# Patient Record
Sex: FEMALE | Race: OTHER | Employment: FULL TIME | URBAN - METROPOLITAN AREA
[De-identification: names, ages, dates, MRNs, and addresses within clinical notes are randomized per-mention and may not be internally consistent; named-entity substitution may affect disease eponyms.]

---

## 2017-04-13 ENCOUNTER — HOSPITAL ENCOUNTER (OUTPATIENT)
Age: 27
Setting detail: SPECIMEN
Discharge: HOME | End: 2017-04-13
Payer: MEDICARE

## 2017-04-13 LAB
ABSOLUTE EOS #: 0.2 K/UL (ref 0–0.4)
ABSOLUTE LYMPH #: 4 K/UL (ref 1–4.8)
ABSOLUTE MONO #: 0.6 K/UL (ref 0.1–1.2)
ALBUMIN SERPL-MCNC: 4.1 G/DL (ref 3.5–5.2)
ALBUMIN/GLOBULIN RATIO: 1.4 (ref 1–2.5)
ALP BLD-CCNC: 106 U/L (ref 35–104)
ALT SERPL-CCNC: 14 U/L (ref 5–33)
ANION GAP SERPL CALCULATED.3IONS-SCNC: 16 MMOL/L (ref 9–17)
AST SERPL-CCNC: 8 U/L
BASOPHILS # BLD: 1 % (ref 0–2)
BASOPHILS ABSOLUTE: 0 K/UL (ref 0–0.2)
BILIRUB SERPL-MCNC: 0.18 MG/DL (ref 0.3–1.2)
BILIRUBIN DIRECT: 0.08 MG/DL
BILIRUBIN, INDIRECT: 0.1 MG/DL (ref 0–1)
BUN BLDV-MCNC: 8 MG/DL (ref 6–20)
BUN/CREAT BLD: ABNORMAL (ref 9–20)
CALCIUM SERPL-MCNC: 9.1 MG/DL (ref 8.6–10.4)
CHLORIDE BLD-SCNC: 98 MMOL/L (ref 98–107)
CHOLESTEROL/HDL RATIO: 16.9
CHOLESTEROL: 305 MG/DL
CO2: 22 MMOL/L (ref 20–31)
CREAT SERPL-MCNC: 0.43 MG/DL (ref 0.5–0.9)
DIFFERENTIAL TYPE: ABNORMAL
EOSINOPHILS RELATIVE PERCENT: 2 % (ref 1–4)
ESTIMATED AVERAGE GLUCOSE: 292 MG/DL
GFR AFRICAN AMERICAN: >60 ML/MIN
GFR NON-AFRICAN AMERICAN: >60 ML/MIN
GFR SERPL CREATININE-BSD FRML MDRD: ABNORMAL ML/MIN/{1.73_M2}
GFR SERPL CREATININE-BSD FRML MDRD: ABNORMAL ML/MIN/{1.73_M2}
GLOBULIN: ABNORMAL G/DL (ref 1.5–3.8)
GLUCOSE BLD-MCNC: 331 MG/DL (ref 70–99)
HBA1C MFR BLD: 11.8 % (ref 4–6)
HCT VFR BLD CALC: 48.2 % (ref 36–46)
HDLC SERPL-MCNC: 18 MG/DL
HEMOGLOBIN: 16.1 G/DL (ref 12–16)
LDL CHOLESTEROL DIRECT: 109 MG/DL
LDL CHOLESTEROL: ABNORMAL MG/DL (ref 0–130)
LYMPHOCYTES # BLD: 41 % (ref 24–44)
MCH RBC QN AUTO: 30 PG (ref 26–34)
MCHC RBC AUTO-ENTMCNC: 33.5 G/DL (ref 31–37)
MCV RBC AUTO: 89.8 FL (ref 80–100)
MONOCYTES # BLD: 6 % (ref 2–11)
PDW BLD-RTO: 13.5 % (ref 12.5–15.4)
PLATELET # BLD: 298 K/UL (ref 140–450)
PLATELET ESTIMATE: ABNORMAL
PMV BLD AUTO: 9.1 FL (ref 6–12)
POTASSIUM SERPL-SCNC: 4.3 MMOL/L (ref 3.7–5.3)
RBC # BLD: 5.36 M/UL (ref 4–5.2)
RBC # BLD: ABNORMAL 10*6/UL
SEG NEUTROPHILS: 50 % (ref 36–66)
SEGMENTED NEUTROPHILS ABSOLUTE COUNT: 4.8 K/UL (ref 1.8–7.7)
SODIUM BLD-SCNC: 136 MMOL/L (ref 135–144)
T3 FREE: 2.82 PG/ML (ref 2.02–4.43)
THYROXINE, FREE: 1.17 NG/DL (ref 0.93–1.7)
TOTAL PROTEIN: 7 G/DL (ref 6.4–8.3)
TRIGL SERPL-MCNC: 1425 MG/DL
TSH SERPL DL<=0.05 MIU/L-ACNC: 1.11 MIU/L (ref 0.3–5)
VLDLC SERPL CALC-MCNC: ABNORMAL MG/DL (ref 1–30)
WBC # BLD: 9.6 K/UL (ref 3.5–11)
WBC # BLD: ABNORMAL 10*3/UL

## 2017-05-12 ENCOUNTER — HOSPITAL ENCOUNTER (EMERGENCY)
Age: 27
Discharge: HOME | End: 2017-05-12
Attending: EMERGENCY MEDICINE
Payer: MEDICARE

## 2017-05-12 ENCOUNTER — APPOINTMENT (OUTPATIENT)
Dept: CT IMAGING | Age: 27
End: 2017-05-12
Payer: MEDICARE

## 2017-05-12 VITALS
HEART RATE: 83 BPM | TEMPERATURE: 98.7 F | SYSTOLIC BLOOD PRESSURE: 144 MMHG | OXYGEN SATURATION: 99 % | DIASTOLIC BLOOD PRESSURE: 80 MMHG | RESPIRATION RATE: 18 BRPM

## 2017-05-12 DIAGNOSIS — H92.02 OTALGIA OF LEFT EAR: Primary | ICD-10-CM

## 2017-05-12 DIAGNOSIS — H60.392 INFECTIVE OTITIS EXTERNA, LEFT: ICD-10-CM

## 2017-05-12 LAB
ABSOLUTE EOS #: 0.2 K/UL (ref 0–0.4)
ABSOLUTE LYMPH #: 2.7 K/UL (ref 1–4.8)
ABSOLUTE MONO #: 0.8 K/UL (ref 0.1–1.2)
ANION GAP SERPL CALCULATED.3IONS-SCNC: 15 MMOL/L (ref 9–17)
BASOPHILS # BLD: 0 %
BASOPHILS ABSOLUTE: 0 K/UL (ref 0–0.2)
BUN BLDV-MCNC: 5 MG/DL (ref 6–20)
BUN/CREAT BLD: ABNORMAL (ref 9–20)
C-REACTIVE PROTEIN: 17.1 MG/L (ref 0–5)
CALCIUM SERPL-MCNC: 9.2 MG/DL (ref 8.6–10.4)
CHLORIDE BLD-SCNC: 99 MMOL/L (ref 98–107)
CO2: 24 MMOL/L (ref 20–31)
CREAT SERPL-MCNC: 0.39 MG/DL (ref 0.5–0.9)
DIFFERENTIAL TYPE: ABNORMAL
EOSINOPHILS RELATIVE PERCENT: 2 %
GFR AFRICAN AMERICAN: >60 ML/MIN
GFR NON-AFRICAN AMERICAN: >60 ML/MIN
GFR SERPL CREATININE-BSD FRML MDRD: ABNORMAL ML/MIN/{1.73_M2}
GFR SERPL CREATININE-BSD FRML MDRD: ABNORMAL ML/MIN/{1.73_M2}
GLUCOSE BLD-MCNC: 257 MG/DL (ref 70–99)
HCG QUALITATIVE: NEGATIVE
HCT VFR BLD CALC: 44.4 % (ref 36–46)
HEMOGLOBIN: 14.9 G/DL (ref 12–16)
LYMPHOCYTES # BLD: 24 %
MCH RBC QN AUTO: 29.8 PG (ref 26–34)
MCHC RBC AUTO-ENTMCNC: 33.5 G/DL (ref 31–37)
MCV RBC AUTO: 88.9 FL (ref 80–100)
MONOCYTES # BLD: 7 %
PDW BLD-RTO: 13.6 % (ref 12.5–15.4)
PLATELET # BLD: 252 K/UL (ref 140–450)
PLATELET ESTIMATE: ABNORMAL
PMV BLD AUTO: 8.2 FL (ref 6–12)
POTASSIUM SERPL-SCNC: 3.7 MMOL/L (ref 3.7–5.3)
RBC # BLD: 4.99 M/UL (ref 4–5.2)
RBC # BLD: ABNORMAL 10*6/UL
SEG NEUTROPHILS: 67 %
SEGMENTED NEUTROPHILS ABSOLUTE COUNT: 7.8 K/UL (ref 1.8–7.7)
SODIUM BLD-SCNC: 138 MMOL/L (ref 135–144)
WBC # BLD: 11.5 K/UL (ref 3.5–11)
WBC # BLD: ABNORMAL 10*3/UL

## 2017-05-12 PROCEDURE — 86140 C-REACTIVE PROTEIN: CPT

## 2017-05-12 PROCEDURE — 6360000004 HC RX CONTRAST MEDICATION: Performed by: EMERGENCY MEDICINE

## 2017-05-12 PROCEDURE — 70481 CT ORBIT/EAR/FOSSA W/DYE: CPT

## 2017-05-12 PROCEDURE — 80048 BASIC METABOLIC PNL TOTAL CA: CPT

## 2017-05-12 PROCEDURE — 99283 EMERGENCY DEPT VISIT LOW MDM: CPT

## 2017-05-12 PROCEDURE — 84703 CHORIONIC GONADOTROPIN ASSAY: CPT

## 2017-05-12 PROCEDURE — 85025 COMPLETE CBC W/AUTO DIFF WBC: CPT

## 2017-05-12 RX ORDER — AMOXICILLIN 500 MG/1
500 CAPSULE ORAL 3 TIMES DAILY
Qty: 30 CAPSULE | Refills: 0 | Status: SHIPPED | OUTPATIENT
Start: 2017-05-12 | End: 2017-05-22

## 2017-05-12 RX ORDER — CIPROFLOXACIN AND DEXAMETHASONE 3; 1 MG/ML; MG/ML
4 SUSPENSION/ DROPS AURICULAR (OTIC) 2 TIMES DAILY
Qty: 1 BOTTLE | Refills: 0 | Status: SHIPPED | OUTPATIENT
Start: 2017-05-12 | End: 2017-05-19

## 2017-05-12 RX ADMIN — IOVERSOL 70 ML: 741 INJECTION INTRA-ARTERIAL; INTRAVENOUS at 17:22

## 2017-05-12 ASSESSMENT — ENCOUNTER SYMPTOMS
SHORTNESS OF BREATH: 0
ABDOMINAL PAIN: 0
RHINORRHEA: 0
BACK PAIN: 0
SINUS PRESSURE: 0
NAUSEA: 0
COUGH: 0
VOMITING: 0
SORE THROAT: 0

## 2018-06-27 ENCOUNTER — HOSPITAL ENCOUNTER (EMERGENCY)
Age: 28
Discharge: HOME | End: 2018-06-27
Attending: EMERGENCY MEDICINE
Payer: MEDICARE

## 2018-06-27 VITALS
TEMPERATURE: 98.4 F | RESPIRATION RATE: 16 BRPM | BODY MASS INDEX: 29.35 KG/M2 | DIASTOLIC BLOOD PRESSURE: 103 MMHG | OXYGEN SATURATION: 97 % | HEART RATE: 92 BPM | SYSTOLIC BLOOD PRESSURE: 157 MMHG | HEIGHT: 70 IN | WEIGHT: 205 LBS

## 2018-06-27 DIAGNOSIS — H01.001 BLEPHARITIS OF RIGHT UPPER EYELID, UNSPECIFIED TYPE: Primary | ICD-10-CM

## 2018-06-27 PROCEDURE — 99283 EMERGENCY DEPT VISIT LOW MDM: CPT

## 2018-06-27 RX ORDER — ERYTHROMYCIN 5 MG/G
OINTMENT OPHTHALMIC
Qty: 1 TUBE | Refills: 0 | Status: SHIPPED | OUTPATIENT
Start: 2018-06-27 | End: 2018-07-07

## 2018-06-27 ASSESSMENT — ENCOUNTER SYMPTOMS
VOMITING: 0
PHOTOPHOBIA: 0
FACIAL SWELLING: 1
NAUSEA: 0
SHORTNESS OF BREATH: 0
SINUS PRESSURE: 0
SINUS PAIN: 0
WHEEZING: 0

## 2019-09-17 ENCOUNTER — HOSPITAL ENCOUNTER (EMERGENCY)
Age: 29
Discharge: HOME | End: 2019-09-17
Attending: EMERGENCY MEDICINE

## 2019-09-17 VITALS
HEART RATE: 67 BPM | TEMPERATURE: 97.9 F | OXYGEN SATURATION: 100 % | RESPIRATION RATE: 17 BRPM | DIASTOLIC BLOOD PRESSURE: 98 MMHG | SYSTOLIC BLOOD PRESSURE: 158 MMHG

## 2019-09-17 DIAGNOSIS — S01.01XA LACERATION OF SCALP, INITIAL ENCOUNTER: Primary | ICD-10-CM

## 2019-09-17 PROCEDURE — 99282 EMERGENCY DEPT VISIT SF MDM: CPT

## 2019-09-17 PROCEDURE — 12001 RPR S/N/AX/GEN/TRNK 2.5CM/<: CPT

## 2019-09-17 PROCEDURE — 6360000002 HC RX W HCPCS: Performed by: NURSE PRACTITIONER

## 2019-09-17 PROCEDURE — 90715 TDAP VACCINE 7 YRS/> IM: CPT | Performed by: NURSE PRACTITIONER

## 2019-09-17 PROCEDURE — 90471 IMMUNIZATION ADMIN: CPT | Performed by: NURSE PRACTITIONER

## 2019-09-17 RX ORDER — ACETAMINOPHEN 500 MG
1000 TABLET ORAL EVERY 6 HOURS PRN
Qty: 30 TABLET | Refills: 0 | Status: SHIPPED | OUTPATIENT
Start: 2019-09-17 | End: 2020-09-12

## 2019-09-17 RX ORDER — LIDOCAINE HYDROCHLORIDE 10 MG/ML
20 INJECTION, SOLUTION INFILTRATION; PERINEURAL ONCE
Status: DISCONTINUED | OUTPATIENT
Start: 2019-09-17 | End: 2019-09-17 | Stop reason: HOSPADM

## 2019-09-17 RX ADMIN — TETANUS TOXOID, REDUCED DIPHTHERIA TOXOID AND ACELLULAR PERTUSSIS VACCINE, ADSORBED 0.5 ML: 5; 2.5; 8; 8; 2.5 SUSPENSION INTRAMUSCULAR at 15:05

## 2019-09-17 ASSESSMENT — PAIN SCALES - GENERAL: PAINLEVEL_OUTOF10: 10

## 2019-09-17 ASSESSMENT — PAIN DESCRIPTION - DESCRIPTORS: DESCRIPTORS: ACHING

## 2019-09-17 ASSESSMENT — PAIN DESCRIPTION - LOCATION: LOCATION: HEAD

## 2019-09-17 ASSESSMENT — PAIN DESCRIPTION - ORIENTATION: ORIENTATION: RIGHT

## 2019-09-17 ASSESSMENT — PAIN DESCRIPTION - PAIN TYPE: TYPE: ACUTE PAIN

## 2019-09-17 ASSESSMENT — PAIN DESCRIPTION - FREQUENCY: FREQUENCY: CONTINUOUS

## 2019-09-17 NOTE — ED PROVIDER NOTES
[address] [facility]     Emergency Department     Faculty Attestation    I performed a history and physical examination of the patient and discussed management with the resident. I reviewed the resident´s note and agree with the documented findings and plan of care. Any areas of disagreement are noted on the chart. I was personally present for the key portions of any procedures. I have documented in the chart those procedures where I was not present during the key portions. I have reviewed the emergency nurses triage note. I agree with the chief complaint, past medical history, past surgical history, allergies, medications, social and family history as documented unless otherwise noted below. For Physician Assistant/ Nurse Practitioner cases/documentation I have personally evaluated this patient and have completed at least one if not all key elements of the E/M (history, physical exam, and MDM). Additional findings are as noted. Superficial laceration right frontal scalp, in the hairline, no bony crepitus depression or step-offs, pupils equal round reactive to light, no midline C-spine pain. Needs tetanus update.      Ye Goncalves MD  09/17/19 2191

## 2019-09-17 NOTE — ED NOTES
Pt presents to the ER with complaints of right sided head pain and a small laceration from falling and hitting her head on the TV stand. Pt states she tripped and denies and LOC. She took 2 tylenol in route to the ED. VSS. Bleeding controlled.       Karan Aguilar RN  09/17/19 8021

## 2019-09-17 NOTE — ED PROVIDER NOTES
[facility] ED  Emergency Department Encounter  Mid Level Provider     Pt Name: Megha Randolph  MRN: [id number]  [place] 1990  Date of evaluation: 9/17/19  PCP:  No primary care provider on file. CHIEF COMPLAINT       Chief Complaint   Patient presents with    Laceration     tripped and hit her head on her TV stand. small lac. no LOC. HISTORY OF PRESENT ILLNESS  (Location/Symptom, Timing/Onset,Context/Setting, Quality, Duration, Modifying Factors, Severity.)      Megha Randolph is a 26 y.o. female who presents with accidental trip and fall striking her head on the corner of door. No LOC. Not anticoagulated. Patient with scalp laceration. Unknown last tetanus. Alert oriented without acute distress    PAST MEDICAL /SURGICAL / SOCIAL / FAMILY HISTORY      has a past medical history of Bipolar 1 disorder ([facility] [address] Chest pain, Depression, Hyperlipidemia, Hypertension, Psychiatric problem, and Type II or unspecified type diabetes mellitus without mention of complication, uncontrolled. has no past surgical history on file.     Social History     Socioeconomic History    Marital status: Single     Spouse name: Not on file    Number of children: Not on file    Years of education: Not on file    Highest education level: Not on file   Occupational History    Not on file   Social Needs    Financial resource strain: Not on file    Food insecurity:     Worry: Not on file     Inability: Not on file    Transportation needs:     Medical: Not on file     Non-medical: Not on file   Tobacco Use    Smoking status: Former Smoker     Packs/day: 0.25     Types: Cigarettes   Substance and Sexual Activity    Alcohol use: No    Drug use: No    Sexual activity: Yes     Partners: Female   Lifestyle    Physical activity:     Days per week: Not on file     Minutes per session: Not on file    Stress: Not on file   Relationships    Social connections:     Talks on phone: Not on file

## 2019-09-25 ENCOUNTER — HOSPITAL ENCOUNTER (EMERGENCY)
Age: 29
Discharge: HOME | End: 2019-09-25
Attending: EMERGENCY MEDICINE

## 2019-09-25 VITALS
DIASTOLIC BLOOD PRESSURE: 94 MMHG | HEART RATE: 100 BPM | TEMPERATURE: 97.9 F | SYSTOLIC BLOOD PRESSURE: 145 MMHG | OXYGEN SATURATION: 97 % | RESPIRATION RATE: 20 BRPM

## 2019-09-25 DIAGNOSIS — Z48.02 REMOVAL OF STAPLES: Primary | ICD-10-CM

## 2019-09-25 PROCEDURE — 99281 EMR DPT VST MAYX REQ PHY/QHP: CPT

## 2020-09-12 ENCOUNTER — HOSPITAL ENCOUNTER (EMERGENCY)
Age: 30
Discharge: HOME | End: 2020-09-12
Attending: EMERGENCY MEDICINE
Payer: COMMERCIAL

## 2020-09-12 VITALS
HEART RATE: 87 BPM | BODY MASS INDEX: 27.06 KG/M2 | SYSTOLIC BLOOD PRESSURE: 123 MMHG | RESPIRATION RATE: 14 BRPM | WEIGHT: 189 LBS | DIASTOLIC BLOOD PRESSURE: 79 MMHG | TEMPERATURE: 97.5 F | OXYGEN SATURATION: 98 % | HEIGHT: 70 IN

## 2020-09-12 PROCEDURE — 96372 THER/PROPH/DIAG INJ SC/IM: CPT

## 2020-09-12 PROCEDURE — 6360000002 HC RX W HCPCS: Performed by: EMERGENCY MEDICINE

## 2020-09-12 PROCEDURE — 99283 EMERGENCY DEPT VISIT LOW MDM: CPT

## 2020-09-12 RX ORDER — IBUPROFEN 800 MG/1
800 TABLET ORAL EVERY 8 HOURS PRN
Qty: 30 TABLET | Refills: 0 | Status: SHIPPED | OUTPATIENT
Start: 2020-09-12 | End: 2021-03-10 | Stop reason: ALTCHOICE

## 2020-09-12 RX ORDER — CYCLOBENZAPRINE HCL 10 MG
10 TABLET ORAL 3 TIMES DAILY PRN
Qty: 12 TABLET | Refills: 0 | Status: SHIPPED | OUTPATIENT
Start: 2020-09-12 | End: 2021-03-10 | Stop reason: ALTCHOICE

## 2020-09-12 RX ORDER — DEXAMETHASONE SODIUM PHOSPHATE 4 MG/ML
4 INJECTION, SOLUTION INTRA-ARTICULAR; INTRALESIONAL; INTRAMUSCULAR; INTRAVENOUS; SOFT TISSUE ONCE
Status: COMPLETED | OUTPATIENT
Start: 2020-09-12 | End: 2020-09-12

## 2020-09-12 RX ADMIN — DEXAMETHASONE SODIUM PHOSPHATE 4 MG: 4 INJECTION, SOLUTION INTRAMUSCULAR; INTRAVENOUS at 08:32

## 2020-09-12 ASSESSMENT — PAIN DESCRIPTION - PAIN TYPE: TYPE: ACUTE PAIN

## 2020-09-12 ASSESSMENT — ENCOUNTER SYMPTOMS
NAUSEA: 0
BLOOD IN STOOL: 0
EYE PAIN: 0
CHEST TIGHTNESS: 0
SINUS PRESSURE: 0
VOMITING: 0
COUGH: 0
SHORTNESS OF BREATH: 0
EYE DISCHARGE: 0
FACIAL SWELLING: 0
RHINORRHEA: 0
CONSTIPATION: 0
DIARRHEA: 0
EYE REDNESS: 0
WHEEZING: 0
ABDOMINAL PAIN: 0
TROUBLE SWALLOWING: 0
SORE THROAT: 0
COLOR CHANGE: 0
BACK PAIN: 1

## 2020-09-12 ASSESSMENT — PAIN DESCRIPTION - LOCATION: LOCATION: BACK

## 2020-09-12 ASSESSMENT — PAIN SCALES - GENERAL: PAINLEVEL_OUTOF10: 10

## 2020-09-12 ASSESSMENT — PAIN DESCRIPTION - ORIENTATION: ORIENTATION: LOWER

## 2020-09-12 ASSESSMENT — PAIN DESCRIPTION - FREQUENCY: FREQUENCY: CONTINUOUS

## 2020-09-12 ASSESSMENT — PAIN DESCRIPTION - ONSET: ONSET: SUDDEN

## 2020-09-12 ASSESSMENT — PAIN DESCRIPTION - DIRECTION: RADIATING_TOWARDS: DOWN BOTH LEGS

## 2020-09-12 NOTE — ED NOTES
Mode of arrival (squad #, walk in, police, etc) : walk in       Arrival Note (brief scenario, treatment PTA, etc). : pt reports that 2 days ago she woke up and had lower back pain. She reports this pain radiates down both legs. Pt denies any recent falls and any heavy lifting. C= \"Have you ever felt that you should Cut down on your drinking? \"  No  A= \"Have people Annoyed you by criticizing your drinking? \"  No  G= \"Have you ever felt bad or Guilty about your drinking? \"  No  E= \"Have you ever had a drink as an Eye-opener first thing in the morning to steady your nerves or to help a hangover? \"  No      Deferred []      Reason for deferring: N/A    *If yes to two or more: probable alcohol abuse. Shanita Tao RN  09/12/20 0511

## 2020-09-12 NOTE — ED PROVIDER NOTES
[address] ED  eMERGENCY dEPARTMENT eNCOUnter      Pt Name: Jason James  MRN: [id number]  [place] 1990  Date of evaluation: 9/12/20      CHIEF COMPLAINT       Chief Complaint   Patient presents with    Back Pain         HISTORY OF PRESENT ILLNESS    Jason James is a 31 y.o. female who presents complaining of back pain. Patient states for the last 2 days she has been having severe bilateral lower midline back pain. Patient states she does not recall any trauma. Patient has had a problem like this once before that she went to the hospital for. Patient states she is getting pain numbness and tingling kind of shooting down both legs to the thigh. Patient is denying any bowel or bladder incontinence or retention. Patient states the pain is made worse with movements. REVIEW OF SYSTEMS       Review of Systems   Constitutional: Negative for activity change, appetite change, chills, diaphoresis and fever. HENT: Negative for congestion, ear pain, facial swelling, nosebleeds, rhinorrhea, sinus pressure, sore throat and trouble swallowing. Eyes: Negative for pain, discharge and redness. Respiratory: Negative for cough, chest tightness, shortness of breath and wheezing. Cardiovascular: Negative for chest pain, palpitations and leg swelling. Gastrointestinal: Negative for abdominal pain, blood in stool, constipation, diarrhea, nausea and vomiting. Genitourinary: Negative for difficulty urinating, dysuria, flank pain, frequency, genital sores and hematuria. Musculoskeletal: Positive for back pain. Negative for arthralgias, gait problem, joint swelling, myalgias and neck pain. Skin: Negative for color change, pallor, rash and wound. Neurological: Negative for dizziness, tremors, seizures, syncope, speech difficulty, weakness, numbness and headaches.    Psychiatric/Behavioral: Negative for confusion, decreased concentration, hallucinations, self-injury, sleep disturbance and suicidal ideas. PAST MEDICAL HISTORY     Past Medical History:   Diagnosis Date    Bipolar 1 disorder ([facility] [address]     Chest pain     Depression     Hyperlipidemia     tx started May 2015    Hypertension     tx started May 2015    Psychiatric problem     depression    Type II or unspecified type diabetes mellitus without mention of complication, uncontrolled 2/21/2015    tx started May 2015       SURGICAL HISTORY     No past surgical history on file. CURRENT MEDICATIONS       Current Discharge Medication List      CONTINUE these medications which have NOT CHANGED    Details   !! ibuprofen (ADVIL;MOTRIN) 800 MG tablet Take 1 tablet by mouth every 8 hours as needed for Pain  Qty: 30 tablet, Refills: 0      metFORMIN (GLUCOPHAGE) 500 MG tablet Take 500 mg by mouth 2 times daily (with meals)      Insulin Detemir (LEVEMIR FLEXPEN SC) Inject 25 Units into the skin Daily q am      !! QUEtiapine (SEROQUEL) 25 MG tablet Take 25 mg by mouth 2 times daily. !! QUEtiapine (SEROQUEL) 300 MG tablet Take 300 mg by mouth nightly. sertraline (ZOLOFT) 50 MG tablet Take 50 mg by mouth daily. atorvastatin (LIPITOR) 80 MG tablet Take 1 tablet by mouth nightly. Qty: 30 tablet, Refills: 3      sitaGLIPtan-metformin (JANUMET)  MG per tablet Take 1 tablet by mouth 2 times daily (with meals). Qty: 60 tablet, Refills: 1      Blood Glucose Monitoring Suppl (BLOOD GLUCOSE MONITOR KIT) KIT Check bs bid  Qty: 1 kit, Refills: 0       !! - Potential duplicate medications found. Please discuss with provider. ALLERGIES     has No Known Allergies. SOCIAL HISTORY      reports that she has quit smoking. Her smoking use included cigarettes. She smoked 0.25 packs per day. She does not have any smokeless tobacco history on file. She reports that she does not drink alcohol or use drugs.     PHYSICAL EXAM     INITIAL VITALS: /79   Pulse 87   Temp 97.5 °F (36.4 °C) (Oral)   Resp 14   Ht 5' 10\" (1.778 m) Wt 189 lb (85.7 kg)   SpO2 98%   BMI 27.12 kg/m²      Physical Exam  Vitals signs and nursing note reviewed. Constitutional:       General: She is not in acute distress. Appearance: She is well-developed. She is not diaphoretic. HENT:      Head: Normocephalic and atraumatic. Eyes:      General: No scleral icterus. Right eye: No discharge. Left eye: No discharge. Conjunctiva/sclera: Conjunctivae normal.      Pupils: Pupils are equal, round, and reactive to light. Cardiovascular:      Rate and Rhythm: Normal rate and regular rhythm. Heart sounds: Normal heart sounds. No murmur. No friction rub. No gallop. Pulmonary:      Effort: Pulmonary effort is normal. No respiratory distress. Breath sounds: Normal breath sounds. No wheezing or rales. Chest:      Chest wall: No tenderness. Abdominal:      General: Bowel sounds are normal. There is no distension. Palpations: Abdomen is soft. There is no mass. Tenderness: There is no abdominal tenderness. There is no guarding or rebound. Musculoskeletal:      Lumbar back: She exhibits decreased range of motion, tenderness, pain and spasm. She exhibits no bony tenderness, no swelling, no edema, no deformity, no laceration and normal pulse. Skin:     General: Skin is warm and dry. Coloration: Skin is not pale. Findings: No erythema or rash. Neurological:      Mental Status: She is alert and oriented to person, place, and time. Cranial Nerves: No cranial nerve deficit. Sensory: No sensory deficit. Motor: No abnormal muscle tone. Coordination: Coordination normal.      Deep Tendon Reflexes: Reflexes normal.   Psychiatric:         Behavior: Behavior normal.         Thought Content:  Thought content normal.         Judgment: Judgment normal.         DIAGNOSTIC RESULTS     RADIOLOGY:All plain film, CT,MRI, and formal ultrasound images (except ED bedside ultrasound) are read by the radiologist and the interpretations are directly viewed by the emergency physician. LABS: All lab results were reviewed by myself, and all abnormals are listed below. Labs Reviewed - No data to display      MEDICAL DECISION MAKING:     The patient presents with low back pain without signs of spinal cord compression, cauda equina syndrome, infection, aneurysm or other serious etiology. The patient is neurologically intact. Given the extremely low risk of these diagnoses further testing and evaluation for these possibilities does not appear to be indicated at this time. The patient has been instructed to return if the symptoms worsen or change in any way. EMERGENCY DEPARTMENT COURSE:   Vitals:    Vitals:    09/12/20 0810   BP: 123/79   Pulse: 87   Resp: 14   Temp: 97.5 °F (36.4 °C)   TempSrc: Oral   SpO2: 98%   Weight: 189 lb (85.7 kg)   Height: 5' 10\" (1.778 m)       The patient was given the following medications while in the emergency department:  Orders Placed This Encounter   Medications    dexamethasone (DECADRON) injection 4 mg    cyclobenzaprine (FLEXERIL) 10 MG tablet     Sig: Take 1 tablet by mouth 3 times daily as needed for Muscle spasms     Dispense:  12 tablet     Refill:  0    ibuprofen (ADVIL;MOTRIN) 800 MG tablet     Sig: Take 1 tablet by mouth every 8 hours as needed for Pain or Fever     Dispense:  30 tablet     Refill:  0       -------------------------      CONSULTS:  None    PROCEDURES:  None    FINAL IMPRESSION      1.  Acute bilateral low back pain with bilateral sciatica          DISPOSITION/PLAN   DISPOSITION Decision To Discharge 09/12/2020 08:06:39 AM      PATIENT REFERREDTO:  [facility]  [facility] [address]  [phone]    If symptoms worsen      DISCHARGEMEDICATIONS:  Current Discharge Medication List          (Please note that portions of this note were completed with a voice recognition program.  Efforts were made to edit thedictations but occasionally words are mis-transcribed.)    Bhupinder Vega MD  Attending Emergency Physician                        Bhupinder Vega MD  09/12/20 2599

## 2021-03-10 ENCOUNTER — HOSPITAL ENCOUNTER (EMERGENCY)
Age: 31
Discharge: HOME | End: 2021-03-10
Attending: EMERGENCY MEDICINE
Payer: COMMERCIAL

## 2021-03-10 VITALS
HEART RATE: 97 BPM | DIASTOLIC BLOOD PRESSURE: 83 MMHG | RESPIRATION RATE: 18 BRPM | OXYGEN SATURATION: 98 % | SYSTOLIC BLOOD PRESSURE: 133 MMHG | TEMPERATURE: 98.2 F

## 2021-03-10 DIAGNOSIS — M54.50 ACUTE EXACERBATION OF CHRONIC LOW BACK PAIN: Primary | ICD-10-CM

## 2021-03-10 DIAGNOSIS — G89.29 ACUTE EXACERBATION OF CHRONIC LOW BACK PAIN: Primary | ICD-10-CM

## 2021-03-10 PROCEDURE — 6370000000 HC RX 637 (ALT 250 FOR IP): Performed by: EMERGENCY MEDICINE

## 2021-03-10 PROCEDURE — 96372 THER/PROPH/DIAG INJ SC/IM: CPT

## 2021-03-10 PROCEDURE — 99282 EMERGENCY DEPT VISIT SF MDM: CPT

## 2021-03-10 PROCEDURE — 6360000002 HC RX W HCPCS: Performed by: EMERGENCY MEDICINE

## 2021-03-10 RX ORDER — PREDNISONE 10 MG/1
TABLET ORAL
Qty: 16 TABLET | Refills: 0 | Status: SHIPPED | OUTPATIENT
Start: 2021-03-10 | End: 2021-03-15

## 2021-03-10 RX ORDER — CYCLOBENZAPRINE HCL 5 MG
5 TABLET ORAL 2 TIMES DAILY PRN
Qty: 20 TABLET | Refills: 0 | Status: SHIPPED | OUTPATIENT
Start: 2021-03-10 | End: 2021-03-20

## 2021-03-10 RX ORDER — KETOROLAC TROMETHAMINE 30 MG/ML
30 INJECTION, SOLUTION INTRAMUSCULAR; INTRAVENOUS ONCE
Status: COMPLETED | OUTPATIENT
Start: 2021-03-10 | End: 2021-03-10

## 2021-03-10 RX ORDER — PREDNISONE 20 MG/1
40 TABLET ORAL ONCE
Status: COMPLETED | OUTPATIENT
Start: 2021-03-10 | End: 2021-03-10

## 2021-03-10 RX ORDER — IBUPROFEN 600 MG/1
600 TABLET ORAL EVERY 6 HOURS PRN
Qty: 30 TABLET | Refills: 0 | Status: SHIPPED | OUTPATIENT
Start: 2021-03-10 | End: 2021-05-06 | Stop reason: SDUPTHER

## 2021-03-10 RX ADMIN — KETOROLAC TROMETHAMINE 30 MG: 30 INJECTION, SOLUTION INTRAMUSCULAR; INTRAVENOUS at 12:12

## 2021-03-10 RX ADMIN — PREDNISONE 40 MG: 20 TABLET ORAL at 12:13

## 2021-03-10 ASSESSMENT — PAIN DESCRIPTION - ORIENTATION: ORIENTATION: LOWER

## 2021-03-10 ASSESSMENT — PAIN SCALES - GENERAL
PAINLEVEL_OUTOF10: 10
PAINLEVEL_OUTOF10: 10

## 2021-03-10 ASSESSMENT — ENCOUNTER SYMPTOMS
ABDOMINAL PAIN: 0
BACK PAIN: 1
ABDOMINAL DISTENTION: 0

## 2021-03-10 ASSESSMENT — PAIN DESCRIPTION - LOCATION: LOCATION: BACK
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
## 2021-05-06 ENCOUNTER — HOSPITAL ENCOUNTER (EMERGENCY)
Age: 31
Discharge: HOME | End: 2021-05-06
Attending: EMERGENCY MEDICINE
Payer: COMMERCIAL

## 2021-05-06 VITALS
WEIGHT: 189 LBS | HEIGHT: 69 IN | HEART RATE: 96 BPM | OXYGEN SATURATION: 96 % | DIASTOLIC BLOOD PRESSURE: 82 MMHG | TEMPERATURE: 96.8 F | SYSTOLIC BLOOD PRESSURE: 149 MMHG | RESPIRATION RATE: 16 BRPM | BODY MASS INDEX: 27.99 KG/M2

## 2021-05-06 DIAGNOSIS — M54.31 SCIATICA OF RIGHT SIDE: Primary | ICD-10-CM

## 2021-05-06 PROCEDURE — 99283 EMERGENCY DEPT VISIT LOW MDM: CPT

## 2021-05-06 PROCEDURE — 6370000000 HC RX 637 (ALT 250 FOR IP): Performed by: STUDENT IN AN ORGANIZED HEALTH CARE EDUCATION/TRAINING PROGRAM

## 2021-05-06 RX ORDER — CYCLOBENZAPRINE HCL 10 MG
10 TABLET ORAL NIGHTLY PRN
Qty: 10 TABLET | Refills: 0 | Status: SHIPPED | OUTPATIENT
Start: 2021-05-06 | End: 2021-05-16

## 2021-05-06 RX ORDER — LIDOCAINE 4 G/G
1 PATCH TOPICAL ONCE
Status: DISCONTINUED | OUTPATIENT
Start: 2021-05-06 | End: 2021-05-06 | Stop reason: HOSPADM

## 2021-05-06 RX ORDER — IBUPROFEN 800 MG/1
800 TABLET ORAL ONCE
Status: COMPLETED | OUTPATIENT
Start: 2021-05-06 | End: 2021-05-06

## 2021-05-06 RX ORDER — ACETAMINOPHEN 500 MG
1000 TABLET ORAL 3 TIMES DAILY PRN
Qty: 60 TABLET | Refills: 1 | Status: SHIPPED | OUTPATIENT
Start: 2021-05-06

## 2021-05-06 RX ORDER — ACETAMINOPHEN 500 MG
1000 TABLET ORAL ONCE
Status: COMPLETED | OUTPATIENT
Start: 2021-05-06 | End: 2021-05-06

## 2021-05-06 RX ORDER — IBUPROFEN 600 MG/1
600 TABLET ORAL EVERY 6 HOURS PRN
Qty: 30 TABLET | Refills: 0 | Status: SHIPPED | OUTPATIENT
Start: 2021-05-06

## 2021-05-06 RX ADMIN — IBUPROFEN 800 MG: 800 TABLET, FILM COATED ORAL at 12:14

## 2021-05-06 RX ADMIN — ACETAMINOPHEN 1000 MG: 500 TABLET ORAL at 12:14

## 2021-05-06 ASSESSMENT — ENCOUNTER SYMPTOMS
SHORTNESS OF BREATH: 0
ABDOMINAL PAIN: 0
COUGH: 0
VOMITING: 0
NAUSEA: 0
BACK PAIN: 1

## 2021-05-06 NOTE — ED TRIAGE NOTES
Pt arrived to the ED with c/o lower back pain and radiates down right leg. Pt states she usually has this sciatica pain and she receives a steroids and is discharged. Pt states this is day 3 of pain. Pt is alert and oriented x4.

## 2021-05-06 NOTE — ED PROVIDER NOTES
use: No    Drug use: Yes     Types: Marijuana    Sexual activity: Yes     Partners: Female   Lifestyle    Physical activity     Days per week: Not on file     Minutes per session: Not on file    Stress: Not on file   Relationships    Social connections     Talks on phone: Not on file     Gets together: Not on file     Attends Judaism service: Not on file     Active member of club or organization: Not on file     Attends meetings of clubs or organizations: Not on file     Relationship status: Not on file    Intimate partner violence     Fear of current or ex partner: Not on file     Emotionally abused: Not on file     Physically abused: Not on file     Forced sexual activity: Not on file   Other Topics Concern    Not on file   Social History Narrative    Not on file       Family History   Problem Relation Age of Onset    Diabetes Mother         Allergies:  Patient has no known allergies. Home Medications:  Prior to Admission medications    Medication Sig Start Date End Date Taking? Authorizing Provider   ibuprofen (ADVIL;MOTRIN) 600 MG tablet Take 1 tablet by mouth every 6 hours as needed for Pain 5/6/21  Yes Elizabeth Delvalle DO   acetaminophen (TYLENOL) 500 MG tablet Take 2 tablets by mouth 3 times daily as needed for Pain 5/6/21  Yes Elizabeth Delvalle DO   cyclobenzaprine (FLEXERIL) 10 MG tablet Take 1 tablet by mouth nightly as needed for Muscle spasms 5/6/21 5/16/21 Yes Elizabeth Delvalle DO   metFORMIN (GLUCOPHAGE) 500 MG tablet Take 500 mg by mouth 2 times daily (with meals)    Historical Provider, MD   Insulin Detemir (LEVEMIR FLEXPEN SC) Inject 25 Units into the skin Daily q am    Historical Provider, MD   QUEtiapine (SEROQUEL) 25 MG tablet Take 25 mg by mouth 2 times daily. Historical Provider, MD   QUEtiapine (SEROQUEL) 300 MG tablet Take 300 mg by mouth nightly. Historical Provider, MD   sertraline (ZOLOFT) 50 MG tablet Take 50 mg by mouth daily.     Historical Provider, MD   atorvastatin (LIPITOR) 80 MG tablet Take 1 tablet by mouth nightly. 2/21/15   Dagoberto HOYT Blood, DO   sitaGLIPtan-metformin (JANUMET)  MG per tablet Take 1 tablet by mouth 2 times daily (with meals). 2/21/15   Dagoberto HOYT Blood, DO   Blood Glucose Monitoring Suppl (BLOOD GLUCOSE MONITOR KIT) KIT Check bs bid 2/21/15   Klaudia Ortega LAI Blood, DO       REVIEW OFSYSTEMS    (2-9 systems for level 4, 10 or more for level 5)      Review of Systems   Constitutional: Negative for chills and fever. Respiratory: Negative for cough and shortness of breath. Cardiovascular: Negative for chest pain, palpitations and leg swelling. Gastrointestinal: Negative for abdominal pain, nausea and vomiting. Genitourinary: Negative for dysuria and hematuria. Musculoskeletal: Positive for back pain. Denies trauma. Skin: Negative for rash and wound. Neurological: Positive for numbness. Negative for syncope, weakness and light-headedness. Positive for numbness and tingling in the right leg. Denies saddle paresthesias, urinary retention, or urinary/bowel incontinence. PHYSICAL EXAM   (up to 7 for level 4, 8 or more forlevel 5)      INITIAL VITALS:   ED Triage Vitals   BP Temp Temp Source Pulse Resp SpO2 Height Weight   05/06/21 1144 05/06/21 1109 05/06/21 1109 05/06/21 1144 05/06/21 1144 05/06/21 1144 05/06/21 1141 05/06/21 1141   (!) 149/82 96.8 °F (36 °C) Infrared 96 16 96 % 5' 9\" (1.753 m) 189 lb (85.7 kg)       Physical Exam  Vitals signs and nursing note reviewed. Constitutional:       General: She is not in acute distress. Appearance: Normal appearance. She is not ill-appearing, toxic-appearing or diaphoretic. Cardiovascular:      Rate and Rhythm: Normal rate and regular rhythm. Heart sounds: No murmur. No gallop. Pulmonary:      Effort: Pulmonary effort is normal.      Breath sounds: Normal breath sounds. Abdominal:      General: There is no distension. Palpations: Abdomen is soft.       Tenderness: There is no abdominal tenderness. There is no guarding. Musculoskeletal:      Right lower leg: No edema. Left lower leg: No edema. Comments: No midline spinal tenderness. Skin:     General: Skin is warm and dry. Neurological:      Mental Status: She is alert. Comments: Bilateral lower extremities are neurovascularly intact with normal gait, normal sensation, normal pulses, normal range of motion, normal strength. DTRs normal.         DIFFERENTIAL  DIAGNOSIS     PLAN (LABS / IMAGING / EKG):  No orders of the defined types were placed in this encounter. MEDICATIONS ORDERED:  Orders Placed This Encounter   Medications    ibuprofen (ADVIL;MOTRIN) tablet 800 mg    acetaminophen (TYLENOL) tablet 1,000 mg    DISCONTD: lidocaine 4 % external patch 1 patch    ibuprofen (ADVIL;MOTRIN) 600 MG tablet     Sig: Take 1 tablet by mouth every 6 hours as needed for Pain     Dispense:  30 tablet     Refill:  0    acetaminophen (TYLENOL) 500 MG tablet     Sig: Take 2 tablets by mouth 3 times daily as needed for Pain     Dispense:  60 tablet     Refill:  1    cyclobenzaprine (FLEXERIL) 10 MG tablet     Sig: Take 1 tablet by mouth nightly as needed for Muscle spasms     Dispense:  10 tablet     Refill:  0       DDX: Sciatica, muscle strain    Initial MDM/Plan/ED course: 27 y.o. female who presents with acute on chronic low back pain with numbness and tingling in the right leg which is not new for the patient. On exam vitals are normal patient is in no acute distress. Physical exam with no evidence of midline spinal tenderness. No red flag signs or symptoms for low back pain. Bilateral lower extremities are neurovascular intact with normal strength, sensation, range of motion, pulses, normal gait. Patient treated with anti-inflammatories and lidocaine patch. Patient was not treated with muscle relaxer as she drove herself here and is planning to drive herself home.   Patient also not treated with steroids even though she has requested this in the past because she has a history of poorly controlled diabetes with most recent A1c 11.8. Low concern for AAA due to patient's age and recurrent symptoms for low back pain. Patient treated symptomatically and given instructions for follow-up. DIAGNOSTIC RESULTS / EMERGENCY DEPARTMENT COURSE / MDM     LABS:  Labs Reviewed - No data to display      RADIOLOGY:  No results found. EKG      All EKG's are interpreted by the [facility] Physician who either signs or Co-signs this chart in the absence of a cardiologist.      PROCEDURES:  None    CONSULTS:  None    CRITICAL CARE:  Please see attending note    FINAL IMPRESSION      1.  Sciatica of right side          DISPOSITION / PLAN     DISPOSITION Decision To Discharge 05/06/2021 12:09:53 PM      PATIENT REFERRED TO:  [facility]  [address] [place] [address]  [phone]  Schedule an appointment as soon as possible for a visit   follow up      DISCHARGE MEDICATIONS:  Discharge Medication List as of 5/6/2021 12:13 PM      START taking these medications    Details   acetaminophen (TYLENOL) 500 MG tablet Take 2 tablets by mouth 3 times daily as needed for Pain, Disp-60 tablet, R-1Print      cyclobenzaprine (FLEXERIL) 10 MG tablet Take 1 tablet by mouth nightly as needed for Muscle spasms, Disp-10 tablet, R-0Print             Raquel Yates DO  Emergency Medicine Resident    (Please note that portions of this note were completed with a voice recognition program.Efforts were made to edit the dictations but occasionally words are mis-transcribed.)        Jackeline Mora DO  Resident  05/06/21 7062

## 2021-05-06 NOTE — LETTER
[facility]  [address] [place] [address]  Phone: [phone]               May 6, 2021    Patient: Kevin Foss   YOB: 1990   Date of Visit: 5/6/2021       To Whom It May Concern:    Renetta Quijano was seen and treated in our emergency department on 5/6/2021. She can return to work on 5/7/2021.       Sincerely,       Tahir Luke RN         Signature:__________________________________

## 2021-05-06 NOTE — ED PROVIDER NOTES
[facility]     Emergency Department     Faculty Attestation    I performed a history and physical examination of the patient and discussed management with the resident. I reviewed the resident´s note and agree with the documented findings and plan of care. Any areas of disagreement are noted on the chart. I was personally present for the key portions of any procedures. I have documented in the chart those procedures where I was not present during the key portions. I have reviewed the emergency nurses triage note. I agree with the chief complaint, past medical history, past surgical history, allergies, medications, social and family history as documented unless otherwise noted below. For Physician Assistant/ Nurse Practitioner cases/documentation I have personally evaluated this patient and have completed at least one if not all key elements of the E/M (history, physical exam, and MDM). Additional findings are as noted. Struve sciatica for 2 years, patient is instrumented diabetic, patient is afebrile, no needle track marks seen, on straight leg testing on the right the patient states that it caused a feeling of muscle pulling in the back of her thigh, pain did not radiate past the knee. Peripheral neurovascular was normal except for decreased sensation in the S1 and L5 region. Good muscle strength, normal position sense of the toes. Patient does not appear to be in any distress. Patient denies bowel or bladder problems.      Nakul Burns MD  05/06/21 [id number]

## 2021-05-14 ENCOUNTER — HOSPITAL ENCOUNTER (OUTPATIENT)
Age: 31
Discharge: HOME | End: 2021-05-14
Payer: COMMERCIAL

## 2021-05-14 ENCOUNTER — TELEPHONE (OUTPATIENT)
Dept: FAMILY MEDICINE CLINIC | Age: 31
End: 2021-05-14

## 2021-05-14 ENCOUNTER — OFFICE VISIT (OUTPATIENT)
Dept: FAMILY MEDICINE CLINIC | Age: 31
End: 2021-05-14
Payer: COMMERCIAL

## 2021-05-14 VITALS
WEIGHT: 179 LBS | HEART RATE: 91 BPM | BODY MASS INDEX: 26.43 KG/M2 | SYSTOLIC BLOOD PRESSURE: 134 MMHG | DIASTOLIC BLOOD PRESSURE: 92 MMHG

## 2021-05-14 DIAGNOSIS — G89.29 CHRONIC BILATERAL LOW BACK PAIN WITH BILATERAL SCIATICA: ICD-10-CM

## 2021-05-14 DIAGNOSIS — M54.42 ACUTE BACK PAIN WITH SCIATICA, LEFT: Primary | ICD-10-CM

## 2021-05-14 DIAGNOSIS — M54.42 CHRONIC BILATERAL LOW BACK PAIN WITH BILATERAL SCIATICA: Primary | ICD-10-CM

## 2021-05-14 DIAGNOSIS — F12.20 TETRAHYDROCANNABINOL (THC) USE DISORDER, MODERATE, DEPENDENCE (HCC): ICD-10-CM

## 2021-05-14 DIAGNOSIS — M54.42 CHRONIC BILATERAL LOW BACK PAIN WITH BILATERAL SCIATICA: ICD-10-CM

## 2021-05-14 DIAGNOSIS — Z11.4 SCREENING FOR HIV (HUMAN IMMUNODEFICIENCY VIRUS): ICD-10-CM

## 2021-05-14 DIAGNOSIS — M54.41 CHRONIC BILATERAL LOW BACK PAIN WITH BILATERAL SCIATICA: Primary | ICD-10-CM

## 2021-05-14 DIAGNOSIS — Z11.59 ENCOUNTER FOR HEPATITIS C SCREENING TEST FOR LOW RISK PATIENT: ICD-10-CM

## 2021-05-14 DIAGNOSIS — I10 ESSENTIAL HYPERTENSION: ICD-10-CM

## 2021-05-14 DIAGNOSIS — E11.9 TYPE 2 DIABETES MELLITUS WITHOUT COMPLICATION, UNSPECIFIED WHETHER LONG TERM INSULIN USE (HCC): ICD-10-CM

## 2021-05-14 DIAGNOSIS — F32.A DEPRESSION, UNSPECIFIED DEPRESSION TYPE: ICD-10-CM

## 2021-05-14 DIAGNOSIS — G89.29 CHRONIC BILATERAL LOW BACK PAIN WITH BILATERAL SCIATICA: Primary | ICD-10-CM

## 2021-05-14 DIAGNOSIS — M54.41 CHRONIC BILATERAL LOW BACK PAIN WITH BILATERAL SCIATICA: ICD-10-CM

## 2021-05-14 DIAGNOSIS — E11.9 TYPE 2 DIABETES MELLITUS WITHOUT COMPLICATION, UNSPECIFIED WHETHER LONG TERM INSULIN USE (HCC): Primary | ICD-10-CM

## 2021-05-14 LAB
ALBUMIN SERPL-MCNC: 4.2 G/DL (ref 3.5–5.2)
ALBUMIN/GLOBULIN RATIO: 1.2 (ref 1–2.5)
ALP BLD-CCNC: 119 U/L (ref 35–104)
ALT SERPL-CCNC: 12 U/L (ref 5–33)
ANION GAP SERPL CALCULATED.3IONS-SCNC: 13 MMOL/L (ref 9–17)
AST SERPL-CCNC: 8 U/L
BILIRUB SERPL-MCNC: 0.24 MG/DL (ref 0.3–1.2)
BUN BLDV-MCNC: 6 MG/DL (ref 6–20)
BUN/CREAT BLD: ABNORMAL (ref 9–20)
CALCIUM SERPL-MCNC: 9 MG/DL (ref 8.6–10.4)
CHLORIDE BLD-SCNC: 98 MMOL/L (ref 98–107)
CHOLESTEROL/HDL RATIO: 8.2
CHOLESTEROL: 272 MG/DL
CO2: 25 MMOL/L (ref 20–31)
CREAT SERPL-MCNC: 0.34 MG/DL (ref 0.5–0.9)
CREATININE URINE: 35 MG/DL (ref 28–217)
ESTIMATED AVERAGE GLUCOSE: 355 MG/DL
GFR AFRICAN AMERICAN: >60 ML/MIN
GFR NON-AFRICAN AMERICAN: >60 ML/MIN
GFR SERPL CREATININE-BSD FRML MDRD: ABNORMAL ML/MIN/{1.73_M2}
GFR SERPL CREATININE-BSD FRML MDRD: ABNORMAL ML/MIN/{1.73_M2}
GLUCOSE BLD-MCNC: 318 MG/DL (ref 70–99)
HBA1C MFR BLD: 14 % (ref 4–6)
HDLC SERPL-MCNC: 33 MG/DL
HEPATITIS C ANTIBODY: NONREACTIVE
HIV AG/AB: NONREACTIVE
LDL CHOLESTEROL DIRECT: 154 MG/DL
LDL CHOLESTEROL: ABNORMAL MG/DL (ref 0–130)
MICROALBUMIN/CREAT 24H UR: 100 MG/L
MICROALBUMIN/CREAT UR-RTO: 286 MCG/MG CREAT
POTASSIUM SERPL-SCNC: 4.1 MMOL/L (ref 3.7–5.3)
SODIUM BLD-SCNC: 136 MMOL/L (ref 135–144)
TOTAL PROTEIN: 7.6 G/DL (ref 6.4–8.3)
TRIGL SERPL-MCNC: 582 MG/DL
TSH SERPL DL<=0.05 MIU/L-ACNC: 0.98 MIU/L (ref 0.3–5)
VLDLC SERPL CALC-MCNC: ABNORMAL MG/DL (ref 1–30)

## 2021-05-14 PROCEDURE — 83721 ASSAY OF BLOOD LIPOPROTEIN: CPT

## 2021-05-14 PROCEDURE — 87389 HIV-1 AG W/HIV-1&-2 AB AG IA: CPT

## 2021-05-14 PROCEDURE — 80061 LIPID PANEL: CPT

## 2021-05-14 PROCEDURE — 36415 COLL VENOUS BLD VENIPUNCTURE: CPT

## 2021-05-14 PROCEDURE — 4004F PT TOBACCO SCREEN RCVD TLK: CPT | Performed by: STUDENT IN AN ORGANIZED HEALTH CARE EDUCATION/TRAINING PROGRAM

## 2021-05-14 PROCEDURE — 83036 HEMOGLOBIN GLYCOSYLATED A1C: CPT

## 2021-05-14 PROCEDURE — 80053 COMPREHEN METABOLIC PANEL: CPT

## 2021-05-14 PROCEDURE — 2022F DILAT RTA XM EVC RTNOPTHY: CPT | Performed by: STUDENT IN AN ORGANIZED HEALTH CARE EDUCATION/TRAINING PROGRAM

## 2021-05-14 PROCEDURE — 82570 ASSAY OF URINE CREATININE: CPT

## 2021-05-14 PROCEDURE — 84443 ASSAY THYROID STIM HORMONE: CPT

## 2021-05-14 PROCEDURE — 99203 OFFICE O/P NEW LOW 30 MIN: CPT | Performed by: STUDENT IN AN ORGANIZED HEALTH CARE EDUCATION/TRAINING PROGRAM

## 2021-05-14 PROCEDURE — 3046F HEMOGLOBIN A1C LEVEL >9.0%: CPT | Performed by: STUDENT IN AN ORGANIZED HEALTH CARE EDUCATION/TRAINING PROGRAM

## 2021-05-14 PROCEDURE — G8419 CALC BMI OUT NRM PARAM NOF/U: HCPCS | Performed by: STUDENT IN AN ORGANIZED HEALTH CARE EDUCATION/TRAINING PROGRAM

## 2021-05-14 PROCEDURE — G8427 DOCREV CUR MEDS BY ELIG CLIN: HCPCS | Performed by: STUDENT IN AN ORGANIZED HEALTH CARE EDUCATION/TRAINING PROGRAM

## 2021-05-14 PROCEDURE — 86803 HEPATITIS C AB TEST: CPT

## 2021-05-14 PROCEDURE — 82043 UR ALBUMIN QUANTITATIVE: CPT

## 2021-05-14 RX ORDER — LIDOCAINE 50 MG/G
1 PATCH TOPICAL DAILY
Qty: 10 PATCH | Refills: 0 | Status: SHIPPED | OUTPATIENT
Start: 2021-05-14 | End: 2021-05-24

## 2021-05-14 SDOH — ECONOMIC STABILITY: TRANSPORTATION INSECURITY
IN THE PAST 12 MONTHS, HAS THE LACK OF TRANSPORTATION KEPT YOU FROM MEDICAL APPOINTMENTS OR FROM GETTING MEDICATIONS?: NOT ASKED

## 2021-05-14 SDOH — ECONOMIC STABILITY: FOOD INSECURITY: WITHIN THE PAST 12 MONTHS, YOU WORRIED THAT YOUR FOOD WOULD RUN OUT BEFORE YOU GOT MONEY TO BUY MORE.: NOT ASKED

## 2021-05-14 SDOH — ECONOMIC STABILITY: INCOME INSECURITY: HOW HARD IS IT FOR YOU TO PAY FOR THE VERY BASICS LIKE FOOD, HOUSING, MEDICAL CARE, AND HEATING?: VERY HARD

## 2021-05-14 ASSESSMENT — ENCOUNTER SYMPTOMS
CHEST TIGHTNESS: 0
NAUSEA: 0
VOMITING: 0
SHORTNESS OF BREATH: 0
WHEEZING: 0
COUGH: 0
ABDOMINAL PAIN: 0
DIARRHEA: 0

## 2021-05-14 NOTE — PROGRESS NOTES
Attending Physician Statement    Wt Readings from Last 3 Encounters:   05/14/21 179 lb (81.2 kg)   05/06/21 189 lb (85.7 kg)   09/12/20 189 lb (85.7 kg)     Temp Readings from Last 3 Encounters:   05/06/21 96.8 °F (36 °C) (Infrared)   03/10/21 98.2 °F (36.8 °C)   09/12/20 97.5 °F (36.4 °C) (Oral)     BP Readings from Last 3 Encounters:   05/14/21 (!) 134/92   05/06/21 (!) 149/82   03/10/21 133/83     Pulse Readings from Last 3 Encounters:   05/14/21 91   05/06/21 96   03/10/21 97         I have discussed the care of Steffanie Mercado, including pertinent history and exam findings with the resident. I have reviewed the key elements of all parts of the encounter with the resident. I agree with the assessment, plan and orders as documented by the resident.   (GE Modifier)

## 2021-05-14 NOTE — TELEPHONE ENCOUNTER
States she received an order for an MRI but she has a facial dermal piercing and it needs to be surgically removed before she can get the MRI, however she is not willing  to have it removed. She wants to know if you have any other suggestion for alternative testing ?

## 2021-05-14 NOTE — PROGRESS NOTES
Anusha Mireles (:  1990) is a 27 y.o. female,New patient, here for evaluation of the following chief complaint(s):  ED Follow-up (sciatic nerve) and Swelling (both legs and ankles)    ASSESSMENT/PLAN:  1. Type 2 diabetes mellitus without complication, unspecified whether long term insulin use (HCC)  -     Hemoglobin A1C; Future  -     metFORMIN (GLUCOPHAGE) 500 MG tablet; Take 1 tablet by mouth 2 times daily (with meals), Disp-60 tablet, R-0Print  -     Microalbumin, Ur; Future  -     AFL - Kenji Lomeli MD, Ophthalmology, [facility]  -     Comprehensive Metabolic Panel; Future  2. Depression, unspecified depression type  -     TSH With Reflex Ft4; Future  3. Essential hypertension  -     Lipid Panel; Future  -     Comprehensive Metabolic Panel; Future  4. Tetrahydrocannabinol (THC) use disorder, moderate, dependence ([facility] [address]  5. Screening for HIV (human immunodeficiency virus)  -     HIV Screen; Future  6. Encounter for hepatitis C screening test for low risk patient  -     Hepatitis C Antibody; Future  7. Chronic bilateral low back pain with bilateral sciatica  -     [facility]  -     [address] [facility] [address]; Future  -     lidocaine (LIDODERM) 5 %; Place 1 patch onto the skin daily for 10 days 12 hours on, 12 hours off., Disp-10 patch, R-0Print    - Depression reported as stable, improved compared to prior, no SI/HI, pt does not want to initiate medications/therapy at this time, rule out thyroid  - Extensive labs needed - loss to follow-up x several years, last from 2017 - A1c 11.7, not currently on any medications.  Had significant weight loss (diet related) since then (reports was ~300lbs)  - Restart Metformin, pending A1c lab - may require restarting insulin based on prior A1c  - Referral given for DM eye exam  - A significant number of ED visits for pain - PT referral, Pain management referral, lidocaine topical  - Hep C, HIV screens (low risk x both based on history)    Return in about 4 weeks (around 6/11/2021) for f/u labs, chronic pains. SUBJECTIVE/OBJECTIVE:  Pt presents to establish care  [facility] moved from [facility], had a PCP there  Hx DM2 on insulin, last A1c from 2017 was 11.7  Not currently on any medications  Reports significant diet-related weight loss was previously around 300 lbs per patient  Denies significant polyuria/dipsia/phagia, denies persistent fatigue  Hx Depression, previously on medications, no longer on any medications, reports that it has been a journey w/ depression but she is doing better, denies SI/HI (was previously a concern for her), reports that does not feel she needs medication at this time nor therapy  Most concerning issue for her is chronic back pain, has had abnormal lumbar XR in past, has many many ED visits for pain, denies trying PT in past, denies pain management specialist in past, does use THC daily to mitigate the symptoms  Denies fecal/urinary incontinence (but reports she has had such bad pain that she could not get up from bed and so would voluntarily have to urinate in that position), denies saddle anesthesia but does report numbness affecting entire lower body (not currently, episodic and self-limited). Denies daily alcohol, denies other drug use      Review of Systems   Constitutional: Negative for appetite change, chills, fever and unexpected weight change. Respiratory: Negative for cough, chest tightness, shortness of breath and wheezing. Cardiovascular: Negative for chest pain, palpitations and leg swelling. Gastrointestinal: Negative for abdominal pain, diarrhea, nausea and vomiting. Genitourinary: Negative for difficulty urinating, dysuria, flank pain and frequency. Skin: Negative for rash. Neurological: Negative for dizziness, syncope, weakness and headaches. Physical Exam  Vitals signs reviewed. Constitutional:       General: She is not in acute distress. Appearance: She is well-developed. She is not diaphoretic. HENT:      Head: Normocephalic and atraumatic. Cardiovascular:      Rate and Rhythm: Normal rate and regular rhythm. Heart sounds: Normal heart sounds. No murmur. No gallop. Pulmonary:      Effort: Pulmonary effort is normal. No respiratory distress. Breath sounds: Normal breath sounds. No wheezing or rales. Abdominal:      General: Bowel sounds are normal.      Palpations: Abdomen is soft. Tenderness: There is no abdominal tenderness. There is no guarding. Musculoskeletal: Normal range of motion. General: No tenderness. Skin:     General: Skin is warm and dry. Capillary Refill: Capillary refill takes less than 2 seconds. Neurological:      Mental Status: She is alert and oriented to person, place, and time. Cranial Nerves: No cranial nerve deficit. Vitals:    05/14/21 0913   BP: (!) 134/92   Site: Left Upper Arm   Position: Sitting   Cuff Size: Medium Adult   Pulse: 91   Weight: 179 lb (81.2 kg)     On this date 5/14/2021 I have spent 31 minutes reviewing previous notes, test results and face to face with the patient discussing the diagnosis and importance of compliance with the treatment plan as well as documenting on the day of the visit. An electronic signature was used to authenticate this note.     --Valentino Simmons MD

## 2021-05-18 ENCOUNTER — TELEPHONE (OUTPATIENT)
Dept: FAMILY MEDICINE CLINIC | Age: 31
End: 2021-05-18

## 2021-05-18 DIAGNOSIS — E11.65 UNCONTROLLED TYPE 2 DIABETES MELLITUS WITH HYPERGLYCEMIA (HCC): Primary | ICD-10-CM

## 2021-05-18 NOTE — TELEPHONE ENCOUNTER
Patient called, stated she is not ready to start the insulin therapy, also let her know a referral was sent over to endocrinology.     Patient understands

## 2021-05-25 ENCOUNTER — HOSPITAL ENCOUNTER (OUTPATIENT)
Dept: CT IMAGING | Age: 31
Discharge: HOME | End: 2021-05-27
Payer: COMMERCIAL

## 2021-05-25 DIAGNOSIS — M54.42 ACUTE BACK PAIN WITH SCIATICA, LEFT: ICD-10-CM

## 2021-05-25 PROCEDURE — 72131 CT LUMBAR SPINE W/O DYE: CPT

## 2021-05-27 ENCOUNTER — TELEPHONE (OUTPATIENT)
Dept: FAMILY MEDICINE CLINIC | Age: 31
End: 2021-05-27

## 2021-05-27 DIAGNOSIS — M51.36 BULGING LUMBAR DISC: ICD-10-CM

## 2021-05-27 DIAGNOSIS — M48.061 SPINAL STENOSIS OF LUMBAR REGION, UNSPECIFIED WHETHER NEUROGENIC CLAUDICATION PRESENT: Primary | ICD-10-CM

## 2021-05-27 NOTE — TELEPHONE ENCOUNTER
Spoke with patient - gave her the results, she voiced understanding     She states she missed her PT appt yesterday, phone number given and was instructed to call them and reschedule if possible. Also patient stated she has an appointment with Lauri Tejeda - date and time confirmed with her for Thurs. 6/3 at 850.

## 2021-05-27 NOTE — TELEPHONE ENCOUNTER
CT reviewed. Positive for potential large disc bulge and/or protrusion L4-5 w/ canal stenosis, foraminal narrowing - MRI recommended but patient has contraindication 2/2 facial dermal piercing requiring surgical removal. Ortho referral given on 5/14 via telephone encounter. Ortho needed for further mgmt. Kevan Lopez MD  [facility] Resident, PGY-3  5/27/2021     Diagnosis Orders   1.  Spinal stenosis of lumbar region, unspecified whether neurogenic claudication present     2. Bulging lumbar disc

## 2021-06-13 PROBLEM — Z11.4 SCREENING FOR HIV (HUMAN IMMUNODEFICIENCY VIRUS): Status: RESOLVED | Noted: 2021-05-14 | Resolved: 2021-06-13

## 2021-06-24 ENCOUNTER — OFFICE VISIT (OUTPATIENT)
Dept: ORTHOPEDIC SURGERY | Age: 31
End: 2021-06-24
Payer: COMMERCIAL

## 2021-06-24 VITALS — BODY MASS INDEX: 26.2 KG/M2 | HEIGHT: 70 IN | WEIGHT: 183 LBS

## 2021-06-24 DIAGNOSIS — M54.41 CHRONIC BILATERAL LOW BACK PAIN WITH BILATERAL SCIATICA: Primary | ICD-10-CM

## 2021-06-24 DIAGNOSIS — M54.42 CHRONIC BILATERAL LOW BACK PAIN WITH BILATERAL SCIATICA: Primary | ICD-10-CM

## 2021-06-24 DIAGNOSIS — G89.29 CHRONIC BILATERAL LOW BACK PAIN WITH BILATERAL SCIATICA: Primary | ICD-10-CM

## 2021-06-24 PROCEDURE — 99204 OFFICE O/P NEW MOD 45 MIN: CPT | Performed by: ORTHOPAEDIC SURGERY

## 2021-06-24 PROCEDURE — G8419 CALC BMI OUT NRM PARAM NOF/U: HCPCS | Performed by: ORTHOPAEDIC SURGERY

## 2021-06-24 PROCEDURE — 4004F PT TOBACCO SCREEN RCVD TLK: CPT | Performed by: ORTHOPAEDIC SURGERY

## 2021-06-24 PROCEDURE — G8427 DOCREV CUR MEDS BY ELIG CLIN: HCPCS | Performed by: ORTHOPAEDIC SURGERY

## 2021-06-24 NOTE — PROGRESS NOTES
[facility]  [facility]  [address] [id number] [facility]  Dept: [phone]    Orthopaedic New Patient    CHIEF COMPLAINT:    Chief Complaint   Patient presents with    New Patient     Low back pain     HISTORY OF PRESENT ILLNESS:    The patient is a 27 y.o. female who is being seen as a new patient for chronic back pain with bilateral sciatica. Patient reports that her chronic back pain has been persistent for approximately 2 years. Denies any traumatic event. Reports that as time went on the back pain and worsened prompting her to seek medical treatment. So far, patient has been worked up by her her primary care physician in the form of a CT of the lumbar spine without contrast.  Patient eports that she was unable to get an MRI due to a surgically implanted piercing to her face. Her chronic back pain is noted to extend distally diffusely to bilateral lower extremities. Patient reports that both sides are painful and states that it affects her whole leg. Pain is described as a throb and also burning. Patient states that she she has tried steroid injections which she goes to the ER for but has been unable to receive an injection in her last ER visit due to her blood sugar. HbA1c reveals to be 14. Per the PCP note, patient had denied her treatment with insulin. Currently, patient reports tingling to bilateral lower extremities. 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 Otherwise, patient has no other orthopedic complaints at this time.     Past Medical History:    Past Medical History:   Diagnosis Date    Bipolar 1 disorder ([facility] [address]     Chest pain     Depression     Hyperlipidemia     tx started May 2015    Hypertension     tx started May 2015    Psychiatric problem     depression    Type II or unspecified type diabetes mellitus without mention of complication, uncontrolled 2/21/2015    tx started May 2015       Past Surgical History:    Past Surgical History:   Procedure Laterality Date    CARPAL TUNNEL RELEASE Left 07/22/2020       Current Medications:   Current Outpatient Medications   Medication Sig Dispense Refill    metFORMIN (GLUCOPHAGE) 500 MG tablet Take 1 tablet by mouth 2 times daily (with meals) 60 tablet 0    ibuprofen (ADVIL;MOTRIN) 600 MG tablet Take 1 tablet by mouth every 6 hours as needed for Pain 30 tablet 0    acetaminophen (TYLENOL) 500 MG tablet Take 2 tablets by mouth 3 times daily as needed for Pain 60 tablet 1    Insulin Detemir (LEVEMIR FLEXPEN SC) Inject 25 Units into the skin Daily q am      QUEtiapine (SEROQUEL) 25 MG tablet Take 25 mg by mouth 2 times daily.  QUEtiapine (SEROQUEL) 300 MG tablet Take 300 mg by mouth nightly.  sertraline (ZOLOFT) 50 MG tablet Take 50 mg by mouth daily.  atorvastatin (LIPITOR) 80 MG tablet Take 1 tablet by mouth nightly. (Patient not taking: Reported on 5/14/2021) 30 tablet 3    sitaGLIPtan-metformin (JANUMET)  MG per tablet Take 1 tablet by mouth 2 times daily (with meals). (Patient not taking: Reported on 5/14/2021) 60 tablet 1    Blood Glucose Monitoring Suppl (BLOOD GLUCOSE MONITOR KIT) KIT Check bs bid (Patient not taking: Reported on 5/14/2021) 1 kit 0     No current facility-administered medications for this visit. Allergies:    Patient has no known allergies.     Social History:   Social History     Socioeconomic History    Marital status: Single     Spouse name: Not on file    Number of children: Not on file    Years of education: Not on file    Highest education level: Not on file   Occupational History    Not on file   Tobacco Use    Smoking status: Current Every Day Smoker     Packs/day: 0.25     Types: Cigarettes    Smokeless tobacco: Never Used   Vaping Use    Vaping Use: Never used   Substance and Sexual Activity    Alcohol use: No    Drug use: Yes     Types: Marijuana    Sexual activity: Yes Partners: Female   Other Topics Concern    Not on file   Social History Narrative    Not on file     Social Determinants of Health     Financial Resource Strain: High Risk    Difficulty of Paying Living Expenses: Very hard   Food Insecurity:     Worried About Running Out of Food in the Last Year:     [address] in the Last Year:    Transportation Needs:     Lack of Transportation (Medical):  Lack of Transportation (Non-Medical):    Physical Activity:     Days of Exercise per Week:     Minutes of Exercise per Session:    Stress:     Feeling of Stress :    Social Connections:     Frequency of Communication with Friends and Family:     Frequency of Social Gatherings with Friends and Family:     Attends Quaker Services:     Active Member of Clubs or Organizations:     Attends Club or Organization Meetings:     Marital Status:    Intimate Partner Violence:     Fear of Current or Ex-Partner:     Emotionally Abused:     Physically Abused:     Sexually Abused:        Family History:  Family History   Problem Relation Age of Onset    Diabetes Mother      REVIEW OF SYSTEMS:  Review of Systems    Gen: no fever, chills, malaise  CV: no chest pain or palpitations  Resp: no cough or shortness of breath  GI: no nausea, vomiting, diarrhea, or constipation  Neuro: no numbness, tingling, or weakness  Msk: As described in the HPI  10 remaining systems reviewed and negative    PHYSICAL EXAM:  Ht 5' 10\" (1.778 m)   Wt 183 lb (83 kg)   BMI 26.26 kg/m² Body mass index is 26.26 kg/m². Physical Exam  Gen: alert and oriented, no acute distress  Psych: Appropriate affect; Appropriate knowledge base; Appropriate mood; No hallucinations  Head: normocephalic atraumatic   Chest: symmetric chest excursion  Ortho Exam  MSK:   Lumbar: Tenderness palpation noted at the paraspinal musculature of the lumbar region.   Diffuse dysesthesias to bilateral lower extremities straight leg test is positive with radicular symptoms. 4/5 strength in EHL/TA with right slightly worse than the left. Patient is able to perform a single heel leg raise. Skin is intact. Sensations intact light touch from L2-S1. Skin is warm and well-perfused. Radiology:   None taken in clinic    ASSESSMENT:  27 y.o. female with diffuse neuropathy and bilateral sciatica    PLAN:     Patient is here today for evaluation of the aforementioned pathology. Discussed with the patient that it is imperative she get her HbA1c levels under control as she is severely elevated at a level 14. I discussed with the patient that this may be causing a majority of her lower extremity pain as the severely elevated sugar in the blood can be irritating to the nerves. She is also positive on her straight leg exam with radicular symptoms thus there may be an element of sciatica also at play. After review of her CT imaging, there appears to be a potential L4/L5 disc herniation which is difficult to ascertain on physical exam secondary to a diffuse neuropathy and weakness. In light of these findings, I ultimately recommended to patient that she first must get her A1c levels under control. As she is treatment naïve, I prescribed physical therapy to hopefully help alleviate her back pain. I deferred prescribing any steroid or NSAIDs to prevent any kidney damage or elevation of her blood sugar. Patient is to follow-up in 3 months. She was amenable to all recommendations and was cursed to call the office with any questions. Return in about 3 months (around 9/24/2021). No orders of the defined types were placed in this encounter.       Orders Placed This Encounter   Procedures   [facility] Physical Therapy - [facility]     Referral Priority:   Routine     Referral Type:   Eval and Treat     Referral Reason:   Specialty Services Required     Requested Specialty:   Physical Therapy     Number of Visits Requested:   1        Electronically signed by Kath Luna DO

## 2023-07-04 ENCOUNTER — HOSPITAL ENCOUNTER (INPATIENT)
Age: 33
LOS: 10 days | Discharge: HOME | DRG: 165 | End: 2023-07-14
Attending: STUDENT IN AN ORGANIZED HEALTH CARE EDUCATION/TRAINING PROGRAM
Payer: COMMERCIAL

## 2023-07-04 ENCOUNTER — APPOINTMENT (OUTPATIENT)
Dept: GENERAL RADIOLOGY | Age: 33
DRG: 165 | End: 2023-07-04
Payer: COMMERCIAL

## 2023-07-04 DIAGNOSIS — Z91.199 MEDICALLY NONCOMPLIANT: ICD-10-CM

## 2023-07-04 DIAGNOSIS — R07.9 CHEST PAIN, UNSPECIFIED TYPE: ICD-10-CM

## 2023-07-04 DIAGNOSIS — E11.9 TYPE 2 DIABETES MELLITUS WITHOUT COMPLICATION, WITHOUT LONG-TERM CURRENT USE OF INSULIN (HCC): ICD-10-CM

## 2023-07-04 DIAGNOSIS — I25.10 MULTIPLE VESSEL CORONARY ARTERY DISEASE: Primary | ICD-10-CM

## 2023-07-04 DIAGNOSIS — E83.42 HYPOMAGNESEMIA: ICD-10-CM

## 2023-07-04 DIAGNOSIS — R77.8 ELEVATED TROPONIN: ICD-10-CM

## 2023-07-04 LAB
ALBUMIN SERPL-MCNC: 3.1 G/DL (ref 3.4–5)
ALBUMIN/GLOB SERPL: 0.8 {RATIO} (ref 1.1–2.2)
ALP SERPL-CCNC: 116 U/L (ref 40–129)
ALT SERPL-CCNC: 6 U/L (ref 10–40)
ANION GAP SERPL CALCULATED.3IONS-SCNC: 14 MMOL/L (ref 3–16)
APTT BLD: 26.1 SEC (ref 22.7–35.9)
AST SERPL-CCNC: 9 U/L (ref 15–37)
BASOPHILS # BLD: 0 K/UL (ref 0–0.2)
BASOPHILS NFR BLD: 0.2 %
BILIRUB SERPL-MCNC: 0.3 MG/DL (ref 0–1)
BUN SERPL-MCNC: 7 MG/DL (ref 7–20)
CALCIUM SERPL-MCNC: 8.7 MG/DL (ref 8.3–10.6)
CHLORIDE SERPL-SCNC: 92 MMOL/L (ref 99–110)
CO2 SERPL-SCNC: 25 MMOL/L (ref 21–32)
CREAT SERPL-MCNC: <0.5 MG/DL (ref 0.6–1.1)
D DIMER: 0.79 UG/ML FEU (ref 0–0.6)
DEPRECATED RDW RBC AUTO: 15.1 % (ref 12.4–15.4)
DEPRECATED RDW RBC AUTO: 15.5 % (ref 12.4–15.4)
EKG ATRIAL RATE: 97 BPM
EKG DIAGNOSIS: NORMAL
EKG P AXIS: 51 DEGREES
EKG P-R INTERVAL: 150 MS
EKG Q-T INTERVAL: 366 MS
EKG QRS DURATION: 78 MS
EKG QTC CALCULATION (BAZETT): 464 MS
EKG R AXIS: 58 DEGREES
EKG T AXIS: 60 DEGREES
EKG VENTRICULAR RATE: 97 BPM
EOSINOPHIL # BLD: 0.1 K/UL (ref 0–0.6)
EOSINOPHIL NFR BLD: 0.5 %
GFR SERPLBLD CREATININE-BSD FMLA CKD-EPI: >60 ML/MIN/{1.73_M2}
GLUCOSE BLD-MCNC: 270 MG/DL (ref 70–99)
GLUCOSE BLD-MCNC: 330 MG/DL (ref 70–99)
GLUCOSE SERPL-MCNC: 316 MG/DL (ref 70–99)
HCT VFR BLD AUTO: 34.1 % (ref 36–48)
HCT VFR BLD AUTO: 36.3 % (ref 36–48)
HGB BLD-MCNC: 11 G/DL (ref 12–16)
HGB BLD-MCNC: 11.6 G/DL (ref 12–16)
INR PPP: 1.11 (ref 0.84–1.16)
LYMPHOCYTES # BLD: 3.4 K/UL (ref 1–5.1)
LYMPHOCYTES NFR BLD: 23.5 %
MAGNESIUM SERPL-MCNC: 1.7 MG/DL (ref 1.8–2.4)
MCH RBC QN AUTO: 27.5 PG (ref 26–34)
MCH RBC QN AUTO: 27.7 PG (ref 26–34)
MCHC RBC AUTO-ENTMCNC: 31.8 G/DL (ref 31–36)
MCHC RBC AUTO-ENTMCNC: 32.1 G/DL (ref 31–36)
MCV RBC AUTO: 86.2 FL (ref 80–100)
MCV RBC AUTO: 86.4 FL (ref 80–100)
MONOCYTES # BLD: 0.9 K/UL (ref 0–1.3)
MONOCYTES NFR BLD: 6.5 %
NEUTROPHILS # BLD: 10 K/UL (ref 1.7–7.7)
NEUTROPHILS NFR BLD: 69.3 %
NT-PROBNP SERPL-MCNC: 1446 PG/ML (ref 0–124)
PERFORMED ON: ABNORMAL
PERFORMED ON: ABNORMAL
PLATELET # BLD AUTO: 500 K/UL (ref 135–450)
PLATELET # BLD AUTO: 555 K/UL (ref 135–450)
PMV BLD AUTO: 7.1 FL (ref 5–10.5)
PMV BLD AUTO: 7.8 FL (ref 5–10.5)
POTASSIUM SERPL-SCNC: 3.5 MMOL/L (ref 3.5–5.1)
PROT SERPL-MCNC: 7.2 G/DL (ref 6.4–8.2)
PROTHROMBIN TIME: 14.3 SEC (ref 11.5–14.8)
RBC # BLD AUTO: 3.96 M/UL (ref 4–5.2)
RBC # BLD AUTO: 4.2 M/UL (ref 4–5.2)
SODIUM SERPL-SCNC: 131 MMOL/L (ref 136–145)
SPECIMEN STATUS: NORMAL
TROPONIN, HIGH SENSITIVITY: 150 NG/L (ref 0–14)
TROPONIN, HIGH SENSITIVITY: 54 NG/L (ref 0–14)
TROPONIN, HIGH SENSITIVITY: 77 NG/L (ref 0–14)
WBC # BLD AUTO: 12.8 K/UL (ref 4–11)
WBC # BLD AUTO: 14.4 K/UL (ref 4–11)

## 2023-07-04 PROCEDURE — 93005 ELECTROCARDIOGRAM TRACING: CPT | Performed by: STUDENT IN AN ORGANIZED HEALTH CARE EDUCATION/TRAINING PROGRAM

## 2023-07-04 PROCEDURE — 96374 THER/PROPH/DIAG INJ IV PUSH: CPT

## 2023-07-04 PROCEDURE — 83735 ASSAY OF MAGNESIUM: CPT

## 2023-07-04 PROCEDURE — 84484 ASSAY OF TROPONIN QUANT: CPT

## 2023-07-04 PROCEDURE — 83036 HEMOGLOBIN GLYCOSYLATED A1C: CPT

## 2023-07-04 PROCEDURE — 85730 THROMBOPLASTIN TIME PARTIAL: CPT

## 2023-07-04 PROCEDURE — 93005 ELECTROCARDIOGRAM TRACING: CPT | Performed by: INTERNAL MEDICINE

## 2023-07-04 PROCEDURE — 6360000002 HC RX W HCPCS: Performed by: INTERNAL MEDICINE

## 2023-07-04 PROCEDURE — 83880 ASSAY OF NATRIURETIC PEPTIDE: CPT

## 2023-07-04 PROCEDURE — 85610 PROTHROMBIN TIME: CPT

## 2023-07-04 PROCEDURE — 36415 COLL VENOUS BLD VENIPUNCTURE: CPT

## 2023-07-04 PROCEDURE — 6360000002 HC RX W HCPCS: Performed by: PHYSICIAN ASSISTANT

## 2023-07-04 PROCEDURE — 85025 COMPLETE CBC W/AUTO DIFF WBC: CPT

## 2023-07-04 PROCEDURE — 85027 COMPLETE CBC AUTOMATED: CPT

## 2023-07-04 PROCEDURE — 80053 COMPREHEN METABOLIC PANEL: CPT

## 2023-07-04 PROCEDURE — 93010 ELECTROCARDIOGRAM REPORT: CPT | Performed by: INTERNAL MEDICINE

## 2023-07-04 PROCEDURE — 2580000003 HC RX 258: Performed by: PHYSICIAN ASSISTANT

## 2023-07-04 PROCEDURE — 2580000003 HC RX 258: Performed by: INTERNAL MEDICINE

## 2023-07-04 PROCEDURE — 99285 EMERGENCY DEPT VISIT HI MDM: CPT

## 2023-07-04 PROCEDURE — 85379 FIBRIN DEGRADATION QUANT: CPT

## 2023-07-04 PROCEDURE — 6370000000 HC RX 637 (ALT 250 FOR IP): Performed by: INTERNAL MEDICINE

## 2023-07-04 PROCEDURE — 71045 X-RAY EXAM CHEST 1 VIEW: CPT

## 2023-07-04 PROCEDURE — 96375 TX/PRO/DX INJ NEW DRUG ADDON: CPT

## 2023-07-04 PROCEDURE — 1200000000 HC SEMI PRIVATE

## 2023-07-04 RX ORDER — POLYETHYLENE GLYCOL 3350 17 G/17G
17 POWDER, FOR SOLUTION ORAL DAILY PRN
Status: DISCONTINUED | OUTPATIENT
Start: 2023-07-04 | End: 2023-07-10

## 2023-07-04 RX ORDER — INSULIN LISPRO 100 [IU]/ML
0-4 INJECTION, SOLUTION INTRAVENOUS; SUBCUTANEOUS NIGHTLY
Status: DISCONTINUED | OUTPATIENT
Start: 2023-07-04 | End: 2023-07-04

## 2023-07-04 RX ORDER — INSULIN LISPRO 100 [IU]/ML
0-4 INJECTION, SOLUTION INTRAVENOUS; SUBCUTANEOUS NIGHTLY
Status: DISCONTINUED | OUTPATIENT
Start: 2023-07-04 | End: 2023-07-10

## 2023-07-04 RX ORDER — ONDANSETRON 4 MG/1
4 TABLET, ORALLY DISINTEGRATING ORAL EVERY 8 HOURS PRN
Status: DISCONTINUED | OUTPATIENT
Start: 2023-07-04 | End: 2023-07-06

## 2023-07-04 RX ORDER — ENOXAPARIN SODIUM 100 MG/ML
40 INJECTION SUBCUTANEOUS DAILY
Status: DISCONTINUED | OUTPATIENT
Start: 2023-07-05 | End: 2023-07-04

## 2023-07-04 RX ORDER — SODIUM CHLORIDE 0.9 % (FLUSH) 0.9 %
5-40 SYRINGE (ML) INJECTION PRN
Status: DISCONTINUED | OUTPATIENT
Start: 2023-07-04 | End: 2023-07-10

## 2023-07-04 RX ORDER — INSULIN LISPRO 100 [IU]/ML
0-8 INJECTION, SOLUTION INTRAVENOUS; SUBCUTANEOUS EVERY 4 HOURS
Status: DISCONTINUED | OUTPATIENT
Start: 2023-07-04 | End: 2023-07-06

## 2023-07-04 RX ORDER — ASPIRIN 81 MG/1
81 TABLET, CHEWABLE ORAL DAILY
Status: DISCONTINUED | OUTPATIENT
Start: 2023-07-05 | End: 2023-07-10

## 2023-07-04 RX ORDER — ONDANSETRON 2 MG/ML
4 INJECTION INTRAMUSCULAR; INTRAVENOUS ONCE
Status: COMPLETED | OUTPATIENT
Start: 2023-07-04 | End: 2023-07-04

## 2023-07-04 RX ORDER — INSULIN LISPRO 100 [IU]/ML
0-8 INJECTION, SOLUTION INTRAVENOUS; SUBCUTANEOUS
Status: DISCONTINUED | OUTPATIENT
Start: 2023-07-05 | End: 2023-07-10

## 2023-07-04 RX ORDER — INSULIN LISPRO 100 [IU]/ML
0-8 INJECTION, SOLUTION INTRAVENOUS; SUBCUTANEOUS
Status: DISCONTINUED | OUTPATIENT
Start: 2023-07-04 | End: 2023-07-04

## 2023-07-04 RX ORDER — SODIUM CHLORIDE 0.9 % (FLUSH) 0.9 %
5-40 SYRINGE (ML) INJECTION EVERY 12 HOURS SCHEDULED
Status: DISCONTINUED | OUTPATIENT
Start: 2023-07-04 | End: 2023-07-10

## 2023-07-04 RX ORDER — ACETAMINOPHEN 650 MG/1
650 SUPPOSITORY RECTAL EVERY 6 HOURS PRN
Status: DISCONTINUED | OUTPATIENT
Start: 2023-07-04 | End: 2023-07-06

## 2023-07-04 RX ORDER — INSULIN GLARGINE 100 [IU]/ML
10 INJECTION, SOLUTION SUBCUTANEOUS NIGHTLY
Status: DISCONTINUED | OUTPATIENT
Start: 2023-07-04 | End: 2023-07-04

## 2023-07-04 RX ORDER — MORPHINE SULFATE 4 MG/ML
4 INJECTION, SOLUTION INTRAMUSCULAR; INTRAVENOUS ONCE
Status: COMPLETED | OUTPATIENT
Start: 2023-07-04 | End: 2023-07-04

## 2023-07-04 RX ORDER — ATORVASTATIN CALCIUM 40 MG/1
40 TABLET, FILM COATED ORAL NIGHTLY
Status: DISCONTINUED | OUTPATIENT
Start: 2023-07-04 | End: 2023-07-10

## 2023-07-04 RX ORDER — ACETAMINOPHEN 325 MG/1
650 TABLET ORAL EVERY 6 HOURS PRN
Status: DISCONTINUED | OUTPATIENT
Start: 2023-07-04 | End: 2023-07-06

## 2023-07-04 RX ORDER — DEXTROSE MONOHYDRATE 100 MG/ML
INJECTION, SOLUTION INTRAVENOUS CONTINUOUS PRN
Status: DISCONTINUED | OUTPATIENT
Start: 2023-07-04 | End: 2023-07-10

## 2023-07-04 RX ORDER — HEPARIN SODIUM 10000 [USP'U]/100ML
1370 INJECTION, SOLUTION INTRAVENOUS CONTINUOUS
Status: DISCONTINUED | OUTPATIENT
Start: 2023-07-04 | End: 2023-07-05

## 2023-07-04 RX ORDER — 0.9 % SODIUM CHLORIDE 0.9 %
1000 INTRAVENOUS SOLUTION INTRAVENOUS ONCE
Status: DISCONTINUED | OUTPATIENT
Start: 2023-07-04 | End: 2023-07-04

## 2023-07-04 RX ORDER — HEPARIN SODIUM 1000 [USP'U]/ML
2000 INJECTION, SOLUTION INTRAVENOUS; SUBCUTANEOUS PRN
Status: DISCONTINUED | OUTPATIENT
Start: 2023-07-04 | End: 2023-07-10

## 2023-07-04 RX ORDER — INSULIN LISPRO 100 [IU]/ML
7 INJECTION, SOLUTION INTRAVENOUS; SUBCUTANEOUS
Status: DISCONTINUED | OUTPATIENT
Start: 2023-07-05 | End: 2023-07-10

## 2023-07-04 RX ORDER — INSULIN GLARGINE 100 [IU]/ML
25 INJECTION, SOLUTION SUBCUTANEOUS DAILY
Status: DISCONTINUED | OUTPATIENT
Start: 2023-07-05 | End: 2023-07-10

## 2023-07-04 RX ORDER — ONDANSETRON 2 MG/ML
4 INJECTION INTRAMUSCULAR; INTRAVENOUS EVERY 6 HOURS PRN
Status: DISCONTINUED | OUTPATIENT
Start: 2023-07-04 | End: 2023-07-06

## 2023-07-04 RX ORDER — HEPARIN SODIUM 1000 [USP'U]/ML
4000 INJECTION, SOLUTION INTRAVENOUS; SUBCUTANEOUS ONCE
Status: COMPLETED | OUTPATIENT
Start: 2023-07-04 | End: 2023-07-04

## 2023-07-04 RX ORDER — SODIUM CHLORIDE 9 MG/ML
INJECTION, SOLUTION INTRAVENOUS PRN
Status: DISCONTINUED | OUTPATIENT
Start: 2023-07-04 | End: 2023-07-10

## 2023-07-04 RX ORDER — HEPARIN SODIUM 1000 [USP'U]/ML
4000 INJECTION, SOLUTION INTRAVENOUS; SUBCUTANEOUS PRN
Status: DISCONTINUED | OUTPATIENT
Start: 2023-07-04 | End: 2023-07-10

## 2023-07-04 RX ORDER — MAGNESIUM SULFATE IN WATER 40 MG/ML
2000 INJECTION, SOLUTION INTRAVENOUS ONCE
Status: COMPLETED | OUTPATIENT
Start: 2023-07-04 | End: 2023-07-04

## 2023-07-04 RX ORDER — SODIUM CHLORIDE 9 MG/ML
1000 INJECTION, SOLUTION INTRAVENOUS CONTINUOUS
Status: DISCONTINUED | OUTPATIENT
Start: 2023-07-04 | End: 2023-07-06

## 2023-07-04 RX ADMIN — INSULIN LISPRO 4 UNITS: 100 INJECTION, SOLUTION INTRAVENOUS; SUBCUTANEOUS at 21:32

## 2023-07-04 RX ADMIN — MORPHINE SULFATE 4 MG: 4 INJECTION, SOLUTION INTRAMUSCULAR; INTRAVENOUS at 16:36

## 2023-07-04 RX ADMIN — ONDANSETRON 4 MG: 2 INJECTION INTRAMUSCULAR; INTRAVENOUS at 16:36

## 2023-07-04 RX ADMIN — SODIUM CHLORIDE 1000 ML: 9 INJECTION, SOLUTION INTRAVENOUS at 20:45

## 2023-07-04 RX ADMIN — ATORVASTATIN CALCIUM 40 MG: 40 TABLET, FILM COATED ORAL at 20:07

## 2023-07-04 RX ADMIN — HEPARIN SODIUM 4000 UNITS: 1000 INJECTION INTRAVENOUS; SUBCUTANEOUS at 20:08

## 2023-07-04 RX ADMIN — SODIUM CHLORIDE 1000 ML: 9 INJECTION, SOLUTION INTRAVENOUS at 16:34

## 2023-07-04 RX ADMIN — SODIUM CHLORIDE, PRESERVATIVE FREE 10 ML: 5 INJECTION INTRAVENOUS at 20:15

## 2023-07-04 RX ADMIN — INSULIN GLARGINE 10 UNITS: 100 INJECTION, SOLUTION SUBCUTANEOUS at 21:31

## 2023-07-04 RX ADMIN — MAGNESIUM SULFATE HEPTAHYDRATE 2000 MG: 40 INJECTION, SOLUTION INTRAVENOUS at 17:10

## 2023-07-04 RX ADMIN — INSULIN LISPRO 4 UNITS: 100 INJECTION, SOLUTION INTRAVENOUS; SUBCUTANEOUS at 19:33

## 2023-07-04 RX ADMIN — HEPARIN SODIUM 820 UNITS/HR: 10000 INJECTION, SOLUTION INTRAVENOUS at 20:15

## 2023-07-04 ASSESSMENT — PAIN DESCRIPTION - ORIENTATION
ORIENTATION: LEFT;MID
ORIENTATION: MID
ORIENTATION: MID

## 2023-07-04 ASSESSMENT — PAIN DESCRIPTION - LOCATION
LOCATION: CHEST

## 2023-07-04 ASSESSMENT — PAIN DESCRIPTION - PAIN TYPE
TYPE: ACUTE PAIN
TYPE: ACUTE PAIN

## 2023-07-04 ASSESSMENT — PAIN SCALES - GENERAL
PAINLEVEL_OUTOF10: 7
PAINLEVEL_OUTOF10: 5
PAINLEVEL_OUTOF10: 0
PAINLEVEL_OUTOF10: 5
PAINLEVEL_OUTOF10: 7
PAINLEVEL_OUTOF10: 5

## 2023-07-04 ASSESSMENT — PAIN - FUNCTIONAL ASSESSMENT: PAIN_FUNCTIONAL_ASSESSMENT: 0-10

## 2023-07-04 ASSESSMENT — LIFESTYLE VARIABLES
HOW OFTEN DO YOU HAVE A DRINK CONTAINING ALCOHOL: NEVER
HOW MANY STANDARD DRINKS CONTAINING ALCOHOL DO YOU HAVE ON A TYPICAL DAY: PATIENT DOES NOT DRINK

## 2023-07-05 ENCOUNTER — APPOINTMENT (OUTPATIENT)
Dept: GENERAL RADIOLOGY | Age: 33
DRG: 165 | End: 2023-07-05
Payer: COMMERCIAL

## 2023-07-05 PROBLEM — I21.4 NSTEMI (NON-ST ELEVATED MYOCARDIAL INFARCTION) (HCC): Status: ACTIVE | Noted: 2023-07-05

## 2023-07-05 LAB
ALBUMIN SERPL-MCNC: 2.1 G/DL (ref 3.4–5)
ALBUMIN/GLOB SERPL: 0.5 {RATIO} (ref 1.1–2.2)
ALP SERPL-CCNC: 104 U/L (ref 40–129)
ALT SERPL-CCNC: 6 U/L (ref 10–40)
ANION GAP SERPL CALCULATED.3IONS-SCNC: 14 MMOL/L (ref 3–16)
ANTI-XA UNFRAC HEPARIN: <0.1 IU/ML (ref 0.3–0.7)
ANTI-XA UNFRAC HEPARIN: <0.1 IU/ML (ref 0.3–0.7)
AST SERPL-CCNC: 24 U/L (ref 15–37)
BILIRUB SERPL-MCNC: <0.2 MG/DL (ref 0–1)
BUN SERPL-MCNC: 7 MG/DL (ref 7–20)
CALCIUM SERPL-MCNC: 8.2 MG/DL (ref 8.3–10.6)
CHLORIDE SERPL-SCNC: 97 MMOL/L (ref 99–110)
CHOLEST SERPL-MCNC: 190 MG/DL (ref 0–199)
CO2 SERPL-SCNC: 19 MMOL/L (ref 21–32)
CREAT SERPL-MCNC: <0.5 MG/DL (ref 0.6–1.1)
DEPRECATED RDW RBC AUTO: 15 % (ref 12.4–15.4)
EKG ATRIAL RATE: 93 BPM
EKG ATRIAL RATE: 93 BPM
EKG DIAGNOSIS: NORMAL
EKG DIAGNOSIS: NORMAL
EKG P AXIS: 42 DEGREES
EKG P AXIS: 50 DEGREES
EKG P-R INTERVAL: 154 MS
EKG P-R INTERVAL: 158 MS
EKG Q-T INTERVAL: 366 MS
EKG Q-T INTERVAL: 392 MS
EKG QRS DURATION: 88 MS
EKG QRS DURATION: 88 MS
EKG QTC CALCULATION (BAZETT): 455 MS
EKG QTC CALCULATION (BAZETT): 487 MS
EKG R AXIS: 28 DEGREES
EKG R AXIS: 63 DEGREES
EKG T AXIS: 43 DEGREES
EKG T AXIS: 59 DEGREES
EKG VENTRICULAR RATE: 93 BPM
EKG VENTRICULAR RATE: 93 BPM
EST. AVERAGE GLUCOSE BLD GHB EST-MCNC: 297.7 MG/DL
EST. AVERAGE GLUCOSE BLD GHB EST-MCNC: 303.4 MG/DL
GFR SERPLBLD CREATININE-BSD FMLA CKD-EPI: >60 ML/MIN/{1.73_M2}
GLUCOSE BLD-MCNC: 122 MG/DL (ref 70–99)
GLUCOSE BLD-MCNC: 203 MG/DL (ref 70–99)
GLUCOSE BLD-MCNC: 256 MG/DL (ref 70–99)
GLUCOSE BLD-MCNC: 278 MG/DL (ref 70–99)
GLUCOSE BLD-MCNC: 293 MG/DL (ref 70–99)
GLUCOSE SERPL-MCNC: 285 MG/DL (ref 70–99)
HBA1C MFR BLD: 12 %
HBA1C MFR BLD: 12.2 %
HCG SERPL QL: NEGATIVE
HCT VFR BLD AUTO: 32.9 % (ref 36–48)
HDLC SERPL-MCNC: 22 MG/DL (ref 40–60)
HGB BLD-MCNC: 10.7 G/DL (ref 12–16)
LACTATE BLDV-SCNC: 1 MMOL/L (ref 0.4–2)
LDLC SERPL CALC-MCNC: 131 MG/DL
LV EF: 40 %
LV EF: 43 %
LV EF: 45 %
LVEF MODALITY: NORMAL
MCH RBC QN AUTO: 28 PG (ref 26–34)
MCHC RBC AUTO-ENTMCNC: 32.5 G/DL (ref 31–36)
MCV RBC AUTO: 86.2 FL (ref 80–100)
PERFORMED ON: ABNORMAL
PLATELET # BLD AUTO: 503 K/UL (ref 135–450)
PMV BLD AUTO: 8.5 FL (ref 5–10.5)
POC ACT LR: 167 SEC
POC ACT LR: 189 SEC
POC ACT LR: 195 SEC
POC ACT LR: 215 SEC
POTASSIUM SERPL-SCNC: 4 MMOL/L (ref 3.5–5.1)
PROT SERPL-MCNC: 6.6 G/DL (ref 6.4–8.2)
RBC # BLD AUTO: 3.81 M/UL (ref 4–5.2)
SODIUM SERPL-SCNC: 130 MMOL/L (ref 136–145)
TRIGL SERPL-MCNC: 185 MG/DL (ref 0–150)
TROPONIN, HIGH SENSITIVITY: 206 NG/L (ref 0–14)
TROPONIN, HIGH SENSITIVITY: 219 NG/L (ref 0–14)
VLDLC SERPL CALC-MCNC: 37 MG/DL
WBC # BLD AUTO: 13.7 K/UL (ref 4–11)

## 2023-07-05 PROCEDURE — 2580000003 HC RX 258: Performed by: INTERNAL MEDICINE

## 2023-07-05 PROCEDURE — C1894 INTRO/SHEATH, NON-LASER: HCPCS | Performed by: INTERNAL MEDICINE

## 2023-07-05 PROCEDURE — 93458 L HRT ARTERY/VENTRICLE ANGIO: CPT | Performed by: INTERNAL MEDICINE

## 2023-07-05 PROCEDURE — 6370000000 HC RX 637 (ALT 250 FOR IP): Performed by: INTERNAL MEDICINE

## 2023-07-05 PROCEDURE — 92920 PRQ TRLUML C ANGIOP 1ART&/BR: CPT | Performed by: INTERNAL MEDICINE

## 2023-07-05 PROCEDURE — 84703 CHORIONIC GONADOTROPIN ASSAY: CPT

## 2023-07-05 PROCEDURE — 83605 ASSAY OF LACTIC ACID: CPT

## 2023-07-05 PROCEDURE — 99152 MOD SED SAME PHYS/QHP 5/>YRS: CPT | Performed by: INTERNAL MEDICINE

## 2023-07-05 PROCEDURE — B2151ZZ FLUOROSCOPY OF LEFT HEART USING LOW OSMOLAR CONTRAST: ICD-10-PCS | Performed by: INTERNAL MEDICINE

## 2023-07-05 PROCEDURE — 6360000002 HC RX W HCPCS: Performed by: NURSE PRACTITIONER

## 2023-07-05 PROCEDURE — 36415 COLL VENOUS BLD VENIPUNCTURE: CPT

## 2023-07-05 PROCEDURE — 85027 COMPLETE CBC AUTOMATED: CPT

## 2023-07-05 PROCEDURE — 84484 ASSAY OF TROPONIN QUANT: CPT

## 2023-07-05 PROCEDURE — 85347 COAGULATION TIME ACTIVATED: CPT

## 2023-07-05 PROCEDURE — 80061 LIPID PANEL: CPT

## 2023-07-05 PROCEDURE — 71046 X-RAY EXAM CHEST 2 VIEWS: CPT

## 2023-07-05 PROCEDURE — 2580000003 HC RX 258: Performed by: PHYSICIAN ASSISTANT

## 2023-07-05 PROCEDURE — 2709999900 HC NON-CHARGEABLE SUPPLY: Performed by: INTERNAL MEDICINE

## 2023-07-05 PROCEDURE — 80053 COMPREHEN METABOLIC PANEL: CPT

## 2023-07-05 PROCEDURE — C1887 CATHETER, GUIDING: HCPCS | Performed by: INTERNAL MEDICINE

## 2023-07-05 PROCEDURE — B2111ZZ FLUOROSCOPY OF MULTIPLE CORONARY ARTERIES USING LOW OSMOLAR CONTRAST: ICD-10-PCS | Performed by: INTERNAL MEDICINE

## 2023-07-05 PROCEDURE — 99255 IP/OBS CONSLTJ NEW/EST HI 80: CPT | Performed by: INTERNAL MEDICINE

## 2023-07-05 PROCEDURE — 6360000002 HC RX W HCPCS

## 2023-07-05 PROCEDURE — 93010 ELECTROCARDIOGRAM REPORT: CPT | Performed by: INTERNAL MEDICINE

## 2023-07-05 PROCEDURE — 6360000002 HC RX W HCPCS: Performed by: INTERNAL MEDICINE

## 2023-07-05 PROCEDURE — C1753 CATH, INTRAVAS ULTRASOUND: HCPCS | Performed by: INTERNAL MEDICINE

## 2023-07-05 PROCEDURE — C1725 CATH, TRANSLUMIN NON-LASER: HCPCS | Performed by: INTERNAL MEDICINE

## 2023-07-05 PROCEDURE — 4A023N7 MEASUREMENT OF CARDIAC SAMPLING AND PRESSURE, LEFT HEART, PERCUTANEOUS APPROACH: ICD-10-PCS | Performed by: INTERNAL MEDICINE

## 2023-07-05 PROCEDURE — 2000000000 HC ICU R&B

## 2023-07-05 PROCEDURE — 93005 ELECTROCARDIOGRAM TRACING: CPT | Performed by: INTERNAL MEDICINE

## 2023-07-05 PROCEDURE — 93458 L HRT ARTERY/VENTRICLE ANGIO: CPT

## 2023-07-05 PROCEDURE — 2500000003 HC RX 250 WO HCPCS

## 2023-07-05 PROCEDURE — C1769 GUIDE WIRE: HCPCS | Performed by: INTERNAL MEDICINE

## 2023-07-05 PROCEDURE — 93308 TTE F-UP OR LMTD: CPT

## 2023-07-05 PROCEDURE — 93306 TTE W/DOPPLER COMPLETE: CPT

## 2023-07-05 PROCEDURE — 85520 HEPARIN ASSAY: CPT

## 2023-07-05 RX ORDER — SODIUM CHLORIDE 9 MG/ML
INJECTION, SOLUTION INTRAVENOUS PRN
Status: DISCONTINUED | OUTPATIENT
Start: 2023-07-05 | End: 2023-07-06

## 2023-07-05 RX ORDER — HEPARIN SODIUM 1000 [USP'U]/ML
INJECTION, SOLUTION INTRAVENOUS; SUBCUTANEOUS
Status: COMPLETED | OUTPATIENT
Start: 2023-07-05 | End: 2023-07-05

## 2023-07-05 RX ORDER — FENTANYL CITRATE 50 UG/ML
INJECTION, SOLUTION INTRAMUSCULAR; INTRAVENOUS
Status: COMPLETED | OUTPATIENT
Start: 2023-07-05 | End: 2023-07-05

## 2023-07-05 RX ORDER — MIDAZOLAM HYDROCHLORIDE 1 MG/ML
INJECTION INTRAMUSCULAR; INTRAVENOUS
Status: COMPLETED | OUTPATIENT
Start: 2023-07-05 | End: 2023-07-05

## 2023-07-05 RX ORDER — ASPIRIN 81 MG/1
244 TABLET, CHEWABLE ORAL ONCE
Status: COMPLETED | OUTPATIENT
Start: 2023-07-05 | End: 2023-07-05

## 2023-07-05 RX ORDER — HEPARIN SODIUM 10000 [USP'U]/100ML
1370 INJECTION, SOLUTION INTRAVENOUS CONTINUOUS
Status: DISCONTINUED | OUTPATIENT
Start: 2023-07-06 | End: 2023-07-05

## 2023-07-05 RX ORDER — MORPHINE SULFATE 2 MG/ML
2 INJECTION, SOLUTION INTRAMUSCULAR; INTRAVENOUS EVERY 4 HOURS PRN
Status: DISCONTINUED | OUTPATIENT
Start: 2023-07-05 | End: 2023-07-10

## 2023-07-05 RX ORDER — LORAZEPAM 0.5 MG/1
0.5 TABLET ORAL
Status: DISCONTINUED | OUTPATIENT
Start: 2023-07-05 | End: 2023-07-06

## 2023-07-05 RX ORDER — MORPHINE SULFATE 2 MG/ML
2 INJECTION, SOLUTION INTRAMUSCULAR; INTRAVENOUS ONCE
Status: COMPLETED | OUTPATIENT
Start: 2023-07-05 | End: 2023-07-05

## 2023-07-05 RX ORDER — SODIUM CHLORIDE 0.9 % (FLUSH) 0.9 %
5-40 SYRINGE (ML) INJECTION PRN
Status: DISCONTINUED | OUTPATIENT
Start: 2023-07-05 | End: 2023-07-06 | Stop reason: SDUPTHER

## 2023-07-05 RX ORDER — HEPARIN SODIUM 10000 [USP'U]/100ML
3340 INJECTION, SOLUTION INTRAVENOUS CONTINUOUS
Status: DISCONTINUED | OUTPATIENT
Start: 2023-07-06 | End: 2023-07-09

## 2023-07-05 RX ORDER — HEPARIN SODIUM 1000 [USP'U]/ML
4000 INJECTION, SOLUTION INTRAVENOUS; SUBCUTANEOUS ONCE
Status: COMPLETED | OUTPATIENT
Start: 2023-07-05 | End: 2023-07-05

## 2023-07-05 RX ORDER — SODIUM CHLORIDE 0.9 % (FLUSH) 0.9 %
5-40 SYRINGE (ML) INJECTION EVERY 12 HOURS SCHEDULED
Status: DISCONTINUED | OUTPATIENT
Start: 2023-07-05 | End: 2023-07-06 | Stop reason: SDUPTHER

## 2023-07-05 RX ORDER — ONDANSETRON 2 MG/ML
4 INJECTION INTRAMUSCULAR; INTRAVENOUS EVERY 6 HOURS PRN
Status: DISCONTINUED | OUTPATIENT
Start: 2023-07-05 | End: 2023-07-10 | Stop reason: HOSPADM

## 2023-07-05 RX ORDER — ACETAMINOPHEN 325 MG/1
650 TABLET ORAL EVERY 4 HOURS PRN
Status: DISCONTINUED | OUTPATIENT
Start: 2023-07-05 | End: 2023-07-10

## 2023-07-05 RX ADMIN — METOPROLOL TARTRATE 12.5 MG: 25 TABLET, FILM COATED ORAL at 09:09

## 2023-07-05 RX ADMIN — MIDAZOLAM HYDROCHLORIDE 0.5 MG: 1 INJECTION INTRAMUSCULAR; INTRAVENOUS at 16:42

## 2023-07-05 RX ADMIN — HEPARIN SODIUM 3000 UNITS: 1000 INJECTION, SOLUTION INTRAVENOUS; SUBCUTANEOUS at 17:07

## 2023-07-05 RX ADMIN — INSULIN LISPRO 4 UNITS: 100 INJECTION, SOLUTION INTRAVENOUS; SUBCUTANEOUS at 23:13

## 2023-07-05 RX ADMIN — MIDAZOLAM HYDROCHLORIDE 1 MG: 1 INJECTION INTRAMUSCULAR; INTRAVENOUS at 16:15

## 2023-07-05 RX ADMIN — HEPARIN SODIUM 4000 UNITS: 1000 INJECTION INTRAVENOUS; SUBCUTANEOUS at 10:46

## 2023-07-05 RX ADMIN — METOPROLOL TARTRATE 12.5 MG: 25 TABLET, FILM COATED ORAL at 21:19

## 2023-07-05 RX ADMIN — ASPIRIN 81 MG 244 MG: 81 TABLET ORAL at 14:13

## 2023-07-05 RX ADMIN — FENTANYL CITRATE 25 MCG: 50 INJECTION, SOLUTION INTRAMUSCULAR; INTRAVENOUS at 16:15

## 2023-07-05 RX ADMIN — HEPARIN SODIUM 5000 UNITS: 1000 INJECTION, SOLUTION INTRAVENOUS; SUBCUTANEOUS at 16:23

## 2023-07-05 RX ADMIN — HEPARIN SODIUM 4000 UNITS: 1000 INJECTION INTRAVENOUS; SUBCUTANEOUS at 04:11

## 2023-07-05 RX ADMIN — FENTANYL CITRATE 25 MCG: 50 INJECTION, SOLUTION INTRAMUSCULAR; INTRAVENOUS at 17:58

## 2023-07-05 RX ADMIN — FENTANYL CITRATE 25 MCG: 50 INJECTION, SOLUTION INTRAMUSCULAR; INTRAVENOUS at 17:32

## 2023-07-05 RX ADMIN — MORPHINE SULFATE 2 MG: 2 INJECTION, SOLUTION INTRAMUSCULAR; INTRAVENOUS at 14:14

## 2023-07-05 RX ADMIN — Medication 10 ML: at 21:19

## 2023-07-05 RX ADMIN — FENTANYL CITRATE 50 MCG: 50 INJECTION, SOLUTION INTRAMUSCULAR; INTRAVENOUS at 18:25

## 2023-07-05 RX ADMIN — INSULIN LISPRO 4 UNITS: 100 INJECTION, SOLUTION INTRAVENOUS; SUBCUTANEOUS at 09:10

## 2023-07-05 RX ADMIN — ASPIRIN 81 MG 81 MG: 81 TABLET ORAL at 09:09

## 2023-07-05 RX ADMIN — Medication 10 ML: at 21:20

## 2023-07-05 RX ADMIN — MIDAZOLAM HYDROCHLORIDE 1 MG: 1 INJECTION INTRAMUSCULAR; INTRAVENOUS at 17:32

## 2023-07-05 RX ADMIN — HEPARIN SODIUM 5000 UNITS: 1000 INJECTION, SOLUTION INTRAVENOUS; SUBCUTANEOUS at 16:58

## 2023-07-05 RX ADMIN — MIDAZOLAM HYDROCHLORIDE 1 MG: 1 INJECTION INTRAMUSCULAR; INTRAVENOUS at 17:08

## 2023-07-05 RX ADMIN — FENTANYL CITRATE 50 MCG: 50 INJECTION, SOLUTION INTRAMUSCULAR; INTRAVENOUS at 17:48

## 2023-07-05 RX ADMIN — FENTANYL CITRATE 50 MCG: 50 INJECTION, SOLUTION INTRAMUSCULAR; INTRAVENOUS at 17:28

## 2023-07-05 RX ADMIN — FENTANYL CITRATE 25 MCG: 50 INJECTION, SOLUTION INTRAMUSCULAR; INTRAVENOUS at 16:42

## 2023-07-05 RX ADMIN — PHENYLEPHRINE HYDROCHLORIDE 15 MCG/MIN: 10 INJECTION INTRAVENOUS at 18:20

## 2023-07-05 RX ADMIN — INSULIN LISPRO 4 UNITS: 100 INJECTION, SOLUTION INTRAVENOUS; SUBCUTANEOUS at 04:19

## 2023-07-05 RX ADMIN — INSULIN LISPRO 2 UNITS: 100 INJECTION, SOLUTION INTRAVENOUS; SUBCUTANEOUS at 12:15

## 2023-07-05 RX ADMIN — MORPHINE SULFATE 2 MG: 2 INJECTION, SOLUTION INTRAMUSCULAR; INTRAVENOUS at 19:50

## 2023-07-05 RX ADMIN — FENTANYL CITRATE 25 MCG: 50 INJECTION, SOLUTION INTRAMUSCULAR; INTRAVENOUS at 16:25

## 2023-07-05 RX ADMIN — SODIUM CHLORIDE 1000 ML: 9 INJECTION, SOLUTION INTRAVENOUS at 23:16

## 2023-07-05 RX ADMIN — ACETAMINOPHEN 650 MG: 325 TABLET ORAL at 09:09

## 2023-07-05 RX ADMIN — ONDANSETRON 4 MG: 2 INJECTION INTRAMUSCULAR; INTRAVENOUS at 04:23

## 2023-07-05 RX ADMIN — INSULIN GLARGINE 25 UNITS: 100 INJECTION, SOLUTION SUBCUTANEOUS at 09:09

## 2023-07-05 RX ADMIN — MIDAZOLAM HYDROCHLORIDE 1 MG: 1 INJECTION INTRAMUSCULAR; INTRAVENOUS at 16:25

## 2023-07-05 RX ADMIN — MIDAZOLAM HYDROCHLORIDE 1 MG: 1 INJECTION INTRAMUSCULAR; INTRAVENOUS at 17:58

## 2023-07-05 RX ADMIN — ATORVASTATIN CALCIUM 40 MG: 40 TABLET, FILM COATED ORAL at 21:19

## 2023-07-05 ASSESSMENT — PAIN SCALES - GENERAL: PAINLEVEL_OUTOF10: 6

## 2023-07-06 ENCOUNTER — APPOINTMENT (OUTPATIENT)
Dept: VASCULAR LAB | Age: 33
DRG: 165 | End: 2023-07-06
Payer: COMMERCIAL

## 2023-07-06 PROBLEM — R77.8 ELEVATED TROPONIN: Status: ACTIVE | Noted: 2023-07-06

## 2023-07-06 PROBLEM — E11.65 UNCONTROLLED TYPE 2 DIABETES MELLITUS WITH HYPERGLYCEMIA (HCC): Status: ACTIVE | Noted: 2023-07-06

## 2023-07-06 PROBLEM — Z91.199 MEDICALLY NONCOMPLIANT: Status: ACTIVE | Noted: 2023-07-06

## 2023-07-06 PROBLEM — R79.89 ELEVATED TROPONIN: Status: ACTIVE | Noted: 2023-07-06

## 2023-07-06 PROBLEM — I25.5 ISCHEMIC CARDIOMYOPATHY: Status: ACTIVE | Noted: 2023-07-06

## 2023-07-06 PROBLEM — I25.10 MULTIPLE VESSEL CORONARY ARTERY DISEASE: Status: ACTIVE | Noted: 2023-07-06

## 2023-07-06 LAB
ANION GAP SERPL CALCULATED.3IONS-SCNC: 8 MMOL/L (ref 3–16)
ANTI-XA UNFRAC HEPARIN: <0.1 IU/ML (ref 0.3–0.7)
BUN SERPL-MCNC: 5 MG/DL (ref 7–20)
CALCIUM SERPL-MCNC: 8.1 MG/DL (ref 8.3–10.6)
CHLORIDE SERPL-SCNC: 100 MMOL/L (ref 99–110)
CO2 SERPL-SCNC: 25 MMOL/L (ref 21–32)
CREAT SERPL-MCNC: <0.5 MG/DL (ref 0.6–1.1)
DEPRECATED RDW RBC AUTO: 15.4 % (ref 12.4–15.4)
EKG ATRIAL RATE: 80 BPM
EKG DIAGNOSIS: NORMAL
EKG P AXIS: 49 DEGREES
EKG P-R INTERVAL: 160 MS
EKG Q-T INTERVAL: 414 MS
EKG QRS DURATION: 80 MS
EKG QTC CALCULATION (BAZETT): 477 MS
EKG R AXIS: 62 DEGREES
EKG T AXIS: 105 DEGREES
EKG VENTRICULAR RATE: 80 BPM
GFR SERPLBLD CREATININE-BSD FMLA CKD-EPI: >60 ML/MIN/{1.73_M2}
GLUCOSE BLD-MCNC: 160 MG/DL (ref 70–99)
GLUCOSE BLD-MCNC: 288 MG/DL (ref 70–99)
GLUCOSE BLD-MCNC: 83 MG/DL (ref 70–99)
GLUCOSE SERPL-MCNC: 171 MG/DL (ref 70–99)
HCT VFR BLD AUTO: 31.2 % (ref 36–48)
HGB BLD-MCNC: 9.9 G/DL (ref 12–16)
IRON SATN MFR SERPL: 5 % (ref 15–50)
IRON SERPL-MCNC: 9 UG/DL (ref 37–145)
LV EF: 43 %
LVEF MODALITY: NORMAL
MCH RBC QN AUTO: 27.5 PG (ref 26–34)
MCHC RBC AUTO-ENTMCNC: 31.8 G/DL (ref 31–36)
MCV RBC AUTO: 86.5 FL (ref 80–100)
PERFORMED ON: ABNORMAL
PERFORMED ON: ABNORMAL
PERFORMED ON: NORMAL
PLATELET # BLD AUTO: 459 K/UL (ref 135–450)
PMV BLD AUTO: 7.5 FL (ref 5–10.5)
POTASSIUM SERPL-SCNC: 3.3 MMOL/L (ref 3.5–5.1)
RBC # BLD AUTO: 3.61 M/UL (ref 4–5.2)
SODIUM SERPL-SCNC: 133 MMOL/L (ref 136–145)
TIBC SERPL-MCNC: 185 UG/DL (ref 260–445)
TSH SERPL DL<=0.005 MIU/L-ACNC: 1.13 UIU/ML (ref 0.27–4.2)
WBC # BLD AUTO: 12.4 K/UL (ref 4–11)

## 2023-07-06 PROCEDURE — 93880 EXTRACRANIAL BILAT STUDY: CPT

## 2023-07-06 PROCEDURE — 85520 HEPARIN ASSAY: CPT

## 2023-07-06 PROCEDURE — 6370000000 HC RX 637 (ALT 250 FOR IP): Performed by: INTERNAL MEDICINE

## 2023-07-06 PROCEDURE — 93010 ELECTROCARDIOGRAM REPORT: CPT | Performed by: INTERNAL MEDICINE

## 2023-07-06 PROCEDURE — 6360000002 HC RX W HCPCS: Performed by: INTERNAL MEDICINE

## 2023-07-06 PROCEDURE — 36415 COLL VENOUS BLD VENIPUNCTURE: CPT

## 2023-07-06 PROCEDURE — 83540 ASSAY OF IRON: CPT

## 2023-07-06 PROCEDURE — 6360000004 HC RX CONTRAST MEDICATION: Performed by: INTERNAL MEDICINE

## 2023-07-06 PROCEDURE — 85027 COMPLETE CBC AUTOMATED: CPT

## 2023-07-06 PROCEDURE — 80048 BASIC METABOLIC PNL TOTAL CA: CPT

## 2023-07-06 PROCEDURE — 94010 BREATHING CAPACITY TEST: CPT

## 2023-07-06 PROCEDURE — 6370000000 HC RX 637 (ALT 250 FOR IP): Performed by: NURSE PRACTITIONER

## 2023-07-06 PROCEDURE — 84443 ASSAY THYROID STIM HORMONE: CPT

## 2023-07-06 PROCEDURE — 93971 EXTREMITY STUDY: CPT

## 2023-07-06 PROCEDURE — 2000000000 HC ICU R&B

## 2023-07-06 PROCEDURE — 2580000003 HC RX 258: Performed by: INTERNAL MEDICINE

## 2023-07-06 PROCEDURE — C8923 2D TTE W OR W/O FOL W/CON,CO: HCPCS

## 2023-07-06 PROCEDURE — 99232 SBSQ HOSP IP/OBS MODERATE 35: CPT | Performed by: NURSE PRACTITIONER

## 2023-07-06 PROCEDURE — 83550 IRON BINDING TEST: CPT

## 2023-07-06 RX ORDER — HEPARIN SODIUM 1000 [USP'U]/ML
4000 INJECTION, SOLUTION INTRAVENOUS; SUBCUTANEOUS ONCE
Status: COMPLETED | OUTPATIENT
Start: 2023-07-06 | End: 2023-07-06

## 2023-07-06 RX ORDER — HEPARIN SODIUM 1000 [USP'U]/ML
2000 INJECTION, SOLUTION INTRAVENOUS; SUBCUTANEOUS ONCE
Status: COMPLETED | OUTPATIENT
Start: 2023-07-06 | End: 2023-07-06

## 2023-07-06 RX ORDER — POTASSIUM CHLORIDE 20 MEQ/1
20 TABLET, EXTENDED RELEASE ORAL ONCE
Status: COMPLETED | OUTPATIENT
Start: 2023-07-06 | End: 2023-07-06

## 2023-07-06 RX ADMIN — PERFLUTREN 1.5 ML: 6.52 INJECTION, SUSPENSION INTRAVENOUS at 06:31

## 2023-07-06 RX ADMIN — SODIUM CHLORIDE, PRESERVATIVE FREE 10 ML: 5 INJECTION INTRAVENOUS at 20:58

## 2023-07-06 RX ADMIN — MUPIROCIN: 20 OINTMENT TOPICAL at 20:55

## 2023-07-06 RX ADMIN — INSULIN LISPRO 4 UNITS: 100 INJECTION, SOLUTION INTRAVENOUS; SUBCUTANEOUS at 18:36

## 2023-07-06 RX ADMIN — METOPROLOL TARTRATE 12.5 MG: 25 TABLET, FILM COATED ORAL at 09:18

## 2023-07-06 RX ADMIN — SODIUM CHLORIDE, PRESERVATIVE FREE 10 ML: 5 INJECTION INTRAVENOUS at 09:18

## 2023-07-06 RX ADMIN — INSULIN LISPRO 7 UNITS: 100 INJECTION, SOLUTION INTRAVENOUS; SUBCUTANEOUS at 18:37

## 2023-07-06 RX ADMIN — INSULIN GLARGINE 25 UNITS: 100 INJECTION, SOLUTION SUBCUTANEOUS at 09:18

## 2023-07-06 RX ADMIN — ACETAMINOPHEN 650 MG: 325 TABLET ORAL at 20:52

## 2023-07-06 RX ADMIN — HEPARIN SODIUM 1370 UNITS/HR: 10000 INJECTION, SOLUTION INTRAVENOUS at 00:35

## 2023-07-06 RX ADMIN — ASPIRIN 81 MG 81 MG: 81 TABLET ORAL at 09:18

## 2023-07-06 RX ADMIN — HEPARIN SODIUM 4000 UNITS: 1000 INJECTION INTRAVENOUS; SUBCUTANEOUS at 06:17

## 2023-07-06 RX ADMIN — METOPROLOL TARTRATE 12.5 MG: 25 TABLET, FILM COATED ORAL at 20:53

## 2023-07-06 RX ADMIN — HEPARIN SODIUM 4000 UNITS: 1000 INJECTION INTRAVENOUS; SUBCUTANEOUS at 14:17

## 2023-07-06 RX ADMIN — HEPARIN SODIUM 2000 UNITS: 1000 INJECTION INTRAVENOUS; SUBCUTANEOUS at 20:15

## 2023-07-06 RX ADMIN — POTASSIUM CHLORIDE 20 MEQ: 1500 TABLET, EXTENDED RELEASE ORAL at 14:37

## 2023-07-06 RX ADMIN — ATORVASTATIN CALCIUM 40 MG: 40 TABLET, FILM COATED ORAL at 20:53

## 2023-07-06 ASSESSMENT — PAIN DESCRIPTION - DESCRIPTORS: DESCRIPTORS: ACHING

## 2023-07-06 ASSESSMENT — PAIN DESCRIPTION - LOCATION: LOCATION: WRIST

## 2023-07-06 ASSESSMENT — PAIN DESCRIPTION - ORIENTATION: ORIENTATION: RIGHT

## 2023-07-06 ASSESSMENT — PAIN - FUNCTIONAL ASSESSMENT: PAIN_FUNCTIONAL_ASSESSMENT: PREVENTS OR INTERFERES SOME ACTIVE ACTIVITIES AND ADLS

## 2023-07-06 ASSESSMENT — PAIN SCALES - GENERAL: PAINLEVEL_OUTOF10: 3

## 2023-07-07 ENCOUNTER — ANESTHESIA EVENT (OUTPATIENT)
Dept: OPERATING ROOM | Age: 33
End: 2023-07-07
Payer: COMMERCIAL

## 2023-07-07 LAB
AMPHETAMINES UR QL SCN>1000 NG/ML: ABNORMAL
ANION GAP SERPL CALCULATED.3IONS-SCNC: 10 MMOL/L (ref 3–16)
ANTI-XA UNFRAC HEPARIN: 0.22 IU/ML (ref 0.3–0.7)
ANTI-XA UNFRAC HEPARIN: 0.25 IU/ML (ref 0.3–0.7)
ANTI-XA UNFRAC HEPARIN: 0.27 IU/ML (ref 0.3–0.7)
ANTI-XA UNFRAC HEPARIN: <0.1 IU/ML (ref 0.3–0.7)
BARBITURATES UR QL SCN>200 NG/ML: ABNORMAL
BENZODIAZ UR QL SCN>200 NG/ML: ABNORMAL
BILIRUB UR QL STRIP.AUTO: NEGATIVE
BUN SERPL-MCNC: 3 MG/DL (ref 7–20)
CALCIUM SERPL-MCNC: 8.7 MG/DL (ref 8.3–10.6)
CANNABINOIDS UR QL SCN>50 NG/ML: POSITIVE
CHLORIDE SERPL-SCNC: 98 MMOL/L (ref 99–110)
CLARITY UR: CLEAR
CO2 SERPL-SCNC: 27 MMOL/L (ref 21–32)
COCAINE UR QL SCN: ABNORMAL
COLOR UR: YELLOW
CREAT SERPL-MCNC: <0.5 MG/DL (ref 0.6–1.1)
DRUG SCREEN COMMENT UR-IMP: ABNORMAL
EKG ATRIAL RATE: 90 BPM
EKG DIAGNOSIS: NORMAL
EKG P AXIS: 44 DEGREES
EKG P-R INTERVAL: 168 MS
EKG Q-T INTERVAL: 412 MS
EKG QRS DURATION: 84 MS
EKG QTC CALCULATION (BAZETT): 504 MS
EKG R AXIS: 33 DEGREES
EKG T AXIS: 116 DEGREES
EKG VENTRICULAR RATE: 90 BPM
FENTANYL SCREEN, URINE: POSITIVE
GFR SERPLBLD CREATININE-BSD FMLA CKD-EPI: >60 ML/MIN/{1.73_M2}
GLUCOSE BLD-MCNC: 116 MG/DL (ref 70–99)
GLUCOSE BLD-MCNC: 170 MG/DL (ref 70–99)
GLUCOSE BLD-MCNC: 173 MG/DL (ref 70–99)
GLUCOSE BLD-MCNC: 309 MG/DL (ref 70–99)
GLUCOSE SERPL-MCNC: 190 MG/DL (ref 70–99)
GLUCOSE UR STRIP.AUTO-MCNC: NEGATIVE MG/DL
HCG UR QL: NEGATIVE
HGB UR QL STRIP.AUTO: ABNORMAL
KETONES UR STRIP.AUTO-MCNC: NEGATIVE MG/DL
LEUKOCYTE ESTERASE UR QL STRIP.AUTO: ABNORMAL
METHADONE UR QL SCN>300 NG/ML: ABNORMAL
NITRITE UR QL STRIP.AUTO: NEGATIVE
OPIATES UR QL SCN>300 NG/ML: ABNORMAL
OXYCODONE UR QL SCN: ABNORMAL
PCP UR QL SCN>25 NG/ML: ABNORMAL
PERFORMED ON: ABNORMAL
PH UR STRIP.AUTO: 7 [PH] (ref 5–8)
PH UR STRIP: 7 [PH]
POC ACT LR: 163 SEC
POTASSIUM SERPL-SCNC: 4 MMOL/L (ref 3.5–5.1)
PROT UR STRIP.AUTO-MCNC: NEGATIVE MG/DL
RBC #/AREA URNS HPF: NORMAL /HPF (ref 0–4)
SODIUM SERPL-SCNC: 135 MMOL/L (ref 136–145)
SP GR UR STRIP.AUTO: <=1.005 (ref 1–1.03)
UA COMPLETE W REFLEX CULTURE PNL UR: ABNORMAL
UA DIPSTICK W REFLEX MICRO PNL UR: YES
URN SPEC COLLECT METH UR: ABNORMAL
UROBILINOGEN UR STRIP-ACNC: 4 E.U./DL
WBC #/AREA URNS HPF: NORMAL /HPF (ref 0–5)

## 2023-07-07 PROCEDURE — 6370000000 HC RX 637 (ALT 250 FOR IP)

## 2023-07-07 PROCEDURE — 99232 SBSQ HOSP IP/OBS MODERATE 35: CPT | Performed by: NURSE PRACTITIONER

## 2023-07-07 PROCEDURE — 83010 ASSAY OF HAPTOGLOBIN QUANT: CPT

## 2023-07-07 PROCEDURE — 85520 HEPARIN ASSAY: CPT

## 2023-07-07 PROCEDURE — 6370000000 HC RX 637 (ALT 250 FOR IP): Performed by: INTERNAL MEDICINE

## 2023-07-07 PROCEDURE — 6360000002 HC RX W HCPCS: Performed by: THORACIC SURGERY (CARDIOTHORACIC VASCULAR SURGERY)

## 2023-07-07 PROCEDURE — 6360000002 HC RX W HCPCS: Performed by: NURSE PRACTITIONER

## 2023-07-07 PROCEDURE — 2060000000 HC ICU INTERMEDIATE R&B

## 2023-07-07 PROCEDURE — 93010 ELECTROCARDIOGRAM REPORT: CPT | Performed by: INTERNAL MEDICINE

## 2023-07-07 PROCEDURE — 6360000002 HC RX W HCPCS: Performed by: INTERNAL MEDICINE

## 2023-07-07 PROCEDURE — 80307 DRUG TEST PRSMV CHEM ANLYZR: CPT

## 2023-07-07 PROCEDURE — 2580000003 HC RX 258: Performed by: INTERNAL MEDICINE

## 2023-07-07 PROCEDURE — 1200000000 HC SEMI PRIVATE

## 2023-07-07 PROCEDURE — 84703 CHORIONIC GONADOTROPIN ASSAY: CPT

## 2023-07-07 PROCEDURE — 36415 COLL VENOUS BLD VENIPUNCTURE: CPT

## 2023-07-07 PROCEDURE — 80048 BASIC METABOLIC PNL TOTAL CA: CPT

## 2023-07-07 PROCEDURE — 81001 URINALYSIS AUTO W/SCOPE: CPT

## 2023-07-07 RX ORDER — HEPARIN SODIUM 1000 [USP'U]/ML
4000 INJECTION, SOLUTION INTRAVENOUS; SUBCUTANEOUS ONCE
Status: COMPLETED | OUTPATIENT
Start: 2023-07-07 | End: 2023-07-07

## 2023-07-07 RX ORDER — M-VIT,TX,IRON,MINS/CALC/FOLIC 27MG-0.4MG
1 TABLET ORAL DAILY
Status: DISCONTINUED | OUTPATIENT
Start: 2023-07-07 | End: 2023-07-10

## 2023-07-07 RX ORDER — HEPARIN SODIUM 1000 [USP'U]/ML
2000 INJECTION, SOLUTION INTRAVENOUS; SUBCUTANEOUS ONCE
Status: COMPLETED | OUTPATIENT
Start: 2023-07-07 | End: 2023-07-07

## 2023-07-07 RX ORDER — AMIODARONE HYDROCHLORIDE 200 MG/1
400 TABLET ORAL 2 TIMES DAILY
Status: COMPLETED | OUTPATIENT
Start: 2023-07-08 | End: 2023-07-09

## 2023-07-07 RX ORDER — ASCORBIC ACID 500 MG
500 TABLET ORAL DAILY
Status: DISCONTINUED | OUTPATIENT
Start: 2023-07-07 | End: 2023-07-10

## 2023-07-07 RX ADMIN — INSULIN GLARGINE 25 UNITS: 100 INJECTION, SOLUTION SUBCUTANEOUS at 10:08

## 2023-07-07 RX ADMIN — HEPARIN SODIUM 2450 UNITS/HR: 10000 INJECTION, SOLUTION INTRAVENOUS at 05:33

## 2023-07-07 RX ADMIN — HEPARIN SODIUM 2000 UNITS: 1000 INJECTION INTRAVENOUS; SUBCUTANEOUS at 23:38

## 2023-07-07 RX ADMIN — INSULIN LISPRO 7 UNITS: 100 INJECTION, SOLUTION INTRAVENOUS; SUBCUTANEOUS at 12:50

## 2023-07-07 RX ADMIN — HEPARIN SODIUM 4000 UNITS: 1000 INJECTION INTRAVENOUS; SUBCUTANEOUS at 03:33

## 2023-07-07 RX ADMIN — MULTIPLE VITAMINS W/ MINERALS TAB 1 TABLET: TAB at 13:56

## 2023-07-07 RX ADMIN — OXYCODONE HYDROCHLORIDE AND ACETAMINOPHEN 500 MG: 500 TABLET ORAL at 13:56

## 2023-07-07 RX ADMIN — ASPIRIN 81 MG 81 MG: 81 TABLET ORAL at 10:29

## 2023-07-07 RX ADMIN — HEPARIN SODIUM 2930 UNITS/HR: 10000 INJECTION, SOLUTION INTRAVENOUS at 23:41

## 2023-07-07 RX ADMIN — MUPIROCIN: 20 OINTMENT TOPICAL at 10:33

## 2023-07-07 RX ADMIN — INSULIN LISPRO 6 UNITS: 100 INJECTION, SOLUTION INTRAVENOUS; SUBCUTANEOUS at 10:27

## 2023-07-07 RX ADMIN — MORPHINE SULFATE 2 MG: 2 INJECTION, SOLUTION INTRAMUSCULAR; INTRAVENOUS at 21:09

## 2023-07-07 RX ADMIN — INSULIN LISPRO 7 UNITS: 100 INJECTION, SOLUTION INTRAVENOUS; SUBCUTANEOUS at 10:27

## 2023-07-07 RX ADMIN — INSULIN LISPRO 7 UNITS: 100 INJECTION, SOLUTION INTRAVENOUS; SUBCUTANEOUS at 17:34

## 2023-07-07 RX ADMIN — METOPROLOL TARTRATE 12.5 MG: 25 TABLET, FILM COATED ORAL at 10:29

## 2023-07-07 RX ADMIN — ATORVASTATIN CALCIUM 40 MG: 40 TABLET, FILM COATED ORAL at 21:01

## 2023-07-07 RX ADMIN — HEPARIN SODIUM 2590 UNITS/HR: 10000 INJECTION, SOLUTION INTRAVENOUS at 15:02

## 2023-07-07 RX ADMIN — HEPARIN SODIUM 2000 UNITS: 1000 INJECTION INTRAVENOUS; SUBCUTANEOUS at 10:09

## 2023-07-07 RX ADMIN — ACETAMINOPHEN 650 MG: 325 TABLET ORAL at 21:07

## 2023-07-07 RX ADMIN — MORPHINE SULFATE 2 MG: 2 INJECTION, SOLUTION INTRAMUSCULAR; INTRAVENOUS at 15:03

## 2023-07-07 RX ADMIN — SODIUM CHLORIDE, PRESERVATIVE FREE 10 ML: 5 INJECTION INTRAVENOUS at 21:01

## 2023-07-07 RX ADMIN — HEPARIN SODIUM 2000 UNITS: 1000 INJECTION INTRAVENOUS; SUBCUTANEOUS at 17:12

## 2023-07-07 RX ADMIN — METOPROLOL TARTRATE 12.5 MG: 25 TABLET, FILM COATED ORAL at 21:01

## 2023-07-07 RX ADMIN — ACETAMINOPHEN 650 MG: 325 TABLET ORAL at 15:03

## 2023-07-07 ASSESSMENT — PAIN DESCRIPTION - LOCATION
LOCATION_2: FOOT
LOCATION: FOOT
LOCATION: CHEST
LOCATION: CHEST

## 2023-07-07 ASSESSMENT — PAIN DESCRIPTION - ORIENTATION
ORIENTATION_2: RIGHT;LEFT
ORIENTATION: RIGHT;LEFT
ORIENTATION: MID

## 2023-07-07 ASSESSMENT — PAIN DESCRIPTION - DESCRIPTORS
DESCRIPTORS: BURNING;NUMBNESS
DESCRIPTORS_2: BURNING;NUMBNESS
DESCRIPTORS: PRESSURE

## 2023-07-07 ASSESSMENT — PAIN SCALES - GENERAL
PAINLEVEL_OUTOF10: 5
PAINLEVEL_OUTOF10: 5

## 2023-07-08 LAB
ABO + RH BLD: NORMAL
ALBUMIN SERPL-MCNC: 2.5 G/DL (ref 3.4–5)
ANION GAP SERPL CALCULATED.3IONS-SCNC: 12 MMOL/L (ref 3–16)
ANTI-XA UNFRAC HEPARIN: 0.27 IU/ML (ref 0.3–0.7)
ANTI-XA UNFRAC HEPARIN: 0.33 IU/ML (ref 0.3–0.7)
ANTI-XA UNFRAC HEPARIN: 0.35 IU/ML (ref 0.3–0.7)
BASOPHILS # BLD: 0 K/UL (ref 0–0.2)
BASOPHILS NFR BLD: 0.2 %
BLD GP AB SCN SERPL QL: NORMAL
BUN SERPL-MCNC: 7 MG/DL (ref 7–20)
CALCIUM SERPL-MCNC: 8.7 MG/DL (ref 8.3–10.6)
CHLORIDE SERPL-SCNC: 94 MMOL/L (ref 99–110)
CO2 SERPL-SCNC: 25 MMOL/L (ref 21–32)
CREAT SERPL-MCNC: <0.5 MG/DL (ref 0.6–1.1)
DAT IGG CAPTURE: NORMAL
DEPRECATED RDW RBC AUTO: 15.4 % (ref 12.4–15.4)
DEPRECATED RDW RBC AUTO: 15.9 % (ref 12.4–15.4)
EOSINOPHIL # BLD: 0 K/UL (ref 0–0.6)
EOSINOPHIL NFR BLD: 0.4 %
FERRITIN SERPL IA-MCNC: 109.2 NG/ML (ref 15–150)
GFR SERPLBLD CREATININE-BSD FMLA CKD-EPI: >60 ML/MIN/{1.73_M2}
GLUCOSE BLD-MCNC: 212 MG/DL (ref 70–99)
GLUCOSE BLD-MCNC: 240 MG/DL (ref 70–99)
GLUCOSE BLD-MCNC: 243 MG/DL (ref 70–99)
GLUCOSE BLD-MCNC: 248 MG/DL (ref 70–99)
GLUCOSE SERPL-MCNC: 268 MG/DL (ref 70–99)
HCT VFR BLD AUTO: 27.8 % (ref 36–48)
HCT VFR BLD AUTO: 28.5 % (ref 36–48)
HGB BLD-MCNC: 8.9 G/DL (ref 12–16)
HGB BLD-MCNC: 9.1 G/DL (ref 12–16)
LACTATE BLDV-SCNC: 2.5 MMOL/L (ref 0.4–2)
LYMPHOCYTES # BLD: 2.7 K/UL (ref 1–5.1)
LYMPHOCYTES NFR BLD: 20.8 %
MAGNESIUM SERPL-MCNC: 1.6 MG/DL (ref 1.8–2.4)
MCH RBC QN AUTO: 27.2 PG (ref 26–34)
MCH RBC QN AUTO: 27.5 PG (ref 26–34)
MCHC RBC AUTO-ENTMCNC: 31.8 G/DL (ref 31–36)
MCHC RBC AUTO-ENTMCNC: 32.2 G/DL (ref 31–36)
MCV RBC AUTO: 85.4 FL (ref 80–100)
MCV RBC AUTO: 85.4 FL (ref 80–100)
MONOCYTES # BLD: 1.1 K/UL (ref 0–1.3)
MONOCYTES NFR BLD: 8.4 %
NEUTROPHILS # BLD: 9.1 K/UL (ref 1.7–7.7)
NEUTROPHILS NFR BLD: 70.2 %
PERFORMED ON: ABNORMAL
PHOSPHATE SERPL-MCNC: 3 MG/DL (ref 2.5–4.9)
PLATELET # BLD AUTO: 335 K/UL (ref 135–450)
PLATELET # BLD AUTO: 407 K/UL (ref 135–450)
PMV BLD AUTO: 8 FL (ref 5–10.5)
PMV BLD AUTO: 8.5 FL (ref 5–10.5)
POTASSIUM SERPL-SCNC: 3.6 MMOL/L (ref 3.5–5.1)
RBC # BLD AUTO: 3.25 M/UL (ref 4–5.2)
RBC # BLD AUTO: 3.34 M/UL (ref 4–5.2)
SODIUM SERPL-SCNC: 131 MMOL/L (ref 136–145)
WBC # BLD AUTO: 13 K/UL (ref 4–11)
WBC # BLD AUTO: 13.2 K/UL (ref 4–11)

## 2023-07-08 PROCEDURE — 86850 RBC ANTIBODY SCREEN: CPT

## 2023-07-08 PROCEDURE — 82607 VITAMIN B-12: CPT

## 2023-07-08 PROCEDURE — 82728 ASSAY OF FERRITIN: CPT

## 2023-07-08 PROCEDURE — 80069 RENAL FUNCTION PANEL: CPT

## 2023-07-08 PROCEDURE — 2060000000 HC ICU INTERMEDIATE R&B

## 2023-07-08 PROCEDURE — 85520 HEPARIN ASSAY: CPT

## 2023-07-08 PROCEDURE — 6370000000 HC RX 637 (ALT 250 FOR IP): Performed by: INTERNAL MEDICINE

## 2023-07-08 PROCEDURE — 86900 BLOOD TYPING SEROLOGIC ABO: CPT

## 2023-07-08 PROCEDURE — 2580000003 HC RX 258: Performed by: INTERNAL MEDICINE

## 2023-07-08 PROCEDURE — 87040 BLOOD CULTURE FOR BACTERIA: CPT

## 2023-07-08 PROCEDURE — 85025 COMPLETE CBC W/AUTO DIFF WBC: CPT

## 2023-07-08 PROCEDURE — 6360000002 HC RX W HCPCS: Performed by: NURSE PRACTITIONER

## 2023-07-08 PROCEDURE — 6360000002 HC RX W HCPCS: Performed by: INTERNAL MEDICINE

## 2023-07-08 PROCEDURE — 83735 ASSAY OF MAGNESIUM: CPT

## 2023-07-08 PROCEDURE — 6370000000 HC RX 637 (ALT 250 FOR IP)

## 2023-07-08 PROCEDURE — 82746 ASSAY OF FOLIC ACID SERUM: CPT

## 2023-07-08 PROCEDURE — 85027 COMPLETE CBC AUTOMATED: CPT

## 2023-07-08 PROCEDURE — 1200000000 HC SEMI PRIVATE

## 2023-07-08 PROCEDURE — 86923 COMPATIBILITY TEST ELECTRIC: CPT

## 2023-07-08 PROCEDURE — 83605 ASSAY OF LACTIC ACID: CPT

## 2023-07-08 PROCEDURE — 2580000003 HC RX 258: Performed by: NURSE PRACTITIONER

## 2023-07-08 PROCEDURE — 36415 COLL VENOUS BLD VENIPUNCTURE: CPT

## 2023-07-08 PROCEDURE — 86880 COOMBS TEST DIRECT: CPT

## 2023-07-08 PROCEDURE — 86901 BLOOD TYPING SEROLOGIC RH(D): CPT

## 2023-07-08 RX ORDER — MAGNESIUM SULFATE 1 G/100ML
1000 INJECTION INTRAVENOUS PRN
Status: DISCONTINUED | OUTPATIENT
Start: 2023-07-08 | End: 2023-07-10

## 2023-07-08 RX ORDER — HEPARIN SODIUM 1000 [USP'U]/ML
2000 INJECTION, SOLUTION INTRAVENOUS; SUBCUTANEOUS ONCE
Status: COMPLETED | OUTPATIENT
Start: 2023-07-08 | End: 2023-07-08

## 2023-07-08 RX ORDER — SODIUM CHLORIDE 9 MG/ML
INJECTION, SOLUTION INTRAVENOUS CONTINUOUS
Status: ACTIVE | OUTPATIENT
Start: 2023-07-08 | End: 2023-07-09

## 2023-07-08 RX ADMIN — INSULIN LISPRO 2 UNITS: 100 INJECTION, SOLUTION INTRAVENOUS; SUBCUTANEOUS at 17:22

## 2023-07-08 RX ADMIN — ASPIRIN 81 MG 81 MG: 81 TABLET ORAL at 08:47

## 2023-07-08 RX ADMIN — MORPHINE SULFATE 2 MG: 2 INJECTION, SOLUTION INTRAMUSCULAR; INTRAVENOUS at 21:32

## 2023-07-08 RX ADMIN — MORPHINE SULFATE 2 MG: 2 INJECTION, SOLUTION INTRAMUSCULAR; INTRAVENOUS at 08:54

## 2023-07-08 RX ADMIN — ACETAMINOPHEN 650 MG: 325 TABLET ORAL at 16:20

## 2023-07-08 RX ADMIN — AMIODARONE HYDROCHLORIDE 400 MG: 200 TABLET ORAL at 08:47

## 2023-07-08 RX ADMIN — MAGNESIUM SULFATE HEPTAHYDRATE 1000 MG: 1 INJECTION, SOLUTION INTRAVENOUS at 23:04

## 2023-07-08 RX ADMIN — INSULIN LISPRO 7 UNITS: 100 INJECTION, SOLUTION INTRAVENOUS; SUBCUTANEOUS at 08:48

## 2023-07-08 RX ADMIN — MULTIPLE VITAMINS W/ MINERALS TAB 1 TABLET: TAB at 08:47

## 2023-07-08 RX ADMIN — METOPROLOL TARTRATE 12.5 MG: 25 TABLET, FILM COATED ORAL at 21:30

## 2023-07-08 RX ADMIN — AMIODARONE HYDROCHLORIDE 400 MG: 200 TABLET ORAL at 21:30

## 2023-07-08 RX ADMIN — INSULIN LISPRO 2 UNITS: 100 INJECTION, SOLUTION INTRAVENOUS; SUBCUTANEOUS at 08:49

## 2023-07-08 RX ADMIN — INSULIN LISPRO 7 UNITS: 100 INJECTION, SOLUTION INTRAVENOUS; SUBCUTANEOUS at 12:46

## 2023-07-08 RX ADMIN — INSULIN LISPRO 2 UNITS: 100 INJECTION, SOLUTION INTRAVENOUS; SUBCUTANEOUS at 12:46

## 2023-07-08 RX ADMIN — HEPARIN SODIUM 2000 UNITS: 1000 INJECTION INTRAVENOUS; SUBCUTANEOUS at 12:19

## 2023-07-08 RX ADMIN — ATORVASTATIN CALCIUM 40 MG: 40 TABLET, FILM COATED ORAL at 21:30

## 2023-07-08 RX ADMIN — METOPROLOL TARTRATE 12.5 MG: 25 TABLET, FILM COATED ORAL at 08:46

## 2023-07-08 RX ADMIN — INSULIN LISPRO 7 UNITS: 100 INJECTION, SOLUTION INTRAVENOUS; SUBCUTANEOUS at 17:22

## 2023-07-08 RX ADMIN — SODIUM CHLORIDE, PRESERVATIVE FREE 10 ML: 5 INJECTION INTRAVENOUS at 21:33

## 2023-07-08 RX ADMIN — INSULIN GLARGINE 25 UNITS: 100 INJECTION, SOLUTION SUBCUTANEOUS at 08:48

## 2023-07-08 RX ADMIN — HEPARIN SODIUM 2930 UNITS/HR: 10000 INJECTION, SOLUTION INTRAVENOUS at 09:00

## 2023-07-08 RX ADMIN — MAGNESIUM SULFATE HEPTAHYDRATE 1000 MG: 1 INJECTION, SOLUTION INTRAVENOUS at 21:42

## 2023-07-08 RX ADMIN — HEPARIN SODIUM 3070 UNITS/HR: 10000 INJECTION, SOLUTION INTRAVENOUS at 17:22

## 2023-07-08 RX ADMIN — OXYCODONE HYDROCHLORIDE AND ACETAMINOPHEN 500 MG: 500 TABLET ORAL at 08:46

## 2023-07-08 RX ADMIN — SODIUM CHLORIDE: 9 INJECTION, SOLUTION INTRAVENOUS at 21:39

## 2023-07-08 ASSESSMENT — ENCOUNTER SYMPTOMS
SHORTNESS OF BREATH: 1
EYES NEGATIVE: 1
GASTROINTESTINAL NEGATIVE: 1

## 2023-07-08 ASSESSMENT — PAIN DESCRIPTION - ORIENTATION
ORIENTATION: MID
ORIENTATION_2: RIGHT;LEFT

## 2023-07-08 ASSESSMENT — PAIN DESCRIPTION - DESCRIPTORS
DESCRIPTORS: PRESSURE
DESCRIPTORS_2: BURNING;NUMBNESS

## 2023-07-08 ASSESSMENT — PAIN DESCRIPTION - LOCATION
LOCATION: CHEST
LOCATION: CHEST
LOCATION_2: FOOT
LOCATION: CHEST

## 2023-07-08 ASSESSMENT — PAIN SCALES - GENERAL
PAINLEVEL_OUTOF10: 4
PAINLEVEL_OUTOF10: 6
PAINLEVEL_OUTOF10: 0

## 2023-07-09 ENCOUNTER — APPOINTMENT (OUTPATIENT)
Dept: GENERAL RADIOLOGY | Age: 33
DRG: 165 | End: 2023-07-09
Payer: COMMERCIAL

## 2023-07-09 LAB
ALBUMIN SERPL-MCNC: 2.3 G/DL (ref 3.4–5)
ALP SERPL-CCNC: 150 U/L (ref 40–129)
ALT SERPL-CCNC: 16 U/L (ref 10–40)
ANION GAP SERPL CALCULATED.3IONS-SCNC: 9 MMOL/L (ref 3–16)
ANTI-XA UNFRAC HEPARIN: 0.27 IU/ML (ref 0.3–0.7)
ANTI-XA UNFRAC HEPARIN: 0.37 IU/ML (ref 0.3–0.7)
ANTI-XA UNFRAC HEPARIN: 0.39 IU/ML (ref 0.3–0.7)
AST SERPL-CCNC: 15 U/L (ref 15–37)
BACTERIA URNS QL MICRO: ABNORMAL /HPF
BASOPHILS # BLD: 0 K/UL (ref 0–0.2)
BASOPHILS NFR BLD: 0.3 %
BILIRUB DIRECT SERPL-MCNC: <0.2 MG/DL (ref 0–0.3)
BILIRUB INDIRECT SERPL-MCNC: ABNORMAL MG/DL (ref 0–1)
BILIRUB SERPL-MCNC: <0.2 MG/DL (ref 0–1)
BILIRUB UR QL STRIP.AUTO: NEGATIVE
BUN SERPL-MCNC: 5 MG/DL (ref 7–20)
CALCIUM SERPL-MCNC: 8.6 MG/DL (ref 8.3–10.6)
CHLORIDE SERPL-SCNC: 96 MMOL/L (ref 99–110)
CLARITY UR: ABNORMAL
CO2 SERPL-SCNC: 27 MMOL/L (ref 21–32)
COLOR UR: YELLOW
CORTIS SERPL-MCNC: 13.9 UG/DL
CREAT SERPL-MCNC: <0.5 MG/DL (ref 0.6–1.1)
DEPRECATED RDW RBC AUTO: 15.5 % (ref 12.4–15.4)
EOSINOPHIL # BLD: 0.1 K/UL (ref 0–0.6)
EOSINOPHIL NFR BLD: 0.6 %
EPI CELLS #/AREA URNS HPF: ABNORMAL /HPF (ref 0–5)
FOLATE SERPL-MCNC: 7.23 NG/ML (ref 4.78–24.2)
GFR SERPLBLD CREATININE-BSD FMLA CKD-EPI: >60 ML/MIN/{1.73_M2}
GLUCOSE BLD-MCNC: 170 MG/DL (ref 70–99)
GLUCOSE BLD-MCNC: 217 MG/DL (ref 70–99)
GLUCOSE BLD-MCNC: 237 MG/DL (ref 70–99)
GLUCOSE BLD-MCNC: 249 MG/DL (ref 70–99)
GLUCOSE SERPL-MCNC: 229 MG/DL (ref 70–99)
GLUCOSE UR STRIP.AUTO-MCNC: 250 MG/DL
HAPTOGLOB SERPL-MCNC: 417 MG/DL (ref 30–200)
HCG SERPL QL: NEGATIVE
HCT VFR BLD AUTO: 26.8 % (ref 36–48)
HGB BLD-MCNC: 8.5 G/DL (ref 12–16)
HGB UR QL STRIP.AUTO: ABNORMAL
KETONES UR STRIP.AUTO-MCNC: NEGATIVE MG/DL
LACTATE BLDV-SCNC: 0.9 MMOL/L (ref 0.4–2)
LEUKOCYTE ESTERASE UR QL STRIP.AUTO: ABNORMAL
LYMPHOCYTES # BLD: 2.8 K/UL (ref 1–5.1)
LYMPHOCYTES NFR BLD: 23.9 %
MAGNESIUM SERPL-MCNC: 2 MG/DL (ref 1.8–2.4)
MCH RBC QN AUTO: 27.2 PG (ref 26–34)
MCHC RBC AUTO-ENTMCNC: 31.7 G/DL (ref 31–36)
MCV RBC AUTO: 85.8 FL (ref 80–100)
MONOCYTES # BLD: 1.1 K/UL (ref 0–1.3)
MONOCYTES NFR BLD: 9.7 %
NEUTROPHILS # BLD: 7.6 K/UL (ref 1.7–7.7)
NEUTROPHILS NFR BLD: 65.5 %
NITRITE UR QL STRIP.AUTO: NEGATIVE
PERFORMED ON: ABNORMAL
PH UR STRIP.AUTO: 7.5 [PH] (ref 5–8)
PHOSPHATE SERPL-MCNC: 3.9 MG/DL (ref 2.5–4.9)
PLATELET # BLD AUTO: 417 K/UL (ref 135–450)
PMV BLD AUTO: 7.9 FL (ref 5–10.5)
POTASSIUM SERPL-SCNC: 3.9 MMOL/L (ref 3.5–5.1)
PROT SERPL-MCNC: 6.3 G/DL (ref 6.4–8.2)
PROT UR STRIP.AUTO-MCNC: NEGATIVE MG/DL
RBC # BLD AUTO: 3.12 M/UL (ref 4–5.2)
RBC #/AREA URNS HPF: ABNORMAL /HPF (ref 0–4)
RENAL EPI CELLS #/AREA UR COMP ASSIST: ABNORMAL /HPF (ref 0–1)
SODIUM SERPL-SCNC: 132 MMOL/L (ref 136–145)
SP GR UR STRIP.AUTO: 1.01 (ref 1–1.03)
TSH SERPL DL<=0.005 MIU/L-ACNC: 1.63 UIU/ML (ref 0.27–4.2)
UA DIPSTICK W REFLEX MICRO PNL UR: YES
URN SPEC COLLECT METH UR: ABNORMAL
UROBILINOGEN UR STRIP-ACNC: 2 E.U./DL
VIT B12 SERPL-MCNC: 452 PG/ML (ref 211–911)
WBC # BLD AUTO: 11.6 K/UL (ref 4–11)
WBC #/AREA URNS HPF: ABNORMAL /HPF (ref 0–5)

## 2023-07-09 PROCEDURE — 99024 POSTOP FOLLOW-UP VISIT: CPT | Performed by: THORACIC SURGERY (CARDIOTHORACIC VASCULAR SURGERY)

## 2023-07-09 PROCEDURE — 6370000000 HC RX 637 (ALT 250 FOR IP): Performed by: INTERNAL MEDICINE

## 2023-07-09 PROCEDURE — 6360000002 HC RX W HCPCS: Performed by: NURSE PRACTITIONER

## 2023-07-09 PROCEDURE — 2580000003 HC RX 258: Performed by: INTERNAL MEDICINE

## 2023-07-09 PROCEDURE — 84443 ASSAY THYROID STIM HORMONE: CPT

## 2023-07-09 PROCEDURE — 74019 RADEX ABDOMEN 2 VIEWS: CPT

## 2023-07-09 PROCEDURE — 85520 HEPARIN ASSAY: CPT

## 2023-07-09 PROCEDURE — 71046 X-RAY EXAM CHEST 2 VIEWS: CPT

## 2023-07-09 PROCEDURE — 87086 URINE CULTURE/COLONY COUNT: CPT

## 2023-07-09 PROCEDURE — 6360000002 HC RX W HCPCS: Performed by: INTERNAL MEDICINE

## 2023-07-09 PROCEDURE — 2060000000 HC ICU INTERMEDIATE R&B

## 2023-07-09 PROCEDURE — 87186 SC STD MICRODIL/AGAR DIL: CPT

## 2023-07-09 PROCEDURE — 6370000000 HC RX 637 (ALT 250 FOR IP)

## 2023-07-09 PROCEDURE — 80076 HEPATIC FUNCTION PANEL: CPT

## 2023-07-09 PROCEDURE — 87077 CULTURE AEROBIC IDENTIFY: CPT

## 2023-07-09 PROCEDURE — 82533 TOTAL CORTISOL: CPT

## 2023-07-09 PROCEDURE — 80069 RENAL FUNCTION PANEL: CPT

## 2023-07-09 PROCEDURE — 6360000002 HC RX W HCPCS: Performed by: THORACIC SURGERY (CARDIOTHORACIC VASCULAR SURGERY)

## 2023-07-09 PROCEDURE — 1200000000 HC SEMI PRIVATE

## 2023-07-09 PROCEDURE — 84703 CHORIONIC GONADOTROPIN ASSAY: CPT

## 2023-07-09 PROCEDURE — 6370000000 HC RX 637 (ALT 250 FOR IP): Performed by: NURSE PRACTITIONER

## 2023-07-09 PROCEDURE — 81001 URINALYSIS AUTO W/SCOPE: CPT

## 2023-07-09 PROCEDURE — 36415 COLL VENOUS BLD VENIPUNCTURE: CPT

## 2023-07-09 PROCEDURE — 85025 COMPLETE CBC W/AUTO DIFF WBC: CPT

## 2023-07-09 PROCEDURE — 83735 ASSAY OF MAGNESIUM: CPT

## 2023-07-09 RX ORDER — CHLORHEXIDINE GLUCONATE 4 G/100ML
SOLUTION TOPICAL ONCE
Status: DISCONTINUED | OUTPATIENT
Start: 2023-07-10 | End: 2023-07-09 | Stop reason: SDUPTHER

## 2023-07-09 RX ORDER — ACETAMINOPHEN 500 MG
1000 TABLET ORAL ONCE
Status: COMPLETED | OUTPATIENT
Start: 2023-07-10 | End: 2023-07-10

## 2023-07-09 RX ORDER — CHLORHEXIDINE GLUCONATE 0.12 MG/ML
15 RINSE ORAL 2 TIMES DAILY
Status: DISCONTINUED | OUTPATIENT
Start: 2023-07-09 | End: 2023-07-09 | Stop reason: SDUPTHER

## 2023-07-09 RX ORDER — HEPARIN SODIUM 10000 [USP'U]/100ML
3340 INJECTION, SOLUTION INTRAVENOUS CONTINUOUS
Status: DISCONTINUED | OUTPATIENT
Start: 2023-07-09 | End: 2023-07-10

## 2023-07-09 RX ORDER — CHLORHEXIDINE GLUCONATE 4 G/100ML
SOLUTION TOPICAL ONCE
Status: DISCONTINUED | OUTPATIENT
Start: 2023-07-09 | End: 2023-07-09 | Stop reason: SDUPTHER

## 2023-07-09 RX ORDER — CHLORHEXIDINE GLUCONATE 0.12 MG/ML
15 RINSE ORAL ONCE
Status: COMPLETED | OUTPATIENT
Start: 2023-07-10 | End: 2023-07-10

## 2023-07-09 RX ORDER — HEPARIN SODIUM 1000 [USP'U]/ML
2000 INJECTION, SOLUTION INTRAVENOUS; SUBCUTANEOUS ONCE
Status: COMPLETED | OUTPATIENT
Start: 2023-07-09 | End: 2023-07-09

## 2023-07-09 RX ORDER — CEFAZOLIN SODIUM IN 0.9 % NACL 2 G/100 ML
2000 PLASTIC BAG, INJECTION (ML) INTRAVENOUS
Status: COMPLETED | OUTPATIENT
Start: 2023-07-10 | End: 2023-07-10

## 2023-07-09 RX ORDER — SODIUM CHLORIDE, SODIUM LACTATE, POTASSIUM CHLORIDE, CALCIUM CHLORIDE 600; 310; 30; 20 MG/100ML; MG/100ML; MG/100ML; MG/100ML
INJECTION, SOLUTION INTRAVENOUS CONTINUOUS
Status: DISCONTINUED | OUTPATIENT
Start: 2023-07-10 | End: 2023-07-10

## 2023-07-09 RX ORDER — ASPIRIN 81 MG/1
81 TABLET ORAL ONCE
Status: COMPLETED | OUTPATIENT
Start: 2023-07-10 | End: 2023-07-10

## 2023-07-09 RX ORDER — CHLORHEXIDINE GLUCONATE 4 G/100ML
SOLUTION TOPICAL 2 TIMES DAILY
Status: DISCONTINUED | OUTPATIENT
Start: 2023-07-09 | End: 2023-07-10 | Stop reason: HOSPADM

## 2023-07-09 RX ADMIN — ASPIRIN 81 MG 81 MG: 81 TABLET ORAL at 08:38

## 2023-07-09 RX ADMIN — HEPARIN SODIUM 3340 UNITS/HR: 10000 INJECTION, SOLUTION INTRAVENOUS at 17:04

## 2023-07-09 RX ADMIN — MORPHINE SULFATE 2 MG: 2 INJECTION, SOLUTION INTRAMUSCULAR; INTRAVENOUS at 14:18

## 2023-07-09 RX ADMIN — MULTIPLE VITAMINS W/ MINERALS TAB 1 TABLET: TAB at 08:38

## 2023-07-09 RX ADMIN — OXYCODONE HYDROCHLORIDE AND ACETAMINOPHEN 500 MG: 500 TABLET ORAL at 08:38

## 2023-07-09 RX ADMIN — ACETAMINOPHEN 650 MG: 325 TABLET ORAL at 08:38

## 2023-07-09 RX ADMIN — INSULIN GLARGINE 25 UNITS: 100 INJECTION, SOLUTION SUBCUTANEOUS at 08:38

## 2023-07-09 RX ADMIN — AMIODARONE HYDROCHLORIDE 400 MG: 200 TABLET ORAL at 08:38

## 2023-07-09 RX ADMIN — ATORVASTATIN CALCIUM 40 MG: 40 TABLET, FILM COATED ORAL at 20:51

## 2023-07-09 RX ADMIN — SODIUM CHLORIDE, PRESERVATIVE FREE 10 ML: 5 INJECTION INTRAVENOUS at 20:52

## 2023-07-09 RX ADMIN — MUPIROCIN: 20 OINTMENT TOPICAL at 20:52

## 2023-07-09 RX ADMIN — METOPROLOL TARTRATE 12.5 MG: 25 TABLET, FILM COATED ORAL at 20:51

## 2023-07-09 RX ADMIN — SODIUM CHLORIDE, PRESERVATIVE FREE 10 ML: 5 INJECTION INTRAVENOUS at 08:40

## 2023-07-09 RX ADMIN — INSULIN LISPRO 2 UNITS: 100 INJECTION, SOLUTION INTRAVENOUS; SUBCUTANEOUS at 12:54

## 2023-07-09 RX ADMIN — AMIODARONE HYDROCHLORIDE 400 MG: 200 TABLET ORAL at 20:51

## 2023-07-09 RX ADMIN — CEFTRIAXONE SODIUM 1000 MG: 1 INJECTION, POWDER, FOR SOLUTION INTRAMUSCULAR; INTRAVENOUS at 14:14

## 2023-07-09 RX ADMIN — METOPROLOL TARTRATE 12.5 MG: 25 TABLET, FILM COATED ORAL at 08:38

## 2023-07-09 RX ADMIN — INSULIN LISPRO 7 UNITS: 100 INJECTION, SOLUTION INTRAVENOUS; SUBCUTANEOUS at 08:39

## 2023-07-09 RX ADMIN — ACETAMINOPHEN 650 MG: 325 TABLET ORAL at 21:01

## 2023-07-09 RX ADMIN — HEPARIN SODIUM 3340 UNITS/HR: 10000 INJECTION, SOLUTION INTRAVENOUS at 08:47

## 2023-07-09 RX ADMIN — MORPHINE SULFATE 2 MG: 2 INJECTION, SOLUTION INTRAMUSCULAR; INTRAVENOUS at 06:00

## 2023-07-09 RX ADMIN — INSULIN LISPRO 2 UNITS: 100 INJECTION, SOLUTION INTRAVENOUS; SUBCUTANEOUS at 08:39

## 2023-07-09 RX ADMIN — INSULIN LISPRO 7 UNITS: 100 INJECTION, SOLUTION INTRAVENOUS; SUBCUTANEOUS at 17:40

## 2023-07-09 RX ADMIN — HEPARIN SODIUM 2000 UNITS: 1000 INJECTION INTRAVENOUS; SUBCUTANEOUS at 00:55

## 2023-07-09 RX ADMIN — MORPHINE SULFATE 2 MG: 2 INJECTION, SOLUTION INTRAMUSCULAR; INTRAVENOUS at 20:52

## 2023-07-09 RX ADMIN — HEPARIN SODIUM 3340 UNITS/HR: 10000 INJECTION, SOLUTION INTRAVENOUS at 00:59

## 2023-07-09 RX ADMIN — INSULIN LISPRO 7 UNITS: 100 INJECTION, SOLUTION INTRAVENOUS; SUBCUTANEOUS at 12:54

## 2023-07-09 ASSESSMENT — PAIN DESCRIPTION - DESCRIPTORS
DESCRIPTORS: PRESSURE
DESCRIPTORS: NAGGING;JABBING

## 2023-07-09 ASSESSMENT — PAIN SCALES - GENERAL
PAINLEVEL_OUTOF10: 8
PAINLEVEL_OUTOF10: 8
PAINLEVEL_OUTOF10: 7
PAINLEVEL_OUTOF10: 6
PAINLEVEL_OUTOF10: 2

## 2023-07-09 ASSESSMENT — PAIN DESCRIPTION - ORIENTATION
ORIENTATION: MID

## 2023-07-09 ASSESSMENT — PAIN DESCRIPTION - LOCATION
LOCATION: CHEST

## 2023-07-10 ENCOUNTER — ANESTHESIA (OUTPATIENT)
Dept: OPERATING ROOM | Age: 33
End: 2023-07-10
Payer: COMMERCIAL

## 2023-07-10 ENCOUNTER — APPOINTMENT (OUTPATIENT)
Dept: GENERAL RADIOLOGY | Age: 33
DRG: 165 | End: 2023-07-10
Payer: COMMERCIAL

## 2023-07-10 LAB
ACTIVATED CLOTTING TIME: 107 SEC (ref 99–130)
ACTIVATED CLOTTING TIME: 122 SEC (ref 99–130)
ACTIVATED CLOTTING TIME: 171 SEC (ref 99–130)
ACTIVATED CLOTTING TIME: 342 SEC (ref 99–130)
ACTIVATED CLOTTING TIME: 356 SEC (ref 99–130)
ACTIVATED CLOTTING TIME: 360 SEC (ref 99–130)
ACTIVATED CLOTTING TIME: 374 SEC (ref 99–130)
ACTIVATED CLOTTING TIME: 399 SEC (ref 99–130)
ACTIVATED CLOTTING TIME: 416 SEC (ref 99–130)
ACTIVATED CLOTTING TIME: 425 SEC (ref 99–130)
ACTIVATED CLOTTING TIME: 437 SEC (ref 99–130)
ACTIVATED CLOTTING TIME: 457 SEC (ref 99–130)
ACTIVATED CLOTTING TIME: 473 SEC (ref 99–130)
ACTIVATED CLOTTING TIME: 488 SEC (ref 99–130)
ALBUMIN SERPL-MCNC: 2.5 G/DL (ref 3.4–5)
ALP SERPL-CCNC: 199 U/L (ref 40–129)
ALT SERPL-CCNC: 21 U/L (ref 10–40)
ANION GAP SERPL CALCULATED.3IONS-SCNC: 10 MMOL/L (ref 3–16)
ANION GAP SERPL CALCULATED.3IONS-SCNC: 15 MMOL/L (ref 3–16)
ANTI-XA UNFRAC HEPARIN: 0.28 IU/ML (ref 0.3–0.7)
APTT BLD: 42.9 SEC (ref 22.7–35.9)
AST SERPL-CCNC: 18 U/L (ref 15–37)
BASE EXCESS BLDA CALC-SCNC: -2 MMOL/L (ref -3–3)
BASE EXCESS BLDA CALC-SCNC: 0 MMOL/L (ref -3–3)
BASE EXCESS BLDA CALC-SCNC: 1 MMOL/L (ref -3–3)
BASOPHILS # BLD: 0.1 K/UL (ref 0–0.2)
BASOPHILS NFR BLD: 0.9 %
BILIRUB DIRECT SERPL-MCNC: <0.2 MG/DL (ref 0–0.3)
BILIRUB INDIRECT SERPL-MCNC: ABNORMAL MG/DL (ref 0–1)
BILIRUB SERPL-MCNC: <0.2 MG/DL (ref 0–1)
BUN SERPL-MCNC: 8 MG/DL (ref 7–20)
BUN SERPL-MCNC: 8 MG/DL (ref 7–20)
CA-I BLD-SCNC: 1.05 MMOL/L (ref 1.12–1.32)
CA-I BLD-SCNC: 1.06 MMOL/L (ref 1.12–1.32)
CA-I BLD-SCNC: 1.07 MMOL/L (ref 1.12–1.32)
CA-I BLD-SCNC: 1.08 MMOL/L (ref 1.12–1.32)
CA-I BLD-SCNC: 1.11 MMOL/L (ref 1.12–1.32)
CA-I BLD-SCNC: 1.2 MMOL/L (ref 1.12–1.32)
CA-I BLD-SCNC: 1.21 MMOL/L (ref 1.12–1.32)
CA-I BLD-SCNC: 1.26 MMOL/L (ref 1.12–1.32)
CA-I BLD-SCNC: 1.28 MMOL/L (ref 1.12–1.32)
CALCIUM SERPL-MCNC: 8.4 MG/DL (ref 8.3–10.6)
CALCIUM SERPL-MCNC: 8.6 MG/DL (ref 8.3–10.6)
CHLORIDE SERPL-SCNC: 100 MMOL/L (ref 99–110)
CHLORIDE SERPL-SCNC: 93 MMOL/L (ref 99–110)
CHOLEST SERPL-MCNC: 114 MG/DL (ref 0–199)
CO2 BLDA-SCNC: 24 MMOL/L
CO2 BLDA-SCNC: 26 MMOL/L
CO2 BLDA-SCNC: 27 MMOL/L
CO2 BLDA-SCNC: 27 MMOL/L
CO2 BLDA-SCNC: 28 MMOL/L
CO2 SERPL-SCNC: 22 MMOL/L (ref 21–32)
CO2 SERPL-SCNC: 23 MMOL/L (ref 21–32)
CREAT SERPL-MCNC: <0.5 MG/DL (ref 0.6–1.1)
CREAT SERPL-MCNC: <0.5 MG/DL (ref 0.6–1.1)
DEPRECATED RDW RBC AUTO: 15.6 % (ref 12.4–15.4)
DEPRECATED RDW RBC AUTO: 15.7 % (ref 12.4–15.4)
EKG ATRIAL RATE: 115 BPM
EKG DIAGNOSIS: NORMAL
EKG P AXIS: 63 DEGREES
EKG P-R INTERVAL: 152 MS
EKG Q-T INTERVAL: 324 MS
EKG QRS DURATION: 88 MS
EKG QTC CALCULATION (BAZETT): 448 MS
EKG R AXIS: 95 DEGREES
EKG T AXIS: 13 DEGREES
EKG VENTRICULAR RATE: 115 BPM
EOSINOPHIL # BLD: 0 K/UL (ref 0–0.6)
EOSINOPHIL NFR BLD: 0.1 %
EST. AVERAGE GLUCOSE BLD GHB EST-MCNC: 303.4 MG/DL
GFR SERPLBLD CREATININE-BSD FMLA CKD-EPI: >60 ML/MIN/{1.73_M2}
GFR SERPLBLD CREATININE-BSD FMLA CKD-EPI: >60 ML/MIN/{1.73_M2}
GLUCOSE BLD-MCNC: 103 MG/DL (ref 70–99)
GLUCOSE BLD-MCNC: 121 MG/DL (ref 70–99)
GLUCOSE BLD-MCNC: 123 MG/DL (ref 70–99)
GLUCOSE BLD-MCNC: 137 MG/DL (ref 70–99)
GLUCOSE BLD-MCNC: 140 MG/DL (ref 70–99)
GLUCOSE BLD-MCNC: 148 MG/DL (ref 70–99)
GLUCOSE BLD-MCNC: 149 MG/DL (ref 70–99)
GLUCOSE BLD-MCNC: 152 MG/DL (ref 70–99)
GLUCOSE BLD-MCNC: 176 MG/DL (ref 70–99)
GLUCOSE BLD-MCNC: 186 MG/DL (ref 70–99)
GLUCOSE BLD-MCNC: 191 MG/DL (ref 70–99)
GLUCOSE BLD-MCNC: 217 MG/DL (ref 70–99)
GLUCOSE BLD-MCNC: 227 MG/DL (ref 70–99)
GLUCOSE BLD-MCNC: 228 MG/DL (ref 70–99)
GLUCOSE BLD-MCNC: 329 MG/DL (ref 70–99)
GLUCOSE BLD-MCNC: 99 MG/DL (ref 70–99)
GLUCOSE SERPL-MCNC: 139 MG/DL (ref 70–99)
GLUCOSE SERPL-MCNC: 307 MG/DL (ref 70–99)
HBA1C MFR BLD: 12.2 %
HCO3 BLDA-SCNC: 22.8 MMOL/L (ref 21–29)
HCO3 BLDA-SCNC: 24.1 MMOL/L (ref 21–29)
HCO3 BLDA-SCNC: 24.9 MMOL/L (ref 21–29)
HCO3 BLDA-SCNC: 25.2 MMOL/L (ref 21–29)
HCO3 BLDA-SCNC: 25.2 MMOL/L (ref 21–29)
HCO3 BLDA-SCNC: 25.6 MMOL/L (ref 21–29)
HCO3 BLDA-SCNC: 26.1 MMOL/L (ref 21–29)
HCT VFR BLD AUTO: 21 % (ref 36–48)
HCT VFR BLD AUTO: 23 % (ref 36–48)
HCT VFR BLD AUTO: 24 % (ref 36–48)
HCT VFR BLD AUTO: 24.6 % (ref 36–48)
HCT VFR BLD AUTO: 26 % (ref 36–48)
HCT VFR BLD AUTO: 28.9 % (ref 36–48)
HCT VFR BLD AUTO: 30 % (ref 36–48)
HCT VFR BLD AUTO: 32.4 % (ref 36–48)
HDLC SERPL-MCNC: 37 MG/DL (ref 40–60)
HGB BLD CALC-MCNC: 10.2 GM/DL (ref 12–16)
HGB BLD CALC-MCNC: 7.1 GM/DL (ref 12–16)
HGB BLD CALC-MCNC: 7.9 GM/DL (ref 12–16)
HGB BLD CALC-MCNC: 8.1 GM/DL (ref 12–16)
HGB BLD CALC-MCNC: 8.2 GM/DL (ref 12–16)
HGB BLD CALC-MCNC: 8.3 GM/DL (ref 12–16)
HGB BLD CALC-MCNC: 9 GM/DL (ref 12–16)
HGB BLD-MCNC: 10.5 G/DL (ref 12–16)
HGB BLD-MCNC: 7.7 G/DL (ref 12–16)
HGB BLD-MCNC: 8.9 G/DL (ref 12–16)
INR PPP: 1.23 (ref 0.84–1.16)
INR PPP: 1.5 (ref 0.84–1.16)
LACTATE BLD-SCNC: 0.75 MMOL/L (ref 0.4–2)
LACTATE BLD-SCNC: 1.37 MMOL/L (ref 0.4–2)
LACTATE BLD-SCNC: 1.7 MMOL/L (ref 0.4–2)
LDLC SERPL CALC-MCNC: 55 MG/DL
LYMPHOCYTES # BLD: 2.8 K/UL (ref 1–5.1)
LYMPHOCYTES NFR BLD: 17.1 %
MAGNESIUM SERPL-MCNC: 1.7 MG/DL (ref 1.8–2.4)
MAGNESIUM SERPL-MCNC: 2.5 MG/DL (ref 1.8–2.4)
MCH RBC QN AUTO: 26.7 PG (ref 26–34)
MCH RBC QN AUTO: 27.8 PG (ref 26–34)
MCHC RBC AUTO-ENTMCNC: 31.1 G/DL (ref 31–36)
MCHC RBC AUTO-ENTMCNC: 32.3 G/DL (ref 31–36)
MCV RBC AUTO: 85.7 FL (ref 80–100)
MCV RBC AUTO: 86 FL (ref 80–100)
MONOCYTES # BLD: 1.2 K/UL (ref 0–1.3)
MONOCYTES NFR BLD: 7.5 %
NEUTROPHILS # BLD: 12.2 K/UL (ref 1.7–7.7)
NEUTROPHILS NFR BLD: 74.4 %
PCO2 BLDA: 37.6 MM HG (ref 35–45)
PCO2 BLDA: 39 MM HG (ref 35–45)
PCO2 BLDA: 39.1 MM HG (ref 35–45)
PCO2 BLDA: 44.7 MM HG (ref 35–45)
PCO2 BLDA: 47 MM HG (ref 35–45)
PCO2 BLDA: 47.9 MM HG (ref 35–45)
PCO2 BLDA: 67.4 MM HG (ref 35–45)
PERFORMED ON: ABNORMAL
PERFORMED ON: NORMAL
PH BLDA: 7.2 [PH] (ref 7.35–7.45)
PH BLDA: 7.31 [PH] (ref 7.35–7.45)
PH BLDA: 7.34 [PH] (ref 7.35–7.45)
PH BLDA: 7.36 [PH] (ref 7.35–7.45)
PH BLDA: 7.37 [PH] (ref 7.35–7.45)
PH BLDA: 7.41 [PH] (ref 7.35–7.45)
PH BLDA: 7.43 [PH] (ref 7.35–7.45)
PH BLDV: 7.39 [PH] (ref 7.35–7.45)
PH BLDV: 7.46 [PH] (ref 7.35–7.45)
PLATELET # BLD AUTO: 229 K/UL (ref 135–450)
PLATELET # BLD AUTO: 459 K/UL (ref 135–450)
PMV BLD AUTO: 8 FL (ref 5–10.5)
PMV BLD AUTO: 9.5 FL (ref 5–10.5)
PO2 BLDA: 143 MM HG (ref 75–108)
PO2 BLDA: 180 MM HG (ref 75–108)
PO2 BLDA: 312.7 MM HG (ref 75–108)
PO2 BLDA: 317.6 MM HG (ref 75–108)
PO2 BLDA: 514.5 MM HG (ref 75–108)
PO2 BLDA: 524.6 MM HG (ref 75–108)
PO2 BLDA: 97.1 MM HG (ref 75–108)
POC ACT LR: 198 SEC
POC ACT LR: 214 SEC
POC ACT LR: 214 SEC
POC ACT LR: 289 SEC
POC ACT LR: >400 SEC
POC SAMPLE TYPE: ABNORMAL
POTASSIUM BLD-SCNC: 3.5 MMOL/L (ref 3.5–5.1)
POTASSIUM BLD-SCNC: 3.6 MMOL/L (ref 3.5–5.1)
POTASSIUM BLD-SCNC: 3.9 MMOL/L (ref 3.5–5.1)
POTASSIUM BLD-SCNC: 4 MMOL/L (ref 3.5–5.1)
POTASSIUM BLD-SCNC: 4.7 MMOL/L (ref 3.5–5.1)
POTASSIUM SERPL-SCNC: 3.8 MMOL/L (ref 3.5–5.1)
POTASSIUM SERPL-SCNC: 3.9 MMOL/L (ref 3.5–5.1)
POTASSIUM SERPL-SCNC: 4.5 MMOL/L (ref 3.5–5.1)
PROT SERPL-MCNC: 6.7 G/DL (ref 6.4–8.2)
PROTHROMBIN TIME: 15.5 SEC (ref 11.5–14.8)
PROTHROMBIN TIME: 18.1 SEC (ref 11.5–14.8)
RBC # BLD AUTO: 2.87 M/UL (ref 4–5.2)
RBC # BLD AUTO: 3.77 M/UL (ref 4–5.2)
SAO2 % BLDA: 100 % (ref 93–100)
SAO2 % BLDA: 97 % (ref 93–100)
SAO2 % BLDA: 99 % (ref 93–100)
SAO2 % BLDA: 99 % (ref 93–100)
SODIUM BLD-SCNC: 133 MMOL/L (ref 136–145)
SODIUM BLD-SCNC: 134 MMOL/L (ref 136–145)
SODIUM BLD-SCNC: 135 MMOL/L (ref 136–145)
SODIUM BLD-SCNC: 136 MMOL/L (ref 136–145)
SODIUM BLD-SCNC: 136 MMOL/L (ref 136–145)
SODIUM BLD-SCNC: 137 MMOL/L (ref 136–145)
SODIUM BLD-SCNC: 138 MMOL/L (ref 136–145)
SODIUM SERPL-SCNC: 130 MMOL/L (ref 136–145)
SODIUM SERPL-SCNC: 133 MMOL/L (ref 136–145)
TRIGL SERPL-MCNC: 108 MG/DL (ref 0–150)
VLDLC SERPL CALC-MCNC: 22 MG/DL
WBC # BLD AUTO: 13 K/UL (ref 4–11)
WBC # BLD AUTO: 16.4 K/UL (ref 4–11)

## 2023-07-10 PROCEDURE — 5A1223Z PERFORMANCE OF CARDIAC PACING, CONTINUOUS: ICD-10-PCS | Performed by: THORACIC SURGERY (CARDIOTHORACIC VASCULAR SURGERY)

## 2023-07-10 PROCEDURE — 33268 EXCL LAA OPN OTH PX ANY METH: CPT

## 2023-07-10 PROCEDURE — 6360000002 HC RX W HCPCS: Performed by: NURSE PRACTITIONER

## 2023-07-10 PROCEDURE — 85027 COMPLETE CBC AUTOMATED: CPT

## 2023-07-10 PROCEDURE — 6360000002 HC RX W HCPCS: Performed by: THORACIC SURGERY (CARDIOTHORACIC VASCULAR SURGERY)

## 2023-07-10 PROCEDURE — 5A1221Z PERFORMANCE OF CARDIAC OUTPUT, CONTINUOUS: ICD-10-PCS | Performed by: THORACIC SURGERY (CARDIOTHORACIC VASCULAR SURGERY)

## 2023-07-10 PROCEDURE — 36415 COLL VENOUS BLD VENIPUNCTURE: CPT

## 2023-07-10 PROCEDURE — 6360000002 HC RX W HCPCS: Performed by: ANESTHESIOLOGY

## 2023-07-10 PROCEDURE — 83735 ASSAY OF MAGNESIUM: CPT

## 2023-07-10 PROCEDURE — 3700000001 HC ADD 15 MINUTES (ANESTHESIA): Performed by: THORACIC SURGERY (CARDIOTHORACIC VASCULAR SURGERY)

## 2023-07-10 PROCEDURE — 2580000003 HC RX 258: Performed by: THORACIC SURGERY (CARDIOTHORACIC VASCULAR SURGERY)

## 2023-07-10 PROCEDURE — 84132 ASSAY OF SERUM POTASSIUM: CPT

## 2023-07-10 PROCEDURE — 82330 ASSAY OF CALCIUM: CPT

## 2023-07-10 PROCEDURE — 85347 COAGULATION TIME ACTIVATED: CPT

## 2023-07-10 PROCEDURE — 2580000003 HC RX 258: Performed by: NURSE PRACTITIONER

## 2023-07-10 PROCEDURE — 33533 CABG ARTERIAL SINGLE: CPT

## 2023-07-10 PROCEDURE — C9290 INJ, BUPIVACAINE LIPOSOME: HCPCS | Performed by: THORACIC SURGERY (CARDIOTHORACIC VASCULAR SURGERY)

## 2023-07-10 PROCEDURE — 2580000003 HC RX 258: Performed by: ANESTHESIOLOGY

## 2023-07-10 PROCEDURE — 85730 THROMBOPLASTIN TIME PARTIAL: CPT

## 2023-07-10 PROCEDURE — 2100000000 HC CCU R&B

## 2023-07-10 PROCEDURE — 2720000010 HC SURG SUPPLY STERILE: Performed by: THORACIC SURGERY (CARDIOTHORACIC VASCULAR SURGERY)

## 2023-07-10 PROCEDURE — 71045 X-RAY EXAM CHEST 1 VIEW: CPT

## 2023-07-10 PROCEDURE — 2500000003 HC RX 250 WO HCPCS: Performed by: THORACIC SURGERY (CARDIOTHORACIC VASCULAR SURGERY)

## 2023-07-10 PROCEDURE — 82947 ASSAY GLUCOSE BLOOD QUANT: CPT

## 2023-07-10 PROCEDURE — 33518 CABG ARTERY-VEIN TWO: CPT | Performed by: THORACIC SURGERY (CARDIOTHORACIC VASCULAR SURGERY)

## 2023-07-10 PROCEDURE — 2500000003 HC RX 250 WO HCPCS: Performed by: ANESTHESIOLOGY

## 2023-07-10 PROCEDURE — 021109W BYPASS CORONARY ARTERY, TWO ARTERIES FROM AORTA WITH AUTOLOGOUS VENOUS TISSUE, OPEN APPROACH: ICD-10-PCS | Performed by: THORACIC SURGERY (CARDIOTHORACIC VASCULAR SURGERY)

## 2023-07-10 PROCEDURE — 2709999900 HC NON-CHARGEABLE SUPPLY: Performed by: THORACIC SURGERY (CARDIOTHORACIC VASCULAR SURGERY)

## 2023-07-10 PROCEDURE — 83036 HEMOGLOBIN GLYCOSYLATED A1C: CPT

## 2023-07-10 PROCEDURE — B24BZZ4 ULTRASONOGRAPHY OF HEART WITH AORTA, TRANSESOPHAGEAL: ICD-10-PCS | Performed by: THORACIC SURGERY (CARDIOTHORACIC VASCULAR SURGERY)

## 2023-07-10 PROCEDURE — 85025 COMPLETE CBC W/AUTO DIFF WBC: CPT

## 2023-07-10 PROCEDURE — 2700000000 HC OXYGEN THERAPY PER DAY

## 2023-07-10 PROCEDURE — 33533 CABG ARTERIAL SINGLE: CPT | Performed by: THORACIC SURGERY (CARDIOTHORACIC VASCULAR SURGERY)

## 2023-07-10 PROCEDURE — 33518 CABG ARTERY-VEIN TWO: CPT

## 2023-07-10 PROCEDURE — 85520 HEPARIN ASSAY: CPT

## 2023-07-10 PROCEDURE — 02100Z9 BYPASS CORONARY ARTERY, ONE ARTERY FROM LEFT INTERNAL MAMMARY, OPEN APPROACH: ICD-10-PCS | Performed by: THORACIC SURGERY (CARDIOTHORACIC VASCULAR SURGERY)

## 2023-07-10 PROCEDURE — 02L70CK OCCLUSION OF LEFT ATRIAL APPENDAGE WITH EXTRALUMINAL DEVICE, OPEN APPROACH: ICD-10-PCS | Performed by: THORACIC SURGERY (CARDIOTHORACIC VASCULAR SURGERY)

## 2023-07-10 PROCEDURE — 80061 LIPID PANEL: CPT

## 2023-07-10 PROCEDURE — 84295 ASSAY OF SERUM SODIUM: CPT

## 2023-07-10 PROCEDURE — 3600000018 HC SURGERY OHS ADDTL 15MIN: Performed by: THORACIC SURGERY (CARDIOTHORACIC VASCULAR SURGERY)

## 2023-07-10 PROCEDURE — 85014 HEMATOCRIT: CPT

## 2023-07-10 PROCEDURE — 80076 HEPATIC FUNCTION PANEL: CPT

## 2023-07-10 PROCEDURE — 83605 ASSAY OF LACTIC ACID: CPT

## 2023-07-10 PROCEDURE — 6370000000 HC RX 637 (ALT 250 FOR IP): Performed by: NURSE PRACTITIONER

## 2023-07-10 PROCEDURE — 33508 ENDOSCOPIC VEIN HARVEST: CPT | Performed by: THORACIC SURGERY (CARDIOTHORACIC VASCULAR SURGERY)

## 2023-07-10 PROCEDURE — 94761 N-INVAS EAR/PLS OXIMETRY MLT: CPT

## 2023-07-10 PROCEDURE — 85610 PROTHROMBIN TIME: CPT

## 2023-07-10 PROCEDURE — 80048 BASIC METABOLIC PNL TOTAL CA: CPT

## 2023-07-10 PROCEDURE — 94002 VENT MGMT INPAT INIT DAY: CPT

## 2023-07-10 PROCEDURE — C1729 CATH, DRAINAGE: HCPCS | Performed by: THORACIC SURGERY (CARDIOTHORACIC VASCULAR SURGERY)

## 2023-07-10 PROCEDURE — A4217 STERILE WATER/SALINE, 500 ML: HCPCS | Performed by: THORACIC SURGERY (CARDIOTHORACIC VASCULAR SURGERY)

## 2023-07-10 PROCEDURE — 3700000000 HC ANESTHESIA ATTENDED CARE: Performed by: THORACIC SURGERY (CARDIOTHORACIC VASCULAR SURGERY)

## 2023-07-10 PROCEDURE — 05HM33Z INSERTION OF INFUSION DEVICE INTO RIGHT INTERNAL JUGULAR VEIN, PERCUTANEOUS APPROACH: ICD-10-PCS | Performed by: THORACIC SURGERY (CARDIOTHORACIC VASCULAR SURGERY)

## 2023-07-10 PROCEDURE — 85018 HEMOGLOBIN: CPT

## 2023-07-10 PROCEDURE — 94640 AIRWAY INHALATION TREATMENT: CPT

## 2023-07-10 PROCEDURE — 3600000008 HC SURGERY OHS BASE: Performed by: THORACIC SURGERY (CARDIOTHORACIC VASCULAR SURGERY)

## 2023-07-10 PROCEDURE — 6370000000 HC RX 637 (ALT 250 FOR IP): Performed by: THORACIC SURGERY (CARDIOTHORACIC VASCULAR SURGERY)

## 2023-07-10 PROCEDURE — 82803 BLOOD GASES ANY COMBINATION: CPT

## 2023-07-10 PROCEDURE — 33268 EXCL LAA OPN OTH PX ANY METH: CPT | Performed by: THORACIC SURGERY (CARDIOTHORACIC VASCULAR SURGERY)

## 2023-07-10 PROCEDURE — 33508 ENDOSCOPIC VEIN HARVEST: CPT

## 2023-07-10 PROCEDURE — 06BP4ZZ EXCISION OF RIGHT SAPHENOUS VEIN, PERCUTANEOUS ENDOSCOPIC APPROACH: ICD-10-PCS | Performed by: THORACIC SURGERY (CARDIOTHORACIC VASCULAR SURGERY)

## 2023-07-10 PROCEDURE — 6370000000 HC RX 637 (ALT 250 FOR IP): Performed by: ANESTHESIOLOGY

## 2023-07-10 PROCEDURE — C1889 IMPLANT/INSERT DEVICE, NOC: HCPCS | Performed by: THORACIC SURGERY (CARDIOTHORACIC VASCULAR SURGERY)

## 2023-07-10 DEVICE — CLIP MED SUTURE LESS 40 MM SYS 1 HND STRL ATRICLIP FLX V: Type: IMPLANTABLE DEVICE | Site: HEART | Status: FUNCTIONAL

## 2023-07-10 RX ORDER — KETOROLAC TROMETHAMINE 30 MG/ML
INJECTION, SOLUTION INTRAMUSCULAR; INTRAVENOUS PRN
Status: DISCONTINUED | OUTPATIENT
Start: 2023-07-10 | End: 2023-07-10 | Stop reason: SDUPTHER

## 2023-07-10 RX ORDER — HYDRALAZINE HYDROCHLORIDE 20 MG/ML
5 INJECTION INTRAMUSCULAR; INTRAVENOUS EVERY 5 MIN PRN
Status: DISCONTINUED | OUTPATIENT
Start: 2023-07-10 | End: 2023-07-13

## 2023-07-10 RX ORDER — MORPHINE SULFATE 2 MG/ML
2 INJECTION, SOLUTION INTRAMUSCULAR; INTRAVENOUS
Status: DISCONTINUED | OUTPATIENT
Start: 2023-07-10 | End: 2023-07-14 | Stop reason: HOSPADM

## 2023-07-10 RX ORDER — ASPIRIN 325 MG
325 TABLET, DELAYED RELEASE (ENTERIC COATED) ORAL ONCE
Status: COMPLETED | OUTPATIENT
Start: 2023-07-10 | End: 2023-07-10

## 2023-07-10 RX ORDER — POTASSIUM CHLORIDE 750 MG/1
20 TABLET, EXTENDED RELEASE ORAL 2 TIMES DAILY
Status: DISCONTINUED | OUTPATIENT
Start: 2023-07-11 | End: 2023-07-14 | Stop reason: HOSPADM

## 2023-07-10 RX ORDER — MIDAZOLAM HYDROCHLORIDE 1 MG/ML
INJECTION INTRAMUSCULAR; INTRAVENOUS PRN
Status: DISCONTINUED | OUTPATIENT
Start: 2023-07-10 | End: 2023-07-10 | Stop reason: SDUPTHER

## 2023-07-10 RX ORDER — DEXTROSE AND SODIUM CHLORIDE 5; .45 G/100ML; G/100ML
INJECTION, SOLUTION INTRAVENOUS CONTINUOUS
Status: DISCONTINUED | OUTPATIENT
Start: 2023-07-10 | End: 2023-07-11

## 2023-07-10 RX ORDER — FUROSEMIDE 10 MG/ML
20 INJECTION INTRAMUSCULAR; INTRAVENOUS 4 TIMES DAILY
Status: DISCONTINUED | OUTPATIENT
Start: 2023-07-10 | End: 2023-07-10

## 2023-07-10 RX ORDER — SODIUM CHLORIDE 9 MG/ML
INJECTION, SOLUTION INTRAVENOUS PRN
Status: DISCONTINUED | OUTPATIENT
Start: 2023-07-10 | End: 2023-07-14 | Stop reason: HOSPADM

## 2023-07-10 RX ORDER — PROCHLORPERAZINE EDISYLATE 5 MG/ML
5 INJECTION INTRAMUSCULAR; INTRAVENOUS EVERY 6 HOURS PRN
Status: DISCONTINUED | OUTPATIENT
Start: 2023-07-10 | End: 2023-07-14 | Stop reason: HOSPADM

## 2023-07-10 RX ORDER — SODIUM CHLORIDE 9 MG/ML
INJECTION, SOLUTION INTRAVENOUS CONTINUOUS
Status: DISCONTINUED | OUTPATIENT
Start: 2023-07-10 | End: 2023-07-14 | Stop reason: HOSPADM

## 2023-07-10 RX ORDER — ASPIRIN 81 MG/1
81 TABLET ORAL DAILY
Status: DISCONTINUED | OUTPATIENT
Start: 2023-07-11 | End: 2023-07-14 | Stop reason: HOSPADM

## 2023-07-10 RX ORDER — FONDAPARINUX SODIUM 2.5 MG/.5ML
2.5 INJECTION SUBCUTANEOUS DAILY
Status: DISCONTINUED | OUTPATIENT
Start: 2023-07-11 | End: 2023-07-14 | Stop reason: HOSPADM

## 2023-07-10 RX ORDER — CHLORHEXIDINE GLUCONATE 0.12 MG/ML
15 RINSE ORAL 2 TIMES DAILY
Status: DISCONTINUED | OUTPATIENT
Start: 2023-07-10 | End: 2023-07-14 | Stop reason: HOSPADM

## 2023-07-10 RX ORDER — HEPARIN SODIUM 1000 [USP'U]/ML
2000 INJECTION, SOLUTION INTRAVENOUS; SUBCUTANEOUS ONCE
Status: DISCONTINUED | OUTPATIENT
Start: 2023-07-10 | End: 2023-07-10 | Stop reason: ALTCHOICE

## 2023-07-10 RX ORDER — DEXTROSE MONOHYDRATE 100 MG/ML
INJECTION, SOLUTION INTRAVENOUS CONTINUOUS PRN
Status: DISCONTINUED | OUTPATIENT
Start: 2023-07-10 | End: 2023-07-14 | Stop reason: HOSPADM

## 2023-07-10 RX ORDER — FAMOTIDINE 10 MG/ML
20 INJECTION, SOLUTION INTRAVENOUS 2 TIMES DAILY
Status: DISCONTINUED | OUTPATIENT
Start: 2023-07-10 | End: 2023-07-13

## 2023-07-10 RX ORDER — ALBUMIN, HUMAN INJ 5% 5 %
25 SOLUTION INTRAVENOUS PRN
Status: DISCONTINUED | OUTPATIENT
Start: 2023-07-10 | End: 2023-07-13

## 2023-07-10 RX ORDER — ALBUTEROL SULFATE 2.5 MG/3ML
2.5 SOLUTION RESPIRATORY (INHALATION)
Status: DISCONTINUED | OUTPATIENT
Start: 2023-07-10 | End: 2023-07-14 | Stop reason: HOSPADM

## 2023-07-10 RX ORDER — DEXAMETHASONE SODIUM PHOSPHATE 4 MG/ML
4 INJECTION, SOLUTION INTRA-ARTICULAR; INTRALESIONAL; INTRAMUSCULAR; INTRAVENOUS; SOFT TISSUE EVERY 6 HOURS
Status: DISCONTINUED | OUTPATIENT
Start: 2023-07-10 | End: 2023-07-12

## 2023-07-10 RX ORDER — SODIUM CHLORIDE 0.9 % (FLUSH) 0.9 %
5-40 SYRINGE (ML) INJECTION EVERY 12 HOURS SCHEDULED
Status: DISCONTINUED | OUTPATIENT
Start: 2023-07-10 | End: 2023-07-14 | Stop reason: HOSPADM

## 2023-07-10 RX ORDER — POLYETHYLENE GLYCOL 3350 17 G/17G
17 POWDER, FOR SOLUTION ORAL DAILY
Status: DISCONTINUED | OUTPATIENT
Start: 2023-07-11 | End: 2023-07-14 | Stop reason: HOSPADM

## 2023-07-10 RX ORDER — SODIUM CHLORIDE 0.9 % (FLUSH) 0.9 %
5-40 SYRINGE (ML) INJECTION PRN
Status: DISCONTINUED | OUTPATIENT
Start: 2023-07-10 | End: 2023-07-14 | Stop reason: HOSPADM

## 2023-07-10 RX ORDER — PROTAMINE SULFATE 10 MG/ML
INJECTION, SOLUTION INTRAVENOUS PRN
Status: DISCONTINUED | OUTPATIENT
Start: 2023-07-10 | End: 2023-07-10 | Stop reason: SDUPTHER

## 2023-07-10 RX ORDER — MIDAZOLAM HYDROCHLORIDE 1 MG/ML
1 INJECTION INTRAMUSCULAR; INTRAVENOUS
Status: DISCONTINUED | OUTPATIENT
Start: 2023-07-10 | End: 2023-07-11

## 2023-07-10 RX ORDER — ONDANSETRON 4 MG/1
4 TABLET, ORALLY DISINTEGRATING ORAL EVERY 8 HOURS PRN
Status: DISCONTINUED | OUTPATIENT
Start: 2023-07-10 | End: 2023-07-14 | Stop reason: HOSPADM

## 2023-07-10 RX ORDER — INSULIN LISPRO 100 [IU]/ML
0-12 INJECTION, SOLUTION INTRAVENOUS; SUBCUTANEOUS
Status: DISCONTINUED | OUTPATIENT
Start: 2023-07-13 | End: 2023-07-12

## 2023-07-10 RX ORDER — SODIUM CHLORIDE 9 MG/ML
INJECTION, SOLUTION INTRAVENOUS PRN
Status: DISCONTINUED | OUTPATIENT
Start: 2023-07-10 | End: 2023-07-10

## 2023-07-10 RX ORDER — METOPROLOL TARTRATE 5 MG/5ML
2.5 INJECTION INTRAVENOUS EVERY 10 MIN PRN
Status: DISCONTINUED | OUTPATIENT
Start: 2023-07-10 | End: 2023-07-13

## 2023-07-10 RX ORDER — CISATRACURIUM BESYLATE 2 MG/ML
INJECTION, SOLUTION INTRAVENOUS PRN
Status: DISCONTINUED | OUTPATIENT
Start: 2023-07-10 | End: 2023-07-10 | Stop reason: SDUPTHER

## 2023-07-10 RX ORDER — DOBUTAMINE HYDROCHLORIDE 200 MG/100ML
INJECTION INTRAVENOUS CONTINUOUS PRN
Status: DISCONTINUED | OUTPATIENT
Start: 2023-07-10 | End: 2023-07-10 | Stop reason: SDUPTHER

## 2023-07-10 RX ORDER — CEFAZOLIN SODIUM IN 0.9 % NACL 2 G/100 ML
2000 PLASTIC BAG, INJECTION (ML) INTRAVENOUS EVERY 8 HOURS
Status: DISCONTINUED | OUTPATIENT
Start: 2023-07-10 | End: 2023-07-10

## 2023-07-10 RX ORDER — KETAMINE HYDROCHLORIDE 100 MG/ML
INJECTION INTRAMUSCULAR; INTRAVENOUS PRN
Status: DISCONTINUED | OUTPATIENT
Start: 2023-07-10 | End: 2023-07-10 | Stop reason: SDUPTHER

## 2023-07-10 RX ORDER — MORPHINE SULFATE 4 MG/ML
4 INJECTION, SOLUTION INTRAMUSCULAR; INTRAVENOUS
Status: DISCONTINUED | OUTPATIENT
Start: 2023-07-10 | End: 2023-07-14 | Stop reason: HOSPADM

## 2023-07-10 RX ORDER — SODIUM CHLORIDE, SODIUM LACTATE, POTASSIUM CHLORIDE, CALCIUM CHLORIDE 600; 310; 30; 20 MG/100ML; MG/100ML; MG/100ML; MG/100ML
INJECTION, SOLUTION INTRAVENOUS CONTINUOUS PRN
Status: DISCONTINUED | OUTPATIENT
Start: 2023-07-10 | End: 2023-07-10 | Stop reason: SDUPTHER

## 2023-07-10 RX ORDER — ONDANSETRON 2 MG/ML
4 INJECTION INTRAMUSCULAR; INTRAVENOUS EVERY 6 HOURS PRN
Status: DISCONTINUED | OUTPATIENT
Start: 2023-07-10 | End: 2023-07-14 | Stop reason: HOSPADM

## 2023-07-10 RX ORDER — MIDAZOLAM HYDROCHLORIDE 1 MG/ML
2 INJECTION INTRAMUSCULAR; INTRAVENOUS ONCE
Status: COMPLETED | OUTPATIENT
Start: 2023-07-10 | End: 2023-07-10

## 2023-07-10 RX ORDER — OXYCODONE HYDROCHLORIDE 5 MG/1
5 TABLET ORAL EVERY 4 HOURS PRN
Status: DISCONTINUED | OUTPATIENT
Start: 2023-07-10 | End: 2023-07-14 | Stop reason: HOSPADM

## 2023-07-10 RX ORDER — PROTAMINE SULFATE 10 MG/ML
50 INJECTION, SOLUTION INTRAVENOUS
Status: ACTIVE | OUTPATIENT
Start: 2023-07-10 | End: 2023-07-11

## 2023-07-10 RX ORDER — GLYCOPYRROLATE 0.2 MG/ML
INJECTION INTRAMUSCULAR; INTRAVENOUS PRN
Status: DISCONTINUED | OUTPATIENT
Start: 2023-07-10 | End: 2023-07-10 | Stop reason: SDUPTHER

## 2023-07-10 RX ORDER — ATORVASTATIN CALCIUM 40 MG/1
40 TABLET, FILM COATED ORAL NIGHTLY
Status: DISCONTINUED | OUTPATIENT
Start: 2023-07-11 | End: 2023-07-14 | Stop reason: HOSPADM

## 2023-07-10 RX ORDER — CLOPIDOGREL BISULFATE 75 MG/1
75 TABLET ORAL DAILY
Status: DISCONTINUED | OUTPATIENT
Start: 2023-07-11 | End: 2023-07-14 | Stop reason: HOSPADM

## 2023-07-10 RX ORDER — FUROSEMIDE 40 MG/1
40 TABLET ORAL 2 TIMES DAILY
Status: DISCONTINUED | OUTPATIENT
Start: 2023-07-13 | End: 2023-07-14 | Stop reason: HOSPADM

## 2023-07-10 RX ORDER — NEOSTIGMINE METHYLSULFATE 1 MG/ML
INJECTION, SOLUTION INTRAVENOUS PRN
Status: DISCONTINUED | OUTPATIENT
Start: 2023-07-10 | End: 2023-07-10 | Stop reason: SDUPTHER

## 2023-07-10 RX ORDER — POTASSIUM CHLORIDE 29.8 MG/ML
20 INJECTION INTRAVENOUS PRN
Status: DISCONTINUED | OUTPATIENT
Start: 2023-07-10 | End: 2023-07-14 | Stop reason: HOSPADM

## 2023-07-10 RX ORDER — FENTANYL CITRATE 0.05 MG/ML
INJECTION, SOLUTION INTRAMUSCULAR; INTRAVENOUS PRN
Status: DISCONTINUED | OUTPATIENT
Start: 2023-07-10 | End: 2023-07-10 | Stop reason: SDUPTHER

## 2023-07-10 RX ORDER — ACETAMINOPHEN 325 MG/1
650 TABLET ORAL EVERY 6 HOURS
Status: DISCONTINUED | OUTPATIENT
Start: 2023-07-11 | End: 2023-07-14 | Stop reason: HOSPADM

## 2023-07-10 RX ORDER — QUETIAPINE FUMARATE 25 MG/1
25 TABLET, FILM COATED ORAL 2 TIMES DAILY
Status: DISCONTINUED | OUTPATIENT
Start: 2023-07-10 | End: 2023-07-10

## 2023-07-10 RX ORDER — QUETIAPINE FUMARATE 100 MG/1
300 TABLET, FILM COATED ORAL NIGHTLY
Status: DISCONTINUED | OUTPATIENT
Start: 2023-07-10 | End: 2023-07-10

## 2023-07-10 RX ORDER — INSULIN GLARGINE 100 [IU]/ML
0.15 INJECTION, SOLUTION SUBCUTANEOUS NIGHTLY
Status: DISCONTINUED | OUTPATIENT
Start: 2023-07-12 | End: 2023-07-14 | Stop reason: HOSPADM

## 2023-07-10 RX ORDER — LANOLIN ALCOHOL/MO/W.PET/CERES
400 CREAM (GRAM) TOPICAL 2 TIMES DAILY
Status: DISCONTINUED | OUTPATIENT
Start: 2023-07-11 | End: 2023-07-14 | Stop reason: HOSPADM

## 2023-07-10 RX ORDER — MEPERIDINE HYDROCHLORIDE 50 MG/ML
25 INJECTION INTRAMUSCULAR; INTRAVENOUS; SUBCUTANEOUS
Status: DISCONTINUED | OUTPATIENT
Start: 2023-07-10 | End: 2023-07-11

## 2023-07-10 RX ORDER — INSULIN LISPRO 100 [IU]/ML
0-6 INJECTION, SOLUTION INTRAVENOUS; SUBCUTANEOUS NIGHTLY
Status: DISCONTINUED | OUTPATIENT
Start: 2023-07-13 | End: 2023-07-12

## 2023-07-10 RX ORDER — DEXAMETHASONE SODIUM PHOSPHATE 4 MG/ML
INJECTION, SOLUTION INTRA-ARTICULAR; INTRALESIONAL; INTRAMUSCULAR; INTRAVENOUS; SOFT TISSUE PRN
Status: DISCONTINUED | OUTPATIENT
Start: 2023-07-10 | End: 2023-07-10 | Stop reason: SDUPTHER

## 2023-07-10 RX ORDER — ONDANSETRON 2 MG/ML
INJECTION INTRAMUSCULAR; INTRAVENOUS PRN
Status: DISCONTINUED | OUTPATIENT
Start: 2023-07-10 | End: 2023-07-10 | Stop reason: SDUPTHER

## 2023-07-10 RX ORDER — HEPARIN SODIUM 1000 [USP'U]/ML
INJECTION, SOLUTION INTRAVENOUS; SUBCUTANEOUS PRN
Status: DISCONTINUED | OUTPATIENT
Start: 2023-07-10 | End: 2023-07-10 | Stop reason: SDUPTHER

## 2023-07-10 RX ORDER — FAMOTIDINE 20 MG/1
20 TABLET, FILM COATED ORAL 2 TIMES DAILY
Status: DISCONTINUED | OUTPATIENT
Start: 2023-07-10 | End: 2023-07-14 | Stop reason: HOSPADM

## 2023-07-10 RX ORDER — MIDAZOLAM HYDROCHLORIDE 1 MG/ML
2 INJECTION INTRAMUSCULAR; INTRAVENOUS ONCE
Status: DISCONTINUED | OUTPATIENT
Start: 2023-07-10 | End: 2023-07-10 | Stop reason: SDUPTHER

## 2023-07-10 RX ORDER — MAGNESIUM SULFATE IN WATER 40 MG/ML
2000 INJECTION, SOLUTION INTRAVENOUS PRN
Status: DISCONTINUED | OUTPATIENT
Start: 2023-07-10 | End: 2023-07-14 | Stop reason: HOSPADM

## 2023-07-10 RX ORDER — DEXMEDETOMIDINE HYDROCHLORIDE 4 UG/ML
0.6 INJECTION, SOLUTION INTRAVENOUS CONTINUOUS
Status: DISCONTINUED | OUTPATIENT
Start: 2023-07-10 | End: 2023-07-11

## 2023-07-10 RX ORDER — FUROSEMIDE 10 MG/ML
20 INJECTION INTRAMUSCULAR; INTRAVENOUS 4 TIMES DAILY
Status: COMPLETED | OUTPATIENT
Start: 2023-07-11 | End: 2023-07-12

## 2023-07-10 RX ORDER — BISACODYL 5 MG/1
5 TABLET, DELAYED RELEASE ORAL DAILY
Status: DISCONTINUED | OUTPATIENT
Start: 2023-07-11 | End: 2023-07-14 | Stop reason: HOSPADM

## 2023-07-10 RX ADMIN — ALBUTEROL SULFATE 2.5 MG: 2.5 SOLUTION RESPIRATORY (INHALATION) at 15:45

## 2023-07-10 RX ADMIN — POTASSIUM CHLORIDE 20 MEQ: 29.8 INJECTION, SOLUTION INTRAVENOUS at 22:06

## 2023-07-10 RX ADMIN — Medication 0.2 MCG/KG/HR: at 15:14

## 2023-07-10 RX ADMIN — CISATRACURIUM BESYLATE 10 MG: 2 INJECTION, SOLUTION INTRAVENOUS at 09:00

## 2023-07-10 RX ADMIN — ASPIRIN 325 MG: 325 TABLET, COATED ORAL at 20:45

## 2023-07-10 RX ADMIN — SODIUM CHLORIDE, SODIUM LACTATE, POTASSIUM CHLORIDE, AND CALCIUM CHLORIDE: .6; .31; .03; .02 INJECTION, SOLUTION INTRAVENOUS at 07:50

## 2023-07-10 RX ADMIN — Medication 2000 MG: at 08:30

## 2023-07-10 RX ADMIN — SODIUM CHLORIDE 4 MCG/MIN: 9 INJECTION, SOLUTION INTRAVENOUS at 14:14

## 2023-07-10 RX ADMIN — PROTAMINE SULFATE 120 MG: 10 INJECTION, SOLUTION INTRAVENOUS at 12:07

## 2023-07-10 RX ADMIN — HEPARIN SODIUM 10000 UNITS: 1000 INJECTION, SOLUTION INTRAVENOUS; SUBCUTANEOUS at 10:22

## 2023-07-10 RX ADMIN — ACETAMINOPHEN 1000 MG: 500 TABLET ORAL at 04:29

## 2023-07-10 RX ADMIN — SODIUM CHLORIDE 1 MCG/MIN: 9 INJECTION, SOLUTION INTRAVENOUS at 08:15

## 2023-07-10 RX ADMIN — CISATRACURIUM BESYLATE 5 MG: 2 INJECTION, SOLUTION INTRAVENOUS at 12:00

## 2023-07-10 RX ADMIN — FENTANYL CITRATE 500 MCG: 50 INJECTION, SOLUTION INTRAMUSCULAR; INTRAVENOUS at 10:01

## 2023-07-10 RX ADMIN — DOBUTAMINE HYDROCHLORIDE 2 MCG/KG/MIN: 200 INJECTION INTRAVENOUS at 08:15

## 2023-07-10 RX ADMIN — AMINOCAPROIC ACID 2 G/HR: 250 INJECTION, SOLUTION INTRAVENOUS at 08:15

## 2023-07-10 RX ADMIN — SODIUM CHLORIDE: 9 INJECTION, SOLUTION INTRAVENOUS at 15:40

## 2023-07-10 RX ADMIN — DEXAMETHASONE SODIUM PHOSPHATE 4 MG: 4 INJECTION, SOLUTION INTRA-ARTICULAR; INTRALESIONAL; INTRAMUSCULAR; INTRAVENOUS; SOFT TISSUE at 22:50

## 2023-07-10 RX ADMIN — ASPIRIN 81 MG: 81 TABLET, COATED ORAL at 04:29

## 2023-07-10 RX ADMIN — DEXAMETHASONE SODIUM PHOSPHATE 12 MG: 4 INJECTION, SOLUTION INTRAMUSCULAR; INTRAVENOUS at 12:15

## 2023-07-10 RX ADMIN — CISATRACURIUM BESYLATE 30 MG: 2 INJECTION, SOLUTION INTRAVENOUS at 07:55

## 2023-07-10 RX ADMIN — CISATRACURIUM BESYLATE 10 MG: 2 INJECTION, SOLUTION INTRAVENOUS at 11:00

## 2023-07-10 RX ADMIN — SODIUM CHLORIDE: 9 INJECTION, SOLUTION INTRAVENOUS at 16:52

## 2023-07-10 RX ADMIN — MUPIROCIN: 20 OINTMENT TOPICAL at 21:00

## 2023-07-10 RX ADMIN — SODIUM CHLORIDE 8.07 UNITS/HR: 9 INJECTION, SOLUTION INTRAVENOUS at 08:35

## 2023-07-10 RX ADMIN — CALCIUM CHLORIDE INJECTION 1000 MG: 100 INJECTION, SOLUTION INTRAVENOUS at 21:10

## 2023-07-10 RX ADMIN — VANCOMYCIN HYDROCHLORIDE 1000 MG: 1 INJECTION, POWDER, LYOPHILIZED, FOR SOLUTION INTRAVENOUS at 21:05

## 2023-07-10 RX ADMIN — HEPARIN SODIUM 3340 UNITS/HR: 10000 INJECTION, SOLUTION INTRAVENOUS at 00:08

## 2023-07-10 RX ADMIN — DEXAMETHASONE SODIUM PHOSPHATE 4 MG: 4 INJECTION, SOLUTION INTRA-ARTICULAR; INTRALESIONAL; INTRAMUSCULAR; INTRAVENOUS; SOFT TISSUE at 16:40

## 2023-07-10 RX ADMIN — ONDANSETRON 4 MG: 2 INJECTION INTRAMUSCULAR; INTRAVENOUS at 15:57

## 2023-07-10 RX ADMIN — MORPHINE SULFATE 2 MG: 2 INJECTION, SOLUTION INTRAMUSCULAR; INTRAVENOUS at 00:27

## 2023-07-10 RX ADMIN — MIDAZOLAM 6 MG: 1 INJECTION INTRAMUSCULAR; INTRAVENOUS at 07:51

## 2023-07-10 RX ADMIN — MIDAZOLAM 2 MG: 1 INJECTION INTRAMUSCULAR; INTRAVENOUS at 13:27

## 2023-07-10 RX ADMIN — FENTANYL CITRATE 500 MCG: 50 INJECTION, SOLUTION INTRAMUSCULAR; INTRAVENOUS at 07:51

## 2023-07-10 RX ADMIN — CHLORHEXIDINE GLUCONATE 15 ML: 1.2 RINSE ORAL at 21:06

## 2023-07-10 RX ADMIN — KETOROLAC TROMETHAMINE 60 MG: 30 INJECTION, SOLUTION INTRAMUSCULAR; INTRAVENOUS at 12:15

## 2023-07-10 RX ADMIN — DEXMEDETOMIDINE 0.7 MCG/KG/HR: 100 INJECTION, SOLUTION INTRAVENOUS at 08:15

## 2023-07-10 RX ADMIN — MIDAZOLAM 2 MG: 1 INJECTION INTRAMUSCULAR; INTRAVENOUS at 13:10

## 2023-07-10 RX ADMIN — FAMOTIDINE 20 MG: 20 TABLET, FILM COATED ORAL at 20:45

## 2023-07-10 RX ADMIN — GLYCOPYRROLATE 0.4 MG: 0.2 INJECTION, SOLUTION INTRAMUSCULAR; INTRAVENOUS at 12:45

## 2023-07-10 RX ADMIN — SODIUM CHLORIDE, SODIUM LACTATE, POTASSIUM CHLORIDE, AND CALCIUM CHLORIDE: .6; .31; .03; .02 INJECTION, SOLUTION INTRAVENOUS at 12:30

## 2023-07-10 RX ADMIN — PIPERACILLIN AND TAZOBACTAM 3375 MG: 3; .375 INJECTION, POWDER, LYOPHILIZED, FOR SOLUTION INTRAVENOUS at 08:30

## 2023-07-10 RX ADMIN — HEPARIN SODIUM 10000 UNITS: 1000 INJECTION, SOLUTION INTRAVENOUS; SUBCUTANEOUS at 10:31

## 2023-07-10 RX ADMIN — ONDANSETRON 4 MG: 2 INJECTION INTRAMUSCULAR; INTRAVENOUS at 12:15

## 2023-07-10 RX ADMIN — Medication 0.6 MCG/KG/HR: at 14:42

## 2023-07-10 RX ADMIN — VANCOMYCIN HYDROCHLORIDE 1000 MG: 1 INJECTION, POWDER, LYOPHILIZED, FOR SOLUTION INTRAVENOUS at 08:30

## 2023-07-10 RX ADMIN — POTASSIUM CHLORIDE 20 MEQ: 29.8 INJECTION, SOLUTION INTRAVENOUS at 19:59

## 2023-07-10 RX ADMIN — KETAMINE HYDROCHLORIDE 150 MG: 100 INJECTION INTRAMUSCULAR; INTRAVENOUS at 07:51

## 2023-07-10 RX ADMIN — CISATRACURIUM BESYLATE 10 MG: 2 INJECTION, SOLUTION INTRAVENOUS at 10:00

## 2023-07-10 RX ADMIN — Medication 10 ML: at 20:45

## 2023-07-10 RX ADMIN — ANTISEPTIC SURGICAL SCRUB: 0.04 SOLUTION TOPICAL at 00:09

## 2023-07-10 RX ADMIN — Medication 4 MG: at 12:45

## 2023-07-10 RX ADMIN — DEXTROSE AND SODIUM CHLORIDE: 5; 450 INJECTION, SOLUTION INTRAVENOUS at 15:03

## 2023-07-10 RX ADMIN — HEPARIN SODIUM 30000 UNITS: 1000 INJECTION, SOLUTION INTRAVENOUS; SUBCUTANEOUS at 10:01

## 2023-07-10 RX ADMIN — PROCHLORPERAZINE EDISYLATE 5 MG: 5 INJECTION INTRAMUSCULAR; INTRAVENOUS at 17:23

## 2023-07-10 RX ADMIN — OXYCODONE HYDROCHLORIDE 5 MG: 5 TABLET ORAL at 22:50

## 2023-07-10 RX ADMIN — MORPHINE SULFATE 2 MG: 2 INJECTION, SOLUTION INTRAMUSCULAR; INTRAVENOUS at 04:36

## 2023-07-10 RX ADMIN — METOPROLOL TARTRATE 12.5 MG: 25 TABLET, FILM COATED ORAL at 04:28

## 2023-07-10 RX ADMIN — PIPERACILLIN AND TAZOBACTAM 3375 MG: 3; .375 INJECTION, POWDER, LYOPHILIZED, FOR SOLUTION INTRAVENOUS at 16:54

## 2023-07-10 RX ADMIN — SODIUM CHLORIDE, POTASSIUM CHLORIDE, SODIUM LACTATE AND CALCIUM CHLORIDE: 600; 310; 30; 20 INJECTION, SOLUTION INTRAVENOUS at 04:53

## 2023-07-10 RX ADMIN — SODIUM CHLORIDE 3.52 UNITS/HR: 9 INJECTION, SOLUTION INTRAVENOUS at 14:24

## 2023-07-10 RX ADMIN — PROTAMINE SULFATE 400 MG: 10 INJECTION, SOLUTION INTRAVENOUS at 11:55

## 2023-07-10 RX ADMIN — CHLORHEXIDINE GLUCONATE 15 ML: 1.2 RINSE ORAL at 04:30

## 2023-07-10 RX ADMIN — DEXMEDETOMIDINE 0.8 MCG/KG/HR: 100 INJECTION, SOLUTION INTRAVENOUS at 14:12

## 2023-07-10 RX ADMIN — FENTANYL CITRATE 500 MCG: 50 INJECTION, SOLUTION INTRAMUSCULAR; INTRAVENOUS at 08:54

## 2023-07-10 RX ADMIN — Medication 2000 MG: at 12:30

## 2023-07-10 ASSESSMENT — PULMONARY FUNCTION TESTS
PIF_VALUE: 16
PIF_VALUE: 25

## 2023-07-10 ASSESSMENT — PAIN DESCRIPTION - LOCATION
LOCATION: CHEST
LOCATION: CHEST
LOCATION: INCISION

## 2023-07-10 ASSESSMENT — PAIN DESCRIPTION - DESCRIPTORS
DESCRIPTORS: ACHING;SORE
DESCRIPTORS: SHARP;SQUEEZING;STABBING

## 2023-07-10 ASSESSMENT — PAIN SCALES - GENERAL
PAINLEVEL_OUTOF10: 6
PAINLEVEL_OUTOF10: 5
PAINLEVEL_OUTOF10: 6

## 2023-07-10 ASSESSMENT — PAIN DESCRIPTION - ORIENTATION
ORIENTATION: MID
ORIENTATION: MID

## 2023-07-10 NOTE — OR NURSING
PATIENT ASKED PREOP IF SHE HAD LOOSE, CRACKED TEETH OR DENTURES. UPON MOVING PATIENT FROM ORBED TO HER BED AFTER SURGERY TOP BRIDGE NOTICED LOOSELY IN PATIENT'S MOUTH.  REMOVED BY DR. Jonathan Lopez. PLACED IN PATIENT LABELED DENTURE CUP AND TAKEN TO UNIT WITH PATIENT.

## 2023-07-10 NOTE — BRIEF OP NOTE
Brief Postoperative Note      Patient: Caron Clifton  YOB: 1990  MRN: [id number]    Date of Procedure: 7/10/2023    Pre-Op Diagnosis Codes:     * Coronary artery disease, unspecified vessel or lesion type, unspecified whether angina present, unspecified whether native or transplanted heart [I25.10]    Post-Op Diagnosis: Same       Procedure(s):  CORONARY ARTERY BYPASS GRAFTING TIMES THREE WITH LEFT ATRIAL APPENDAGE CLIP PLACEMENT, ON PUMP, SATNAM, PLACEMENT OF TEMPORARY PACING WIRES, BILATERAL PECTORALIS BLOCKS     Surgeon(s):  Berlin Esquivel MD    Assistant:  Surgical Assistant: Karon Ortiz  Physician Assistant: MAGDIEL Lozano    Anesthesia: General    Estimated Blood Loss (mL): 578    Complications: None    Specimens:   * No specimens in log *    Implants:  Implant Name Type Inv.  Item Serial No.  Lot No. LRB No. Used Action   CLIP MED SUTURE LESS 40 MM SYS 1 HND STRL ATRICLIP FLX V - [id number]  CLIP MED SUTURE LESS 40 MM SYS 1 HND STRL ATRICLIP FLX V  [employer]WD [id number] N/A 1 Implanted        Findings:       Electronically signed by Berlin Esquivel MD on 7/10/2023 at 2:46 PM

## 2023-07-10 NOTE — ANESTHESIA POSTPROCEDURE EVALUATION
Department of Anesthesiology  Postprocedure Note    Patient: Jana Love  MRN: [id number]  [facility] 1990  Date of evaluation: 7/10/2023      Procedure Summary     Date: 07/10/23 Room / Location: Arnold [facility] / [address] [place] [address]    Anesthesia Start: 3584 Anesthesia Stop:     Procedure: CORONARY ARTERY BYPASS GRAFTING TIMES THREE WITH LEFT ATRIAL APPENDAGE CLIP PLACEMENT, ON PUMP, SATNAM, PLACEMENT OF TEMPORARY PACING WIRES, BILATERAL PECTORALIS BLOCKS  (Chest) Diagnosis:       Coronary artery disease, unspecified vessel or lesion type, unspecified whether angina present, unspecified whether native or transplanted heart      (Coronary artery disease, unspecified vessel or lesion type, unspecified whether angina present, unspecified whether native or transplanted heart [I25.10])    Surgeons: Diana Petersen MD Responsible Provider: Mikala Paige MD    Anesthesia Type: general ASA Status: 4          Anesthesia Type: No value filed. Shanon Phase I:      Shanon Phase II:        Anesthesia Post Evaluation    Patient location during evaluation: ICU  Patient participation: waiting for patient participation  Level of consciousness: sedated and ventilated  Pain scale: N/A. Airway patency: patent  Nausea: N/A.   Complications: no  Cardiovascular status: hemodynamically stable  Respiratory status: acceptable, intubated and ventilator  Hydration status: stable

## 2023-07-11 ENCOUNTER — APPOINTMENT (OUTPATIENT)
Dept: GENERAL RADIOLOGY | Age: 33
DRG: 165 | End: 2023-07-11
Payer: COMMERCIAL

## 2023-07-11 LAB
ANION GAP SERPL CALCULATED.3IONS-SCNC: 10 MMOL/L (ref 3–16)
BACTERIA UR CULT: ABNORMAL
BUN SERPL-MCNC: 10 MG/DL (ref 7–20)
CA-I BLD-SCNC: 1.1 MMOL/L (ref 1.12–1.32)
CALCIUM SERPL-MCNC: 9 MG/DL (ref 8.3–10.6)
CHLORIDE SERPL-SCNC: 105 MMOL/L (ref 99–110)
CO2 SERPL-SCNC: 21 MMOL/L (ref 21–32)
CREAT SERPL-MCNC: <0.5 MG/DL (ref 0.6–1.1)
DEPRECATED RDW RBC AUTO: 15.8 % (ref 12.4–15.4)
GFR SERPLBLD CREATININE-BSD FMLA CKD-EPI: >60 ML/MIN/{1.73_M2}
GLUCOSE BLD-MCNC: 100 MG/DL (ref 70–99)
GLUCOSE BLD-MCNC: 106 MG/DL (ref 70–99)
GLUCOSE BLD-MCNC: 111 MG/DL (ref 70–99)
GLUCOSE BLD-MCNC: 113 MG/DL (ref 70–99)
GLUCOSE BLD-MCNC: 116 MG/DL (ref 70–99)
GLUCOSE BLD-MCNC: 117 MG/DL (ref 70–99)
GLUCOSE BLD-MCNC: 126 MG/DL (ref 70–99)
GLUCOSE BLD-MCNC: 129 MG/DL (ref 70–99)
GLUCOSE BLD-MCNC: 130 MG/DL (ref 70–99)
GLUCOSE BLD-MCNC: 134 MG/DL (ref 70–99)
GLUCOSE BLD-MCNC: 135 MG/DL (ref 70–99)
GLUCOSE BLD-MCNC: 141 MG/DL (ref 70–99)
GLUCOSE BLD-MCNC: 156 MG/DL (ref 70–99)
GLUCOSE BLD-MCNC: 157 MG/DL (ref 70–99)
GLUCOSE BLD-MCNC: 160 MG/DL (ref 70–99)
GLUCOSE SERPL-MCNC: 119 MG/DL (ref 70–99)
HCT VFR BLD AUTO: 26 % (ref 36–48)
HGB BLD-MCNC: 8.3 G/DL (ref 12–16)
INR PPP: 1.32 (ref 0.84–1.16)
MAGNESIUM SERPL-MCNC: 2 MG/DL (ref 1.8–2.4)
MCH RBC QN AUTO: 27.4 PG (ref 26–34)
MCHC RBC AUTO-ENTMCNC: 32 G/DL (ref 31–36)
MCV RBC AUTO: 85.7 FL (ref 80–100)
ORGANISM: ABNORMAL
PERFORMED ON: ABNORMAL
PH BLDV: 7.51 [PH] (ref 7.35–7.45)
PLATELET # BLD AUTO: 293 K/UL (ref 135–450)
PMV BLD AUTO: 8.3 FL (ref 5–10.5)
POTASSIUM SERPL-SCNC: 4.4 MMOL/L (ref 3.5–5.1)
POTASSIUM SERPL-SCNC: 4.7 MMOL/L (ref 3.5–5.1)
PROTHROMBIN TIME: 16.4 SEC (ref 11.5–14.8)
RBC # BLD AUTO: 3.03 M/UL (ref 4–5.2)
SODIUM SERPL-SCNC: 136 MMOL/L (ref 136–145)
WBC # BLD AUTO: 12.1 K/UL (ref 4–11)

## 2023-07-11 PROCEDURE — 94669 MECHANICAL CHEST WALL OSCILL: CPT

## 2023-07-11 PROCEDURE — 6360000002 HC RX W HCPCS: Performed by: THORACIC SURGERY (CARDIOTHORACIC VASCULAR SURGERY)

## 2023-07-11 PROCEDURE — 2500000003 HC RX 250 WO HCPCS: Performed by: NURSE PRACTITIONER

## 2023-07-11 PROCEDURE — 6370000000 HC RX 637 (ALT 250 FOR IP): Performed by: THORACIC SURGERY (CARDIOTHORACIC VASCULAR SURGERY)

## 2023-07-11 PROCEDURE — 6360000002 HC RX W HCPCS: Performed by: NURSE PRACTITIONER

## 2023-07-11 PROCEDURE — 2580000003 HC RX 258: Performed by: THORACIC SURGERY (CARDIOTHORACIC VASCULAR SURGERY)

## 2023-07-11 PROCEDURE — 84132 ASSAY OF SERUM POTASSIUM: CPT

## 2023-07-11 PROCEDURE — 97530 THERAPEUTIC ACTIVITIES: CPT

## 2023-07-11 PROCEDURE — 97162 PT EVAL MOD COMPLEX 30 MIN: CPT

## 2023-07-11 PROCEDURE — 82330 ASSAY OF CALCIUM: CPT

## 2023-07-11 PROCEDURE — 71045 X-RAY EXAM CHEST 1 VIEW: CPT

## 2023-07-11 PROCEDURE — 94640 AIRWAY INHALATION TREATMENT: CPT

## 2023-07-11 PROCEDURE — 2500000003 HC RX 250 WO HCPCS

## 2023-07-11 PROCEDURE — 80048 BASIC METABOLIC PNL TOTAL CA: CPT

## 2023-07-11 PROCEDURE — 85610 PROTHROMBIN TIME: CPT

## 2023-07-11 PROCEDURE — 97166 OT EVAL MOD COMPLEX 45 MIN: CPT

## 2023-07-11 PROCEDURE — 2140000000 HC CCU INTERMEDIATE R&B

## 2023-07-11 PROCEDURE — 85027 COMPLETE CBC AUTOMATED: CPT

## 2023-07-11 PROCEDURE — 6360000002 HC RX W HCPCS

## 2023-07-11 PROCEDURE — 2580000003 HC RX 258: Performed by: NURSE PRACTITIONER

## 2023-07-11 PROCEDURE — 83735 ASSAY OF MAGNESIUM: CPT

## 2023-07-11 PROCEDURE — 97116 GAIT TRAINING THERAPY: CPT

## 2023-07-11 PROCEDURE — 2500000003 HC RX 250 WO HCPCS: Performed by: THORACIC SURGERY (CARDIOTHORACIC VASCULAR SURGERY)

## 2023-07-11 RX ORDER — METOPROLOL TARTRATE 5 MG/5ML
5 INJECTION INTRAVENOUS EVERY 6 HOURS
Status: DISCONTINUED | OUTPATIENT
Start: 2023-07-11 | End: 2023-07-11

## 2023-07-11 RX ORDER — KETOROLAC TROMETHAMINE 30 MG/ML
INJECTION, SOLUTION INTRAMUSCULAR; INTRAVENOUS
Status: COMPLETED
Start: 2023-07-11 | End: 2023-07-11

## 2023-07-11 RX ORDER — ASPIRIN 81 MG/1
81 TABLET, CHEWABLE ORAL DAILY
Status: ON HOLD | COMMUNITY
End: 2023-07-14 | Stop reason: HOSPADM

## 2023-07-11 RX ORDER — PRASUGREL 10 MG/1
10 TABLET, FILM COATED ORAL DAILY
Status: ON HOLD | COMMUNITY
End: 2023-07-14 | Stop reason: HOSPADM

## 2023-07-11 RX ORDER — DULAGLUTIDE 1.5 MG/.5ML
1.5 INJECTION, SOLUTION SUBCUTANEOUS WEEKLY
COMMUNITY

## 2023-07-11 RX ORDER — METOPROLOL TARTRATE 5 MG/5ML
2.5 INJECTION INTRAVENOUS ONCE
Status: COMPLETED | OUTPATIENT
Start: 2023-07-11 | End: 2023-07-11

## 2023-07-11 RX ORDER — METOCLOPRAMIDE HYDROCHLORIDE 5 MG/ML
10 INJECTION INTRAMUSCULAR; INTRAVENOUS EVERY 6 HOURS
Status: COMPLETED | OUTPATIENT
Start: 2023-07-11 | End: 2023-07-12

## 2023-07-11 RX ORDER — BISACODYL 10 MG
10 SUPPOSITORY, RECTAL RECTAL DAILY PRN
Status: DISCONTINUED | OUTPATIENT
Start: 2023-07-11 | End: 2023-07-14 | Stop reason: HOSPADM

## 2023-07-11 RX ORDER — PROCHLORPERAZINE MALEATE 5 MG/1
5 TABLET ORAL EVERY 6 HOURS PRN
COMMUNITY

## 2023-07-11 RX ORDER — EZETIMIBE 10 MG/1
10 TABLET ORAL DAILY
COMMUNITY

## 2023-07-11 RX ORDER — METOPROLOL SUCCINATE 25 MG/1
25 TABLET, EXTENDED RELEASE ORAL DAILY
Status: ON HOLD | COMMUNITY
End: 2023-07-14 | Stop reason: HOSPADM

## 2023-07-11 RX ORDER — NITROGLYCERIN 0.4 MG/1
0.4 TABLET SUBLINGUAL EVERY 5 MIN PRN
Status: ON HOLD | COMMUNITY
End: 2023-07-14 | Stop reason: HOSPADM

## 2023-07-11 RX ORDER — KETOROLAC TROMETHAMINE 30 MG/ML
15 INJECTION, SOLUTION INTRAMUSCULAR; INTRAVENOUS EVERY 6 HOURS
Status: COMPLETED | OUTPATIENT
Start: 2023-07-11 | End: 2023-07-12

## 2023-07-11 RX ORDER — OMEPRAZOLE 40 MG/1
40 CAPSULE, DELAYED RELEASE ORAL
COMMUNITY

## 2023-07-11 RX ADMIN — ALBUTEROL SULFATE 2.5 MG: 2.5 SOLUTION RESPIRATORY (INHALATION) at 19:11

## 2023-07-11 RX ADMIN — ONDANSETRON 4 MG: 2 INJECTION INTRAMUSCULAR; INTRAVENOUS at 00:28

## 2023-07-11 RX ADMIN — METOPROLOL TARTRATE 12.5 MG: 25 TABLET, FILM COATED ORAL at 17:55

## 2023-07-11 RX ADMIN — Medication 10 ML: at 11:27

## 2023-07-11 RX ADMIN — Medication 400 MG: at 20:15

## 2023-07-11 RX ADMIN — ACETAMINOPHEN 650 MG: 325 TABLET ORAL at 17:44

## 2023-07-11 RX ADMIN — FUROSEMIDE 20 MG: 10 INJECTION, SOLUTION INTRAMUSCULAR; INTRAVENOUS at 13:04

## 2023-07-11 RX ADMIN — BISACODYL 5 MG: 5 TABLET, COATED ORAL at 20:22

## 2023-07-11 RX ADMIN — ALBUTEROL SULFATE 2.5 MG: 2.5 SOLUTION RESPIRATORY (INHALATION) at 11:14

## 2023-07-11 RX ADMIN — POTASSIUM CHLORIDE 20 MEQ: 29.8 INJECTION, SOLUTION INTRAVENOUS at 05:43

## 2023-07-11 RX ADMIN — VANCOMYCIN HYDROCHLORIDE 1000 MG: 1 INJECTION, POWDER, LYOPHILIZED, FOR SOLUTION INTRAVENOUS at 07:59

## 2023-07-11 RX ADMIN — METOCLOPRAMIDE HYDROCHLORIDE 10 MG: 5 INJECTION INTRAMUSCULAR; INTRAVENOUS at 20:16

## 2023-07-11 RX ADMIN — CHLORHEXIDINE GLUCONATE 15 ML: 1.2 RINSE ORAL at 20:16

## 2023-07-11 RX ADMIN — KETOROLAC TROMETHAMINE 15 MG: 30 INJECTION, SOLUTION INTRAMUSCULAR; INTRAVENOUS at 20:16

## 2023-07-11 RX ADMIN — ONDANSETRON 4 MG: 2 INJECTION INTRAMUSCULAR; INTRAVENOUS at 11:17

## 2023-07-11 RX ADMIN — FUROSEMIDE 20 MG: 10 INJECTION, SOLUTION INTRAMUSCULAR; INTRAVENOUS at 20:16

## 2023-07-11 RX ADMIN — PIPERACILLIN AND TAZOBACTAM 3375 MG: 3; .375 INJECTION, POWDER, LYOPHILIZED, FOR SOLUTION INTRAVENOUS at 10:05

## 2023-07-11 RX ADMIN — OXYCODONE HYDROCHLORIDE 5 MG: 5 TABLET ORAL at 04:52

## 2023-07-11 RX ADMIN — SODIUM CHLORIDE 6 UNITS/HR: 9 INJECTION, SOLUTION INTRAVENOUS at 18:00

## 2023-07-11 RX ADMIN — VANCOMYCIN HYDROCHLORIDE 1000 MG: 1 INJECTION, POWDER, LYOPHILIZED, FOR SOLUTION INTRAVENOUS at 22:00

## 2023-07-11 RX ADMIN — CALCIUM CHLORIDE INJECTION 1000 MG: 100 INJECTION, SOLUTION INTRAVENOUS at 06:48

## 2023-07-11 RX ADMIN — DEXAMETHASONE SODIUM PHOSPHATE 4 MG: 4 INJECTION, SOLUTION INTRA-ARTICULAR; INTRALESIONAL; INTRAMUSCULAR; INTRAVENOUS; SOFT TISSUE at 17:15

## 2023-07-11 RX ADMIN — METOPROLOL TARTRATE 2.5 MG: 5 INJECTION INTRAVENOUS at 08:40

## 2023-07-11 RX ADMIN — POTASSIUM CHLORIDE 20 MEQ: 750 TABLET, EXTENDED RELEASE ORAL at 20:15

## 2023-07-11 RX ADMIN — CHLORHEXIDINE GLUCONATE 15 ML: 1.2 RINSE ORAL at 11:28

## 2023-07-11 RX ADMIN — DEXAMETHASONE SODIUM PHOSPHATE 4 MG: 4 INJECTION, SOLUTION INTRA-ARTICULAR; INTRALESIONAL; INTRAMUSCULAR; INTRAVENOUS; SOFT TISSUE at 23:51

## 2023-07-11 RX ADMIN — MUPIROCIN: 20 OINTMENT TOPICAL at 20:15

## 2023-07-11 RX ADMIN — Medication 10 ML: at 20:16

## 2023-07-11 RX ADMIN — FAMOTIDINE 20 MG: 10 INJECTION, SOLUTION INTRAVENOUS at 08:02

## 2023-07-11 RX ADMIN — FAMOTIDINE 20 MG: 10 INJECTION, SOLUTION INTRAVENOUS at 20:15

## 2023-07-11 RX ADMIN — PIPERACILLIN AND TAZOBACTAM 3375 MG: 3; .375 INJECTION, POWDER, LYOPHILIZED, FOR SOLUTION INTRAVENOUS at 23:58

## 2023-07-11 RX ADMIN — ACETAMINOPHEN 650 MG: 325 TABLET ORAL at 05:00

## 2023-07-11 RX ADMIN — PIPERACILLIN AND TAZOBACTAM 3375 MG: 3; .375 INJECTION, POWDER, LYOPHILIZED, FOR SOLUTION INTRAVENOUS at 17:42

## 2023-07-11 RX ADMIN — FUROSEMIDE 20 MG: 10 INJECTION, SOLUTION INTRAMUSCULAR; INTRAVENOUS at 17:15

## 2023-07-11 RX ADMIN — ALBUTEROL SULFATE 2.5 MG: 2.5 SOLUTION RESPIRATORY (INHALATION) at 16:11

## 2023-07-11 RX ADMIN — DEXAMETHASONE SODIUM PHOSPHATE 4 MG: 4 INJECTION, SOLUTION INTRA-ARTICULAR; INTRALESIONAL; INTRAMUSCULAR; INTRAVENOUS; SOFT TISSUE at 04:52

## 2023-07-11 RX ADMIN — METOPROLOL TARTRATE 5 MG: 1 INJECTION, SOLUTION INTRAVENOUS at 12:54

## 2023-07-11 RX ADMIN — METOCLOPRAMIDE HYDROCHLORIDE 10 MG: 5 INJECTION INTRAMUSCULAR; INTRAVENOUS at 07:50

## 2023-07-11 RX ADMIN — KETOROLAC TROMETHAMINE 15 MG: 30 INJECTION, SOLUTION INTRAMUSCULAR; INTRAVENOUS at 08:07

## 2023-07-11 RX ADMIN — KETOROLAC TROMETHAMINE 15 MG: 30 INJECTION, SOLUTION INTRAMUSCULAR; INTRAVENOUS at 14:31

## 2023-07-11 RX ADMIN — ATORVASTATIN CALCIUM 40 MG: 40 TABLET, FILM COATED ORAL at 20:15

## 2023-07-11 RX ADMIN — MUPIROCIN: 20 OINTMENT TOPICAL at 11:28

## 2023-07-11 RX ADMIN — PIPERACILLIN AND TAZOBACTAM 3375 MG: 3; .375 INJECTION, POWDER, LYOPHILIZED, FOR SOLUTION INTRAVENOUS at 00:12

## 2023-07-11 RX ADMIN — METOCLOPRAMIDE HYDROCHLORIDE 10 MG: 5 INJECTION INTRAMUSCULAR; INTRAVENOUS at 14:32

## 2023-07-11 RX ADMIN — ALBUTEROL SULFATE 2.5 MG: 2.5 SOLUTION RESPIRATORY (INHALATION) at 07:25

## 2023-07-11 RX ADMIN — DEXAMETHASONE SODIUM PHOSPHATE 4 MG: 4 INJECTION, SOLUTION INTRA-ARTICULAR; INTRALESIONAL; INTRAMUSCULAR; INTRAVENOUS; SOFT TISSUE at 11:22

## 2023-07-11 RX ADMIN — FONDAPARINUX SODIUM 2.5 MG: 2.5 INJECTION, SOLUTION SUBCUTANEOUS at 20:22

## 2023-07-11 RX ADMIN — ACETAMINOPHEN 650 MG: 325 TABLET ORAL at 23:50

## 2023-07-11 RX ADMIN — FUROSEMIDE 20 MG: 10 INJECTION, SOLUTION INTRAMUSCULAR; INTRAVENOUS at 08:00

## 2023-07-11 RX ADMIN — CLOPIDOGREL BISULFATE 75 MG: 75 TABLET ORAL at 20:22

## 2023-07-11 ASSESSMENT — PAIN SCALES - GENERAL
PAINLEVEL_OUTOF10: 10
PAINLEVEL_OUTOF10: 2
PAINLEVEL_OUTOF10: 3
PAINLEVEL_OUTOF10: 5
PAINLEVEL_OUTOF10: 9

## 2023-07-11 ASSESSMENT — PAIN DESCRIPTION - LOCATION
LOCATION: CHEST
LOCATION: INCISION

## 2023-07-11 ASSESSMENT — PAIN DESCRIPTION - ORIENTATION
ORIENTATION: LEFT;POSTERIOR;OTHER (COMMENT)
ORIENTATION: LEFT;POSTERIOR

## 2023-07-11 ASSESSMENT — PAIN DESCRIPTION - DESCRIPTORS
DESCRIPTORS: ACHING;SORE
DESCRIPTORS: ACHING;DISCOMFORT
DESCRIPTORS: ACHING;SORE
DESCRIPTORS: ACHING;SORE
DESCRIPTORS: SHARP;STABBING

## 2023-07-11 ASSESSMENT — PAIN DESCRIPTION - PAIN TYPE
TYPE: SURGICAL PAIN
TYPE: OTHER (COMMENT)

## 2023-07-11 NOTE — RT PROTOCOL NOTE
with Frequency of every 4 hours PRN for wheezing or increased work of breathing using Per Protocol order mode. 4-6 - enter or revise RT Bronchodilator order(s) to two equivalent RT bronchodilator orders with one order with BID Frequency and one order with Frequency of every 4 hours PRN wheezing or increased work of breathing using Per Protocol order mode. 7-10 - enter or revise RT Bronchodilator order(s) to two equivalent RT bronchodilator orders with one order with TID Frequency and one order with Frequency of every 4 hours PRN wheezing or increased work of breathing using Per Protocol order mode. 11-13 - enter or revise RT Bronchodilator order(s) to one equivalent RT bronchodilator order with QID Frequency and an Albuterol order with Frequency of every 4 hours PRN wheezing or increased work of breathing using Per Protocol order mode. Greater than 13 - enter or revise RT Bronchodilator order(s) to one equivalent RT bronchodilator order with every 4 hours Frequency and an Albuterol order with Frequency of every 2 hours PRN wheezing or increased work of breathing using Per Protocol order mode. RT to enter RT Home Evaluation for COPD & MDI Assessment order using Per Protocol order mode.     Electronically signed by Lenard Aguilar RCP on 7/10/2023 at 8:19 PM

## 2023-07-11 NOTE — CONSULTS
Nutrition Note    RD received consult for diet education s/p CABG 7/10. Education not appropriate at this time d/t pt NPO on cpap. Will provide education at more appropriate time. Dietitians following with nutrition assessment and recommendations in RD progress notes, see last note from 7/7. Will continue to monitor per nutrition standards of care.      Electronically signed by Akbar Esquivel [id number] [place] [address] on 7/11/23 at 8:20 AM EDT    Contact: Office: [phone]; [address] [place] [address] Road: [phone]

## 2023-07-11 NOTE — CARE COORDINATION
Chart reviewed day 7. Followed by CT/ONC. IM & Cards signed off. Pt up in chair today. POD 1 CABG. Pt w/a neal and chest tube. Pt IPTA in apartment alone. [facility] orders have been placed. Spoke w/pt r/t [address] and she has no preference. Will place referral today for [facility]. Will follow along for needs as they arise.  Electronically signed by Leana Clark RN on 7/11/2023 at 11:24 AM

## 2023-07-11 NOTE — CARE COORDINATION
[facility] unable to accept due to fentanyl use  Patient has no preference in agency; will find alternate agency    [address] declined referral  [address] home care declined referral  [facility] declined  [facility] declined  Care connections declined    Annette Plaza RN, BSN CTN  [facility] DECLINED referral   Electronically signed by Rosy Maloney RN on 7/11/2023 at 2:14 PM    Referral to [facility] [address]. Will review pt and let me know. Info faxed. DECLINED. Electronically signed by Rosy Maloney RN on 7/11/2023 at 2:19 PM    [facility] will most likely ACCEPT pt. They will follow and call if any issues arise.  Electronically signed by Rosy Maloney RN on 7/11/2023 at 3:23 PM

## 2023-07-12 ENCOUNTER — APPOINTMENT (OUTPATIENT)
Dept: GENERAL RADIOLOGY | Age: 33
DRG: 165 | End: 2023-07-12
Payer: COMMERCIAL

## 2023-07-12 LAB
ANION GAP SERPL CALCULATED.3IONS-SCNC: 12 MMOL/L (ref 3–16)
BACTERIA BLD CULT ORG #2: NORMAL
BACTERIA BLD CULT: NORMAL
BASE EXCESS BLDA CALC-SCNC: 1 MMOL/L (ref -3–3)
BLOOD BANK DISPENSE STATUS: NORMAL
BLOOD BANK DISPENSE STATUS: NORMAL
BLOOD BANK PRODUCT CODE: NORMAL
BLOOD BANK PRODUCT CODE: NORMAL
BPU ID: NORMAL
BPU ID: NORMAL
BUN SERPL-MCNC: 14 MG/DL (ref 7–20)
CALCIUM SERPL-MCNC: 9 MG/DL (ref 8.3–10.6)
CHLORIDE SERPL-SCNC: 103 MMOL/L (ref 99–110)
CO2 BLDA-SCNC: 27 MMOL/L
CO2 SERPL-SCNC: 25 MMOL/L (ref 21–32)
CREAT SERPL-MCNC: <0.5 MG/DL (ref 0.6–1.1)
DEPRECATED RDW RBC AUTO: 15.7 % (ref 12.4–15.4)
DESCRIPTION BLOOD BANK: NORMAL
DESCRIPTION BLOOD BANK: NORMAL
GFR SERPLBLD CREATININE-BSD FMLA CKD-EPI: >60 ML/MIN/{1.73_M2}
GLUCOSE BLD-MCNC: 101 MG/DL (ref 70–99)
GLUCOSE BLD-MCNC: 101 MG/DL (ref 70–99)
GLUCOSE BLD-MCNC: 102 MG/DL (ref 70–99)
GLUCOSE BLD-MCNC: 119 MG/DL (ref 70–99)
GLUCOSE BLD-MCNC: 123 MG/DL (ref 70–99)
GLUCOSE BLD-MCNC: 232 MG/DL (ref 70–99)
GLUCOSE BLD-MCNC: 277 MG/DL (ref 70–99)
GLUCOSE BLD-MCNC: 287 MG/DL (ref 70–99)
GLUCOSE SERPL-MCNC: 113 MG/DL (ref 70–99)
HCO3 BLDA-SCNC: 25.8 MMOL/L (ref 21–29)
HCT VFR BLD AUTO: 24.7 % (ref 36–48)
HGB BLD-MCNC: 7.7 G/DL (ref 12–16)
INR PPP: 1.31 (ref 0.84–1.16)
MAGNESIUM SERPL-MCNC: 1.7 MG/DL (ref 1.8–2.4)
MCH RBC QN AUTO: 26.6 PG (ref 26–34)
MCHC RBC AUTO-ENTMCNC: 31.3 G/DL (ref 31–36)
MCV RBC AUTO: 85 FL (ref 80–100)
PCO2 BLDA: 42.9 MM HG (ref 35–45)
PERFORMED ON: ABNORMAL
PH BLDA: 7.39 [PH] (ref 7.35–7.45)
PLATELET # BLD AUTO: 341 K/UL (ref 135–450)
PMV BLD AUTO: 8.4 FL (ref 5–10.5)
PO2 BLDA: 143.6 MM HG (ref 75–108)
POC SAMPLE TYPE: ABNORMAL
POTASSIUM SERPL-SCNC: 4.3 MMOL/L (ref 3.5–5.1)
PROTHROMBIN TIME: 16.3 SEC (ref 11.5–14.8)
RBC # BLD AUTO: 2.91 M/UL (ref 4–5.2)
SAO2 % BLDA: 99 % (ref 93–100)
SODIUM SERPL-SCNC: 140 MMOL/L (ref 136–145)
WBC # BLD AUTO: 11.1 K/UL (ref 4–11)

## 2023-07-12 PROCEDURE — 94669 MECHANICAL CHEST WALL OSCILL: CPT

## 2023-07-12 PROCEDURE — 2580000003 HC RX 258: Performed by: NURSE PRACTITIONER

## 2023-07-12 PROCEDURE — 71045 X-RAY EXAM CHEST 1 VIEW: CPT

## 2023-07-12 PROCEDURE — 84238 ASSAY NONENDOCRINE RECEPTOR: CPT

## 2023-07-12 PROCEDURE — 6370000000 HC RX 637 (ALT 250 FOR IP)

## 2023-07-12 PROCEDURE — 6360000002 HC RX W HCPCS

## 2023-07-12 PROCEDURE — 97116 GAIT TRAINING THERAPY: CPT

## 2023-07-12 PROCEDURE — 80048 BASIC METABOLIC PNL TOTAL CA: CPT

## 2023-07-12 PROCEDURE — 2580000003 HC RX 258: Performed by: THORACIC SURGERY (CARDIOTHORACIC VASCULAR SURGERY)

## 2023-07-12 PROCEDURE — 6360000002 HC RX W HCPCS: Performed by: THORACIC SURGERY (CARDIOTHORACIC VASCULAR SURGERY)

## 2023-07-12 PROCEDURE — 2580000003 HC RX 258

## 2023-07-12 PROCEDURE — 6360000002 HC RX W HCPCS: Performed by: NURSE PRACTITIONER

## 2023-07-12 PROCEDURE — 94640 AIRWAY INHALATION TREATMENT: CPT

## 2023-07-12 PROCEDURE — 85027 COMPLETE CBC AUTOMATED: CPT

## 2023-07-12 PROCEDURE — 85610 PROTHROMBIN TIME: CPT

## 2023-07-12 PROCEDURE — 83735 ASSAY OF MAGNESIUM: CPT

## 2023-07-12 PROCEDURE — 97535 SELF CARE MNGMENT TRAINING: CPT

## 2023-07-12 PROCEDURE — 2140000000 HC CCU INTERMEDIATE R&B

## 2023-07-12 PROCEDURE — 6370000000 HC RX 637 (ALT 250 FOR IP): Performed by: THORACIC SURGERY (CARDIOTHORACIC VASCULAR SURGERY)

## 2023-07-12 RX ORDER — INSULIN LISPRO 100 [IU]/ML
0-6 INJECTION, SOLUTION INTRAVENOUS; SUBCUTANEOUS NIGHTLY
Status: DISCONTINUED | OUTPATIENT
Start: 2023-07-12 | End: 2023-07-14 | Stop reason: HOSPADM

## 2023-07-12 RX ORDER — INSULIN LISPRO 100 [IU]/ML
0-12 INJECTION, SOLUTION INTRAVENOUS; SUBCUTANEOUS
Status: DISCONTINUED | OUTPATIENT
Start: 2023-07-12 | End: 2023-07-14 | Stop reason: HOSPADM

## 2023-07-12 RX ADMIN — METOCLOPRAMIDE HYDROCHLORIDE 10 MG: 5 INJECTION INTRAMUSCULAR; INTRAVENOUS at 02:11

## 2023-07-12 RX ADMIN — POLYETHYLENE GLYCOL 3350 17 G: 17 POWDER, FOR SOLUTION ORAL at 09:45

## 2023-07-12 RX ADMIN — MUPIROCIN: 20 OINTMENT TOPICAL at 09:46

## 2023-07-12 RX ADMIN — FONDAPARINUX SODIUM 2.5 MG: 2.5 INJECTION, SOLUTION SUBCUTANEOUS at 09:47

## 2023-07-12 RX ADMIN — ACETAMINOPHEN 650 MG: 325 TABLET ORAL at 12:21

## 2023-07-12 RX ADMIN — ALBUTEROL SULFATE 2.5 MG: 2.5 SOLUTION RESPIRATORY (INHALATION) at 08:11

## 2023-07-12 RX ADMIN — PIPERACILLIN AND TAZOBACTAM 3375 MG: 3; .375 INJECTION, POWDER, LYOPHILIZED, FOR SOLUTION INTRAVENOUS at 17:16

## 2023-07-12 RX ADMIN — FUROSEMIDE 20 MG: 10 INJECTION, SOLUTION INTRAMUSCULAR; INTRAVENOUS at 12:21

## 2023-07-12 RX ADMIN — METOPROLOL TARTRATE 25 MG: 25 TABLET, FILM COATED ORAL at 09:46

## 2023-07-12 RX ADMIN — INSULIN LISPRO 3 UNITS: 100 INJECTION, SOLUTION INTRAVENOUS; SUBCUTANEOUS at 19:54

## 2023-07-12 RX ADMIN — ALBUTEROL SULFATE 2.5 MG: 2.5 SOLUTION RESPIRATORY (INHALATION) at 20:33

## 2023-07-12 RX ADMIN — POTASSIUM CHLORIDE 20 MEQ: 750 TABLET, EXTENDED RELEASE ORAL at 19:49

## 2023-07-12 RX ADMIN — MAGNESIUM SULFATE HEPTAHYDRATE 2000 MG: 40 INJECTION, SOLUTION INTRAVENOUS at 07:50

## 2023-07-12 RX ADMIN — KETOROLAC TROMETHAMINE 15 MG: 30 INJECTION, SOLUTION INTRAMUSCULAR; INTRAVENOUS at 09:46

## 2023-07-12 RX ADMIN — ACETAMINOPHEN 650 MG: 325 TABLET ORAL at 06:23

## 2023-07-12 RX ADMIN — POTASSIUM CHLORIDE 20 MEQ: 750 TABLET, EXTENDED RELEASE ORAL at 09:45

## 2023-07-12 RX ADMIN — Medication 10 ML: at 09:46

## 2023-07-12 RX ADMIN — FAMOTIDINE 20 MG: 20 TABLET, FILM COATED ORAL at 19:49

## 2023-07-12 RX ADMIN — ATORVASTATIN CALCIUM 40 MG: 40 TABLET, FILM COATED ORAL at 19:49

## 2023-07-12 RX ADMIN — IRON SUCROSE 200 MG: 20 INJECTION, SOLUTION INTRAVENOUS at 15:03

## 2023-07-12 RX ADMIN — POTASSIUM CHLORIDE 20 MEQ: 29.8 INJECTION, SOLUTION INTRAVENOUS at 06:29

## 2023-07-12 RX ADMIN — CHLORHEXIDINE GLUCONATE 15 ML: 1.2 RINSE ORAL at 19:51

## 2023-07-12 RX ADMIN — FUROSEMIDE 20 MG: 10 INJECTION, SOLUTION INTRAMUSCULAR; INTRAVENOUS at 17:18

## 2023-07-12 RX ADMIN — CLOPIDOGREL BISULFATE 75 MG: 75 TABLET ORAL at 09:45

## 2023-07-12 RX ADMIN — ACETAMINOPHEN 650 MG: 325 TABLET ORAL at 23:57

## 2023-07-12 RX ADMIN — MUPIROCIN: 20 OINTMENT TOPICAL at 19:51

## 2023-07-12 RX ADMIN — FUROSEMIDE 20 MG: 10 INJECTION, SOLUTION INTRAMUSCULAR; INTRAVENOUS at 19:50

## 2023-07-12 RX ADMIN — FUROSEMIDE 20 MG: 10 INJECTION, SOLUTION INTRAMUSCULAR; INTRAVENOUS at 09:44

## 2023-07-12 RX ADMIN — Medication 400 MG: at 09:45

## 2023-07-12 RX ADMIN — KETOROLAC TROMETHAMINE 15 MG: 30 INJECTION, SOLUTION INTRAMUSCULAR; INTRAVENOUS at 02:11

## 2023-07-12 RX ADMIN — INSULIN GLARGINE 11 UNITS: 100 INJECTION, SOLUTION SUBCUTANEOUS at 19:49

## 2023-07-12 RX ADMIN — Medication 400 MG: at 19:49

## 2023-07-12 RX ADMIN — INSULIN LISPRO 4 UNITS: 100 INJECTION, SOLUTION INTRAVENOUS; SUBCUTANEOUS at 12:25

## 2023-07-12 RX ADMIN — CHLORHEXIDINE GLUCONATE 15 ML: 1.2 RINSE ORAL at 09:46

## 2023-07-12 RX ADMIN — ACETAMINOPHEN 650 MG: 325 TABLET ORAL at 17:18

## 2023-07-12 RX ADMIN — FAMOTIDINE 20 MG: 20 TABLET, FILM COATED ORAL at 09:45

## 2023-07-12 RX ADMIN — INSULIN LISPRO 6 UNITS: 100 INJECTION, SOLUTION INTRAVENOUS; SUBCUTANEOUS at 17:19

## 2023-07-12 RX ADMIN — Medication 10 ML: at 19:50

## 2023-07-12 RX ADMIN — DEXAMETHASONE SODIUM PHOSPHATE 4 MG: 4 INJECTION, SOLUTION INTRA-ARTICULAR; INTRALESIONAL; INTRAMUSCULAR; INTRAVENOUS; SOFT TISSUE at 06:23

## 2023-07-12 RX ADMIN — ASPIRIN 81 MG: 81 TABLET, COATED ORAL at 09:45

## 2023-07-12 RX ADMIN — PIPERACILLIN AND TAZOBACTAM 3375 MG: 3; .375 INJECTION, POWDER, LYOPHILIZED, FOR SOLUTION INTRAVENOUS at 09:52

## 2023-07-12 RX ADMIN — BISACODYL 5 MG: 5 TABLET, COATED ORAL at 09:45

## 2023-07-12 RX ADMIN — ALBUTEROL SULFATE 2.5 MG: 2.5 SOLUTION RESPIRATORY (INHALATION) at 11:45

## 2023-07-12 RX ADMIN — ALBUTEROL SULFATE 2.5 MG: 2.5 SOLUTION RESPIRATORY (INHALATION) at 15:45

## 2023-07-12 ASSESSMENT — PAIN DESCRIPTION - DESCRIPTORS
DESCRIPTORS: ACHING;SORE
DESCRIPTORS: ACHING;SORE

## 2023-07-12 ASSESSMENT — PAIN DESCRIPTION - LOCATION
LOCATION: INCISION
LOCATION: INCISION

## 2023-07-12 ASSESSMENT — PAIN SCALES - GENERAL
PAINLEVEL_OUTOF10: 3
PAINLEVEL_OUTOF10: 2
PAINLEVEL_OUTOF10: 6

## 2023-07-12 NOTE — CARE COORDINATION
Chart review day 8. Followed by CT Sx. POD 2 CABG. Hall & Chest tube have been removed. Pt is seen sitting up in the chair this am. Pt IPTA from apartment alone. Plan for d/c home tomorrow w[facility]. Pt states that her aunt will be staying with her awhile until she if feeling better. Will follow for needs as they arise. Electronically signed by Adrian Mancilla RN on 7/12/2023 at 9:31 AM    After going through pt chart a +fentanyl test result is d/t medications given after admission. Updated Carly Bender at [facility]; also updated on d/c of Friday.  Electronically signed by Adrian Mancilla RN on 7/12/2023 at 12:49 PM

## 2023-07-13 ENCOUNTER — APPOINTMENT (OUTPATIENT)
Dept: GENERAL RADIOLOGY | Age: 33
DRG: 165 | End: 2023-07-13
Payer: COMMERCIAL

## 2023-07-13 LAB
ANION GAP SERPL CALCULATED.3IONS-SCNC: 8 MMOL/L (ref 3–16)
BUN SERPL-MCNC: 17 MG/DL (ref 7–20)
CALCIUM SERPL-MCNC: 8.5 MG/DL (ref 8.3–10.6)
CHLORIDE SERPL-SCNC: 98 MMOL/L (ref 99–110)
CO2 SERPL-SCNC: 28 MMOL/L (ref 21–32)
CREAT SERPL-MCNC: <0.5 MG/DL (ref 0.6–1.1)
DEPRECATED RDW RBC AUTO: 15.9 % (ref 12.4–15.4)
GFR SERPLBLD CREATININE-BSD FMLA CKD-EPI: >60 ML/MIN/{1.73_M2}
GLUCOSE BLD-MCNC: 129 MG/DL (ref 70–99)
GLUCOSE BLD-MCNC: 188 MG/DL (ref 70–99)
GLUCOSE BLD-MCNC: 232 MG/DL (ref 70–99)
GLUCOSE SERPL-MCNC: 140 MG/DL (ref 70–99)
HCT VFR BLD AUTO: 26.9 % (ref 36–48)
HGB BLD-MCNC: 8.6 G/DL (ref 12–16)
MAGNESIUM SERPL-MCNC: 1.8 MG/DL (ref 1.8–2.4)
MCH RBC QN AUTO: 27.1 PG (ref 26–34)
MCHC RBC AUTO-ENTMCNC: 32 G/DL (ref 31–36)
MCV RBC AUTO: 84.8 FL (ref 80–100)
PERFORMED ON: ABNORMAL
PLATELET # BLD AUTO: 433 K/UL (ref 135–450)
PMV BLD AUTO: 8.4 FL (ref 5–10.5)
POTASSIUM SERPL-SCNC: 4.3 MMOL/L (ref 3.5–5.1)
RBC # BLD AUTO: 3.17 M/UL (ref 4–5.2)
SODIUM SERPL-SCNC: 134 MMOL/L (ref 136–145)
WBC # BLD AUTO: 12.8 K/UL (ref 4–11)

## 2023-07-13 PROCEDURE — 2580000003 HC RX 258: Performed by: THORACIC SURGERY (CARDIOTHORACIC VASCULAR SURGERY)

## 2023-07-13 PROCEDURE — 85027 COMPLETE CBC AUTOMATED: CPT

## 2023-07-13 PROCEDURE — 2580000003 HC RX 258: Performed by: NURSE PRACTITIONER

## 2023-07-13 PROCEDURE — 6360000002 HC RX W HCPCS: Performed by: NURSE PRACTITIONER

## 2023-07-13 PROCEDURE — 6360000002 HC RX W HCPCS: Performed by: THORACIC SURGERY (CARDIOTHORACIC VASCULAR SURGERY)

## 2023-07-13 PROCEDURE — 71045 X-RAY EXAM CHEST 1 VIEW: CPT

## 2023-07-13 PROCEDURE — 2140000000 HC CCU INTERMEDIATE R&B

## 2023-07-13 PROCEDURE — 6360000002 HC RX W HCPCS

## 2023-07-13 PROCEDURE — 94640 AIRWAY INHALATION TREATMENT: CPT

## 2023-07-13 PROCEDURE — 6370000000 HC RX 637 (ALT 250 FOR IP): Performed by: THORACIC SURGERY (CARDIOTHORACIC VASCULAR SURGERY)

## 2023-07-13 PROCEDURE — 83735 ASSAY OF MAGNESIUM: CPT

## 2023-07-13 PROCEDURE — 36592 COLLECT BLOOD FROM PICC: CPT

## 2023-07-13 PROCEDURE — 94669 MECHANICAL CHEST WALL OSCILL: CPT

## 2023-07-13 PROCEDURE — 2580000003 HC RX 258

## 2023-07-13 PROCEDURE — 6370000000 HC RX 637 (ALT 250 FOR IP)

## 2023-07-13 PROCEDURE — 80048 BASIC METABOLIC PNL TOTAL CA: CPT

## 2023-07-13 RX ORDER — AMOXICILLIN AND CLAVULANATE POTASSIUM 875; 125 MG/1; MG/1
1 TABLET, FILM COATED ORAL EVERY 12 HOURS SCHEDULED
Status: DISCONTINUED | OUTPATIENT
Start: 2023-07-13 | End: 2023-07-14 | Stop reason: HOSPADM

## 2023-07-13 RX ADMIN — Medication 10 ML: at 08:14

## 2023-07-13 RX ADMIN — FUROSEMIDE 40 MG: 40 TABLET ORAL at 17:00

## 2023-07-13 RX ADMIN — MORPHINE SULFATE 4 MG: 4 INJECTION, SOLUTION INTRAMUSCULAR; INTRAVENOUS at 13:03

## 2023-07-13 RX ADMIN — ALBUTEROL SULFATE 2.5 MG: 2.5 SOLUTION RESPIRATORY (INHALATION) at 08:30

## 2023-07-13 RX ADMIN — CHLORHEXIDINE GLUCONATE 15 ML: 1.2 RINSE ORAL at 08:15

## 2023-07-13 RX ADMIN — IRON SUCROSE 200 MG: 20 INJECTION, SOLUTION INTRAVENOUS at 14:41

## 2023-07-13 RX ADMIN — ALBUTEROL SULFATE 2.5 MG: 2.5 SOLUTION RESPIRATORY (INHALATION) at 15:51

## 2023-07-13 RX ADMIN — Medication 400 MG: at 19:46

## 2023-07-13 RX ADMIN — FUROSEMIDE 40 MG: 40 TABLET ORAL at 08:13

## 2023-07-13 RX ADMIN — MAGNESIUM SULFATE HEPTAHYDRATE 2000 MG: 40 INJECTION, SOLUTION INTRAVENOUS at 04:52

## 2023-07-13 RX ADMIN — OXYCODONE HYDROCHLORIDE 5 MG: 5 TABLET ORAL at 14:38

## 2023-07-13 RX ADMIN — POLYETHYLENE GLYCOL 3350 17 G: 17 POWDER, FOR SOLUTION ORAL at 08:14

## 2023-07-13 RX ADMIN — MORPHINE SULFATE 2 MG: 2 INJECTION, SOLUTION INTRAMUSCULAR; INTRAVENOUS at 02:18

## 2023-07-13 RX ADMIN — INSULIN LISPRO 4 UNITS: 100 INJECTION, SOLUTION INTRAVENOUS; SUBCUTANEOUS at 16:58

## 2023-07-13 RX ADMIN — METOPROLOL TARTRATE 12.5 MG: 25 TABLET, FILM COATED ORAL at 08:13

## 2023-07-13 RX ADMIN — ACETAMINOPHEN 650 MG: 325 TABLET ORAL at 17:00

## 2023-07-13 RX ADMIN — FAMOTIDINE 20 MG: 20 TABLET, FILM COATED ORAL at 08:13

## 2023-07-13 RX ADMIN — ACETAMINOPHEN 650 MG: 325 TABLET ORAL at 13:01

## 2023-07-13 RX ADMIN — FAMOTIDINE 20 MG: 20 TABLET, FILM COATED ORAL at 19:46

## 2023-07-13 RX ADMIN — OXYCODONE HYDROCHLORIDE 5 MG: 5 TABLET ORAL at 19:45

## 2023-07-13 RX ADMIN — Medication 10 ML: at 19:48

## 2023-07-13 RX ADMIN — BISACODYL 5 MG: 5 TABLET, COATED ORAL at 08:13

## 2023-07-13 RX ADMIN — POTASSIUM CHLORIDE 20 MEQ: 750 TABLET, EXTENDED RELEASE ORAL at 08:13

## 2023-07-13 RX ADMIN — FONDAPARINUX SODIUM 2.5 MG: 2.5 INJECTION, SOLUTION SUBCUTANEOUS at 08:13

## 2023-07-13 RX ADMIN — POTASSIUM CHLORIDE 20 MEQ: 750 TABLET, EXTENDED RELEASE ORAL at 19:46

## 2023-07-13 RX ADMIN — MUPIROCIN: 20 OINTMENT TOPICAL at 08:26

## 2023-07-13 RX ADMIN — Medication 400 MG: at 08:13

## 2023-07-13 RX ADMIN — PROCHLORPERAZINE EDISYLATE 5 MG: 5 INJECTION INTRAMUSCULAR; INTRAVENOUS at 23:26

## 2023-07-13 RX ADMIN — PIPERACILLIN AND TAZOBACTAM 3375 MG: 3; .375 INJECTION, POWDER, LYOPHILIZED, FOR SOLUTION INTRAVENOUS at 08:23

## 2023-07-13 RX ADMIN — PIPERACILLIN AND TAZOBACTAM 3375 MG: 3; .375 INJECTION, POWDER, LYOPHILIZED, FOR SOLUTION INTRAVENOUS at 00:07

## 2023-07-13 RX ADMIN — ASPIRIN 81 MG: 81 TABLET, COATED ORAL at 08:13

## 2023-07-13 RX ADMIN — AMOXICILLIN AND CLAVULANATE POTASSIUM 1 TABLET: 875; 125 TABLET, FILM COATED ORAL at 19:46

## 2023-07-13 RX ADMIN — ACETAMINOPHEN 650 MG: 325 TABLET ORAL at 05:44

## 2023-07-13 RX ADMIN — ATORVASTATIN CALCIUM 40 MG: 40 TABLET, FILM COATED ORAL at 19:47

## 2023-07-13 RX ADMIN — CLOPIDOGREL BISULFATE 75 MG: 75 TABLET ORAL at 08:13

## 2023-07-13 RX ADMIN — INSULIN GLARGINE 11 UNITS: 100 INJECTION, SOLUTION SUBCUTANEOUS at 19:47

## 2023-07-13 RX ADMIN — OXYCODONE HYDROCHLORIDE 5 MG: 5 TABLET ORAL at 00:04

## 2023-07-13 RX ADMIN — MORPHINE SULFATE 4 MG: 4 INJECTION, SOLUTION INTRAMUSCULAR; INTRAVENOUS at 21:15

## 2023-07-13 ASSESSMENT — PAIN DESCRIPTION - PAIN TYPE: TYPE: SURGICAL PAIN

## 2023-07-13 ASSESSMENT — PAIN SCALES - GENERAL
PAINLEVEL_OUTOF10: 6
PAINLEVEL_OUTOF10: 5
PAINLEVEL_OUTOF10: 5
PAINLEVEL_OUTOF10: 7
PAINLEVEL_OUTOF10: 6
PAINLEVEL_OUTOF10: 3
PAINLEVEL_OUTOF10: 5

## 2023-07-13 ASSESSMENT — PAIN DESCRIPTION - LOCATION: LOCATION: INCISION

## 2023-07-13 NOTE — PLAN OF CARE
Increase function to baseline.
Increase patients ADLs/functional status to baseline.
Problem: Discharge Planning  Goal: Discharge to home or other facility with appropriate resources  7/11/2023 0857 by Sujatha Tai RN  Outcome: Progressing  Flowsheets (Taken 7/10/2023 2000 by Gaviota Ward RN)  Discharge to home or other facility with appropriate resources: Identify barriers to discharge with patient and caregiver     Problem: Pain  Goal: Verbalizes/displays adequate comfort level or baseline comfort level  7/11/2023 0857 by Sujatha Tai RN  Outcome: Progressing   Added toradol to pain regimen    Problem: Safety - Adult  Goal: Free from fall injury  7/11/2023 0857 by Sujatha Tai RN  Outcome: Progressing     Problem: Nutrition Deficit:  Goal: Optimize nutritional status  7/11/2023 0857 by Sujatha Tai RN  Outcome: Progressing     Problem: Chronic Conditions and Co-morbidities  Goal: Patient's chronic conditions and co-morbidity symptoms are monitored and maintained or improved  7/11/2023 0857 by Sujatha Tai RN  Outcome: Progressing  Flowsheets (Taken 7/10/2023 2000 by Gaviota Ward RN)  Care Plan - Patient's Chronic Conditions and Co-Morbidity Symptoms are Monitored and Maintained or Improved: Monitor and assess patient's chronic conditions and comorbid symptoms for stability, deterioration, or improvement  7/10/2023 1919 by Sujatha Tai RN  Outcome: Progressing     Problem: Respiratory - Adult  Goal: Achieves optimal ventilation and oxygenation  7/11/2023 0857 by Sujatha Tai RN  Outcome: Progressing  Flowsheets (Taken 7/10/2023 2000 by Gaviota Ward RN)  Achieves optimal ventilation and oxygenation: Assess for changes in respiratory status     Problem: Cardiovascular - Adult  Goal: Maintains optimal cardiac output and hemodynamic stability  7/11/2023 0857 by Sujatha Tai RN  Outcome: Progressing  Flowsheets (Taken 7/10/2023 2000 by Gaviota Ward RN)  Maintains optimal cardiac output and hemodynamic stability: Monitor blood pressure and heart rate
Problem: Discharge Planning  Goal: Discharge to home or other facility with appropriate resources  Outcome: Progressing     Problem: Pain  Goal: Verbalizes/displays adequate comfort level or baseline comfort level  7/10/2023 0105 by Xavier Valencia RN  Outcome: Progressing  7/9/2023 1848 by Juju Alvarado RN  Outcome: Progressing     Problem: Safety - Adult  Goal: Free from fall injury  Outcome: Progressing     Problem: Nutrition Deficit:  Goal: Optimize nutritional status  Outcome: Progressing     Problem: Chronic Conditions and Co-morbidities  Goal: Patient's chronic conditions and co-morbidity symptoms are monitored and maintained or improved  7/10/2023 0105 by Xavier Valencia RN  Outcome: Progressing  7/9/2023 1848 by Juju Alvarado RN  Outcome: Progressing
Problem: Discharge Planning  Goal: Discharge to home or other facility with appropriate resources  Outcome: Progressing     Problem: Pain  Goal: Verbalizes/displays adequate comfort level or baseline comfort level  Outcome: Progressing     Problem: Safety - Adult  Goal: Free from fall injury  Outcome: Progressing     Problem: Nutrition Deficit:  Goal: Optimize nutritional status  Outcome: Progressing     Problem: Chronic Conditions and Co-morbidities  Goal: Patient's chronic conditions and co-morbidity symptoms are monitored and maintained or improved  Outcome: Progressing     Problem: Respiratory - Adult  Goal: Achieves optimal ventilation and oxygenation  Outcome: Progressing     Problem: Cardiovascular - Adult  Goal: Maintains optimal cardiac output and hemodynamic stability  Outcome: Progressing     Problem: Cardiovascular - Adult  Goal: Absence of cardiac dysrhythmias or at baseline  Outcome: Progressing     Problem: Skin/Tissue Integrity - Adult  Goal: Skin integrity remains intact  Outcome: Progressing     Problem: Skin/Tissue Integrity - Adult  Goal: Incisions, wounds, or drain sites healing without S/S of infection  Outcome: Progressing     Problem: Skin/Tissue Integrity - Adult  Goal: Oral mucous membranes remain intact  Outcome: Progressing     Problem: Musculoskeletal - Adult  Goal: Return mobility to safest level of function  Outcome: Progressing     Problem: Musculoskeletal - Adult  Goal: Maintain proper alignment of affected body part  Outcome: Progressing     Problem: Musculoskeletal - Adult  Goal: Return ADL status to a safe level of function  Outcome: Progressing     Problem: Gastrointestinal - Adult  Goal: Minimal or absence of nausea and vomiting  Outcome: Progressing     Problem: Gastrointestinal - Adult  Goal: Maintains or returns to baseline bowel function  Outcome: Progressing     Problem: Gastrointestinal - Adult  Goal: Maintains adequate nutritional intake  Outcome: Progressing
Problem: Pain  Goal: Verbalizes/displays adequate comfort level or baseline comfort level  Outcome: Progressing  Flowsheets (Taken 7/11/2023 2000 by Katharine Dorsey RN)  Verbalizes/displays adequate comfort level or baseline comfort level: Encourage patient to monitor pain and request assistance  Note: Pt has not had any c/o pain thus far this shift. Pt resting comfortably in bed. Will monitor. Problem: Safety - Adult  Goal: Free from fall injury  Outcome: Progressing  Note: Pt is a fall risk. Fall risk protocol in place. See Delman Pages Fall Score. Pt bed is in low position, bed alarm is on, side rails up, fall risk bracelet applied. , non-skid footwear in use. Patient/family educated on fall risk protocol, instructed to call for assistance when needed and belongings are in reach. assistance. Will continue with hourly rounds for po intake, pain needs, toileting and repositioning as needed. Will continue to monitor for needs. Problem: Nutrition Deficit:  Goal: Optimize nutritional status  Outcome: Progressing  Note: Pt currently NPO r/t ileus. Will monitor. Problem: Skin/Tissue Integrity - Adult  Goal: Skin integrity remains intact  Outcome: Progressing  Note: Pt at risk for skin breakdown. See Charles score. Pt is able to reposition self in bed. Heels elevated off bed. Sacral heart mepilex intact to protect, site inspected and intact underneath. Will continue to turn and reposition patient every two hours and as needed. Will continue to keep patient clean and dry, applying skin care cream as needed. Pillows used for repositioning q2hs. Will continue to monitor and assess for skin breakdown.
Metabolic/Fluid and Electrolytes - Adult  Goal: Hemodynamic stability and optimal renal function maintained  Outcome: Progressing  Flowsheets (Taken 7/12/2023 2000)  Hemodynamic stability and optimal renal function maintained:   Monitor labs and assess for signs and symptoms of volume excess or deficit   Monitor response to interventions for patient's volume status, including labs, urine output, blood pressure (other measures as available)     Problem: Metabolic/Fluid and Electrolytes - Adult  Goal: Glucose maintained within prescribed range  Outcome: Progressing  Flowsheets (Taken 7/12/2023 2000)  Glucose maintained within prescribed range: Monitor blood glucose as ordered     Problem: Hematologic - Adult  Goal: Maintains hematologic stability  Outcome: Progressing  Flowsheets (Taken 7/12/2023 2000)  Maintains hematologic stability: Assess for signs and symptoms of bleeding or hemorrhage

## 2023-07-13 NOTE — Clinical Note
134*   K 4.3 4.3   CO2 25 28   BUN 14 17   CREATININE <0.5* <0.5*     LIVR: No results for input(s): AST, ALT in the last 72 hours. PT/INR:   Recent Labs     07/11/23  0425 07/12/23  0415   INR 1.32* 1.31*     APTT: No results for input(s): APTT in the last 72 hours.   ABG:   Recent Labs     07/10/23  1523   PHART 7.387   EPC3WDE 42.9   PO2ART 143.6*

## 2023-07-13 NOTE — CARE COORDINATION
Chart reviewed day 9. Followed by CT Sx. POD 3 CABG. Chest tube and neal removed. Overnight pt had increased pain, controllable w/PRN pain meds. Mag was also replaced overnight, mag within normal limits at this time. IPTA in apartment alone. Plan is for d/c home [facility]. Pt will also have family staying with her for the next couple weeks. Anticipate d/c [facility]. Will follow for needs as they arise.  Electronically signed by Kanwal Lucio RN on 7/13/2023 at 9:41 AM

## 2023-07-14 ENCOUNTER — APPOINTMENT (OUTPATIENT)
Dept: GENERAL RADIOLOGY | Age: 33
DRG: 165 | End: 2023-07-14
Payer: COMMERCIAL

## 2023-07-14 VITALS
WEIGHT: 159 LBS | BODY MASS INDEX: 22.76 KG/M2 | HEIGHT: 70 IN | DIASTOLIC BLOOD PRESSURE: 64 MMHG | RESPIRATION RATE: 13 BRPM | SYSTOLIC BLOOD PRESSURE: 104 MMHG | OXYGEN SATURATION: 94 % | HEART RATE: 113 BPM | TEMPERATURE: 98.3 F

## 2023-07-14 PROBLEM — R79.89 ELEVATED TROPONIN: Status: RESOLVED | Noted: 2023-07-06 | Resolved: 2023-07-14

## 2023-07-14 PROBLEM — R77.8 ELEVATED TROPONIN: Status: RESOLVED | Noted: 2023-07-06 | Resolved: 2023-07-14

## 2023-07-14 LAB
ANION GAP SERPL CALCULATED.3IONS-SCNC: 9 MMOL/L (ref 3–16)
BUN SERPL-MCNC: 11 MG/DL (ref 7–20)
CALCIUM SERPL-MCNC: 9 MG/DL (ref 8.3–10.6)
CHLORIDE SERPL-SCNC: 96 MMOL/L (ref 99–110)
CO2 SERPL-SCNC: 29 MMOL/L (ref 21–32)
CREAT SERPL-MCNC: <0.5 MG/DL (ref 0.6–1.1)
DEPRECATED RDW RBC AUTO: 15.9 % (ref 12.4–15.4)
GFR SERPLBLD CREATININE-BSD FMLA CKD-EPI: >60 ML/MIN/{1.73_M2}
GLUCOSE BLD-MCNC: 357 MG/DL (ref 70–99)
GLUCOSE SERPL-MCNC: 167 MG/DL (ref 70–99)
HCT VFR BLD AUTO: 30.7 % (ref 36–48)
HGB BLD-MCNC: 9.8 G/DL (ref 12–16)
INR PPP: 1.06 (ref 0.84–1.16)
MAGNESIUM SERPL-MCNC: 1.9 MG/DL (ref 1.8–2.4)
MCH RBC QN AUTO: 27.5 PG (ref 26–34)
MCHC RBC AUTO-ENTMCNC: 32.1 G/DL (ref 31–36)
MCV RBC AUTO: 85.7 FL (ref 80–100)
PERFORMED ON: ABNORMAL
PLATELET # BLD AUTO: 515 K/UL (ref 135–450)
PMV BLD AUTO: 8.3 FL (ref 5–10.5)
POTASSIUM SERPL-SCNC: 4.2 MMOL/L (ref 3.5–5.1)
PROTHROMBIN TIME: 13.8 SEC (ref 11.5–14.8)
RBC # BLD AUTO: 3.59 M/UL (ref 4–5.2)
SODIUM SERPL-SCNC: 134 MMOL/L (ref 136–145)
STFR SERPL-SCNC: 4.4 MG/L (ref 1.9–4.4)
WBC # BLD AUTO: 14 K/UL (ref 4–11)

## 2023-07-14 PROCEDURE — 83735 ASSAY OF MAGNESIUM: CPT

## 2023-07-14 PROCEDURE — 6370000000 HC RX 637 (ALT 250 FOR IP)

## 2023-07-14 PROCEDURE — 94669 MECHANICAL CHEST WALL OSCILL: CPT

## 2023-07-14 PROCEDURE — 6370000000 HC RX 637 (ALT 250 FOR IP): Performed by: THORACIC SURGERY (CARDIOTHORACIC VASCULAR SURGERY)

## 2023-07-14 PROCEDURE — 71045 X-RAY EXAM CHEST 1 VIEW: CPT

## 2023-07-14 PROCEDURE — 6360000002 HC RX W HCPCS: Performed by: THORACIC SURGERY (CARDIOTHORACIC VASCULAR SURGERY)

## 2023-07-14 PROCEDURE — 94640 AIRWAY INHALATION TREATMENT: CPT

## 2023-07-14 PROCEDURE — 85610 PROTHROMBIN TIME: CPT

## 2023-07-14 PROCEDURE — 80048 BASIC METABOLIC PNL TOTAL CA: CPT

## 2023-07-14 PROCEDURE — 85027 COMPLETE CBC AUTOMATED: CPT

## 2023-07-14 PROCEDURE — 36415 COLL VENOUS BLD VENIPUNCTURE: CPT

## 2023-07-14 RX ORDER — ASPIRIN 81 MG/1
81 TABLET ORAL DAILY
Qty: 30 TABLET | Refills: 0 | Status: SHIPPED | OUTPATIENT
Start: 2023-07-15

## 2023-07-14 RX ORDER — LANOLIN ALCOHOL/MO/W.PET/CERES
400 CREAM (GRAM) TOPICAL DAILY
Qty: 7 TABLET | Refills: 0 | Status: SHIPPED | OUTPATIENT
Start: 2023-07-14 | End: 2023-07-21

## 2023-07-14 RX ORDER — CLOPIDOGREL BISULFATE 75 MG/1
75 TABLET ORAL DAILY
Qty: 30 TABLET | Refills: 0 | Status: SHIPPED | OUTPATIENT
Start: 2023-07-15

## 2023-07-14 RX ORDER — OXYCODONE HYDROCHLORIDE 5 MG/1
5 TABLET ORAL EVERY 6 HOURS PRN
Qty: 28 TABLET | Refills: 0 | Status: SHIPPED | OUTPATIENT
Start: 2023-07-14 | End: 2023-07-21

## 2023-07-14 RX ORDER — FERROUS SULFATE 325(65) MG
325 TABLET ORAL 2 TIMES DAILY
Qty: 60 TABLET | Refills: 0 | Status: SHIPPED | OUTPATIENT
Start: 2023-07-14

## 2023-07-14 RX ORDER — BISACODYL 5 MG/1
5 TABLET, DELAYED RELEASE ORAL DAILY PRN
Qty: 14 TABLET | Refills: 0 | Status: SHIPPED | OUTPATIENT
Start: 2023-07-14 | End: 2023-07-28

## 2023-07-14 RX ORDER — POTASSIUM CHLORIDE 20 MEQ/1
20 TABLET, EXTENDED RELEASE ORAL DAILY
Qty: 7 TABLET | Refills: 0 | Status: SHIPPED | OUTPATIENT
Start: 2023-07-14 | End: 2023-07-21

## 2023-07-14 RX ORDER — AMOXICILLIN AND CLAVULANATE POTASSIUM 875; 125 MG/1; MG/1
1 TABLET, FILM COATED ORAL EVERY 12 HOURS SCHEDULED
Qty: 8 TABLET | Refills: 0 | Status: SHIPPED | OUTPATIENT
Start: 2023-07-14 | End: 2023-07-18

## 2023-07-14 RX ORDER — FUROSEMIDE 40 MG/1
40 TABLET ORAL DAILY
Qty: 7 TABLET | Refills: 0 | Status: SHIPPED | OUTPATIENT
Start: 2023-07-14 | End: 2023-07-21

## 2023-07-14 RX ADMIN — INSULIN LISPRO 10 UNITS: 100 INJECTION, SOLUTION INTRAVENOUS; SUBCUTANEOUS at 10:35

## 2023-07-14 RX ADMIN — BISACODYL 5 MG: 5 TABLET, COATED ORAL at 09:08

## 2023-07-14 RX ADMIN — MAGNESIUM SULFATE HEPTAHYDRATE 2000 MG: 40 INJECTION, SOLUTION INTRAVENOUS at 06:58

## 2023-07-14 RX ADMIN — FAMOTIDINE 20 MG: 20 TABLET, FILM COATED ORAL at 09:08

## 2023-07-14 RX ADMIN — POLYETHYLENE GLYCOL 3350 17 G: 17 POWDER, FOR SOLUTION ORAL at 09:07

## 2023-07-14 RX ADMIN — ALBUTEROL SULFATE 2.5 MG: 2.5 SOLUTION RESPIRATORY (INHALATION) at 07:43

## 2023-07-14 RX ADMIN — FONDAPARINUX SODIUM 2.5 MG: 2.5 INJECTION, SOLUTION SUBCUTANEOUS at 09:08

## 2023-07-14 RX ADMIN — CLOPIDOGREL BISULFATE 75 MG: 75 TABLET ORAL at 09:08

## 2023-07-14 RX ADMIN — ASPIRIN 81 MG: 81 TABLET, COATED ORAL at 09:08

## 2023-07-14 RX ADMIN — POTASSIUM CHLORIDE 20 MEQ: 750 TABLET, EXTENDED RELEASE ORAL at 09:08

## 2023-07-14 RX ADMIN — METOPROLOL TARTRATE 12.5 MG: 25 TABLET, FILM COATED ORAL at 09:07

## 2023-07-14 RX ADMIN — FUROSEMIDE 40 MG: 40 TABLET ORAL at 09:08

## 2023-07-14 RX ADMIN — Medication 400 MG: at 09:08

## 2023-07-14 RX ADMIN — AMOXICILLIN AND CLAVULANATE POTASSIUM 1 TABLET: 875; 125 TABLET, FILM COATED ORAL at 09:08

## 2023-07-14 NOTE — CARE COORDINATION
CASE MANAGEMENT DISCHARGE SUMMARY      Discharge to: Home w/family w/[facility]. Agustín Maldonado at "[employer]" aware of d/c and will pull info from [facility]. New Durable Medical Equipment ordered/agency: NONE    Transportation:    Family/car:Private w/aunt. Medical Transport explained to pt/family. Pt/family voice no agency preference.     Agency used:   time:   Ambulance form completed: Yes    Confirmed discharge plan with:     Patient: yes     Family:  yes Aunt at bedside     RN, name: Willma Garland

## 2023-07-14 NOTE — DISCHARGE INSTR - COC
Continuity of Care Form    Patient Name: Jana Love   :  1990  MRN:  [id number]    Admit date:  2023  Discharge date:  ***    Code Status Order: Full Code   Advance Directives:     Admitting Physician:  No admitting provider for patient encounter. PCP: No primary care provider on file. Discharging Nurse: [facility] Unit/Room#: 0221/0221-01  Discharging Unit Phone Number: ***    Emergency Contact:   Extended Emergency Contact Information  Primary Emergency Contact: [address] [facility]  Mobile Phone: [phone]  Relation: Aunt/Uncle  Secondary Emergency Contact: [address] [place] [address]  Mobile Phone: [phone]  Relation: Aunt/Uncle    Past Surgical History:  Past Surgical History:   Procedure Laterality Date    CARPAL TUNNEL RELEASE Left 2020    CORONARY ARTERY BYPASS GRAFT N/A 7/10/2023    CORONARY ARTERY BYPASS GRAFTING TIMES THREE USING INTERNAL MAMMARY ARTERY AND RIGHT SAPHENOUS VEIN VIA ENDOSCOPIC HARVEST WITH LEFT ATRIAL APPENDAGE CLIP PLACEMENT, ON PUMP, SATNAM, PLACEMENT OF TEMPORARY PACING WIRES, BILATERAL PECTORALIS BLOCKS  performed by Diana Petersen MD at [facility]       Immunization History:   Immunization History   Administered Date(s) Administered    Influenza Virus Vaccine 2015    Pneumococcal, PPSV23, PNEUMOVAX 21, (age 2y+), SC/IM, 0.5mL 2015    TDaP, ADACEL (age 6y-58y), [facility] (age 10y+), IM, 0.5mL 2019       Active Problems:  Patient Active Problem List   Diagnosis Code    Type II or unspecified type diabetes mellitus without mention of complication, uncontrolled [id number]    Dyslipidemia E78.5    Obesity (BMI 30-39. 9) E66.9    Depressed F32. A    Type 2 diabetes mellitus without complication (HCC) Z60.3    Essential hypertension I10    Tetrahydrocannabinol (THC) use disorder, moderate, dependence (HCC) F12.20    Chronic bilateral low back pain with bilateral sciatica M54.42, M54.41, G89.29    NSTEMI (non-ST elevated myocardial infarction) ([address])

## 2023-07-14 NOTE — CARE COORDINATION
Chart reviewed day 10. POD 4 CABG. Pt w/mausea overnight per RN note. Pt currently taking oral pain meds for pain. Pt ambulating and getting OOB. Anticipate d/c today. IPTA in apartment alone. Pt will return home w/family w/[facility]. Will follow for needs as they arise.  Electronically signed by Nando Paz RN on 7/14/2023 at 9:45 AM

## 2023-07-15 ENCOUNTER — TELEPHONE (OUTPATIENT)
Dept: CARDIOTHORACIC SURGERY | Age: 33
End: 2023-07-15

## 2023-07-15 RX ORDER — GLUCOSAMINE HCL/CHONDROITIN SU 500-400 MG
CAPSULE ORAL
Qty: 30 STRIP | Refills: 1 | Status: SHIPPED | OUTPATIENT
Start: 2023-07-15

## 2023-07-15 RX ORDER — ATORVASTATIN CALCIUM 40 MG/1
40 TABLET, FILM COATED ORAL DAILY
Qty: 90 TABLET | Refills: 1 | Status: SHIPPED | OUTPATIENT
Start: 2023-07-15

## 2023-07-15 RX ORDER — INSULIN GLARGINE 100 [IU]/ML
15 INJECTION, SOLUTION SUBCUTANEOUS NIGHTLY
Qty: 5 ADJUSTABLE DOSE PRE-FILLED PEN SYRINGE | Refills: 0 | Status: SHIPPED | OUTPATIENT
Start: 2023-07-15

## 2023-07-15 RX ORDER — EZETIMIBE 10 MG/1
10 TABLET ORAL DAILY
Qty: 90 TABLET | Refills: 1 | Status: SHIPPED | OUTPATIENT
Start: 2023-07-15

## 2023-07-15 RX ORDER — BLOOD-GLUCOSE METER
1 KIT MISCELLANEOUS DAILY
Qty: 1 KIT | Refills: 0 | Status: SHIPPED | OUTPATIENT
Start: 2023-07-15

## 2023-07-15 RX ORDER — LANCETS 30 GAUGE
1 EACH MISCELLANEOUS DAILY
Qty: 100 EACH | Refills: 5 | Status: SHIPPED | OUTPATIENT
Start: 2023-07-15

## 2023-07-17 NOTE — DISCHARGE SUMMARY
Cardiac, Vascular & Thoracic Surgery  Discharge Summary    Patient:  Shireen Libman 1990 [id number]   Admission Date:  7/4/2023  3:52 PM  Discharge Date:  7/14/2023    Principle Diagnosis:  Chest pain    Secondary Diagnosis:  Principal Problem (Resolved):    Chest pain  Active Problems:    NSTEMI (non-ST elevated myocardial infarction) ([address])    Medically noncompliant    Uncontrolled type 2 diabetes mellitus with hyperglycemia ([address])    Ischemic cardiomyopathy    Multiple vessel coronary artery disease  Resolved Problems:    Elevated troponin      Cardiac Cath:   FINDINGS      LVGRAM     LVEDP 15   GRADIENT ACROSS AORTIC VALVE None   LV FUNCTION EF 40%, difficult to evaluate due to incomplete opacification   WALL MOTION Difficult to fully evaluate due to incomplete opacification, there does appear to be apical hypokinesis   MITRAL REGURGITATION Mild            CORONARY ARTERIES     LM Less than 10% halbflvl-nlo-tcxywr stenosis            LAD Medium size vessel, there is a proximal/mid stent with 90% lesion within the stented area. There is a small first diagonal artery which has 90% rawqavgk-bba-wnveku stenosis. D2 is a medium sized vessel with ostial/proximal 85% stenosis. This is the largest diagonal artery. LCX Small vessel through the AV groove with proximal 40% stenosis, mid-distal less than 10% stenosis. There is a high OM1 which could also be described as a ramus artery, this vessel is medium sized with stents in the proximal-mid segments and the proximal vessel has 50 to 60% stenosis followed by mid-distal 100% stenosis. RI See above regarding high OM1         RCA Large vessel, dominant, there was pressure dampening with 5 [place] diagnostic catheter as well as 4 [place] diagnostic catheter. There appears to be proximal 60% stenosis followed by mid 20 to 30% stenosis followed by distal 80% stenosis.      There is essentially a twin PDA system with a high bifurcation as well as

## 2023-07-18 NOTE — OP NOTE
Operative Note      Patient: Sudhakar Moffett  YOB: 1990  MRN: [id number]    Date of Procedure: 7/10/2023    Pre-Op Diagnosis Codes:     * Coronary artery disease, unspecified vessel or lesion type, unspecified whether angina present, unspecified whether native or transplanted heart [I25.10]    Post-Op Diagnosis: Same       Procedure(s):  CORONARY ARTERY BYPASS GRAFTING TIMES THREE USING INTERNAL MAMMARY ARTERY AND RIGHT SAPHENOUS VEIN VIA ENDOSCOPIC HARVEST WITH LEFT ATRIAL APPENDAGE CLIP PLACEMENT, ON PUMP, SATNAM, PLACEMENT OF TEMPORARY PACING WIRES, BILATERAL PECTORALIS BLOCKS     Surgeon(s):  Vernon Ding MD    Assistant:   Surgical Assistant: Braden Noe  Physician Assistant: MAGDIEL Wheeler    Anesthesia: General    Estimated Blood Loss (mL): 124    Complications: None    Implants:  Implant Name Type Inv. Item Serial No.  Lot No. LRB No. Used Action   CLIP MED SUTURE LESS 40 MM SYS 1 HND STRL ATRICLIP FLX V - [id number]  CLIP MED SUTURE LESS 40 MM SYS 1 HND STRL ATRICLIP FLX V  [employer]WD [id number] N/A 1 Implanted           Findings: Good ventricular function appendage showed no flow after placement of the clip minimal amount of chemical support postoperatively        Detailed Description of Procedure:     Patient was taken to the operating room after informative consent was obtained lying supine under general anesthesia ET tube was placed arterial line and central venous access by anesthesia with no complication. Nerve block was injected to the pectoralis nerve using 10 cc of mixed Exparel Marcaine to each side ultrasound-guided by anesthesia patient was prepped and draped in standard fashion pause for the cause was done in standard manner 2 team approach.   The physician assistant participated in patient positioning, endovascular vein harvesting, median sternotomy, patient cannulation for bypass and performing the distal and proximal bypasses grafting    Endovenous obtained

## 2023-07-31 ENCOUNTER — HOSPITAL ENCOUNTER (OUTPATIENT)
Dept: ULTRASOUND IMAGING | Age: 33
Discharge: HOME | End: 2023-07-31
Payer: COMMERCIAL

## 2023-07-31 DIAGNOSIS — R19.02 ABDOMINAL MASS, LEFT UPPER QUADRANT: ICD-10-CM

## 2023-07-31 PROCEDURE — 76999 ECHO EXAMINATION PROCEDURE: CPT

## 2023-08-03 ENCOUNTER — HOSPITAL ENCOUNTER (OUTPATIENT)
Dept: CT IMAGING | Age: 33
Discharge: HOME | End: 2023-08-03
Payer: COMMERCIAL

## 2023-08-03 DIAGNOSIS — D50.9 IRON DEFICIENCY ANEMIA, UNSPECIFIED IRON DEFICIENCY ANEMIA TYPE: ICD-10-CM

## 2023-08-03 DIAGNOSIS — R19.02 ABDOMINAL MASS, LEFT UPPER QUADRANT: ICD-10-CM

## 2023-08-03 PROCEDURE — 6360000004 HC RX CONTRAST MEDICATION: Performed by: INTERNAL MEDICINE

## 2023-08-03 PROCEDURE — 74178 CT ABD&PLV WO CNTR FLWD CNTR: CPT

## 2023-08-03 RX ADMIN — DIATRIZOATE MEGLUMINE AND DIATRIZOATE SODIUM 12 ML: 660; 100 LIQUID ORAL; RECTAL at 08:50

## 2023-08-03 RX ADMIN — IOPAMIDOL 75 ML: 755 INJECTION, SOLUTION INTRAVENOUS at 08:50

## 2023-08-04 ENCOUNTER — HOSPITAL ENCOUNTER (INPATIENT)
Age: 33
LOS: 13 days | Discharge: HOME | End: 2023-08-17
Attending: STUDENT IN AN ORGANIZED HEALTH CARE EDUCATION/TRAINING PROGRAM | Admitting: HOSPITALIST
Payer: COMMERCIAL

## 2023-08-04 DIAGNOSIS — N15.1 RENAL ABSCESS: Primary | ICD-10-CM

## 2023-08-04 DIAGNOSIS — A41.9 SEPTICEMIA (HCC): ICD-10-CM

## 2023-08-04 LAB
ALBUMIN SERPL-MCNC: 3.3 G/DL (ref 3.4–5)
ALBUMIN/GLOB SERPL: 0.8 {RATIO} (ref 1.1–2.2)
ALP SERPL-CCNC: 106 U/L (ref 40–129)
ALT SERPL-CCNC: 24 U/L (ref 10–40)
ANION GAP SERPL CALCULATED.3IONS-SCNC: 15 MMOL/L (ref 3–16)
AST SERPL-CCNC: 12 U/L (ref 15–37)
BASOPHILS # BLD: 0.1 K/UL (ref 0–0.2)
BASOPHILS NFR BLD: 0.6 %
BILIRUB SERPL-MCNC: 0.3 MG/DL (ref 0–1)
BUN SERPL-MCNC: 7 MG/DL (ref 7–20)
CALCIUM SERPL-MCNC: 9.2 MG/DL (ref 8.3–10.6)
CHLORIDE SERPL-SCNC: 95 MMOL/L (ref 99–110)
CO2 SERPL-SCNC: 23 MMOL/L (ref 21–32)
CREAT SERPL-MCNC: <0.5 MG/DL (ref 0.6–1.1)
DEPRECATED RDW RBC AUTO: 17.8 % (ref 12.4–15.4)
EOSINOPHIL # BLD: 0.1 K/UL (ref 0–0.6)
EOSINOPHIL NFR BLD: 0.6 %
GFR SERPLBLD CREATININE-BSD FMLA CKD-EPI: >60 ML/MIN/{1.73_M2}
GLUCOSE BLD-MCNC: 181 MG/DL (ref 70–99)
GLUCOSE SERPL-MCNC: 204 MG/DL (ref 70–99)
HCG SERPL QL: NEGATIVE
HCT VFR BLD AUTO: 33.3 % (ref 36–48)
HGB BLD-MCNC: 10.6 G/DL (ref 12–16)
LACTATE BLDV-SCNC: 1.4 MMOL/L (ref 0.4–1.9)
LYMPHOCYTES # BLD: 2.4 K/UL (ref 1–5.1)
LYMPHOCYTES NFR BLD: 14.3 %
MCH RBC QN AUTO: 26.9 PG (ref 26–34)
MCHC RBC AUTO-ENTMCNC: 31.8 G/DL (ref 31–36)
MCV RBC AUTO: 84.7 FL (ref 80–100)
MONOCYTES # BLD: 1.6 K/UL (ref 0–1.3)
MONOCYTES NFR BLD: 9.5 %
NEUTROPHILS # BLD: 12.5 K/UL (ref 1.7–7.7)
NEUTROPHILS NFR BLD: 75 %
PERFORMED ON: ABNORMAL
PLATELET # BLD AUTO: 724 K/UL (ref 135–450)
PMV BLD AUTO: 6.8 FL (ref 5–10.5)
POTASSIUM SERPL-SCNC: 4 MMOL/L (ref 3.5–5.1)
PROCALCITONIN SERPL IA-MCNC: 0.11 NG/ML (ref 0–0.15)
PROT SERPL-MCNC: 7.6 G/DL (ref 6.4–8.2)
RBC # BLD AUTO: 3.94 M/UL (ref 4–5.2)
SODIUM SERPL-SCNC: 133 MMOL/L (ref 136–145)
WBC # BLD AUTO: 16.7 K/UL (ref 4–11)

## 2023-08-04 PROCEDURE — 2060000000 HC ICU INTERMEDIATE R&B

## 2023-08-04 PROCEDURE — 96366 THER/PROPH/DIAG IV INF ADDON: CPT

## 2023-08-04 PROCEDURE — 2580000003 HC RX 258: Performed by: PHYSICIAN ASSISTANT

## 2023-08-04 PROCEDURE — 87040 BLOOD CULTURE FOR BACTERIA: CPT

## 2023-08-04 PROCEDURE — 6360000002 HC RX W HCPCS: Performed by: PHYSICIAN ASSISTANT

## 2023-08-04 PROCEDURE — 80074 ACUTE HEPATITIS PANEL: CPT

## 2023-08-04 PROCEDURE — 84145 PROCALCITONIN (PCT): CPT

## 2023-08-04 PROCEDURE — 2580000003 HC RX 258: Performed by: HOSPITALIST

## 2023-08-04 PROCEDURE — 6370000000 HC RX 637 (ALT 250 FOR IP): Performed by: PHYSICIAN ASSISTANT

## 2023-08-04 PROCEDURE — 96365 THER/PROPH/DIAG IV INF INIT: CPT

## 2023-08-04 PROCEDURE — 80053 COMPREHEN METABOLIC PANEL: CPT

## 2023-08-04 PROCEDURE — 6370000000 HC RX 637 (ALT 250 FOR IP): Performed by: HOSPITALIST

## 2023-08-04 PROCEDURE — 96375 TX/PRO/DX INJ NEW DRUG ADDON: CPT

## 2023-08-04 PROCEDURE — 84703 CHORIONIC GONADOTROPIN ASSAY: CPT

## 2023-08-04 PROCEDURE — 96361 HYDRATE IV INFUSION ADD-ON: CPT

## 2023-08-04 PROCEDURE — 83605 ASSAY OF LACTIC ACID: CPT

## 2023-08-04 PROCEDURE — 6360000002 HC RX W HCPCS: Performed by: HOSPITALIST

## 2023-08-04 PROCEDURE — 85025 COMPLETE CBC W/AUTO DIFF WBC: CPT

## 2023-08-04 PROCEDURE — 99285 EMERGENCY DEPT VISIT HI MDM: CPT

## 2023-08-04 PROCEDURE — 81001 URINALYSIS AUTO W/SCOPE: CPT

## 2023-08-04 PROCEDURE — 87390 HIV-1 AG IA: CPT

## 2023-08-04 PROCEDURE — 87086 URINE CULTURE/COLONY COUNT: CPT

## 2023-08-04 PROCEDURE — 86702 HIV-2 ANTIBODY: CPT

## 2023-08-04 PROCEDURE — 86701 HIV-1ANTIBODY: CPT

## 2023-08-04 RX ORDER — SODIUM CHLORIDE 0.9 % (FLUSH) 0.9 %
5-40 SYRINGE (ML) INJECTION EVERY 12 HOURS SCHEDULED
Status: DISCONTINUED | OUTPATIENT
Start: 2023-08-04 | End: 2023-08-16 | Stop reason: SDUPTHER

## 2023-08-04 RX ORDER — ACETAMINOPHEN 650 MG/1
650 SUPPOSITORY RECTAL EVERY 6 HOURS PRN
Status: DISCONTINUED | OUTPATIENT
Start: 2023-08-04 | End: 2023-08-17 | Stop reason: HOSPADM

## 2023-08-04 RX ORDER — 0.9 % SODIUM CHLORIDE 0.9 %
30 INTRAVENOUS SOLUTION INTRAVENOUS ONCE
Status: COMPLETED | OUTPATIENT
Start: 2023-08-04 | End: 2023-08-04

## 2023-08-04 RX ORDER — SODIUM CHLORIDE 0.9 % (FLUSH) 0.9 %
5-40 SYRINGE (ML) INJECTION PRN
Status: DISCONTINUED | OUTPATIENT
Start: 2023-08-04 | End: 2023-08-16 | Stop reason: SDUPTHER

## 2023-08-04 RX ORDER — FERROUS SULFATE 325(65) MG
325 TABLET ORAL 2 TIMES DAILY
Status: DISCONTINUED | OUTPATIENT
Start: 2023-08-04 | End: 2023-08-17 | Stop reason: HOSPADM

## 2023-08-04 RX ORDER — ONDANSETRON 2 MG/ML
4 INJECTION INTRAMUSCULAR; INTRAVENOUS ONCE
Status: COMPLETED | OUTPATIENT
Start: 2023-08-04 | End: 2023-08-04

## 2023-08-04 RX ORDER — METHOCARBAMOL 500 MG/1
750 TABLET, FILM COATED ORAL 4 TIMES DAILY
Status: DISCONTINUED | OUTPATIENT
Start: 2023-08-04 | End: 2023-08-17 | Stop reason: HOSPADM

## 2023-08-04 RX ORDER — SODIUM CHLORIDE 9 MG/ML
INJECTION, SOLUTION INTRAVENOUS CONTINUOUS
Status: DISCONTINUED | OUTPATIENT
Start: 2023-08-04 | End: 2023-08-17 | Stop reason: HOSPADM

## 2023-08-04 RX ORDER — EZETIMIBE 10 MG/1
10 TABLET ORAL DAILY
Status: DISCONTINUED | OUTPATIENT
Start: 2023-08-05 | End: 2023-08-17 | Stop reason: HOSPADM

## 2023-08-04 RX ORDER — ACETAMINOPHEN 325 MG/1
650 TABLET ORAL EVERY 6 HOURS PRN
Status: DISCONTINUED | OUTPATIENT
Start: 2023-08-04 | End: 2023-08-17 | Stop reason: HOSPADM

## 2023-08-04 RX ORDER — SODIUM CHLORIDE 9 MG/ML
INJECTION, SOLUTION INTRAVENOUS PRN
Status: DISCONTINUED | OUTPATIENT
Start: 2023-08-04 | End: 2023-08-17 | Stop reason: HOSPADM

## 2023-08-04 RX ORDER — MORPHINE SULFATE 4 MG/ML
4 INJECTION, SOLUTION INTRAMUSCULAR; INTRAVENOUS ONCE
Status: COMPLETED | OUTPATIENT
Start: 2023-08-04 | End: 2023-08-04

## 2023-08-04 RX ORDER — HYDROMORPHONE HYDROCHLORIDE 1 MG/ML
1 INJECTION, SOLUTION INTRAMUSCULAR; INTRAVENOUS; SUBCUTANEOUS
Status: DISCONTINUED | OUTPATIENT
Start: 2023-08-04 | End: 2023-08-17 | Stop reason: HOSPADM

## 2023-08-04 RX ORDER — PANTOPRAZOLE SODIUM 40 MG/1
40 TABLET, DELAYED RELEASE ORAL
Status: DISCONTINUED | OUTPATIENT
Start: 2023-08-05 | End: 2023-08-17 | Stop reason: HOSPADM

## 2023-08-04 RX ORDER — ASPIRIN 81 MG/1
81 TABLET ORAL DAILY
Status: DISCONTINUED | OUTPATIENT
Start: 2023-08-04 | End: 2023-08-17 | Stop reason: HOSPADM

## 2023-08-04 RX ORDER — CLOPIDOGREL BISULFATE 75 MG/1
75 TABLET ORAL DAILY
Status: DISCONTINUED | OUTPATIENT
Start: 2023-08-04 | End: 2023-08-17 | Stop reason: HOSPADM

## 2023-08-04 RX ORDER — ENOXAPARIN SODIUM 100 MG/ML
40 INJECTION SUBCUTANEOUS DAILY
Status: DISCONTINUED | OUTPATIENT
Start: 2023-08-04 | End: 2023-08-04

## 2023-08-04 RX ORDER — ATORVASTATIN CALCIUM 80 MG/1
80 TABLET, FILM COATED ORAL NIGHTLY
Status: DISCONTINUED | OUTPATIENT
Start: 2023-08-04 | End: 2023-08-17 | Stop reason: HOSPADM

## 2023-08-04 RX ORDER — ACETAMINOPHEN 500 MG
1000 TABLET ORAL ONCE
Status: COMPLETED | OUTPATIENT
Start: 2023-08-04 | End: 2023-08-04

## 2023-08-04 RX ADMIN — MORPHINE SULFATE 4 MG: 4 INJECTION, SOLUTION INTRAMUSCULAR; INTRAVENOUS at 18:41

## 2023-08-04 RX ADMIN — MEROPENEM 1000 MG: 1 INJECTION, POWDER, FOR SOLUTION INTRAVENOUS at 21:48

## 2023-08-04 RX ADMIN — FERROUS SULFATE TAB 325 MG (65 MG ELEMENTAL FE) 325 MG: 325 (65 FE) TAB at 21:37

## 2023-08-04 RX ADMIN — ATORVASTATIN CALCIUM 80 MG: 80 TABLET, FILM COATED ORAL at 21:37

## 2023-08-04 RX ADMIN — ACETAMINOPHEN 1000 MG: 500 TABLET ORAL at 17:51

## 2023-08-04 RX ADMIN — PIPERACILLIN AND TAZOBACTAM 4500 MG: 4; .5 INJECTION, POWDER, LYOPHILIZED, FOR SOLUTION INTRAVENOUS at 17:55

## 2023-08-04 RX ADMIN — SODIUM CHLORIDE: 9 INJECTION, SOLUTION INTRAVENOUS at 21:47

## 2023-08-04 RX ADMIN — SODIUM CHLORIDE, PRESERVATIVE FREE 10 ML: 5 INJECTION INTRAVENOUS at 21:49

## 2023-08-04 RX ADMIN — METHOCARBAMOL 750 MG: 500 TABLET ORAL at 21:37

## 2023-08-04 RX ADMIN — ONDANSETRON 4 MG: 2 INJECTION INTRAMUSCULAR; INTRAVENOUS at 18:41

## 2023-08-04 RX ADMIN — HYDROMORPHONE HYDROCHLORIDE 1 MG: 1 INJECTION, SOLUTION INTRAMUSCULAR; INTRAVENOUS; SUBCUTANEOUS at 21:32

## 2023-08-04 RX ADMIN — VANCOMYCIN HYDROCHLORIDE 1000 MG: 1 INJECTION, POWDER, LYOPHILIZED, FOR SOLUTION INTRAVENOUS at 18:00

## 2023-08-04 RX ADMIN — SODIUM CHLORIDE 2000 ML: 9 INJECTION, SOLUTION INTRAVENOUS at 17:50

## 2023-08-04 ASSESSMENT — PAIN SCALES - GENERAL
PAINLEVEL_OUTOF10: 10

## 2023-08-04 ASSESSMENT — PAIN DESCRIPTION - DESCRIPTORS: DESCRIPTORS: SQUEEZING;DISCOMFORT

## 2023-08-04 ASSESSMENT — PAIN DESCRIPTION - LOCATION
LOCATION: BACK

## 2023-08-04 ASSESSMENT — PAIN DESCRIPTION - ORIENTATION: ORIENTATION: LEFT

## 2023-08-04 ASSESSMENT — PAIN SCALES - WONG BAKER: WONGBAKER_NUMERICALRESPONSE: 2

## 2023-08-04 NOTE — ED PROVIDER NOTES
SURGICAL HISTORY:      Past Surgical History:   Procedure Laterality Date    CARPAL TUNNEL RELEASE Left 07/22/2020    CORONARY ARTERY BYPASS GRAFT N/A 7/10/2023    CORONARY ARTERY BYPASS GRAFTING TIMES THREE USING INTERNAL MAMMARY ARTERY AND RIGHT SAPHENOUS VEIN VIA ENDOSCOPIC HARVEST WITH LEFT ATRIAL APPENDAGE CLIP PLACEMENT, ON PUMP, SATNAM, PLACEMENT OF TEMPORARY PACING WIRES, BILATERAL PECTORALIS BLOCKS  performed by Nabor Main MD at [address] [place] [address] Avenue:       Previous Medications    ACETAMINOPHEN (TYLENOL) 500 MG TABLET    Take 2 tablets by mouth 3 times daily as needed for Pain    ASPIRIN 81 MG EC TABLET    Take 1 tablet by mouth daily    ATORVASTATIN (LIPITOR) 40 MG TABLET    Take 1 tablet by mouth daily    ATORVASTATIN (LIPITOR) 80 MG TABLET    Take 1 tablet by mouth nightly. BLOOD GLUCOSE MONITOR STRIPS    Test 1 times a day & as needed for symptoms of irregular blood glucose. Dispense sufficient amount for indicated testing frequency plus additional to accommodate PRN testing needs. BLOOD GLUCOSE MONITORING SUPPL (BLOOD GLUCOSE MONITOR KIT) KIT    Check bs bid    CLOPIDOGREL (PLAVIX) 75 MG TABLET    Take 1 tablet by mouth daily    DULAGLUTIDE (TRULICITY) 1.5 VI/5.3SK SC INJECTION    Inject 0.5 mLs into the skin once a week    EMPAGLIFLOZIN (JARDIANCE) 10 MG TABLET    Take 2.5 tablets by mouth daily    EMPAGLIFLOZIN (JARDIANCE) 25 MG TABLET    Take 1 tablet by mouth daily    EZETIMIBE (ZETIA) 10 MG TABLET    Take 1 tablet by mouth daily    EZETIMIBE (ZETIA) 10 MG TABLET    Take 1 tablet by mouth daily    FERROUS SULFATE (IRON 325) 325 (65 FE) MG TABLET    Take 1 tablet by mouth 2 times daily    GABAPENTIN (NEURONTIN) 300 MG CAPSULE    Take 1 capsule by mouth 3 times daily for 14 days.     GLUCOSE MONITORING (FREESTYLE FREEDOM) KIT    1 kit by Does not apply route daily    INSULIN ASPART (NOVOLOG SC)    Inject 0-6 Units into the skin 4 times daily (before meals and nightly) Inject medications:  Medications   sodium chloride 0.9 % bolus 1,959 mL (0 mLs IntraVENous Stopped 8/4/23 1854)   vancomycin 1000 mg IVPB in 250 mL NS addavial (1,000 mg IntraVENous New Bag 8/4/23 1800)   piperacillin-tazobactam (ZOSYN) 4,500 mg in sodium chloride 0.9 % 100 mL IVPB (mini-bag) (0 mg IntraVENous Stopped 8/4/23 1854)   acetaminophen (TYLENOL) tablet 1,000 mg (1,000 mg Oral Given 8/4/23 1751)   ondansetron (ZOFRAN) injection 4 mg (4 mg IntraVENous Given 8/4/23 1841)   morphine sulfate (PF) injection 4 mg (4 mg IntraVENous Given 8/4/23 1841)       Patient was evaluated by both myself and Olivia Troy MD     CC/HPI Summary, DDx, ED course, and Reassessment: Patient here with left flank pain and the oral swelling. Had CT imaging done yesterday that ultimately shows a large and extensive left renal abscess. Patient was called by her doctor and told to come to the ED. She arrives febrile and tachycardic. Differential diagnoses: Sepsis, hemodynamic instability, UTI, pyelonephritis, renal abscess, pneumonia, other acute intra-abdominal infection/process. Septic work-up initiated. Patient is ordered fluids, 30 MLS per kilogram based on sepsis protocol with broad-spectrum antibiotics-vancomycin and Zosyn. CBC white count 16.7 with H&H 10.6 and 33.3  CMP most significant for hyperglycemia at 204  Lactate normal at 1.4  Procalcitonin 0.11  hCG negative  UA pending  Blood cultures x2 drawn  Again patient had an outpatient CT showing a left renal abscess extending into the musculature. I consulted the hospitalist for admission. They requested a consult urology and general surgery. I have done this. Ultimately this is more by urology and IR type patient. She will be n.p.o. after midnight. SEP-1 CORE MEASURE DATA      Sepsis Criteria   Severe Sepsis Criteria   Septic Shock Criteria     Must be confirmed or suspected to move forward with diagnosis of sepsis.     Must meet 2:    [x] Temperature > 100.9 F (38.3 C)

## 2023-08-04 NOTE — H&P
empagliflozin (JARDIANCE) 10 MG tablet Take 2.5 tablets by mouth daily 7/15/23   Angel Camp MD   ezetimibe (ZETIA) 10 MG tablet Take 1 tablet by mouth daily 7/15/23   Angel Camp MD   glucose monitoring (FREESTYLE FREEDOM) kit 1 kit by Does not apply route daily  Patient not taking: Reported on 7/25/2023 7/15/23   Angel Camp MD   Lancets MISC 1 each by Does not apply route daily  Patient not taking: Reported on 7/25/2023 7/15/23   Angel Camp MD   blood glucose monitor strips Test 1 times a day & as needed for symptoms of irregular blood glucose. Dispense sufficient amount for indicated testing frequency plus additional to accommodate PRN testing needs. Patient not taking: Reported on 7/25/2023 7/15/23   Angel Camp MD   aspirin 81 MG EC tablet Take 1 tablet by mouth daily 7/15/23   REVA Lake CNP   metoprolol tartrate (LOPRESSOR) 25 MG tablet Take 0.5 tablets by mouth 2 times daily Hold for HR < 65 and/or SBP < 100 7/14/23   REVA Lake CNP   clopidogrel (PLAVIX) 75 MG tablet Take 1 tablet by mouth daily 7/15/23   REVA Lake CNP   ferrous sulfate (IRON 325) 325 (65 Fe) MG tablet Take 1 tablet by mouth 2 times daily 7/14/23   REVA Lake CNP   Insulin Aspart (NOVOLOG SC) Inject 0-6 Units into the skin 4 times daily (before meals and nightly) Inject 0-6 Units subcutaneously 4 (four) times daily before meals and at bedtime     Cover with Insulin before meals and at HS (cover HS only if glucose greater than or equal to 200 mg/dL). If BLOOD SUGAR=120-149, give No Insulin. If BLOOD SUGAR=150-199, give 2 units. If BLOOD SUGAR=200-249, give 3 units. If BLOOD SUGAR=250-299, give 4 units. If BLOOD SUGAR=300-349, give 5 units. If BLOOD SUGAR is greater than 349, give 6 units and call physician.   Patient not taking: Reported on 7/25/2023    Historical Provider, MD   Insulin Glargine (LANTUS SOLOSTAR SC) Inject 15 Units into the skin Troponin:   Lab Results   Component Value Date/Time    TROPONINT <0.03 08/30/2015 08:30 AM    TROPONINT <0.03 02/21/2015 04:28 PM     Lactic Acid: No results for input(s): LACTA in the last 72 hours. BNP: No results for input(s): PROBNP in the last 72 hours. UA:  Lab Results   Component Value Date/Time    NITRU Negative 07/09/2023 10:58 AM    COLORU Yellow 07/09/2023 10:58 AM    PHUR 7.5 07/09/2023 10:58 AM    WBCUA 6-9 07/09/2023 10:58 AM    RBCUA 0-2 07/09/2023 10:58 AM    MUCUS NOT REPORTED 02/21/2015 12:36 AM    TRICHOMONAS NOT REPORTED 02/21/2015 12:36 AM    YEAST NOT REPORTED 02/21/2015 12:36 AM    BACTERIA Rare 07/09/2023 10:58 AM    CLARITYU SL CLOUDY 07/09/2023 10:58 AM    SPECGRAV 1.010 07/09/2023 10:58 AM    LEUKOCYTESUR SMALL 07/09/2023 10:58 AM    UROBILINOGEN 2.0 07/09/2023 10:58 AM    BILIRUBINUR Negative 07/09/2023 10:58 AM    BLOODU TRACE-INTACT 07/09/2023 10:58 AM    GLUCOSEU 250 07/09/2023 10:58 AM    KETUA Negative 07/09/2023 10:58 AM    AMORPHOUS NOT REPORTED 02/21/2015 12:36 AM     Urine Cultures:   Lab Results   Component Value Date/Time    LABURIN >100,000 CFU/ml 07/09/2023 10:58 AM     Blood Cultures:   Lab Results   Component Value Date/Time    BC No Growth after 4 days of incubation. 07/08/2023 06:27 PM     Lab Results   Component Value Date/Time    BLOODCULT2 No Growth after 4 days of incubation. 07/08/2023 06:27 PM     Organism:   Lab Results   Component Value Date/Time    ORG Proteus mirabilis 07/09/2023 10:58 AM       Imaging/Diagnostics Last 24 Hours   CT ABDOMEN PELVIS W WO CONTRAST Additional Contrast? Oral    Result Date: 8/3/2023  EXAMINATION: CT OF THE ABDOMEN AND PELVIS WITH AND WITHOUT CONTRAST 8/3/2023 8:10 am TECHNIQUE: CT of the abdomen and pelvis was performed with and without the administration of intravenous contrast.  Multiplanar reformatted images are provided for review.  Automated exposure control, iterative reconstruction, and/or weight based adjustment of the

## 2023-08-04 NOTE — ED PROVIDER NOTES
I independently performed a history and physical on Wu. I have completed a substantive portion of the visit including all aspects of the medical decision making. All diagnostic, treatment, and disposition decisions were made by myself in conjunction with the advanced practice provider/resident. In summary the patient presents with a left flank mass previously identified as a renal abscess. On exam she is comfortable appearing though with significant tenderness in the region of swelling. The abdomen is soft nontender. Breath sounds are clear. She presents febrile tachycardic with a leukocytosis in the context of a known renal abscess therefore we will initiate empiric antibiotics and she will require admission. For further details of [address] [place] [address]'s emergency department encounter, please see the [facility]TA/resident's documentation.         MD Azael Harley MD  08/04/23 8046

## 2023-08-04 NOTE — CONSULTS
[address] Surgery  Per Ema VELOZ  Re left renal abscess  1838- Dr. iH Heading returned page

## 2023-08-04 NOTE — CONSULTS
[facility]-  [facility]   Per Eleanor Hollis VELOZ  Re left renal abscess  1836- Dr. Kristin Campoverde returned page

## 2023-08-05 ENCOUNTER — APPOINTMENT (OUTPATIENT)
Dept: CT IMAGING | Age: 33
End: 2023-08-05
Payer: COMMERCIAL

## 2023-08-05 LAB
ANION GAP SERPL CALCULATED.3IONS-SCNC: 11 MMOL/L (ref 3–16)
ANISOCYTOSIS BLD QL SMEAR: ABNORMAL
APTT BLD: 27.5 SEC (ref 22.7–35.9)
BASOPHILS # BLD: 0 K/UL (ref 0–0.2)
BASOPHILS NFR BLD: 0 %
BILIRUB UR QL STRIP.AUTO: NEGATIVE
BUN SERPL-MCNC: 5 MG/DL (ref 7–20)
CALCIUM SERPL-MCNC: 8.5 MG/DL (ref 8.3–10.6)
CHLORIDE SERPL-SCNC: 102 MMOL/L (ref 99–110)
CLARITY UR: CLEAR
CO2 SERPL-SCNC: 23 MMOL/L (ref 21–32)
COLOR UR: YELLOW
CREAT SERPL-MCNC: <0.5 MG/DL (ref 0.6–1.1)
CRP SERPL-MCNC: 118.3 MG/L (ref 0–5.1)
DEPRECATED RDW RBC AUTO: 18 % (ref 12.4–15.4)
EOSINOPHIL # BLD: 0.2 K/UL (ref 0–0.6)
EOSINOPHIL NFR BLD: 1 %
EPI CELLS #/AREA URNS HPF: ABNORMAL /HPF (ref 0–5)
ERYTHROCYTE [SEDIMENTATION RATE] IN BLOOD BY WESTERGREN METHOD: 70 MM/HR (ref 0–20)
GFR SERPLBLD CREATININE-BSD FMLA CKD-EPI: >60 ML/MIN/{1.73_M2}
GLUCOSE BLD-MCNC: 126 MG/DL (ref 70–99)
GLUCOSE BLD-MCNC: 186 MG/DL (ref 70–99)
GLUCOSE BLD-MCNC: 224 MG/DL (ref 70–99)
GLUCOSE BLD-MCNC: 225 MG/DL (ref 70–99)
GLUCOSE SERPL-MCNC: 178 MG/DL (ref 70–99)
GLUCOSE UR STRIP.AUTO-MCNC: >=1000 MG/DL
HAV IGM SERPL QL IA: NORMAL
HBV CORE IGM SERPL QL IA: NORMAL
HBV SURFACE AG SERPL QL IA: NORMAL
HCT VFR BLD AUTO: 28.9 % (ref 36–48)
HCV AB SERPL QL IA: NORMAL
HGB BLD-MCNC: 9.1 G/DL (ref 12–16)
HGB UR QL STRIP.AUTO: ABNORMAL
INR PPP: 1.21 (ref 0.84–1.16)
KETONES UR STRIP.AUTO-MCNC: 15 MG/DL
LEUKOCYTE ESTERASE UR QL STRIP.AUTO: NEGATIVE
LYMPHOCYTES # BLD: 1.5 K/UL (ref 1–5.1)
LYMPHOCYTES NFR BLD: 10 %
MCH RBC QN AUTO: 26.8 PG (ref 26–34)
MCHC RBC AUTO-ENTMCNC: 31.5 G/DL (ref 31–36)
MCV RBC AUTO: 85.2 FL (ref 80–100)
MONOCYTES # BLD: 1.5 K/UL (ref 0–1.3)
MONOCYTES NFR BLD: 10 %
NEUTROPHILS # BLD: 11.9 K/UL (ref 1.7–7.7)
NEUTROPHILS NFR BLD: 77 %
NEUTS BAND NFR BLD MANUAL: 2 % (ref 0–7)
NITRITE UR QL STRIP.AUTO: NEGATIVE
PERFORMED ON: ABNORMAL
PH UR STRIP.AUTO: 5.5 [PH] (ref 5–8)
PLATELET # BLD AUTO: 631 K/UL (ref 135–450)
PLATELET BLD QL SMEAR: ABNORMAL
PMV BLD AUTO: 6.9 FL (ref 5–10.5)
POTASSIUM SERPL-SCNC: 4 MMOL/L (ref 3.5–5.1)
PROT UR STRIP.AUTO-MCNC: NEGATIVE MG/DL
PROTHROMBIN TIME: 15.3 SEC (ref 11.5–14.8)
RBC # BLD AUTO: 3.39 M/UL (ref 4–5.2)
RBC #/AREA URNS HPF: ABNORMAL /HPF (ref 0–4)
SLIDE REVIEW: ABNORMAL
SODIUM SERPL-SCNC: 136 MMOL/L (ref 136–145)
SP GR UR STRIP.AUTO: 1.01 (ref 1–1.03)
UA COMPLETE W REFLEX CULTURE PNL UR: ABNORMAL
UA DIPSTICK W REFLEX MICRO PNL UR: YES
URN SPEC COLLECT METH UR: ABNORMAL
UROBILINOGEN UR STRIP-ACNC: 1 E.U./DL
WBC # BLD AUTO: 15.1 K/UL (ref 4–11)
WBC #/AREA URNS HPF: ABNORMAL /HPF (ref 0–5)
YEAST URNS QL MICRO: PRESENT /HPF

## 2023-08-05 PROCEDURE — 6360000002 HC RX W HCPCS: Performed by: RADIOLOGY

## 2023-08-05 PROCEDURE — 85610 PROTHROMBIN TIME: CPT

## 2023-08-05 PROCEDURE — 85025 COMPLETE CBC W/AUTO DIFF WBC: CPT

## 2023-08-05 PROCEDURE — C1769 GUIDE WIRE: HCPCS

## 2023-08-05 PROCEDURE — 36415 COLL VENOUS BLD VENIPUNCTURE: CPT

## 2023-08-05 PROCEDURE — 87070 CULTURE OTHR SPECIMN AEROBIC: CPT

## 2023-08-05 PROCEDURE — 99255 IP/OBS CONSLTJ NEW/EST HI 80: CPT | Performed by: INTERNAL MEDICINE

## 2023-08-05 PROCEDURE — 87077 CULTURE AEROBIC IDENTIFY: CPT

## 2023-08-05 PROCEDURE — 87205 SMEAR GRAM STAIN: CPT

## 2023-08-05 PROCEDURE — 85730 THROMBOPLASTIN TIME PARTIAL: CPT

## 2023-08-05 PROCEDURE — 2060000000 HC ICU INTERMEDIATE R&B

## 2023-08-05 PROCEDURE — 6370000000 HC RX 637 (ALT 250 FOR IP): Performed by: HOSPITALIST

## 2023-08-05 PROCEDURE — 0T9430Z DRAINAGE OF LEFT KIDNEY PELVIS WITH DRAINAGE DEVICE, PERCUTANEOUS APPROACH: ICD-10-PCS | Performed by: RADIOLOGY

## 2023-08-05 PROCEDURE — 86140 C-REACTIVE PROTEIN: CPT

## 2023-08-05 PROCEDURE — 85652 RBC SED RATE AUTOMATED: CPT

## 2023-08-05 PROCEDURE — 80048 BASIC METABOLIC PNL TOTAL CA: CPT

## 2023-08-05 PROCEDURE — 87186 SC STD MICRODIL/AGAR DIL: CPT

## 2023-08-05 PROCEDURE — 6360000002 HC RX W HCPCS: Performed by: HOSPITALIST

## 2023-08-05 PROCEDURE — 2580000003 HC RX 258: Performed by: HOSPITALIST

## 2023-08-05 PROCEDURE — 77012 CT SCAN FOR NEEDLE BIOPSY: CPT

## 2023-08-05 RX ORDER — OXYCODONE HYDROCHLORIDE AND ACETAMINOPHEN 5; 325 MG/1; MG/1
1 TABLET ORAL EVERY 4 HOURS PRN
Status: DISCONTINUED | OUTPATIENT
Start: 2023-08-05 | End: 2023-08-17 | Stop reason: HOSPADM

## 2023-08-05 RX ORDER — OXYCODONE HYDROCHLORIDE AND ACETAMINOPHEN 5; 325 MG/1; MG/1
2 TABLET ORAL EVERY 4 HOURS PRN
Status: DISCONTINUED | OUTPATIENT
Start: 2023-08-05 | End: 2023-08-17 | Stop reason: HOSPADM

## 2023-08-05 RX ORDER — FENTANYL CITRATE 50 UG/ML
INJECTION, SOLUTION INTRAMUSCULAR; INTRAVENOUS PRN
Status: COMPLETED | OUTPATIENT
Start: 2023-08-05 | End: 2023-08-05

## 2023-08-05 RX ORDER — MIDAZOLAM HYDROCHLORIDE 5 MG/ML
INJECTION INTRAMUSCULAR; INTRAVENOUS PRN
Status: COMPLETED | OUTPATIENT
Start: 2023-08-05 | End: 2023-08-05

## 2023-08-05 RX ORDER — DIPHENHYDRAMINE HYDROCHLORIDE 50 MG/ML
INJECTION INTRAMUSCULAR; INTRAVENOUS PRN
Status: COMPLETED | OUTPATIENT
Start: 2023-08-05 | End: 2023-08-05

## 2023-08-05 RX ADMIN — MIDAZOLAM HYDROCHLORIDE 1 MG: 5 INJECTION, SOLUTION INTRAMUSCULAR; INTRAVENOUS at 09:47

## 2023-08-05 RX ADMIN — METHOCARBAMOL 750 MG: 500 TABLET ORAL at 13:28

## 2023-08-05 RX ADMIN — OXYCODONE HYDROCHLORIDE AND ACETAMINOPHEN 2 TABLET: 5; 325 TABLET ORAL at 13:28

## 2023-08-05 RX ADMIN — MEROPENEM 1000 MG: 1 INJECTION, POWDER, FOR SOLUTION INTRAVENOUS at 21:17

## 2023-08-05 RX ADMIN — DIPHENHYDRAMINE HYDROCHLORIDE 25 MG: 50 INJECTION, SOLUTION INTRAMUSCULAR; INTRAVENOUS at 09:52

## 2023-08-05 RX ADMIN — OXYCODONE HYDROCHLORIDE AND ACETAMINOPHEN 2 TABLET: 5; 325 TABLET ORAL at 21:11

## 2023-08-05 RX ADMIN — FENTANYL CITRATE 50 MCG: 50 INJECTION, SOLUTION INTRAMUSCULAR; INTRAVENOUS at 09:47

## 2023-08-05 RX ADMIN — METHOCARBAMOL 750 MG: 500 TABLET ORAL at 21:12

## 2023-08-05 RX ADMIN — VANCOMYCIN HYDROCHLORIDE 1250 MG: 1 INJECTION, POWDER, LYOPHILIZED, FOR SOLUTION INTRAVENOUS at 05:28

## 2023-08-05 RX ADMIN — FERROUS SULFATE TAB 325 MG (65 MG ELEMENTAL FE) 325 MG: 325 (65 FE) TAB at 21:13

## 2023-08-05 RX ADMIN — SODIUM CHLORIDE: 9 INJECTION, SOLUTION INTRAVENOUS at 16:18

## 2023-08-05 RX ADMIN — HYDROMORPHONE HYDROCHLORIDE 1 MG: 1 INJECTION, SOLUTION INTRAMUSCULAR; INTRAVENOUS; SUBCUTANEOUS at 16:19

## 2023-08-05 RX ADMIN — EZETIMIBE 10 MG: 10 TABLET ORAL at 08:38

## 2023-08-05 RX ADMIN — MEROPENEM 1000 MG: 1 INJECTION, POWDER, FOR SOLUTION INTRAVENOUS at 04:32

## 2023-08-05 RX ADMIN — MEROPENEM 1000 MG: 1 INJECTION, POWDER, FOR SOLUTION INTRAVENOUS at 13:35

## 2023-08-05 RX ADMIN — ATORVASTATIN CALCIUM 80 MG: 80 TABLET, FILM COATED ORAL at 21:13

## 2023-08-05 RX ADMIN — METOPROLOL TARTRATE 12.5 MG: 25 TABLET, FILM COATED ORAL at 21:12

## 2023-08-05 RX ADMIN — METHOCARBAMOL 750 MG: 500 TABLET ORAL at 17:02

## 2023-08-05 RX ADMIN — HYDROMORPHONE HYDROCHLORIDE 1 MG: 1 INJECTION, SOLUTION INTRAMUSCULAR; INTRAVENOUS; SUBCUTANEOUS at 00:35

## 2023-08-05 RX ADMIN — HYDROMORPHONE HYDROCHLORIDE 1 MG: 1 INJECTION, SOLUTION INTRAMUSCULAR; INTRAVENOUS; SUBCUTANEOUS at 04:30

## 2023-08-05 RX ADMIN — METOPROLOL TARTRATE 12.5 MG: 25 TABLET, FILM COATED ORAL at 08:38

## 2023-08-05 RX ADMIN — METHOCARBAMOL 750 MG: 500 TABLET ORAL at 08:37

## 2023-08-05 ASSESSMENT — PAIN - FUNCTIONAL ASSESSMENT
PAIN_FUNCTIONAL_ASSESSMENT: PREVENTS OR INTERFERES WITH MANY ACTIVE NOT PASSIVE ACTIVITIES
PAIN_FUNCTIONAL_ASSESSMENT: 0-10

## 2023-08-05 ASSESSMENT — PAIN DESCRIPTION - LOCATION
LOCATION: BACK;FLANK
LOCATION: BACK
LOCATION: BACK
LOCATION: FLANK
LOCATION: FLANK

## 2023-08-05 ASSESSMENT — PAIN DESCRIPTION - DESCRIPTORS
DESCRIPTORS: SORE;DISCOMFORT
DESCRIPTORS: DISCOMFORT;SQUEEZING
DESCRIPTORS: DISCOMFORT

## 2023-08-05 ASSESSMENT — PAIN DESCRIPTION - PAIN TYPE: TYPE: ACUTE PAIN

## 2023-08-05 ASSESSMENT — PAIN DESCRIPTION - ORIENTATION
ORIENTATION: LEFT
ORIENTATION: LEFT;POSTERIOR
ORIENTATION: LEFT

## 2023-08-05 ASSESSMENT — PAIN SCALES - GENERAL
PAINLEVEL_OUTOF10: 10
PAINLEVEL_OUTOF10: 5
PAINLEVEL_OUTOF10: 9
PAINLEVEL_OUTOF10: 10
PAINLEVEL_OUTOF10: 10

## 2023-08-05 ASSESSMENT — PAIN SCALES - WONG BAKER
WONGBAKER_NUMERICALRESPONSE: 0
WONGBAKER_NUMERICALRESPONSE: 0

## 2023-08-05 NOTE — CONSULTS
signs and symptoms: Fever, chills, flank pain        Past Medical History:    Past Medical History:   Diagnosis Date    Bipolar 1 disorder ([address])     Chest pain     Depression     Hyperlipidemia     tx started May 2015    Hypertension     tx started May 2015    Multiple vessel coronary artery disease 7/6/2023    Psychiatric problem     depression    Type II or unspecified type diabetes mellitus without mention of complication, uncontrolled 2/21/2015    tx started May 2015       Past Surgical History:    Past Surgical History:   Procedure Laterality Date    CARPAL TUNNEL RELEASE Left 07/22/2020    CORONARY ARTERY BYPASS GRAFT N/A 7/10/2023    CORONARY ARTERY BYPASS GRAFTING TIMES THREE USING INTERNAL MAMMARY ARTERY AND RIGHT SAPHENOUS VEIN VIA ENDOSCOPIC HARVEST WITH LEFT ATRIAL APPENDAGE CLIP PLACEMENT, ON PUMP, SATNAM, PLACEMENT OF TEMPORARY PACING WIRES, BILATERAL PECTORALIS BLOCKS  performed by Elsi Gilman MD at [facility]       Current Medications:    No outpatient medications have been marked as taking for the 8/4/23 encounter [facility] Encounter). Allergies:  Patient has no known allergies.     Immunizations :   Immunization History   Administered Date(s) Administered    Influenza Virus Vaccine 02/22/2015, 09/19/2022    Pneumococcal, PPSV23, PNEUMOVAX 21, (age 2y+), SC/IM, 0.5mL 02/22/2015    TDaP, ADACEL (age 6y-58y), BOOSTRIX (age 10y+), IM, 0.5mL 09/17/2019         Social History:     Social History     Tobacco Use    Smoking status: Former     Packs/day: 0.25     Types: Cigarettes    Smokeless tobacco: Never   Vaping Use    Vaping Use: Never used   Substance Use Topics    Alcohol use: No    Drug use: Not Currently     Types: Marijuana Andrew Harm)     Social History     Tobacco Use   Smoking Status Former    Packs/day: 0.25    Types: Cigarettes   Smokeless Tobacco Never      Family History   Problem Relation Age of Onset    Diabetes Mother           REVIEW OF SYSTEMS:      Constitutional:  fevers++ , chills, ceFAZolin 4 mcg/mL Intermediate     cefepime <=2 mcg/mL Sensitive     cefTRIAXone <=1 mcg/mL Sensitive     cefuroxime <=4 mcg/mL Sensitive     ciprofloxacin <=1 mcg/mL Sensitive     ertapenem <=0.5 mcg/mL Sensitive     gentamicin <=4 mcg/mL Sensitive     meropenem <=1 mcg/mL Sensitive     nitrofurantoin >64 mcg/mL Resistant     piperacillin-tazobactam <=16 mcg/mL Sensitive     trimethoprim-sulfamethoxazole <=2/38 mcg/mL Sensitive               Linear View           Viral Culture:    No results found for: COVID19  Urine Culture: No results for input(s): LABURIN in the last 72 hours. Scheduled Meds:   metoprolol tartrate  12.5 mg Oral BID    methocarbamol  750 mg Oral 4x Daily    ezetimibe  10 mg Oral Daily    ferrous sulfate  325 mg Oral BID    pantoprazole  40 mg Oral QAM AC    [Held by provider] aspirin  81 mg Oral Daily    [Held by provider] clopidogrel  75 mg Oral Daily    sodium chloride flush  5-40 mL IntraVENous 2 times per day    meropenem  1,000 mg IntraVENous Q8H    atorvastatin  80 mg Oral Nightly    vancomycin  1,250 mg IntraVENous Q12H       Continuous Infusions:   sodium chloride      sodium chloride 75 mL/hr at 08/04/23 2147       PRN Meds:  fentanNYL, midazolam, diphenhydrAMINE, sodium chloride flush, sodium chloride, acetaminophen **OR** acetaminophen, HYDROmorphone    Imaging:   CT ABSCESS DRAINAGE W CATH PLACEMENT S&I    (Results Pending)   CT GUIDED NEEDLE PLACEMENT    (Results Pending)       CT abd/pelvis from 8/3/23   FINDINGS:  Lower Chest: There is no consolidation or effusion. Organs: There are 3 nonobstructing 2 mm calculi within the right intrarenal  collecting system and a 1 mm nonobstructing calculus within the left  intrarenal collecting system. There is a large multi loculated rim enhancing  fluid collection arising from the posterior aspect of the left kidney. The  intrarenal component measures 3.6 x 3.4 cm and collectively measures 9 x 6.3  x 14 cm.   The collection extends

## 2023-08-05 NOTE — PROGRESS NOTES
CT abd/pelvis reviewed. I have called Dr Krys Cazares, IR, and he will place abscess drain in AM.  Plavix/ASA/Lovenox held for this and PT/INR ordered.

## 2023-08-05 NOTE — PROGRESS NOTES
95%  93%   Weight:       Height:             Physical Exam:      General: NAD  Eyes: EOMI  ENT: neck supple  Cardiovascular: Regular rate. Respiratory: Clear to auscultation  Gastrointestinal: Soft, non tender  Genitourinary: no suprapubic tenderness  Musculoskeletal: No edema  Skin: warm, dry  Neuro: Alert. Psych: Mood appropriate. Medications:   Medications:    metoprolol tartrate  12.5 mg Oral BID    methocarbamol  750 mg Oral 4x Daily    ezetimibe  10 mg Oral Daily    ferrous sulfate  325 mg Oral BID    pantoprazole  40 mg Oral QAM AC    [Held by provider] aspirin  81 mg Oral Daily    [Held by provider] clopidogrel  75 mg Oral Daily    sodium chloride flush  5-40 mL IntraVENous 2 times per day    meropenem  1,000 mg IntraVENous Q8H    atorvastatin  80 mg Oral Nightly      Infusions:    sodium chloride      sodium chloride 75 mL/hr at 08/04/23 2147     PRN Meds: sodium chloride flush, 5-40 mL, PRN  sodium chloride, , PRN  acetaminophen, 650 mg, Q6H PRN   Or  acetaminophen, 650 mg, Q6H PRN  HYDROmorphone, 1 mg, Q3H PRN        Labs and Imaging   CT ABDOMEN PELVIS W WO CONTRAST Additional Contrast? Oral    Result Date: 8/3/2023  EXAMINATION: CT OF THE ABDOMEN AND PELVIS WITH AND WITHOUT CONTRAST 8/3/2023 8:10 am TECHNIQUE: CT of the abdomen and pelvis was performed with and without the administration of intravenous contrast.  Multiplanar reformatted images are provided for review. Automated exposure control, iterative reconstruction, and/or weight based adjustment of the mA/kV was utilized to reduce the radiation dose to as low as reasonably achievable. COMPARISON: None.  HISTORY: ORDERING SYSTEM PROVIDED HISTORY: Abdominal mass, left upper quadrant TECHNOLOGIST PROVIDED HISTORY: Additional Contrast?->Oral STAT Creatinine as needed:->No Reason for exam:->abnormal ultrasound Reason for Exam: abnormal ultrasound, abd mass, LUQ,  surgery  July 10th, triple bypass,  unable to raise arms for scan,  palapable skin thickening. Indeterminate 9.2 x 4.5 x 4.2 cm complex hypoechoic area with increased vascularity within the left flank corresponding to the palpable area of interest.  Recommend further evaluation with contrast-enhanced CT. The findings were sent to the Radiology Results [address] at 3:32 pm on 7/31/2023 to be communicated to a licensed caregiver. CBC:   Recent Labs     08/04/23  1730 08/05/23  0502   WBC 16.7* 15.1*   HGB 10.6* 9.1*   * 631*     BMP:    Recent Labs     08/04/23  1730 08/05/23  0501   * 136   K 4.0 4.0   CL 95* 102   CO2 23 23   BUN 7 5*   CREATININE <0.5* <0.5*   GLUCOSE 204* 178*     Hepatic:   Recent Labs     08/04/23  1730   AST 12*   ALT 24   BILITOT 0.3   ALKPHOS 106     Lipids:   Lab Results   Component Value Date/Time    CHOL 114 07/10/2023 05:00 AM    HDL 37 07/10/2023 05:00 AM    TRIG 108 07/10/2023 05:00 AM     Hemoglobin A1C:   Lab Results   Component Value Date/Time    LABA1C 12.2 07/10/2023 05:00 AM     TSH:   Lab Results   Component Value Date/Time    TSH 0.98 05/14/2021 10:34 AM     Troponin:   Lab Results   Component Value Date/Time    TROPONINT <0.03 08/30/2015 08:30 AM    TROPONINT <0.03 02/21/2015 04:28 PM     Lactic Acid: No results for input(s): LACTA in the last 72 hours. BNP: No results for input(s): PROBNP in the last 72 hours.   UA:  Lab Results   Component Value Date/Time    NITRU Negative 08/04/2023 11:48 PM    COLORU Yellow 08/04/2023 11:48 PM    PHUR 5.5 08/04/2023 11:48 PM    WBCUA 0-2 08/04/2023 11:48 PM    RBCUA 0-2 08/04/2023 11:48 PM    MUCUS NOT REPORTED 02/21/2015 12:36 AM    TRICHOMONAS NOT REPORTED 02/21/2015 12:36 AM    YEAST Present 08/04/2023 11:48 PM    BACTERIA Rare 07/09/2023 10:58 AM    CLARITYU Clear 08/04/2023 11:48 PM    SPECGRAV 1.010 08/04/2023 11:48 PM    LEUKOCYTESUR Negative 08/04/2023 11:48 PM    UROBILINOGEN 1.0 08/04/2023 11:48 PM    BILIRUBINUR Negative 08/04/2023 11:48 PM    BLOODU TRACE-INTACT

## 2023-08-05 NOTE — BRIEF OP NOTE
Brief Postoperative Note    Ami Gutierrez  YOB: 1990  [id number]    Pre-operative Diagnosis: Left perinephric abscess    Post-operative Diagnosis: Same    Procedure: CT-guided left perinephric abscess drain placement    Anesthesia: Moderate Sedation    Surgeons: Sascha Bermudez MD    Estimated Blood Loss: Less than 5 mL    Complications: None    Specimens: Was Obtained: 130 cc purulent fluid drained    Findings: Successful CT-guided left perinephric abscess drain placement.     Electronically signed by Sascha Bermudez MD on 8/5/2023 at 10:21 AM

## 2023-08-05 NOTE — PROGRESS NOTES
Patient admitted to room 426 from ED. Patient oriented to room, call light, bed rails, phone, lights and bathroom. Patient instructed about the schedule of the day including: vital sign frequency, lab draws, possible tests, frequency of MD and staff rounds, including RN/MD rounding together at bedside, daily weights, and I &O's. Patient instructed about prescribed diet, how to use 8MENU, and television. Patient up ad kristina, calls out appropriately. Telemetry box in place, patient aware of placement and reason. Bed locked, in lowest position, side rails up 2/4, call light within reach. Will continue to monitor.

## 2023-08-05 NOTE — PLAN OF CARE
Problem: Discharge Planning  Goal: Discharge to home or other facility with appropriate resources  Outcome: Progressing     Problem: Pain  Goal: Verbalizes/displays adequate comfort level or baseline comfort level  Outcome: Progressing     Problem: Skin/Tissue Integrity  Goal: Absence of new skin breakdown  Description: 1.   Monitor for areas of redness and/or skin breakdown  Outcome: Progressing

## 2023-08-05 NOTE — PRE SEDATION
If BLOOD SUGAR=300-349, give 5 units. If BLOOD SUGAR is greater than 349, give 6 units and call physician. Historical Provider, MD   Insulin Glargine (LANTUS SOLOSTAR SC) Inject 15 Units into the skin nightly  Patient not taking: Reported on 7/25/2023    Historical Provider, MD   empagliflozin (JARDIANCE) 25 MG tablet Take 1 tablet by mouth daily  Patient not taking: Reported on 8/4/2023    Historical Provider, MD   ezetimibe (ZETIA) 10 MG tablet Take 1 tablet by mouth daily    Historical Provider, MD   dulaglutide (TRULICITY) 1.5 TP/8.5TZ SC injection Inject 0.5 mLs into the skin once a week  Patient not taking: Reported on 7/25/2023    Historical Provider, MD   omeprazole (PRILOSEC) 40 MG delayed release capsule Take 1 capsule by mouth 2 times daily (before meals)    Historical Provider, MD   prochlorperazine (COMPAZINE) 5 MG tablet Take 1 tablet by mouth every 6 hours as needed for Nausea  Patient not taking: Reported on 7/25/2023    Historical Provider, MD   acetaminophen (TYLENOL) 500 MG tablet Take 2 tablets by mouth 3 times daily as needed for Pain 5/6/21   Maria Elena Kenney DO   atorvastatin (LIPITOR) 80 MG tablet Take 1 tablet by mouth nightly. 2/21/15   Dagoberto Anderson DO   Blood Glucose Monitoring Suppl (BLOOD GLUCOSE MONITOR KIT) KIT Check bs bid  Patient not taking: Reported on 7/25/2023 2/21/15   Glendell Bence P Blood, DO     Coumadin Use Last 7 Days:  no  Antiplatelet drug therapy use last 7 days: no  Other anticoagulant use last 7 days: no  Additional Medication Information:  n/a      Pre-Sedation Documentation and Exam:   I have reviewed the patient's history and review of systems.     Mallampati Airway Assessment:  Mallampati Class II - (soft palate, fauces & uvula are visible)    Prior History of Anesthesia Complications:   none    ASA Classification:  Class 2 - A normal healthy patient with mild systemic disease    Sedation/ Anesthesia Plan:   intravenous sedation    Medications Planned:

## 2023-08-05 NOTE — PROGRESS NOTES
4 Eyes Skin Assessment     NAME:  Seamus Blanco OF BIRTH:  1990  MEDICAL RECORD NUMBER:  [id number]    The patient is being assessed for  Admission    I agree that at least one RN has performed a thorough Head to Toe Skin Assessment on the patient. ALL assessment sites listed below have been assessed. Areas assessed by both nurses:    Head, Face, Ears, Shoulders, Back, Chest, Arms, Elbows, Hands, Sacrum. Buttock, Coccyx, Ischium, Legs. Feet and Heels, and Under Medical Devices         Does the Patient have a Wound?  No noted wound(s)       Charles Prevention initiated by RN: No  Wound Care Orders initiated by RN: No    Pressure Injury (Stage 3,4, Unstageable, DTI, NWPT, and Complex wounds) if present, place Wound referral order by RN under : No    New Ostomies, if present place, Ostomy referral order under : No     Nurse 1 eSignature: Electronically signed by Ramandeep Hurley RN on 8/4/23 at 10:48 PM EDT    **SHARE this note so that the co-signing nurse can place an eSignature**    Nurse 2 eSignature: Electronically signed by Dimitris Johnson RN on 8/4/23 at 10:49 PM EDT

## 2023-08-06 PROBLEM — R65.20 SEVERE SEPSIS (HCC): Status: ACTIVE | Noted: 2023-08-06

## 2023-08-06 PROBLEM — N15.1 RENAL AND PERINEPHRIC ABSCESS: Status: ACTIVE | Noted: 2023-08-06

## 2023-08-06 PROBLEM — R93.5 ABNORMAL CT OF THE ABDOMEN: Status: ACTIVE | Noted: 2023-08-06

## 2023-08-06 PROBLEM — A41.9 SEVERE SEPSIS (HCC): Status: ACTIVE | Noted: 2023-08-06

## 2023-08-06 PROBLEM — D72.828 NEUTROPHILIA: Status: ACTIVE | Noted: 2023-08-06

## 2023-08-06 PROBLEM — A49.8 PROTEUS INFECTION: Status: ACTIVE | Noted: 2023-08-06

## 2023-08-06 PROBLEM — N20.0 NEPHROLITHIASIS: Status: ACTIVE | Noted: 2023-08-06

## 2023-08-06 PROBLEM — D72.9 NEUTROPHILIA: Status: ACTIVE | Noted: 2023-08-06

## 2023-08-06 PROBLEM — A41.9 SEPTICEMIA (HCC): Status: ACTIVE | Noted: 2023-08-06

## 2023-08-06 PROBLEM — R50.9 FEVER AND CHILLS: Status: ACTIVE | Noted: 2023-08-06

## 2023-08-06 PROBLEM — E11.65 POORLY CONTROLLED DIABETES MELLITUS (HCC): Status: ACTIVE | Noted: 2023-08-06

## 2023-08-06 LAB
ANION GAP SERPL CALCULATED.3IONS-SCNC: 10 MMOL/L (ref 3–16)
BACTERIA UR CULT: NORMAL
BASOPHILS # BLD: 0 K/UL (ref 0–0.2)
BASOPHILS NFR BLD: 0.3 %
BUN SERPL-MCNC: 9 MG/DL (ref 7–20)
CALCIUM SERPL-MCNC: 8.2 MG/DL (ref 8.3–10.6)
CHLORIDE SERPL-SCNC: 100 MMOL/L (ref 99–110)
CO2 SERPL-SCNC: 25 MMOL/L (ref 21–32)
CREAT SERPL-MCNC: <0.5 MG/DL (ref 0.6–1.1)
DEPRECATED RDW RBC AUTO: 18.1 % (ref 12.4–15.4)
EOSINOPHIL # BLD: 0.2 K/UL (ref 0–0.6)
EOSINOPHIL NFR BLD: 1.8 %
GFR SERPLBLD CREATININE-BSD FMLA CKD-EPI: >60 ML/MIN/{1.73_M2}
GLUCOSE BLD-MCNC: 172 MG/DL (ref 70–99)
GLUCOSE BLD-MCNC: 226 MG/DL (ref 70–99)
GLUCOSE BLD-MCNC: 267 MG/DL (ref 70–99)
GLUCOSE BLD-MCNC: 294 MG/DL (ref 70–99)
GLUCOSE SERPL-MCNC: 250 MG/DL (ref 70–99)
HCT VFR BLD AUTO: 29.7 % (ref 36–48)
HGB BLD-MCNC: 9.2 G/DL (ref 12–16)
HIV 1+2 AB+HIV1 P24 AG SERPL QL IA: NORMAL
HIV 2 AB SERPL QL IA: NORMAL
HIV1 AB SERPL QL IA: NORMAL
HIV1 P24 AG SERPL QL IA: NORMAL
LYMPHOCYTES # BLD: 1.9 K/UL (ref 1–5.1)
LYMPHOCYTES NFR BLD: 15 %
MCH RBC QN AUTO: 26.3 PG (ref 26–34)
MCHC RBC AUTO-ENTMCNC: 30.9 G/DL (ref 31–36)
MCV RBC AUTO: 85.1 FL (ref 80–100)
MONOCYTES # BLD: 1.1 K/UL (ref 0–1.3)
MONOCYTES NFR BLD: 8.2 %
NEUTROPHILS # BLD: 9.6 K/UL (ref 1.7–7.7)
NEUTROPHILS NFR BLD: 74.7 %
PERFORMED ON: ABNORMAL
PLATELET # BLD AUTO: 655 K/UL (ref 135–450)
PMV BLD AUTO: 7.3 FL (ref 5–10.5)
POTASSIUM SERPL-SCNC: 3.8 MMOL/L (ref 3.5–5.1)
RBC # BLD AUTO: 3.49 M/UL (ref 4–5.2)
SODIUM SERPL-SCNC: 135 MMOL/L (ref 136–145)
WBC # BLD AUTO: 12.9 K/UL (ref 4–11)

## 2023-08-06 PROCEDURE — 36415 COLL VENOUS BLD VENIPUNCTURE: CPT

## 2023-08-06 PROCEDURE — 6360000002 HC RX W HCPCS: Performed by: INTERNAL MEDICINE

## 2023-08-06 PROCEDURE — 85025 COMPLETE CBC W/AUTO DIFF WBC: CPT

## 2023-08-06 PROCEDURE — 80048 BASIC METABOLIC PNL TOTAL CA: CPT

## 2023-08-06 PROCEDURE — 2060000000 HC ICU INTERMEDIATE R&B

## 2023-08-06 PROCEDURE — 6370000000 HC RX 637 (ALT 250 FOR IP): Performed by: HOSPITALIST

## 2023-08-06 PROCEDURE — 2580000003 HC RX 258: Performed by: HOSPITALIST

## 2023-08-06 PROCEDURE — 6360000002 HC RX W HCPCS: Performed by: HOSPITALIST

## 2023-08-06 RX ORDER — FLUCONAZOLE 2 MG/ML
400 INJECTION, SOLUTION INTRAVENOUS EVERY 24 HOURS
Status: DISCONTINUED | OUTPATIENT
Start: 2023-08-06 | End: 2023-08-14

## 2023-08-06 RX ORDER — INSULIN LISPRO 100 [IU]/ML
0-8 INJECTION, SOLUTION INTRAVENOUS; SUBCUTANEOUS
Status: DISCONTINUED | OUTPATIENT
Start: 2023-08-06 | End: 2023-08-17 | Stop reason: HOSPADM

## 2023-08-06 RX ORDER — INSULIN LISPRO 100 [IU]/ML
0-4 INJECTION, SOLUTION INTRAVENOUS; SUBCUTANEOUS NIGHTLY
Status: DISCONTINUED | OUTPATIENT
Start: 2023-08-06 | End: 2023-08-17 | Stop reason: HOSPADM

## 2023-08-06 RX ORDER — DEXTROSE MONOHYDRATE 100 MG/ML
INJECTION, SOLUTION INTRAVENOUS CONTINUOUS PRN
Status: DISCONTINUED | OUTPATIENT
Start: 2023-08-06 | End: 2023-08-17 | Stop reason: HOSPADM

## 2023-08-06 RX ORDER — INSULIN GLARGINE 100 [IU]/ML
0.25 INJECTION, SOLUTION SUBCUTANEOUS NIGHTLY
Status: DISCONTINUED | OUTPATIENT
Start: 2023-08-06 | End: 2023-08-09

## 2023-08-06 RX ORDER — INSULIN LISPRO 100 [IU]/ML
0.08 INJECTION, SOLUTION INTRAVENOUS; SUBCUTANEOUS
Status: DISCONTINUED | OUTPATIENT
Start: 2023-08-06 | End: 2023-08-09

## 2023-08-06 RX ADMIN — INSULIN LISPRO 4 UNITS: 100 INJECTION, SOLUTION INTRAVENOUS; SUBCUTANEOUS at 11:31

## 2023-08-06 RX ADMIN — OXYCODONE HYDROCHLORIDE AND ACETAMINOPHEN 2 TABLET: 5; 325 TABLET ORAL at 04:41

## 2023-08-06 RX ADMIN — METOPROLOL TARTRATE 12.5 MG: 25 TABLET, FILM COATED ORAL at 20:13

## 2023-08-06 RX ADMIN — ATORVASTATIN CALCIUM 80 MG: 80 TABLET, FILM COATED ORAL at 20:13

## 2023-08-06 RX ADMIN — OXYCODONE HYDROCHLORIDE AND ACETAMINOPHEN 2 TABLET: 5; 325 TABLET ORAL at 13:35

## 2023-08-06 RX ADMIN — PANTOPRAZOLE SODIUM 40 MG: 40 TABLET, DELAYED RELEASE ORAL at 08:37

## 2023-08-06 RX ADMIN — MEROPENEM 1000 MG: 1 INJECTION, POWDER, FOR SOLUTION INTRAVENOUS at 22:23

## 2023-08-06 RX ADMIN — OXYCODONE HYDROCHLORIDE AND ACETAMINOPHEN 2 TABLET: 5; 325 TABLET ORAL at 08:37

## 2023-08-06 RX ADMIN — SODIUM CHLORIDE: 9 INJECTION, SOLUTION INTRAVENOUS at 04:44

## 2023-08-06 RX ADMIN — SODIUM CHLORIDE, PRESERVATIVE FREE 10 ML: 5 INJECTION INTRAVENOUS at 08:40

## 2023-08-06 RX ADMIN — METHOCARBAMOL 750 MG: 500 TABLET ORAL at 16:18

## 2023-08-06 RX ADMIN — METHOCARBAMOL 750 MG: 500 TABLET ORAL at 08:37

## 2023-08-06 RX ADMIN — FERROUS SULFATE TAB 325 MG (65 MG ELEMENTAL FE) 325 MG: 325 (65 FE) TAB at 20:13

## 2023-08-06 RX ADMIN — OXYCODONE HYDROCHLORIDE AND ACETAMINOPHEN 2 TABLET: 5; 325 TABLET ORAL at 20:13

## 2023-08-06 RX ADMIN — CLOPIDOGREL BISULFATE 75 MG: 75 TABLET ORAL at 08:39

## 2023-08-06 RX ADMIN — INSULIN LISPRO 6 UNITS: 100 INJECTION, SOLUTION INTRAVENOUS; SUBCUTANEOUS at 11:31

## 2023-08-06 RX ADMIN — HYDROMORPHONE HYDROCHLORIDE 1 MG: 1 INJECTION, SOLUTION INTRAMUSCULAR; INTRAVENOUS; SUBCUTANEOUS at 00:17

## 2023-08-06 RX ADMIN — INSULIN GLARGINE 17 UNITS: 100 INJECTION, SOLUTION SUBCUTANEOUS at 22:21

## 2023-08-06 RX ADMIN — MEROPENEM 1000 MG: 1 INJECTION, POWDER, FOR SOLUTION INTRAVENOUS at 06:39

## 2023-08-06 RX ADMIN — MEROPENEM 1000 MG: 1 INJECTION, POWDER, FOR SOLUTION INTRAVENOUS at 13:41

## 2023-08-06 RX ADMIN — EZETIMIBE 10 MG: 10 TABLET ORAL at 08:37

## 2023-08-06 RX ADMIN — FLUCONAZOLE 400 MG: 400 INJECTION, SOLUTION INTRAVENOUS at 04:09

## 2023-08-06 RX ADMIN — METHOCARBAMOL 750 MG: 500 TABLET ORAL at 20:12

## 2023-08-06 RX ADMIN — ASPIRIN 81 MG: 81 TABLET, COATED ORAL at 08:39

## 2023-08-06 RX ADMIN — METOPROLOL TARTRATE 12.5 MG: 25 TABLET, FILM COATED ORAL at 08:38

## 2023-08-06 RX ADMIN — FERROUS SULFATE TAB 325 MG (65 MG ELEMENTAL FE) 325 MG: 325 (65 FE) TAB at 08:37

## 2023-08-06 RX ADMIN — SODIUM CHLORIDE: 9 INJECTION, SOLUTION INTRAVENOUS at 18:08

## 2023-08-06 RX ADMIN — METHOCARBAMOL 750 MG: 500 TABLET ORAL at 13:34

## 2023-08-06 RX ADMIN — INSULIN LISPRO 6 UNITS: 100 INJECTION, SOLUTION INTRAVENOUS; SUBCUTANEOUS at 16:54

## 2023-08-06 ASSESSMENT — PAIN DESCRIPTION - PAIN TYPE
TYPE: ACUTE PAIN

## 2023-08-06 ASSESSMENT — PAIN DESCRIPTION - ORIENTATION
ORIENTATION: LEFT;POSTERIOR

## 2023-08-06 ASSESSMENT — PAIN DESCRIPTION - DESCRIPTORS
DESCRIPTORS: DISCOMFORT;SORE
DESCRIPTORS: SORE
DESCRIPTORS: DISCOMFORT;SORE
DESCRIPTORS: DISCOMFORT;SORE

## 2023-08-06 ASSESSMENT — PAIN SCALES - GENERAL
PAINLEVEL_OUTOF10: 8
PAINLEVEL_OUTOF10: 10
PAINLEVEL_OUTOF10: 9
PAINLEVEL_OUTOF10: 9
PAINLEVEL_OUTOF10: 10
PAINLEVEL_OUTOF10: 10
PAINLEVEL_OUTOF10: 8
PAINLEVEL_OUTOF10: 9
PAINLEVEL_OUTOF10: 10

## 2023-08-06 ASSESSMENT — PAIN DESCRIPTION - LOCATION
LOCATION: FLANK;BACK
LOCATION: FLANK
LOCATION: FLANK
LOCATION: BACK;FLANK
LOCATION: FLANK;BACK
LOCATION: FLANK
LOCATION: FLANK;BACK
LOCATION: FLANK

## 2023-08-06 ASSESSMENT — PAIN - FUNCTIONAL ASSESSMENT
PAIN_FUNCTIONAL_ASSESSMENT: PREVENTS OR INTERFERES WITH MANY ACTIVE NOT PASSIVE ACTIVITIES
PAIN_FUNCTIONAL_ASSESSMENT: PREVENTS OR INTERFERES WITH MANY ACTIVE NOT PASSIVE ACTIVITIES

## 2023-08-06 NOTE — PLAN OF CARE
Problem: Discharge Planning  Goal: Discharge to home or other facility with appropriate resources  Outcome: Progressing     Problem: Pain  Goal: Verbalizes/displays adequate comfort level or baseline comfort level  Outcome: Progressing     Problem: Skin/Tissue Integrity  Goal: Absence of new skin breakdown  Outcome: Progressing

## 2023-08-06 NOTE — PROGRESS NOTES
V2.0    [facility] Progress Note      Name:  Marcello Laureano /Age/Sex: 1990  (28 y.o. female)   MRN & CSN:  [id number] & [id number] Encounter Date/Time: 2023 9:34 AM EDT   Location:  [address] [place] [address] PCP: No primary care provider on file. Ana Paul MD       Hospital Day: 3    Assessment and Recommendations   Marcello Laureano is a 28 y.o. female with a pmh of CAD s/p CABG 23, hypertension, DM type II who presents with Renal abscess      Plan:   -Large left renal abscess with posterior treated for extension into the left psoas and paraspinal musculature due to Proteus mirabilis  -Sepsis due to above--with leukocytosis 16, sinus tachycardia 117, fever 102  -Recent UTI clinically. Lives  S/p CT guided percutaneous drain placement  by IR  Urology consult appreciated  Drain cultures positive for Proteus  Antibiotics--IV vancomycin dc'd  -c/w IV  meropenem + fluconazole per [facility] Goodman Follow-up on culture data  Repeat CT abdomen/pelvis in the few days      Multivessel CAD s/p CABG 7/10/23  Ischemic cardiomyopathy  Hypertension  Hyperlipidemia  Stable. On beta-blocker, antiplatelets,statin-        Diabetes mellitus type 2,uncontrolled ,Hba1c   12.2    Compliant with prior insulin regimen, had been off all medications  Initiated basal bolus insulin, diabetic medication      Diet ADULT DIET; Regular; 4 carb choices (60 gm/meal); Low Fat/Low Chol/High Fiber/2 gm Na   DVT Prophylaxis [] Lovenox, []  Heparin, [] SCDs, [] Ambulation,  [] Eliquis, [] Xarelto  [] Coumadin   Code Status Full Code       Surrogate Decision Maker/ POA               Subjective:     Patient feels better today. Abdominal pain is improving. No nausea vomiting. Review of Systems:      Pertinent positives and negatives discussed in HPI    Objective:      Intake/Output Summary (Last 24 hours) at 2023 0913  Last data filed at 2023 2238  Gross per 24 hour   Intake 2215 ml   Output 205 ml   Net

## 2023-08-06 NOTE — PLAN OF CARE
Problem: Discharge Planning  Goal: Discharge to home or other facility with appropriate resources  8/6/2023 0031 by Heron Huffman RN  Outcome: Progressing  Note: NICHOLAS drain placed today by IR. Receiving antibiotics as ordered. ID consult pending. Problem: Pain  Goal: Verbalizes/displays adequate comfort level or baseline comfort level  8/6/2023 0031 by Heron Huffman RN  Outcome: Progressing  Note: Aware of available pain medications and able to adequately express changes in pain status to nursing. See MAR for administrations.

## 2023-08-06 NOTE — PROGRESS NOTES
Care assumed from previous RN. A&O, VSS, NSR on tele, assessment complete as documented. Left posterior flank remains quite swollen, NICHOLAS drain in place with small amount of cloudy red output, bulb compressed, dressing clean and dry. Medicated per [facility] with Percocet 5/325 2 tabs for 10/10 pain at drain insertion site. Pt aware of need to call for ambulation assistance if weakness of legs or dizziness occurs. Denies other needs at this time.

## 2023-08-07 ENCOUNTER — APPOINTMENT (OUTPATIENT)
Dept: CT IMAGING | Age: 33
End: 2023-08-07
Payer: COMMERCIAL

## 2023-08-07 ENCOUNTER — APPOINTMENT (OUTPATIENT)
Dept: INTERVENTIONAL RADIOLOGY/VASCULAR | Age: 33
End: 2023-08-07
Payer: COMMERCIAL

## 2023-08-07 LAB
ANION GAP SERPL CALCULATED.3IONS-SCNC: 12 MMOL/L (ref 3–16)
BACTERIA FLD AEROBE CULT: ABNORMAL
BASOPHILS # BLD: 0 K/UL (ref 0–0.2)
BASOPHILS NFR BLD: 0.3 %
BUN SERPL-MCNC: 5 MG/DL (ref 7–20)
CALCIUM SERPL-MCNC: 8.9 MG/DL (ref 8.3–10.6)
CHLORIDE SERPL-SCNC: 99 MMOL/L (ref 99–110)
CO2 SERPL-SCNC: 21 MMOL/L (ref 21–32)
CREAT SERPL-MCNC: <0.5 MG/DL (ref 0.6–1.1)
DEPRECATED RDW RBC AUTO: 17.8 % (ref 12.4–15.4)
EOSINOPHIL # BLD: 0.1 K/UL (ref 0–0.6)
EOSINOPHIL NFR BLD: 1.1 %
GFR SERPLBLD CREATININE-BSD FMLA CKD-EPI: >60 ML/MIN/{1.73_M2}
GLUCOSE BLD-MCNC: 181 MG/DL (ref 70–99)
GLUCOSE BLD-MCNC: 215 MG/DL (ref 70–99)
GLUCOSE BLD-MCNC: 220 MG/DL (ref 70–99)
GLUCOSE SERPL-MCNC: 246 MG/DL (ref 70–99)
GRAM STN SPEC: ABNORMAL
HCT VFR BLD AUTO: 30.8 % (ref 36–48)
HGB BLD-MCNC: 9.8 G/DL (ref 12–16)
LYMPHOCYTES # BLD: 2.1 K/UL (ref 1–5.1)
LYMPHOCYTES NFR BLD: 18.6 %
MCH RBC QN AUTO: 27 PG (ref 26–34)
MCHC RBC AUTO-ENTMCNC: 31.7 G/DL (ref 31–36)
MCV RBC AUTO: 84.9 FL (ref 80–100)
MONOCYTES # BLD: 1.1 K/UL (ref 0–1.3)
MONOCYTES NFR BLD: 9.6 %
NEUTROPHILS # BLD: 8.1 K/UL (ref 1.7–7.7)
NEUTROPHILS NFR BLD: 70.4 %
ORGANISM: ABNORMAL
PERFORMED ON: ABNORMAL
PLATELET # BLD AUTO: 622 K/UL (ref 135–450)
PMV BLD AUTO: 7.1 FL (ref 5–10.5)
POTASSIUM SERPL-SCNC: 4.2 MMOL/L (ref 3.5–5.1)
RBC # BLD AUTO: 3.63 M/UL (ref 4–5.2)
SODIUM SERPL-SCNC: 132 MMOL/L (ref 136–145)
WBC # BLD AUTO: 11.5 K/UL (ref 4–11)

## 2023-08-07 PROCEDURE — 2060000000 HC ICU INTERMEDIATE R&B

## 2023-08-07 PROCEDURE — 6370000000 HC RX 637 (ALT 250 FOR IP): Performed by: HOSPITALIST

## 2023-08-07 PROCEDURE — 02HV33Z INSERTION OF INFUSION DEVICE INTO SUPERIOR VENA CAVA, PERCUTANEOUS APPROACH: ICD-10-PCS | Performed by: HOSPITALIST

## 2023-08-07 PROCEDURE — 2580000003 HC RX 258: Performed by: INTERNAL MEDICINE

## 2023-08-07 PROCEDURE — 99232 SBSQ HOSP IP/OBS MODERATE 35: CPT | Performed by: INTERNAL MEDICINE

## 2023-08-07 PROCEDURE — 6360000004 HC RX CONTRAST MEDICATION: Performed by: PHYSICIAN ASSISTANT

## 2023-08-07 PROCEDURE — 36573 INSJ PICC RS&I 5 YR+: CPT

## 2023-08-07 PROCEDURE — 80048 BASIC METABOLIC PNL TOTAL CA: CPT

## 2023-08-07 PROCEDURE — C1769 GUIDE WIRE: HCPCS

## 2023-08-07 PROCEDURE — 74177 CT ABD & PELVIS W/CONTRAST: CPT

## 2023-08-07 PROCEDURE — 36415 COLL VENOUS BLD VENIPUNCTURE: CPT

## 2023-08-07 PROCEDURE — 6370000000 HC RX 637 (ALT 250 FOR IP): Performed by: INTERNAL MEDICINE

## 2023-08-07 PROCEDURE — 2580000003 HC RX 258: Performed by: HOSPITALIST

## 2023-08-07 PROCEDURE — 6360000002 HC RX W HCPCS: Performed by: HOSPITALIST

## 2023-08-07 PROCEDURE — 6360000002 HC RX W HCPCS: Performed by: INTERNAL MEDICINE

## 2023-08-07 PROCEDURE — 85025 COMPLETE CBC W/AUTO DIFF WBC: CPT

## 2023-08-07 RX ORDER — ONDANSETRON 4 MG/1
4 TABLET, ORALLY DISINTEGRATING ORAL EVERY 6 HOURS PRN
Status: DISCONTINUED | OUTPATIENT
Start: 2023-08-07 | End: 2023-08-17 | Stop reason: HOSPADM

## 2023-08-07 RX ORDER — LIDOCAINE HYDROCHLORIDE 10 MG/ML
5 INJECTION, SOLUTION INFILTRATION; PERINEURAL ONCE
Status: DISCONTINUED | OUTPATIENT
Start: 2023-08-07 | End: 2023-08-17 | Stop reason: HOSPADM

## 2023-08-07 RX ORDER — SODIUM CHLORIDE 0.9 % (FLUSH) 0.9 %
5-40 SYRINGE (ML) INJECTION EVERY 12 HOURS SCHEDULED
Status: DISCONTINUED | OUTPATIENT
Start: 2023-08-07 | End: 2023-08-17 | Stop reason: HOSPADM

## 2023-08-07 RX ORDER — SODIUM CHLORIDE 0.9 % (FLUSH) 0.9 %
5-40 SYRINGE (ML) INJECTION PRN
Status: DISCONTINUED | OUTPATIENT
Start: 2023-08-07 | End: 2023-08-17 | Stop reason: HOSPADM

## 2023-08-07 RX ORDER — SODIUM CHLORIDE 9 MG/ML
25 INJECTION, SOLUTION INTRAVENOUS PRN
Status: DISCONTINUED | OUTPATIENT
Start: 2023-08-07 | End: 2023-08-16 | Stop reason: SDUPTHER

## 2023-08-07 RX ORDER — CIPROFLOXACIN 500 MG/1
750 TABLET, FILM COATED ORAL EVERY 12 HOURS SCHEDULED
Status: DISCONTINUED | OUTPATIENT
Start: 2023-08-07 | End: 2023-08-07

## 2023-08-07 RX ADMIN — ONDANSETRON 4 MG: 4 TABLET, ORALLY DISINTEGRATING ORAL at 12:57

## 2023-08-07 RX ADMIN — OXYCODONE HYDROCHLORIDE AND ACETAMINOPHEN 2 TABLET: 5; 325 TABLET ORAL at 00:26

## 2023-08-07 RX ADMIN — METHOCARBAMOL 750 MG: 500 TABLET ORAL at 09:54

## 2023-08-07 RX ADMIN — CIPROFLOXACIN 750 MG: 500 TABLET, FILM COATED ORAL at 10:06

## 2023-08-07 RX ADMIN — INSULIN GLARGINE 17 UNITS: 100 INJECTION, SOLUTION SUBCUTANEOUS at 21:00

## 2023-08-07 RX ADMIN — FERROUS SULFATE TAB 325 MG (65 MG ELEMENTAL FE) 325 MG: 325 (65 FE) TAB at 09:52

## 2023-08-07 RX ADMIN — OXYCODONE HYDROCHLORIDE AND ACETAMINOPHEN 2 TABLET: 5; 325 TABLET ORAL at 21:00

## 2023-08-07 RX ADMIN — Medication 10 ML: at 21:03

## 2023-08-07 RX ADMIN — FLUCONAZOLE 400 MG: 400 INJECTION, SOLUTION INTRAVENOUS at 03:44

## 2023-08-07 RX ADMIN — INSULIN LISPRO 6 UNITS: 100 INJECTION, SOLUTION INTRAVENOUS; SUBCUTANEOUS at 09:58

## 2023-08-07 RX ADMIN — OXYCODONE HYDROCHLORIDE AND ACETAMINOPHEN 2 TABLET: 5; 325 TABLET ORAL at 14:05

## 2023-08-07 RX ADMIN — EZETIMIBE 10 MG: 10 TABLET ORAL at 09:53

## 2023-08-07 RX ADMIN — SODIUM CHLORIDE, PRESERVATIVE FREE 10 ML: 5 INJECTION INTRAVENOUS at 21:03

## 2023-08-07 RX ADMIN — METHOCARBAMOL 750 MG: 500 TABLET ORAL at 12:57

## 2023-08-07 RX ADMIN — FERROUS SULFATE TAB 325 MG (65 MG ELEMENTAL FE) 325 MG: 325 (65 FE) TAB at 21:00

## 2023-08-07 RX ADMIN — METOPROLOL TARTRATE 12.5 MG: 25 TABLET, FILM COATED ORAL at 09:53

## 2023-08-07 RX ADMIN — METHOCARBAMOL 750 MG: 500 TABLET ORAL at 17:47

## 2023-08-07 RX ADMIN — CEFTRIAXONE SODIUM 2000 MG: 2 INJECTION, POWDER, FOR SOLUTION INTRAMUSCULAR; INTRAVENOUS at 14:05

## 2023-08-07 RX ADMIN — METOPROLOL TARTRATE 12.5 MG: 25 TABLET, FILM COATED ORAL at 21:00

## 2023-08-07 RX ADMIN — ATORVASTATIN CALCIUM 80 MG: 80 TABLET, FILM COATED ORAL at 21:00

## 2023-08-07 RX ADMIN — MEROPENEM 1000 MG: 1 INJECTION, POWDER, FOR SOLUTION INTRAVENOUS at 06:07

## 2023-08-07 RX ADMIN — IOPAMIDOL 75 ML: 755 INJECTION, SOLUTION INTRAVENOUS at 16:01

## 2023-08-07 RX ADMIN — OXYCODONE HYDROCHLORIDE AND ACETAMINOPHEN 2 TABLET: 5; 325 TABLET ORAL at 09:00

## 2023-08-07 RX ADMIN — INSULIN LISPRO 6 UNITS: 100 INJECTION, SOLUTION INTRAVENOUS; SUBCUTANEOUS at 17:55

## 2023-08-07 RX ADMIN — ASPIRIN 81 MG: 81 TABLET, COATED ORAL at 09:51

## 2023-08-07 RX ADMIN — PANTOPRAZOLE SODIUM 40 MG: 40 TABLET, DELAYED RELEASE ORAL at 09:50

## 2023-08-07 RX ADMIN — METHOCARBAMOL 750 MG: 500 TABLET ORAL at 21:00

## 2023-08-07 RX ADMIN — OXYCODONE HYDROCHLORIDE AND ACETAMINOPHEN 2 TABLET: 5; 325 TABLET ORAL at 04:13

## 2023-08-07 RX ADMIN — INSULIN LISPRO 2 UNITS: 100 INJECTION, SOLUTION INTRAVENOUS; SUBCUTANEOUS at 17:55

## 2023-08-07 RX ADMIN — CLOPIDOGREL BISULFATE 75 MG: 75 TABLET ORAL at 09:52

## 2023-08-07 ASSESSMENT — PAIN SCALES - GENERAL
PAINLEVEL_OUTOF10: 10
PAINLEVEL_OUTOF10: 7
PAINLEVEL_OUTOF10: 10
PAINLEVEL_OUTOF10: 5
PAINLEVEL_OUTOF10: 10
PAINLEVEL_OUTOF10: 8
PAINLEVEL_OUTOF10: 8
PAINLEVEL_OUTOF10: 10
PAINLEVEL_OUTOF10: 9
PAINLEVEL_OUTOF10: 8

## 2023-08-07 ASSESSMENT — PAIN - FUNCTIONAL ASSESSMENT: PAIN_FUNCTIONAL_ASSESSMENT: PREVENTS OR INTERFERES SOME ACTIVE ACTIVITIES AND ADLS

## 2023-08-07 ASSESSMENT — PAIN DESCRIPTION - LOCATION
LOCATION: FLANK
LOCATION: FLANK
LOCATION: BACK;FLANK
LOCATION: FLANK
LOCATION: BACK;FLANK

## 2023-08-07 ASSESSMENT — PAIN DESCRIPTION - DESCRIPTORS: DESCRIPTORS: ACHING;DISCOMFORT;SORE

## 2023-08-07 ASSESSMENT — PAIN DESCRIPTION - ORIENTATION
ORIENTATION: LEFT;POSTERIOR

## 2023-08-07 ASSESSMENT — PAIN DESCRIPTION - PAIN TYPE: TYPE: ACUTE PAIN

## 2023-08-07 NOTE — PLAN OF CARE
Problem: Discharge Planning  Goal: Discharge to home or other facility with appropriate resources  Outcome: Progressing     Problem: Pain  Goal: Verbalizes/displays adequate comfort level or baseline comfort level  Outcome: Progressing     Problem: Skin/Tissue Integrity  Goal: Absence of new skin breakdown  Outcome: Progressing     Problem: Chronic Conditions and Co-morbidities  Goal: Patient's chronic conditions and co-morbidity symptoms are monitored and maintained or improved  Outcome: Progressing

## 2023-08-07 NOTE — CARE COORDINATION
Patient has no primary contacts selected. Chart reviewed. Patient would not arouse to participate in full CM assessment. Writer made several attempts to verbally arouse without success. Patient was recently discharged home after CABG in July with [facility]. Patient from home in an apartment. Per initial CM assessment on 7/4 she was IPTA . She has no PCP listed on facesheet. Moi placed to Mike Toscano at [facility] to see if they are still active and who was signing [address] orders, left VM. May have been CTS since she was post CABG at time of D/C  last admission. RA screen not completed. Unable to speak with patient. Will attempt to see in AM. Here for renal abscess in setting of recent proteus UTI. CT guided percutaneous drain placement in 8/5.

## 2023-08-07 NOTE — PROGRESS NOTES
V2.0    [facility] Progress Note      Name:  Baldomero Schultz /Age/Sex: 1990  (28 y.o. female)   MRN & CSN:  [id number] & [id number] Encounter Date/Time: 2023 9:34 AM EDT   Location:  [address] [place] [address] PCP: No primary care provider on file. Elda Mitchell MD       Hospital Day: 4    Assessment and Recommendations   Baldomero Schultz is a 28 y.o. female with a pmh of CAD s/p CABG 23, hypertension, DM type II who presents with Renal abscess      Plan:   -Large left renal abscess with posterior treated for extension into the left psoas and paraspinal musculature due to Proteus mirabilis  -Sepsis due renal abscess  -Recent proteus UTI  S/p CT guided percutaneous drain placement  by IR  Urology consult appreciated  Drain cultures positive for Proteus  Antibiotics--IV vancomycin dc'd  -c/w IV  meropenem + fluconazole per ID. Karo Guajardo Follow-up on culture data  Repeat CT abdomen/pelvis today per urology     Multivessel CAD s/p CABG 7/10/23  Ischemic cardiomyopathy  Hypertension  Hyperlipidemia  Stable. On beta-blocker, antiplatelets,statin-        Diabetes mellitus type 2,uncontrolled ,Hba1c   12.2    Was Non Compliant with prior insulin regimen, had been off all medications  Initiated basal bolus insulin, diabetic medication-      Diet Diet NPO   DVT Prophylaxis [] Lovenox, []  Heparin, [] SCDs, [] Ambulation,  [] Eliquis, [] Xarelto  [] Coumadin   Code Status Full Code       Surrogate Decision Maker/ POA               Subjective: Had episode of nausea and vomiting today. Continues to have persistent left flank pain. .  No fevers or chills        Review of Systems:      Pertinent positives and negatives discussed in HPI    Objective:      Intake/Output Summary (Last 24 hours) at 2023 1003  Last data filed at 2023 0813  Gross per 24 hour   Intake 2960 ml   Output 50 ml   Net 2910 ml        Vitals:   Vitals:    23 0419 23 0500 23 0834 23 0930   BP: and left posterior paraspinal musculature/subcutaneous tissues. 2. Nonobstructing bilateral nephrolithiasis. US SOFT TISSUE LIMITED AREA    Result Date: 8/2/2023  EXAMINATION: SOFT TISSUE ULTRASOUND 7/31/2023 2:55 pm COMPARISON: None. HISTORY: ORDERING SYSTEM PROVIDED HISTORY: Abdominal mass, left upper quadrant TECHNOLOGIST PROVIDED HISTORY: Reason for exam:->mass Initial encounter FINDINGS: Focused ultrasound of the left flank was performed in the palpable area of interest.  There is an indeterminate hypoechoic area measuring 9.2 x 4.5 x 4.2 cm with areas of increased vascularity. There is mild subcutaneous edema and skin thickening. Indeterminate 9.2 x 4.5 x 4.2 cm complex hypoechoic area with increased vascularity within the left flank corresponding to the palpable area of interest.  Recommend further evaluation with contrast-enhanced CT. The findings were sent to the Radiology Results [facility] at 3:32 pm on 7/31/2023 to be communicated to a licensed caregiver.        CBC:   Recent Labs     08/05/23  0502 08/06/23  0654 08/07/23  0721   WBC 15.1* 12.9* 11.5*   HGB 9.1* 9.2* 9.8*   * 655* 622*       BMP:    Recent Labs     08/05/23  0501 08/06/23  0654 08/07/23  0454    135* 132*   K 4.0 3.8 4.2    100 99   CO2 23 25 21   BUN 5* 9 5*   CREATININE <0.5* <0.5* <0.5*   GLUCOSE 178* 250* 246*       Hepatic:   Recent Labs     08/04/23  1730   AST 12*   ALT 24   BILITOT 0.3   ALKPHOS 106       Lipids:   Lab Results   Component Value Date/Time    CHOL 114 07/10/2023 05:00 AM    HDL 37 07/10/2023 05:00 AM    TRIG 108 07/10/2023 05:00 AM     Hemoglobin A1C:   Lab Results   Component Value Date/Time    LABA1C 12.2 07/10/2023 05:00 AM     TSH:   Lab Results   Component Value Date/Time    TSH 0.98 05/14/2021 10:34 AM     Troponin:   Lab Results   Component Value Date/Time    TROPONINT <0.03 08/30/2015 08:30 AM    TROPONINT <0.03 02/21/2015 04:28 PM     Lactic Acid: No results for

## 2023-08-07 NOTE — PROCEDURES
PICC/CVC insertion Procedure Note    Myesha Thapa   Admitted- 8/4/2023  5:20 PM  Admission diagnosis- Renal abscess [N15.1]  Septicemia ([address]) [A41.9]      Attending Physician- Colleen Lipscomb MD  Ordering Physician- AMALIA FONSECA  Indication for Insertion: Limited Access    Lab Results   Component Value Date    INR 1.21 (H) 08/05/2023    PROTIME 15.3 (H) 08/05/2023     Lab Results   Component Value Date    WBC 11.5 (H) 08/07/2023    HGB 9.8 (L) 08/07/2023    HCT 30.8 (L) 08/07/2023     (H) 08/07/2023     Lab Results   Component Value Date    CREATININE <0.5 (L) 08/07/2023    BUN 5 (L) 08/07/2023    GFR      05/14/2021    GFR NOT REPORTED 05/14/2021       Catheter Insertion Date- 8/7/2023   Catheter Brand- myMatrixx PowerPICC Provena Catheter  Lot Number- [id number]  Lumen-Single    Insertion Site- Right Brachial  Vein Diameter- 0.39 cm  Bahamian Size- 4  Catheter Length- 44 cm  Exposed Catheter Length- 2cm   PICC Tip Terminates in the Cavo Atrial Junction- Yes  Easy insertion- Yes  Able to Aspirate Blood- Yes  Easy Flush- Yes    PICC Placement Confirmation- Vein Position System ECG  PICC insertion successful- Yes  Ultrasound- yes    Okay To Use PICC- \"Yes    NURSES:  *please replace all existing IV tubing with new IV tubing prior to using the PICC for current IV infusions. *please remove any PIVs from RIGHT arm. *please refrain from obtaining BPs in the RIGHT arm. All of the above may be sources of infection or damage to the PICC line/site.     Electronically signed by Sydnee Ley, RN, RN on 8/7/2023 at 1:11 PM

## 2023-08-07 NOTE — PROGRESS NOTES
Weight up 4 lbs since yesterday, both obtained via standing scale, pt has EF 40-45%. IV fluids paused, then cross cover notified. Per Brian Richey NP, okay to hold fluids until attending has time to review case.

## 2023-08-08 ENCOUNTER — APPOINTMENT (OUTPATIENT)
Dept: CT IMAGING | Age: 33
End: 2023-08-08
Payer: COMMERCIAL

## 2023-08-08 LAB
ANION GAP SERPL CALCULATED.3IONS-SCNC: 8 MMOL/L (ref 3–16)
BACTERIA BLD CULT ORG #2: NORMAL
BACTERIA BLD CULT: NORMAL
BASOPHILS # BLD: 0.1 K/UL (ref 0–0.2)
BASOPHILS NFR BLD: 0.5 %
BUN SERPL-MCNC: 3 MG/DL (ref 7–20)
CALCIUM SERPL-MCNC: 8.2 MG/DL (ref 8.3–10.6)
CHLORIDE SERPL-SCNC: 100 MMOL/L (ref 99–110)
CO2 SERPL-SCNC: 28 MMOL/L (ref 21–32)
CREAT SERPL-MCNC: <0.5 MG/DL (ref 0.6–1.1)
DEPRECATED RDW RBC AUTO: 18.3 % (ref 12.4–15.4)
EOSINOPHIL # BLD: 0.2 K/UL (ref 0–0.6)
EOSINOPHIL NFR BLD: 1.6 %
EST. AVERAGE GLUCOSE BLD GHB EST-MCNC: 251.8 MG/DL
GFR SERPLBLD CREATININE-BSD FMLA CKD-EPI: >60 ML/MIN/{1.73_M2}
GLUCOSE BLD-MCNC: 127 MG/DL (ref 70–99)
GLUCOSE BLD-MCNC: 157 MG/DL (ref 70–99)
GLUCOSE BLD-MCNC: 222 MG/DL (ref 70–99)
GLUCOSE SERPL-MCNC: 143 MG/DL (ref 70–99)
HBA1C MFR BLD: 10.4 %
HCT VFR BLD AUTO: 30.4 % (ref 36–48)
HGB BLD-MCNC: 9.8 G/DL (ref 12–16)
LYMPHOCYTES # BLD: 2.5 K/UL (ref 1–5.1)
LYMPHOCYTES NFR BLD: 23.2 %
MCH RBC QN AUTO: 27.3 PG (ref 26–34)
MCHC RBC AUTO-ENTMCNC: 32.3 G/DL (ref 31–36)
MCV RBC AUTO: 84.7 FL (ref 80–100)
MONOCYTES # BLD: 1.1 K/UL (ref 0–1.3)
MONOCYTES NFR BLD: 9.8 %
NEUTROPHILS # BLD: 7.1 K/UL (ref 1.7–7.7)
NEUTROPHILS NFR BLD: 64.9 %
PERFORMED ON: ABNORMAL
PLATELET # BLD AUTO: 647 K/UL (ref 135–450)
PMV BLD AUTO: 7 FL (ref 5–10.5)
POTASSIUM SERPL-SCNC: 3.4 MMOL/L (ref 3.5–5.1)
RBC # BLD AUTO: 3.6 M/UL (ref 4–5.2)
SODIUM SERPL-SCNC: 136 MMOL/L (ref 136–145)
WBC # BLD AUTO: 10.9 K/UL (ref 4–11)

## 2023-08-08 PROCEDURE — 6360000002 HC RX W HCPCS: Performed by: INTERNAL MEDICINE

## 2023-08-08 PROCEDURE — 2060000000 HC ICU INTERMEDIATE R&B

## 2023-08-08 PROCEDURE — 87186 SC STD MICRODIL/AGAR DIL: CPT

## 2023-08-08 PROCEDURE — 2580000003 HC RX 258: Performed by: INTERNAL MEDICINE

## 2023-08-08 PROCEDURE — 85025 COMPLETE CBC W/AUTO DIFF WBC: CPT

## 2023-08-08 PROCEDURE — 99232 SBSQ HOSP IP/OBS MODERATE 35: CPT | Performed by: INTERNAL MEDICINE

## 2023-08-08 PROCEDURE — 80048 BASIC METABOLIC PNL TOTAL CA: CPT

## 2023-08-08 PROCEDURE — 2580000003 HC RX 258: Performed by: HOSPITALIST

## 2023-08-08 PROCEDURE — 87070 CULTURE OTHR SPECIMN AEROBIC: CPT

## 2023-08-08 PROCEDURE — 0J9830Z DRAINAGE OF ABDOMEN SUBCUTANEOUS TISSUE AND FASCIA WITH DRAINAGE DEVICE, PERCUTANEOUS APPROACH: ICD-10-PCS | Performed by: PSYCHIATRY & NEUROLOGY

## 2023-08-08 PROCEDURE — 77012 CT SCAN FOR NEEDLE BIOPSY: CPT

## 2023-08-08 PROCEDURE — 36415 COLL VENOUS BLD VENIPUNCTURE: CPT

## 2023-08-08 PROCEDURE — 83036 HEMOGLOBIN GLYCOSYLATED A1C: CPT

## 2023-08-08 PROCEDURE — 87205 SMEAR GRAM STAIN: CPT

## 2023-08-08 PROCEDURE — 2709999900 CT ABSCESS DRAINAGE W CATH PLACEMENT S&I

## 2023-08-08 PROCEDURE — 6370000000 HC RX 637 (ALT 250 FOR IP): Performed by: HOSPITALIST

## 2023-08-08 PROCEDURE — 87077 CULTURE AEROBIC IDENTIFY: CPT

## 2023-08-08 PROCEDURE — 6360000002 HC RX W HCPCS: Performed by: RADIOLOGY

## 2023-08-08 PROCEDURE — 87075 CULTR BACTERIA EXCEPT BLOOD: CPT

## 2023-08-08 RX ORDER — DIPHENHYDRAMINE HYDROCHLORIDE 50 MG/ML
INJECTION INTRAMUSCULAR; INTRAVENOUS PRN
Status: COMPLETED | OUTPATIENT
Start: 2023-08-08 | End: 2023-08-08

## 2023-08-08 RX ORDER — FENTANYL CITRATE 50 UG/ML
INJECTION, SOLUTION INTRAMUSCULAR; INTRAVENOUS PRN
Status: COMPLETED | OUTPATIENT
Start: 2023-08-08 | End: 2023-08-08

## 2023-08-08 RX ORDER — ONDANSETRON 2 MG/ML
INJECTION INTRAMUSCULAR; INTRAVENOUS PRN
Status: COMPLETED | OUTPATIENT
Start: 2023-08-08 | End: 2023-08-08

## 2023-08-08 RX ADMIN — METOPROLOL TARTRATE 12.5 MG: 25 TABLET, FILM COATED ORAL at 21:17

## 2023-08-08 RX ADMIN — INSULIN LISPRO 2 UNITS: 100 INJECTION, SOLUTION INTRAVENOUS; SUBCUTANEOUS at 16:53

## 2023-08-08 RX ADMIN — ASPIRIN 81 MG: 81 TABLET, COATED ORAL at 07:47

## 2023-08-08 RX ADMIN — Medication 10 ML: at 07:54

## 2023-08-08 RX ADMIN — DIPHENHYDRAMINE HYDROCHLORIDE 25 MG: 50 INJECTION, SOLUTION INTRAMUSCULAR; INTRAVENOUS at 11:37

## 2023-08-08 RX ADMIN — INSULIN LISPRO 6 UNITS: 100 INJECTION, SOLUTION INTRAVENOUS; SUBCUTANEOUS at 16:54

## 2023-08-08 RX ADMIN — METOPROLOL TARTRATE 12.5 MG: 25 TABLET, FILM COATED ORAL at 07:46

## 2023-08-08 RX ADMIN — FLUCONAZOLE 400 MG: 400 INJECTION, SOLUTION INTRAVENOUS at 02:39

## 2023-08-08 RX ADMIN — INSULIN GLARGINE 17 UNITS: 100 INJECTION, SOLUTION SUBCUTANEOUS at 21:20

## 2023-08-08 RX ADMIN — PANTOPRAZOLE SODIUM 40 MG: 40 TABLET, DELAYED RELEASE ORAL at 07:47

## 2023-08-08 RX ADMIN — Medication 10 ML: at 21:20

## 2023-08-08 RX ADMIN — OXYCODONE HYDROCHLORIDE AND ACETAMINOPHEN 2 TABLET: 5; 325 TABLET ORAL at 01:05

## 2023-08-08 RX ADMIN — SODIUM CHLORIDE, PRESERVATIVE FREE 10 ML: 5 INJECTION INTRAVENOUS at 21:20

## 2023-08-08 RX ADMIN — METHOCARBAMOL 750 MG: 500 TABLET ORAL at 14:05

## 2023-08-08 RX ADMIN — METHOCARBAMOL 750 MG: 500 TABLET ORAL at 21:18

## 2023-08-08 RX ADMIN — EZETIMIBE 10 MG: 10 TABLET ORAL at 09:23

## 2023-08-08 RX ADMIN — METHOCARBAMOL 750 MG: 500 TABLET ORAL at 07:47

## 2023-08-08 RX ADMIN — ONDANSETRON 4 MG: 2 INJECTION INTRAMUSCULAR; INTRAVENOUS at 11:37

## 2023-08-08 RX ADMIN — FERROUS SULFATE TAB 325 MG (65 MG ELEMENTAL FE) 325 MG: 325 (65 FE) TAB at 21:17

## 2023-08-08 RX ADMIN — CEFTRIAXONE SODIUM 2000 MG: 2 INJECTION, POWDER, FOR SOLUTION INTRAMUSCULAR; INTRAVENOUS at 14:05

## 2023-08-08 RX ADMIN — CLOPIDOGREL BISULFATE 75 MG: 75 TABLET ORAL at 07:47

## 2023-08-08 RX ADMIN — FERROUS SULFATE TAB 325 MG (65 MG ELEMENTAL FE) 325 MG: 325 (65 FE) TAB at 07:47

## 2023-08-08 RX ADMIN — FENTANYL CITRATE 50 MCG: 50 INJECTION, SOLUTION INTRAMUSCULAR; INTRAVENOUS at 11:36

## 2023-08-08 RX ADMIN — METHOCARBAMOL 750 MG: 500 TABLET ORAL at 18:04

## 2023-08-08 RX ADMIN — ATORVASTATIN CALCIUM 80 MG: 80 TABLET, FILM COATED ORAL at 21:17

## 2023-08-08 RX ADMIN — SODIUM CHLORIDE, PRESERVATIVE FREE 10 ML: 5 INJECTION INTRAVENOUS at 09:23

## 2023-08-08 RX ADMIN — OXYCODONE HYDROCHLORIDE AND ACETAMINOPHEN 2 TABLET: 5; 325 TABLET ORAL at 21:16

## 2023-08-08 ASSESSMENT — PAIN DESCRIPTION - DESCRIPTORS
DESCRIPTORS: ACHING;DISCOMFORT;SORE
DESCRIPTORS: ACHING;DISCOMFORT;SORE
DESCRIPTORS: ACHING;SORE

## 2023-08-08 ASSESSMENT — PAIN SCALES - GENERAL
PAINLEVEL_OUTOF10: 10
PAINLEVEL_OUTOF10: 9
PAINLEVEL_OUTOF10: 10
PAINLEVEL_OUTOF10: 10
PAINLEVEL_OUTOF10: 7

## 2023-08-08 ASSESSMENT — PAIN DESCRIPTION - ORIENTATION
ORIENTATION: LEFT;POSTERIOR
ORIENTATION: LEFT
ORIENTATION: LEFT;POSTERIOR

## 2023-08-08 ASSESSMENT — PAIN DESCRIPTION - LOCATION
LOCATION: BACK;FLANK
LOCATION: FLANK;BACK
LOCATION: BACK;FLANK
LOCATION: BACK;FLANK

## 2023-08-08 ASSESSMENT — PAIN DESCRIPTION - PAIN TYPE
TYPE: ACUTE PAIN
TYPE: ACUTE PAIN

## 2023-08-08 ASSESSMENT — PAIN - FUNCTIONAL ASSESSMENT
PAIN_FUNCTIONAL_ASSESSMENT: PREVENTS OR INTERFERES SOME ACTIVE ACTIVITIES AND ADLS
PAIN_FUNCTIONAL_ASSESSMENT: PREVENTS OR INTERFERES SOME ACTIVE ACTIVITIES AND ADLS
PAIN_FUNCTIONAL_ASSESSMENT: PREVENTS OR INTERFERES WITH MANY ACTIVE NOT PASSIVE ACTIVITIES

## 2023-08-08 NOTE — OR NURSING
Time out completed prior to procedure. Pt was place prone on the ct table. Pt was placed on all cardiac monitoring.

## 2023-08-08 NOTE — PLAN OF CARE
Problem: Pain  Goal: Verbalizes/displays adequate comfort level or baseline comfort level  8/7/2023 2027 by Kita Shah RN  Outcome: Progressing  8/7/2023 1809 by Gayathri Augustine RN  Outcome: Progressing  Pharmacologic and nonpharmacologic therapies provided per orders and patient request     Problem: Skin/Tissue Integrity  Goal: Absence of new skin breakdown  Description: 1. Monitor for areas of redness and/or skin breakdown  2. Assess vascular access sites hourly  3. Every 4-6 hours minimum:  Change oxygen saturation probe site  4. Every 4-6 hours:  If on nasal continuous positive airway pressure, respiratory therapy assess nares and determine need for appliance change or resting period.   8/7/2023 2027 by Kita Shah RN  Outcome: Progressing  8/7/2023 1809 by Gayathri Augustine RN  Outcome: Progressing  Skin assessment performed, patient encouraged to perform hygiene practices for skin preservation

## 2023-08-08 NOTE — OR NURSING
Image guided abscess drain insertion left back completed. Dr. Griffin placed 10 Thai 25 cm Argon Skater drain LOT # [id number] 6/29/2028 in the left. Drain/tube secured at the 15 cm line with sutures. 16 milliliters of pus colored withdrawn immediately. Specimen sent to lab for culture. Drain/tube dressing clean, dry, and intact. Pt tolerated procedure without any signs or symptoms of distress. Vital signs stable. Report called to  RN. Pt transported back to [facility] in stable condition via bed by transport. Medications  Versed: 0 mg  Fentanyl: 50 mcg  Benadryl: 25 mg  Zofran: 4mg    Vital Signs  Vitals:    08/08/23 1142   BP: 122/81   Pulse: 84   Resp: 16   Temp:    SpO2: 97%    (vital signs in table format)    Post Shanon  2 - Able to move 4 extremities voluntarily on command  2 - BP+/- 20mmHg of normal  2 - Fully awake  2 - Able to maintain oxygen saturation >92% on room air  2 - Able to breathe deeply and cough freely     This RN wasted the following with [employer].     50 mcg Fentanyl

## 2023-08-08 NOTE — BRIEF OP NOTE
Brief Postoperative Note    Kenny Hernandez  YOB: 1990  [id number]    Pre-operative Diagnosis: Left flank subcutaneous Abscess    Post-operative Diagnosis: Same    Procedure: Insertion of 10Fr Abscess Drain    Anesthesia: Moderate Sedation    Surgeons/Assistants: Poonam Gastelum MD    Estimated Blood Loss: < 5 mL    Complications: None    Specimens: 10 cc purulent fluid drained and sent for analysis    Findings: Technically successful insertion of  left flank subcutaneous abscess Drain.      Electronically signed by Perez Ross MD on 8/8/2023 at 11:44 AM

## 2023-08-08 NOTE — OR NURSING
Pt arrived for image guided abscess drain insertion left . Procedure explained including the risk and benefits of the procedure. All questions answered. Pt verbalizes understanding of the procedure and states no more questions. Consent confirmed. Vital signs stable. Labs, allergies, medications, and code status reviewed. No contraindications noted. Vital Signs  Vitals:    08/08/23 1126   BP: 125/86   Pulse: 87   Resp: 19   Temp:    SpO2: 98%    (vital signs in table format)    Pre Shanon Score  2 - Able to move 4 extremities voluntarily on command  2 - BP+/- 20mmHg of normal  2 - Fully awake  2 - Able to maintain oxygen saturation >92% on room air  2 - Able to breathe deeply and cough freely    Allergies  Patient has no known allergies.  (allergies)    Labs  Lab Results   Component Value Date    INR 1.21 (H) 08/05/2023    PROTIME 15.3 (H) 08/05/2023     Lab Results   Component Value Date    CREATININE <0.5 (L) 08/08/2023    BUN 3 (L) 08/08/2023     08/08/2023    GFR      05/14/2021    GFR NOT REPORTED 05/14/2021    K 3.4 (L) 08/08/2023     08/08/2023    CO2 28 08/08/2023     Lab Results   Component Value Date    WBC 10.9 08/08/2023    HGB 9.8 (L) 08/08/2023    HCT 30.4 (L) 08/08/2023    MCV 84.7 08/08/2023     (H) 08/08/2023

## 2023-08-08 NOTE — PROGRESS NOTES
injection  5 mL IntraDERmal Once    sodium chloride flush  5-40 mL IntraVENous 2 times per day    cefTRIAXone (ROCEPHIN) IV  2,000 mg IntraVENous Q24H    fluconazole  400 mg IntraVENous Q24H    insulin glargine  0.25 Units/kg SubCUTAneous Nightly    insulin lispro  0-8 Units SubCUTAneous TID WC    insulin lispro  0-4 Units SubCUTAneous Nightly    insulin lispro  0.08 Units/kg SubCUTAneous TID WC    metoprolol tartrate  12.5 mg Oral BID    methocarbamol  750 mg Oral 4x Daily    ezetimibe  10 mg Oral Daily    ferrous sulfate  325 mg Oral BID    pantoprazole  40 mg Oral QAM AC    aspirin  81 mg Oral Daily    clopidogrel  75 mg Oral Daily    sodium chloride flush  5-40 mL IntraVENous 2 times per day    atorvastatin  80 mg Oral Nightly       Continuous Infusions:   sodium chloride      dextrose      sodium chloride      sodium chloride 75 mL/hr at 08/07/23 2000       PRN Meds:  sodium chloride flush, sodium chloride, ondansetron, glucose, dextrose bolus **OR** dextrose bolus, glucagon (rDNA), dextrose, oxyCODONE-acetaminophen, oxyCODONE-acetaminophen, sodium chloride flush, sodium chloride, acetaminophen **OR** acetaminophen, HYDROmorphone      Assessment:       Patient Active Problem List   Diagnosis    Type II or unspecified type diabetes mellitus without mention of complication, uncontrolled    Dyslipidemia    Obesity (BMI 30-39. 9)    Depressed    Type 2 diabetes mellitus without complication (HCC)    Essential hypertension    Tetrahydrocannabinol (THC) use disorder, moderate, dependence (HCC)    Chronic bilateral low back pain with bilateral sciatica    NSTEMI (non-ST elevated myocardial infarction) ([address])    Medically noncompliant    Uncontrolled type 2 diabetes mellitus with hyperglycemia (HCC)    Ischemic cardiomyopathy    Multiple vessel coronary artery disease    Renal abscess    Septicemia (HCC)    Severe sepsis (HCC)    Fever and chills    Proteus infection    Nephrolithiasis    Neutrophilia    Abnormal CT of the abdomen    Renal and perinephric abscess    Poorly controlled diabetes mellitus ([address])        Plan:   28 y. o. woman with a past with history of coronary artery disease,NSTEMI, hypertension, diabetes poor control, depression, bipolar disorder, recent admission to [facility] found to have multivessel coronary artery disease status post CABG on 7/10/23. Large left perinephric abscess extending to left psoas:  - NICHOLAS drain in place with fluid growing proteus as well. - yeast seen on UA   - pt currently on ceftriaxone and fluconazole  - pt at risk for need for nephrectomy without adequate source control.  - NICHOLAS continued and new drain placed again today For worsening posterior soft tissue collection.   - Urology following.   - HIV and Hepatitis panel testing this admission neg  - planned a course of iv abx after adequate drain decompression, discussed OPAT with pt. DM:  - needs good glycemic control to aide in healing and prevention on further infections. - a1c 12.2, elevated. Medical Decision Making:   The following items were considered in medical decision making:  Discussion of patient care with other providers  Reviewed clinical lab tests  Reviewed radiology tests  Reviewed other diagnostic tests/interventions  Independent review of radiologic images  Microbiology cultures and other micro tests reviewed      Discussed with PT, Primary team.   Estefany Munoz MD

## 2023-08-08 NOTE — PROGRESS NOTES
V2.0    [facility] [place] Progress Note      Name:  Yovana Cintron /Age/Sex: 1990  (28 y.o. female)   MRN & CSN:  [id number] & [id number] Encounter Date/Time: 2023 9:34 AM EDT   Location:  [address] [place] [address] PCP: No primary care provider on file. Carlitos Mo MD       Hospital Day: 5    Assessment and Recommendations   Yovana Cintron is a 28 y.o. female with a pmh of CAD s/p CABG 23, hypertension, DM type II who presents with Renal abscess      Plan:   -Large left renal abscess with posterior treated for extension into the left psoas and paraspinal musculature due to Proteus mirabilis  -Sepsis due renal abscess  -Recent proteus UTI  S/p CT guided percutaneous drain placement  by IR  Repeat CT  showed slight increase in in size of the abscess in the posterior tissue--IR consulted to place another drain  Continue management per nephrology and ID  Drain cultures positive for Proteus  Antibiotics--IV vancomycin dc'd  -c/w IV  meropenem + fluconazole per ID. .         Multivessel CAD s/p CABG 7/10/23  Ischemic cardiomyopathy  Hypertension  Hyperlipidemia  Stable. On beta-blocker, antiplatelets,statin-        Diabetes mellitus type 2,uncontrolled ,Hba1c   12.2  Non Compliant with prior insulin regimen and diet, had been off all medications  Continue to optimize basal bolus regimen      Diet Diet NPO   DVT Prophylaxis [] Lovenox, []  Heparin, [] SCDs, [] Ambulation,  [] Eliquis, [] Xarelto  [] Coumadin   Code Status Full Code       Surrogate Decision Maker/ POA               Subjective:       Reports no significant improvement, still having pain. No nausea or vomiting. Review of Systems:      Pertinent positives and negatives discussed in HPI    Objective:      Intake/Output Summary (Last 24 hours) at 2023 0932  Last data filed at 2023 1707  Gross per 24 hour   Intake 2441.28 ml   Output 35 ml   Net 2406.28 ml        Vitals:   Vitals:    23 1956 23 0000

## 2023-08-08 NOTE — PROGRESS NOTES
Antonio Simental with Urology notified about saturated NICHOLAS drain dressing. Instructed to remove dressing and apply new one to watch for any further saturation. New dressing applied. Clean, dry and intact at this time.

## 2023-08-08 NOTE — CARE COORDINATION
Patient has large area over left flank that is very tender per Urology. Repeat CT with contrast showed increased size in the abscess left flank soft tissues. Urology requested IR to place second drain in this area of flank. Continuing to monitor output from left perinephric drain. Drainage is decreasing. ID following and managing IVABX's. Non-obstructing bilateral nephrolithiasis. Patient s/p CABG previous admission. Has no pcp but Dr. Pascual Parmar following for home care orders prior to admit with [facility] post CABG. Per Merline Paolo she would need PCP for home care related to any other issues that are not related to CTS orders. Referral to [facility] . Information provided to patient about [facility] and PCP list given. Will need home care orders at discharge to resume [address] [facility] episode for post CABG care. [place] for need for IVABX's at discharge.

## 2023-08-09 LAB
ANION GAP SERPL CALCULATED.3IONS-SCNC: 9 MMOL/L (ref 3–16)
BASOPHILS # BLD: 0 K/UL (ref 0–0.2)
BASOPHILS NFR BLD: 0.3 %
BUN SERPL-MCNC: 4 MG/DL (ref 7–20)
CALCIUM SERPL-MCNC: 8 MG/DL (ref 8.3–10.6)
CHLORIDE SERPL-SCNC: 103 MMOL/L (ref 99–110)
CO2 SERPL-SCNC: 25 MMOL/L (ref 21–32)
CREAT SERPL-MCNC: <0.5 MG/DL (ref 0.6–1.1)
DEPRECATED RDW RBC AUTO: 17.9 % (ref 12.4–15.4)
EOSINOPHIL # BLD: 0.2 K/UL (ref 0–0.6)
EOSINOPHIL NFR BLD: 2 %
GFR SERPLBLD CREATININE-BSD FMLA CKD-EPI: >60 ML/MIN/{1.73_M2}
GLUCOSE BLD-MCNC: 137 MG/DL (ref 70–99)
GLUCOSE BLD-MCNC: 168 MG/DL (ref 70–99)
GLUCOSE BLD-MCNC: 202 MG/DL (ref 70–99)
GLUCOSE BLD-MCNC: 244 MG/DL (ref 70–99)
GLUCOSE SERPL-MCNC: 191 MG/DL (ref 70–99)
HCT VFR BLD AUTO: 29.7 % (ref 36–48)
HGB BLD-MCNC: 9.1 G/DL (ref 12–16)
LYMPHOCYTES # BLD: 2.7 K/UL (ref 1–5.1)
LYMPHOCYTES NFR BLD: 31.3 %
MCH RBC QN AUTO: 26.6 PG (ref 26–34)
MCHC RBC AUTO-ENTMCNC: 30.6 G/DL (ref 31–36)
MCV RBC AUTO: 87.1 FL (ref 80–100)
MONOCYTES # BLD: 1 K/UL (ref 0–1.3)
MONOCYTES NFR BLD: 11.8 %
NEUTROPHILS # BLD: 4.8 K/UL (ref 1.7–7.7)
NEUTROPHILS NFR BLD: 54.6 %
PERFORMED ON: ABNORMAL
PLATELET # BLD AUTO: 561 K/UL (ref 135–450)
PMV BLD AUTO: 7 FL (ref 5–10.5)
POTASSIUM SERPL-SCNC: 3.6 MMOL/L (ref 3.5–5.1)
RBC # BLD AUTO: 3.4 M/UL (ref 4–5.2)
SODIUM SERPL-SCNC: 137 MMOL/L (ref 136–145)
WBC # BLD AUTO: 8.7 K/UL (ref 4–11)

## 2023-08-09 PROCEDURE — 6360000002 HC RX W HCPCS: Performed by: INTERNAL MEDICINE

## 2023-08-09 PROCEDURE — 99232 SBSQ HOSP IP/OBS MODERATE 35: CPT | Performed by: INTERNAL MEDICINE

## 2023-08-09 PROCEDURE — 2500000003 HC RX 250 WO HCPCS: Performed by: HOSPITALIST

## 2023-08-09 PROCEDURE — 36415 COLL VENOUS BLD VENIPUNCTURE: CPT

## 2023-08-09 PROCEDURE — 85025 COMPLETE CBC W/AUTO DIFF WBC: CPT

## 2023-08-09 PROCEDURE — 80048 BASIC METABOLIC PNL TOTAL CA: CPT

## 2023-08-09 PROCEDURE — 2580000003 HC RX 258: Performed by: HOSPITALIST

## 2023-08-09 PROCEDURE — 99254 IP/OBS CNSLTJ NEW/EST MOD 60: CPT | Performed by: SURGERY

## 2023-08-09 PROCEDURE — 2580000003 HC RX 258: Performed by: INTERNAL MEDICINE

## 2023-08-09 PROCEDURE — 2060000000 HC ICU INTERMEDIATE R&B

## 2023-08-09 PROCEDURE — 6370000000 HC RX 637 (ALT 250 FOR IP): Performed by: HOSPITALIST

## 2023-08-09 RX ORDER — POLYETHYLENE GLYCOL 3350 17 G/17G
17 POWDER, FOR SOLUTION ORAL DAILY
Status: DISCONTINUED | OUTPATIENT
Start: 2023-08-09 | End: 2023-08-17 | Stop reason: HOSPADM

## 2023-08-09 RX ORDER — INSULIN GLARGINE 100 [IU]/ML
20 INJECTION, SOLUTION SUBCUTANEOUS NIGHTLY
Status: DISCONTINUED | OUTPATIENT
Start: 2023-08-09 | End: 2023-08-17 | Stop reason: HOSPADM

## 2023-08-09 RX ORDER — INSULIN LISPRO 100 [IU]/ML
8 INJECTION, SOLUTION INTRAVENOUS; SUBCUTANEOUS
Status: DISCONTINUED | OUTPATIENT
Start: 2023-08-09 | End: 2023-08-17 | Stop reason: HOSPADM

## 2023-08-09 RX ORDER — BISACODYL 5 MG/1
5 TABLET, DELAYED RELEASE ORAL DAILY PRN
Status: DISCONTINUED | OUTPATIENT
Start: 2023-08-09 | End: 2023-08-17 | Stop reason: HOSPADM

## 2023-08-09 RX ADMIN — HYDROMORPHONE HYDROCHLORIDE 1 MG: 1 INJECTION, SOLUTION INTRAMUSCULAR; INTRAVENOUS; SUBCUTANEOUS at 08:21

## 2023-08-09 RX ADMIN — CEFTRIAXONE SODIUM 2000 MG: 2 INJECTION, POWDER, FOR SOLUTION INTRAMUSCULAR; INTRAVENOUS at 17:08

## 2023-08-09 RX ADMIN — FERROUS SULFATE TAB 325 MG (65 MG ELEMENTAL FE) 325 MG: 325 (65 FE) TAB at 19:55

## 2023-08-09 RX ADMIN — FERROUS SULFATE TAB 325 MG (65 MG ELEMENTAL FE) 325 MG: 325 (65 FE) TAB at 08:16

## 2023-08-09 RX ADMIN — METHOCARBAMOL 750 MG: 500 TABLET ORAL at 19:55

## 2023-08-09 RX ADMIN — SODIUM CHLORIDE: 9 INJECTION, SOLUTION INTRAVENOUS at 19:57

## 2023-08-09 RX ADMIN — METOPROLOL TARTRATE 12.5 MG: 25 TABLET, FILM COATED ORAL at 08:15

## 2023-08-09 RX ADMIN — OXYCODONE HYDROCHLORIDE AND ACETAMINOPHEN 2 TABLET: 5; 325 TABLET ORAL at 05:09

## 2023-08-09 RX ADMIN — INSULIN LISPRO 8 UNITS: 100 INJECTION, SOLUTION INTRAVENOUS; SUBCUTANEOUS at 12:17

## 2023-08-09 RX ADMIN — EZETIMIBE 10 MG: 10 TABLET ORAL at 08:15

## 2023-08-09 RX ADMIN — CLOPIDOGREL BISULFATE 75 MG: 75 TABLET ORAL at 08:15

## 2023-08-09 RX ADMIN — ASPIRIN 81 MG: 81 TABLET, COATED ORAL at 08:16

## 2023-08-09 RX ADMIN — METHOCARBAMOL 750 MG: 500 TABLET ORAL at 08:15

## 2023-08-09 RX ADMIN — METHOCARBAMOL 750 MG: 500 TABLET ORAL at 16:45

## 2023-08-09 RX ADMIN — METOPROLOL TARTRATE 12.5 MG: 25 TABLET, FILM COATED ORAL at 19:55

## 2023-08-09 RX ADMIN — Medication 10 ML: at 08:22

## 2023-08-09 RX ADMIN — ATORVASTATIN CALCIUM 80 MG: 80 TABLET, FILM COATED ORAL at 19:55

## 2023-08-09 RX ADMIN — INSULIN LISPRO 6 UNITS: 100 INJECTION, SOLUTION INTRAVENOUS; SUBCUTANEOUS at 08:19

## 2023-08-09 RX ADMIN — INSULIN LISPRO 2 UNITS: 100 INJECTION, SOLUTION INTRAVENOUS; SUBCUTANEOUS at 08:20

## 2023-08-09 RX ADMIN — METHOCARBAMOL 750 MG: 500 TABLET ORAL at 12:14

## 2023-08-09 RX ADMIN — FLUCONAZOLE 400 MG: 400 INJECTION, SOLUTION INTRAVENOUS at 02:22

## 2023-08-09 RX ADMIN — PANTOPRAZOLE SODIUM 40 MG: 40 TABLET, DELAYED RELEASE ORAL at 08:16

## 2023-08-09 RX ADMIN — INSULIN LISPRO 8 UNITS: 100 INJECTION, SOLUTION INTRAVENOUS; SUBCUTANEOUS at 17:08

## 2023-08-09 RX ADMIN — INSULIN GLARGINE 20 UNITS: 100 INJECTION, SOLUTION SUBCUTANEOUS at 19:57

## 2023-08-09 RX ADMIN — POLYETHYLENE GLYCOL 3350 17 G: 17 POWDER, FOR SOLUTION ORAL at 17:08

## 2023-08-09 ASSESSMENT — PAIN DESCRIPTION - ORIENTATION
ORIENTATION: LEFT
ORIENTATION: LEFT

## 2023-08-09 ASSESSMENT — PAIN DESCRIPTION - LOCATION
LOCATION: FLANK

## 2023-08-09 ASSESSMENT — PAIN SCALES - GENERAL
PAINLEVEL_OUTOF10: 9
PAINLEVEL_OUTOF10: 10
PAINLEVEL_OUTOF10: 10
PAINLEVEL_OUTOF10: 9
PAINLEVEL_OUTOF10: 7
PAINLEVEL_OUTOF10: 7
PAINLEVEL_OUTOF10: 10

## 2023-08-09 NOTE — PROGRESS NOTES
V2.0    [facility] [place] Progress Note      Name:  Radha Rees /Age/Sex: 1990  (28 y.o. female)   MRN & CSN:  [id number] & [id number] Encounter Date/Time: 2023 9:34 AM EDT   Location:  [facility] PCP: No primary care provider on file. Seun Pelayo MD       Hospital Day: 6    Assessment and Recommendations   Radha Rees is a 28 y.o. female with a pmh of CAD s/p CABG 23, hypertension, DM type II who presents with Renal abscess      Plan:   -Large left renal abscess with posterior treated for extension into the left psoas and paraspinal musculature due to Proteus mirabilis  -Sepsis due renal abscess  -Recent proteus UTI  S/p CT guided percutaneous drain placement  by IR  Repeat CT  showed slight increase in in size of the abscess in the posterior tissue--IR placed 2nd drain in the left flank  Continue management per nephrology and ID  Drain cultures positive for Proteus  Antibiotics--IV vancomycin dc'd  -c/w IV  meropenem + fluconazole per ID. Apolonio Fothergill General surgery consulted by urology to assess for I&D of the left flank     Multivessel CAD s/p CABG 7/10/23  Ischemic cardiomyopathy  Hypertension  Hyperlipidemia  Stable. On beta-blocker, antiplatelets,statin-        Diabetes mellitus type 2,uncontrolled ,Hba1c    is 10.4  Non Compliant with prior insulin regimen and diet, had been off all medications  Continue to optimize basal bolus regimen    Constipation, start laxatives      Diet ADULT DIET; Regular; 4 carb choices (60 gm/meal)   DVT Prophylaxis [] Lovenox, []  Heparin, [] SCDs, [] Ambulation,  [] Eliquis, [] Xarelto  [] Coumadin   Code Status Full Code       Surrogate Decision Maker/ POA               Subjective:     Pain remains uncontrolled, reports no BM in 3 days        Review of Systems:      Pertinent positives and negatives discussed in HPI    Objective:      Intake/Output Summary (Last 24 hours) at 2023 0922  Last data filed at 2023 8348  Gross Q6H PRN   Or  acetaminophen, 650 mg, Q6H PRN  HYDROmorphone, 1 mg, Q3H PRN        Labs and Imaging   CT ABDOMEN PELVIS W WO CONTRAST Additional Contrast? Oral    Result Date: 8/3/2023  EXAMINATION: CT OF THE ABDOMEN AND PELVIS WITH AND WITHOUT CONTRAST 8/3/2023 8:10 am TECHNIQUE: CT of the abdomen and pelvis was performed with and without the administration of intravenous contrast.  Multiplanar reformatted images are provided for review. Automated exposure control, iterative reconstruction, and/or weight based adjustment of the mA/kV was utilized to reduce the radiation dose to as low as reasonably achievable. COMPARISON: None. HISTORY: ORDERING SYSTEM PROVIDED HISTORY: Abdominal mass, left upper quadrant TECHNOLOGIST PROVIDED HISTORY: Additional Contrast?->Oral STAT Creatinine as needed:->No Reason for exam:->abnormal ultrasound Reason for Exam: abnormal ultrasound, abd mass, LUQ,  surgery  July 10th, triple bypass,  unable to raise arms for scan,  palapable mass on left flank/posterior, marked with BB's, iron deficiency anemia history- HTN, diabetes, surg- CABG FINDINGS: Lower Chest: There is no consolidation or effusion. Organs: There are 3 nonobstructing 2 mm calculi within the right intrarenal collecting system and a 1 mm nonobstructing calculus within the left intrarenal collecting system. There is a large multi loculated rim enhancing fluid collection arising from the posterior aspect of the left kidney. The intrarenal component measures 3.6 x 3.4 cm and collectively measures 9 x 6.3 x 14 cm. The collection extends from the posterior aspect of the left kidney and invades the left psoas muscle and left posterior paraspinal musculature/subcutaneous tissues. There is moderate surrounding inflammatory change. The collection results in anterior displacement of the left kidney into the left upper quadrant. The remainder of the solid abdominal organs are unremarkable. GI/Bowel:  There is no bowel dilatation,

## 2023-08-09 NOTE — CARE COORDINATION
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 May need I&D first before considering nephrectomy. S/P CABG from July with Dr. Julianne Closs. Active with [facility] for post CABG care with Julianne Closs following for [address] orders. No PCP if she ends up needing IVABX'S at discharge . Referral to [facility]. Either ID or [facility] would have to follow for any home care orders until established with PCP for any home care needs not related to cardiothoracic surgery and CABG. Has 2 drains in place. May need to go to SNF if needs ongoing IVABX's. She lives with uncle and he may be able to offer some assistance at home but unclear if he could manage home IVABX's. Will follow closely for ID final abx plan and assist with d/c planning. Patient has not wanted to engage in discussion around discharge needs with writer and offers little input. Affect flat. Addendum: Patient has Logan and jacinta Segundo at [facility] she needs be referred to the residency family medicine program here at [facility] at [phone]. Call placed to make referral due to lack of PCP and potential need for home IVABX's. Notified clinic of need for MD to follow.  Jesus Nava at clinic will look for accepting MD .

## 2023-08-10 ENCOUNTER — ANESTHESIA EVENT (OUTPATIENT)
Dept: OPERATING ROOM | Age: 33
End: 2023-08-10
Payer: COMMERCIAL

## 2023-08-10 ENCOUNTER — ANESTHESIA (OUTPATIENT)
Dept: OPERATING ROOM | Age: 33
End: 2023-08-10
Payer: COMMERCIAL

## 2023-08-10 LAB
GLUCOSE BLD-MCNC: 125 MG/DL (ref 70–99)
GLUCOSE BLD-MCNC: 132 MG/DL (ref 70–99)
GLUCOSE BLD-MCNC: 168 MG/DL (ref 70–99)
GLUCOSE BLD-MCNC: 184 MG/DL (ref 70–99)
GLUCOSE BLD-MCNC: 201 MG/DL (ref 70–99)
GLUCOSE BLD-MCNC: 64 MG/DL (ref 70–99)
HCG UR QL: NEGATIVE
PERFORMED ON: ABNORMAL

## 2023-08-10 PROCEDURE — 2709999900 HC NON-CHARGEABLE SUPPLY: Performed by: SURGERY

## 2023-08-10 PROCEDURE — 3600000003 HC SURGERY LEVEL 3 BASE: Performed by: SURGERY

## 2023-08-10 PROCEDURE — 2580000003 HC RX 258: Performed by: SURGERY

## 2023-08-10 PROCEDURE — 3600000013 HC SURGERY LEVEL 3 ADDTL 15MIN: Performed by: SURGERY

## 2023-08-10 PROCEDURE — 6370000000 HC RX 637 (ALT 250 FOR IP): Performed by: HOSPITALIST

## 2023-08-10 PROCEDURE — 3700000000 HC ANESTHESIA ATTENDED CARE: Performed by: SURGERY

## 2023-08-10 PROCEDURE — 84703 CHORIONIC GONADOTROPIN ASSAY: CPT

## 2023-08-10 PROCEDURE — 3700000001 HC ADD 15 MINUTES (ANESTHESIA): Performed by: SURGERY

## 2023-08-10 PROCEDURE — 7100000001 HC PACU RECOVERY - ADDTL 15 MIN: Performed by: SURGERY

## 2023-08-10 PROCEDURE — 99232 SBSQ HOSP IP/OBS MODERATE 35: CPT | Performed by: INTERNAL MEDICINE

## 2023-08-10 PROCEDURE — 0J970ZZ DRAINAGE OF BACK SUBCUTANEOUS TISSUE AND FASCIA, OPEN APPROACH: ICD-10-PCS | Performed by: SURGERY

## 2023-08-10 PROCEDURE — 2580000003 HC RX 258: Performed by: NURSE ANESTHETIST, CERTIFIED REGISTERED

## 2023-08-10 PROCEDURE — 2500000003 HC RX 250 WO HCPCS: Performed by: HOSPITALIST

## 2023-08-10 PROCEDURE — 6370000000 HC RX 637 (ALT 250 FOR IP): Performed by: SURGERY

## 2023-08-10 PROCEDURE — 6360000002 HC RX W HCPCS: Performed by: INTERNAL MEDICINE

## 2023-08-10 PROCEDURE — 6360000002 HC RX W HCPCS: Performed by: SURGERY

## 2023-08-10 PROCEDURE — 2580000003 HC RX 258: Performed by: HOSPITALIST

## 2023-08-10 PROCEDURE — 2060000000 HC ICU INTERMEDIATE R&B

## 2023-08-10 PROCEDURE — 21501 I&D DP ABSC/HMTMA SFT TS NCK: CPT | Performed by: SURGERY

## 2023-08-10 PROCEDURE — C9399 UNCLASSIFIED DRUGS OR BIOLOG: HCPCS | Performed by: NURSE ANESTHETIST, CERTIFIED REGISTERED

## 2023-08-10 PROCEDURE — 6360000002 HC RX W HCPCS: Performed by: NURSE ANESTHETIST, CERTIFIED REGISTERED

## 2023-08-10 PROCEDURE — A4217 STERILE WATER/SALINE, 500 ML: HCPCS | Performed by: SURGERY

## 2023-08-10 PROCEDURE — 7100000000 HC PACU RECOVERY - FIRST 15 MIN: Performed by: SURGERY

## 2023-08-10 PROCEDURE — 2500000003 HC RX 250 WO HCPCS: Performed by: NURSE ANESTHETIST, CERTIFIED REGISTERED

## 2023-08-10 RX ORDER — ONDANSETRON 2 MG/ML
4 INJECTION INTRAMUSCULAR; INTRAVENOUS EVERY 30 MIN PRN
Status: DISCONTINUED | OUTPATIENT
Start: 2023-08-10 | End: 2023-08-10 | Stop reason: HOSPADM

## 2023-08-10 RX ORDER — SODIUM CHLORIDE 0.9 % (FLUSH) 0.9 %
5-40 SYRINGE (ML) INJECTION EVERY 12 HOURS SCHEDULED
Status: DISCONTINUED | OUTPATIENT
Start: 2023-08-10 | End: 2023-08-10 | Stop reason: HOSPADM

## 2023-08-10 RX ORDER — MAGNESIUM HYDROXIDE 1200 MG/15ML
LIQUID ORAL CONTINUOUS PRN
Status: COMPLETED | OUTPATIENT
Start: 2023-08-10 | End: 2023-08-10

## 2023-08-10 RX ORDER — ONDANSETRON 2 MG/ML
INJECTION INTRAMUSCULAR; INTRAVENOUS PRN
Status: DISCONTINUED | OUTPATIENT
Start: 2023-08-10 | End: 2023-08-10 | Stop reason: SDUPTHER

## 2023-08-10 RX ORDER — ROCURONIUM BROMIDE 10 MG/ML
INJECTION, SOLUTION INTRAVENOUS PRN
Status: DISCONTINUED | OUTPATIENT
Start: 2023-08-10 | End: 2023-08-10 | Stop reason: SDUPTHER

## 2023-08-10 RX ORDER — OXYCODONE HYDROCHLORIDE 5 MG/1
5 TABLET ORAL PRN
Status: DISCONTINUED | OUTPATIENT
Start: 2023-08-10 | End: 2023-08-10 | Stop reason: HOSPADM

## 2023-08-10 RX ORDER — OXYCODONE HYDROCHLORIDE 5 MG/1
10 TABLET ORAL PRN
Status: DISCONTINUED | OUTPATIENT
Start: 2023-08-10 | End: 2023-08-10 | Stop reason: HOSPADM

## 2023-08-10 RX ORDER — SODIUM CHLORIDE 9 MG/ML
INJECTION, SOLUTION INTRAVENOUS PRN
Status: DISCONTINUED | OUTPATIENT
Start: 2023-08-10 | End: 2023-08-10 | Stop reason: HOSPADM

## 2023-08-10 RX ORDER — SODIUM CHLORIDE 0.9 % (FLUSH) 0.9 %
5-40 SYRINGE (ML) INJECTION PRN
Status: DISCONTINUED | OUTPATIENT
Start: 2023-08-10 | End: 2023-08-10 | Stop reason: HOSPADM

## 2023-08-10 RX ORDER — LIDOCAINE HYDROCHLORIDE 20 MG/ML
INJECTION, SOLUTION INFILTRATION; PERINEURAL PRN
Status: DISCONTINUED | OUTPATIENT
Start: 2023-08-10 | End: 2023-08-10 | Stop reason: SDUPTHER

## 2023-08-10 RX ORDER — SODIUM CHLORIDE, SODIUM LACTATE, POTASSIUM CHLORIDE, CALCIUM CHLORIDE 600; 310; 30; 20 MG/100ML; MG/100ML; MG/100ML; MG/100ML
INJECTION, SOLUTION INTRAVENOUS CONTINUOUS PRN
Status: DISCONTINUED | OUTPATIENT
Start: 2023-08-10 | End: 2023-08-10 | Stop reason: SDUPTHER

## 2023-08-10 RX ORDER — MIDAZOLAM HYDROCHLORIDE 1 MG/ML
INJECTION INTRAMUSCULAR; INTRAVENOUS PRN
Status: DISCONTINUED | OUTPATIENT
Start: 2023-08-10 | End: 2023-08-10 | Stop reason: SDUPTHER

## 2023-08-10 RX ORDER — BUPIVACAINE HYDROCHLORIDE 5 MG/ML
INJECTION, SOLUTION EPIDURAL; INTRACAUDAL PRN
Status: DISCONTINUED | OUTPATIENT
Start: 2023-08-10 | End: 2023-08-10 | Stop reason: ALTCHOICE

## 2023-08-10 RX ORDER — PROPOFOL 10 MG/ML
INJECTION, EMULSION INTRAVENOUS PRN
Status: DISCONTINUED | OUTPATIENT
Start: 2023-08-10 | End: 2023-08-10 | Stop reason: SDUPTHER

## 2023-08-10 RX ADMIN — OXYCODONE HYDROCHLORIDE AND ACETAMINOPHEN 2 TABLET: 5; 325 TABLET ORAL at 18:43

## 2023-08-10 RX ADMIN — INSULIN GLARGINE 20 UNITS: 100 INJECTION, SOLUTION SUBCUTANEOUS at 21:12

## 2023-08-10 RX ADMIN — CEFTRIAXONE SODIUM 2000 MG: 2 INJECTION, POWDER, FOR SOLUTION INTRAMUSCULAR; INTRAVENOUS at 18:49

## 2023-08-10 RX ADMIN — SODIUM CHLORIDE, SODIUM LACTATE, POTASSIUM CHLORIDE, AND CALCIUM CHLORIDE: .6; .31; .03; .02 INJECTION, SOLUTION INTRAVENOUS at 16:00

## 2023-08-10 RX ADMIN — HYDROMORPHONE HYDROCHLORIDE 1 MG: 1 INJECTION, SOLUTION INTRAMUSCULAR; INTRAVENOUS; SUBCUTANEOUS at 02:37

## 2023-08-10 RX ADMIN — Medication 10 ML: at 21:13

## 2023-08-10 RX ADMIN — CLOPIDOGREL BISULFATE 75 MG: 75 TABLET ORAL at 12:13

## 2023-08-10 RX ADMIN — FLUCONAZOLE 400 MG: 400 INJECTION, SOLUTION INTRAVENOUS at 02:32

## 2023-08-10 RX ADMIN — EZETIMIBE 10 MG: 10 TABLET ORAL at 09:59

## 2023-08-10 RX ADMIN — OXYCODONE HYDROCHLORIDE AND ACETAMINOPHEN 2 TABLET: 5; 325 TABLET ORAL at 00:22

## 2023-08-10 RX ADMIN — DEXMEDETOMIDINE HYDROCHLORIDE 10 MCG: 100 INJECTION, SOLUTION INTRAVENOUS at 16:26

## 2023-08-10 RX ADMIN — PANTOPRAZOLE SODIUM 40 MG: 40 TABLET, DELAYED RELEASE ORAL at 09:58

## 2023-08-10 RX ADMIN — POLYETHYLENE GLYCOL 3350 17 G: 17 POWDER, FOR SOLUTION ORAL at 09:57

## 2023-08-10 RX ADMIN — ATORVASTATIN CALCIUM 80 MG: 80 TABLET, FILM COATED ORAL at 21:10

## 2023-08-10 RX ADMIN — ROCURONIUM BROMIDE 50 MG: 50 INJECTION, SOLUTION INTRAVENOUS at 16:21

## 2023-08-10 RX ADMIN — METOPROLOL TARTRATE 12.5 MG: 25 TABLET, FILM COATED ORAL at 09:58

## 2023-08-10 RX ADMIN — OXYCODONE HYDROCHLORIDE AND ACETAMINOPHEN 2 TABLET: 5; 325 TABLET ORAL at 09:59

## 2023-08-10 RX ADMIN — FERROUS SULFATE TAB 325 MG (65 MG ELEMENTAL FE) 325 MG: 325 (65 FE) TAB at 09:59

## 2023-08-10 RX ADMIN — METHOCARBAMOL 750 MG: 500 TABLET ORAL at 09:58

## 2023-08-10 RX ADMIN — MIDAZOLAM 2 MG: 1 INJECTION INTRAMUSCULAR; INTRAVENOUS at 16:17

## 2023-08-10 RX ADMIN — ONDANSETRON 4 MG: 2 INJECTION INTRAMUSCULAR; INTRAVENOUS at 16:37

## 2023-08-10 RX ADMIN — OXYCODONE HYDROCHLORIDE AND ACETAMINOPHEN 2 TABLET: 5; 325 TABLET ORAL at 04:17

## 2023-08-10 RX ADMIN — METOPROLOL TARTRATE 12.5 MG: 25 TABLET, FILM COATED ORAL at 21:10

## 2023-08-10 RX ADMIN — ASPIRIN 81 MG: 81 TABLET, COATED ORAL at 12:13

## 2023-08-10 RX ADMIN — SODIUM CHLORIDE: 9 INJECTION, SOLUTION INTRAVENOUS at 18:46

## 2023-08-10 RX ADMIN — METHOCARBAMOL 750 MG: 500 TABLET ORAL at 18:41

## 2023-08-10 RX ADMIN — LIDOCAINE HYDROCHLORIDE 60 MG: 20 INJECTION, SOLUTION INFILTRATION; PERINEURAL at 16:20

## 2023-08-10 RX ADMIN — SUGAMMADEX 200 MG: 100 INJECTION, SOLUTION INTRAVENOUS at 16:58

## 2023-08-10 RX ADMIN — FERROUS SULFATE TAB 325 MG (65 MG ELEMENTAL FE) 325 MG: 325 (65 FE) TAB at 21:11

## 2023-08-10 RX ADMIN — PROPOFOL 150 MG: 10 INJECTION, EMULSION INTRAVENOUS at 16:20

## 2023-08-10 RX ADMIN — METHOCARBAMOL 750 MG: 500 TABLET ORAL at 21:11

## 2023-08-10 RX ADMIN — SODIUM CHLORIDE: 9 INJECTION, SOLUTION INTRAVENOUS at 10:50

## 2023-08-10 ASSESSMENT — PAIN SCALES - GENERAL
PAINLEVEL_OUTOF10: 10
PAINLEVEL_OUTOF10: 10
PAINLEVEL_OUTOF10: 9
PAINLEVEL_OUTOF10: 10

## 2023-08-10 ASSESSMENT — PAIN DESCRIPTION - LOCATION
LOCATION: FLANK

## 2023-08-10 ASSESSMENT — PAIN DESCRIPTION - DESCRIPTORS
DESCRIPTORS: ACHING;DISCOMFORT
DESCRIPTORS: THROBBING
DESCRIPTORS: ACHING;DISCOMFORT
DESCRIPTORS: ACHING;DISCOMFORT
DESCRIPTORS: BURNING;THROBBING
DESCRIPTORS: OTHER (COMMENT)

## 2023-08-10 ASSESSMENT — PAIN DESCRIPTION - PAIN TYPE
TYPE: ACUTE PAIN

## 2023-08-10 ASSESSMENT — PAIN DESCRIPTION - ORIENTATION
ORIENTATION: LEFT

## 2023-08-10 ASSESSMENT — PAIN - FUNCTIONAL ASSESSMENT: PAIN_FUNCTIONAL_ASSESSMENT: PREVENTS OR INTERFERES SOME ACTIVE ACTIVITIES AND ADLS

## 2023-08-10 NOTE — CONSULTS
Department of General Surgery Consult    PATIENT NAME: Nava Papillion OF BIRTH: 1990    ADMISSION DATE: 8/4/2023  5:20 PM      TODAY'S DATE: 8/9/2023    Reason for Consult:  Complex let renal abscess with psoas/flank extension    Chief Complaint: left flank/back pain    Historian: patient    Requesting Physician:  Tracie Up    HISTORY OF PRESENT ILLNESS:              The patient is a 28 y.o. female who presents with complex infectious process - large, complex left renal abscess which had percutaneous nephric drain placed, but the abscess continued to expand and extend into the surrounding soft tissues of the psoas, iliacus and superfical flank area. Had second drain placed into the more superficial flank area yesterday. We have been asked to assess whether there would be a benefit from a surgical I&D of the flank process for better/more effective drainage. .    Past Medical History:        Diagnosis Date    Bipolar 1 disorder ([address])     Chest pain     Depression     Hyperlipidemia     tx started May 2015    Hypertension     tx started May 2015    Multiple vessel coronary artery disease 7/6/2023    Neutrophilia 8/6/2023    Psychiatric problem     depression    Type II or unspecified type diabetes mellitus without mention of complication, uncontrolled 2/21/2015    tx started May 2015       Past Surgical History:        Procedure Laterality Date    CARPAL TUNNEL RELEASE Left 07/22/2020    CORONARY ARTERY BYPASS GRAFT N/A 7/10/2023    CORONARY ARTERY BYPASS GRAFTING TIMES THREE USING INTERNAL MAMMARY ARTERY AND RIGHT SAPHENOUS VEIN VIA ENDOSCOPIC HARVEST WITH LEFT ATRIAL APPENDAGE CLIP PLACEMENT, ON PUMP, SATNAM, PLACEMENT OF TEMPORARY PACING WIRES, BILATERAL PECTORALIS BLOCKS  performed by Yamila Hendricks MD at [facility]       Current Medications:   Current Facility-Administered Medications: insulin glargine (LANTUS) injection vial 20 Units, 20 Units, SubCUTAneous, Nightly  insulin lispro (HUMALOG) IntraVENous, PRN  acetaminophen (TYLENOL) tablet 650 mg, 650 mg, Oral, Q6H PRN **OR** acetaminophen (TYLENOL) suppository 650 mg, 650 mg, Rectal, Q6H PRN  atorvastatin (LIPITOR) tablet 80 mg, 80 mg, Oral, Nightly  0.9 % sodium chloride infusion, , IntraVENous, Continuous  HYDROmorphone HCl PF (DILAUDID) injection 1 mg, 1 mg, IntraVENous, Q3H PRN  Prior to Admission medications    Medication Sig Start Date End Date Taking? Authorizing Provider   gabapentin (NEURONTIN) 300 MG capsule Take 1 capsule by mouth 3 times daily for 14 days. 7/25/23 8/8/23  MAGDIEL Baker   atorvastatin (LIPITOR) 40 MG tablet Take 1 tablet by mouth daily 7/15/23   Angel Camp MD   empagliflozin (JARDIANCE) 10 MG tablet Take 2.5 tablets by mouth daily 7/15/23   Angel Camp MD   ezetimibe (ZETIA) 10 MG tablet Take 1 tablet by mouth daily 7/15/23   Angel Camp MD   glucose monitoring (FREESTYLE FREEDOM) kit 1 kit by Does not apply route daily  Patient not taking: Reported on 7/25/2023 7/15/23   Angel Camp MD   Lancets MISC 1 each by Does not apply route daily  Patient not taking: Reported on 7/25/2023 7/15/23   Angel Camp MD   blood glucose monitor strips Test 1 times a day & as needed for symptoms of irregular blood glucose. Dispense sufficient amount for indicated testing frequency plus additional to accommodate PRN testing needs.   Patient not taking: Reported on 7/25/2023 7/15/23   Angel Camp MD   aspirin 81 MG EC tablet Take 1 tablet by mouth daily 7/15/23   REVA Lake CNP   metoprolol tartrate (LOPRESSOR) 25 MG tablet Take 0.5 tablets by mouth 2 times daily Hold for HR < 65 and/or SBP < 100 7/14/23   REVA Lake CNP   clopidogrel (PLAVIX) 75 MG tablet Take 1 tablet by mouth daily 7/15/23   REVA Lake CNP   ferrous sulfate (IRON 325) 325 (65 Fe) MG tablet Take 1 tablet by mouth 2 times daily 7/14/23   REVA Lake CNP   Insulin Aspart (NOVOLOG SC) Inject 0-6 Units into

## 2023-08-10 NOTE — PROGRESS NOTES
V2.0    [facility] [place] Progress Note      Name:  Forrest Keith /Age/Sex: 1990  (28 y.o. female)   MRN & CSN:  [id number] & [id number] Encounter Date/Time: 8/10/2023 9:34 AM EDT   Location:  [address] [place] [address] PCP: No primary care provider on file. Hugh Juarez MD       Hospital Day: 7    Assessment and Recommendations   Forrest Keith is a 28 y.o. female with a pmh of CAD s/p CABG 23, hypertension, DM type II who presents with Renal abscess      Plan:   -Large left renal abscess with posterior treated for extension into the left psoas and paraspinal musculature due to Proteus mirabilis  -Sepsis due renal abscess  -Recent proteus UTI  S/p CT guided percutaneous drain placement  by IR  Repeat CT  showed slight increase in in size of the abscess in the posterior tissue--IR placed 2nd drain in the left flank   Continue management per nephrology and ID  Drain cultures positive for Proteus  Antibiotics--IV vancomycin dc'd  ,IV  meropenem dc'd - on IV rocephin + fluconazole per ID. [facility] General surgery consulted plan I&D of the left flank abscess     Multivessel CAD s/p CABG 7/10/23  Ischemic cardiomyopathy  Hypertension  Hyperlipidemia  Stable. On beta-blocker, antiplatelets,statin-        Diabetes mellitus type 2,uncontrolled ,Hba1c    is 10.4  Non Compliant with prior insulin regimen and diet, had been off all medications  Continue to optimize basal bolus regimen    Constipation, c/w laxatives      Diet Diet NPO   DVT Prophylaxis [] Lovenox, []  Heparin, [] SCDs, [] Ambulation,  [] Eliquis, [] Xarelto  [] Coumadin   Code Status Full Code       Surrogate Decision Maker/ POA               Subjective:     Reports no improvement, still has flank pain, no BM in several days        Review of Systems:      Pertinent positives and negatives discussed in HPI    Objective:      Intake/Output Summary (Last 24 hours) at 8/10/2023 0711  Last data filed at 8/10/2023 9455  Gross per 24 Detail Level: Detailed

## 2023-08-10 NOTE — ANESTHESIA POSTPROCEDURE EVALUATION
Department of Anesthesiology  Postprocedure Note    Patient: Jana Love  MRN: [id number]  YOB: 1990  Date of evaluation: 8/10/2023      Procedure Summary     Date: 08/10/23 Room / Location: [facility] 01 / [address] [place] [address]    Anesthesia Start: 3661 Anesthesia Stop: 7792    Procedure: LEFT FLANK ABSCESS INCISION AND DRAINAGE (Left: Back) Diagnosis:       Abscess      (Abscess [L02.91])    Surgeons: Daija Suero MD Responsible Provider: Sasha Moore MD    Anesthesia Type: general ASA Status: 3          Anesthesia Type: No value filed.     Shanon Phase I: Shanon Score: 10    Shanon Phase II:        Anesthesia Post Evaluation    Patient location during evaluation: PACU  Level of consciousness: awake and alert  Airway patency: patent  Nausea & Vomiting: no vomiting  Complications: no  Respiratory status: acceptable  Hydration status: stable  Pain management: adequate

## 2023-08-10 NOTE — PROGRESS NOTES
Patient arrived to PACU bay 9, phase one initiated. Placed on bedside monitor, VSS. Report obtained from OR RN and anesthesia. Patient on room air. Oral airway removed, without difficulty, upon arrival to PACU. See flowsheet for assessment. . Warm blankets applied. Side rails in place, will monitor patient closely.

## 2023-08-10 NOTE — PROGRESS NOTES
Data Review:    Lab Results   Component Value Date    WBC 8.7 08/09/2023    HGB 9.1 (L) 08/09/2023    HCT 29.7 (L) 08/09/2023    MCV 87.1 08/09/2023     (H) 08/09/2023     Lab Results   Component Value Date    CREATININE <0.5 (L) 08/09/2023    BUN 4 (L) 08/09/2023     08/09/2023    K 3.6 08/09/2023     08/09/2023    CO2 25 08/09/2023       Hepatic Function Panel:   Lab Results   Component Value Date/Time    ALKPHOS 106 08/04/2023 05:30 PM    ALT 24 08/04/2023 05:30 PM    AST 12 08/04/2023 05:30 PM    PROT 7.6 08/04/2023 05:30 PM    BILITOT 0.3 08/04/2023 05:30 PM    BILIDIR <0.2 07/10/2023 05:00 AM    IBILI see below 07/10/2023 05:00 AM    LABALBU 3.3 08/04/2023 05:30 PM         MICRO:  8/4 BC x2 neg   HIV screen neg    UC neg; UA yeast  8/5 Abscess fluid culture light growth P mirabilis   8/8 Abscess fluid culture P mirabilis     Proteus mirabilis   Antibiotic Interpretation Microscan  Method Status    ampicillin Sensitive <=8 mcg/mL BACTERIAL SUSCEPTIBILITY PANEL BY DIVINA     ampicillin-sulbactam Sensitive <=8/4 mcg/mL BACTERIAL SUSCEPTIBILITY PANEL BY DIVINA     ceFAZolin Intermediate 4 mcg/mL BACTERIAL SUSCEPTIBILITY PANEL BY DIVINA     cefepime Sensitive <=2 mcg/mL BACTERIAL SUSCEPTIBILITY PANEL BY DIVINA     cefTRIAXone Sensitive <=1 mcg/mL BACTERIAL SUSCEPTIBILITY PANEL BY DIVINA     cefuroxime Sensitive <=4 mcg/mL BACTERIAL SUSCEPTIBILITY PANEL BY DIVINA     ciprofloxacin Sensitive <=1 mcg/mL BACTERIAL SUSCEPTIBILITY PANEL BY DIVINA     ertapenem Sensitive <=0.5 mcg/mL BACTERIAL SUSCEPTIBILITY PANEL BY DIVINA     gentamicin Sensitive <=4 mcg/mL BACTERIAL SUSCEPTIBILITY PANEL BY DIVINA     meropenem Sensitive <=1 mcg/mL BACTERIAL SUSCEPTIBILITY PANEL BY DIVINA     piperacillin-tazobactam Sensitive <=16 mcg/mL BACTERIAL SUSCEPTIBILITY PANEL BY DIVINA     trimethoprim-sulfamethoxazole Sensitive <=2/38 mcg/mL BACTERIAL SUSCEPTIBILITY PANEL BY DIVINA         IMAGING:    CT AP w wo contrast 8/3/23  1.  Large left renal abscess with posterior perinephric extension into both  the left psoas muscle and left posterior paraspinal musculature/subcutaneous  tissues. 2. Nonobstructing bilateral nephrolithiasis. CT AP w contrast 8/7/23  1. Drain in the left perinephric abscess. It is slightly smaller in size   since the previous study. 2. The abscess in the posterior soft tissues is slightly larger. 3. Left psoas abscess and abscess in the paravertebral musculature are   slightly smaller. 4. Small pleural effusions and bibasilar consolidation worse on the right. Assessment:     Patient Active Problem List    Diagnosis Date Noted    Septicemia ([address]) 08/06/2023    Severe sepsis ([address]) 08/06/2023    Fever and chills 08/06/2023    Proteus infection 08/06/2023    Nephrolithiasis 08/06/2023    Neutrophilia 08/06/2023    Abnormal CT of the abdomen 08/06/2023    Renal and perinephric abscess 08/06/2023    Poorly controlled diabetes mellitus ([address]) 08/06/2023    Renal abscess 08/04/2023    Medically noncompliant 07/06/2023    Uncontrolled type 2 diabetes mellitus with hyperglycemia ([address]) 07/06/2023    Ischemic cardiomyopathy 07/06/2023    Multiple vessel coronary artery disease 07/06/2023    NSTEMI (non-ST elevated myocardial infarction) ([address]) 07/05/2023    Type 2 diabetes mellitus without complication ([address]) 40/27/9762    Essential hypertension 05/14/2021    Tetrahydrocannabinol (THC) use disorder, moderate, dependence ([address]) 05/14/2021    Chronic bilateral low back pain with bilateral sciatica 05/14/2021    Type II or unspecified type diabetes mellitus without mention of complication, uncontrolled 02/21/2015    Dyslipidemia 02/21/2015    Obesity (BMI 30-39.9) 02/21/2015    Depressed 02/21/2015       History of CAD, HTN, uncontrolled DM  S/p CABG 7/10/23    Admitted 8/4/23 with sepsis from large L renal and psoas abscess  S/p IR drain placement 8/5/23, 8/8/23 with growth of P mirabilis in fluid cultures.   Yeast were seen on

## 2023-08-10 NOTE — PROGRESS NOTES
Comprehensive Nutrition Assessment    Type and Reason for Visit:  Initial, RD Nutrition Re-Screen/LOS    Nutrition Recommendations/Plan:   NPO- ADAT per MD  Recommend CC4 when medically feasible  Recommend glucerna BID when medically feasible  Monitor nutrition adequacy, pertinent labs, bowel habits, wt changes, and clinical progress     Malnutrition Assessment:  Malnutrition Status: At risk for malnutrition (Comment) (08/10/23 1219)      Nutrition Assessment:    LOS: 27 yo F w PMH HLD, HTN, CAD s/p CABG 7/10, DM admitted w renal abscess. S/p perc drain placement 8/5 and second drain placed 8/7. General surgery consulted for possible left flank I&D. Previously on CC4 w majority % po intakes recorded per EMR. Donwgraded to NPO today. Pt reports eating well PTA but having some weight loss since last admit in July. Reports being able to eat most of her meals while here. Recommend resuming CC4 when medically feasible and adding ONS to promote po intake. Will monitor. Nutrition Related Findings:    A1c 10.4 (8/8/23). -294 x24 hrs. BM 8/5. Wound Type: Surgical Incision       Current Nutrition Intake & Therapies:    Average Meal Intake: %, 0% (prior to NPO)  Average Supplements Intake: None Ordered  Diet NPO    Anthropometric Measures:  Height: 5' 10\" (177.8 cm)  Ideal Body Weight (IBW): 150 lbs (68 kg)       Current Body Weight: 161 lb 6 oz (73.2 kg),   IBW. Weight Source: Standing Scale  Current BMI (kg/m2): 23.2  Usual Body Weight: 162 lb 4.1 oz (73.6 kg) (7/10, standing scale)  % Weight Change (Calculated): -0.5  Weight Adjustment For: No Adjustment                 BMI Categories: Normal Weight (BMI 18.5-24. 9)    Estimated Daily Nutrient Needs:  Energy Requirements Based On: Kcal/kg (25-30)  Weight Used for Energy Requirements: Current  Energy (kcal/day): 0570-7190 kcals  Weight Used for Protein Requirements:  (1-1.2)  Protein (g/day): 73-88 g  Method Used for Fluid Requirements: 1 ml/kcal  Fluid

## 2023-08-10 NOTE — BRIEF OP NOTE
Brief Postoperative Note      Patient: Shireen Libman  YOB: 1990  MRN: [id number]    Date of Procedure: 8/10/2023    Pre-Op Diagnosis Codes:     * Abscess [L02.91]    Post-Op Diagnosis:  left flank/back abscess, complex       Procedure(s):  LEFT FLANK ABSCESS INCISION AND DRAINAGE    Surgeon(s):  Jama Perez MD    Assistant:  Surgical Assistant: Lulu Elizalde    Anesthesia: General    Estimated Blood Loss (mL): less than 50     Complications: None    Specimens:   * No specimens in log *    Implants:  * No implants in log *      Drains:   Closed/Suction Drain Left Back Bulb (Active)   Site Description Unable to view 08/10/23 1215   Dressing Status Clean, dry & intact 08/10/23 1215   Drainage Appearance Milky;Pink tinged 08/10/23 1215   Drain Status To bulb suction 08/10/23 1215   Output (ml) 5 ml 08/10/23 1215       [REMOVED] Chest Tube Anterior Mediastinal 1 (Removed)   Chest Tube Airleak No 07/11/23 0800   Status Continuous Suction 07/11/23 0400   Suction To water seal 07/11/23 0800   Y Connector Used No 07/11/23 0800   Drainage Description Serous 07/11/23 0800   Dressing Status New dressing applied 07/11/23 0600   Chest Tube Dressing Dry 07/11/23 0600   Site Assessment Not assessed 07/11/23 0800   Surrounding Skin Unable to view 07/11/23 0800   Output (ml) 0 ml 07/11/23 0800       [REMOVED] Chest Tube Left Pleural 2 (Removed)   Chest Tube Airleak No 07/12/23 0805   Status Gravity 07/12/23 0805   Suction To water seal 07/12/23 0805   Y Connector Used No 07/12/23 0805   Drainage Description Serous 07/12/23 0805   Dressing Status New dressing applied 07/12/23 0600   Chest Tube Dressing Dry 07/11/23 0600   Site Assessment Other (Comment) 07/12/23 0805   Surrounding Skin Dry; Intact 07/12/23 0805   Output (ml) 10 ml 07/12/23 1000       [REMOVED] Closed/Suction Drain Left Back Bulb (Removed)   Site Description Unable to view 08/10/23 1215   Dressing Status Clean 08/10/23 1215   Drainage

## 2023-08-10 NOTE — ANESTHESIA PRE PROCEDURE
Department of Anesthesiology  Preprocedure Note       Name:  Sudhakar Moffett   Age:  28 y.o.  :  1990                                          MRN:  [id number]         Date:  8/10/2023      Surgeon: Sumit Roberson):  Avril Anaya MD    Procedure: Procedure(s):  LEFT FLANK ABSCESS INCISION AND DRAINAGE    Medications prior to admission:   Prior to Admission medications    Medication Sig Start Date End Date Taking? Authorizing Provider   gabapentin (NEURONTIN) 300 MG capsule Take 1 capsule by mouth 3 times daily for 14 days. 23  MAGDIEL Wheeler   atorvastatin (LIPITOR) 40 MG tablet Take 1 tablet by mouth daily 7/15/23   Nerissa Ramos MD   empagliflozin (JARDIANCE) 10 MG tablet Take 2.5 tablets by mouth daily 7/15/23   Nerissa Ramos MD   ezetimibe (ZETIA) 10 MG tablet Take 1 tablet by mouth daily 7/15/23   Nerissa Ramos MD   glucose monitoring (FREESTYLE FREEDOM) kit 1 kit by Does not apply route daily  Patient not taking: Reported on 2023 7/15/23   Nerissa Ramos MD   Lancets MISC 1 each by Does not apply route daily  Patient not taking: Reported on 2023 7/15/23   Nerissa Ramos MD   blood glucose monitor strips Test 1 times a day & as needed for symptoms of irregular blood glucose. Dispense sufficient amount for indicated testing frequency plus additional to accommodate PRN testing needs.   Patient not taking: Reported on 2023 7/15/23   Nerissa Ramos MD   aspirin 81 MG EC tablet Take 1 tablet by mouth daily 7/15/23   REVA Crews CNP   metoprolol tartrate (LOPRESSOR) 25 MG tablet Take 0.5 tablets by mouth 2 times daily Hold for HR < 65 and/or SBP < 100 23   REVA Crews CNP   clopidogrel (PLAVIX) 75 MG tablet Take 1 tablet by mouth daily 7/15/23   REVA Crews CNP   ferrous sulfate (IRON 325) 325 (65 Fe) MG tablet Take 1 tablet by mouth 2 times daily 23   REVA Crews CNP   Insulin Aspart (NOVOLOG SC) Inject 0-6 Units

## 2023-08-10 NOTE — PROGRESS NOTES
Writer informed pt would need urine sample, placed hat in bathroom. Writer went back to obtain sample, no urine in hat. Writer spoke with nurse in same day, made aware. Pt in gown, IVF's infusing at 75mL/hr. Consent in front of chart. Pt going off floor to same day surgery at this time, CMU made aware.   Emeka Peck RN  8/10/2023

## 2023-08-11 LAB
ANION GAP SERPL CALCULATED.3IONS-SCNC: 9 MMOL/L (ref 3–16)
BASOPHILS # BLD: 0 K/UL (ref 0–0.2)
BASOPHILS NFR BLD: 0.2 %
BUN SERPL-MCNC: 4 MG/DL (ref 7–20)
CALCIUM SERPL-MCNC: 8.1 MG/DL (ref 8.3–10.6)
CHLORIDE SERPL-SCNC: 99 MMOL/L (ref 99–110)
CO2 SERPL-SCNC: 28 MMOL/L (ref 21–32)
CREAT SERPL-MCNC: <0.5 MG/DL (ref 0.6–1.1)
DEPRECATED RDW RBC AUTO: 18.4 % (ref 12.4–15.4)
EOSINOPHIL # BLD: 0.3 K/UL (ref 0–0.6)
EOSINOPHIL NFR BLD: 3.6 %
GFR SERPLBLD CREATININE-BSD FMLA CKD-EPI: >60 ML/MIN/{1.73_M2}
GLUCOSE BLD-MCNC: 184 MG/DL (ref 70–99)
GLUCOSE BLD-MCNC: 223 MG/DL (ref 70–99)
GLUCOSE BLD-MCNC: 89 MG/DL (ref 70–99)
GLUCOSE BLD-MCNC: 90 MG/DL (ref 70–99)
GLUCOSE SERPL-MCNC: 323 MG/DL (ref 70–99)
HCT VFR BLD AUTO: 27.8 % (ref 36–48)
HGB BLD-MCNC: 8.8 G/DL (ref 12–16)
LYMPHOCYTES # BLD: 1.9 K/UL (ref 1–5.1)
LYMPHOCYTES NFR BLD: 23.9 %
MCH RBC QN AUTO: 27 PG (ref 26–34)
MCHC RBC AUTO-ENTMCNC: 31.5 G/DL (ref 31–36)
MCV RBC AUTO: 85.6 FL (ref 80–100)
MONOCYTES # BLD: 0.7 K/UL (ref 0–1.3)
MONOCYTES NFR BLD: 9.2 %
NEUTROPHILS # BLD: 5 K/UL (ref 1.7–7.7)
NEUTROPHILS NFR BLD: 63.1 %
PERFORMED ON: ABNORMAL
PERFORMED ON: ABNORMAL
PERFORMED ON: NORMAL
PERFORMED ON: NORMAL
PLATELET # BLD AUTO: 587 K/UL (ref 135–450)
PMV BLD AUTO: 7.1 FL (ref 5–10.5)
POTASSIUM SERPL-SCNC: 4.1 MMOL/L (ref 3.5–5.1)
RBC # BLD AUTO: 3.25 M/UL (ref 4–5.2)
SODIUM SERPL-SCNC: 136 MMOL/L (ref 136–145)
WBC # BLD AUTO: 7.9 K/UL (ref 4–11)

## 2023-08-11 PROCEDURE — 2060000000 HC ICU INTERMEDIATE R&B

## 2023-08-11 PROCEDURE — 80048 BASIC METABOLIC PNL TOTAL CA: CPT

## 2023-08-11 PROCEDURE — 6360000002 HC RX W HCPCS: Performed by: SURGERY

## 2023-08-11 PROCEDURE — 2580000003 HC RX 258: Performed by: SURGERY

## 2023-08-11 PROCEDURE — 6370000000 HC RX 637 (ALT 250 FOR IP): Performed by: SURGERY

## 2023-08-11 PROCEDURE — 99232 SBSQ HOSP IP/OBS MODERATE 35: CPT | Performed by: INTERNAL MEDICINE

## 2023-08-11 PROCEDURE — 85025 COMPLETE CBC W/AUTO DIFF WBC: CPT

## 2023-08-11 PROCEDURE — 99024 POSTOP FOLLOW-UP VISIT: CPT | Performed by: SURGERY

## 2023-08-11 RX ADMIN — OXYCODONE HYDROCHLORIDE AND ACETAMINOPHEN 2 TABLET: 5; 325 TABLET ORAL at 19:20

## 2023-08-11 RX ADMIN — METHOCARBAMOL 750 MG: 500 TABLET ORAL at 09:54

## 2023-08-11 RX ADMIN — METHOCARBAMOL 750 MG: 500 TABLET ORAL at 19:19

## 2023-08-11 RX ADMIN — INSULIN GLARGINE 20 UNITS: 100 INJECTION, SOLUTION SUBCUTANEOUS at 20:51

## 2023-08-11 RX ADMIN — CLOPIDOGREL BISULFATE 75 MG: 75 TABLET ORAL at 09:54

## 2023-08-11 RX ADMIN — Medication 10 ML: at 20:51

## 2023-08-11 RX ADMIN — PANTOPRAZOLE SODIUM 40 MG: 40 TABLET, DELAYED RELEASE ORAL at 09:54

## 2023-08-11 RX ADMIN — METOPROLOL TARTRATE 12.5 MG: 25 TABLET, FILM COATED ORAL at 09:55

## 2023-08-11 RX ADMIN — INSULIN LISPRO 2 UNITS: 100 INJECTION, SOLUTION INTRAVENOUS; SUBCUTANEOUS at 09:58

## 2023-08-11 RX ADMIN — OXYCODONE HYDROCHLORIDE AND ACETAMINOPHEN 2 TABLET: 5; 325 TABLET ORAL at 06:35

## 2023-08-11 RX ADMIN — FERROUS SULFATE TAB 325 MG (65 MG ELEMENTAL FE) 325 MG: 325 (65 FE) TAB at 09:54

## 2023-08-11 RX ADMIN — METHOCARBAMOL 750 MG: 500 TABLET ORAL at 15:12

## 2023-08-11 RX ADMIN — ASPIRIN 81 MG: 81 TABLET, COATED ORAL at 09:54

## 2023-08-11 RX ADMIN — OXYCODONE HYDROCHLORIDE AND ACETAMINOPHEN 2 TABLET: 5; 325 TABLET ORAL at 10:45

## 2023-08-11 RX ADMIN — FLUCONAZOLE 400 MG: 400 INJECTION, SOLUTION INTRAVENOUS at 03:44

## 2023-08-11 RX ADMIN — INSULIN LISPRO 8 UNITS: 100 INJECTION, SOLUTION INTRAVENOUS; SUBCUTANEOUS at 09:57

## 2023-08-11 RX ADMIN — INSULIN LISPRO 8 UNITS: 100 INJECTION, SOLUTION INTRAVENOUS; SUBCUTANEOUS at 12:20

## 2023-08-11 RX ADMIN — POLYETHYLENE GLYCOL 3350 17 G: 17 POWDER, FOR SOLUTION ORAL at 09:54

## 2023-08-11 RX ADMIN — FERROUS SULFATE TAB 325 MG (65 MG ELEMENTAL FE) 325 MG: 325 (65 FE) TAB at 20:51

## 2023-08-11 RX ADMIN — EZETIMIBE 10 MG: 10 TABLET ORAL at 09:55

## 2023-08-11 RX ADMIN — ATORVASTATIN CALCIUM 80 MG: 80 TABLET, FILM COATED ORAL at 20:51

## 2023-08-11 RX ADMIN — HYDROMORPHONE HYDROCHLORIDE 1 MG: 1 INJECTION, SOLUTION INTRAMUSCULAR; INTRAVENOUS; SUBCUTANEOUS at 09:21

## 2023-08-11 RX ADMIN — Medication 10 ML: at 09:57

## 2023-08-11 RX ADMIN — SODIUM CHLORIDE: 9 INJECTION, SOLUTION INTRAVENOUS at 10:01

## 2023-08-11 RX ADMIN — INSULIN LISPRO 8 UNITS: 100 INJECTION, SOLUTION INTRAVENOUS; SUBCUTANEOUS at 16:55

## 2023-08-11 RX ADMIN — CEFTRIAXONE SODIUM 2000 MG: 2 INJECTION, POWDER, FOR SOLUTION INTRAMUSCULAR; INTRAVENOUS at 19:24

## 2023-08-11 RX ADMIN — OXYCODONE HYDROCHLORIDE AND ACETAMINOPHEN 2 TABLET: 5; 325 TABLET ORAL at 00:22

## 2023-08-11 RX ADMIN — OXYCODONE HYDROCHLORIDE AND ACETAMINOPHEN 2 TABLET: 5; 325 TABLET ORAL at 15:13

## 2023-08-11 RX ADMIN — METOPROLOL TARTRATE 12.5 MG: 25 TABLET, FILM COATED ORAL at 20:51

## 2023-08-11 ASSESSMENT — PAIN DESCRIPTION - LOCATION
LOCATION: FLANK

## 2023-08-11 ASSESSMENT — PAIN DESCRIPTION - ORIENTATION
ORIENTATION: LEFT

## 2023-08-11 ASSESSMENT — PAIN DESCRIPTION - DESCRIPTORS
DESCRIPTORS: BURNING;THROBBING
DESCRIPTORS: THROBBING;BURNING
DESCRIPTORS: THROBBING
DESCRIPTORS: BURNING;THROBBING
DESCRIPTORS: THROBBING;BURNING
DESCRIPTORS: BURNING;THROBBING
DESCRIPTORS: THROBBING;BURNING

## 2023-08-11 ASSESSMENT — PAIN DESCRIPTION - FREQUENCY: FREQUENCY: CONTINUOUS

## 2023-08-11 ASSESSMENT — PAIN SCALES - GENERAL
PAINLEVEL_OUTOF10: 9
PAINLEVEL_OUTOF10: 10
PAINLEVEL_OUTOF10: 9
PAINLEVEL_OUTOF10: 10
PAINLEVEL_OUTOF10: 8
PAINLEVEL_OUTOF10: 8
PAINLEVEL_OUTOF10: 10
PAINLEVEL_OUTOF10: 9

## 2023-08-11 ASSESSMENT — PAIN DESCRIPTION - PAIN TYPE
TYPE: ACUTE PAIN
TYPE: ACUTE PAIN

## 2023-08-11 ASSESSMENT — PAIN - FUNCTIONAL ASSESSMENT
PAIN_FUNCTIONAL_ASSESSMENT: ACTIVITIES ARE NOT PREVENTED
PAIN_FUNCTIONAL_ASSESSMENT: ACTIVITIES ARE NOT PREVENTED

## 2023-08-11 NOTE — PLAN OF CARE
Problem: Pain  Goal: Verbalizes/displays adequate comfort level or baseline comfort level  Outcome: Progressing  Note: Pt able to use pain scale 0-10 with complaints of left flank I&D and NICHOLAS dressing site pain this shift, medicated with prn percocet this shift and dilaudid x1 dose prior to MD changing dressing. Although based on scale minimal improvement, pt states that gets relief from the prn percocet and the scheduled robaxin. Problem: Skin/Tissue Integrity  Goal: Absence of new skin breakdown  Outcome: Progressing  Note: Left flank I&D wound changed per MD this a.m.  left flank NICHOLAS drain in place; dressing clean, dry and intact.

## 2023-08-11 NOTE — OP NOTE
[facility] [place], [address]                                OPERATIVE REPORT    PATIENT NAME: Baldomero Schultz                 :        1990  MED REC NO:   [id number]                          ROOM:       [id number]  ACCOUNT NO:   [de-identified]                           ADMIT DATE: 2023  PROVIDER:     Chaka Lew MD    DATE OF PROCEDURE:  08/10/2023    PREOPERATIVE DIAGNOSIS:  Complex left renal abscess extending into a  large left flank and back wall abscess. POSTOPERATIVE DIAGNOSIS:  Complex left renal abscess extending into a  large left flank and back wall abscess. OPERATION PERFORMED:  Left flank abscess incision and drainage. SURGEON:  Chaka Lew MD    ANESTHESIA:  General.    ESTIMATED BLOOD LOSS:  Less than 50 mL. SPECIMEN:  None. COUNTS:  Sponge and needle counts were correct. INDICATIONS:  The patient is a 70-year-old female with multiple medical  conditions, who has developed a large complex abscess within the left  kidney that has been refractory to antimicrobial therapy. She has also  had a percutaneous drain placed into the kidney abscess. Recently,  however she has also developed a large extension of this abscess up to  her left flank and back muscle layers approaching the skin surface. She  had a pecutaneous drain placed into this abscess extension several days  ago, but still has persistent pain and tenderness in this area, again  suggestion of insufficiently drained abscess contents. We have been  requested to provide more adequate drainage of this extension of the  abscess. FINDINGS:  Large deep abscess cavity with extensive pus, evacuated and  irrigated and packed. DESCRIPTION OF PROCEDURE:  After informed consent was obtained, the  patient was taken to the operating room, placed in the supine position.    She was placed under general anesthesia and rotated on the operating  table

## 2023-08-11 NOTE — PROGRESS NOTES
PRN  sodium chloride flush, 5-40 mL, PRN  sodium chloride, , PRN  acetaminophen, 650 mg, Q6H PRN   Or  acetaminophen, 650 mg, Q6H PRN  HYDROmorphone, 1 mg, Q3H PRN        Labs and Imaging   CT ABDOMEN PELVIS W WO CONTRAST Additional Contrast? Oral    Result Date: 8/3/2023  EXAMINATION: CT OF THE ABDOMEN AND PELVIS WITH AND WITHOUT CONTRAST 8/3/2023 8:10 am TECHNIQUE: CT of the abdomen and pelvis was performed with and without the administration of intravenous contrast.  Multiplanar reformatted images are provided for review. Automated exposure control, iterative reconstruction, and/or weight based adjustment of the mA/kV was utilized to reduce the radiation dose to as low as reasonably achievable. COMPARISON: None. HISTORY: ORDERING SYSTEM PROVIDED HISTORY: Abdominal mass, left upper quadrant TECHNOLOGIST PROVIDED HISTORY: Additional Contrast?->Oral STAT Creatinine as needed:->No Reason for exam:->abnormal ultrasound Reason for Exam: abnormal ultrasound, abd mass, LUQ,  surgery  July 10th, triple bypass,  unable to raise arms for scan,  palapable mass on left flank/posterior, marked with BB's, iron deficiency anemia history- HTN, diabetes, surg- CABG FINDINGS: Lower Chest: There is no consolidation or effusion. Organs: There are 3 nonobstructing 2 mm calculi within the right intrarenal collecting system and a 1 mm nonobstructing calculus within the left intrarenal collecting system. There is a large multi loculated rim enhancing fluid collection arising from the posterior aspect of the left kidney. The intrarenal component measures 3.6 x 3.4 cm and collectively measures 9 x 6.3 x 14 cm. The collection extends from the posterior aspect of the left kidney and invades the left psoas muscle and left posterior paraspinal musculature/subcutaneous tissues. There is moderate surrounding inflammatory change. The collection results in anterior displacement of the left kidney into the left upper quadrant.   The remainder of the solid abdominal organs are unremarkable. GI/Bowel: There is no bowel dilatation, wall thickening or obstruction. Pelvis: The bladder and pelvic organs are unremarkable. Peritoneum/Retroperitoneum: There is no free air, free fluid or adenopathy. Bones/Soft Tissues: There is no acute fracture or aggressive osseous lesion. 1. Large left renal abscess with posterior perinephric extension into both the left psoas muscle and left posterior paraspinal musculature/subcutaneous tissues. 2. Nonobstructing bilateral nephrolithiasis. US SOFT TISSUE LIMITED AREA    Result Date: 8/2/2023  EXAMINATION: SOFT TISSUE ULTRASOUND 7/31/2023 2:55 pm COMPARISON: None. HISTORY: ORDERING SYSTEM PROVIDED HISTORY: Abdominal mass, left upper quadrant TECHNOLOGIST PROVIDED HISTORY: Reason for exam:->mass Initial encounter FINDINGS: Focused ultrasound of the left flank was performed in the palpable area of interest.  There is an indeterminate hypoechoic area measuring 9.2 x 4.5 x 4.2 cm with areas of increased vascularity. There is mild subcutaneous edema and skin thickening. Indeterminate 9.2 x 4.5 x 4.2 cm complex hypoechoic area with increased vascularity within the left flank corresponding to the palpable area of interest.  Recommend further evaluation with contrast-enhanced CT. The findings were sent to the Radiology Results [address] at 3:32 pm on 7/31/2023 to be communicated to a licensed caregiver. CBC:   Recent Labs     08/09/23  0434 08/11/23  0335   WBC 8.7 7.9   HGB 9.1* 8.8*   * 587*       BMP:    Recent Labs     08/09/23  0434 08/11/23  0335    136   K 3.6 4.1    99   CO2 25 28   BUN 4* 4*   CREATININE <0.5* <0.5*   GLUCOSE 191* 323*       Hepatic:   No results for input(s): AST, ALT, ALB, BILITOT, ALKPHOS in the last 72 hours.     Lipids:   Lab Results   Component Value Date/Time    CHOL 114 07/10/2023 05:00 AM    HDL 37 07/10/2023 05:00 AM    TRIG 108 07/10/2023 05:00 AM

## 2023-08-11 NOTE — CARE COORDINATION
Per ID do not anticipate need for IVABX at d/c and Dr. Mary Bennett documents likelihood of transitioning to PO Cipro at d/c. General Surgery performed a left flank abscess I&D on patient 8/10. Findings : Large deep abscess cavity with extensive pus evacuated and irrigated and packed. Patient with perinephric drain and left flank drain in place. Urology following. Getting IV Ceftriaxone at present. Patient c/o left flank pain at incision site today. Here through weekend. Per Urology will monitor drains and do wet to dry dressings to flank. Consider repeat CT early next week to monitor abscess. Was active with [facility] prior to admit and Dr. Amarilis Montoya was signing [facility] related to CABG and post CABG care. Per Gene Lott at [facility] if she needs [facility] for any other reason unrelated to CTS then she will need PCP to follow or specialist managing drain and dressings to flank such as urology. She has no PCP and has been provided list and [facility] referral.Per Fior Nunez at [facility] since patient has [employer] she needs to be referred to [address] [place] [address] here at [facility] at [phone]. Writer initiated call and and spoke to [facility] who will look for accepting MD for PCP and [facility] needs.

## 2023-08-12 LAB
ALBUMIN SERPL-MCNC: 2.2 G/DL (ref 3.4–5)
ALBUMIN/GLOB SERPL: 0.6 {RATIO} (ref 1.1–2.2)
ALP SERPL-CCNC: 92 U/L (ref 40–129)
ALT SERPL-CCNC: 20 U/L (ref 10–40)
ANION GAP SERPL CALCULATED.3IONS-SCNC: 5 MMOL/L (ref 3–16)
AST SERPL-CCNC: 10 U/L (ref 15–37)
BASOPHILS # BLD: 0 K/UL (ref 0–0.2)
BASOPHILS NFR BLD: 0.4 %
BILIRUB SERPL-MCNC: <0.2 MG/DL (ref 0–1)
BUN SERPL-MCNC: 5 MG/DL (ref 7–20)
CALCIUM SERPL-MCNC: 8.3 MG/DL (ref 8.3–10.6)
CHLORIDE SERPL-SCNC: 102 MMOL/L (ref 99–110)
CO2 SERPL-SCNC: 31 MMOL/L (ref 21–32)
CREAT SERPL-MCNC: <0.5 MG/DL (ref 0.6–1.1)
DEPRECATED RDW RBC AUTO: 18 % (ref 12.4–15.4)
EOSINOPHIL # BLD: 0.4 K/UL (ref 0–0.6)
EOSINOPHIL NFR BLD: 4.8 %
GFR SERPLBLD CREATININE-BSD FMLA CKD-EPI: >60 ML/MIN/{1.73_M2}
GLUCOSE BLD-MCNC: 110 MG/DL (ref 70–99)
GLUCOSE BLD-MCNC: 132 MG/DL (ref 70–99)
GLUCOSE BLD-MCNC: 139 MG/DL (ref 70–99)
GLUCOSE BLD-MCNC: 96 MG/DL (ref 70–99)
GLUCOSE SERPL-MCNC: 169 MG/DL (ref 70–99)
HCT VFR BLD AUTO: 27 % (ref 36–48)
HGB BLD-MCNC: 8.7 G/DL (ref 12–16)
LYMPHOCYTES # BLD: 3 K/UL (ref 1–5.1)
LYMPHOCYTES NFR BLD: 37.6 %
MCH RBC QN AUTO: 26.9 PG (ref 26–34)
MCHC RBC AUTO-ENTMCNC: 32 G/DL (ref 31–36)
MCV RBC AUTO: 84 FL (ref 80–100)
MONOCYTES # BLD: 0.7 K/UL (ref 0–1.3)
MONOCYTES NFR BLD: 9.3 %
NEUTROPHILS # BLD: 3.8 K/UL (ref 1.7–7.7)
NEUTROPHILS NFR BLD: 47.9 %
PERFORMED ON: ABNORMAL
PERFORMED ON: NORMAL
PLATELET # BLD AUTO: 587 K/UL (ref 135–450)
PMV BLD AUTO: 6.6 FL (ref 5–10.5)
POTASSIUM SERPL-SCNC: 4.2 MMOL/L (ref 3.5–5.1)
PROT SERPL-MCNC: 5.6 G/DL (ref 6.4–8.2)
RBC # BLD AUTO: 3.21 M/UL (ref 4–5.2)
SODIUM SERPL-SCNC: 138 MMOL/L (ref 136–145)
WBC # BLD AUTO: 7.9 K/UL (ref 4–11)

## 2023-08-12 PROCEDURE — 99024 POSTOP FOLLOW-UP VISIT: CPT | Performed by: SURGERY

## 2023-08-12 PROCEDURE — 6360000002 HC RX W HCPCS: Performed by: SURGERY

## 2023-08-12 PROCEDURE — 6370000000 HC RX 637 (ALT 250 FOR IP): Performed by: SURGERY

## 2023-08-12 PROCEDURE — 2580000003 HC RX 258: Performed by: SURGERY

## 2023-08-12 PROCEDURE — 85025 COMPLETE CBC W/AUTO DIFF WBC: CPT

## 2023-08-12 PROCEDURE — 2060000000 HC ICU INTERMEDIATE R&B

## 2023-08-12 PROCEDURE — 2500000003 HC RX 250 WO HCPCS: Performed by: SURGERY

## 2023-08-12 PROCEDURE — 80053 COMPREHEN METABOLIC PANEL: CPT

## 2023-08-12 RX ADMIN — METHOCARBAMOL 750 MG: 500 TABLET ORAL at 21:54

## 2023-08-12 RX ADMIN — CEFTRIAXONE SODIUM 2000 MG: 2 INJECTION, POWDER, FOR SOLUTION INTRAMUSCULAR; INTRAVENOUS at 18:32

## 2023-08-12 RX ADMIN — METOPROLOL TARTRATE 12.5 MG: 25 TABLET, FILM COATED ORAL at 08:32

## 2023-08-12 RX ADMIN — HYDROMORPHONE HYDROCHLORIDE 1 MG: 1 INJECTION, SOLUTION INTRAMUSCULAR; INTRAVENOUS; SUBCUTANEOUS at 04:09

## 2023-08-12 RX ADMIN — FERROUS SULFATE TAB 325 MG (65 MG ELEMENTAL FE) 325 MG: 325 (65 FE) TAB at 21:54

## 2023-08-12 RX ADMIN — PANTOPRAZOLE SODIUM 40 MG: 40 TABLET, DELAYED RELEASE ORAL at 08:32

## 2023-08-12 RX ADMIN — INSULIN LISPRO 8 UNITS: 100 INJECTION, SOLUTION INTRAVENOUS; SUBCUTANEOUS at 12:09

## 2023-08-12 RX ADMIN — OXYCODONE HYDROCHLORIDE AND ACETAMINOPHEN 2 TABLET: 5; 325 TABLET ORAL at 08:31

## 2023-08-12 RX ADMIN — BISACODYL 5 MG: 5 TABLET, COATED ORAL at 00:55

## 2023-08-12 RX ADMIN — HYDROMORPHONE HYDROCHLORIDE 1 MG: 1 INJECTION, SOLUTION INTRAMUSCULAR; INTRAVENOUS; SUBCUTANEOUS at 12:09

## 2023-08-12 RX ADMIN — POLYETHYLENE GLYCOL 3350 17 G: 17 POWDER, FOR SOLUTION ORAL at 08:35

## 2023-08-12 RX ADMIN — METHOCARBAMOL 750 MG: 500 TABLET ORAL at 08:32

## 2023-08-12 RX ADMIN — EZETIMIBE 10 MG: 10 TABLET ORAL at 08:38

## 2023-08-12 RX ADMIN — HYDROMORPHONE HYDROCHLORIDE 1 MG: 1 INJECTION, SOLUTION INTRAMUSCULAR; INTRAVENOUS; SUBCUTANEOUS at 21:57

## 2023-08-12 RX ADMIN — INSULIN LISPRO 8 UNITS: 100 INJECTION, SOLUTION INTRAVENOUS; SUBCUTANEOUS at 08:34

## 2023-08-12 RX ADMIN — METHOCARBAMOL 750 MG: 500 TABLET ORAL at 16:04

## 2023-08-12 RX ADMIN — METHOCARBAMOL 750 MG: 500 TABLET ORAL at 12:14

## 2023-08-12 RX ADMIN — INSULIN LISPRO 8 UNITS: 100 INJECTION, SOLUTION INTRAVENOUS; SUBCUTANEOUS at 17:08

## 2023-08-12 RX ADMIN — HYDROMORPHONE HYDROCHLORIDE 1 MG: 1 INJECTION, SOLUTION INTRAMUSCULAR; INTRAVENOUS; SUBCUTANEOUS at 18:28

## 2023-08-12 RX ADMIN — ASPIRIN 81 MG: 81 TABLET, COATED ORAL at 08:31

## 2023-08-12 RX ADMIN — ONDANSETRON 4 MG: 4 TABLET, ORALLY DISINTEGRATING ORAL at 16:04

## 2023-08-12 RX ADMIN — ATORVASTATIN CALCIUM 80 MG: 80 TABLET, FILM COATED ORAL at 21:55

## 2023-08-12 RX ADMIN — METHOCARBAMOL 750 MG: 500 TABLET ORAL at 01:28

## 2023-08-12 RX ADMIN — METOPROLOL TARTRATE 12.5 MG: 25 TABLET, FILM COATED ORAL at 21:54

## 2023-08-12 RX ADMIN — CLOPIDOGREL BISULFATE 75 MG: 75 TABLET ORAL at 08:31

## 2023-08-12 RX ADMIN — OXYCODONE HYDROCHLORIDE AND ACETAMINOPHEN 2 TABLET: 5; 325 TABLET ORAL at 00:57

## 2023-08-12 RX ADMIN — FLUCONAZOLE 400 MG: 400 INJECTION, SOLUTION INTRAVENOUS at 01:30

## 2023-08-12 RX ADMIN — INSULIN GLARGINE 20 UNITS: 100 INJECTION, SOLUTION SUBCUTANEOUS at 21:57

## 2023-08-12 RX ADMIN — FERROUS SULFATE TAB 325 MG (65 MG ELEMENTAL FE) 325 MG: 325 (65 FE) TAB at 08:32

## 2023-08-12 RX ADMIN — OXYCODONE HYDROCHLORIDE AND ACETAMINOPHEN 2 TABLET: 5; 325 TABLET ORAL at 14:48

## 2023-08-12 ASSESSMENT — PAIN DESCRIPTION - LOCATION
LOCATION: FLANK

## 2023-08-12 ASSESSMENT — PAIN DESCRIPTION - DESCRIPTORS
DESCRIPTORS: ACHING;DISCOMFORT
DESCRIPTORS: ACHING;DISCOMFORT
DESCRIPTORS: ACHING;THROBBING;SHARP;STABBING
DESCRIPTORS: ACHING
DESCRIPTORS: ACHING
DESCRIPTORS: DISCOMFORT

## 2023-08-12 ASSESSMENT — PAIN DESCRIPTION - ORIENTATION
ORIENTATION: LEFT

## 2023-08-12 ASSESSMENT — PAIN - FUNCTIONAL ASSESSMENT
PAIN_FUNCTIONAL_ASSESSMENT: ACTIVITIES ARE NOT PREVENTED

## 2023-08-12 ASSESSMENT — PAIN SCALES - GENERAL
PAINLEVEL_OUTOF10: 10
PAINLEVEL_OUTOF10: 9
PAINLEVEL_OUTOF10: 8
PAINLEVEL_OUTOF10: 9
PAINLEVEL_OUTOF10: 10
PAINLEVEL_OUTOF10: 9

## 2023-08-12 ASSESSMENT — PAIN DESCRIPTION - PAIN TYPE
TYPE: ACUTE PAIN
TYPE: ACUTE PAIN

## 2023-08-12 NOTE — PLAN OF CARE
Problem: Pain  Goal: Verbalizes/displays adequate comfort level or baseline comfort level  8/11/2023 2222 by Lala Peck RN  Outcome: Not Progressing  Flowsheets (Taken 8/11/2023 2030)  Verbalizes/displays adequate comfort level or baseline comfort level: Encourage patient to monitor pain and request assistance  8/11/2023 1830 by Lazarus Sauger, RN  Outcome: Progressing  Note: Pt able to use pain scale 0-10 with complaints of left flank I&D and NICHOLAS dressing site pain this shift, medicated with prn percocet this shift. Although based on scale minimal improvement, pt states that gets relief from the prn percocet and the scheduled robaxin.

## 2023-08-13 LAB
BACTERIA SPEC AEROBE CULT: ABNORMAL
BACTERIA SPEC ANAEROBE CULT: ABNORMAL
GLUCOSE BLD-MCNC: 155 MG/DL (ref 70–99)
GLUCOSE BLD-MCNC: 166 MG/DL (ref 70–99)
GLUCOSE BLD-MCNC: 167 MG/DL (ref 70–99)
GLUCOSE BLD-MCNC: 186 MG/DL (ref 70–99)
GRAM STN SPEC: ABNORMAL
ORGANISM: ABNORMAL
PERFORMED ON: ABNORMAL

## 2023-08-13 PROCEDURE — 6360000002 HC RX W HCPCS: Performed by: SURGERY

## 2023-08-13 PROCEDURE — 6370000000 HC RX 637 (ALT 250 FOR IP): Performed by: SURGERY

## 2023-08-13 PROCEDURE — 2580000003 HC RX 258: Performed by: SURGERY

## 2023-08-13 PROCEDURE — 2060000000 HC ICU INTERMEDIATE R&B

## 2023-08-13 RX ADMIN — HYDROMORPHONE HYDROCHLORIDE 1 MG: 1 INJECTION, SOLUTION INTRAMUSCULAR; INTRAVENOUS; SUBCUTANEOUS at 02:49

## 2023-08-13 RX ADMIN — ATORVASTATIN CALCIUM 80 MG: 80 TABLET, FILM COATED ORAL at 21:25

## 2023-08-13 RX ADMIN — OXYCODONE HYDROCHLORIDE AND ACETAMINOPHEN 2 TABLET: 5; 325 TABLET ORAL at 21:22

## 2023-08-13 RX ADMIN — POLYETHYLENE GLYCOL 3350 17 G: 17 POWDER, FOR SOLUTION ORAL at 09:33

## 2023-08-13 RX ADMIN — OXYCODONE HYDROCHLORIDE AND ACETAMINOPHEN 2 TABLET: 5; 325 TABLET ORAL at 12:16

## 2023-08-13 RX ADMIN — FLUCONAZOLE 400 MG: 400 INJECTION, SOLUTION INTRAVENOUS at 02:50

## 2023-08-13 RX ADMIN — METOPROLOL TARTRATE 12.5 MG: 25 TABLET, FILM COATED ORAL at 21:24

## 2023-08-13 RX ADMIN — METHOCARBAMOL 750 MG: 500 TABLET ORAL at 16:58

## 2023-08-13 RX ADMIN — FERROUS SULFATE TAB 325 MG (65 MG ELEMENTAL FE) 325 MG: 325 (65 FE) TAB at 09:30

## 2023-08-13 RX ADMIN — ASPIRIN 81 MG: 81 TABLET, COATED ORAL at 09:29

## 2023-08-13 RX ADMIN — INSULIN LISPRO 8 UNITS: 100 INJECTION, SOLUTION INTRAVENOUS; SUBCUTANEOUS at 09:43

## 2023-08-13 RX ADMIN — INSULIN GLARGINE 20 UNITS: 100 INJECTION, SOLUTION SUBCUTANEOUS at 21:26

## 2023-08-13 RX ADMIN — CEFTRIAXONE SODIUM 2000 MG: 2 INJECTION, POWDER, FOR SOLUTION INTRAMUSCULAR; INTRAVENOUS at 18:36

## 2023-08-13 RX ADMIN — FERROUS SULFATE TAB 325 MG (65 MG ELEMENTAL FE) 325 MG: 325 (65 FE) TAB at 21:25

## 2023-08-13 RX ADMIN — METHOCARBAMOL 750 MG: 500 TABLET ORAL at 12:17

## 2023-08-13 RX ADMIN — METHOCARBAMOL 750 MG: 500 TABLET ORAL at 09:29

## 2023-08-13 RX ADMIN — INSULIN LISPRO 8 UNITS: 100 INJECTION, SOLUTION INTRAVENOUS; SUBCUTANEOUS at 12:15

## 2023-08-13 RX ADMIN — HYDROMORPHONE HYDROCHLORIDE 1 MG: 1 INJECTION, SOLUTION INTRAMUSCULAR; INTRAVENOUS; SUBCUTANEOUS at 09:34

## 2023-08-13 RX ADMIN — INSULIN LISPRO 8 UNITS: 100 INJECTION, SOLUTION INTRAVENOUS; SUBCUTANEOUS at 17:00

## 2023-08-13 RX ADMIN — CLOPIDOGREL BISULFATE 75 MG: 75 TABLET ORAL at 09:29

## 2023-08-13 RX ADMIN — PANTOPRAZOLE SODIUM 40 MG: 40 TABLET, DELAYED RELEASE ORAL at 09:30

## 2023-08-13 RX ADMIN — HYDROMORPHONE HYDROCHLORIDE 1 MG: 1 INJECTION, SOLUTION INTRAMUSCULAR; INTRAVENOUS; SUBCUTANEOUS at 18:36

## 2023-08-13 RX ADMIN — BISACODYL 5 MG: 5 TABLET, COATED ORAL at 18:36

## 2023-08-13 RX ADMIN — EZETIMIBE 10 MG: 10 TABLET ORAL at 09:43

## 2023-08-13 RX ADMIN — METOPROLOL TARTRATE 12.5 MG: 25 TABLET, FILM COATED ORAL at 09:30

## 2023-08-13 RX ADMIN — METHOCARBAMOL 750 MG: 500 TABLET ORAL at 21:22

## 2023-08-13 ASSESSMENT — PAIN SCALES - GENERAL
PAINLEVEL_OUTOF10: 10
PAINLEVEL_OUTOF10: 10
PAINLEVEL_OUTOF10: 9
PAINLEVEL_OUTOF10: 9
PAINLEVEL_OUTOF10: 6
PAINLEVEL_OUTOF10: 10

## 2023-08-13 ASSESSMENT — PAIN DESCRIPTION - ORIENTATION
ORIENTATION: LEFT

## 2023-08-13 ASSESSMENT — PAIN DESCRIPTION - LOCATION
LOCATION: FLANK

## 2023-08-13 NOTE — PLAN OF CARE
Problem: Discharge Planning  Goal: Discharge to home or other facility with appropriate resources  8/13/2023 1508 by Jewel Dewitt RN  Outcome: Progressing  8/13/2023 0604 by Ayaan Montgomery RN  Outcome: Progressing     Problem: Pain  Goal: Verbalizes/displays adequate comfort level or baseline comfort level  8/13/2023 1508 by Jewel Dewitt RN  Outcome: Progressing  8/13/2023 0604 by Ayaan Montgomery RN  Outcome: Progressing     Problem: Skin/Tissue Integrity  Goal: Absence of new skin breakdown  Description: 1. Monitor for areas of redness and/or skin breakdown  2. Assess vascular access sites hourly  3. Every 4-6 hours minimum:  Change oxygen saturation probe site  4. Every 4-6 hours:  If on nasal continuous positive airway pressure, respiratory therapy assess nares and determine need for appliance change or resting period.   8/13/2023 1508 by Jewel Dewitt RN  Outcome: Progressing  8/13/2023 0604 by Ayaan Montgomery RN  Outcome: Progressing     Problem: Chronic Conditions and Co-morbidities  Goal: Patient's chronic conditions and co-morbidity symptoms are monitored and maintained or improved  8/13/2023 1508 by Jewel Dewitt RN  Outcome: Progressing  8/13/2023 0604 by Ayaan Montgomery RN  Outcome: Progressing     Problem: Nutrition Deficit:  Goal: Optimize nutritional status  8/13/2023 1508 by Jewel Dewitt RN  Outcome: Progressing  8/13/2023 0604 by Ayaan Montgomery RN  Outcome: Progressing     Problem: Safety - Adult  Goal: Free from fall injury  8/13/2023 1508 by Jewel Dewitt RN  Outcome: Progressing  8/13/2023 0604 by Ayaan Montgomery RN  Outcome: Progressing

## 2023-08-14 ENCOUNTER — APPOINTMENT (OUTPATIENT)
Dept: CT IMAGING | Age: 33
End: 2023-08-14
Attending: INTERNAL MEDICINE
Payer: COMMERCIAL

## 2023-08-14 LAB
GLUCOSE BLD-MCNC: 121 MG/DL (ref 70–99)
GLUCOSE BLD-MCNC: 121 MG/DL (ref 70–99)
GLUCOSE BLD-MCNC: 134 MG/DL (ref 70–99)
GLUCOSE BLD-MCNC: 230 MG/DL (ref 70–99)
PERFORMED ON: ABNORMAL

## 2023-08-14 PROCEDURE — 74177 CT ABD & PELVIS W/CONTRAST: CPT

## 2023-08-14 PROCEDURE — 2060000000 HC ICU INTERMEDIATE R&B

## 2023-08-14 PROCEDURE — 99232 SBSQ HOSP IP/OBS MODERATE 35: CPT | Performed by: INTERNAL MEDICINE

## 2023-08-14 PROCEDURE — 2580000003 HC RX 258: Performed by: SURGERY

## 2023-08-14 PROCEDURE — 99024 POSTOP FOLLOW-UP VISIT: CPT | Performed by: SURGERY

## 2023-08-14 PROCEDURE — 6370000000 HC RX 637 (ALT 250 FOR IP): Performed by: SURGERY

## 2023-08-14 PROCEDURE — 6360000002 HC RX W HCPCS: Performed by: SURGERY

## 2023-08-14 PROCEDURE — 6360000004 HC RX CONTRAST MEDICATION: Performed by: INTERNAL MEDICINE

## 2023-08-14 RX ORDER — FLUCONAZOLE 100 MG/1
400 TABLET ORAL DAILY
Status: DISCONTINUED | OUTPATIENT
Start: 2023-08-15 | End: 2023-08-17 | Stop reason: HOSPADM

## 2023-08-14 RX ADMIN — INSULIN LISPRO 8 UNITS: 100 INJECTION, SOLUTION INTRAVENOUS; SUBCUTANEOUS at 16:47

## 2023-08-14 RX ADMIN — ONDANSETRON 4 MG: 4 TABLET, ORALLY DISINTEGRATING ORAL at 13:31

## 2023-08-14 RX ADMIN — METOPROLOL TARTRATE 12.5 MG: 25 TABLET, FILM COATED ORAL at 22:31

## 2023-08-14 RX ADMIN — ASPIRIN 81 MG: 81 TABLET, COATED ORAL at 08:26

## 2023-08-14 RX ADMIN — FERROUS SULFATE TAB 325 MG (65 MG ELEMENTAL FE) 325 MG: 325 (65 FE) TAB at 22:31

## 2023-08-14 RX ADMIN — ATORVASTATIN CALCIUM 80 MG: 80 TABLET, FILM COATED ORAL at 22:40

## 2023-08-14 RX ADMIN — SODIUM CHLORIDE: 9 INJECTION, SOLUTION INTRAVENOUS at 13:32

## 2023-08-14 RX ADMIN — Medication 10 ML: at 08:29

## 2023-08-14 RX ADMIN — POLYETHYLENE GLYCOL 3350 17 G: 17 POWDER, FOR SOLUTION ORAL at 08:26

## 2023-08-14 RX ADMIN — OXYCODONE HYDROCHLORIDE AND ACETAMINOPHEN 2 TABLET: 5; 325 TABLET ORAL at 13:29

## 2023-08-14 RX ADMIN — OXYCODONE HYDROCHLORIDE AND ACETAMINOPHEN 2 TABLET: 5; 325 TABLET ORAL at 08:26

## 2023-08-14 RX ADMIN — METHOCARBAMOL 750 MG: 500 TABLET ORAL at 16:45

## 2023-08-14 RX ADMIN — IOPAMIDOL 75 ML: 755 INJECTION, SOLUTION INTRAVENOUS at 13:05

## 2023-08-14 RX ADMIN — CEFTRIAXONE SODIUM 2000 MG: 2 INJECTION, POWDER, FOR SOLUTION INTRAMUSCULAR; INTRAVENOUS at 18:44

## 2023-08-14 RX ADMIN — INSULIN LISPRO 2 UNITS: 100 INJECTION, SOLUTION INTRAVENOUS; SUBCUTANEOUS at 08:34

## 2023-08-14 RX ADMIN — FERROUS SULFATE TAB 325 MG (65 MG ELEMENTAL FE) 325 MG: 325 (65 FE) TAB at 08:26

## 2023-08-14 RX ADMIN — METOPROLOL TARTRATE 12.5 MG: 25 TABLET, FILM COATED ORAL at 08:26

## 2023-08-14 RX ADMIN — PANTOPRAZOLE SODIUM 40 MG: 40 TABLET, DELAYED RELEASE ORAL at 08:26

## 2023-08-14 RX ADMIN — METHOCARBAMOL 750 MG: 500 TABLET ORAL at 13:29

## 2023-08-14 RX ADMIN — EZETIMIBE 10 MG: 10 TABLET ORAL at 08:34

## 2023-08-14 RX ADMIN — FLUCONAZOLE 400 MG: 400 INJECTION, SOLUTION INTRAVENOUS at 03:04

## 2023-08-14 RX ADMIN — INSULIN GLARGINE 20 UNITS: 100 INJECTION, SOLUTION SUBCUTANEOUS at 22:31

## 2023-08-14 RX ADMIN — METHOCARBAMOL 750 MG: 500 TABLET ORAL at 22:30

## 2023-08-14 RX ADMIN — BISACODYL 5 MG: 5 TABLET, COATED ORAL at 03:01

## 2023-08-14 RX ADMIN — OXYCODONE HYDROCHLORIDE AND ACETAMINOPHEN 2 TABLET: 5; 325 TABLET ORAL at 22:30

## 2023-08-14 RX ADMIN — INSULIN LISPRO 8 UNITS: 100 INJECTION, SOLUTION INTRAVENOUS; SUBCUTANEOUS at 11:43

## 2023-08-14 RX ADMIN — METHOCARBAMOL 750 MG: 500 TABLET ORAL at 08:26

## 2023-08-14 RX ADMIN — OXYCODONE HYDROCHLORIDE AND ACETAMINOPHEN 2 TABLET: 5; 325 TABLET ORAL at 02:57

## 2023-08-14 RX ADMIN — CLOPIDOGREL BISULFATE 75 MG: 75 TABLET ORAL at 08:25

## 2023-08-14 RX ADMIN — INSULIN LISPRO 8 UNITS: 100 INJECTION, SOLUTION INTRAVENOUS; SUBCUTANEOUS at 08:34

## 2023-08-14 ASSESSMENT — PAIN DESCRIPTION - ORIENTATION
ORIENTATION: LEFT

## 2023-08-14 ASSESSMENT — PAIN SCALES - GENERAL
PAINLEVEL_OUTOF10: 9
PAINLEVEL_OUTOF10: 8
PAINLEVEL_OUTOF10: 8
PAINLEVEL_OUTOF10: 9
PAINLEVEL_OUTOF10: 10
PAINLEVEL_OUTOF10: 9
PAINLEVEL_OUTOF10: 7
PAINLEVEL_OUTOF10: 9

## 2023-08-14 ASSESSMENT — PAIN DESCRIPTION - LOCATION
LOCATION: FLANK

## 2023-08-14 ASSESSMENT — PAIN SCALES - WONG BAKER: WONGBAKER_NUMERICALRESPONSE: 0

## 2023-08-14 NOTE — CARE COORDINATION
Case Management/Follow up:    Chart reviewed for length of stay. Hospital day # 10  Unit:  C 4    Diagnosis and current status as per MD progress: Renal abscess: s/p day 3 of  I&D of large, complex left flank abscess with wound packed lightly to allow drainage. Anticipated d/c date: TBD    Expected plan for discharge: Home with [address]    Potential barriers: None    Precert required/date initiated: N/A    Confirmed plan with patient and/or family: yes    Comments: Patient will have repeat CT scan to check on progress of I&D surgery. Patient currently with NICHOLAS drain and lightly packed wound to promote drainage. Patient was active with [facility] from recent CABG. Will need Urology to resume [address] orders as in patient has no PCP and Dr. Kelvin Boone was only signing for CABG orders. Edilma Shah RN

## 2023-08-14 NOTE — PROGRESS NOTES
IR team spoke to Mica VELOZ who states that drain may be evaluated/placed on 8/15/23. MD made aware.

## 2023-08-15 ENCOUNTER — APPOINTMENT (OUTPATIENT)
Dept: INTERVENTIONAL RADIOLOGY/VASCULAR | Age: 33
End: 2023-08-15
Payer: COMMERCIAL

## 2023-08-15 LAB
GLUCOSE BLD-MCNC: 100 MG/DL (ref 70–99)
GLUCOSE BLD-MCNC: 127 MG/DL (ref 70–99)
GLUCOSE BLD-MCNC: 174 MG/DL (ref 70–99)
GLUCOSE BLD-MCNC: 262 MG/DL (ref 70–99)
INR PPP: 1.12 (ref 0.84–1.16)
PERFORMED ON: ABNORMAL
PROTHROMBIN TIME: 14.4 SEC (ref 11.5–14.8)

## 2023-08-15 PROCEDURE — C1769 GUIDE WIRE: HCPCS

## 2023-08-15 PROCEDURE — 85610 PROTHROMBIN TIME: CPT

## 2023-08-15 PROCEDURE — 6370000000 HC RX 637 (ALT 250 FOR IP): Performed by: SURGERY

## 2023-08-15 PROCEDURE — 6360000002 HC RX W HCPCS: Performed by: STUDENT IN AN ORGANIZED HEALTH CARE EDUCATION/TRAINING PROGRAM

## 2023-08-15 PROCEDURE — 2580000003 HC RX 258: Performed by: SURGERY

## 2023-08-15 PROCEDURE — 0JWT30Z REVISION OF DRAINAGE DEVICE IN TRUNK SUBCUTANEOUS TISSUE AND FASCIA, PERCUTANEOUS APPROACH: ICD-10-PCS | Performed by: STUDENT IN AN ORGANIZED HEALTH CARE EDUCATION/TRAINING PROGRAM

## 2023-08-15 PROCEDURE — 99152 MOD SED SAME PHYS/QHP 5/>YRS: CPT

## 2023-08-15 PROCEDURE — 49405 IMAGE CATH FLUID COLXN VISC: CPT

## 2023-08-15 PROCEDURE — 6360000002 HC RX W HCPCS: Performed by: SURGERY

## 2023-08-15 PROCEDURE — 2060000000 HC ICU INTERMEDIATE R&B

## 2023-08-15 PROCEDURE — 6370000000 HC RX 637 (ALT 250 FOR IP): Performed by: INTERNAL MEDICINE

## 2023-08-15 PROCEDURE — 6360000004 HC RX CONTRAST MEDICATION: Performed by: STUDENT IN AN ORGANIZED HEALTH CARE EDUCATION/TRAINING PROGRAM

## 2023-08-15 RX ORDER — FENTANYL CITRATE 50 UG/ML
INJECTION, SOLUTION INTRAMUSCULAR; INTRAVENOUS PRN
Status: COMPLETED | OUTPATIENT
Start: 2023-08-15 | End: 2023-08-15

## 2023-08-15 RX ORDER — MIDAZOLAM HYDROCHLORIDE 5 MG/ML
INJECTION INTRAMUSCULAR; INTRAVENOUS PRN
Status: COMPLETED | OUTPATIENT
Start: 2023-08-15 | End: 2023-08-15

## 2023-08-15 RX ADMIN — FERROUS SULFATE TAB 325 MG (65 MG ELEMENTAL FE) 325 MG: 325 (65 FE) TAB at 21:57

## 2023-08-15 RX ADMIN — CEFTRIAXONE SODIUM 2000 MG: 2 INJECTION, POWDER, FOR SOLUTION INTRAMUSCULAR; INTRAVENOUS at 18:53

## 2023-08-15 RX ADMIN — SODIUM CHLORIDE: 9 INJECTION, SOLUTION INTRAVENOUS at 21:55

## 2023-08-15 RX ADMIN — EZETIMIBE 10 MG: 10 TABLET ORAL at 09:48

## 2023-08-15 RX ADMIN — FERROUS SULFATE TAB 325 MG (65 MG ELEMENTAL FE) 325 MG: 325 (65 FE) TAB at 09:49

## 2023-08-15 RX ADMIN — INSULIN LISPRO 8 UNITS: 100 INJECTION, SOLUTION INTRAVENOUS; SUBCUTANEOUS at 09:53

## 2023-08-15 RX ADMIN — Medication 10 ML: at 09:50

## 2023-08-15 RX ADMIN — METHOCARBAMOL 750 MG: 500 TABLET ORAL at 21:55

## 2023-08-15 RX ADMIN — OXYCODONE HYDROCHLORIDE AND ACETAMINOPHEN 2 TABLET: 5; 325 TABLET ORAL at 05:20

## 2023-08-15 RX ADMIN — METHOCARBAMOL 750 MG: 500 TABLET ORAL at 17:21

## 2023-08-15 RX ADMIN — OXYCODONE HYDROCHLORIDE AND ACETAMINOPHEN 2 TABLET: 5; 325 TABLET ORAL at 09:49

## 2023-08-15 RX ADMIN — METHOCARBAMOL 750 MG: 500 TABLET ORAL at 13:15

## 2023-08-15 RX ADMIN — FENTANYL CITRATE 25 MCG: 50 INJECTION, SOLUTION INTRAMUSCULAR; INTRAVENOUS at 14:45

## 2023-08-15 RX ADMIN — FENTANYL CITRATE 25 MCG: 50 INJECTION, SOLUTION INTRAMUSCULAR; INTRAVENOUS at 14:36

## 2023-08-15 RX ADMIN — ATORVASTATIN CALCIUM 80 MG: 80 TABLET, FILM COATED ORAL at 21:57

## 2023-08-15 RX ADMIN — FLUCONAZOLE 400 MG: 100 TABLET ORAL at 09:50

## 2023-08-15 RX ADMIN — IOVERSOL 40 ML: 741 INJECTION INTRA-ARTERIAL; INTRAVENOUS at 14:56

## 2023-08-15 RX ADMIN — INSULIN GLARGINE 20 UNITS: 100 INJECTION, SOLUTION SUBCUTANEOUS at 21:59

## 2023-08-15 RX ADMIN — METOPROLOL TARTRATE 12.5 MG: 25 TABLET, FILM COATED ORAL at 21:56

## 2023-08-15 RX ADMIN — MIDAZOLAM HYDROCHLORIDE 0.5 MG: 5 INJECTION, SOLUTION INTRAMUSCULAR; INTRAVENOUS at 14:36

## 2023-08-15 RX ADMIN — MIDAZOLAM HYDROCHLORIDE 0.5 MG: 5 INJECTION, SOLUTION INTRAMUSCULAR; INTRAVENOUS at 14:41

## 2023-08-15 RX ADMIN — METHOCARBAMOL 750 MG: 500 TABLET ORAL at 09:48

## 2023-08-15 RX ADMIN — BISACODYL 5 MG: 5 TABLET, COATED ORAL at 21:57

## 2023-08-15 RX ADMIN — FENTANYL CITRATE 25 MCG: 50 INJECTION, SOLUTION INTRAMUSCULAR; INTRAVENOUS at 14:41

## 2023-08-15 RX ADMIN — OXYCODONE HYDROCHLORIDE AND ACETAMINOPHEN 2 TABLET: 5; 325 TABLET ORAL at 21:55

## 2023-08-15 RX ADMIN — METOPROLOL TARTRATE 12.5 MG: 25 TABLET, FILM COATED ORAL at 09:49

## 2023-08-15 RX ADMIN — PANTOPRAZOLE SODIUM 40 MG: 40 TABLET, DELAYED RELEASE ORAL at 09:49

## 2023-08-15 ASSESSMENT — PAIN SCALES - GENERAL
PAINLEVEL_OUTOF10: 10
PAINLEVEL_OUTOF10: 8
PAINLEVEL_OUTOF10: 10

## 2023-08-15 ASSESSMENT — PAIN DESCRIPTION - DESCRIPTORS: DESCRIPTORS: DISCOMFORT

## 2023-08-15 ASSESSMENT — PAIN DESCRIPTION - ORIENTATION
ORIENTATION: LEFT

## 2023-08-15 ASSESSMENT — PAIN DESCRIPTION - LOCATION
LOCATION: FLANK
LOCATION: BACK;INCISION
LOCATION: FLANK

## 2023-08-15 NOTE — OR NURSING
Image guided abscess drain insertion left completed. Dr. Ashley Leon placed 12 [place] 25 cm Angiodynamics Exodus Array multipurpose drainage catheter LOT # [id number] EXP 03/31/25 in the left kidney. Drain/tube secured at the 21 cm line with sutures. Drain/tube dressing clean, dry, and intact. Pt tolerated procedure without any signs or symptoms of distress. Vital signs stable. Report called to [facility]. Pt transported back to [facility] in stable condition via bed by transport.      Medications  Versed: 1 mg  Fentanyl: 75 mcg    Vital Signs  Vitals:    08/15/23 1448   BP: 131/80   Pulse: 75   Resp: 18   Temp:    SpO2: 100%    (vital signs in table format)    Post [place]  2 - Able to move 4 extremities voluntarily on command  2 - BP+/- 20mmHg of normal  2 - Fully awake  2 - Able to maintain oxygen saturation >92% on room air  2 - Able to breathe deeply and cough freely

## 2023-08-15 NOTE — PRE SEDATION
clopidogrel  75 mg Oral Daily    sodium chloride flush  5-40 mL IntraVENous 2 times per day    atorvastatin  80 mg Oral Nightly     Continuous Infusions:    sodium chloride      dextrose      sodium chloride      sodium chloride 75 mL/hr at 08/14/23 1332     PRN Meds: bisacodyl, sodium chloride flush, sodium chloride, ondansetron, glucose, dextrose bolus **OR** dextrose bolus, glucagon (rDNA), dextrose, oxyCODONE-acetaminophen, oxyCODONE-acetaminophen, sodium chloride flush, sodium chloride, acetaminophen **OR** acetaminophen, HYDROmorphone  Home Meds:   Prior to Admission medications    Medication Sig Start Date End Date Taking? Authorizing Provider   gabapentin (NEURONTIN) 300 MG capsule Take 1 capsule by mouth 3 times daily for 14 days. 7/25/23 8/8/23  MAGDIEL Baker   atorvastatin (LIPITOR) 40 MG tablet Take 1 tablet by mouth daily 7/15/23   Angel Camp MD   empagliflozin (JARDIANCE) 10 MG tablet Take 2.5 tablets by mouth daily 7/15/23   Angel Camp MD   ezetimibe (ZETIA) 10 MG tablet Take 1 tablet by mouth daily 7/15/23   Angel Camp MD   glucose monitoring (FREESTYLE FREEDOM) kit 1 kit by Does not apply route daily  Patient not taking: Reported on 7/25/2023 7/15/23   Angel Camp MD   Lancets MISC 1 each by Does not apply route daily  Patient not taking: Reported on 7/25/2023 7/15/23   Angel Camp MD   blood glucose monitor strips Test 1 times a day & as needed for symptoms of irregular blood glucose. Dispense sufficient amount for indicated testing frequency plus additional to accommodate PRN testing needs.   Patient not taking: Reported on 7/25/2023 7/15/23   Angel Camp MD   aspirin 81 MG EC tablet Take 1 tablet by mouth daily 7/15/23   REVA Lake CNP   metoprolol tartrate (LOPRESSOR) 25 MG tablet Take 0.5 tablets by mouth 2 times daily Hold for HR < 65 and/or SBP < 100 7/14/23   REVA Lake CNP   clopidogrel (PLAVIX) 75 MG tablet Take 1 tablet by mouth 7/25/2023 2/21/15   Anette Anderson DO     Coumadin Use Last 7 Days:  no  Antiplatelet drug therapy use last 7 days: yes - ASA/plavix  Other anticoagulant use last 7 days: no  Additional Medication Information:  N/A      Pre-Sedation Documentation and Exam:   I have reviewed the patient's history and review of systems.     Mallampati Airway Assessment:  normal, Mallampati Class II - (soft palate, fauces & uvula are visible)    Prior History of Anesthesia Complications:   none    ASA Classification:  Class 2 - A normal healthy patient with mild systemic disease    Sedation/ Anesthesia Plan:   intravenous sedation    Medications Planned:   midazolam (Versed) intravenously and fentanyl intravenously    Patient is an appropriate candidate for plan of sedation: yes    Electronically signed by Michaelle Restrepo DO on 8/15/2023 at 2:19 PM

## 2023-08-15 NOTE — CARE COORDINATION
Per urology , requesting IR to place new drain in kidney. Active with [facility] for post CABG care prior to admit and Dr. Amarilis Montoya was following for those [facility] orders If needs [facility] pertaining to drain and renal abscess will need urology to follow for [facility] orders per [facility]. Solution or referral to [facility] . ID changing abx's to PO.

## 2023-08-15 NOTE — BRIEF OP NOTE
Brief Postoperative Note    Patient: Vishal Cardoza  YOB: 1990  MRN: [id number]      Pre-operative Diagnosis: Perinephric abscess with poor drainage from existing catheter    Post-operative Diagnosis: Same    Procedure: Fluoro guided drain exchange/repositioning/upsize    Anesthesia: Local and Moderate Sedation    Surgeons/Assistants: Yo Kebede DO    Estimated Blood Loss: less than 50     Complications: None    Specimens: Was Not Obtained    Findings:   Contrast injection through the existing catheter demonstrated minimal abscess cavity. There was communication between the perinephrhic collection to the paraspinal collection. Successful upsize/repositioning of the catheter more inferiorly. Patient now has a 12-F locking pigtail catheter. Please note given the paucity of fluid seen on CT and minimal cavity on fluoro images, drainage may be poor despite upsizing and repositioning. CT findings are likely more phlegmonous than liquid in nature. Findings communicated to Mesha Kirkpatrick via telephone post drain exchange.        Yo Kebede DO  8/15/2023, 3:01 PM  Interventional Radiology  [phone]

## 2023-08-15 NOTE — OR NURSING
Pt arrived for image guided abscess drain insertion left . Procedure explained including the risk and benefits of the procedure. All questions answered. Pt verbalizes understanding of the procedure and states no more questions. Consent confirmed. Vital signs stable. Labs, allergies, medications, and code status reviewed. No contraindications noted. Vital Signs  Vitals:    08/15/23 1434   BP: 128/81   Pulse: 74   Resp: 13   Temp:    SpO2: 99%    (vital signs in table format)    Pre Shanon Score  2 - Able to move 4 extremities voluntarily on command  2 - BP+/- 20mmHg of normal  2 - Fully awake  2 - Able to maintain oxygen saturation >92% on room air  2 - Able to breathe deeply and cough freely    Allergies  Patient has no known allergies.  (allergies)    Labs  Lab Results   Component Value Date    INR 1.12 08/15/2023    PROTIME 14.4 08/15/2023     Lab Results   Component Value Date    CREATININE <0.5 (L) 08/12/2023    BUN 5 (L) 08/12/2023     08/12/2023    GFR      05/14/2021    GFR NOT REPORTED 05/14/2021    K 4.2 08/12/2023     08/12/2023    CO2 31 08/12/2023     Lab Results   Component Value Date    WBC 7.9 08/12/2023    HGB 8.7 (L) 08/12/2023    HCT 27.0 (L) 08/12/2023    MCV 84.0 08/12/2023     (H) 08/12/2023

## 2023-08-16 LAB
ALBUMIN SERPL-MCNC: 2.5 G/DL (ref 3.4–5)
ALBUMIN/GLOB SERPL: 0.7 {RATIO} (ref 1.1–2.2)
ALP SERPL-CCNC: 87 U/L (ref 40–129)
ALT SERPL-CCNC: 16 U/L (ref 10–40)
ANION GAP SERPL CALCULATED.3IONS-SCNC: 8 MMOL/L (ref 3–16)
AST SERPL-CCNC: 11 U/L (ref 15–37)
BASOPHILS # BLD: 0 K/UL (ref 0–0.2)
BASOPHILS NFR BLD: 0.5 %
BILIRUB SERPL-MCNC: <0.2 MG/DL (ref 0–1)
BUN SERPL-MCNC: 8 MG/DL (ref 7–20)
CALCIUM SERPL-MCNC: 8.1 MG/DL (ref 8.3–10.6)
CHLORIDE SERPL-SCNC: 101 MMOL/L (ref 99–110)
CO2 SERPL-SCNC: 27 MMOL/L (ref 21–32)
CREAT SERPL-MCNC: <0.5 MG/DL (ref 0.6–1.1)
DEPRECATED RDW RBC AUTO: 18 % (ref 12.4–15.4)
EOSINOPHIL # BLD: 0.3 K/UL (ref 0–0.6)
EOSINOPHIL NFR BLD: 3.6 %
GFR SERPLBLD CREATININE-BSD FMLA CKD-EPI: >60 ML/MIN/{1.73_M2}
GLUCOSE BLD-MCNC: 104 MG/DL (ref 70–99)
GLUCOSE BLD-MCNC: 145 MG/DL (ref 70–99)
GLUCOSE BLD-MCNC: 205 MG/DL (ref 70–99)
GLUCOSE BLD-MCNC: 333 MG/DL (ref 70–99)
GLUCOSE BLD-MCNC: 339 MG/DL (ref 70–99)
GLUCOSE SERPL-MCNC: 264 MG/DL (ref 70–99)
HCT VFR BLD AUTO: 28.9 % (ref 36–48)
HGB BLD-MCNC: 9.4 G/DL (ref 12–16)
LYMPHOCYTES # BLD: 3.3 K/UL (ref 1–5.1)
LYMPHOCYTES NFR BLD: 42.3 %
MCH RBC QN AUTO: 27.2 PG (ref 26–34)
MCHC RBC AUTO-ENTMCNC: 32.4 G/DL (ref 31–36)
MCV RBC AUTO: 83.9 FL (ref 80–100)
MONOCYTES # BLD: 0.6 K/UL (ref 0–1.3)
MONOCYTES NFR BLD: 7.5 %
NEUTROPHILS # BLD: 3.6 K/UL (ref 1.7–7.7)
NEUTROPHILS NFR BLD: 46.1 %
PERFORMED ON: ABNORMAL
PLATELET # BLD AUTO: 664 K/UL (ref 135–450)
PMV BLD AUTO: 6.4 FL (ref 5–10.5)
POTASSIUM SERPL-SCNC: 4.1 MMOL/L (ref 3.5–5.1)
PROT SERPL-MCNC: 6.2 G/DL (ref 6.4–8.2)
RBC # BLD AUTO: 3.45 M/UL (ref 4–5.2)
SODIUM SERPL-SCNC: 136 MMOL/L (ref 136–145)
WBC # BLD AUTO: 7.8 K/UL (ref 4–11)

## 2023-08-16 PROCEDURE — 2580000003 HC RX 258: Performed by: SURGERY

## 2023-08-16 PROCEDURE — 6360000002 HC RX W HCPCS: Performed by: SURGERY

## 2023-08-16 PROCEDURE — 6370000000 HC RX 637 (ALT 250 FOR IP): Performed by: INTERNAL MEDICINE

## 2023-08-16 PROCEDURE — 99232 SBSQ HOSP IP/OBS MODERATE 35: CPT | Performed by: INTERNAL MEDICINE

## 2023-08-16 PROCEDURE — 80053 COMPREHEN METABOLIC PANEL: CPT

## 2023-08-16 PROCEDURE — 85025 COMPLETE CBC W/AUTO DIFF WBC: CPT

## 2023-08-16 PROCEDURE — 6370000000 HC RX 637 (ALT 250 FOR IP): Performed by: SURGERY

## 2023-08-16 PROCEDURE — 2060000000 HC ICU INTERMEDIATE R&B

## 2023-08-16 PROCEDURE — 99024 POSTOP FOLLOW-UP VISIT: CPT | Performed by: SURGERY

## 2023-08-16 PROCEDURE — 2500000003 HC RX 250 WO HCPCS: Performed by: SURGERY

## 2023-08-16 RX ADMIN — FERROUS SULFATE TAB 325 MG (65 MG ELEMENTAL FE) 325 MG: 325 (65 FE) TAB at 09:59

## 2023-08-16 RX ADMIN — INSULIN LISPRO 6 UNITS: 100 INJECTION, SOLUTION INTRAVENOUS; SUBCUTANEOUS at 08:16

## 2023-08-16 RX ADMIN — OXYCODONE HYDROCHLORIDE AND ACETAMINOPHEN 2 TABLET: 5; 325 TABLET ORAL at 21:14

## 2023-08-16 RX ADMIN — OXYCODONE HYDROCHLORIDE AND ACETAMINOPHEN 2 TABLET: 5; 325 TABLET ORAL at 09:59

## 2023-08-16 RX ADMIN — HYDROMORPHONE HYDROCHLORIDE 1 MG: 1 INJECTION, SOLUTION INTRAMUSCULAR; INTRAVENOUS; SUBCUTANEOUS at 00:23

## 2023-08-16 RX ADMIN — METHOCARBAMOL 750 MG: 500 TABLET ORAL at 09:59

## 2023-08-16 RX ADMIN — FERROUS SULFATE TAB 325 MG (65 MG ELEMENTAL FE) 325 MG: 325 (65 FE) TAB at 21:14

## 2023-08-16 RX ADMIN — CLOPIDOGREL BISULFATE 75 MG: 75 TABLET ORAL at 09:59

## 2023-08-16 RX ADMIN — METOPROLOL TARTRATE 12.5 MG: 25 TABLET, FILM COATED ORAL at 21:14

## 2023-08-16 RX ADMIN — CEFTRIAXONE SODIUM 2000 MG: 2 INJECTION, POWDER, FOR SOLUTION INTRAMUSCULAR; INTRAVENOUS at 19:00

## 2023-08-16 RX ADMIN — INSULIN LISPRO 8 UNITS: 100 INJECTION, SOLUTION INTRAVENOUS; SUBCUTANEOUS at 12:25

## 2023-08-16 RX ADMIN — ATORVASTATIN CALCIUM 80 MG: 80 TABLET, FILM COATED ORAL at 21:14

## 2023-08-16 RX ADMIN — EZETIMIBE 10 MG: 10 TABLET ORAL at 10:45

## 2023-08-16 RX ADMIN — SODIUM CHLORIDE, PRESERVATIVE FREE 10 ML: 5 INJECTION INTRAVENOUS at 08:16

## 2023-08-16 RX ADMIN — INSULIN LISPRO 2 UNITS: 100 INJECTION, SOLUTION INTRAVENOUS; SUBCUTANEOUS at 12:24

## 2023-08-16 RX ADMIN — POLYETHYLENE GLYCOL 3350 17 G: 17 POWDER, FOR SOLUTION ORAL at 09:59

## 2023-08-16 RX ADMIN — ASPIRIN 81 MG: 81 TABLET, COATED ORAL at 09:59

## 2023-08-16 RX ADMIN — HYDROMORPHONE HYDROCHLORIDE 1 MG: 1 INJECTION, SOLUTION INTRAMUSCULAR; INTRAVENOUS; SUBCUTANEOUS at 17:40

## 2023-08-16 RX ADMIN — INSULIN GLARGINE 20 UNITS: 100 INJECTION, SOLUTION SUBCUTANEOUS at 21:18

## 2023-08-16 RX ADMIN — METOPROLOL TARTRATE 12.5 MG: 25 TABLET, FILM COATED ORAL at 09:59

## 2023-08-16 RX ADMIN — INSULIN LISPRO 8 UNITS: 100 INJECTION, SOLUTION INTRAVENOUS; SUBCUTANEOUS at 17:41

## 2023-08-16 RX ADMIN — Medication 10 ML: at 09:59

## 2023-08-16 RX ADMIN — BISACODYL 5 MG: 5 TABLET, COATED ORAL at 09:59

## 2023-08-16 RX ADMIN — SODIUM CHLORIDE: 9 INJECTION, SOLUTION INTRAVENOUS at 17:46

## 2023-08-16 RX ADMIN — METHOCARBAMOL 750 MG: 500 TABLET ORAL at 21:16

## 2023-08-16 RX ADMIN — METHOCARBAMOL 750 MG: 500 TABLET ORAL at 17:40

## 2023-08-16 RX ADMIN — PANTOPRAZOLE SODIUM 40 MG: 40 TABLET, DELAYED RELEASE ORAL at 09:59

## 2023-08-16 RX ADMIN — OXYCODONE HYDROCHLORIDE AND ACETAMINOPHEN 2 TABLET: 5; 325 TABLET ORAL at 04:29

## 2023-08-16 RX ADMIN — FLUCONAZOLE 400 MG: 100 TABLET ORAL at 09:59

## 2023-08-16 RX ADMIN — INSULIN LISPRO 8 UNITS: 100 INJECTION, SOLUTION INTRAVENOUS; SUBCUTANEOUS at 08:16

## 2023-08-16 ASSESSMENT — PAIN DESCRIPTION - LOCATION
LOCATION: BACK
LOCATION: BACK;FLANK
LOCATION: INCISION;BACK
LOCATION: BACK;FLANK

## 2023-08-16 ASSESSMENT — PAIN SCALES - GENERAL
PAINLEVEL_OUTOF10: 10

## 2023-08-16 ASSESSMENT — PAIN DESCRIPTION - DESCRIPTORS
DESCRIPTORS: DISCOMFORT
DESCRIPTORS: DISCOMFORT
DESCRIPTORS: DISCOMFORT;ACHING

## 2023-08-16 ASSESSMENT — PAIN - FUNCTIONAL ASSESSMENT
PAIN_FUNCTIONAL_ASSESSMENT: ACTIVITIES ARE NOT PREVENTED

## 2023-08-16 ASSESSMENT — PAIN DESCRIPTION - ORIENTATION
ORIENTATION: LEFT

## 2023-08-16 ASSESSMENT — PAIN SCALES - WONG BAKER
WONGBAKER_NUMERICALRESPONSE: 0

## 2023-08-16 NOTE — PLAN OF CARE
Problem: Discharge Planning  Goal: Discharge to home or other facility with appropriate resources  Outcome: Progressing     Problem: Skin/Tissue Integrity  Goal: Absence of new skin breakdown  Description: 1. Monitor for areas of redness and/or skin breakdown  2. Assess vascular access sites hourly  3. Every 4-6 hours minimum:  Change oxygen saturation probe site  4. Every 4-6 hours:  If on nasal continuous positive airway pressure, respiratory therapy assess nares and determine need for appliance change or resting period.   Outcome: Progressing     Problem: Chronic Conditions and Co-morbidities  Goal: Patient's chronic conditions and co-morbidity symptoms are monitored and maintained or improved  Outcome: Progressing     Problem: Nutrition Deficit:  Goal: Optimize nutritional status  Outcome: Progressing  Flowsheets (Taken 8/16/2023 1235 by Marlen Oates RD)  Nutrient intake appropriate for improving, restoring, or maintaining nutritional needs:   Recommend appropriate diets, oral nutritional supplements, and vitamin/mineral supplements   Monitor oral intake, labs, and treatment plans   Assess nutritional status and recommend course of action     Problem: Safety - Adult  Goal: Free from fall injury  Outcome: Progressing     Problem: Pain  Goal: Verbalizes/displays adequate comfort level or baseline comfort level  Outcome: Not Progressing

## 2023-08-16 NOTE — DISCHARGE INSTR - COC
Continuity of Care Form    Patient Name: Baldomero Schultz   :  1990  MRN:  [id number]    Admit date:  2023  Discharge date:  2023    Code Status Order: Full Code   Advance Directives:   Advance Care Flowsheet Documentation       Date/Time Healthcare Directive Type of Healthcare Directive Copy in [address] Agent's Name Healthcare Agent's Phone Number    08/10/23 1406 No, patient does not have an advance directive for healthcare treatment -- -- -- -- --            Admitting Physician:  David Mijares MD  PCP: No primary care provider on file.     Discharging Nurse: Maninder Brandon, [address] Unit/Room#: 9214/3180-03  Discharging Unit Phone Number: [phone]    Emergency Contact:   Extended Emergency Contact Information  Primary Emergency Contact: [facility] Phone: [phone]  Relation: Friend    Past Surgical History:  Past Surgical History:   Procedure Laterality Date    BACK SURGERY Left 8/10/2023    LEFT FLANK ABSCESS INCISION AND DRAINAGE performed by Divya Monreal MD at [facility] 2020    CORONARY ARTERY BYPASS GRAFT N/A 7/10/2023    CORONARY ARTERY BYPASS GRAFTING TIMES THREE USING INTERNAL MAMMARY ARTERY AND RIGHT SAPHENOUS VEIN VIA ENDOSCOPIC HARVEST WITH LEFT ATRIAL APPENDAGE CLIP PLACEMENT, ON PUMP, SATNAM, PLACEMENT OF TEMPORARY PACING WIRES, BILATERAL PECTORALIS BLOCKS  performed by Claudette Atkinson MD at [address] [place] [address]       Immunization History:   Immunization History   Administered Date(s) Administered    Influenza Virus Vaccine 2015, 2022    Pneumococcal, PPSV23, PNEUMOVAX 21, (age 2y+), SC/IM, 0.5mL 2015    TDaP, ADACEL (age 6y-58y), [facility] (age 10y+), IM, 0.5mL 2019       Active Problems:  Patient Active Problem List   Diagnosis Code    Type II or unspecified type diabetes mellitus without mention of complication, uncontrolled [id number]    Dyslipidemia E78.5    Obesity NOT a DME order): Other Treatments:     Patient's personal belongings (please select all that are sent with patient):      RN SIGNATURE:  Electronically signed by Morgan Chery RN on 8/17/23 at 11:11 AM EDT    CASE MANAGEMENT/SOCIAL WORK SECTION    Inpatient Status Date:     Readmission Risk Assessment Score:  Readmission Risk              Risk of Unplanned Readmission:  31           Discharging to Facility/ Agency   Name: [facility]  Phone:[phone]  Fax:[phone]      / signature: Electronically signed by Sonya Sethi RN on 8/16/23 at 12:55 PM EDT    PHYSICIAN SECTION    Prognosis: Good    Condition at Discharge: Stable    Rehab Potential (if transferring to Rehab):     Recommended Labs or Other Treatments After Discharge:   -Flank wound - change with aquacel every day or every other day packing, cover with dry gauze  -Perinephric drain- empty the drain daily, or more frequently if the bulb is full of fluid   -Urology office will be calling patient to set up a follow up CT next week and then an appt with Dr Yadira Mehta after the [address] [place] [address] for skilled nursing for wound care to flank to resume with previous agency [facility]    F/U Appointment at [facility] Friday 8/18/2023 at 2:30 PM   [facility] [address] with Dr. Mike Benito phone [phone]    Physician Certification: I certify the above information and transfer of Radha Barrett  is necessary for the continuing treatment of the diagnosis listed and that she requires Home Care for less 30 days.      Update Admission H&P: No change in H&P    PHYSICIAN SIGNATURE:  Electronically signed by Rui Quispe MD on 8/17/23 at 10:43 AM EDT

## 2023-08-16 NOTE — PLAN OF CARE
Problem: Discharge Planning  Goal: Discharge to home or other facility with appropriate resources  Outcome: Progressing     Problem: Skin/Tissue Integrity  Goal: Absence of new skin breakdown  Description: 1. Monitor for areas of redness and/or skin breakdown  2. Assess vascular access sites hourly  3. Every 4-6 hours minimum:  Change oxygen saturation probe site  4. Every 4-6 hours:  If on nasal continuous positive airway pressure, respiratory therapy assess nares and determine need for appliance change or resting period.   Outcome: Progressing     Problem: Chronic Conditions and Co-morbidities  Goal: Patient's chronic conditions and co-morbidity symptoms are monitored and maintained or improved  Outcome: Progressing     Problem: Nutrition Deficit:  Goal: Optimize nutritional status  Outcome: Progressing     Problem: Safety - Adult  Goal: Free from fall injury  Outcome: Progressing    Problem: Pain  Goal: Verbalizes/displays adequate comfort level or baseline comfort level  Outcome: Not Progressing

## 2023-08-16 NOTE — PROGRESS NOTES
with Recommendations for:  []Home independently  []Home w/ assist  []HHC  []SNF  []Acute Rehab    Disposition:  Home with home health in 4-5 day(s). Patient requires continued admission due to sepsis, abscess treatment. Discussed with patient. This patient has a high likelihood of being discharged tomorrow and is appropriate for A1 Discharge Unit in AM pending clinical course overnight: []Yes  [x]No          Diet:  ADULT DIET; Regular  ADULT ORAL NUTRITION SUPPLEMENT; Breakfast, Lunch, Dinner; Standard High Calorie/High Protein Oral Supplement    Medications:  Personally reviewed in detail in conjunction w/ labs as documented for evidence of drug toxicity.      Infusion Medications    sodium chloride      dextrose      sodium chloride      sodium chloride 75 mL/hr at 08/15/23 2155     Scheduled Medications    fluconazole  400 mg Oral Daily    insulin glargine  20 Units SubCUTAneous Nightly    insulin lispro  8 Units SubCUTAneous TID WC    polyethylene glycol  17 g Oral Daily    lidocaine 1 % injection  5 mL IntraDERmal Once    sodium chloride flush  5-40 mL IntraVENous 2 times per day    cefTRIAXone (ROCEPHIN) IV  2,000 mg IntraVENous Q24H    insulin lispro  0-8 Units SubCUTAneous TID WC    insulin lispro  0-4 Units SubCUTAneous Nightly    metoprolol tartrate  12.5 mg Oral BID    methocarbamol  750 mg Oral 4x Daily    ezetimibe  10 mg Oral Daily    ferrous sulfate  325 mg Oral BID    pantoprazole  40 mg Oral QAM AC    aspirin  81 mg Oral Daily    clopidogrel  75 mg Oral Daily    sodium chloride flush  5-40 mL IntraVENous 2 times per day    atorvastatin  80 mg Oral Nightly     PRN Meds: bisacodyl, sodium chloride flush, sodium chloride, ondansetron, glucose, dextrose bolus **OR** dextrose bolus, glucagon (rDNA), dextrose, oxyCODONE-acetaminophen, oxyCODONE-acetaminophen, sodium chloride flush, sodium chloride, acetaminophen **OR** acetaminophen, HYDROmorphone     Labs:  Personally reviewed and interpreted for Drugs/treatments that require intensive monitoring for toxicity include:  IV abx, abscess drain  [] Change in code status:    [] Decision to escalate care:    [] Major surgery/procedure with associated risk factors:    ----------------------------------------------------------------------  C.  Data (any 2)  [x] Discussed management of the case with:   nursing  [] Imaging personally reviewed and interpreted, includes:    [x] Data Review (any 3)  [] Collateral history obtained from:    [x] All available Consultant notes from yesterday/today were reviewed  [x] All current labs were reviewed and interpreted for clinical significance   [x] Appropriate follow-up labs were ordered

## 2023-08-16 NOTE — PROGRESS NOTES
Comprehensive Nutrition Assessment    Type and Reason for Visit:  Reassess    Nutrition Recommendations/Plan:   Continue CC4 diet  Continue glucerna BID   Monitor nutrition adequacy, pertinent labs, bowel habits, wt changes, and clinical progress     Malnutrition Assessment:  Malnutrition Status: At risk for malnutrition (Comment) (08/10/23 1219)      Nutrition Assessment:    Follow up: S/p left flank I&D 8/10 and perc drain exchange 8/15. Diet changed to regular w ensure TID- RD modified back to CC4 and glucerna BID to help maintain BG. PO intakes % per EMR. Pt reports haivng an appetite and eating most of her meals. Reports consuming ONS BID. Discussed changing diet back to [facility], pt voiced understanding. Continue current recommendations. Will continue to monitor. Nutrition Related Findings:    -339 x24 hrs. BM 8/15, active BS. Trace BLE edema. Wound Type: Surgical Incision       Current Nutrition Intake & Therapies:    Average Meal Intake: %  Average Supplements Intake: % (per pt)  ADULT DIET; Regular; 4 carb choices (60 gm/meal)  ADULT ORAL NUTRITION SUPPLEMENT; Breakfast, Dinner; Diabetic Oral Supplement    Anthropometric Measures:  Height: 5' 10\" (177.8 cm)  Ideal Body Weight (IBW): 150 lbs (68 kg)       Current Body Weight: 169 lb 1.5 oz (76.7 kg),   IBW. Weight Source: Standing Scale  Current BMI (kg/m2): 24.3  Usual Body Weight: 162 lb 4.1 oz (73.6 kg) (7/10, standing scale)  % Weight Change (Calculated): -0.5  Weight Adjustment For: No Adjustment                 BMI Categories: Normal Weight (BMI 18.5-24. 9)    Estimated Daily Nutrient Needs:  Energy Requirements Based On: Kcal/kg (25-30)  Weight Used for Energy Requirements: Current  Energy (kcal/day): 2434-8704 kcals  Weight Used for Protein Requirements:  (1-1.2)  Protein (g/day): 73-88 g  Method Used for Fluid Requirements: 1 ml/kcal  Fluid (ml/day): 7381-8946 ml    Nutrition Diagnosis:   Inadequate oral intake related to

## 2023-08-16 NOTE — CARE COORDINATION
Patient will need home care for daily dressing changes. Omi Robertson MD will follow for home care for any antibiotic needs and patient will need to be seen at [facility]. [facility] will need signed [facility] and home care orders. Patient has appointment scheduled at [facility] clinic at 12:30 PM tomorrow if she is discharged today. Their number is [phone]. Her appointment will be with Dr. Danny Camargo. Dr. Shiva Martinez is following ofr 1475  [address] [facility] orders at discharge until patient is established at clinic. Patient states she will have a ride to appointment tomorrow . If not discharged will need to reschedule. Ajay Macdonald from [place] has received all the information and contact numbers to fax orders to Dr. Shiva Martinez at [facility] at [phone] . Addendum: Per attending, not discharging until tomorrow. Reschedule appointment at [address] [place] [address] at [phone] for Friday at 2:30 PM with Dr. Fritz Haddad .

## 2023-08-16 NOTE — PROGRESS NOTES
abscess in the paravertebral musculature are   slightly smaller. 4. Small pleural effusions and bibasilar consolidation worse on the right. CT ap w contrast 8/14/23:  Persistent abscess on the left involving the perinephric region on the left,   extending into the left psoas, and presumably involving the left kidney in   addition. There is slight decrease in size compared to prior. Nonobstructing renal calculi on right. Large stool load seen in the colon. A few colonic diverticula are seen     Findings of fluid overload denoted by body wall anasarca, and trace pleural   effusions         Urine Culture:  negative    abscess Culture:  Proteus mirabilis     Antibiotic Therapy:  ceftriaxone, fluconazone         Impression/Plan:     Left Renal abscess  Uncontrolled DM (A1c 12.2%)      Abx per ID  Left perinephric drain was replaced and up sized to 12F by IR on 8/15. I discussed with Dr Moon Aguilar and most of the changes on CT are likely phlegmonous, there was no obvious fluid pocket in the perinephric space in which to place a new drain. OK for discharge from urology standpoint on abx and with the drain in place. CM to set up wound care for the left flank incision at home. We will arrange a follow up CT AP with IV contrast next week, then a follow up appt with Dr Matilde Dodd in our office following the CT.          Maria Luisa Quintana PA-C

## 2023-08-17 VITALS
HEART RATE: 83 BPM | HEIGHT: 70 IN | DIASTOLIC BLOOD PRESSURE: 66 MMHG | BODY MASS INDEX: 23.69 KG/M2 | RESPIRATION RATE: 17 BRPM | OXYGEN SATURATION: 97 % | SYSTOLIC BLOOD PRESSURE: 107 MMHG | TEMPERATURE: 98.2 F | WEIGHT: 165.5 LBS

## 2023-08-17 LAB
GLUCOSE BLD-MCNC: 179 MG/DL (ref 70–99)
GLUCOSE BLD-MCNC: 275 MG/DL (ref 70–99)
PERFORMED ON: ABNORMAL
PERFORMED ON: ABNORMAL

## 2023-08-17 PROCEDURE — 6370000000 HC RX 637 (ALT 250 FOR IP): Performed by: SURGERY

## 2023-08-17 PROCEDURE — 99232 SBSQ HOSP IP/OBS MODERATE 35: CPT | Performed by: INTERNAL MEDICINE

## 2023-08-17 PROCEDURE — 6370000000 HC RX 637 (ALT 250 FOR IP): Performed by: INTERNAL MEDICINE

## 2023-08-17 RX ORDER — FLUCONAZOLE 200 MG/1
400 TABLET ORAL DAILY
Qty: 14 TABLET | Refills: 0 | Status: SHIPPED | OUTPATIENT
Start: 2023-08-18 | End: 2023-08-17 | Stop reason: HOSPADM

## 2023-08-17 RX ORDER — CIPROFLOXACIN 500 MG/1
500 TABLET, FILM COATED ORAL 2 TIMES DAILY
Qty: 28 TABLET | Refills: 0 | Status: SHIPPED | OUTPATIENT
Start: 2023-08-17 | End: 2023-08-31

## 2023-08-17 RX ORDER — CIPROFLOXACIN 500 MG/1
500 TABLET, FILM COATED ORAL 2 TIMES DAILY
Qty: 14 TABLET | Refills: 0 | Status: SHIPPED | OUTPATIENT
Start: 2023-08-17 | End: 2023-08-17 | Stop reason: SDUPTHER

## 2023-08-17 RX ORDER — OXYCODONE HYDROCHLORIDE AND ACETAMINOPHEN 5; 325 MG/1; MG/1
1 TABLET ORAL EVERY 4 HOURS PRN
Qty: 12 TABLET | Refills: 0 | Status: SHIPPED | OUTPATIENT
Start: 2023-08-17 | End: 2023-08-20

## 2023-08-17 RX ADMIN — FERROUS SULFATE TAB 325 MG (65 MG ELEMENTAL FE) 325 MG: 325 (65 FE) TAB at 07:53

## 2023-08-17 RX ADMIN — INSULIN LISPRO 8 UNITS: 100 INJECTION, SOLUTION INTRAVENOUS; SUBCUTANEOUS at 07:58

## 2023-08-17 RX ADMIN — PANTOPRAZOLE SODIUM 40 MG: 40 TABLET, DELAYED RELEASE ORAL at 07:53

## 2023-08-17 RX ADMIN — OXYCODONE HYDROCHLORIDE AND ACETAMINOPHEN 2 TABLET: 5; 325 TABLET ORAL at 07:52

## 2023-08-17 RX ADMIN — METHOCARBAMOL 750 MG: 500 TABLET ORAL at 07:51

## 2023-08-17 RX ADMIN — ASPIRIN 81 MG: 81 TABLET, COATED ORAL at 07:50

## 2023-08-17 RX ADMIN — FLUCONAZOLE 400 MG: 100 TABLET ORAL at 07:53

## 2023-08-17 RX ADMIN — METOPROLOL TARTRATE 12.5 MG: 25 TABLET, FILM COATED ORAL at 07:54

## 2023-08-17 RX ADMIN — CLOPIDOGREL BISULFATE 75 MG: 75 TABLET ORAL at 07:50

## 2023-08-17 RX ADMIN — EZETIMIBE 10 MG: 10 TABLET ORAL at 07:53

## 2023-08-17 RX ADMIN — INSULIN LISPRO 4 UNITS: 100 INJECTION, SOLUTION INTRAVENOUS; SUBCUTANEOUS at 07:58

## 2023-08-17 RX ADMIN — POLYETHYLENE GLYCOL 3350 17 G: 17 POWDER, FOR SOLUTION ORAL at 07:54

## 2023-08-17 RX ADMIN — OXYCODONE HYDROCHLORIDE AND ACETAMINOPHEN 2 TABLET: 5; 325 TABLET ORAL at 03:56

## 2023-08-17 ASSESSMENT — PAIN SCALES - WONG BAKER
WONGBAKER_NUMERICALRESPONSE: 0

## 2023-08-17 ASSESSMENT — PAIN DESCRIPTION - DESCRIPTORS: DESCRIPTORS: DISCOMFORT

## 2023-08-17 ASSESSMENT — PAIN SCALES - GENERAL
PAINLEVEL_OUTOF10: 9
PAINLEVEL_OUTOF10: 9

## 2023-08-17 ASSESSMENT — PAIN DESCRIPTION - LOCATION: LOCATION: FLANK

## 2023-08-17 ASSESSMENT — PAIN DESCRIPTION - ORIENTATION: ORIENTATION: LEFT

## 2023-08-17 NOTE — DISCHARGE SUMMARY
are seen Findings of fluid overload denoted by body wall anasarca, and trace pleural effusions     IR ABSCESS DRAINAGE PERC    Result Date: 8/15/2023  PROCEDURE: IR ABSCESS DRAIN PERC MODERATE CONSCIOUS SEDATION 8/15/2023 HISTORY: ORDERING SYSTEM PROVIDED HISTORY: drain placement in upper pole of left kidney for renal abscess. Poor drainage of the existing catheter PHYSICIANS: Dennis Ortega D.O. CONTRAST: Approximately 20 mL of iodinated contrast SEDATION: A total of 1 mg versed and 75 mcg fentanyl were titrated intravenously for moderate sedation monitored under my direction. Total intra-service time of sedation was 12 minutes. The patient's vital signs were monitored throughout the procedure and recorded in the patient's medical record by the nurse. Sedation monitoring started at 2:36 p.m. and ended at 2:48 p.m. FLUOROSCOPY DOSE AND TYPE OR TIME AND EXPOSURES: 5.9 minutes fluoroscopy time 122.47 mGy cumulative air Kerma 159 acquired images in total DESCRIPTION OF PROCEDURE: Informed consent was obtained after a detailed explanation of the procedure including risks, benefits, and alternatives. Universal protocol was observed. Sterile gowns, masks, hats and gloves utilized for maximal sterile barrier. A  image was obtained which demonstrated the positioning of the existing left perinephric drainage catheter. Contrast administration through the existing catheter demonstrated no large amount of residual abscess cavity. The residual cavity was multiloculated nature with the greatest component extending inferiorly and posteriorly from the existing drainage catheter. The existing catheter was removed over an Amplatz wire and using a combination of an Amplatz wire and a 5 [place] Kumpe the catheter, access was advanced into the largest component of the multiloculated abscess cavity. Over the wire a 12 [place] Islander locking pigtail percutaneous drainage catheter was positioned into place.   The cavity was lavaged with total DESCRIPTION OF PROCEDURE: Informed consent was obtained after a detailed explanation of the procedure including risks, benefits, and alternatives. Universal protocol was observed. Sterile gowns, masks, hats and gloves utilized for maximal sterile barrier. A  image was obtained which demonstrated the positioning of the existing left perinephric drainage catheter. Contrast administration through the existing catheter demonstrated no large amount of residual abscess cavity. The residual cavity was multiloculated nature with the greatest component extending inferiorly and posteriorly from the existing drainage catheter. The existing catheter was removed over an Amplatz wire and using a combination of an Amplatz wire and a 5 [place] Kumpe the catheter, access was advanced into the largest component of the multiloculated abscess cavity. Over the wire a 12 [place]vian locking pigtail percutaneous drainage catheter was positioned into place. The cavity was lavaged with normal saline and placed to bulb suction. The patient tolerated procedure well and there were no immediate complications. Estimated blood loss: Minimal (less than 5 mL) FINDINGS: Minimal residual fluid component of the complex multiloculated abscess. Successful repositioning of a new locking pigtail drainage catheter into the largest cavity. Successful fluoroscopic guided percutaneous drainage catheter exchange and repositioning into a complex perinephric abscess. Patient currently has a 12 Latvian locking pigtail drainage catheter placed to bulb suction.        CBC:   Recent Labs     08/16/23  0440   WBC 7.8   HGB 9.4*   *     BMP:    Recent Labs     08/16/23 0440      K 4.1      CO2 27   BUN 8   CREATININE <0.5*   GLUCOSE 264*     Hepatic:   Recent Labs     08/16/23 0440   AST 11*   ALT 16   BILITOT <0.2   ALKPHOS 87     Lipids:   Lab Results   Component Value Date/Time    CHOL 114 07/10/2023 05:00 AM    HDL 37 07/10/2023

## 2023-08-17 NOTE — PROGRESS NOTES
Physician Progress Note      Judi Flores  CSN #:                  [id number]  :                       1990  ADMIT DATE:       2023 5:20 PM  DISCH DATE:        2023 12:20 PM  RESPONDING  PROVIDER #:        Kayy Jensen MD          QUERY TEXT:    Pt admitted with renal abscess and non-obstructing bilateral nephrolithiasis   and underwent CABG 07/10/2023 and noted documentation of recent UTI and poor   DM control. If possible, please document in progress notes and discharge   summary the relationship if any between the renal abscess and non-obstructing   bilateral nephrolithiasis and the surgery: The medical record reflects the following:  Risk Factors: CABG 07/10, UTI on previous admission, malnutrition, CAD, DM  Clinical Indicators:  H&P -  \" 28 y.o. female with pmh CAD, prior NSTEMI w/ REED x 3 (), HTN, HLD, DM2   and depression who was recently admitted to [facility] with chest pain and found to   have multivessel CAD s/p CABG 7/10/23 presented to emergency room with left   renal abscess seen on outpatient CT done 8/3/23. .  The patient states that for the past 2 weeks, about 4 days after recent   discharge following CABG, she has been having persistent left flank pain that   has been progressive. \"  ID -  \"recent admission to [facility] found to have multivessel   coronary artery disease status post CABG on 7/10/23, that admission found to   have Proteus on urine culture she was prescribed Augmentin at discharge. She   also noted to have a WBC elevation with anemia on that admission.   Since going   home she was complaining of left flank pain eventually underwent ultrasound   of the kidney on 23, reported to be abnormal indicating 9.2 cm complex   hypoechoic area, subsequently she underwent CT scan of the abdomen pelvis on   8/3/23 reported to have large left renal and per  DM poor control  Recent UTI with PROTEUS on the urine cx  Was

## 2023-08-17 NOTE — CARE COORDINATION
CASE MANAGEMENT DISCHARGE SUMMARY      Discharge to: Home with [facility]      IMM given: (date) na    New Durable Medical Equipment ordered/agency: none    Transportation:    Family/car:on arrival      Confirmed discharge plan with:MD/RN/Jermain with [facility]     Patient: yes     Family:  patient notified family     Facility/Agency, name:  [facility] with [facility] received orders from Sydney Garner RN, name: Reid Braun

## 2023-08-17 NOTE — PLAN OF CARE
Problem: Discharge Planning  Goal: Discharge to home or other facility with appropriate resources  Outcome: Completed     Problem: Pain  Goal: Verbalizes/displays adequate comfort level or baseline comfort level  Outcome: Completed     Problem: Skin/Tissue Integrity  Goal: Absence of new skin breakdown  Description: 1. Monitor for areas of redness and/or skin breakdown  2. Assess vascular access sites hourly  3. Every 4-6 hours minimum:  Change oxygen saturation probe site  4. Every 4-6 hours:  If on nasal continuous positive airway pressure, respiratory therapy assess nares and determine need for appliance change or resting period.   Outcome: Completed     Problem: Chronic Conditions and Co-morbidities  Goal: Patient's chronic conditions and co-morbidity symptoms are monitored and maintained or improved  Outcome: Completed     Problem: Nutrition Deficit:  Goal: Optimize nutritional status  Outcome: Completed     Problem: Safety - Adult  Goal: Free from fall injury  Outcome: Completed

## 2023-08-18 ENCOUNTER — CARE COORDINATION (OUTPATIENT)
Dept: CASE MANAGEMENT | Age: 33
End: 2023-08-18

## 2023-08-25 ENCOUNTER — TELEPHONE (OUTPATIENT)
Dept: PRIMARY CARE CLINIC | Age: 33
End: 2023-08-25

## 2023-08-25 ENCOUNTER — CARE COORDINATION (OUTPATIENT)
Dept: CASE MANAGEMENT | Age: 33
End: 2023-08-25

## 2023-08-25 NOTE — CARE COORDINATION
care and/or understanding of plan of care after discharge. Discussed appropriate site of care based on symptoms and resources available to patient including: PCP  Specialist. The patient agrees to contact the PCP office for questions related to their healthcare. Advance Care Planning:   not on file. Patients top risk factors for readmission: functional physical ability  Interventions to address risk factors: Education of patient/family/caregiver/guardian to support self-management-     Offered patient enrollment in the Remote Patient Monitoring (RPM) program for in-home monitoring: Patient declined. Care Transitions Subsequent and Final Call    Subsequent and Final Calls  Do you have any ongoing symptoms?: No  Have your medications changed?: No  Do you have any questions related to your medications?: No  Do you currently have any active services?: No  Do you have any needs or concerns that I can assist you with?: No  Identified Barriers: None  Care Transitions Interventions  Other Interventions:             Care Transition Nurse provided contact information for future needs. Plan for follow-up call in 5-7 days based on severity of symptoms and risk factors.   Plan for next call: follow-up appointment-did patient go to appt    Shawna Amaya RN

## 2023-08-25 NOTE — TELEPHONE ENCOUNTER
Call placed to this patient per care coordination. Unable to document in that encounter. Call went to voicemail box, which is full. Unable to leave a message    ORIGINAL NOTE  Hussain Posada RN  to [facility]    TY      11:07 AM  Patient was recently d/c from hospital and new provider (Jeffrey Saldana) appt needs scheduled. Patient active with home care services with [facility]   Hussain Posada RN     TY  11:06 AM  Note      [facility] Care Transitions Follow Up Call     Patient Current Location:  Home: [address] [place] [address] Dr Isabel Reina [address] [place] [address]     Care Transition Nurse contacted the patient by telephone to follow up after admission. Verified name and  with patient as identifiers.      Patient: Maksim Kwan   Patient : 1990   MRN: [id number]     Reason for Admission: renal abscess  Discharge Date: 23       RARS: Readmission Risk Score: 21.1

## 2023-08-25 NOTE — TELEPHONE ENCOUNTER
? Not our patient. Im showing Zaheer Ellington as of 8/16/23.   Pt cancelled and no showed her appointment in August

## 2023-08-31 ENCOUNTER — CARE COORDINATION (OUTPATIENT)
Dept: CASE MANAGEMENT | Age: 33
End: 2023-08-31

## 2023-09-01 ENCOUNTER — CARE COORDINATION (OUTPATIENT)
Dept: CASE MANAGEMENT | Age: 33
End: 2023-09-01

## 2023-09-01 NOTE — CARE COORDINATION
[facility] Care Transitions Follow Up Call      Patient: Elan Wolf  Patient : 1990   MRN: [id number]  Reason for Admission: renal abscess  Discharge Date: 23 RARS: Readmission Risk Score: 21.1    Attempted to reach patient via phone for transition call. Mailbox was full and no option to l/m      Follow Up  No future appointments.   Non-[facility] follow up appointment(s):      Care Transitions Subsequent and Final Call    Subsequent and Final Calls  Care Transitions Interventions  Other Interventions:           Tori Vance RN

## 2023-09-08 ENCOUNTER — CARE COORDINATION (OUTPATIENT)
Dept: CASE MANAGEMENT | Age: 33
End: 2023-09-08

## 2023-09-08 NOTE — CARE COORDINATION
[facility] Care Transitions Follow Up Call      Patient: Parth Vizcaino  Patient : 1990   MRN: [id number]  Reason for Admission: renal abscess  Discharge Date: 23 RARS: Readmission Risk Score: 21.1    Second and final attempt made to reach patient for transition call. \"mailbox is full and can not accept any messages at this time\"      Follow Up  No future appointments.   Non-[facility] follow up appointment(s):     Care Transitions Subsequent and Final Call    Subsequent and Final Calls  Care Transitions Interventions  Other Interventions:             Rachel Akins RN

## 2024-06-11 ENCOUNTER — APPOINTMENT (OUTPATIENT)
Dept: GENERAL RADIOLOGY | Age: 34
End: 2024-06-11
Payer: COMMERCIAL

## 2024-06-11 ENCOUNTER — HOSPITAL ENCOUNTER (EMERGENCY)
Age: 34
Discharge: HOME | End: 2024-06-11
Attending: EMERGENCY MEDICINE
Payer: COMMERCIAL

## 2024-06-11 VITALS
DIASTOLIC BLOOD PRESSURE: 55 MMHG | RESPIRATION RATE: 16 BRPM | OXYGEN SATURATION: 100 % | HEART RATE: 92 BPM | TEMPERATURE: 97.2 F | SYSTOLIC BLOOD PRESSURE: 122 MMHG

## 2024-06-11 DIAGNOSIS — L03.119 CELLULITIS IN DIABETIC FOOT (HCC): Primary | ICD-10-CM

## 2024-06-11 DIAGNOSIS — E11.628 CELLULITIS IN DIABETIC FOOT (HCC): Primary | ICD-10-CM

## 2024-06-11 LAB
ANION GAP SERPL CALCULATED.3IONS-SCNC: 11 MMOL/L (ref 9–16)
BASOPHILS # BLD: 0.03 K/UL (ref 0–0.2)
BASOPHILS NFR BLD: 0 % (ref 0–2)
BUN SERPL-MCNC: 11 MG/DL (ref 6–20)
CALCIUM SERPL-MCNC: 8.8 MG/DL (ref 8.6–10.4)
CHLORIDE SERPL-SCNC: 99 MMOL/L (ref 98–107)
CO2 SERPL-SCNC: 25 MMOL/L (ref 20–31)
CREAT SERPL-MCNC: 0.5 MG/DL (ref 0.5–0.9)
CRP SERPL HS-MCNC: 15.2 MG/L (ref 0–5)
EOSINOPHIL # BLD: 0.16 K/UL (ref 0–0.44)
EOSINOPHILS RELATIVE PERCENT: 2 % (ref 1–4)
ERYTHROCYTE [DISTWIDTH] IN BLOOD BY AUTOMATED COUNT: 14.4 % (ref 11.8–14.4)
ERYTHROCYTE [SEDIMENTATION RATE] IN BLOOD BY PHOTOMETRIC METHOD: 35 MM/HR (ref 0–20)
GFR, ESTIMATED: >90 ML/MIN/1.73M2
GLUCOSE SERPL-MCNC: 358 MG/DL (ref 74–99)
HCG SERPL QL: NEGATIVE
HCT VFR BLD AUTO: 41.7 % (ref 36.3–47.1)
HGB BLD-MCNC: 13.6 G/DL (ref 11.9–15.1)
IMM GRANULOCYTES # BLD AUTO: 0.04 K/UL (ref 0–0.3)
IMM GRANULOCYTES NFR BLD: 1 %
LYMPHOCYTES NFR BLD: 2.93 K/UL (ref 1.1–3.7)
LYMPHOCYTES RELATIVE PERCENT: 35 % (ref 24–43)
MCH RBC QN AUTO: 30.2 PG (ref 25.2–33.5)
MCHC RBC AUTO-ENTMCNC: 32.6 G/DL (ref 28.4–34.8)
MCV RBC AUTO: 92.5 FL (ref 82.6–102.9)
MONOCYTES NFR BLD: 0.47 K/UL (ref 0.1–1.2)
MONOCYTES NFR BLD: 6 % (ref 3–12)
NEUTROPHILS NFR BLD: 56 % (ref 36–65)
NEUTS SEG NFR BLD: 4.7 K/UL (ref 1.5–8.1)
NRBC BLD-RTO: 0 PER 100 WBC
PLATELET # BLD AUTO: 290 K/UL (ref 138–453)
PMV BLD AUTO: 9.9 FL (ref 8.1–13.5)
POTASSIUM SERPL-SCNC: 4.5 MMOL/L (ref 3.7–5.3)
RBC # BLD AUTO: 4.51 M/UL (ref 3.95–5.11)
SODIUM SERPL-SCNC: 135 MMOL/L (ref 136–145)
WBC OTHER # BLD: 8.3 K/UL (ref 3.5–11.3)

## 2024-06-11 PROCEDURE — 87070 CULTURE OTHR SPECIMN AEROBIC: CPT

## 2024-06-11 PROCEDURE — 87075 CULTR BACTERIA EXCEPT BLOOD: CPT

## 2024-06-11 PROCEDURE — 80048 BASIC METABOLIC PNL TOTAL CA: CPT

## 2024-06-11 PROCEDURE — 2580000003 HC RX 258

## 2024-06-11 PROCEDURE — 73630 X-RAY EXAM OF FOOT: CPT

## 2024-06-11 PROCEDURE — 6370000000 HC RX 637 (ALT 250 FOR IP): Performed by: STUDENT IN AN ORGANIZED HEALTH CARE EDUCATION/TRAINING PROGRAM

## 2024-06-11 PROCEDURE — 87205 SMEAR GRAM STAIN: CPT

## 2024-06-11 PROCEDURE — 87077 CULTURE AEROBIC IDENTIFY: CPT

## 2024-06-11 PROCEDURE — 85025 COMPLETE CBC W/AUTO DIFF WBC: CPT

## 2024-06-11 PROCEDURE — 87186 SC STD MICRODIL/AGAR DIL: CPT

## 2024-06-11 PROCEDURE — 84703 CHORIONIC GONADOTROPIN ASSAY: CPT

## 2024-06-11 PROCEDURE — 6360000002 HC RX W HCPCS

## 2024-06-11 PROCEDURE — 86140 C-REACTIVE PROTEIN: CPT

## 2024-06-11 PROCEDURE — 96365 THER/PROPH/DIAG IV INF INIT: CPT

## 2024-06-11 PROCEDURE — 85652 RBC SED RATE AUTOMATED: CPT

## 2024-06-11 PROCEDURE — 99284 EMERGENCY DEPT VISIT MOD MDM: CPT

## 2024-06-11 RX ORDER — CIPROFLOXACIN 500 MG/1
500 TABLET, FILM COATED ORAL 2 TIMES DAILY
Qty: 14 TABLET | Refills: 0 | Status: SHIPPED | OUTPATIENT
Start: 2024-06-11 | End: 2024-06-11

## 2024-06-11 RX ORDER — DOXYCYCLINE HYCLATE 100 MG
100 TABLET ORAL DAILY
Qty: 10 TABLET | Refills: 0 | Status: SHIPPED | OUTPATIENT
Start: 2024-06-11 | End: 2024-06-11

## 2024-06-11 RX ORDER — ACETAMINOPHEN 500 MG
1000 TABLET ORAL EVERY 8 HOURS PRN
Qty: 21 TABLET | Refills: 0 | Status: SHIPPED | OUTPATIENT
Start: 2024-06-11 | End: 2024-06-11

## 2024-06-11 RX ORDER — OXYCODONE HYDROCHLORIDE 5 MG/1
5 TABLET ORAL EVERY 8 HOURS PRN
Qty: 6 TABLET | Refills: 0 | Status: SHIPPED | OUTPATIENT
Start: 2024-06-11 | End: 2024-06-11

## 2024-06-11 RX ORDER — OXYCODONE HYDROCHLORIDE 5 MG/1
5 TABLET ORAL EVERY 8 HOURS PRN
Qty: 6 TABLET | Refills: 0 | Status: SHIPPED | OUTPATIENT
Start: 2024-06-11 | End: 2024-06-14

## 2024-06-11 RX ORDER — CIPROFLOXACIN 500 MG/1
500 TABLET, FILM COATED ORAL 2 TIMES DAILY
Qty: 14 TABLET | Refills: 0 | Status: SHIPPED | OUTPATIENT
Start: 2024-06-11 | End: 2024-06-18

## 2024-06-11 RX ORDER — OXYCODONE HYDROCHLORIDE AND ACETAMINOPHEN 5; 325 MG/1; MG/1
1 TABLET ORAL ONCE
Status: COMPLETED | OUTPATIENT
Start: 2024-06-11 | End: 2024-06-11

## 2024-06-11 RX ORDER — IBUPROFEN 400 MG/1
400 TABLET ORAL EVERY 6 HOURS PRN
Qty: 28 TABLET | Refills: 0 | Status: SHIPPED | OUTPATIENT
Start: 2024-06-11 | End: 2024-06-11

## 2024-06-11 RX ORDER — IBUPROFEN 400 MG/1
400 TABLET ORAL EVERY 6 HOURS PRN
Qty: 28 TABLET | Refills: 0 | Status: SHIPPED | OUTPATIENT
Start: 2024-06-11 | End: 2024-06-18

## 2024-06-11 RX ORDER — ACETAMINOPHEN 500 MG
1000 TABLET ORAL EVERY 8 HOURS PRN
Qty: 21 TABLET | Refills: 0 | Status: SHIPPED | OUTPATIENT
Start: 2024-06-11 | End: 2024-06-18

## 2024-06-11 RX ORDER — DOXYCYCLINE HYCLATE 100 MG
100 TABLET ORAL DAILY
Qty: 10 TABLET | Refills: 0 | Status: SHIPPED | OUTPATIENT
Start: 2024-06-11 | End: 2024-06-21

## 2024-06-11 RX ADMIN — OXYCODONE HYDROCHLORIDE AND ACETAMINOPHEN 1 TABLET: 5; 325 TABLET ORAL at 10:35

## 2024-06-11 RX ADMIN — CEFEPIME 2000 MG: 2 INJECTION, POWDER, FOR SOLUTION INTRAVENOUS at 11:33

## 2024-06-11 ASSESSMENT — PAIN SCALES - GENERAL
PAINLEVEL_OUTOF10: 9
PAINLEVEL_OUTOF10: 10

## 2024-06-11 ASSESSMENT — ENCOUNTER SYMPTOMS: COLOR CHANGE: 1

## 2024-06-11 ASSESSMENT — PAIN - FUNCTIONAL ASSESSMENT
PAIN_FUNCTIONAL_ASSESSMENT: PREVENTS OR INTERFERES SOME ACTIVE ACTIVITIES AND ADLS
PAIN_FUNCTIONAL_ASSESSMENT: 0-10

## 2024-06-11 ASSESSMENT — PAIN DESCRIPTION - ORIENTATION
ORIENTATION: LEFT
ORIENTATION: LEFT

## 2024-06-11 ASSESSMENT — PAIN DESCRIPTION - LOCATION
LOCATION: TOE (COMMENT WHICH ONE)
LOCATION: TOE (COMMENT WHICH ONE)

## 2024-06-11 NOTE — ED NOTES
The following labs were labeled with appropriate pt sticker and tubed to lab:     [] Blue     [] Lavender   [] on ice  [] Green/yellow  [] Green/black [] on ice  [] Grey  [] on ice  [] Yellow  [] Red  [] Pink  [] Type/ Screen  [] ABG  [] VBG    [] COVID-19 swab    [] Rapid  [] PCR  [] Flu swab  [] Peds Viral Panel     [] Urine Sample  [] Fecal Sample  [] Pelvic Cultures  [] Blood Cultures  [] X 2  [] STREP Cultures  [x] Wound Cultures

## 2024-06-11 NOTE — CONSULTS
Consultation Note  Foot and Ankle Surgery     Subjective     Chief Complaint: Left hallux wound    HPI:  Jane Saavedra is a 34 y.o. female diabetic patient seen at [facility] for left hallux discoloration and wound.  Patient has been following up in [facility] however has come to [place] to visit family.  She has been seeing wound care and vascular surgery she had. They have had this wound for roughly 2 months.  They have PMH that is significant for NSTEMI, THC abuse, type 2 diabetes, multivessel coronary artery disease, history of infections in 2023. They recently noticed worsening discoloration, and increased swelling, redness, pain and drainage recently and came to the ED.   Patient denies constitutional symptoms.  Foot and Ankle Surgery was consulted for further surgical evaluation.    PCP is Shyann Coombs MD     ROS:   Review of Systems   Constitutional:  Negative for chills.   HENT: Negative.     Eyes: Negative.    Respiratory: Negative.     Endocrine: Negative.    Genitourinary: Negative.    Skin:  Positive for color change, pallor, rash and wound.   Allergic/Immunologic: Negative.    Neurological:  Positive for numbness.       Past Medical History  Past Medical History:   Diagnosis Date    Bipolar 1 disorder (HCC)     Chest pain     Depression     Hyperlipidemia     tx started May 2015    Hypertension     tx started May 2015    Multiple vessel coronary artery disease 7/6/2023    Neutrophilia 8/6/2023    Psychiatric problem     depression    Type II or unspecified type diabetes mellitus without mention of complication, uncontrolled 2/21/2015    tx started May 2015        Past Surgical History   has a past surgical history that includes Carpal tunnel release (Left, 07/22/2020); Coronary artery bypass graft (N/A, 7/10/2023); and back surgery (Left, 8/10/2023).      Medications  Prior to Admission medications    Medication Sig Start Date End Date Taking? Authorizing Provider

## 2024-06-11 NOTE — DISCHARGE INSTRUCTIONS
ED Instructions:  You have an infection to your first digit left foot    Medications:  Take your prescriptions as directed  You are receiving new prescriptions for doxycycline-take 1 time by mouth per day with food  and water  Ciprofloxacin- take 2x per day for 1 week with food and water  Take both antibiotics daily until both finished  You received 1 dose of IV antibiotics while in the ED  If your pain is not severe then you may take the non-prescription medication that you normally take for aches and pains ie Tylenol and Ibuprofen (alternating), or if severe pain occurs these will serve as additional medication in conjuction with the pain medication from ED    Ambulation and Activity:  You are advised to go directly home from the hospital  Use crutches or scooter   We recommend knee scooter if possible, you can also obtain these on [employer] for rather affordable and quickly obtainable.  You may not put weight on the left foot  Do not walk on the left foot.  Avoid stairs.  Do not lift or move heavy objects  Do not drive until cleared by your physician    Bandage and Wound Care Instructions:  Keep bandage clean and dry  Do NOT remove dressing/ splint until seen by podiatrist in [place]  DO NOT get wet  Please use shower cover around leg if you do shower so that dressing does not get wet  Do not shower or bathe the operative extremity  Do not remove the bandage (unless it is too tight)  Do not attempt to put anything between the cast or dressing and your skin, some itching is normal.    Ice and Elevation:   Elevate extremity as much as possible to reduce swelling and discomfort.  Elevate with 2 pillows at or above the level of the heart for the first 72 hours. This is important for swelling and pain  Ice:  Apply Hospital dispensed insulated ice bag over the bandage 20 minutes of every hour while awake for the first 72 hours.  You may ice behind the knee as well.    Special Instructions: Call your doctor

## 2024-06-11 NOTE — ED TRIAGE NOTES
Pt presents to ED room 07 ambulatory from triage c/o L greater toe pain that has been an ongoing issue. Pt reports that she used to follow with wound care in [place], however she has not seen wound care in a couple months. Pt reports that the wound has grown in size and is now draining purulent fluid that has an odor. Pt reports 10/10 pain at this time.

## 2024-06-11 NOTE — ED PROVIDER NOTES
[facility] ED  Emergency Department Encounter  Emergency Medicine Resident     Pt Name:Jane Saavedra  MRN: [id number]  Birthdate 1990  Date of evaluation: 6/11/24  PCP:  Shyann Coombs MD  Note Started: 10:01 AM EDT      CHIEF COMPLAINT       Chief Complaint   Patient presents with    Toe Pain     Left foot. Diabetic        HISTORY OF PRESENT ILLNESS  (Location/Symptom, Timing/Onset, Context/Setting, Quality, Duration, Modifying Factors, Severity.)      Jane Saavedra is a 34 y.o. female who presents with left great toe pain for the last week.  She states there is been some yellow drainage have it as well.  She states over the last year she has had issues with wounds on her toe and is followed with wound care.  She states she is not seeing her wound care team in 2 months.  She denies any recent injuries.  She does have diabetes and has neuropathy and decreased sensation in her lower extremities.  She denies any fevers or chills.  No numbness or weakness.    PAST MEDICAL / SURGICAL / SOCIAL / FAMILY HISTORY      has a past medical history of Bipolar 1 disorder (HCC), Chest pain, Depression, Hyperlipidemia, Hypertension, Multiple vessel coronary artery disease, Neutrophilia, Psychiatric problem, and Type II or unspecified type diabetes mellitus without mention of complication, uncontrolled.     has a past surgical history that includes Carpal tunnel release (Left, 07/22/2020); Coronary artery bypass graft (N/A, 7/10/2023); and back surgery (Left, 8/10/2023).    Social History     Socioeconomic History    Marital status: Single     Spouse name: Not on file    Number of children: Not on file    Years of education: Not on file    Highest education level: Not on file   Occupational History    Not on file   Tobacco Use    Smoking status: Former     Current packs/day: 0.25     Types: Cigarettes    Smokeless tobacco: Never   Vaping Use    Vaping Use: Never used   Substance and Sexual  MD Huseyin   acetaminophen (TYLENOL) 500 MG tablet Take 2 tablets by mouth 3 times daily as needed for Pain 5/6/21   Stewart Morocho,    atorvastatin (LIPITOR) 80 MG tablet Take 1 tablet by mouth nightly. 2/21/15   Dagoberto Anderson DO   Blood Glucose Monitoring Suppl (BLOOD GLUCOSE MONITOR KIT) KIT Check bs bid  Patient not taking: Reported on 7/25/2023 2/21/15   Dagoberto Anderson DO       REVIEW OF SYSTEMS       Review of Systems   Skin:  Positive for color change and wound.   All other systems reviewed and are negative.      PHYSICAL EXAM      INITIAL VITALS:   BP (!) 122/55   Pulse 92   Temp 97.2 °F (36.2 °C) (Oral)   Resp 16   LMP 06/05/2024 (Approximate)   SpO2 100%     Physical Exam  Cardiovascular:      Rate and Rhythm: Normal rate.      Pulses: Normal pulses.      Heart sounds: Normal heart sounds.      Comments: +2 equal palpable pulses lower extremities bilaterally  Pulmonary:      Effort: Pulmonary effort is normal.      Breath sounds: Normal breath sounds.   Abdominal:      Palpations: Abdomen is soft.      Tenderness: There is no abdominal tenderness.   Musculoskeletal:      Right lower leg: No edema.      Left lower leg: No edema.      Comments: The left great toe has excoriations on the plantar surface with some erythema and surrounding duskiness.  There is tenderness as well.  There is no soft tissue swelling in the midfoot ankle or calf or tenderness.  There is no crepitus palpated.  There is no purulence or fluctuance   Skin:     Capillary Refill: Capillary refill takes less than 2 seconds.   Neurological:      Comments: 5 out of 5 strength and sensation intact lower extremities bilaterally           DDX/DIAGNOSTIC RESULTS / EMERGENCY DEPARTMENT COURSE / MDM     Medical Decision Making  Diabetic foot ulcer in the left first phalanx likely an abscess that had drained prior to arrival.  Surrounding cellulitis.  No overt signs of any gaseous infection or osteomyelitis.  Sed rate is mildly

## 2024-06-11 NOTE — ED PROVIDER NOTES
Note Started: 10:52 AM EDT         [facility]     Emergency Department     Faculty Attestation    I performed a history and physical examination of the patient and discussed management with the resident. I reviewed the resident’s note and agree with the documented findings and plan of care. Any areas of disagreement are noted on the chart. I was personally present for the key portions of any procedures. I have documented in the chart those procedures where I was not present during the key portions. I have reviewed the emergency nurses triage note. I agree with the chief complaint, past medical history, past surgical history, allergies, medications, social and family history as documented unless otherwise noted below.        For Physician Assistant/ Nurse Practitioner cases/documentation I have personally evaluated this patient and have completed at least one if not all key elements of the E/M (history, physical exam, and MDM). Additional findings are as noted.  I have personally seen and evaluated the patient.  I find the patient's history and physical exam are consistent with the NP/PA documentation.  I agree with the care provided, treatment rendered, disposition and follow-up plan.    34 y.o. female with history of diabetes presenting with painful, draining great toe. History of wound on the toe, has been seen previously by wound care in [place]. Patient is visiting [place], returning to [place] this weekend. No fever. Pain radiates up to the mid-calf.    Exam:  General : Laying on the bed, awake, alert, and in no acute distress  CV : normal rate and regular rhythm  Lungs : Breathing comfortably on room air with no tachypnea, hypoxia, or increased work of breathing  Extremities: open, draining wound on left great toe. Pain to mid-calf. No crepitus or spreading erythema.     DDx: Diabetic toe infection, rule out oseteomyelitis    Plan:  XR, inflammatory labs  Podiatry consult    Medical

## 2024-06-12 ASSESSMENT — ENCOUNTER SYMPTOMS
EYES NEGATIVE: 1
ALLERGIC/IMMUNOLOGIC NEGATIVE: 1
COLOR CHANGE: 1
RESPIRATORY NEGATIVE: 1

## 2024-06-13 LAB
MICROORGANISM SPEC CULT: ABNORMAL
MICROORGANISM/AGENT SPEC: ABNORMAL
SPECIMEN DESCRIPTION: ABNORMAL

## 2024-06-17 NOTE — PROGRESS NOTES
Reviewed patient's wound culture - culture positive for proteus mirabilis.  Patient was discharged on ciprofloxacin, and culture is sensitive to prescribed medication.  Antibiotic prescribed at discharge is appropriate - no changes made to antibiotic regimen.     Jean Claude Ayala Pharm.D., [facility]

## 2024-06-18 ENCOUNTER — APPOINTMENT (OUTPATIENT)
Dept: GENERAL RADIOLOGY | Age: 34
DRG: 207 | End: 2024-06-18
Payer: MEDICAID

## 2024-06-18 ENCOUNTER — HOSPITAL ENCOUNTER (INPATIENT)
Age: 34
LOS: 1 days | Discharge: ANOTHER HOSPITAL | DRG: 207 | End: 2024-06-19
Attending: EMERGENCY MEDICINE | Admitting: INTERNAL MEDICINE
Payer: MEDICAID

## 2024-06-18 DIAGNOSIS — L08.9 WOUND INFECTION: Primary | ICD-10-CM

## 2024-06-18 DIAGNOSIS — T14.8XXA WOUND INFECTION: Primary | ICD-10-CM

## 2024-06-18 DIAGNOSIS — M79.662 PAIN OF LEFT CALF: ICD-10-CM

## 2024-06-18 DIAGNOSIS — R09.89 ABSENT PULSE IN LOWER EXTREMITY: ICD-10-CM

## 2024-06-18 PROCEDURE — 99285 EMERGENCY DEPT VISIT HI MDM: CPT

## 2024-06-18 PROCEDURE — 86140 C-REACTIVE PROTEIN: CPT

## 2024-06-18 PROCEDURE — 85025 COMPLETE CBC W/AUTO DIFF WBC: CPT

## 2024-06-18 PROCEDURE — 73630 X-RAY EXAM OF FOOT: CPT

## 2024-06-18 PROCEDURE — 85652 RBC SED RATE AUTOMATED: CPT

## 2024-06-18 PROCEDURE — 80053 COMPREHEN METABOLIC PANEL: CPT

## 2024-06-18 PROCEDURE — 36415 COLL VENOUS BLD VENIPUNCTURE: CPT

## 2024-06-18 ASSESSMENT — PAIN DESCRIPTION - LOCATION: LOCATION: TOE (COMMENT WHICH ONE)

## 2024-06-18 ASSESSMENT — PAIN DESCRIPTION - PAIN TYPE: TYPE: ACUTE PAIN

## 2024-06-18 ASSESSMENT — PAIN - FUNCTIONAL ASSESSMENT: PAIN_FUNCTIONAL_ASSESSMENT: 0-10

## 2024-06-18 ASSESSMENT — PAIN DESCRIPTION - DESCRIPTORS: DESCRIPTORS: ACHING;POUNDING;PRESSURE

## 2024-06-18 ASSESSMENT — PAIN SCALES - GENERAL: PAINLEVEL_OUTOF10: 10

## 2024-06-18 ASSESSMENT — PAIN DESCRIPTION - ORIENTATION: ORIENTATION: LEFT

## 2024-06-19 ENCOUNTER — ANESTHESIA EVENT (OUTPATIENT)
Dept: OPERATING ROOM | Age: 34
DRG: 169 | End: 2024-06-19
Payer: MEDICAID

## 2024-06-19 ENCOUNTER — HOSPITAL ENCOUNTER (INPATIENT)
Age: 34
LOS: 1 days | Discharge: HOME | DRG: 169 | End: 2024-06-20
Attending: HOSPITALIST | Admitting: INTERNAL MEDICINE
Payer: MEDICAID

## 2024-06-19 ENCOUNTER — APPOINTMENT (OUTPATIENT)
Dept: VASCULAR LAB | Age: 34
DRG: 207 | End: 2024-06-19
Payer: MEDICAID

## 2024-06-19 ENCOUNTER — APPOINTMENT (OUTPATIENT)
Dept: CT IMAGING | Age: 34
DRG: 207 | End: 2024-06-19
Payer: MEDICAID

## 2024-06-19 ENCOUNTER — OFFICE VISIT (OUTPATIENT)
Dept: OPERATING ROOM | Age: 34
End: 2024-06-19
Attending: HOSPITALIST

## 2024-06-19 ENCOUNTER — ANESTHESIA (OUTPATIENT)
Dept: OPERATING ROOM | Age: 34
DRG: 169 | End: 2024-06-19
Payer: MEDICAID

## 2024-06-19 ENCOUNTER — APPOINTMENT (OUTPATIENT)
Dept: GENERAL RADIOLOGY | Age: 34
DRG: 207 | End: 2024-06-19
Payer: MEDICAID

## 2024-06-19 VITALS
TEMPERATURE: 98.3 F | WEIGHT: 160.94 LBS | SYSTOLIC BLOOD PRESSURE: 132 MMHG | HEIGHT: 70 IN | RESPIRATION RATE: 16 BRPM | BODY MASS INDEX: 23.04 KG/M2 | HEART RATE: 73 BPM | OXYGEN SATURATION: 96 % | DIASTOLIC BLOOD PRESSURE: 78 MMHG

## 2024-06-19 DIAGNOSIS — I99.8 ISCHEMIA OF LEFT LOWER EXTREMITY: ICD-10-CM

## 2024-06-19 DIAGNOSIS — I25.5 ISCHEMIC CARDIOMYOPATHY: ICD-10-CM

## 2024-06-19 DIAGNOSIS — I70.202 POPLITEAL ARTERY OCCLUSION, LEFT (HCC): ICD-10-CM

## 2024-06-19 DIAGNOSIS — I73.9 PAD (PERIPHERAL ARTERY DISEASE) (HCC): Primary | ICD-10-CM

## 2024-06-19 DIAGNOSIS — L97.509 ISCHEMIC ULCER DIABETIC FOOT (HCC): ICD-10-CM

## 2024-06-19 DIAGNOSIS — E11.65 UNCONTROLLED TYPE 2 DIABETES MELLITUS WITH HYPERGLYCEMIA (HCC): ICD-10-CM

## 2024-06-19 DIAGNOSIS — E11.621 ISCHEMIC ULCER DIABETIC FOOT (HCC): ICD-10-CM

## 2024-06-19 PROBLEM — M79.662 PAIN OF LEFT CALF: Status: ACTIVE | Noted: 2024-06-19

## 2024-06-19 PROBLEM — T14.8XXA WOUND INFECTION: Status: ACTIVE | Noted: 2024-06-19

## 2024-06-19 PROBLEM — L08.9 WOUND INFECTION: Status: ACTIVE | Noted: 2024-06-19

## 2024-06-19 PROBLEM — R09.89 ABSENT PULSE IN LOWER EXTREMITY: Status: ACTIVE | Noted: 2024-06-19

## 2024-06-19 PROBLEM — Z79.4 TYPE 2 DIABETES MELLITUS WITH FOOT ULCER, WITH LONG-TERM CURRENT USE OF INSULIN (HCC): Status: ACTIVE | Noted: 2024-06-19

## 2024-06-19 PROBLEM — L08.9 DIABETIC INFECTION OF LEFT FOOT (HCC): Status: ACTIVE | Noted: 2024-06-19

## 2024-06-19 PROBLEM — E11.628 DIABETIC INFECTION OF LEFT FOOT (HCC): Status: ACTIVE | Noted: 2024-06-19

## 2024-06-19 PROBLEM — L97.522 ULCER OF GREAT TOE, LEFT, WITH FAT LAYER EXPOSED (HCC): Status: ACTIVE | Noted: 2024-06-19

## 2024-06-19 LAB
ACT BLD: 140 SEC (ref 79–149)
ALBUMIN SERPL-MCNC: 3.6 G/DL (ref 3.5–5.2)
ALP SERPL-CCNC: 124 U/L (ref 35–104)
ALT SERPL-CCNC: 11 U/L (ref 5–33)
ANION GAP SERPL CALCULATED.3IONS-SCNC: 13 MMOL/L (ref 9–17)
ANION GAP SERPL CALCULATED.3IONS-SCNC: 9 MMOL/L (ref 9–17)
ANTI-XA UNFRAC HEPARIN: <0.1 IU/L (ref 0.3–0.7)
AST SERPL-CCNC: 7 U/L
BASOPHILS # BLD: 0 K/UL (ref 0–0.2)
BASOPHILS # BLD: 0 K/UL (ref 0–0.2)
BASOPHILS NFR BLD: 0 % (ref 0–2)
BASOPHILS NFR BLD: 1 % (ref 0–2)
BILIRUB SERPL-MCNC: <0.2 MG/DL (ref 0.3–1.2)
BUN SERPL-MCNC: 10 MG/DL (ref 6–20)
BUN SERPL-MCNC: 13 MG/DL (ref 6–20)
CALCIUM SERPL-MCNC: 8 MG/DL (ref 8.6–10.4)
CALCIUM SERPL-MCNC: 8.7 MG/DL (ref 8.6–10.4)
CHLORIDE SERPL-SCNC: 105 MMOL/L (ref 98–107)
CHLORIDE SERPL-SCNC: 96 MMOL/L (ref 98–107)
CO2 SERPL-SCNC: 22 MMOL/L (ref 20–31)
CO2 SERPL-SCNC: 23 MMOL/L (ref 20–31)
CREAT SERPL-MCNC: 0.6 MG/DL (ref 0.5–0.9)
CREAT SERPL-MCNC: <0.4 MG/DL (ref 0.5–0.9)
CRP SERPL HS-MCNC: 5.6 MG/L (ref 0–5)
CRP SERPL HS-MCNC: 6.2 MG/L (ref 0–5)
EOSINOPHIL # BLD: 0.09 K/UL (ref 0–0.4)
EOSINOPHIL # BLD: 0.2 K/UL (ref 0–0.4)
EOSINOPHILS RELATIVE PERCENT: 1 % (ref 0–4)
EOSINOPHILS RELATIVE PERCENT: 2 % (ref 0–4)
ERYTHROCYTE [DISTWIDTH] IN BLOOD BY AUTOMATED COUNT: 14.5 % (ref 11.5–14.9)
ERYTHROCYTE [DISTWIDTH] IN BLOOD BY AUTOMATED COUNT: 15 % (ref 11.5–14.9)
ERYTHROCYTE [DISTWIDTH] IN BLOOD BY AUTOMATED COUNT: 15.3 % (ref 11.5–14.9)
ERYTHROCYTE [SEDIMENTATION RATE] IN BLOOD BY PHOTOMETRIC METHOD: 40 MM/HR (ref 0–20)
ERYTHROCYTE [SEDIMENTATION RATE] IN BLOOD BY PHOTOMETRIC METHOD: 40 MM/HR (ref 0–20)
GFR, ESTIMATED: >90 ML/MIN/1.73M2
GFR, ESTIMATED: ABNORMAL ML/MIN/1.73M2
GLUCOSE BLD-MCNC: 167 MG/DL (ref 65–105)
GLUCOSE BLD-MCNC: 206 MG/DL (ref 65–105)
GLUCOSE BLD-MCNC: 207 MG/DL (ref 65–105)
GLUCOSE BLD-MCNC: 318 MG/DL (ref 65–105)
GLUCOSE SERPL-MCNC: 216 MG/DL (ref 70–99)
GLUCOSE SERPL-MCNC: 658 MG/DL (ref 70–99)
HCT VFR BLD AUTO: 38.7 % (ref 36–46)
HCT VFR BLD AUTO: 40.1 % (ref 36–46)
HCT VFR BLD AUTO: 41.8 % (ref 36–46)
HGB BLD-MCNC: 12.8 G/DL (ref 12–16)
HGB BLD-MCNC: 13.1 G/DL (ref 12–16)
HGB BLD-MCNC: 13.3 G/DL (ref 12–16)
INR PPP: 1
LYMPHOCYTES NFR BLD: 3.3 K/UL (ref 1–4.8)
LYMPHOCYTES NFR BLD: 4.78 K/UL (ref 1–4.8)
LYMPHOCYTES RELATIVE PERCENT: 38 % (ref 24–44)
LYMPHOCYTES RELATIVE PERCENT: 55 % (ref 24–44)
MCH RBC QN AUTO: 29.9 PG (ref 26–34)
MCH RBC QN AUTO: 30.6 PG (ref 26–34)
MCH RBC QN AUTO: 30.7 PG (ref 26–34)
MCHC RBC AUTO-ENTMCNC: 31.9 G/DL (ref 31–37)
MCHC RBC AUTO-ENTMCNC: 32.8 G/DL (ref 31–37)
MCHC RBC AUTO-ENTMCNC: 33.1 G/DL (ref 31–37)
MCV RBC AUTO: 92.5 FL (ref 80–100)
MCV RBC AUTO: 93.8 FL (ref 80–100)
MCV RBC AUTO: 93.8 FL (ref 80–100)
MONOCYTES NFR BLD: 0.61 K/UL (ref 0.1–1.3)
MONOCYTES NFR BLD: 0.7 K/UL (ref 0.1–1.3)
MONOCYTES NFR BLD: 7 % (ref 1–7)
MONOCYTES NFR BLD: 8 % (ref 1–7)
MORPHOLOGY: NORMAL
NEUTROPHILS NFR BLD: 37 % (ref 36–66)
NEUTROPHILS NFR BLD: 51 % (ref 36–66)
NEUTS SEG NFR BLD: 3.22 K/UL (ref 1.3–9.1)
NEUTS SEG NFR BLD: 4.5 K/UL (ref 1.3–9.1)
PARTIAL THROMBOPLASTIN TIME: 24.1 SEC (ref 24–36)
PLATELET # BLD AUTO: 335 K/UL (ref 150–450)
PLATELET # BLD AUTO: 353 K/UL (ref 150–450)
PLATELET # BLD AUTO: 365 K/UL (ref 150–450)
PMV BLD AUTO: 7.5 FL (ref 6–12)
PMV BLD AUTO: 7.5 FL (ref 6–12)
PMV BLD AUTO: 7.9 FL (ref 6–12)
POTASSIUM SERPL-SCNC: 3.7 MMOL/L (ref 3.7–5.3)
POTASSIUM SERPL-SCNC: 4.8 MMOL/L (ref 3.7–5.3)
PROT SERPL-MCNC: 6.9 G/DL (ref 6.4–8.3)
PROTHROMBIN TIME: 13.2 SEC (ref 11.8–14.6)
RBC # BLD AUTO: 4.18 M/UL (ref 4–5.2)
RBC # BLD AUTO: 4.27 M/UL (ref 4–5.2)
RBC # BLD AUTO: 4.46 M/UL (ref 4–5.2)
SODIUM SERPL-SCNC: 132 MMOL/L (ref 135–144)
SODIUM SERPL-SCNC: 136 MMOL/L (ref 135–144)
WBC OTHER # BLD: 8.7 K/UL (ref 3.5–11)

## 2024-06-19 PROCEDURE — 80048 BASIC METABOLIC PNL TOTAL CA: CPT

## 2024-06-19 PROCEDURE — 2709999900 HC NON-CHARGEABLE SUPPLY: Performed by: STUDENT IN AN ORGANIZED HEALTH CARE EDUCATION/TRAINING PROGRAM

## 2024-06-19 PROCEDURE — C2623 CATH, TRANSLUMIN, DRUG-COAT: HCPCS | Performed by: STUDENT IN AN ORGANIZED HEALTH CARE EDUCATION/TRAINING PROGRAM

## 2024-06-19 PROCEDURE — 85347 COAGULATION TIME ACTIVATED: CPT

## 2024-06-19 PROCEDURE — C1884 EMBOLIZATION PROTECT SYST: HCPCS | Performed by: STUDENT IN AN ORGANIZED HEALTH CARE EDUCATION/TRAINING PROGRAM

## 2024-06-19 PROCEDURE — 6360000002 HC RX W HCPCS: Performed by: STUDENT IN AN ORGANIZED HEALTH CARE EDUCATION/TRAINING PROGRAM

## 2024-06-19 PROCEDURE — C1887 CATHETER, GUIDING: HCPCS | Performed by: STUDENT IN AN ORGANIZED HEALTH CARE EDUCATION/TRAINING PROGRAM

## 2024-06-19 PROCEDURE — 1200000000 HC SEMI PRIVATE

## 2024-06-19 PROCEDURE — 87040 BLOOD CULTURE FOR BACTERIA: CPT

## 2024-06-19 PROCEDURE — 99254 IP/OBS CNSLTJ NEW/EST MOD 60: CPT | Performed by: PODIATRIST

## 2024-06-19 PROCEDURE — 3700000000 HC ANESTHESIA ATTENDED CARE: Performed by: STUDENT IN AN ORGANIZED HEALTH CARE EDUCATION/TRAINING PROGRAM

## 2024-06-19 PROCEDURE — 6360000002 HC RX W HCPCS: Performed by: INTERNAL MEDICINE

## 2024-06-19 PROCEDURE — 85520 HEPARIN ASSAY: CPT

## 2024-06-19 PROCEDURE — 2580000003 HC RX 258: Performed by: INTERNAL MEDICINE

## 2024-06-19 PROCEDURE — C1757 CATH, THROMBECTOMY/EMBOLECT: HCPCS | Performed by: STUDENT IN AN ORGANIZED HEALTH CARE EDUCATION/TRAINING PROGRAM

## 2024-06-19 PROCEDURE — 6360000002 HC RX W HCPCS: Performed by: EMERGENCY MEDICINE

## 2024-06-19 PROCEDURE — 6370000000 HC RX 637 (ALT 250 FOR IP): Performed by: EMERGENCY MEDICINE

## 2024-06-19 PROCEDURE — 85025 COMPLETE CBC W/AUTO DIFF WBC: CPT

## 2024-06-19 PROCEDURE — 85610 PROTHROMBIN TIME: CPT

## 2024-06-19 PROCEDURE — 93971 EXTREMITY STUDY: CPT

## 2024-06-19 PROCEDURE — 2580000003 HC RX 258: Performed by: STUDENT IN AN ORGANIZED HEALTH CARE EDUCATION/TRAINING PROGRAM

## 2024-06-19 PROCEDURE — 87075 CULTR BACTERIA EXCEPT BLOOD: CPT

## 2024-06-19 PROCEDURE — 82947 ASSAY GLUCOSE BLOOD QUANT: CPT

## 2024-06-19 PROCEDURE — 6360000002 HC RX W HCPCS: Performed by: ANESTHESIOLOGY

## 2024-06-19 PROCEDURE — 86403 PARTICLE AGGLUT ANTBDY SCRN: CPT

## 2024-06-19 PROCEDURE — B41G1ZZ FLUOROSCOPY OF LEFT LOWER EXTREMITY ARTERIES USING LOW OSMOLAR CONTRAST: ICD-10-PCS | Performed by: STUDENT IN AN ORGANIZED HEALTH CARE EDUCATION/TRAINING PROGRAM

## 2024-06-19 PROCEDURE — 6360000004 HC RX CONTRAST MEDICATION: Performed by: INTERNAL MEDICINE

## 2024-06-19 PROCEDURE — C1769 GUIDE WIRE: HCPCS | Performed by: STUDENT IN AN ORGANIZED HEALTH CARE EDUCATION/TRAINING PROGRAM

## 2024-06-19 PROCEDURE — 85652 RBC SED RATE AUTOMATED: CPT

## 2024-06-19 PROCEDURE — A4217 STERILE WATER/SALINE, 500 ML: HCPCS | Performed by: STUDENT IN AN ORGANIZED HEALTH CARE EDUCATION/TRAINING PROGRAM

## 2024-06-19 PROCEDURE — 6360000004 HC RX CONTRAST MEDICATION: Performed by: STUDENT IN AN ORGANIZED HEALTH CARE EDUCATION/TRAINING PROGRAM

## 2024-06-19 PROCEDURE — 87070 CULTURE OTHR SPECIMN AEROBIC: CPT

## 2024-06-19 PROCEDURE — 3700000001 HC ADD 15 MINUTES (ANESTHESIA): Performed by: STUDENT IN AN ORGANIZED HEALTH CARE EDUCATION/TRAINING PROGRAM

## 2024-06-19 PROCEDURE — 3600000007 HC SURGERY HYBRID BASE: Performed by: STUDENT IN AN ORGANIZED HEALTH CARE EDUCATION/TRAINING PROGRAM

## 2024-06-19 PROCEDURE — C1892 INTRO/SHEATH,FIXED,PEEL-AWAY: HCPCS | Performed by: STUDENT IN AN ORGANIZED HEALTH CARE EDUCATION/TRAINING PROGRAM

## 2024-06-19 PROCEDURE — 99223 1ST HOSP IP/OBS HIGH 75: CPT | Performed by: INTERNAL MEDICINE

## 2024-06-19 PROCEDURE — X2CT3T7 EXTIRPATION OF MATTER FROM LEFT LOWER EXTREMITY ARTERY USING COMPUTER-AIDED MECHANICAL ASPIRATION, PERCUTANEOUS APPROACH, NEW TECHNOLOGY GROUP 7: ICD-10-PCS | Performed by: STUDENT IN AN ORGANIZED HEALTH CARE EDUCATION/TRAINING PROGRAM

## 2024-06-19 PROCEDURE — 2580000003 HC RX 258: Performed by: EMERGENCY MEDICINE

## 2024-06-19 PROCEDURE — 047N3Z1 DILATION OF LEFT POPLITEAL ARTERY USING DRUG-COATED BALLOON, PERCUTANEOUS APPROACH: ICD-10-PCS | Performed by: STUDENT IN AN ORGANIZED HEALTH CARE EDUCATION/TRAINING PROGRAM

## 2024-06-19 PROCEDURE — 85730 THROMBOPLASTIN TIME PARTIAL: CPT

## 2024-06-19 PROCEDURE — 2580000003 HC RX 258

## 2024-06-19 PROCEDURE — 99222 1ST HOSP IP/OBS MODERATE 55: CPT | Performed by: NURSE PRACTITIONER

## 2024-06-19 PROCEDURE — 6360000002 HC RX W HCPCS: Performed by: NURSE PRACTITIONER

## 2024-06-19 PROCEDURE — 71045 X-RAY EXAM CHEST 1 VIEW: CPT

## 2024-06-19 PROCEDURE — 2000000000 HC ICU R&B

## 2024-06-19 PROCEDURE — 3600000017 HC SURGERY HYBRID ADDL 15MIN: Performed by: STUDENT IN AN ORGANIZED HEALTH CARE EDUCATION/TRAINING PROGRAM

## 2024-06-19 PROCEDURE — 2500000003 HC RX 250 WO HCPCS

## 2024-06-19 PROCEDURE — 6360000002 HC RX W HCPCS

## 2024-06-19 PROCEDURE — C1760 CLOSURE DEV, VASC: HCPCS | Performed by: STUDENT IN AN ORGANIZED HEALTH CARE EDUCATION/TRAINING PROGRAM

## 2024-06-19 PROCEDURE — 85027 COMPLETE CBC AUTOMATED: CPT

## 2024-06-19 PROCEDURE — 2580000003 HC RX 258: Performed by: NURSE PRACTITIONER

## 2024-06-19 PROCEDURE — 87205 SMEAR GRAM STAIN: CPT

## 2024-06-19 PROCEDURE — 86140 C-REACTIVE PROTEIN: CPT

## 2024-06-19 PROCEDURE — 36415 COLL VENOUS BLD VENIPUNCTURE: CPT

## 2024-06-19 PROCEDURE — 75635 CT ANGIO ABDOMINAL ARTERIES: CPT

## 2024-06-19 PROCEDURE — 6370000000 HC RX 637 (ALT 250 FOR IP): Performed by: NURSE PRACTITIONER

## 2024-06-19 PROCEDURE — C1894 INTRO/SHEATH, NON-LASER: HCPCS | Performed by: STUDENT IN AN ORGANIZED HEALTH CARE EDUCATION/TRAINING PROGRAM

## 2024-06-19 RX ORDER — ATORVASTATIN CALCIUM 80 MG/1
80 TABLET, FILM COATED ORAL NIGHTLY
Status: DISCONTINUED | OUTPATIENT
Start: 2024-06-19 | End: 2024-06-19 | Stop reason: HOSPADM

## 2024-06-19 RX ORDER — HYDRALAZINE HYDROCHLORIDE 20 MG/ML
10 INJECTION INTRAMUSCULAR; INTRAVENOUS
Status: DISCONTINUED | OUTPATIENT
Start: 2024-06-19 | End: 2024-06-20

## 2024-06-19 RX ORDER — POTASSIUM CHLORIDE 7.45 MG/ML
10 INJECTION INTRAVENOUS PRN
Status: DISCONTINUED | OUTPATIENT
Start: 2024-06-19 | End: 2024-06-19 | Stop reason: SDUPTHER

## 2024-06-19 RX ORDER — SODIUM CHLORIDE 0.9 % (FLUSH) 0.9 %
5-40 SYRINGE (ML) INJECTION EVERY 12 HOURS SCHEDULED
Status: DISCONTINUED | OUTPATIENT
Start: 2024-06-19 | End: 2024-06-20

## 2024-06-19 RX ORDER — PHENYLEPHRINE HCL IN 0.9% NACL 1 MG/10 ML
SYRINGE (ML) INTRAVENOUS PRN
Status: DISCONTINUED | OUTPATIENT
Start: 2024-06-19 | End: 2024-06-19 | Stop reason: SDUPTHER

## 2024-06-19 RX ORDER — POTASSIUM CHLORIDE 20 MEQ/1
40 TABLET, EXTENDED RELEASE ORAL PRN
Status: DISCONTINUED | OUTPATIENT
Start: 2024-06-19 | End: 2024-06-20 | Stop reason: HOSPADM

## 2024-06-19 RX ORDER — SODIUM CHLORIDE 9 MG/ML
INJECTION, SOLUTION INTRAVENOUS PRN
Status: DISCONTINUED | OUTPATIENT
Start: 2024-06-19 | End: 2024-06-20 | Stop reason: HOSPADM

## 2024-06-19 RX ORDER — MORPHINE SULFATE 4 MG/ML
4 INJECTION, SOLUTION INTRAMUSCULAR; INTRAVENOUS EVERY 4 HOURS PRN
Status: CANCELLED | OUTPATIENT
Start: 2024-06-19

## 2024-06-19 RX ORDER — POLYETHYLENE GLYCOL 3350 17 G/17G
17 POWDER, FOR SOLUTION ORAL DAILY PRN
Status: DISCONTINUED | OUTPATIENT
Start: 2024-06-19 | End: 2024-06-19

## 2024-06-19 RX ORDER — HEPARIN SODIUM 1000 [USP'U]/ML
4000 INJECTION, SOLUTION INTRAVENOUS; SUBCUTANEOUS PRN
Status: DISCONTINUED | OUTPATIENT
Start: 2024-06-19 | End: 2024-06-19

## 2024-06-19 RX ORDER — ENOXAPARIN SODIUM 100 MG/ML
40 INJECTION SUBCUTANEOUS DAILY
Status: DISCONTINUED | OUTPATIENT
Start: 2024-06-19 | End: 2024-06-19

## 2024-06-19 RX ORDER — DROPERIDOL 2.5 MG/ML
0.62 INJECTION, SOLUTION INTRAMUSCULAR; INTRAVENOUS
Status: COMPLETED | OUTPATIENT
Start: 2024-06-19 | End: 2024-06-19

## 2024-06-19 RX ORDER — 0.9 % SODIUM CHLORIDE 0.9 %
100 INTRAVENOUS SOLUTION INTRAVENOUS ONCE
Status: COMPLETED | OUTPATIENT
Start: 2024-06-19 | End: 2024-06-19

## 2024-06-19 RX ORDER — INSULIN GLARGINE 100 [IU]/ML
25 INJECTION, SOLUTION SUBCUTANEOUS
Status: CANCELLED | OUTPATIENT
Start: 2024-06-19

## 2024-06-19 RX ORDER — HYDRALAZINE HYDROCHLORIDE 20 MG/ML
10 INJECTION INTRAMUSCULAR; INTRAVENOUS EVERY 6 HOURS PRN
Status: DISCONTINUED | OUTPATIENT
Start: 2024-06-19 | End: 2024-06-20

## 2024-06-19 RX ORDER — HEPARIN SODIUM 10000 [USP'U]/100ML
5-30 INJECTION, SOLUTION INTRAVENOUS CONTINUOUS
Status: DISCONTINUED | OUTPATIENT
Start: 2024-06-19 | End: 2024-06-19 | Stop reason: HOSPADM

## 2024-06-19 RX ORDER — ACETAMINOPHEN 650 MG/1
650 SUPPOSITORY RECTAL EVERY 6 HOURS PRN
Status: DISCONTINUED | OUTPATIENT
Start: 2024-06-19 | End: 2024-06-20 | Stop reason: HOSPADM

## 2024-06-19 RX ORDER — INSULIN LISPRO 100 [IU]/ML
0-8 INJECTION, SOLUTION INTRAVENOUS; SUBCUTANEOUS
Status: DISCONTINUED | OUTPATIENT
Start: 2024-06-20 | End: 2024-06-20 | Stop reason: HOSPADM

## 2024-06-19 RX ORDER — IODIXANOL 320 MG/ML
INJECTION, SOLUTION INTRAVASCULAR PRN
Status: DISCONTINUED | OUTPATIENT
Start: 2024-06-19 | End: 2024-06-19 | Stop reason: ALTCHOICE

## 2024-06-19 RX ORDER — POTASSIUM CHLORIDE 20 MEQ/1
40 TABLET, EXTENDED RELEASE ORAL PRN
Status: DISCONTINUED | OUTPATIENT
Start: 2024-06-19 | End: 2024-06-19 | Stop reason: SDUPTHER

## 2024-06-19 RX ORDER — OXYCODONE HYDROCHLORIDE 5 MG/1
10 TABLET ORAL PRN
Status: ACTIVE | OUTPATIENT
Start: 2024-06-19 | End: 2024-06-19

## 2024-06-19 RX ORDER — SODIUM CHLORIDE 0.9 % (FLUSH) 0.9 %
10 SYRINGE (ML) INJECTION PRN
Status: DISCONTINUED | OUTPATIENT
Start: 2024-06-19 | End: 2024-06-19 | Stop reason: HOSPADM

## 2024-06-19 RX ORDER — INSULIN LISPRO 100 [IU]/ML
0-4 INJECTION, SOLUTION INTRAVENOUS; SUBCUTANEOUS NIGHTLY
Status: CANCELLED | OUTPATIENT
Start: 2024-06-19

## 2024-06-19 RX ORDER — SODIUM CHLORIDE 0.9 % (FLUSH) 0.9 %
10 SYRINGE (ML) INJECTION PRN
Status: CANCELLED | OUTPATIENT
Start: 2024-06-19

## 2024-06-19 RX ORDER — INSULIN LISPRO 100 [IU]/ML
0-8 INJECTION, SOLUTION INTRAVENOUS; SUBCUTANEOUS
Status: DISCONTINUED | OUTPATIENT
Start: 2024-06-19 | End: 2024-06-19 | Stop reason: HOSPADM

## 2024-06-19 RX ORDER — METRONIDAZOLE 500 MG/100ML
500 INJECTION, SOLUTION INTRAVENOUS EVERY 8 HOURS
Status: DISCONTINUED | OUTPATIENT
Start: 2024-06-19 | End: 2024-06-19 | Stop reason: HOSPADM

## 2024-06-19 RX ORDER — ONDANSETRON 2 MG/ML
4 INJECTION INTRAMUSCULAR; INTRAVENOUS EVERY 6 HOURS PRN
Status: DISCONTINUED | OUTPATIENT
Start: 2024-06-19 | End: 2024-06-19 | Stop reason: HOSPADM

## 2024-06-19 RX ORDER — INSULIN LISPRO 100 [IU]/ML
0-4 INJECTION, SOLUTION INTRAVENOUS; SUBCUTANEOUS NIGHTLY
Status: DISCONTINUED | OUTPATIENT
Start: 2024-06-19 | End: 2024-06-19 | Stop reason: SDUPTHER

## 2024-06-19 RX ORDER — POTASSIUM CHLORIDE 20 MEQ/1
40 TABLET, EXTENDED RELEASE ORAL PRN
Status: CANCELLED | OUTPATIENT
Start: 2024-06-19

## 2024-06-19 RX ORDER — NALOXONE HYDROCHLORIDE 0.4 MG/ML
INJECTION, SOLUTION INTRAMUSCULAR; INTRAVENOUS; SUBCUTANEOUS PRN
Status: DISCONTINUED | OUTPATIENT
Start: 2024-06-19 | End: 2024-06-20 | Stop reason: HOSPADM

## 2024-06-19 RX ORDER — ONDANSETRON 4 MG/1
4 TABLET, ORALLY DISINTEGRATING ORAL EVERY 8 HOURS PRN
Status: DISCONTINUED | OUTPATIENT
Start: 2024-06-19 | End: 2024-06-20 | Stop reason: HOSPADM

## 2024-06-19 RX ORDER — MAGNESIUM SULFATE 1 G/100ML
1000 INJECTION INTRAVENOUS PRN
Status: CANCELLED | OUTPATIENT
Start: 2024-06-19

## 2024-06-19 RX ORDER — LIDOCAINE HYDROCHLORIDE 10 MG/ML
INJECTION, SOLUTION INFILTRATION; PERINEURAL
Status: DISPENSED
Start: 2024-06-19 | End: 2024-06-20

## 2024-06-19 RX ORDER — SODIUM CHLORIDE 9 MG/ML
INJECTION, SOLUTION INTRAVENOUS CONTINUOUS
Status: DISCONTINUED | OUTPATIENT
Start: 2024-06-19 | End: 2024-06-20

## 2024-06-19 RX ORDER — ONDANSETRON 4 MG/1
4 TABLET, ORALLY DISINTEGRATING ORAL EVERY 8 HOURS PRN
Status: DISCONTINUED | OUTPATIENT
Start: 2024-06-19 | End: 2024-06-19 | Stop reason: HOSPADM

## 2024-06-19 RX ORDER — INSULIN LISPRO 100 [IU]/ML
0-8 INJECTION, SOLUTION INTRAVENOUS; SUBCUTANEOUS
Status: DISCONTINUED | OUTPATIENT
Start: 2024-06-19 | End: 2024-06-19 | Stop reason: SDUPTHER

## 2024-06-19 RX ORDER — INSULIN GLARGINE 100 [IU]/ML
25 INJECTION, SOLUTION SUBCUTANEOUS
Status: DISCONTINUED | OUTPATIENT
Start: 2024-06-19 | End: 2024-06-20

## 2024-06-19 RX ORDER — SODIUM CHLORIDE 0.9 % (FLUSH) 0.9 %
10 SYRINGE (ML) INJECTION PRN
Status: DISCONTINUED | OUTPATIENT
Start: 2024-06-19 | End: 2024-06-20 | Stop reason: HOSPADM

## 2024-06-19 RX ORDER — POLYETHYLENE GLYCOL 3350 17 G/17G
17 POWDER, FOR SOLUTION ORAL DAILY PRN
Status: CANCELLED | OUTPATIENT
Start: 2024-06-19

## 2024-06-19 RX ORDER — EZETIMIBE 10 MG/1
10 TABLET ORAL DAILY
Status: DISCONTINUED | OUTPATIENT
Start: 2024-06-19 | End: 2024-06-19 | Stop reason: HOSPADM

## 2024-06-19 RX ORDER — PROCHLORPERAZINE EDISYLATE 5 MG/ML
5 INJECTION INTRAMUSCULAR; INTRAVENOUS
Status: DISCONTINUED | OUTPATIENT
Start: 2024-06-19 | End: 2024-06-20

## 2024-06-19 RX ORDER — LABETALOL HYDROCHLORIDE 5 MG/ML
10 INJECTION, SOLUTION INTRAVENOUS
Status: DISCONTINUED | OUTPATIENT
Start: 2024-06-19 | End: 2024-06-20

## 2024-06-19 RX ORDER — POTASSIUM CHLORIDE 7.45 MG/ML
10 INJECTION INTRAVENOUS PRN
Status: DISCONTINUED | OUTPATIENT
Start: 2024-06-19 | End: 2024-06-19 | Stop reason: HOSPADM

## 2024-06-19 RX ORDER — LIDOCAINE HYDROCHLORIDE 10 MG/ML
INJECTION, SOLUTION EPIDURAL; INFILTRATION; INTRACAUDAL; PERINEURAL PRN
Status: DISCONTINUED | OUTPATIENT
Start: 2024-06-19 | End: 2024-06-19 | Stop reason: SDUPTHER

## 2024-06-19 RX ORDER — SODIUM CHLORIDE 9 MG/ML
INJECTION, SOLUTION INTRAVENOUS CONTINUOUS PRN
Status: DISCONTINUED | OUTPATIENT
Start: 2024-06-19 | End: 2024-06-19 | Stop reason: SDUPTHER

## 2024-06-19 RX ORDER — DEXTROSE MONOHYDRATE 100 MG/ML
INJECTION, SOLUTION INTRAVENOUS CONTINUOUS PRN
Status: CANCELLED | OUTPATIENT
Start: 2024-06-19

## 2024-06-19 RX ORDER — GLUCAGON 1 MG/ML
1 KIT INJECTION PRN
Status: DISCONTINUED | OUTPATIENT
Start: 2024-06-19 | End: 2024-06-20 | Stop reason: HOSPADM

## 2024-06-19 RX ORDER — HEPARIN SODIUM 1000 [USP'U]/ML
4000 INJECTION, SOLUTION INTRAVENOUS; SUBCUTANEOUS ONCE
Status: COMPLETED | OUTPATIENT
Start: 2024-06-20 | End: 2024-06-20

## 2024-06-19 RX ORDER — MORPHINE SULFATE 4 MG/ML
4 INJECTION, SOLUTION INTRAMUSCULAR; INTRAVENOUS EVERY 4 HOURS PRN
Status: DISCONTINUED | OUTPATIENT
Start: 2024-06-19 | End: 2024-06-19 | Stop reason: HOSPADM

## 2024-06-19 RX ORDER — METRONIDAZOLE 500 MG/100ML
500 INJECTION, SOLUTION INTRAVENOUS EVERY 8 HOURS
Status: DISCONTINUED | OUTPATIENT
Start: 2024-06-20 | End: 2024-06-20

## 2024-06-19 RX ORDER — IBUPROFEN 400 MG/1
400 TABLET ORAL EVERY 6 HOURS PRN
Status: CANCELLED | OUTPATIENT
Start: 2024-06-19

## 2024-06-19 RX ORDER — INSULIN GLARGINE 100 [IU]/ML
25 INJECTION, SOLUTION SUBCUTANEOUS
Status: DISCONTINUED | OUTPATIENT
Start: 2024-06-19 | End: 2024-06-19 | Stop reason: HOSPADM

## 2024-06-19 RX ORDER — HEPARIN SODIUM 1000 [USP'U]/ML
4000 INJECTION, SOLUTION INTRAVENOUS; SUBCUTANEOUS ONCE
Status: DISCONTINUED | OUTPATIENT
Start: 2024-06-19 | End: 2024-06-19

## 2024-06-19 RX ORDER — IBUPROFEN 400 MG/1
400 TABLET ORAL EVERY 6 HOURS PRN
Status: DISCONTINUED | OUTPATIENT
Start: 2024-06-19 | End: 2024-06-19 | Stop reason: HOSPADM

## 2024-06-19 RX ORDER — FENTANYL CITRATE 50 UG/ML
25 INJECTION, SOLUTION INTRAMUSCULAR; INTRAVENOUS EVERY 5 MIN PRN
Status: DISCONTINUED | OUTPATIENT
Start: 2024-06-19 | End: 2024-06-20 | Stop reason: HOSPADM

## 2024-06-19 RX ORDER — INSULIN LISPRO 100 [IU]/ML
0-4 INJECTION, SOLUTION INTRAVENOUS; SUBCUTANEOUS NIGHTLY
Status: DISCONTINUED | OUTPATIENT
Start: 2024-06-19 | End: 2024-06-19 | Stop reason: HOSPADM

## 2024-06-19 RX ORDER — MORPHINE SULFATE 2 MG/ML
2 INJECTION, SOLUTION INTRAMUSCULAR; INTRAVENOUS EVERY 4 HOURS PRN
Status: DISCONTINUED | OUTPATIENT
Start: 2024-06-19 | End: 2024-06-19 | Stop reason: HOSPADM

## 2024-06-19 RX ORDER — 0.9 % SODIUM CHLORIDE 0.9 %
1000 INTRAVENOUS SOLUTION INTRAVENOUS ONCE
Status: COMPLETED | OUTPATIENT
Start: 2024-06-19 | End: 2024-06-19

## 2024-06-19 RX ORDER — ONDANSETRON 2 MG/ML
4 INJECTION INTRAMUSCULAR; INTRAVENOUS EVERY 6 HOURS PRN
Status: DISCONTINUED | OUTPATIENT
Start: 2024-06-19 | End: 2024-06-19

## 2024-06-19 RX ORDER — ASPIRIN 81 MG/1
81 TABLET ORAL DAILY
Status: DISCONTINUED | OUTPATIENT
Start: 2024-06-19 | End: 2024-06-19 | Stop reason: HOSPADM

## 2024-06-19 RX ORDER — CLOPIDOGREL BISULFATE 75 MG/1
75 TABLET ORAL DAILY
Status: CANCELLED | OUTPATIENT
Start: 2024-06-20

## 2024-06-19 RX ORDER — INSULIN LISPRO 100 [IU]/ML
0-4 INJECTION, SOLUTION INTRAVENOUS; SUBCUTANEOUS NIGHTLY
Status: DISCONTINUED | OUTPATIENT
Start: 2024-06-19 | End: 2024-06-20 | Stop reason: HOSPADM

## 2024-06-19 RX ORDER — MORPHINE SULFATE 4 MG/ML
4 INJECTION, SOLUTION INTRAMUSCULAR; INTRAVENOUS EVERY 4 HOURS PRN
Status: DISCONTINUED | OUTPATIENT
Start: 2024-06-19 | End: 2024-06-20 | Stop reason: HOSPADM

## 2024-06-19 RX ORDER — MORPHINE SULFATE 4 MG/ML
4 INJECTION, SOLUTION INTRAMUSCULAR; INTRAVENOUS ONCE
Status: COMPLETED | OUTPATIENT
Start: 2024-06-19 | End: 2024-06-19

## 2024-06-19 RX ORDER — MORPHINE SULFATE 2 MG/ML
2 INJECTION, SOLUTION INTRAMUSCULAR; INTRAVENOUS EVERY 4 HOURS PRN
Status: CANCELLED | OUTPATIENT
Start: 2024-06-19

## 2024-06-19 RX ORDER — SODIUM CHLORIDE 9 MG/ML
INJECTION, SOLUTION INTRAVENOUS PRN
Status: CANCELLED | OUTPATIENT
Start: 2024-06-19

## 2024-06-19 RX ORDER — ASPIRIN 81 MG/1
81 TABLET ORAL DAILY
Status: DISCONTINUED | OUTPATIENT
Start: 2024-06-20 | End: 2024-06-20 | Stop reason: HOSPADM

## 2024-06-19 RX ORDER — INSULIN LISPRO 100 [IU]/ML
0-8 INJECTION, SOLUTION INTRAVENOUS; SUBCUTANEOUS
Status: CANCELLED | OUTPATIENT
Start: 2024-06-19

## 2024-06-19 RX ORDER — DIPHENHYDRAMINE HYDROCHLORIDE 50 MG/ML
12.5 INJECTION INTRAMUSCULAR; INTRAVENOUS
Status: DISCONTINUED | OUTPATIENT
Start: 2024-06-19 | End: 2024-06-20 | Stop reason: HOSPADM

## 2024-06-19 RX ORDER — ACETAMINOPHEN 325 MG/1
650 TABLET ORAL EVERY 6 HOURS PRN
Status: DISCONTINUED | OUTPATIENT
Start: 2024-06-19 | End: 2024-06-20 | Stop reason: HOSPADM

## 2024-06-19 RX ORDER — SODIUM CHLORIDE 0.9 % (FLUSH) 0.9 %
5-40 SYRINGE (ML) INJECTION EVERY 12 HOURS SCHEDULED
Status: CANCELLED | OUTPATIENT
Start: 2024-06-19

## 2024-06-19 RX ORDER — HEPARIN SODIUM 10000 [USP'U]/100ML
5-30 INJECTION, SOLUTION INTRAVENOUS CONTINUOUS
Status: DISCONTINUED | OUTPATIENT
Start: 2024-06-19 | End: 2024-06-20

## 2024-06-19 RX ORDER — POLYETHYLENE GLYCOL 3350 17 G/17G
17 POWDER, FOR SOLUTION ORAL DAILY PRN
Status: DISCONTINUED | OUTPATIENT
Start: 2024-06-19 | End: 2024-06-19 | Stop reason: HOSPADM

## 2024-06-19 RX ORDER — DEXTROSE MONOHYDRATE 100 MG/ML
INJECTION, SOLUTION INTRAVENOUS CONTINUOUS PRN
Status: DISCONTINUED | OUTPATIENT
Start: 2024-06-19 | End: 2024-06-20 | Stop reason: HOSPADM

## 2024-06-19 RX ORDER — MAGNESIUM SULFATE 1 G/100ML
1000 INJECTION INTRAVENOUS PRN
Status: DISCONTINUED | OUTPATIENT
Start: 2024-06-19 | End: 2024-06-20 | Stop reason: HOSPADM

## 2024-06-19 RX ORDER — ACETAMINOPHEN 650 MG/1
650 SUPPOSITORY RECTAL EVERY 6 HOURS PRN
Status: DISCONTINUED | OUTPATIENT
Start: 2024-06-19 | End: 2024-06-19 | Stop reason: HOSPADM

## 2024-06-19 RX ORDER — ACETAMINOPHEN 325 MG/1
650 TABLET ORAL EVERY 6 HOURS PRN
Status: CANCELLED | OUTPATIENT
Start: 2024-06-19

## 2024-06-19 RX ORDER — METRONIDAZOLE 500 MG/100ML
500 INJECTION, SOLUTION INTRAVENOUS EVERY 8 HOURS
Status: CANCELLED | OUTPATIENT
Start: 2024-06-19

## 2024-06-19 RX ORDER — ONDANSETRON 2 MG/ML
4 INJECTION INTRAMUSCULAR; INTRAVENOUS EVERY 6 HOURS PRN
Status: CANCELLED | OUTPATIENT
Start: 2024-06-19

## 2024-06-19 RX ORDER — DEXAMETHASONE SODIUM PHOSPHATE 10 MG/ML
INJECTION, SOLUTION INTRAMUSCULAR; INTRAVENOUS PRN
Status: DISCONTINUED | OUTPATIENT
Start: 2024-06-19 | End: 2024-06-19 | Stop reason: SDUPTHER

## 2024-06-19 RX ORDER — ATORVASTATIN CALCIUM 80 MG/1
80 TABLET, FILM COATED ORAL NIGHTLY
Status: DISCONTINUED | OUTPATIENT
Start: 2024-06-19 | End: 2024-06-20 | Stop reason: HOSPADM

## 2024-06-19 RX ORDER — ONDANSETRON 4 MG/1
4 TABLET, ORALLY DISINTEGRATING ORAL EVERY 8 HOURS PRN
Status: DISCONTINUED | OUTPATIENT
Start: 2024-06-19 | End: 2024-06-19

## 2024-06-19 RX ORDER — POTASSIUM CHLORIDE 7.45 MG/ML
10 INJECTION INTRAVENOUS PRN
Status: DISCONTINUED | OUTPATIENT
Start: 2024-06-19 | End: 2024-06-20 | Stop reason: HOSPADM

## 2024-06-19 RX ORDER — MAGNESIUM SULFATE IN WATER 40 MG/ML
2000 INJECTION, SOLUTION INTRAVENOUS PRN
Status: DISCONTINUED | OUTPATIENT
Start: 2024-06-19 | End: 2024-06-20 | Stop reason: HOSPADM

## 2024-06-19 RX ORDER — DEXTROSE MONOHYDRATE 100 MG/ML
INJECTION, SOLUTION INTRAVENOUS CONTINUOUS PRN
Status: DISCONTINUED | OUTPATIENT
Start: 2024-06-19 | End: 2024-06-19 | Stop reason: SDUPTHER

## 2024-06-19 RX ORDER — HEPARIN SODIUM 1000 [USP'U]/ML
4000 INJECTION, SOLUTION INTRAVENOUS; SUBCUTANEOUS PRN
Status: DISCONTINUED | OUTPATIENT
Start: 2024-06-19 | End: 2024-06-20

## 2024-06-19 RX ORDER — DEXTROSE MONOHYDRATE 100 MG/ML
INJECTION, SOLUTION INTRAVENOUS CONTINUOUS PRN
Status: DISCONTINUED | OUTPATIENT
Start: 2024-06-19 | End: 2024-06-19 | Stop reason: HOSPADM

## 2024-06-19 RX ORDER — HEPARIN SODIUM 10000 [USP'U]/100ML
5-30 INJECTION, SOLUTION INTRAVENOUS CONTINUOUS
Status: DISCONTINUED | OUTPATIENT
Start: 2024-06-19 | End: 2024-06-19

## 2024-06-19 RX ORDER — IODIXANOL 320 MG/ML
INJECTION, SOLUTION INTRAVASCULAR
Status: DISPENSED
Start: 2024-06-19 | End: 2024-06-20

## 2024-06-19 RX ORDER — SODIUM CHLORIDE, SODIUM LACTATE, POTASSIUM CHLORIDE, CALCIUM CHLORIDE 600; 310; 30; 20 MG/100ML; MG/100ML; MG/100ML; MG/100ML
INJECTION, SOLUTION INTRAVENOUS CONTINUOUS PRN
Status: DISCONTINUED | OUTPATIENT
Start: 2024-06-19 | End: 2024-06-19 | Stop reason: SDUPTHER

## 2024-06-19 RX ORDER — 0.9 % SODIUM CHLORIDE 0.9 %
500 INTRAVENOUS SOLUTION INTRAVENOUS ONCE
Status: DISCONTINUED | OUTPATIENT
Start: 2024-06-19 | End: 2024-06-19

## 2024-06-19 RX ORDER — SODIUM CHLORIDE 9 MG/ML
INJECTION, SOLUTION INTRAVENOUS PRN
Status: DISCONTINUED | OUTPATIENT
Start: 2024-06-19 | End: 2024-06-19 | Stop reason: HOSPADM

## 2024-06-19 RX ORDER — INSULIN LISPRO 100 [IU]/ML
5 INJECTION, SOLUTION INTRAVENOUS; SUBCUTANEOUS ONCE
Status: COMPLETED | OUTPATIENT
Start: 2024-06-19 | End: 2024-06-19

## 2024-06-19 RX ORDER — SODIUM CHLORIDE 9 MG/ML
INJECTION, SOLUTION INTRAVENOUS CONTINUOUS
Status: CANCELLED | OUTPATIENT
Start: 2024-06-19

## 2024-06-19 RX ORDER — EZETIMIBE 10 MG/1
10 TABLET ORAL DAILY
Status: DISCONTINUED | OUTPATIENT
Start: 2024-06-20 | End: 2024-06-20 | Stop reason: HOSPADM

## 2024-06-19 RX ORDER — ATORVASTATIN CALCIUM 80 MG/1
80 TABLET, FILM COATED ORAL NIGHTLY
Status: CANCELLED | OUTPATIENT
Start: 2024-06-19

## 2024-06-19 RX ORDER — ONDANSETRON 2 MG/ML
INJECTION INTRAMUSCULAR; INTRAVENOUS PRN
Status: DISCONTINUED | OUTPATIENT
Start: 2024-06-19 | End: 2024-06-19 | Stop reason: SDUPTHER

## 2024-06-19 RX ORDER — CEFAZOLIN SODIUM 1 G/3ML
INJECTION, POWDER, FOR SOLUTION INTRAMUSCULAR; INTRAVENOUS PRN
Status: DISCONTINUED | OUTPATIENT
Start: 2024-06-19 | End: 2024-06-19 | Stop reason: SDUPTHER

## 2024-06-19 RX ORDER — ACETAMINOPHEN 325 MG/1
650 TABLET ORAL EVERY 6 HOURS PRN
Status: DISCONTINUED | OUTPATIENT
Start: 2024-06-19 | End: 2024-06-19 | Stop reason: HOSPADM

## 2024-06-19 RX ORDER — IBUPROFEN 400 MG/1
400 TABLET ORAL EVERY 6 HOURS PRN
Status: DISCONTINUED | OUTPATIENT
Start: 2024-06-19 | End: 2024-06-20 | Stop reason: HOSPADM

## 2024-06-19 RX ORDER — HEPARIN SODIUM 1000 [USP'U]/ML
80 INJECTION, SOLUTION INTRAVENOUS; SUBCUTANEOUS PRN
Status: DISCONTINUED | OUTPATIENT
Start: 2024-06-19 | End: 2024-06-19 | Stop reason: HOSPADM

## 2024-06-19 RX ORDER — CLOPIDOGREL BISULFATE 75 MG/1
75 TABLET ORAL DAILY
Status: DISCONTINUED | OUTPATIENT
Start: 2024-06-20 | End: 2024-06-19

## 2024-06-19 RX ORDER — HEPARIN SODIUM 1000 [USP'U]/ML
2000 INJECTION, SOLUTION INTRAVENOUS; SUBCUTANEOUS PRN
Status: DISCONTINUED | OUTPATIENT
Start: 2024-06-19 | End: 2024-06-19

## 2024-06-19 RX ORDER — ACETAMINOPHEN 650 MG/1
650 SUPPOSITORY RECTAL EVERY 6 HOURS PRN
Status: CANCELLED | OUTPATIENT
Start: 2024-06-19

## 2024-06-19 RX ORDER — HEPARIN SODIUM 1000 [USP'U]/ML
2000 INJECTION, SOLUTION INTRAVENOUS; SUBCUTANEOUS PRN
Status: DISCONTINUED | OUTPATIENT
Start: 2024-06-19 | End: 2024-06-20

## 2024-06-19 RX ORDER — METRONIDAZOLE 500 MG/100ML
500 INJECTION, SOLUTION INTRAVENOUS ONCE
Status: COMPLETED | OUTPATIENT
Start: 2024-06-19 | End: 2024-06-19

## 2024-06-19 RX ORDER — ASPIRIN 81 MG/1
81 TABLET ORAL DAILY
Status: CANCELLED | OUTPATIENT
Start: 2024-06-20

## 2024-06-19 RX ORDER — ONDANSETRON 2 MG/ML
4 INJECTION INTRAMUSCULAR; INTRAVENOUS EVERY 6 HOURS PRN
Status: DISCONTINUED | OUTPATIENT
Start: 2024-06-19 | End: 2024-06-20 | Stop reason: HOSPADM

## 2024-06-19 RX ORDER — MIDAZOLAM HYDROCHLORIDE 2 MG/2ML
2 INJECTION, SOLUTION INTRAMUSCULAR; INTRAVENOUS
Status: DISCONTINUED | OUTPATIENT
Start: 2024-06-19 | End: 2024-06-20 | Stop reason: HOSPADM

## 2024-06-19 RX ORDER — SODIUM CHLORIDE 9 MG/ML
INJECTION, SOLUTION INTRAVENOUS CONTINUOUS
Status: DISCONTINUED | OUTPATIENT
Start: 2024-06-19 | End: 2024-06-19 | Stop reason: HOSPADM

## 2024-06-19 RX ORDER — POLYETHYLENE GLYCOL 3350 17 G/17G
17 POWDER, FOR SOLUTION ORAL DAILY PRN
Status: DISCONTINUED | OUTPATIENT
Start: 2024-06-19 | End: 2024-06-20 | Stop reason: HOSPADM

## 2024-06-19 RX ORDER — POTASSIUM CHLORIDE 20 MEQ/1
40 TABLET, EXTENDED RELEASE ORAL PRN
Status: DISCONTINUED | OUTPATIENT
Start: 2024-06-19 | End: 2024-06-19 | Stop reason: HOSPADM

## 2024-06-19 RX ORDER — PROPOFOL 10 MG/ML
INJECTION, EMULSION INTRAVENOUS PRN
Status: DISCONTINUED | OUTPATIENT
Start: 2024-06-19 | End: 2024-06-19 | Stop reason: SDUPTHER

## 2024-06-19 RX ORDER — CLOPIDOGREL BISULFATE 75 MG/1
75 TABLET ORAL DAILY
Status: DISCONTINUED | OUTPATIENT
Start: 2024-06-19 | End: 2024-06-19 | Stop reason: HOSPADM

## 2024-06-19 RX ORDER — POTASSIUM CHLORIDE 7.45 MG/ML
10 INJECTION INTRAVENOUS PRN
Status: CANCELLED | OUTPATIENT
Start: 2024-06-19

## 2024-06-19 RX ORDER — HEPARIN SODIUM 1000 [USP'U]/ML
INJECTION, SOLUTION INTRAVENOUS; SUBCUTANEOUS PRN
Status: DISCONTINUED | OUTPATIENT
Start: 2024-06-19 | End: 2024-06-19 | Stop reason: SDUPTHER

## 2024-06-19 RX ORDER — HEPARIN SODIUM 1000 [USP'U]/ML
40 INJECTION, SOLUTION INTRAVENOUS; SUBCUTANEOUS PRN
Status: DISCONTINUED | OUTPATIENT
Start: 2024-06-19 | End: 2024-06-19 | Stop reason: HOSPADM

## 2024-06-19 RX ORDER — HEPARIN SODIUM 1000 [USP'U]/ML
80 INJECTION, SOLUTION INTRAVENOUS; SUBCUTANEOUS ONCE
Status: COMPLETED | OUTPATIENT
Start: 2024-06-19 | End: 2024-06-19

## 2024-06-19 RX ORDER — EZETIMIBE 10 MG/1
10 TABLET ORAL DAILY
Status: CANCELLED | OUTPATIENT
Start: 2024-06-20

## 2024-06-19 RX ORDER — MORPHINE SULFATE 2 MG/ML
2 INJECTION, SOLUTION INTRAMUSCULAR; INTRAVENOUS EVERY 4 HOURS PRN
Status: DISCONTINUED | OUTPATIENT
Start: 2024-06-19 | End: 2024-06-20 | Stop reason: HOSPADM

## 2024-06-19 RX ORDER — ONDANSETRON 4 MG/1
4 TABLET, ORALLY DISINTEGRATING ORAL EVERY 8 HOURS PRN
Status: CANCELLED | OUTPATIENT
Start: 2024-06-19

## 2024-06-19 RX ORDER — LABETALOL HYDROCHLORIDE 5 MG/ML
10 INJECTION, SOLUTION INTRAVENOUS EVERY 6 HOURS PRN
Status: DISCONTINUED | OUTPATIENT
Start: 2024-06-19 | End: 2024-06-20

## 2024-06-19 RX ORDER — MAGNESIUM SULFATE 1 G/100ML
1000 INJECTION INTRAVENOUS PRN
Status: DISCONTINUED | OUTPATIENT
Start: 2024-06-19 | End: 2024-06-19 | Stop reason: HOSPADM

## 2024-06-19 RX ORDER — SODIUM CHLORIDE 0.9 % (FLUSH) 0.9 %
5-40 SYRINGE (ML) INJECTION EVERY 12 HOURS SCHEDULED
Status: DISCONTINUED | OUTPATIENT
Start: 2024-06-19 | End: 2024-06-19 | Stop reason: HOSPADM

## 2024-06-19 RX ORDER — SODIUM CHLORIDE 0.9 % (FLUSH) 0.9 %
5-40 SYRINGE (ML) INJECTION PRN
Status: DISCONTINUED | OUTPATIENT
Start: 2024-06-19 | End: 2024-06-20 | Stop reason: HOSPADM

## 2024-06-19 RX ORDER — OXYCODONE HYDROCHLORIDE 5 MG/1
5 TABLET ORAL PRN
Status: ACTIVE | OUTPATIENT
Start: 2024-06-19 | End: 2024-06-19

## 2024-06-19 RX ORDER — ROCURONIUM BROMIDE 10 MG/ML
INJECTION, SOLUTION INTRAVENOUS PRN
Status: DISCONTINUED | OUTPATIENT
Start: 2024-06-19 | End: 2024-06-19 | Stop reason: SDUPTHER

## 2024-06-19 RX ORDER — FENTANYL CITRATE 50 UG/ML
INJECTION, SOLUTION INTRAMUSCULAR; INTRAVENOUS PRN
Status: DISCONTINUED | OUTPATIENT
Start: 2024-06-19 | End: 2024-06-19 | Stop reason: SDUPTHER

## 2024-06-19 RX ADMIN — CEFAZOLIN 2 G: 1 INJECTION, POWDER, FOR SOLUTION INTRAMUSCULAR; INTRAVENOUS at 20:35

## 2024-06-19 RX ADMIN — VANCOMYCIN HYDROCHLORIDE 1500 MG: 1.5 INJECTION, POWDER, LYOPHILIZED, FOR SOLUTION INTRAVENOUS at 16:28

## 2024-06-19 RX ADMIN — IOPAMIDOL 100 ML: 755 INJECTION, SOLUTION INTRAVENOUS at 13:17

## 2024-06-19 RX ADMIN — MORPHINE SULFATE 4 MG: 4 INJECTION, SOLUTION INTRAMUSCULAR; INTRAVENOUS at 18:27

## 2024-06-19 RX ADMIN — EZETIMIBE 10 MG: 10 TABLET ORAL at 08:46

## 2024-06-19 RX ADMIN — Medication 100 MCG: at 21:30

## 2024-06-19 RX ADMIN — METRONIDAZOLE 500 MG: 500 INJECTION, SOLUTION INTRAVENOUS at 17:59

## 2024-06-19 RX ADMIN — CLOPIDOGREL BISULFATE 75 MG: 75 TABLET ORAL at 08:46

## 2024-06-19 RX ADMIN — ONDANSETRON 4 MG: 2 INJECTION INTRAMUSCULAR; INTRAVENOUS at 12:18

## 2024-06-19 RX ADMIN — ONDANSETRON 4 MG: 2 INJECTION INTRAMUSCULAR; INTRAVENOUS at 21:58

## 2024-06-19 RX ADMIN — SODIUM CHLORIDE 1000 ML: 9 INJECTION, SOLUTION INTRAVENOUS at 01:58

## 2024-06-19 RX ADMIN — CEFEPIME 2000 MG: 2 INJECTION, POWDER, FOR SOLUTION INTRAVENOUS at 01:50

## 2024-06-19 RX ADMIN — ROCURONIUM BROMIDE 50 MG: 10 INJECTION, SOLUTION INTRAVENOUS at 20:29

## 2024-06-19 RX ADMIN — INSULIN LISPRO 5 UNITS: 100 INJECTION, SOLUTION INTRAVENOUS; SUBCUTANEOUS at 02:00

## 2024-06-19 RX ADMIN — Medication 100 MCG: at 21:16

## 2024-06-19 RX ADMIN — INSULIN LISPRO 2 UNITS: 100 INJECTION, SOLUTION INTRAVENOUS; SUBCUTANEOUS at 08:46

## 2024-06-19 RX ADMIN — VANCOMYCIN HYDROCHLORIDE 1750 MG: 1.25 INJECTION, POWDER, LYOPHILIZED, FOR SOLUTION INTRAVENOUS at 03:05

## 2024-06-19 RX ADMIN — INSULIN GLARGINE 25 UNITS: 100 INJECTION, SOLUTION SUBCUTANEOUS at 03:09

## 2024-06-19 RX ADMIN — Medication 200 MCG: at 21:40

## 2024-06-19 RX ADMIN — CEFEPIME 2000 MG: 2 INJECTION, POWDER, FOR SOLUTION INTRAVENOUS at 08:50

## 2024-06-19 RX ADMIN — DEXAMETHASONE SODIUM PHOSPHATE 10 MG: 10 INJECTION INTRAMUSCULAR; INTRAVENOUS at 21:58

## 2024-06-19 RX ADMIN — CEFEPIME 2000 MG: 2 INJECTION, POWDER, FOR SOLUTION INTRAVENOUS at 23:39

## 2024-06-19 RX ADMIN — LIDOCAINE HYDROCHLORIDE 50 MG: 10 INJECTION, SOLUTION EPIDURAL; INFILTRATION; INTRACAUDAL; PERINEURAL at 20:29

## 2024-06-19 RX ADMIN — SODIUM CHLORIDE, PRESERVATIVE FREE 10 ML: 5 INJECTION INTRAVENOUS at 13:17

## 2024-06-19 RX ADMIN — ROCURONIUM BROMIDE 20 MG: 10 INJECTION, SOLUTION INTRAVENOUS at 20:53

## 2024-06-19 RX ADMIN — MORPHINE SULFATE 4 MG: 4 INJECTION, SOLUTION INTRAMUSCULAR; INTRAVENOUS at 08:57

## 2024-06-19 RX ADMIN — MORPHINE SULFATE 4 MG: 4 INJECTION, SOLUTION INTRAMUSCULAR; INTRAVENOUS at 14:30

## 2024-06-19 RX ADMIN — ASPIRIN 81 MG: 81 TABLET, COATED ORAL at 08:56

## 2024-06-19 RX ADMIN — FENTANYL CITRATE 25 MCG: 50 INJECTION, SOLUTION INTRAMUSCULAR; INTRAVENOUS at 22:53

## 2024-06-19 RX ADMIN — ROCURONIUM BROMIDE 10 MG: 10 INJECTION, SOLUTION INTRAVENOUS at 21:19

## 2024-06-19 RX ADMIN — ONDANSETRON 4 MG: 2 INJECTION INTRAMUSCULAR; INTRAVENOUS at 18:08

## 2024-06-19 RX ADMIN — INSULIN LISPRO 2 UNITS: 100 INJECTION, SOLUTION INTRAVENOUS; SUBCUTANEOUS at 12:18

## 2024-06-19 RX ADMIN — ONDANSETRON 4 MG: 4 TABLET, ORALLY DISINTEGRATING ORAL at 03:16

## 2024-06-19 RX ADMIN — SUGAMMADEX 200 MG: 100 INJECTION, SOLUTION INTRAVENOUS at 22:07

## 2024-06-19 RX ADMIN — PROPOFOL 170 MG: 10 INJECTION, EMULSION INTRAVENOUS at 20:29

## 2024-06-19 RX ADMIN — METRONIDAZOLE 500 MG: 500 INJECTION, SOLUTION INTRAVENOUS at 08:51

## 2024-06-19 RX ADMIN — HEPARIN SODIUM 7000 UNITS: 1000 INJECTION INTRAVENOUS; SUBCUTANEOUS at 21:11

## 2024-06-19 RX ADMIN — ONDANSETRON 4 MG: 2 INJECTION INTRAMUSCULAR; INTRAVENOUS at 23:00

## 2024-06-19 RX ADMIN — SODIUM CHLORIDE: 9 INJECTION, SOLUTION INTRAVENOUS at 23:04

## 2024-06-19 RX ADMIN — METOPROLOL TARTRATE 12.5 MG: 25 TABLET, FILM COATED ORAL at 08:46

## 2024-06-19 RX ADMIN — HYDROMORPHONE HYDROCHLORIDE 0.5 MG: 1 INJECTION, SOLUTION INTRAMUSCULAR; INTRAVENOUS; SUBCUTANEOUS at 16:23

## 2024-06-19 RX ADMIN — DROPERIDOL 0.62 MG: 2.5 INJECTION, SOLUTION INTRAMUSCULAR; INTRAVENOUS at 23:53

## 2024-06-19 RX ADMIN — SODIUM CHLORIDE: 9 INJECTION, SOLUTION INTRAVENOUS at 22:01

## 2024-06-19 RX ADMIN — SODIUM CHLORIDE 100 ML: 9 INJECTION, SOLUTION INTRAVENOUS at 13:18

## 2024-06-19 RX ADMIN — HEPARIN SODIUM 18 UNITS/KG/HR: 10000 INJECTION, SOLUTION INTRAVENOUS at 14:05

## 2024-06-19 RX ADMIN — SODIUM CHLORIDE, PRESERVATIVE FREE 10 ML: 5 INJECTION INTRAVENOUS at 08:47

## 2024-06-19 RX ADMIN — HEPARIN SODIUM 5800 UNITS: 1000 INJECTION INTRAVENOUS; SUBCUTANEOUS at 14:05

## 2024-06-19 RX ADMIN — METRONIDAZOLE 500 MG: 500 SOLUTION INTRAVENOUS at 02:04

## 2024-06-19 RX ADMIN — MORPHINE SULFATE 4 MG: 4 INJECTION, SOLUTION INTRAMUSCULAR; INTRAVENOUS at 01:59

## 2024-06-19 RX ADMIN — HYDROMORPHONE HYDROCHLORIDE 0.5 MG: 1 INJECTION, SOLUTION INTRAMUSCULAR; INTRAVENOUS; SUBCUTANEOUS at 12:27

## 2024-06-19 RX ADMIN — SODIUM CHLORIDE: 9 INJECTION, SOLUTION INTRAVENOUS at 20:32

## 2024-06-19 RX ADMIN — FENTANYL CITRATE 100 MCG: 50 INJECTION, SOLUTION INTRAMUSCULAR; INTRAVENOUS at 20:29

## 2024-06-19 RX ADMIN — SODIUM CHLORIDE, POTASSIUM CHLORIDE, SODIUM LACTATE AND CALCIUM CHLORIDE: 600; 310; 30; 20 INJECTION, SOLUTION INTRAVENOUS at 20:26

## 2024-06-19 RX ADMIN — SODIUM CHLORIDE: 9 INJECTION, SOLUTION INTRAVENOUS at 03:04

## 2024-06-19 ASSESSMENT — PAIN DESCRIPTION - ORIENTATION
ORIENTATION: RIGHT
ORIENTATION: LEFT

## 2024-06-19 ASSESSMENT — PAIN SCALES - GENERAL
PAINLEVEL_OUTOF10: 8
PAINLEVEL_OUTOF10: 9
PAINLEVEL_OUTOF10: 10
PAINLEVEL_OUTOF10: 8
PAINLEVEL_OUTOF10: 5
PAINLEVEL_OUTOF10: 10
PAINLEVEL_OUTOF10: 7
PAINLEVEL_OUTOF10: 10
PAINLEVEL_OUTOF10: 4
PAINLEVEL_OUTOF10: 6

## 2024-06-19 ASSESSMENT — PAIN DESCRIPTION - DESCRIPTORS
DESCRIPTORS: THROBBING
DESCRIPTORS: SHARP;SHOOTING
DESCRIPTORS: THROBBING
DESCRIPTORS: THROBBING
DESCRIPTORS: ACHING
DESCRIPTORS: THROBBING;TINGLING

## 2024-06-19 ASSESSMENT — ENCOUNTER SYMPTOMS
BACK PAIN: 0
ALLERGIC/IMMUNOLOGIC NEGATIVE: 1
NAUSEA: 1
EYES NEGATIVE: 1
VOMITING: 0
EYE PAIN: 0
NAUSEA: 1
VOMITING: 0
COLOR CHANGE: 0
COLOR CHANGE: 1
SHORTNESS OF BREATH: 0
ABDOMINAL PAIN: 0
RESPIRATORY NEGATIVE: 1
RESPIRATORY NEGATIVE: 1
ALLERGIC/IMMUNOLOGIC NEGATIVE: 1
EYES NEGATIVE: 1
SHORTNESS OF BREATH: 0
COLOR CHANGE: 1

## 2024-06-19 ASSESSMENT — PAIN DESCRIPTION - LOCATION
LOCATION: FOOT
LOCATION: GROIN
LOCATION: FOOT
LOCATION: FOOT
LOCATION: LEG;FOOT
LOCATION: FOOT

## 2024-06-19 ASSESSMENT — PAIN - FUNCTIONAL ASSESSMENT: PAIN_FUNCTIONAL_ASSESSMENT: NONE - DENIES PAIN

## 2024-06-19 NOTE — ED NOTES
Report given to CHRISTI Gonzalez from Meds Surg.   Report method by phone   The following was reviewed with receiving RN:   Current vital signs:  /70   Pulse 76   Temp 98 °F (36.7 °C) (Oral)   Resp 16   Ht 1.778 m (5' 10\")   Wt 70.3 kg (155 lb)   LMP 06/05/2024 (Approximate)   SpO2 98%   BMI 22.24 kg/m²                MEWS Score: 1     Any medication or safety alerts were reviewed. Any pending diagnostics and notifications were also reviewed, as well as any safety concerns or issues, abnormal labs, abnormal imaging, and abnormal assessment findings. Questions were answered.

## 2024-06-19 NOTE — CARE COORDINATION
Potential DME:  n/a  Patient expects to discharge to: Apartment  Plan for transportation at discharge: Family    Financial    Payor: [facility] [facility] / Plan: [employer] - CHOICE PL[employer] / Product Type: *No Product type* /     Does insurance require precert for SNF: Yes    Potential assistance Purchasing Medications: No  Meds-to-Beds request:        [id number] - [place], [place] - [address] - P [phone] - F [phone]  [address] [place] [address]  Phone: [phone] Fax: [phone]    [id number] - [place], [place] - [address] - P [phone] - F [phone]  [address] [facility] [facility] [address]  Phone: [phone] Fax: [phone]    [facility] - [place], [place] - [address] - P [phone] - F [phone]  [address]  [address]  [facility] [address]  Phone: [phone] Fax: [phone]    [facility] - [place], [place] - [address] - P [phone] - F [phone]  [address]  [facility] [address]  Phone: [phone] Fax: [phone]    [id number] - [place], [place] - [address] [place] [address] -  [phone] - F [phone]  [address] [place] [address] [place] [address]  Phone: [phone] Fax: [phone]      Notes:    Factors facilitating achievement of predicted outcomes: Family support, Cooperative, and Pleasant    Barriers to discharge: n/a    Additional Case Management Notes: From home with spouse, independent and drives. DME: none. VNS: none/declined. Denies needs. MRI left foot. IV Cefepime/Vanco. [place] 13% - New PCP appt made with Dr. Gamboa 7/9 @ [facility]     The Plan for Transition of Care is related to the following treatment goals of Wound infection [T14.8XXA, L08.9]    IF APPLICABLE: The Patient and/or patient representative Jane and her family were provided with a choice of provider and agrees with the discharge plan. Freedom of choice list with basic dialogue

## 2024-06-19 NOTE — DISCHARGE SUMMARY
[facility]   IN-PATIENT SERVICE   [facility]    Discharge Summary     Patient ID: Jane Saavedra  :  1990   MRN: [id number]     ACCOUNT:  [id number]   Patient's PCP: No primary care provider on file.  Admit Date: 2024   Discharge Date: 2024     Length of Stay: 0  Code Status:  Full Code  Admitting Physician: Dyan Mills MD  Discharge Physician: Musa Hampton MD     Active Discharge Diagnoses:     Primary Problem  Wound infection      Hospital Problems  Active Hospital Problems    Diagnosis Date Noted    Wound infection [T14.8XXA, L08.9] 2024    Poorly controlled diabetes mellitus (HCC) [E11.65] 2023    Uncontrolled type 2 diabetes mellitus with hyperglycemia (HCC) [E11.65] 2023       Admission Condition:  poor     Discharged Condition: poor    Hospital Stay:     Hospital Course:  Jane Saavedra is a 34 y.o. female who was admitted for the management of   Wound infection , presented to ER with Toe Pain (Left great toe is red and swollen was seen at [facility] last Tuesday and they gave her antibiotics, antibiotics are almost completed and toe is looking worse)    34-year-old male with history of dyslipidemia, hypertension, ischemic cardiomyopathy, multivessel CAD, poorly controlled diabetes mellitus type 2, presented to the ER with chief concerns of left lower extremity pain, swelling and discoloration.  Patient reports developing a diabetic blister of the left great toe a year back, was seen at outside hospital on 2024 with concerns of wound infection draining purulent foul-smelling fluid, was discharged home on 7-day course of ciprofloxacin and 10-day course of doxycycline.  Unfortunately pain continues to worsen, denies fevers or chills.  On admission the patient was initiated on antibiotic coverage with cefepime, metronidazole, vancomycin.  Wound cultures were sent, growing rare gram-positive cocci in pairs.    Patient evaluated by podiatry,

## 2024-06-19 NOTE — CONSULTS
Consultation Note  Foot and Ankle Surgery     Subjective     Chief Complaint: Left hallux wound    HPI:  Jane Saavedra is a 34 y.o. female diabetic patient seen at [facility] for foot wound to the left lower extremity.  Patient was previously seen in s ED on 6/11/2024 for wound to left hallux.  Patient just moved from [place] and does not have a podiatrist in [place], but was seeing wound care. They have had this wound for 2-3 months. Patient's last vascular intervention was last year in which they had a bypass done. Patient has been on Cipro and doxycycline since 6/11/2024. They recently noticed increased swelling, redness, pain and came to the ED. Patient admits to nausea, but denies any other constitutional symptoms at this time. Podiatry was consulted for further wound care intervention.     PCP is No primary care provider on file.     ROS:   Review of Systems   Constitutional: Negative.  Negative for chills and fever.   HENT: Negative.     Eyes: Negative.    Respiratory: Negative.  Negative for shortness of breath.    Cardiovascular: Negative.  Negative for chest pain.   Gastrointestinal:  Positive for nausea. Negative for vomiting.   Endocrine: Negative.    Genitourinary: Negative.    Musculoskeletal:  Positive for gait problem and myalgias.   Skin:  Positive for color change and wound.   Allergic/Immunologic: Negative.    Neurological:  Positive for numbness.   Hematological: Negative.    Psychiatric/Behavioral: Negative.         Past Medical History   has a past medical history of Bipolar 1 disorder (HCC), Chest pain, Depression, Hyperlipidemia, Hypertension, Multiple vessel coronary artery disease, Neutrophilia, Psychiatric problem, and Type II or unspecified type diabetes mellitus without mention of complication, uncontrolled.    Past Surgical History   has a past surgical history that includes Carpal tunnel release (Left, 07/22/2020); Coronary artery bypass graft (N/A, 7/10/2023); and back 
Oropharynx is clear.   Eyes:      General: No scleral icterus.     Extraocular Movements: Extraocular movements intact.      Conjunctiva/sclera: Conjunctivae normal.   Cardiovascular:      Rate and Rhythm: Normal rate and regular rhythm.      Heart sounds: No murmur heard.     Comments: Unable to obtain DP/PT signals on left side. Foot is not mottled however unable to move her ankle, minimal movement. Can't move her toes. Has left first toe ulceration.  Pulmonary:      Effort: No respiratory distress.      Breath sounds: No rales.   Abdominal:      General: There is no distension.      Palpations: There is no mass.      Tenderness: There is no abdominal tenderness. There is no guarding.   Musculoskeletal:      Cervical back: No rigidity or tenderness.   Lymphadenopathy:      Cervical: No cervical adenopathy.   Skin:     Coloration: Skin is not jaundiced.      Findings: No rash.   Neurological:      General: No focal deficit present.      Mental Status: She is alert and oriented to person, place, and time.      Cranial Nerves: No cranial nerve deficit.   Psychiatric:         Mood and Affect: Mood normal.       Assessment and Plan:     34 year old female with loss of pulses, motor weakness, concern for limb ischemia    Recommend urgent transfer to [facility] for left leg arterial thrombectomy. Continue IV heparin. This will be done percutaneously but there is chance of converting to open procedure. Risks include bleeding, kidney injury from contrast, vessel damage and distal embolization. Risk of requiring fasciotomy as well. Risk of losing her leg from prolonged ischemia. Patient agreeable to proceed.    Electronically signed by BRIELLE SWEET MD on 6/19/24 at 3:12 PM [facility] [place]  Office: [phone]

## 2024-06-19 NOTE — PROGRESS NOTES
06/19/24 1353   Encounter Summary   Encounter Overview/Reason Volunteer Encounter   Service Provided For Patient   Last Encounter  06/19/24   Rituals, Rites and Sacraments   Type Yazidism Communion       
06/19/2024. Was up earlier this afternoon to do a lower extremity venous on left leg to evaluate for DVT. Patient was having intense pain to the touch. I let the RN know that it would not be a good time to do the arterial Doppler due to the patients pain level and how the test would make this much worse. Was informed later on in the evening that the patient will be transported to [facility].   
Initiated transfer to [facility] per Dr. Memo hairston.    
Pharmacy monitoring of Heparin infusion  Heparin Infusion New Order    Patient on heparin for:  DVT    Was patient on previous oral anticoagulant/dose?:  no , Confirmed with RN/Pt/Pts family/Surescripts?: yes    Factor Xa inhibitor/LMWH use within the past 72 hours? NO    Recent Labs     06/18/24  2315 06/19/24  0643   HGB 13.3 12.8    335       Heparin to be monitored using anti-Xa for duration of therapy.(aPTT should be used for patients who have received a DOAC in the past 72 hours)?      Have admin instructions been updated to reflect appropriate protocol? YES    Have appropriate labs been ordered for corresponding protocol? YES       Is the starting rate/last rate adjustment appropriate? yes    Concurrent anticoagulants: none  (Note it is not appropriate to be on most anticoagulants while on a heparin drip)    Review platelets from the past few days.  Any concerns?: No    Lucretia Gutierrez PharmD, BCPS 6/19/2024 1:28 PM    
Report called to Catie BARRAZA in the [facility] ED. All questions and concerns addressed.   
Transport at bedside. Report given and all questions and concerns addressed. Pt belongings packed up and sent with pt. IV handoff completed with transport team.   
Vancomycin Dosing by Pharmacy - Initial Note   TODAY'S DATE:  6/19/2024  Patient name, age:  Jane Saavedra, 34 y.o.    Indication: SSTI, MRSA suspected.  Additional antimicrobials:  Cefepime and flagyl.    Allergies:  Patient has no known allergies.   Actual Weight:    Wt Readings from Last 1 Encounters:   06/18/24 70.3 kg (155 lb)     Labs/Ancillary Data  Estimated Creatinine Clearance: 142 mL/min (based on SCr of 0.6 mg/dL).  Recent Labs     06/18/24  2315   CREATININE 0.6   BUN 13   WBC 8.7     Procalcitonin   Date Value Ref Range Status   08/04/2023 0.11 0.00 - 0.15 ng/mL Final     Comment:     Suspected Sepsis:  Low likelihood of sepsis  <.50 ng/mL    Increased likelihood of sepsis 0.50-2.00 ng/mL  Antibiotics encouraged    High risk of sepsis/shock   >2.00 ng/mL  Antibiotics strongly encouraged    Suspected Lower Respiratory Tract Infections:  Low likelihood of bacterial infection  <0.24 ng/mL    Increased likelihood of bacterial infection >0.24 ng/mL  Antibiotics encouraged    With successful antibiotic therapy, PCT levels should decrease  rapidly. (Half-life of 24 to 36 hours.)    Procalcitonin values from samples collected within the first  6 hours of systemic infection may still be low.  Retesting may be indicated.  Values from day 1 and day 4 can be entered into the Change in  Procalcitonin Calculator to determine the patient's  Mortality Risk Prognosis  ([email or web address])    In healthy neonates, plasma Procalcitonin (PCT) concentrations  increase gradually after birth, reaching peak values at about  24 hours of age then decrease to normal values below 0.5 ng/mL  by 48-72 hours of age.       No intake or output data in the 24 hours ending 06/19/24 0145  Temp: 98.8 F    Recent vancomycin administrations        No vancomycin IV orders with administrations found.            Orders not given:            vancomycin (VANCOCIN) 1,750 mg in sodium chloride 0.9 % 500 mL IVPB    vancomycin 
Protein 6.9 6.4 - 8.3 g/dL    Albumin 3.6 3.5 - 5.2 g/dL    Total Bilirubin <0.2 (L) 0.3 - 1.2 mg/dL    Alkaline Phosphatase 124 (H) 35 - 104 U/L    ALT 11 5 - 33 U/L    AST 7 <32 U/L   C-Reactive Protein    Collection Time: 06/18/24 11:15 PM   Result Value Ref Range    CRP 6.2 (H) 0.0 - 5.0 mg/L   Sedimentation Rate    Collection Time: 06/18/24 11:15 PM   Result Value Ref Range    Sed Rate, Automated 40 (H) 0 - 20 mm/Hr   POC Glucose Fingerstick    Collection Time: 06/19/24  3:04 AM   Result Value Ref Range    POC Glucose 318 (H) 65 - 105 mg/dL       Imaging/Diagnostics:  XR FOOT LEFT (MIN 3 VIEWS)    Result Date: 6/19/2024  Suspected soft tissue ulceration along the plantar aspect of the 1st toe with questionable soft tissue gas. No radiographic evidence of osteomyelitis.         Patient status inpatient in the [facility]    Hospital Problems             Last Modified POA    * (Principal) Wound infection 6/19/2024 Yes                 Disposition 3 days      Consultations:   PHARMACY TO DOSE VANCOMYCIN  IP CONSULT TO PODIATRY    Patient is admitted as inpatient status because of co-morbidities listed above, severity of signs and symptoms as outlined, requirement for current medical therapies and most importantly because of direct risk to patient if care not provided in a hospital setting.  Expected length of stay > 48 hours.    REVA Cortez - CNP  6/19/2024  4:08 AM     Please note that this chart was generated using voice recognition Dragon dictation software.  Although every effort was made to ensure the accuracy of this automated transcription, some errors in transcription may have occurred.    [address] [address].   Phone [phone]

## 2024-06-19 NOTE — H&P
Ischemic cardiomyopathy 6/19/2024 Yes    Multiple vessel coronary artery disease 6/19/2024 Yes    Poorly controlled diabetes mellitus (HCC) 6/19/2024 Yes       Plan:     Patient status inpatient in the Med[facility]    Diabetic foot wound: Left first toe wound with associated left foot cellulitis-? Soft tissue gas-s/p debridement   c/w Iv abx, podiatry consulted-- s/p wound debridement.     vascular surgery consulted--> requested transfer today to [facility] for left lower extremity vascular invention    and infectious disease consulted for further antibiotic management    F/u MRI of foot-pending     2. Lack of left foot pulse-transferred for vascular surgery intervention today      Now s/p LEFT LOWER LEG ANGIOGRAM,PERCUTANEOUS MECHANICAL THROMBECTOMY        Heparin gtt       Neurovascular checks to lower extremity   Pain management    Poorly controlled diabetes- improved with restarting insulin.  Continue basal insulin with sliding scale coverage ACHS    Once postop nausea /retching resolves--> and able to tolerate diet.  Restart 3 times daily AC NovoLog 6 units subcu.     History of ischemic cardiomyopathy/multivessel CAD-c/w , statin, beta-blocker    4.  Dyslipidemia-continue statin and Zetia    5. History of medical noncompliance (recently moved from [place] to [place])    6. Dvt ppx- Heparin gtt PAD as noted above     7.  Postop nausea-continue as needed Zofran.  As 1 dose of Compazine ordered as well.     Consultations:   Vascular and Podiatry      Patient is admitted as inpatient status because of co-morbidities listed above, severity of signs and symptoms as outlined, requirement for current medical therapies and most importantly because of direct risk to patient if care not provided in a hospital setting.  Expected length of stay > 48 hours.    REVA Cobian NP  6/19/2024  3:01 PM    Copy sent to Dr. Perdomo primary care provider on file.

## 2024-06-19 NOTE — PLAN OF CARE
MRI waiting for chest x-ray to verify if abandoned pacing leads are present. If so, patient is not able to have an MRI due to possibility of burning.

## 2024-06-19 NOTE — PLAN OF CARE
Please have patient or POA answer all MRI screening form questions in Epic. The patient must be changed into hospital clothes for this exam.    Any questions please call [phone].    Thank you!

## 2024-06-19 NOTE — H&P
[facility]   IN-PATIENT SERVICE   [facility]    HISTORY AND PHYSICAL EXAMINATION            Date:   6/19/2024  Patient name:  Jane Saavedra  Date of admission:  6/18/2024 10:45 PM  MRN:   [id number]  Account:  [id number]  YOB: 1990  PCP:    No primary care provider on file.  Room:   [address] [place] [address]  Code Status:    Full Code    Chief Complaint:     Chief Complaint   Patient presents with    Toe Pain     Left great toe is red and swollen was seen at [facility] last Tuesday and they gave her antibiotics, antibiotics are almost completed and toe is looking worse       History Obtained From:     patient    History of Present Illness:     Jane Saavedra is a 34 y.o.  /  female who presents with Toe Pain (Left great toe is red and swollen was seen at [facility] last Tuesday and they gave her antibiotics, antibiotics are almost completed and toe is looking worse)   and is admitted to the hospital for the management of Wound infection.     Patient's medical history significant for dyslipidemia, HTN, ischemic cardiomyopathy, multivessel CAD, NSTEMI, poorly controlled diabetes mellitus, and diabetes mellitus type 2.     According to patient, she developed a \"diabetic blister\" on her left great toe last year.  She was seen at [facility] on 6/11 for concerns that her wound had grown in size and was draining purulent, foul-smelling fluid.  She was discharged home on 7-day course of Cipro (which she has completed) and 10-day course of doxycycline (3 days remain).  Unfortunately patient reports the wound has progressively worsened despite taking the antibiotics as ordered.  She reports throbbing pain in her left great toe along with swelling and discoloration.  (See images in media tab).  Left foot x-ray shows suspected soft tissue ulceration along the plantar aspect of the first toe with questionable soft tissue gas.  No radiographic evidence of osteomyelitis.  CRP 6.2, sed rate

## 2024-06-19 NOTE — PLAN OF CARE
Problem: Discharge Planning  Goal: Discharge to home or other facility with appropriate resources  6/19/2024 1425 by Hortencia Kapoor, RN  Outcome: Progressing  Flowsheets (Taken 6/19/2024 0800)  Discharge to home or other facility with appropriate resources:   Identify barriers to discharge with patient and caregiver   Identify discharge learning needs (meds, wound care, etc)   Arrange for needed discharge resources and transportation as appropriate   Refer to discharge planning if patient needs post-hospital services based on physician order or complex needs related to functional status, cognitive ability or social support system     Problem: Pain  Goal: Verbalizes/displays adequate comfort level or baseline comfort level  6/19/2024 1425 by Hortencia Kapoor, RN  Outcome: Progressing     Problem: Safety - Adult  Goal: Free from fall injury  6/19/2024 1425 by Hortencia Kapoor, RN  Outcome: Progressing

## 2024-06-20 ENCOUNTER — APPOINTMENT (OUTPATIENT)
Dept: MRI IMAGING | Age: 34
DRG: 169 | End: 2024-06-20
Attending: HOSPITALIST
Payer: MEDICAID

## 2024-06-20 VITALS
TEMPERATURE: 98.6 F | OXYGEN SATURATION: 100 % | HEART RATE: 76 BPM | DIASTOLIC BLOOD PRESSURE: 53 MMHG | SYSTOLIC BLOOD PRESSURE: 123 MMHG | RESPIRATION RATE: 20 BRPM

## 2024-06-20 PROBLEM — I99.8 ACUTE LOWER LIMB ISCHEMIA: Status: ACTIVE | Noted: 2024-06-19

## 2024-06-20 LAB
ANION GAP SERPL CALCULATED.3IONS-SCNC: 11 MMOL/L (ref 9–16)
ANTI-XA UNFRAC HEPARIN: 0.11 IU/L
ANTI-XA UNFRAC HEPARIN: <0.1 IU/L
BASOPHILS # BLD: 0.04 K/UL (ref 0–0.2)
BASOPHILS NFR BLD: 0 % (ref 0–2)
BUN SERPL-MCNC: 10 MG/DL (ref 6–20)
CALCIUM SERPL-MCNC: 7.9 MG/DL (ref 8.6–10.4)
CHLORIDE SERPL-SCNC: 106 MMOL/L (ref 98–107)
CO2 SERPL-SCNC: 17 MMOL/L (ref 20–31)
CREAT SERPL-MCNC: 0.4 MG/DL (ref 0.5–0.9)
CRP SERPL HS-MCNC: 3.9 MG/L (ref 0–5)
ECHO BSA: 1.86 M2
EOSINOPHIL # BLD: <0.03 K/UL (ref 0–0.44)
EOSINOPHILS RELATIVE PERCENT: 0 % (ref 1–4)
ERYTHROCYTE [DISTWIDTH] IN BLOOD BY AUTOMATED COUNT: 13.8 % (ref 11.8–14.4)
ERYTHROCYTE [SEDIMENTATION RATE] IN BLOOD BY PHOTOMETRIC METHOD: 34 MM/HR (ref 0–20)
GFR, ESTIMATED: >90 ML/MIN/1.73M2
GLUCOSE BLD-MCNC: 226 MG/DL (ref 65–105)
GLUCOSE BLD-MCNC: 269 MG/DL (ref 65–105)
GLUCOSE SERPL-MCNC: 250 MG/DL (ref 74–99)
HCT VFR BLD AUTO: 41.9 % (ref 36.3–47.1)
HGB BLD-MCNC: 13 G/DL (ref 11.9–15.1)
IMM GRANULOCYTES # BLD AUTO: 0.06 K/UL (ref 0–0.3)
IMM GRANULOCYTES NFR BLD: 1 %
LYMPHOCYTES NFR BLD: 1.33 K/UL (ref 1.1–3.7)
LYMPHOCYTES RELATIVE PERCENT: 15 % (ref 24–43)
MCH RBC QN AUTO: 30 PG (ref 25.2–33.5)
MCHC RBC AUTO-ENTMCNC: 31 G/DL (ref 28.4–34.8)
MCV RBC AUTO: 96.8 FL (ref 82.6–102.9)
MICROORGANISM SPEC CULT: ABNORMAL
MICROORGANISM/AGENT SPEC: ABNORMAL
MICROORGANISM/AGENT SPEC: ABNORMAL
MONOCYTES NFR BLD: 0.14 K/UL (ref 0.1–1.2)
MONOCYTES NFR BLD: 2 % (ref 3–12)
NEUTROPHILS NFR BLD: 82 % (ref 36–65)
NEUTS SEG NFR BLD: 7.47 K/UL (ref 1.5–8.1)
NRBC BLD-RTO: 0 PER 100 WBC
PLATELET # BLD AUTO: 330 K/UL (ref 138–453)
PMV BLD AUTO: 9.6 FL (ref 8.1–13.5)
POTASSIUM SERPL-SCNC: 4.4 MMOL/L (ref 3.7–5.3)
RBC # BLD AUTO: 4.33 M/UL (ref 3.95–5.11)
SERVICE CMNT-IMP: ABNORMAL
SODIUM SERPL-SCNC: 134 MMOL/L (ref 136–145)
SPECIMEN DESCRIPTION: ABNORMAL
VANCOMYCIN SERPL-MCNC: 10.5 UG/ML (ref 5–40)
WBC OTHER # BLD: 9.1 K/UL (ref 3.5–11.3)

## 2024-06-20 PROCEDURE — 36415 COLL VENOUS BLD VENIPUNCTURE: CPT

## 2024-06-20 PROCEDURE — 80202 ASSAY OF VANCOMYCIN: CPT

## 2024-06-20 PROCEDURE — 6370000000 HC RX 637 (ALT 250 FOR IP): Performed by: STUDENT IN AN ORGANIZED HEALTH CARE EDUCATION/TRAINING PROGRAM

## 2024-06-20 PROCEDURE — 99239 HOSP IP/OBS DSCHRG MGMT >30: CPT | Performed by: INTERNAL MEDICINE

## 2024-06-20 PROCEDURE — 2580000003 HC RX 258: Performed by: STUDENT IN AN ORGANIZED HEALTH CARE EDUCATION/TRAINING PROGRAM

## 2024-06-20 PROCEDURE — 6370000000 HC RX 637 (ALT 250 FOR IP): Performed by: INTERNAL MEDICINE

## 2024-06-20 PROCEDURE — 82947 ASSAY GLUCOSE BLOOD QUANT: CPT

## 2024-06-20 PROCEDURE — 6360000002 HC RX W HCPCS

## 2024-06-20 PROCEDURE — 85520 HEPARIN ASSAY: CPT

## 2024-06-20 PROCEDURE — 73718 MRI LOWER EXTREMITY W/O DYE: CPT

## 2024-06-20 PROCEDURE — 86140 C-REACTIVE PROTEIN: CPT

## 2024-06-20 PROCEDURE — 80048 BASIC METABOLIC PNL TOTAL CA: CPT

## 2024-06-20 PROCEDURE — 85025 COMPLETE CBC W/AUTO DIFF WBC: CPT

## 2024-06-20 PROCEDURE — 99223 1ST HOSP IP/OBS HIGH 75: CPT | Performed by: INTERNAL MEDICINE

## 2024-06-20 PROCEDURE — 6360000002 HC RX W HCPCS: Performed by: INTERNAL MEDICINE

## 2024-06-20 PROCEDURE — 85652 RBC SED RATE AUTOMATED: CPT

## 2024-06-20 PROCEDURE — 6360000002 HC RX W HCPCS: Performed by: STUDENT IN AN ORGANIZED HEALTH CARE EDUCATION/TRAINING PROGRAM

## 2024-06-20 RX ORDER — INSULIN GLARGINE 100 [IU]/ML
25 INJECTION, SOLUTION SUBCUTANEOUS
Qty: 5 ADJUSTABLE DOSE PRE-FILLED PEN SYRINGE | Refills: 0 | Status: SHIPPED | OUTPATIENT
Start: 2024-06-20

## 2024-06-20 RX ORDER — ONDANSETRON 2 MG/ML
4 INJECTION INTRAMUSCULAR; INTRAVENOUS ONCE
Status: DISCONTINUED | OUTPATIENT
Start: 2024-06-20 | End: 2024-06-20

## 2024-06-20 RX ORDER — LANCETS 30 GAUGE
1 EACH MISCELLANEOUS DAILY
Qty: 100 EACH | Refills: 5 | Status: SHIPPED | OUTPATIENT
Start: 2024-06-20

## 2024-06-20 RX ORDER — SPIRONOLACTONE 25 MG/1
12.5 TABLET ORAL DAILY
Qty: 15 TABLET | Refills: 0 | Status: SHIPPED | OUTPATIENT
Start: 2024-06-20

## 2024-06-20 RX ORDER — BLOOD PRESSURE TEST KIT
1 KIT MISCELLANEOUS DAILY
Qty: 1 KIT | Refills: 0 | Status: SHIPPED | OUTPATIENT
Start: 2024-06-20

## 2024-06-20 RX ORDER — DOXYCYCLINE HYCLATE 100 MG
100 TABLET ORAL EVERY 12 HOURS SCHEDULED
Qty: 30 TABLET | Refills: 0 | Status: SHIPPED | OUTPATIENT
Start: 2024-06-20 | End: 2024-07-05

## 2024-06-20 RX ORDER — DOXYCYCLINE HYCLATE 100 MG
100 TABLET ORAL EVERY 12 HOURS SCHEDULED
Status: DISCONTINUED | OUTPATIENT
Start: 2024-06-20 | End: 2024-06-20 | Stop reason: HOSPADM

## 2024-06-20 RX ORDER — GLUCOSAMINE HCL/CHONDROITIN SU 500-400 MG
CAPSULE ORAL
Qty: 30 STRIP | Refills: 1 | Status: SHIPPED | OUTPATIENT
Start: 2024-06-20

## 2024-06-20 RX ORDER — EZETIMIBE 10 MG/1
10 TABLET ORAL DAILY
Qty: 30 TABLET | Refills: 0 | Status: SHIPPED | OUTPATIENT
Start: 2024-06-20

## 2024-06-20 RX ORDER — ATORVASTATIN CALCIUM 80 MG/1
80 TABLET, FILM COATED ORAL NIGHTLY
Qty: 30 TABLET | Refills: 0 | Status: SHIPPED | OUTPATIENT
Start: 2024-06-20

## 2024-06-20 RX ORDER — ASPIRIN 81 MG/1
81 TABLET ORAL DAILY
Qty: 30 TABLET | Refills: 0 | Status: SHIPPED | OUTPATIENT
Start: 2024-06-20

## 2024-06-20 RX ORDER — METOPROLOL SUCCINATE 50 MG/1
50 TABLET, EXTENDED RELEASE ORAL DAILY
Qty: 30 TABLET | Refills: 0 | Status: SHIPPED | OUTPATIENT
Start: 2024-06-20

## 2024-06-20 RX ORDER — BLOOD-GLUCOSE METER
1 KIT MISCELLANEOUS DAILY
Qty: 1 KIT | Refills: 0 | Status: SHIPPED | OUTPATIENT
Start: 2024-06-20

## 2024-06-20 RX ORDER — INSULIN GLARGINE 100 [IU]/ML
25 INJECTION, SOLUTION SUBCUTANEOUS EVERY MORNING
Status: DISCONTINUED | OUTPATIENT
Start: 2024-06-20 | End: 2024-06-20 | Stop reason: HOSPADM

## 2024-06-20 RX ADMIN — ONDANSETRON 4 MG: 2 INJECTION INTRAMUSCULAR; INTRAVENOUS at 09:01

## 2024-06-20 RX ADMIN — Medication 1000 MG: at 13:05

## 2024-06-20 RX ADMIN — HEPARIN SODIUM 4000 UNITS: 1000 INJECTION INTRAVENOUS; SUBCUTANEOUS at 11:35

## 2024-06-20 RX ADMIN — HEPARIN SODIUM 12 UNITS/KG/HR: 10000 INJECTION, SOLUTION INTRAVENOUS at 02:32

## 2024-06-20 RX ADMIN — INSULIN LISPRO 2 UNITS: 100 INJECTION, SOLUTION INTRAVENOUS; SUBCUTANEOUS at 08:52

## 2024-06-20 RX ADMIN — METRONIDAZOLE 500 MG: 500 INJECTION, SOLUTION INTRAVENOUS at 03:04

## 2024-06-20 RX ADMIN — MORPHINE SULFATE 4 MG: 4 INJECTION INTRAVENOUS at 00:03

## 2024-06-20 RX ADMIN — SODIUM CHLORIDE 1500 MG: 9 INJECTION, SOLUTION INTRAVENOUS at 04:43

## 2024-06-20 RX ADMIN — EZETIMIBE 10 MG: 10 TABLET ORAL at 08:51

## 2024-06-20 RX ADMIN — INSULIN LISPRO 4 UNITS: 100 INJECTION, SOLUTION INTRAVENOUS; SUBCUTANEOUS at 13:05

## 2024-06-20 RX ADMIN — APIXABAN 10 MG: 5 TABLET, FILM COATED ORAL at 14:29

## 2024-06-20 RX ADMIN — CEFEPIME 2000 MG: 2 INJECTION, POWDER, FOR SOLUTION INTRAVENOUS at 06:49

## 2024-06-20 RX ADMIN — ASPIRIN 81 MG: 81 TABLET, COATED ORAL at 08:51

## 2024-06-20 RX ADMIN — MORPHINE SULFATE 4 MG: 4 INJECTION INTRAVENOUS at 04:57

## 2024-06-20 RX ADMIN — HEPARIN SODIUM 4000 UNITS: 1000 INJECTION INTRAVENOUS; SUBCUTANEOUS at 02:30

## 2024-06-20 RX ADMIN — INSULIN GLARGINE 25 UNITS: 100 INJECTION, SOLUTION SUBCUTANEOUS at 08:52

## 2024-06-20 ASSESSMENT — PAIN SCALES - GENERAL
PAINLEVEL_OUTOF10: 8
PAINLEVEL_OUTOF10: 4
PAINLEVEL_OUTOF10: 0
PAINLEVEL_OUTOF10: 8

## 2024-06-20 ASSESSMENT — ENCOUNTER SYMPTOMS
NAUSEA: 1
COLOR CHANGE: 1
VOMITING: 0

## 2024-06-20 ASSESSMENT — PAIN DESCRIPTION - ORIENTATION
ORIENTATION: RIGHT
ORIENTATION: RIGHT

## 2024-06-20 ASSESSMENT — PAIN DESCRIPTION - LOCATION: LOCATION: GROIN

## 2024-06-20 NOTE — CONSULTS
Division of Vascular Surgery        New Consult      Physician Requesting Consult:  Dr. Jessie Miller    Reason for Consult:   Non palpable or dopplerable pulses left led, wound, dusky foot    Chief Complaint:      LLE pain    History of Present Illness:      Jane Saavedra is a 34 y.o. woman who presented with LLE pain for approximately a week and decreased gross motor to the Lt foot and ankle as well as great toe wound.  She had gone to a physician a few days ago for this pain and was discharged on abx.  She represented today to [facility] with increased pain of the LLE especially her great toe and skin color changes due to non palpable pulses the patient was transferred to [facility].  She has a history of HTN, DM, and HLD. She had a previous CABG last year and last echo in 7/2023 showed an EF of 40-45%.    Medical History:     Past Medical History:   Diagnosis Date    Bipolar 1 disorder (HCC)     Chest pain     Depression     Hyperlipidemia     tx started May 2015    Hypertension     tx started May 2015    Multiple vessel coronary artery disease 7/6/2023    Neutrophilia 8/6/2023    Psychiatric problem     depression    Type II or unspecified type diabetes mellitus without mention of complication, uncontrolled 2/21/2015    tx started May 2015       Surgical History:     Past Surgical History:   Procedure Laterality Date    BACK SURGERY Left 8/10/2023    LEFT FLANK ABSCESS INCISION AND DRAINAGE performed by Sánchez Leonard MD at [facility] OR    CARPAL TUNNEL RELEASE Left 07/22/2020    CORONARY ARTERY BYPASS GRAFT N/A 7/10/2023    CORONARY ARTERY BYPASS GRAFTING TIMES THREE USING INTERNAL MAMMARY ARTERY AND RIGHT SAPHENOUS VEIN VIA ENDOSCOPIC HARVEST WITH LEFT ATRIAL APPENDAGE CLIP PLACEMENT, ON PUMP, SATNAM, PLACEMENT OF TEMPORARY PACING WIRES, BILATERAL PECTORALIS BLOCKS  performed by Malina Gilliam MD at [facility] CVOR       Family History:     Family History   Problem Relation Age of Onset    Diabetes Mother  
    Consultation Note  Foot and Ankle Surgery     Subjective     Chief Complaint: Left hallux wound    HPI:  Jane Saavedra is a 34 y.o. female diabetic patient seen earlier today at [facility] for foot wound to the left lower extremity.  Patient was previously seen in s ED on 6/11/2024 for wound to left hallux.  Patient just moved from [place] and does not have a podiatrist in [place], but was seeing wound care. They have had this wound for 2-3 months. Patient's last vascular intervention was last year in which they had a bypass done. Patient has been on Cipro and doxycycline since 6/11/2024. They recently noticed increased swelling, redness, pain and came to the ED. Patient admits to nausea, but denies any other constitutional symptoms at this time. Podiatry was consulted for further wound care intervention.     PCP is No primary care provider on file.     ROS:   Review of Systems   Constitutional: Negative.  Negative for chills and fever.   HENT: Negative.     Eyes: Negative.    Respiratory: Negative.  Negative for shortness of breath.    Cardiovascular: Negative.  Negative for chest pain.   Gastrointestinal:  Positive for nausea. Negative for vomiting.   Endocrine: Negative.    Genitourinary: Negative.    Musculoskeletal:  Positive for gait problem and myalgias.   Skin:  Positive for color change and wound.   Allergic/Immunologic: Negative.    Neurological:  Positive for numbness.   Hematological: Negative.    Psychiatric/Behavioral: Negative.         Past Medical History   has a past medical history of Bipolar 1 disorder (HCC), Chest pain, Depression, Hyperlipidemia, Hypertension, Multiple vessel coronary artery disease, Neutrophilia, Psychiatric problem, and Type II or unspecified type diabetes mellitus without mention of complication, uncontrolled.    Past Surgical History   has a past surgical history that includes Carpal tunnel release (Left, 07/22/2020); Coronary artery bypass graft (N/A, 7/10/2023); 
Requests Site: Foot, Left     Direct Exam RARE NEUTROPHILS      RARE GRAM POSITIVE COCCI IN PAIRS     Culture PENDING    Wound Gram stain [[id number]] Collected: 06/19/24 0300    Order Status: Canceled Specimen: Foot, Left from Skin     Culture, Anaerobic and Aerobic [[id number]]  (Abnormal)  (Susceptibility) Collected: 06/11/24 1159    Order Status: Completed Specimen: Toe Updated: 06/15/24 0801     Specimen Description .TOE LEFT     Direct Exam FEW NEUTROPHILS      MANY GRAM POSITIVE COCCI IN CLUSTERS      RARE GRAM NEGATIVE RODS     Culture PROTEUS MIRABILIS MODERATE GROWTH Identification by MALDI-TOF      NORMAL SKIN ALESSANDRA      Unable to determine the presence or absence of anaerobes due to swarming proteus.    Susceptibility        Proteus mirabilis      BACTERIAL SUSCEPTIBILITY PANEL DIVINA      ampicillin <=2  Sensitive      ceFAZolin 8  Sensitive      cefTRIAXone <=0.25  Sensitive      gentamicin >=16  Resistant      levofloxacin <=0.12  Sensitive      piperacillin-tazobactam <=4  Sensitive      tobramycin 8  Intermediate      trimethoprim-sulfamethoxazole <=20  Sensitive                                     Medical Decision Making-Other:     Note:    Thank you for allowing us to participate in the care of this patient. Please call with questions.    MD Greg Wood MD Heather Helweg, APRN    ATTESTATION:    I have discussed the case, including pertinent history and exam findings with the APRN. I have evaluated the  History, physical findings and pictures of the patient and the key elements of the encounter have been performed by me. I have reviewed the laboratory data, other diagnostic studies and discussed them with the APRN. I have updated the medical record where necessary.    I agree with the assessment, plan and orders as documented by the APRN.    In addition diagnostic and decision making elements, performed by the Attending Physician, are included in the Diagnostic and

## 2024-06-20 NOTE — DISCHARGE SUMMARY
per tablet  spironolactone 25 MG tablet         Discharge Procedure Orders   Comprehensive Metabolic Panel with Bilirubin   Standing Status: Future Standing Exp. Date: 06/20/25     [facility] ([facility]) - [place] [facility] [place]   Referral Priority: Routine Referral Type: Eval and Treat   Referral Reason: Specialty Services Required   Requested Specialty: Cardiology   Number of Visits Requested: 1     Brielle Vasquez MD, Vascular Surgery, [facility]   Referral Priority: Routine Referral Type: Eval and Treat   Referral Reason: Specialty Services Required   Referred to Provider: BRIELLE SWEET Requested Specialty: Vascular Surgery   Number of Visits Requested: 1     Radha Garrett DPM, Podiatry, [place]   Referral Priority: Routine Referral Type: Eval and Treat   Referral Reason: Specialty Services Required   Referred to Provider: RADHA STYLES Requested Specialty: Podiatry   Number of Visits Requested: 1       Time Spent on discharge is  38 mins in patient examination, evaluation, counseling as well as medication reconciliation, prescriptions for required medications, discharge plan and follow up.    Electronically signed by   Gm Ocampo DO  6/20/2024  3:55 PM      Thank you Dr. Perdomo primary care provider on file. for the opportunity to be involved in this patient's care.

## 2024-06-20 NOTE — CARE COORDINATION
06/20/24 1038   Readmission Assessment   Number of Days since last admission? 1-7 days   Previous Disposition Other (comment)  (Transfer from [facility])   Who is being Interviewed Patient   What was the patient's/caregiver's perception as to why they think they needed to return back to the hospital? Other (Comment)  (Transfer from [facility])   Did you visit your Primary Care Physician after you left the hospital, before you returned this time? No   Why weren't you able to visit your PCP? Other (Comment)  (Transfer from [facility])   Did you see a specialist, such as Cardiac, Pulmonary, Orthopedic Physician, etc. after you left the hospital? Other (Comment)  (Transfer from [facility])   Who advised the patient to return to the hospital? Physician   Does the patient report anything that got in the way of taking their medications? No   In our efforts to provide the best possible care to you and others like you, can you think of anything that we could have done to help you after you left the hospital the first time, so that you might not have needed to return so soon? Other (Comment)  (Transfer from [facility])

## 2024-06-20 NOTE — PROGRESS NOTES
Progress Note  Podiatric Medicine and Surgery     Subjective     CC: Left hallux wound     - Patient seen and examined at bedside.  - No acute events overnight.  - Afebrile, vital signs stable.   - Patient says she cannot feel left foot today.  - S/p angiogram, thrombectomy, angioplasty 6/19/24.    Labs:  CRP: 6.2->5.6->3.9  ESR: 40->40->34  WBC: 8.7->8.7->9.1    HPI :  Jane Saavedra is a 34 y.o. female diabetic patient seen earlier today at [facility] for foot wound to the left lower extremity.  Patient was previously seen in s ED on 6/11/2024 for wound to left hallux.  Patient just moved from [place] and does not have a podiatrist in [place], but was seeing wound care. They have had this wound for 2-3 months. Patient's last vascular intervention was last year in which they had a bypass done. Patient has been on Cipro and doxycycline since 6/11/2024. They recently noticed increased swelling, redness, pain and came to the ED. Patient admits to nausea, but denies any other constitutional symptoms at this time. Podiatry was consulted for further wound care intervention.      PCP is No primary care provider on file.     ROS: Denies N/V/F/C/SOB/CP.  Otherwise negative except at stated in the HPI.     Medications:  Scheduled Meds:   cefTRIAXone (ROCEPHIN) IV  1,000 mg IntraVENous Q24H    insulin glargine  25 Units SubCUTAneous QAM    vancomycin (VANCOCIN) intermittent dosing (placeholder)   Other RX Placeholder    insulin lispro  0-8 Units SubCUTAneous TID     insulin lispro  0-4 Units SubCUTAneous Nightly    vancomycin  1,500 mg IntraVENous Q12H    aspirin  81 mg Oral Daily    atorvastatin  80 mg Oral Nightly    ezetimibe  10 mg Oral Daily    metoprolol tartrate  12.5 mg Oral BID       Continuous Infusions:   sodium chloride      dextrose      sodium chloride      sodium chloride      heparin (PORCINE) Infusion 12 Units/kg/hr (06/20/24 0800)       PRN Meds:sodium chloride flush, sodium chloride, magnesium 
[facility]   Pharmacy Pharmacokinetic Monitoring Service - Vancomycin    Consulting Provider: Dr. Tamie Schmidt    Indication: toe wound  Target Concentration: Goal AUC/DIVINA 400-600 mg*hr/L  Day of Therapy: 2  Additional Antimicrobials: ceftriaxone     Pertinent Laboratory Values:   Wt Readings from Last 1 Encounters:   06/19/24 73 kg (160 lb 15 oz)     Temp Readings from Last 1 Encounters:   06/20/24 98.6 °F (37 °C) (Oral)     Estimated Creatinine Clearance: 212 mL/min (A) (based on SCr of 0.4 mg/dL (L)).  Recent Labs     06/19/24  0643 06/19/24  1401 06/20/24  0453   CREATININE <0.4*  --  0.4*   BUN 10  --  10   WBC 8.7 8.7 9.1       Pertinent Cultures:  Culture Date Source Results   6/11 Toe GPC in clusters, Proteus   6/19 Blood x2 Sent   6/19 Wound GPC in pairs      Assessment:  Date/Time Current Dose Concentration Timing of Concentration (h)   6/20 1127 1500 mg IV q12h 10.5 7 h after last dose   Note: Serum concentrations collected for AUC dosing may appear elevated if collected in close proximity to the dose administered, this is not necessarily an indication of toxicity    Plan:  Renal function stable  Vancomycin level 10.5 mcg/ml, collected 7 h after last dose on a q12h regimen; true trough ~6,   Based on level assessment will adjust dose to Vancomycin 1250 mg IV q8h with anticipated trough of 12 and AUC of 470  Pharmacy will continue to monitor patient and adjust therapy as indicated    Thank you for the consult,  Lynne Reza, PharmD, 6/20/2024 1:03 PM     
Physical Therapy        Physical Therapy Cancel Note      DATE: 2024    NAME: Jane Saavedra  MRN: [id number]   : 1990      Patient not seen this date for Physical Therapy due to:    Other: pt off unit at MRI. PT will check back 24.       Electronically signed by Yazmin Vasquez PT on 2024 at 2:02 PM   
Podiatry resident ordered stat MRI. Writer messaged resident to verify timing for MRI as pt is currently recovering from OR. Per, Dr. Jessie Miller MRI can be completed during day shift.  
RN went over discharge paperwork with patient in full, all questions answered. IV removed by writer. Patient discharged with all belongings, and paperwork. Patient was transferred to outpatient pharmacy to  meds to beds via wheel chair with family member.   
diphenhydrAMINE, sodium chloride flush, sodium chloride, potassium chloride **OR** potassium alternative oral replacement **OR** potassium chloride, magnesium sulfate, polyethylene glycol, ondansetron **OR** ondansetron, heparin (porcine), heparin (porcine)    Data:     Vitals:  /70   Pulse 71   Temp 98.2 °F (36.8 °C) (Oral)   Resp 14   LMP 2024 (Approximate)   SpO2 98%   Temp (24hrs), Av °F (36.7 °C), Min:97.4 °F (36.3 °C), Max:98.4 °F (36.9 °C)    Recent Labs     24  0621 24  1100 24  1553 24  0732   POCGLU 206* 207* 167* 226*       I/O (24Hr):    Intake/Output Summary (Last 24 hours) at 2024 0809  Last data filed at 2024 0623  Gross per 24 hour   Intake 2221.84 ml   Output 1055 ml   Net 1166.84 ml       Labs:  Hematology:  Recent Labs     24  2315 24  0643 24  1401 24  0453   WBC 8.7 8.7 8.7 9.1   RBC 4.46 4.18 4.27 4.33   HGB 13.3 12.8 13.1 13.0   HCT 41.8 38.7 40.1 41.9   MCV 93.8 92.5 93.8 96.8   MCH 29.9 30.6 30.7 30.0   MCHC 31.9 33.1 32.8 31.0   RDW 15.3* 14.5 15.0* 13.8    335 365 330   MPV 7.9 7.5 7.5 9.6   SEDRATE 40* 40*  --  34*   CRP 6.2* 5.6*  --   --    INR  --   --  1.0  --      Chemistry:  Recent Labs     24  2315 24  0643 24  0453   * 136 134*   K 4.8 3.7 4.4   CL 96* 105 106   CO2 23 22 17*   GLUCOSE 658* 216* 250*   BUN 13 10 10   CREATININE 0.6 <0.4* 0.4*   ANIONGAP 13 9 11   LABGLOM >90 Can not be calculated >90   CALCIUM 8.7 8.0* 7.9*     Recent Labs     24  2315 24  0304 24  0621 24  1100 24  1553 24  0732   AST 7  --   --   --   --   --    ALT 11  --   --   --   --   --    ALKPHOS 124*  --   --   --   --   --    BILITOT <0.2*  --   --   --   --   --    POCGLU  --  318* 206* 207* 167* 226*     ABG:  Lab Results   Component Value Date/Time    PHART 7.387 07/10/2023 03:23 PM    CKX7SRR 42.9 07/10/2023 03:23 PM    PO2ART 143.6 07/10/2023 03:23 PM

## 2024-06-20 NOTE — ANESTHESIA POSTPROCEDURE EVALUATION
Department of Anesthesiology  Postprocedure Note    Patient: Jane Saavedra  MRN: [id number]  YOB: 1990  Date of evaluation: 6/20/2024    Procedure Summary       Date: 06/19/24 Room / Location: [place] [address] / [facility]    Anesthesia Start: 2026 Anesthesia Stop: 2229    Procedure: LEFT LOWER LEG ANGIOGRAM,PERCUTANEOUS MECHANICAL THROMBECTOMY (Left) Diagnosis:       Ischemia of left lower extremity      (Ischemia of left lower extremity [I99.8])    Surgeons: Emperatriz Watson MD Responsible Provider: Valente Rodríguez MD    Anesthesia Type: general ASA Status: 3            Anesthesia Type: No value filed.    Shanon Phase I: Shanon Score: 9    Shanon Phase II: Shanon Score: 8    Anesthesia Post Evaluation    Patient location during evaluation: ICU  Patient participation: complete - patient participated  Level of consciousness: awake and alert  Airway patency: patent  Nausea & Vomiting: no nausea and no vomiting  Cardiovascular status: blood pressure returned to baseline  Respiratory status: acceptable  Hydration status: euvolemic  Pain management: adequate    No notable events documented.

## 2024-06-20 NOTE — PLAN OF CARE
Problem: Chronic Conditions and Co-morbidities  Goal: Patient's chronic conditions and co-morbidity symptoms are monitored and maintained or improved  Outcome: Progressing     Problem: Safety - Adult  Goal: Free from fall injury  Outcome: Progressing     Problem: Skin/Tissue Integrity - Adult  Goal: Incisions, wounds, or drain sites healing without S/S of infection  Outcome: Progressing     Problem: Discharge Planning  Goal: Discharge to home or other facility with appropriate resources  Outcome: Progressing     Problem: Pain  Goal: Verbalizes/displays adequate comfort level or baseline comfort level  Outcome: Progressing     Problem: Skin/Tissue Integrity  Goal: Absence of new skin breakdown  Description: 1.  Monitor for areas of redness and/or skin breakdown  2.  Assess vascular access sites hourly  3.  Every 4-6 hours minimum:  Change oxygen saturation probe site  4.  Every 4-6 hours:  If on nasal continuous positive airway pressure, respiratory therapy assess nares and determine need for appliance change or resting period.  Outcome: Progressing

## 2024-06-20 NOTE — DISCHARGE INSTRUCTIONS
Podiatry:  - Please make a follow-up visit with Dr. Recio outpatient 1 week after discharge  - Please keep your lower extremity wounds dry clean and covered at all times until follow-up visit  - Weightbearing status: nonweightbearing to left lower extremity with surgical shoe and mobility assistive device per PT/OT  - Please take all prescribed medications as instructed  - Please restart all home medications as prescribed  - Please return to the hospital if any of the following local signs of infection arise: Increased/streaking redness, pain, swelling, drainage or malodor  - Please return to the hospital for any the following systemic signs of infection arise: Nausea, vomiting, fever, chills, diarrhea, shortness of breath or chest pain

## 2024-06-20 NOTE — CARE COORDINATION
Case Management Assessment  Initial Evaluation    Date/Time of Evaluation: 6/20/2024 9:39 AM  Assessment Completed by: OCHOA MACIAS RN    If patient is discharged prior to next notation, then this note serves as note for discharge by case management.    Patient Name: Jane Saavedra                   YOB: 1990  Diagnosis: Ischemia of left lower extremity [I99.8]                   Date / Time: 6/19/2024  7:25 PM    Patient Admission Status: Inpatient   Readmission Risk (Low < 19, Mod (19-27), High > 27): Readmission Risk Score: 18.1    Current PCP: No primary care provider on file.  PCP verified by CM? No (New PCP appt sceduled with Dr Gamboa 7/9/24)    Chart Reviewed: Yes      History Provided by: Patient  Patient Orientation: Alert and Oriented    Patient Cognition: Alert    Hospitalization in the last 30 days (Readmission):  No    If yes, Readmission Assessment in CM Navigator will be completed.    Advance Directives:      Code Status: Full Code   Patient's Primary Decision Maker is: Legal Next of Kin    Primary Decision Maker: JESSIKA Mcintosh - [phone]    Discharge Planning:    Patient lives with: Spouse/Significant Other Type of Home: Apartment  Primary Care Giver: Self  Patient Support Systems include: Spouse/Significant Other   Current Financial resources: None  Current community resources:    Current services prior to admission: None            Current DME:              Type of Home Care services:  None    ADLS  Prior functional level: Independent in ADLs/IADLs  Current functional level: Independent in ADLs/IADLs    PT AM-PAC:   /24  OT AM-PAC:   /24    Family can provide assistance at DC: Yes  Would you like Case Management to discuss the discharge plan with any other family members/significant others, and if so, who? No  Plans to Return to Present Housing: Yes  Other Identified Issues/Barriers to RETURNING to current housing: Medical condition  Potential Assistance needed at

## 2024-06-20 NOTE — PLAN OF CARE
Problem: Chronic Conditions and Co-morbidities  Goal: Patient's chronic conditions and co-morbidity symptoms are monitored and maintained or improved  6/20/2024 0922 by Belem Sahu RN  Outcome: Progressing  6/20/2024 0054 by Lucy Gustafson RN  Outcome: Progressing     Problem: Safety - Adult  Goal: Free from fall injury  6/20/2024 0922 by Belem Sahu RN  Outcome: Progressing  6/20/2024 0054 by Lucy Gustafson RN  Outcome: Progressing     Problem: Skin/Tissue Integrity - Adult  Goal: Incisions, wounds, or drain sites healing without S/S of infection  6/20/2024 0922 by Belem Sahu RN  Outcome: Progressing  6/20/2024 0054 by Lucy Gustafson RN  Outcome: Progressing     Problem: Discharge Planning  Goal: Discharge to home or other facility with appropriate resources  6/20/2024 0922 by Belem Sahu RN  Outcome: Progressing  6/20/2024 0054 by Lucy Gustafson RN  Outcome: Progressing     Problem: Pain  Goal: Verbalizes/displays adequate comfort level or baseline comfort level  6/20/2024 0922 by Belem Sahu RN  Outcome: Progressing  Flowsheets (Taken 6/20/2024 0800)  Verbalizes/displays adequate comfort level or baseline comfort level:   Encourage patient to monitor pain and request assistance   Assess pain using appropriate pain scale  6/20/2024 0054 by Lucy Gustafson RN  Outcome: Progressing     Problem: Skin/Tissue Integrity  Goal: Absence of new skin breakdown  Description: 1.  Monitor for areas of redness and/or skin breakdown  2.  Assess vascular access sites hourly  3.  Every 4-6 hours minimum:  Change oxygen saturation probe site  4.  Every 4-6 hours:  If on nasal continuous positive airway pressure, respiratory therapy assess nares and determine need for appliance change or resting period.  6/20/2024 0922 by Belem Sahu RN  Outcome: Progressing  6/20/2024 0054 by Lucy Gustafson RN  Outcome: Progressing

## 2024-06-20 NOTE — ANESTHESIA PRE PROCEDURE
Department of Anesthesiology  Preprocedure Note       Name:  Jane Saavedra   Age:  34 y.o.  :  1990                                          MRN:  [id number]         Date:  2024      Surgeon: Surgeon(s):  Emperatriz Watson MD    Procedure: Procedure(s):  LEFT LOWER LEG ANGIOGRAM    Medications prior to admission:   Prior to Admission medications    Medication Sig Start Date End Date Taking? Authorizing Provider   doxycycline hyclate (VIBRA-TABS) 100 MG tablet Take 1 tablet by mouth daily for 10 days 24  Micheal Dupont DO   ibuprofen (IBU) 400 MG tablet Take 1 tablet by mouth every 6 hours as needed for Pain 24  Micheal Dupont DO   glucose monitoring (FREESTYLE FREEDOM) kit 1 kit by Does not apply route daily  Patient not taking: Reported on 2023 7/15/23   Eliseo Mckeon MD   Lancets MISC 1 each by Does not apply route daily  Patient not taking: Reported on 2023 7/15/23   Eliseo Mckeon MD   blood glucose monitor strips Test 1 times a day & as needed for symptoms of irregular blood glucose. Dispense sufficient amount for indicated testing frequency plus additional to accommodate PRN testing needs.  Patient not taking: Reported on 2023 7/15/23   Eliseo Mckeon MD   aspirin 81 MG EC tablet Take 1 tablet by mouth daily 7/15/23   Blake Beltre APRN - CNP   metoprolol tartrate (LOPRESSOR) 25 MG tablet Take 0.5 tablets by mouth 2 times daily Hold for HR < 65 and/or SBP < 100 23   Blake Beltre APRN - CNP   clopidogrel (PLAVIX) 75 MG tablet Take 1 tablet by mouth daily 7/15/23   Blake Beltre APRN - CNP   Insulin Aspart (NOVOLOG SC) Inject 0-6 Units into the skin 4 times daily (before meals and nightly) Inject 0-6 Units subcutaneously 4 (four) times daily before meals and at bedtime     Cover with Insulin before meals and at HS (cover HS only if glucose greater than or equal to 200 mg/dL).    If BLOOD SUGAR=120-149, give No Insulin.

## 2024-06-20 NOTE — OP NOTE
Operative Note      Patient: Jane Saavedra  YOB: 1990  MRN: [id number]    Date of Procedure: 6/19/2024    Pre-Op Diagnosis: Acute limb ischemia of left leg with occlusion of popliteal artery    Post-Op Diagnosis: Same       Procedures:  1) Ultrasound guided access of right common femoral artery  2) Angiogram of aorta and bilateral iliac arteries with catheter in aorta  3) Left leg angiogram with catheter in left external iliac, SFA and popliteal arteries  4) Percutaneous mechanical thrombectomy of left popliteal artery ([place] 7)  5) Drug coated balloon angioplasty of left popliteal artery (4 mm x 80 mm)  6) Deployment of right common femoral artery closure device (7 [employer])    Surgeon(s):  Emperatriz Watson MD    Assistant: None    Anesthesia: General    Estimated Blood Loss (mL): 100 mL    Complications: None    Specimens: Left popliteal artery thrombus        Implants: None      Drains:   Urinary Catheter 06/19/24 Hall (Active)       Findings:  Infection Present At Time Of Surgery (PATOS) (choose all levels that have infection present):  No infection present  Other Findings: Aorta is patent.  Bilateral common and external iliac arteries are patent.  Bilateral common femoral, profunda and proximal superficial femoral arteries are patent.  Left SFA is patent and then occludes at proximal popliteal artery.  This is a short segment occlusion that reconstitutes with patent mid and distal popliteal artery.  There is a three-vessel runoff.  After thrombectomy and PTA there was patent popliteal artery.  The AT is open to the DP.  The PT runs down to the plantar vessels at the ankle.  Mid and distal foot vessels were not seen well.    Detailed Description of Procedure:   Ms. Saavedra was brought to the hybrid room placed in supine position.  She was intubated and sedated by the anesthesia team. Hall catheter was placed.  Her left leg and bilateral groins were prepped and draped

## 2024-06-20 NOTE — PLAN OF CARE
Problem: Chronic Conditions and Co-morbidities  Goal: Patient's chronic conditions and co-morbidity symptoms are monitored and maintained or improved  6/20/2024 1820 by Sveta Monterroso RN  Outcome: Completed  6/20/2024 0922 by Belem Sahu RN  Outcome: Progressing  Flowsheets (Taken 6/20/2024 0800)  Care Plan - Patient's Chronic Conditions and Co-Morbidity Symptoms are Monitored and Maintained or Improved: Monitor and assess patient's chronic conditions and comorbid symptoms for stability, deterioration, or improvement     Problem: Safety - Adult  Goal: Free from fall injury  6/20/2024 1820 by Sveta Monterroso RN  Outcome: Completed  6/20/2024 0922 by Belem Sahu RN  Outcome: Progressing     Problem: Skin/Tissue Integrity - Adult  Goal: Incisions, wounds, or drain sites healing without S/S of infection  6/20/2024 1820 by Sveta Monterroso RN  Outcome: Completed  6/20/2024 0922 by Belem Sahu RN  Outcome: Progressing  Flowsheets (Taken 6/20/2024 0800)  Incisions, Wounds, or Drain Sites Healing Without Sign and Symptoms of Infection: ADMISSION and DAILY: Assess and document risk factors for pressure ulcer development     Problem: Discharge Planning  Goal: Discharge to home or other facility with appropriate resources  6/20/2024 1820 by Sveta Monterroso RN  Outcome: Completed  6/20/2024 0922 by Belem Sahu RN  Outcome: Progressing  Flowsheets (Taken 6/20/2024 0800)  Discharge to home or other facility with appropriate resources: Identify barriers to discharge with patient and caregiver     Problem: Pain  Goal: Verbalizes/displays adequate comfort level or baseline comfort level  6/20/2024 1820 by Sveta Monterroso RN  Outcome: Completed  6/20/2024 0922 by Belem Sahu RN  Outcome: Progressing  Flowsheets (Taken 6/20/2024 0800)  Verbalizes/displays adequate comfort level or baseline comfort level:   Encourage patient to monitor pain and request assistance   Assess pain using

## 2024-06-21 ENCOUNTER — TELEPHONE (OUTPATIENT)
Dept: PODIATRY | Age: 34
End: 2024-06-21

## 2024-06-21 NOTE — TELEPHONE ENCOUNTER
Pt called to schedule a hospital follow up w/Hemal for wound care. Writer unable to find appropriate appt. Please contact pt to schedule.

## 2024-06-23 PROBLEM — T14.8XXA WOUND INFECTION: Status: ACTIVE | Noted: 2024-06-23

## 2024-06-23 PROBLEM — L08.9 WOUND INFECTION: Status: ACTIVE | Noted: 2024-06-23

## 2024-06-23 LAB
MICROORGANISM SPEC CULT: ABNORMAL
MICROORGANISM SPEC CULT: ABNORMAL
MICROORGANISM SPEC CULT: NORMAL
MICROORGANISM/AGENT SPEC: ABNORMAL
MICROORGANISM/AGENT SPEC: ABNORMAL
SERVICE CMNT-IMP: NORMAL
SPECIMEN DESCRIPTION: ABNORMAL
SPECIMEN DESCRIPTION: NORMAL

## 2024-06-24 LAB
MICROORGANISM SPEC CULT: NORMAL
SERVICE CMNT-IMP: NORMAL
SPECIMEN DESCRIPTION: NORMAL

## 2024-06-25 LAB
MICROORGANISM SPEC CULT: ABNORMAL
MICROORGANISM SPEC CULT: ABNORMAL
MICROORGANISM/AGENT SPEC: ABNORMAL
MICROORGANISM/AGENT SPEC: ABNORMAL
SPECIMEN DESCRIPTION: ABNORMAL

## 2024-06-27 LAB — INTERVENTION: NORMAL

## 2024-07-01 ENCOUNTER — OFFICE VISIT (OUTPATIENT)
Dept: PODIATRY | Age: 34
End: 2024-07-01
Payer: COMMERCIAL

## 2024-07-01 VITALS
WEIGHT: 160 LBS | DIASTOLIC BLOOD PRESSURE: 85 MMHG | HEIGHT: 70 IN | BODY MASS INDEX: 22.9 KG/M2 | HEART RATE: 94 BPM | SYSTOLIC BLOOD PRESSURE: 142 MMHG

## 2024-07-01 DIAGNOSIS — E11.628 DIABETIC INFECTION OF LEFT FOOT (HCC): ICD-10-CM

## 2024-07-01 DIAGNOSIS — M86.172: Primary | ICD-10-CM

## 2024-07-01 DIAGNOSIS — I73.9 PAD (PERIPHERAL ARTERY DISEASE) (HCC): ICD-10-CM

## 2024-07-01 DIAGNOSIS — L08.9 DIABETIC INFECTION OF LEFT FOOT (HCC): ICD-10-CM

## 2024-07-01 PROCEDURE — 99203 OFFICE O/P NEW LOW 30 MIN: CPT | Performed by: PODIATRIST

## 2024-07-01 PROCEDURE — 1111F DSCHRG MED/CURRENT MED MERGE: CPT

## 2024-07-01 PROCEDURE — 99214 OFFICE O/P EST MOD 30 MIN: CPT

## 2024-07-01 PROCEDURE — 2022F DILAT RTA XM EVC RTNOPTHY: CPT

## 2024-07-01 PROCEDURE — 3079F DIAST BP 80-89 MM HG: CPT

## 2024-07-01 PROCEDURE — G8427 DOCREV CUR MEDS BY ELIG CLIN: HCPCS

## 2024-07-01 PROCEDURE — 3046F HEMOGLOBIN A1C LEVEL >9.0%: CPT

## 2024-07-01 PROCEDURE — 1036F TOBACCO NON-USER: CPT

## 2024-07-01 PROCEDURE — 3077F SYST BP >= 140 MM HG: CPT

## 2024-07-01 PROCEDURE — G8420 CALC BMI NORM PARAMETERS: HCPCS

## 2024-07-01 NOTE — PROGRESS NOTES
Patient instructed to remove shoes and socks and instructed to sit in exam chair.  Current PCP is No primary care provider on file. and date of last visit was unknown.   Do you have a follow up visit scheduled?  No  If yes, the date is       Diabetic visit information    Blood pressure (Control is BP <140/90)  BP Readings from Last 3 Encounters:   06/20/24 (!) 123/53   06/19/24 132/78   06/11/24 (!) 122/55       BP taken with correct size cuff? - Yes   Repeated if > 140/90 Yes      Tobacco use:  Patient  reports that she has quit smoking. Her smoking use included cigarettes. She has never used smokeless tobacco.  If Smoker - Cessation materials given?- Yes       Diabetic Health Maintenance Items due  Diabetes Management   Topic Date Due    Diabetic retinal exam  Never done    Diabetic Alb to Cr ratio (uACR) test  05/14/2022    A1C test (Diabetic or Prediabetic)  11/08/2023    Lipids  07/10/2024       Diabetic retinal exam done in last year? - Yes   If No: remind patient that it is due and they should schedule an exam    Medications  Is patient taking any medications for diabetes? -   Yes  Have blood sugars been controlled?   Fasting blood sugars under 120   -   Yes   Random home sugars or today's POCT glucose is under 180 -   Yes   []  If No to the above then patient should schedule appt with PCP.     Diabetic Plan    A1C Plan  Lab Results   Component Value Date    LABA1C 10.4 08/08/2023    LABA1C 12.2 07/10/2023    LABA1C 12.0 07/04/2023      []  If A1C over 8 and last result >3 months ago - Order A1C and refer to PCP   []  If last A1C over 6 months ago - Order A1C and refer to PCP for follow up   []  If elevated blood sugars > 180 - refer to PCP for follow up    []  Blood sugar controlled - A1C under 8 and last check was < 6 months      Cholesterol Plan   No components found for: \"LDLCALC\", \"LDLCHOLESTEROL\"   []  If LDL > 100 and last result >3 months ago - order Fasting lipids and refer to PCP for follow up   []  
97.4 °F (36.3 °C)  Max: 98.4 °F (36.9 °C)    RESPIRATIONS RANGE: Resp  Avg: 15.3  Min: 9  Max: 22    PULSE RANGE: Pulse  Av.5  Min: 66  Max: 81    BLOOD PRESSURE RANGE:  Systolic (24hrs), Av , Min:101 , Max:161   ; Diastolic (24hrs), Av, Min:62, Max:88      PULSE OXIMETRY RANGE: SpO2  Av.2 %  Min: 98 %  Max: 100 %    I/O last 3 completed shifts:  In: 2221.8 [I.V.:1777.3; IV Piggyback:444.5]  Out: 1055 [Urine:1055]    CBC:  No results for input(s): \"WBC\", \"HGB\", \"HCT\", \"PLT\", \"CRP\", \"ESR\" in the last 72 hours.       BMP:  No results for input(s): \"NA\", \"K\", \"CL\", \"CO2\", \"BUN\", \"CREATININE\", \"GLUCOSE\", \"CALCIUM\" in the last 72 hours.       Coags:  No results for input(s): \"APTT\", \"INR\" in the last 72 hours.    Invalid input(s): \"PROT\"      Lab Results   Component Value Date    SEDRATE 34 (H) 2024     No results for input(s): \"CRP\" in the last 72 hours.      Lower Extremity Physical Exam:  Vascular: DP palpable to right.  DP left and PT pulses bilateral are nonpalpable.  DP and PT biphasic on Doppler bilaterally. Hair growth is absent to the level of the digits.  Mild edema to left lower distal extremity.  Left foot has pitting edema +3 due to time.     Neuro: Saph/sural/SP/DP/plantar sensation absent to light touch left and diminished right.      Musculoskeletal: Muscle strength is 1/5 with plantarflexion and 2/5 with dorsiflexion, inversion, eversion left. Muscle strength is 5/5 to all lower extremity muscle groups right. Compartments are soft and compressible.  No pain on palpation to left foot and leg.     Dermatologic:   Full  thickness ulcer #1 noted to the plantar aspect of left hallux and measures approximately 3.5cm x 2.0cm x 0.2cm. Base is necrotic. Periwound skin is necrotic.  No drainage noted with associated malodor. Local periwound erythema. Does not probe to bone. Does not sinus track, does undermine.  No fluctuance, crepitus, or induration. Gangrene present. Dusky discoloration

## 2024-07-03 NOTE — PROGRESS NOTES
Patient instructed to remove shoes and socks and instructed to sit in exam chair.  Current PCP is No primary care provider on file. and date of last visit was unknown.   Do you have a follow up visit scheduled?  No  If yes, the date is       Diabetic visit information    Blood pressure (Control is BP <140/90)  BP Readings from Last 3 Encounters:   07/01/24 (!) 142/85   06/20/24 (!) 123/53   06/19/24 132/78       BP taken with correct size cuff? - Yes   Repeated if > 140/90 Yes      Tobacco use:  Patient  reports that she has quit smoking. Her smoking use included cigarettes. She has never used smokeless tobacco.  If Smoker - Cessation materials given?- Yes       Diabetic Health Maintenance Items due  Diabetes Management   Topic Date Due    Diabetic retinal exam  Never done    Diabetic Alb to Cr ratio (uACR) test  05/14/2022    A1C test (Diabetic or Prediabetic)  11/08/2023    Lipids  07/10/2024       Diabetic retinal exam done in last year? - Yes   If No: remind patient that it is due and they should schedule an exam    Medications  Is patient taking any medications for diabetes? -   Yes  Have blood sugars been controlled?   Fasting blood sugars under 120   -   Yes   Random home sugars or today's POCT glucose is under 180 -   Yes   []  If No to the above then patient should schedule appt with PCP.     Diabetic Plan    A1C Plan  Lab Results   Component Value Date    LABA1C 10.4 08/08/2023    LABA1C 12.2 07/10/2023    LABA1C 12.0 07/04/2023      []  If A1C over 8 and last result >3 months ago - Order A1C and refer to PCP   []  If last A1C over 6 months ago - Order A1C and refer to PCP for follow up   []  If elevated blood sugars > 180 - refer to PCP for follow up    []  Blood sugar controlled - A1C under 8 and last check was < 6 months      Cholesterol Plan   No components found for: \"LDLCALC\", \"LDLCHOLESTEROL\"   []  If LDL > 100 and last result >3 months ago - order Fasting lipids and refer to PCP for follow up   []

## 2024-07-05 NOTE — PROGRESS NOTES
Notified dr amaral regarding eliquis instructions and no follow up with cardiology,does not need to stop eliquis,no cardiac clearance needed per dr amaral.pt has hx of cabg,last appt cardiology 12/23.note in epic

## 2024-07-05 NOTE — PROGRESS NOTES
NOTIFIED DR STYLES NO MEDS TAKEN SINCE 6/20/24 WHILE IN HOSPITAL.PT STATES NO INSURANCE UNTIL 7/1/24

## 2024-07-08 ENCOUNTER — SOCIAL WORK (OUTPATIENT)
Dept: EMERGENCY DEPT | Age: 34
End: 2024-07-08

## 2024-07-08 ENCOUNTER — ANESTHESIA (OUTPATIENT)
Dept: OPERATING ROOM | Age: 34
End: 2024-07-08
Payer: COMMERCIAL

## 2024-07-08 ENCOUNTER — HOSPITAL ENCOUNTER (OUTPATIENT)
Age: 34
Setting detail: OUTPATIENT SURGERY
Discharge: HOME | End: 2024-07-08
Attending: PODIATRIST | Admitting: PODIATRIST
Payer: COMMERCIAL

## 2024-07-08 ENCOUNTER — ANESTHESIA EVENT (OUTPATIENT)
Dept: OPERATING ROOM | Age: 34
End: 2024-07-08
Payer: COMMERCIAL

## 2024-07-08 VITALS
RESPIRATION RATE: 16 BRPM | OXYGEN SATURATION: 100 % | TEMPERATURE: 96.8 F | BODY MASS INDEX: 22.19 KG/M2 | WEIGHT: 155 LBS | SYSTOLIC BLOOD PRESSURE: 114 MMHG | HEIGHT: 70 IN | DIASTOLIC BLOOD PRESSURE: 66 MMHG | HEART RATE: 67 BPM

## 2024-07-08 DIAGNOSIS — M86.10 ACUTE OSTEOMYELITIS (HCC): ICD-10-CM

## 2024-07-08 DIAGNOSIS — G89.18 POST-OP PAIN: Primary | ICD-10-CM

## 2024-07-08 DIAGNOSIS — I96 GANGRENE (HCC): ICD-10-CM

## 2024-07-08 PROBLEM — E11.59 TYPE 2 DIABETES MELLITUS WITH VASCULAR DISEASE (HCC): Status: ACTIVE | Noted: 2024-07-08

## 2024-07-08 LAB
BUN BLD-MCNC: 16 MG/DL (ref 8–26)
CHLORIDE BLD-SCNC: 103 MMOL/L (ref 98–107)
CO2 BLD CALC-SCNC: 29 MMOL/L (ref 22–30)
EGFR, POC: >90 ML/MIN/1.73M2
GLUCOSE BLD-MCNC: 280 MG/DL (ref 65–105)
GLUCOSE BLD-MCNC: 373 MG/DL (ref 74–100)
HCG SERPL QL: NEGATIVE
POC ANION GAP: 5 MMOL/L (ref 7–16)
POC CREATININE: 0.4 MG/DL (ref 0.51–1.19)
POTASSIUM BLD-SCNC: 5.2 MMOL/L (ref 3.5–4.5)
SODIUM BLD-SCNC: 136 MMOL/L (ref 138–146)

## 2024-07-08 PROCEDURE — 93005 ELECTROCARDIOGRAM TRACING: CPT | Performed by: ANESTHESIOLOGY

## 2024-07-08 PROCEDURE — 2500000003 HC RX 250 WO HCPCS: Performed by: PODIATRIST

## 2024-07-08 PROCEDURE — 2709999900 HC NON-CHARGEABLE SUPPLY: Performed by: PODIATRIST

## 2024-07-08 PROCEDURE — 2580000003 HC RX 258: Performed by: PODIATRIST

## 2024-07-08 PROCEDURE — 7100000010 HC PHASE II RECOVERY - FIRST 15 MIN: Performed by: PODIATRIST

## 2024-07-08 PROCEDURE — 84520 ASSAY OF UREA NITROGEN: CPT

## 2024-07-08 PROCEDURE — 80051 ELECTROLYTE PANEL: CPT

## 2024-07-08 PROCEDURE — 88305 TISSUE EXAM BY PATHOLOGIST: CPT

## 2024-07-08 PROCEDURE — 3700000000 HC ANESTHESIA ATTENDED CARE: Performed by: PODIATRIST

## 2024-07-08 PROCEDURE — 84703 CHORIONIC GONADOTROPIN ASSAY: CPT

## 2024-07-08 PROCEDURE — 2500000003 HC RX 250 WO HCPCS

## 2024-07-08 PROCEDURE — 3600000004 HC SURGERY LEVEL 4 BASE: Performed by: PODIATRIST

## 2024-07-08 PROCEDURE — 3700000001 HC ADD 15 MINUTES (ANESTHESIA): Performed by: PODIATRIST

## 2024-07-08 PROCEDURE — 14350 FILLETED FINGER/TOE FLAP: CPT | Performed by: PODIATRIST

## 2024-07-08 PROCEDURE — 3600000014 HC SURGERY LEVEL 4 ADDTL 15MIN: Performed by: PODIATRIST

## 2024-07-08 PROCEDURE — 6360000002 HC RX W HCPCS: Performed by: PODIATRIST

## 2024-07-08 PROCEDURE — 6360000002 HC RX W HCPCS: Performed by: ANESTHESIOLOGY

## 2024-07-08 PROCEDURE — 7100000011 HC PHASE II RECOVERY - ADDTL 15 MIN: Performed by: PODIATRIST

## 2024-07-08 PROCEDURE — 82565 ASSAY OF CREATININE: CPT

## 2024-07-08 PROCEDURE — 2580000003 HC RX 258: Performed by: ANESTHESIOLOGY

## 2024-07-08 PROCEDURE — 28810 AMPUTATION TOE & METATARSAL: CPT | Performed by: PODIATRIST

## 2024-07-08 PROCEDURE — 82947 ASSAY GLUCOSE BLOOD QUANT: CPT

## 2024-07-08 PROCEDURE — 88311 DECALCIFY TISSUE: CPT

## 2024-07-08 PROCEDURE — 6360000002 HC RX W HCPCS

## 2024-07-08 DEVICE — DRESSING WND MICRONIZED PARTIC 500 MG MATRISTEM MICROMATRIX: Type: IMPLANTABLE DEVICE | Site: FOOT | Status: FUNCTIONAL

## 2024-07-08 RX ORDER — SODIUM CHLORIDE 0.9 % (FLUSH) 0.9 %
5-40 SYRINGE (ML) INJECTION EVERY 12 HOURS SCHEDULED
Status: DISCONTINUED | OUTPATIENT
Start: 2024-07-08 | End: 2024-07-08 | Stop reason: HOSPADM

## 2024-07-08 RX ORDER — HYDROCODONE BITARTRATE AND ACETAMINOPHEN 5; 325 MG/1; MG/1
1 TABLET ORAL EVERY 6 HOURS PRN
Qty: 28 TABLET | Refills: 0 | Status: SHIPPED | OUTPATIENT
Start: 2024-07-08 | End: 2024-07-15

## 2024-07-08 RX ORDER — BUPIVACAINE HYDROCHLORIDE 5 MG/ML
INJECTION, SOLUTION EPIDURAL; INTRACAUDAL PRN
Status: DISCONTINUED | OUTPATIENT
Start: 2024-07-08 | End: 2024-07-08 | Stop reason: ALTCHOICE

## 2024-07-08 RX ORDER — FENTANYL CITRATE 50 UG/ML
INJECTION, SOLUTION INTRAMUSCULAR; INTRAVENOUS PRN
Status: DISCONTINUED | OUTPATIENT
Start: 2024-07-08 | End: 2024-07-08 | Stop reason: SDUPTHER

## 2024-07-08 RX ORDER — MIDAZOLAM HYDROCHLORIDE 1 MG/ML
INJECTION INTRAMUSCULAR; INTRAVENOUS PRN
Status: DISCONTINUED | OUTPATIENT
Start: 2024-07-08 | End: 2024-07-08 | Stop reason: SDUPTHER

## 2024-07-08 RX ORDER — SODIUM CHLORIDE 0.9 % (FLUSH) 0.9 %
5-40 SYRINGE (ML) INJECTION PRN
Status: DISCONTINUED | OUTPATIENT
Start: 2024-07-08 | End: 2024-07-08 | Stop reason: HOSPADM

## 2024-07-08 RX ORDER — METOCLOPRAMIDE HYDROCHLORIDE 5 MG/ML
10 INJECTION INTRAMUSCULAR; INTRAVENOUS
Status: COMPLETED | OUTPATIENT
Start: 2024-07-08 | End: 2024-07-08

## 2024-07-08 RX ORDER — SODIUM CHLORIDE, SODIUM LACTATE, POTASSIUM CHLORIDE, CALCIUM CHLORIDE 600; 310; 30; 20 MG/100ML; MG/100ML; MG/100ML; MG/100ML
INJECTION, SOLUTION INTRAVENOUS CONTINUOUS
Status: DISCONTINUED | OUTPATIENT
Start: 2024-07-08 | End: 2024-07-08 | Stop reason: HOSPADM

## 2024-07-08 RX ORDER — MAGNESIUM HYDROXIDE 1200 MG/15ML
LIQUID ORAL CONTINUOUS PRN
Status: COMPLETED | OUTPATIENT
Start: 2024-07-08 | End: 2024-07-08

## 2024-07-08 RX ORDER — ONDANSETRON 2 MG/ML
INJECTION INTRAMUSCULAR; INTRAVENOUS PRN
Status: DISCONTINUED | OUTPATIENT
Start: 2024-07-08 | End: 2024-07-08 | Stop reason: SDUPTHER

## 2024-07-08 RX ORDER — PROPOFOL 10 MG/ML
INJECTION, EMULSION INTRAVENOUS CONTINUOUS PRN
Status: DISCONTINUED | OUTPATIENT
Start: 2024-07-08 | End: 2024-07-08 | Stop reason: SDUPTHER

## 2024-07-08 RX ORDER — HYDRALAZINE HYDROCHLORIDE 20 MG/ML
10 INJECTION INTRAMUSCULAR; INTRAVENOUS
Status: DISCONTINUED | OUTPATIENT
Start: 2024-07-08 | End: 2024-07-08 | Stop reason: HOSPADM

## 2024-07-08 RX ORDER — DIPHENHYDRAMINE HYDROCHLORIDE 50 MG/ML
12.5 INJECTION INTRAMUSCULAR; INTRAVENOUS
Status: DISCONTINUED | OUTPATIENT
Start: 2024-07-08 | End: 2024-07-08 | Stop reason: HOSPADM

## 2024-07-08 RX ORDER — DROPERIDOL 2.5 MG/ML
0.62 INJECTION, SOLUTION INTRAMUSCULAR; INTRAVENOUS
Status: DISCONTINUED | OUTPATIENT
Start: 2024-07-08 | End: 2024-07-08 | Stop reason: HOSPADM

## 2024-07-08 RX ORDER — SODIUM CHLORIDE 9 MG/ML
INJECTION, SOLUTION INTRAVENOUS PRN
Status: DISCONTINUED | OUTPATIENT
Start: 2024-07-08 | End: 2024-07-08 | Stop reason: HOSPADM

## 2024-07-08 RX ORDER — LIDOCAINE HYDROCHLORIDE 10 MG/ML
INJECTION, SOLUTION EPIDURAL; INFILTRATION; INTRACAUDAL; PERINEURAL PRN
Status: DISCONTINUED | OUTPATIENT
Start: 2024-07-08 | End: 2024-07-08 | Stop reason: ALTCHOICE

## 2024-07-08 RX ORDER — NALOXONE HYDROCHLORIDE 0.4 MG/ML
INJECTION, SOLUTION INTRAMUSCULAR; INTRAVENOUS; SUBCUTANEOUS PRN
Status: DISCONTINUED | OUTPATIENT
Start: 2024-07-08 | End: 2024-07-08 | Stop reason: HOSPADM

## 2024-07-08 RX ORDER — MEPERIDINE HYDROCHLORIDE 50 MG/ML
12.5 INJECTION INTRAMUSCULAR; INTRAVENOUS; SUBCUTANEOUS EVERY 5 MIN PRN
Status: DISCONTINUED | OUTPATIENT
Start: 2024-07-08 | End: 2024-07-08 | Stop reason: HOSPADM

## 2024-07-08 RX ORDER — KETAMINE HCL IN NACL, ISO-OSM 100MG/10ML
SYRINGE (ML) INJECTION PRN
Status: DISCONTINUED | OUTPATIENT
Start: 2024-07-08 | End: 2024-07-08 | Stop reason: SDUPTHER

## 2024-07-08 RX ADMIN — MIDAZOLAM 2 MG: 1 INJECTION INTRAMUSCULAR; INTRAVENOUS at 14:39

## 2024-07-08 RX ADMIN — METOCLOPRAMIDE 10 MG: 5 INJECTION, SOLUTION INTRAMUSCULAR; INTRAVENOUS at 15:32

## 2024-07-08 RX ADMIN — Medication 2 G: at 14:42

## 2024-07-08 RX ADMIN — ONDANSETRON 4 MG: 2 INJECTION INTRAMUSCULAR; INTRAVENOUS at 14:47

## 2024-07-08 RX ADMIN — Medication 30 MG: at 14:41

## 2024-07-08 RX ADMIN — FENTANYL CITRATE 25 MCG: 50 INJECTION, SOLUTION INTRAMUSCULAR; INTRAVENOUS at 15:05

## 2024-07-08 RX ADMIN — FENTANYL CITRATE 25 MCG: 50 INJECTION, SOLUTION INTRAMUSCULAR; INTRAVENOUS at 14:45

## 2024-07-08 RX ADMIN — SODIUM CHLORIDE, POTASSIUM CHLORIDE, SODIUM LACTATE AND CALCIUM CHLORIDE: 600; 310; 30; 20 INJECTION, SOLUTION INTRAVENOUS at 13:10

## 2024-07-08 RX ADMIN — PROPOFOL 150 MCG/KG/MIN: 10 INJECTION, EMULSION INTRAVENOUS at 14:40

## 2024-07-08 RX ADMIN — PROPOFOL 30 MG: 10 INJECTION, EMULSION INTRAVENOUS at 14:45

## 2024-07-08 ASSESSMENT — PAIN SCALES - GENERAL: PAINLEVEL_OUTOF10: 10

## 2024-07-08 ASSESSMENT — PAIN - FUNCTIONAL ASSESSMENT
PAIN_FUNCTIONAL_ASSESSMENT: 0-10
PAIN_FUNCTIONAL_ASSESSMENT: FACE, LEGS, ACTIVITY, CRY, AND CONSOLABILITY (FLACC)

## 2024-07-08 ASSESSMENT — PAIN DESCRIPTION - DESCRIPTORS: DESCRIPTORS: ACHING;DISCOMFORT

## 2024-07-08 NOTE — ANESTHESIA PRE PROCEDURE
Vascular:   + PVD, aortic or cerebral.       Other Findings:         Anesthesia Plan      general     ASA 3       Induction: intravenous.    MIPS: Postoperative opioids intended and Prophylactic antiemetics administered.  Anesthetic plan and risks discussed with patient.      Plan discussed with CRNA.                Yoan Love MD   7/8/2024

## 2024-07-08 NOTE — DISCHARGE INSTRUCTIONS
Foot and Ankle Surgery Post Operative Instructions:  You have had a surgical procedure on your foot/ ankle      Fluids and Diet:  Begin with clear liquids, broth, dry toast, and crackers.  If not nauseated then resume your regular pre-operative diet when you are ready  With your history of diabetes, please lower your intake sugar content food as this will negatively impact surgery.    Medications:  Take your prescriptions as directed  You are receiving new prescriptions  You received nerve block to the foot-this should manage your pain for the first 18hrs post operatively  If your pain is not severe then you may take the non-prescription medication that you normally take for aches and pains ie Tylenol and Ibuprofen (alternating), or if severe pain occurs these will serve as additional medication   You may resume your regularly scheduled medications (unless otherwise directed)  If you have a fracture or a surgery that involves placing hardware (screws, plates, joint replacement), avoid the routine use of NSAIDs for about 6 months if possible (due to a risk of delayed bone healing).  If any side effects or adverse reactions occur, discontinue the medication and contact your doctor.  Review the patient drug information that is provided before you take any medication    Ambulation and Activity:  You are advised to go directly home from the hospital  Use assistance device with either crutches, walker, or knee scooter  We recommend knee scooter if possible, you can also obtain these on [employer] for rather affordable and quickly obtainable.  You may put weight on the operated foot but to the heel only.  You should wear the surgical shoe at all times when awake. Do not excessively walk on the surgical extremity/ foot.  Avoid stairs.  Do not lift or move heavy objects  Do not drive until cleared by your physician    Bandage and Wound Care Instructions:  Keep bandage clean and dry  Do NOT remove dressing/ splint  DO NOT get

## 2024-07-08 NOTE — H&P
History and Physical    Pt Name: Jane Saavedra  MRN: [id number]  YOB: 1990  Date of evaluation: 7/8/2024    SUBJECTIVE:   History of Chief Complaint:    Patient presents preprocedure for left hallux amputation.  She has a diabetic foot ulcer, says that she has had this for the past year.  She says that she had vascular procedure on the LLE for PAD as well to see if that would help the ulcer heal.  She says that the ulcer has worsened over time, has been scheduled today for HALLUX AMPUTATION, left.   Past Medical History    has a past medical history of Acute osteomyelitis ([facility]), Bipolar 1 disorder ([facility]), Cellulitis, Chronic diabetic ulcer of left foot determined by examination ([facility]), Depression, Diabetic foot infection ([facility]), H. pylori infection, History of recent hospitalization, Hyperlipidemia, Hypertension, Ischemic cardiomyopathy, Multiple vessel coronary artery disease, Noncompliance, NSTEMI (non-ST elevated myocardial infarction) ([facility]), Osteomyelitis of foot, left, acute ([facility]), PAD (peripheral artery disease) ([facility]), Renal abscess, left, STEMI (ST elevation myocardial infarction) ([facility]), Thrombus, Type II or unspecified type diabetes mellitus without mention of complication, uncontrolled, Under care of team, Under care of team, Under care of team, Under care of team, Under care of team, Under care of team, and Wellness examination.  Past Surgical History   has a past surgical history that includes Carpal tunnel release (Left, 07/22/2020); Coronary artery bypass graft (N/A, 07/10/2023); back surgery (Left, 08/10/2023); vascular surgery ([facility], 06/19/2024); left atrial appendage ligation (07/10/2023); IR ABSCESS DRAINAGE (11/26/2023); Laminectomy and microdiscectomy lumbar spine (03/28/2024); Coronary angioplasty with stent (01/06/2023); and Cardiac catheterization (07/05/2023).  Medications  Prior to Admission medications    Medication Sig Start Date End Date Taking? Authorizing Provider   blood

## 2024-07-08 NOTE — PROGRESS NOTES
ED JUAN ALBERTO received a call from the Recovery RN who states patient has not been able to get her diabetes medication or glucometer.  SW notes that patient has [place] [facility] insurance, which looks like an out-of-state Medicaid plan possibly.  Patient was in [facility] as of April 2024 and it appears she recently moved from [place] to [place].  JUAN ALBERTO notes in chart that patient was prescribed all her diabetes meds/supplies (blood glucose monitor strips, blood glucose monitor kit, lancets, and Lantis Solostar) by Internal Medicine Dr. Cj Ocampo on 6/20/2024.  SW is not clear as to why patient has not filled her meds other than that patient still has [place] Medicaid.  In which case, patient will need to go to [place] to fill her meds.  SW suggested the RN call their HELP Rep to evaluate patient's [place] Medicaid status as patient reports she has applied.  Patient may have gotten these meds filled on the [facility] at discharge, but SW unable to locate a note to that effect.  At this point, patient appears to be insured and would not qualify for this program, however.   ED JUAN ALBERTO unable to schedule patient for a new patient PCP appointment as the [employer] megan is down currently.  JANE Mejia

## 2024-07-08 NOTE — BRIEF OP NOTE
PODIATRY BRIEF OP NOTE    PATIENT NAME: Jane Saavedra  YOB: 1990  -  34 y.o. female  MRN: [id number]  DATE: 7/8/2024  BILLING #: [id number]    Surgeon(s):  Radha Recio DPM     ASSISTANTS: Juan Ramon Silva DPM PGY-3; Karrie Christensen DPM PGY-1    PRE-OP DIAGNOSIS:   Diabetic foot infection, left  Gangrene left hallux  Diabetes with peripheral vascular disease  Full thickness chronic ulceration, exposed subcutaneous tissue, left foot  Diabetes mellitus type 2  PAD  Cellulitis, left lower extremity  Thrombus of popliteal artery, left  S/p angiogram with balloon angioplasty (DOS:6/19/24)    POST-OP DIAGNOSIS: Same as above.    PROCEDURE:   Hallux Amputation; Left foot  Filet amputation; Left foot    ANESTHESIA: General with local    HEMOSTASIS: Direct pressure    ESTIMATED BLOOD LOSS: Less than 10cc.    MATERIALS:   Implant Name Type Inv. Item Serial No.  Lot No. LRB No. Used Action   DRESSING WND MICRONIZED PARTIC 500 MG MATRISTEM MICROMATRIX - [id number]  DRESSING WND MICRONIZED PARTIC 500 MG MATRISTEM MICROMATRIX [id number] [employer]- [id number] Left 1 Implanted       INJECTABLES: 10 cc 1:1 mix of 0.5% marcaine plain and 1% lidocaine plain      SPECIMEN:   ID Type Source Tests Collected by Time Destination   A : LEFT HALLUX Tissue Toe SURGICAL PATHOLOGY Radha Recio DPM 7/8/2024 1459        COMPLICATIONS: none    Findings:  Infection Present At Time Of Surgery (PATOS) (choose all levels that have infection present):  - Superficial Infection (skin/subcutaneous) present as evidenced by purulent fluid  Other Findings: Hallux disarticulated at MPJ. Filet/flap created. Site flushed. 500 mg of micromatrix applied.closed with Monocryl and prolene      Juan Ramon Silva DPM   Podiatric Medicine & Surgery   7/8/2024 at 3:19 PM

## 2024-07-08 NOTE — PROGRESS NOTES
Notified dr stern of pt blood sugar of 280 - no further orders    Contacted  in the ER to try and help with pt getting diabetic supplies    states she has beem giving scripts etc on 6/20th - just needs to fill them - gave her phone number to help program

## 2024-07-09 LAB
EKG ATRIAL RATE: 77 BPM
EKG P AXIS: 30 DEGREES
EKG P-R INTERVAL: 156 MS
EKG Q-T INTERVAL: 380 MS
EKG QRS DURATION: 98 MS
EKG QTC CALCULATION (BAZETT): 430 MS
EKG R AXIS: 29 DEGREES
EKG T AXIS: 7 DEGREES
EKG VENTRICULAR RATE: 77 BPM

## 2024-07-09 NOTE — ANESTHESIA POSTPROCEDURE EVALUATION
Department of Anesthesiology  Postprocedure Note    Patient: Jane Saavedra  MRN: [id number]  YOB: 1990  Date of evaluation: 7/9/2024    Procedure Summary       Date: 07/08/24 Room / Location: [address]    Anesthesia Start: 1436 Anesthesia Stop: 1521    Procedure: GREAT TOE AMPUTATION (Left: Foot) Diagnosis:       Acute osteomyelitis (HCC)      Gangrene (HCC)      (Acute osteomyelitis (HCC) [M86.10])      (Gangrene (HCC) [I96])    Surgeons: Radha Recio DPM Responsible Provider: Valente Rodríguez MD    Anesthesia Type: general ASA Status: 3            Anesthesia Type: No value filed.    Shanon Phase I:      Shanon Phase II: Shanon Score: 10    Anesthesia Post Evaluation    Patient location during evaluation: PACU  Patient participation: complete - patient participated  Level of consciousness: awake and alert  Airway patency: patent  Nausea & Vomiting: no nausea and no vomiting  Cardiovascular status: blood pressure returned to baseline  Respiratory status: acceptable  Hydration status: euvolemic  Pain management: adequate    No notable events documented.

## 2024-07-09 NOTE — OP NOTE
phalanx as well as the proximal phalanx was noted to be soft, discolored, necrotic upon bone quality testing consistent with osteomyelitis.  The hallux was disarticulated at the level of the metatarsal phalangeal joint and passed from the table specimen.  All nonviable, necrotic, infected tissue was sharply excised utilizing a combination of scalpel and rongeur. There was adequate bleeding appreciated.  The underlying 1st metatarsal head appeared to be healthy and white without signs of infection or necrosis.      Next the surgical site was irrigated with copious amounts of sterile saline. Then 500 mg of micromatrix was applied to the wound bed.     Status post digit amputation incision edges were provisionally approximated utilizing tactile manipulation.  There was found to be adequate soft tissue for closure with minimal tension across flap edges. The dorsal flap was then advanced distally and directed plantarly. The flap was inset into the recipient site. Flap edges were viable with good capillary refill time and without signs of venous congestion at this time. The surgical site was closed in layers utilizing 3-0 Monocryl for deep closure, 3-0 Monocryl for subcutaneous closure, and 3-0 Prolene for skin closure.       Dressings consisted of adaptic, 4 x 4s, ABDs, Kerlix, ACE were applied. The patient tolerated the above procedure and anesthesia well without complications. The patient was transported from the operating room to the PACU with vital signs stable and vascular status intact.    Juan Ramon Silva DPM   Podiatric Medicine & Surgery   7/8/2024 at 3:19 PM

## 2024-07-11 LAB — SURGICAL PATHOLOGY REPORT: NORMAL

## 2024-07-15 ENCOUNTER — OFFICE VISIT (OUTPATIENT)
Dept: PODIATRY | Age: 34
End: 2024-07-15

## 2024-07-15 VITALS — HEIGHT: 70 IN | BODY MASS INDEX: 22.19 KG/M2 | WEIGHT: 155 LBS

## 2024-07-15 DIAGNOSIS — Z98.890 POST-OPERATIVE STATE: Primary | ICD-10-CM

## 2024-07-15 DIAGNOSIS — S98.112A AMPUTATION OF LEFT GREAT TOE (HCC): ICD-10-CM

## 2024-07-15 DIAGNOSIS — M79.672 LEFT FOOT PAIN: ICD-10-CM

## 2024-07-15 PROCEDURE — 99024 POSTOP FOLLOW-UP VISIT: CPT

## 2024-07-15 RX ORDER — DOXYCYCLINE HYCLATE 100 MG
100 TABLET ORAL 2 TIMES DAILY
Qty: 28 TABLET | Refills: 0 | Status: SHIPPED | OUTPATIENT
Start: 2024-07-15 | End: 2024-07-29

## 2024-07-15 RX ORDER — HYDROCODONE BITARTRATE AND ACETAMINOPHEN 5; 325 MG/1; MG/1
1 TABLET ORAL EVERY 6 HOURS PRN
Qty: 28 TABLET | Refills: 0 | Status: SHIPPED | OUTPATIENT
Start: 2024-07-15 | End: 2024-07-22

## 2024-07-15 NOTE — PROGRESS NOTES
[facility]  [address]   [address] [place] [address]  Tel: [phone]   Fax: [phone]    Subjective     Procedure: Left hallux amputation with fillet  DOS: 7/8/2024    HPI:  This 34 y.o. female presents for a post op visit. Patient states they are doing well. Pain is well controlled by pain medication.  Has been icing and elevating the extremity as instructed preoperatively and has been weightbearing as tolerated to the heel only while utilizing her surgical screw to the left lower extremity. Denies any current nausea, vomiting, fever, chills, shortness of breath, chest pain or calf pain. Denies any other pedal complaints    Primary care physician is No primary care provider on file..    ROS:    Constitutional: Denies nausea, vomiting, fever, chills.  Neurologic: Denies numbness, tingling, and burning in the feet.    Vascular: Denies symptoms of lower extremity claudication.    Skin: Denies open wounds.  Otherwise negative except as noted in the HPI.     PMH:  Past Medical History:   Diagnosis Date    Acute osteomyelitis ([facility])     LEFT FOOT    Bipolar 1 disorder ([facility])     Cellulitis     LT FOOT    Chronic diabetic ulcer of left foot determined by examination ([facility])     Depression     Diabetic foot infection ([facility])     H. pylori infection     recurrent    History of recent hospitalization 06/18/2024    til 6/20/2024 : [facility] , ischemic leg    Hyperlipidemia     tx started May 2015    Hypertension     tx started May 2015    Ischemic cardiomyopathy     Multiple vessel coronary artery disease 01/2023    Neuropathy     BLE    Noncompliance     NSTEMI (non-ST elevated myocardial infarction) ([facility]) 01/06/2023    Osteomyelitis of foot, left, acute ([facility]) 07/01/2024    PAD (peripheral artery disease) ([facility])     Renal abscess, left 08/2023    extending into psoas and paraspinal    STEMI (ST elevation myocardial infarction) ([facility]) 07/04/2023    Thrombus     LT LOWER EXTREMITY    Type II or unspecified

## 2024-07-25 ENCOUNTER — TELEPHONE (OUTPATIENT)
Dept: INTERNAL MEDICINE CLINIC | Age: 34
End: 2024-07-25

## 2024-07-29 ENCOUNTER — HOSPITAL ENCOUNTER (EMERGENCY)
Age: 34
Discharge: HOME | End: 2024-07-29
Attending: EMERGENCY MEDICINE
Payer: COMMERCIAL

## 2024-07-29 ENCOUNTER — APPOINTMENT (OUTPATIENT)
Dept: GENERAL RADIOLOGY | Age: 34
End: 2024-07-29
Payer: COMMERCIAL

## 2024-07-29 VITALS
RESPIRATION RATE: 18 BRPM | BODY MASS INDEX: 22.19 KG/M2 | SYSTOLIC BLOOD PRESSURE: 139 MMHG | OXYGEN SATURATION: 100 % | HEIGHT: 70 IN | TEMPERATURE: 97.3 F | DIASTOLIC BLOOD PRESSURE: 87 MMHG | HEART RATE: 100 BPM | WEIGHT: 155 LBS

## 2024-07-29 DIAGNOSIS — E23.2 DIABETES INSIPIDUS (HCC): ICD-10-CM

## 2024-07-29 DIAGNOSIS — Z76.0 ENCOUNTER FOR MEDICATION REFILL: ICD-10-CM

## 2024-07-29 DIAGNOSIS — G89.18 POST-OP PAIN: Primary | ICD-10-CM

## 2024-07-29 DIAGNOSIS — T81.40XA POSTOPERATIVE INFECTION, UNSPECIFIED TYPE, INITIAL ENCOUNTER: ICD-10-CM

## 2024-07-29 LAB
ALBUMIN SERPL-MCNC: 4.1 G/DL (ref 3.5–5.2)
ALBUMIN/GLOB SERPL: 1 {RATIO} (ref 1–2.5)
ALP SERPL-CCNC: 104 U/L (ref 35–104)
ALT SERPL-CCNC: 16 U/L (ref 10–35)
ANION GAP SERPL CALCULATED.3IONS-SCNC: 12 MMOL/L (ref 9–16)
AST SERPL-CCNC: 19 U/L (ref 10–35)
BILIRUB SERPL-MCNC: 0.3 MG/DL (ref 0–1.2)
BUN SERPL-MCNC: 13 MG/DL (ref 6–20)
CALCIUM SERPL-MCNC: 9.3 MG/DL (ref 8.6–10.4)
CHLORIDE SERPL-SCNC: 98 MMOL/L (ref 98–107)
CO2 SERPL-SCNC: 24 MMOL/L (ref 20–31)
CREAT SERPL-MCNC: 0.7 MG/DL (ref 0.5–0.9)
CRP SERPL HS-MCNC: <3 MG/L (ref 0–5)
ERYTHROCYTE [SEDIMENTATION RATE] IN BLOOD BY PHOTOMETRIC METHOD: 34 MM/HR (ref 0–20)
GFR, ESTIMATED: >90 ML/MIN/1.73M2
GLUCOSE SERPL-MCNC: 405 MG/DL (ref 74–99)
POTASSIUM SERPL-SCNC: 4.9 MMOL/L (ref 3.7–5.3)
PROT SERPL-MCNC: 7.5 G/DL (ref 6.6–8.7)
SODIUM SERPL-SCNC: 134 MMOL/L (ref 136–145)

## 2024-07-29 PROCEDURE — 80053 COMPREHEN METABOLIC PANEL: CPT

## 2024-07-29 PROCEDURE — 87205 SMEAR GRAM STAIN: CPT

## 2024-07-29 PROCEDURE — 87075 CULTR BACTERIA EXCEPT BLOOD: CPT

## 2024-07-29 PROCEDURE — 73630 X-RAY EXAM OF FOOT: CPT

## 2024-07-29 PROCEDURE — 99284 EMERGENCY DEPT VISIT MOD MDM: CPT | Performed by: EMERGENCY MEDICINE

## 2024-07-29 PROCEDURE — 85652 RBC SED RATE AUTOMATED: CPT

## 2024-07-29 PROCEDURE — 87070 CULTURE OTHR SPECIMN AEROBIC: CPT

## 2024-07-29 PROCEDURE — 99222 1ST HOSP IP/OBS MODERATE 55: CPT | Performed by: PODIATRIST

## 2024-07-29 PROCEDURE — 87076 CULTURE ANAEROBE IDENT EACH: CPT

## 2024-07-29 PROCEDURE — 86403 PARTICLE AGGLUT ANTBDY SCRN: CPT

## 2024-07-29 PROCEDURE — 86140 C-REACTIVE PROTEIN: CPT

## 2024-07-29 PROCEDURE — 6370000000 HC RX 637 (ALT 250 FOR IP)

## 2024-07-29 RX ORDER — INSULIN LISPRO 100 [IU]/ML
10 INJECTION, SOLUTION INTRAVENOUS; SUBCUTANEOUS ONCE
Status: DISCONTINUED | OUTPATIENT
Start: 2024-07-29 | End: 2024-07-29 | Stop reason: HOSPADM

## 2024-07-29 RX ORDER — ACETAMINOPHEN 500 MG
1000 TABLET ORAL ONCE
Status: COMPLETED | OUTPATIENT
Start: 2024-07-29 | End: 2024-07-29

## 2024-07-29 RX ORDER — GLUCOSAMINE HCL/CHONDROITIN SU 500-400 MG
CAPSULE ORAL
Qty: 30 STRIP | Refills: 1 | Status: SHIPPED | OUTPATIENT
Start: 2024-07-29

## 2024-07-29 RX ORDER — ASPIRIN 81 MG/1
81 TABLET ORAL DAILY
Qty: 30 TABLET | Refills: 0 | Status: SHIPPED | OUTPATIENT
Start: 2024-07-29

## 2024-07-29 RX ORDER — INSULIN GLARGINE 100 [IU]/ML
25 INJECTION, SOLUTION SUBCUTANEOUS
Qty: 5 ADJUSTABLE DOSE PRE-FILLED PEN SYRINGE | Refills: 0 | Status: SHIPPED | OUTPATIENT
Start: 2024-07-29

## 2024-07-29 RX ORDER — ATORVASTATIN CALCIUM 80 MG/1
80 TABLET, FILM COATED ORAL NIGHTLY
Qty: 30 TABLET | Refills: 0 | Status: SHIPPED | OUTPATIENT
Start: 2024-07-29

## 2024-07-29 RX ORDER — CEPHALEXIN 500 MG/1
500 CAPSULE ORAL 4 TIMES DAILY
Qty: 28 CAPSULE | Refills: 0 | Status: SHIPPED | OUTPATIENT
Start: 2024-07-29 | End: 2024-08-05

## 2024-07-29 RX ADMIN — ACETAMINOPHEN 1000 MG: 500 TABLET ORAL at 13:34

## 2024-07-29 ASSESSMENT — ENCOUNTER SYMPTOMS
COLOR CHANGE: 1
RESPIRATORY NEGATIVE: 1
EYES NEGATIVE: 1
VOMITING: 1

## 2024-07-29 ASSESSMENT — PAIN - FUNCTIONAL ASSESSMENT: PAIN_FUNCTIONAL_ASSESSMENT: 0-10

## 2024-07-29 ASSESSMENT — PAIN DESCRIPTION - ORIENTATION: ORIENTATION: LEFT

## 2024-07-29 ASSESSMENT — PAIN DESCRIPTION - LOCATION: LOCATION: FOOT

## 2024-07-29 ASSESSMENT — PAIN SCALES - GENERAL: PAINLEVEL_OUTOF10: 10

## 2024-07-29 NOTE — ED PROVIDER NOTES
[facility] ED  Emergency Department Encounter  Emergency Medicine Resident     Pt Name:Jane Saavedra  MRN: [id number]  Birthdate 1990  Date of evaluation: 7/29/24  PCP:  No primary care provider on file.  Note Started: 12:55 PM EDT      CHIEF COMPLAINT       Chief Complaint   Patient presents with    Post-op Problem     Left toe amp, concern for infection, missed wound care appoitment       HISTORY OF PRESENT ILLNESS  (Location/Symptom, Timing/Onset, Context/Setting, Quality, Duration, Modifying Factors, Severity.)      Jane Saavedra is a 34-year-old female with a history of diabetes mellitus who presents with concerns of infection at the site of a recent toe amputation. The patient reports that the amputation was performed due to osteomyelitis, and she opted for the procedure to prevent the infection from spreading. The patient was scheduled to have sutures removed today but came to the emergency department instead due to concerns about the wound.    The patient states that she has not been to her wound care appointment in about a week.. She reports that the wound was previously closed but now appears to be opening up. The patient describes a foul odor from the wound. She denies any fevers, chills, nausea, or vomiting.    She reports neuropathy in the affected foot, with diminished sensation.    PAST MEDICAL / SURGICAL / SOCIAL / FAMILY HISTORY      has a past medical history of Acute osteomyelitis ([facility]), Bipolar 1 disorder ([facility]), Cellulitis, Chronic diabetic ulcer of left foot determined by examination ([facility]), Depression, Diabetic foot infection ([facility]), H. pylori infection, History of recent hospitalization, Hyperlipidemia, Hypertension, Ischemic cardiomyopathy, Multiple vessel coronary artery disease, Neuropathy, Noncompliance, NSTEMI (non-ST elevated myocardial infarction) ([facility]), Osteomyelitis of foot, left, acute ([facility]), PAD (peripheral artery disease) ([facility]), Renal abscess, left,

## 2024-07-29 NOTE — ED NOTES
Patient presents to ED for concerns for infection to surgical site  Patient states she had L great toe amputation on 7/08 and has been following up with podiatry weekly but missed last appointment.  Patient states that she changed her bandage yesterday and noticed a foul odor and yellow discharge from wound that looked like infection to her  States she was supposed to see podiatry today for suture removal but came to ED instead d/t concerns  Patient a/o x 4 with unlabored respirations, NAD noted  Resting on cot, wound undressed  Dr Turner @ bedside

## 2024-07-29 NOTE — ED PROVIDER NOTES
I performed a history and physical examination of the patient and discussed management with the resident. I reviewed the resident’s note and agree with the documented findings and plan of care. Any areas of disagreement are noted on the chart. I was personally present for the key portions of any procedures. I have documented in the chart those procedures where I was not present during the key portions. Unless noted in my documentation, I agree with the chief complaint, past medical history, past surgical history, allergies, medications, social and family history as documented. Documentation of the HPI, Physical Exam and Medical Decision Making performed by medical students or scribes is based on my personal performance of the HPI, PE and MDM.   For Phys Assistant/ Nurse Practitioner cases/documentation I have personally evaluated this patient and have completed at least one if not all key elements of the E/M (history, physical exam, and MDM). I find the patient's history and physical exam are consistent with the NP/PA documentation. I agree with the care provided, treatment rendered, disposition and followup plan.   Additional findings are as noted.    Eliseo Lopez MD  Attending Emergency  Physician    This patient presented to the emerged part with a history of having had her left great toe amputated 3 weeks ago secondary to peripheral vascular disease and diabetes.  She presents today with concern for possible wound infection at the site of the amputation.  She reports chronic nausea and vomiting.  She denies any fevers or chills.  No history of trauma.  She also reports that she missed her podiatry appointment a week ago due to transportation issues and she also has been out of and off all her medications for the past month and unable to follow-up with her primary care provider also secondary to transportation issues.  No hematemesis, melena, hematochezia.  No diarrhea.  No polyuria, polydipsia, polyphagia.

## 2024-07-29 NOTE — DISCHARGE INSTRUCTIONS
-You were seen and evaluated by emergency medicine physicians at [facility].    -Please follow-up with your primary care physician and/or with the referrals to specialist.    -You were diagnosed with: Postop pain, postoperative infection, uncontrolled diabetes, medication refill    -Podiatry was consulted for your postop pain and concerns for infection.  You will be discharged with a prescription of antibiotics that has been sent to your listed pharmacy.  Additionally, there have been refills of your main the medications, specifically your diabetic medications.  Please fill out your Medicaid information so you can afford your prescriptions.  Please prioritize the antibiotics and the diabetic medication.  -You have been sent with a good Rx card in case there are any issues with your co-pay for the medications.  -Please follow-up with podiatry and use the referral to [facility] to establish care with a primary care physician  -You elected to leave prior to all the lab work returning.  The risk and benefits were discussed.  Please follow-up on [facility] for the results.    -Please return to the Emergency Department if you are experiencing the following symptoms acutely: Worsening left foot pain, Headache, fever, chills, nausea, vomiting, chest pain, shortness of breath, abdominal pain, change with urination, change with bowel movements, change in your skin/hair/nail, weakness, fatigue, altered mental status and/or any change from baseline health.    -Thank you for coming to [facility].

## 2024-07-29 NOTE — ED NOTES
Please schedule pt for office visit The following labs were labeled with appropriate pt sticker and tubed to lab:     [] Blue     [] Lavender   [] on ice  [] Green/yellow  [] Green/black [] on ice  [] Grey  [] on ice  [] Yellow  [] Red  [] Pink  [] Type/ Screen  [] ABG  [] VBG    [] COVID-19 swab    [] Rapid  [] PCR  [] Flu swab  [] Peds Viral Panel     [] Urine Sample  [] Fecal Sample  [] Pelvic Cultures  [] Blood Cultures  [] X 2  [] STREP Cultures  [x] Wound Cultures

## 2024-07-29 NOTE — ED NOTES
Writer met with patient regarding concerns with transportation and ability to pay for her medications.  She reports that she moved from [place] to [place] and has been denied [place] Medicaid.  Patient states that she signed up for insurance through [employer] and was given [employer].  She reports inability to afford co pays of her medications or medical bills since her surgery as she is not working.  She states that her insurance also does not offer transportation benefits.     Given patient has no income and was denied [place] Medicaid, she is limited on available resources for ongoing help. Writer suggested for patient to reapply for [place] Medicaid as there was likely an error in the application process, given she qualified for Medicaid in [place].  Since she currently has insurance, she does not meet criteria for the [facility] med assist program.  Patient also does not have a plan in place on how to obtain her monthly medications next month.      Patient is currently still completing medical work-up. Once disposition has been determined, social work to follow up on any additional needs.

## 2024-07-29 NOTE — CONSULTS
Consult Note  Foot and Ankle Surgery    Subjective     Chief Complaint: Left foot wound dehiscence    HPI:  Jane Saavedra is a 34 y.o. female seen at [facility] ED for left foot wound dehiscence.  Patient previously underwent amputation of the great toe on 7/8/2024 with Dr. Recio.  Patient was then discharged and put on antibiotics as an outpatient.  Patient today appears, missing her appointment, due to pain in the region of her amputation and symptoms of fever and nausea over the past few days.  Patient also missed her appointment last week, states that she was changing the bandage last night when she noticed drainage coming out from the bandage.  Patient states that she has not had any antibiotics since her discharge because of an insurance issue.  Patient states that she has been ambulating only in the surgical shoe.  Patient currently denies any other pedal complaints.    PCP is No primary care provider on file.    ROS:   Review of Systems   Constitutional:  Positive for activity change, appetite change, chills and fever.   HENT: Negative.     Eyes: Negative.    Respiratory: Negative.     Cardiovascular:  Positive for leg swelling.   Gastrointestinal:  Positive for vomiting.   Musculoskeletal:  Positive for gait problem.   Skin:  Positive for color change and wound.       Past Medical History   has a past medical history of Acute osteomyelitis ([facility]), Bipolar 1 disorder ([facility]), Cellulitis, Chronic diabetic ulcer of left foot determined by examination ([facility]), Depression, Diabetic foot infection ([facility]), H. pylori infection, History of recent hospitalization, Hyperlipidemia, Hypertension, Ischemic cardiomyopathy, Multiple vessel coronary artery disease, Neuropathy, Noncompliance, NSTEMI (non-ST elevated myocardial infarction) ([facility]), Osteomyelitis of foot, left, acute ([facility]), PAD (peripheral artery disease) ([facility]), Renal abscess, left, STEMI (ST elevation myocardial infarction) ([facility]), Thrombus, Type II or

## 2024-07-29 NOTE — ED NOTES
Writer updated that plan is for patient to be discharged.  Writer encouraged patient to re-apply for Medicaid in [place].  HELP information was also provided for assistance with accumulating medical bills. Patient denied further needs at this time.

## 2024-08-01 ENCOUNTER — TELEPHONE (OUTPATIENT)
Dept: FAMILY MEDICINE CLINIC | Age: 34
End: 2024-08-01

## 2024-08-01 NOTE — TELEPHONE ENCOUNTER
PC to pt to assist in scheduling NP appt with [facility] being down across the office, call rang through to VM, writer LM to contact office back at [phone].

## 2024-08-06 ENCOUNTER — OFFICE VISIT (OUTPATIENT)
Dept: FAMILY MEDICINE CLINIC | Age: 34
End: 2024-08-06
Payer: COMMERCIAL

## 2024-08-06 VITALS
DIASTOLIC BLOOD PRESSURE: 75 MMHG | WEIGHT: 174 LBS | BODY MASS INDEX: 24.97 KG/M2 | HEART RATE: 85 BPM | TEMPERATURE: 97.6 F | SYSTOLIC BLOOD PRESSURE: 108 MMHG

## 2024-08-06 DIAGNOSIS — E11.59 TYPE 2 DIABETES MELLITUS WITH VASCULAR DISEASE (HCC): ICD-10-CM

## 2024-08-06 DIAGNOSIS — Z09 HOSPITAL DISCHARGE FOLLOW-UP: Primary | ICD-10-CM

## 2024-08-06 PROBLEM — I96 GANGRENE OF TOE OF LEFT FOOT (HCC): Status: RESOLVED | Noted: 2024-07-08 | Resolved: 2024-08-06

## 2024-08-06 PROBLEM — I96 GANGRENE (HCC): Status: RESOLVED | Noted: 2024-07-08 | Resolved: 2024-08-06

## 2024-08-06 PROCEDURE — 99213 OFFICE O/P EST LOW 20 MIN: CPT

## 2024-08-06 PROCEDURE — 3074F SYST BP LT 130 MM HG: CPT

## 2024-08-06 PROCEDURE — 3078F DIAST BP <80 MM HG: CPT

## 2024-08-06 PROCEDURE — 1111F DSCHRG MED/CURRENT MED MERGE: CPT

## 2024-08-06 SDOH — ECONOMIC STABILITY: HOUSING INSECURITY
IN THE LAST 12 MONTHS, WAS THERE A TIME WHEN YOU DID NOT HAVE A STEADY PLACE TO SLEEP OR SLEPT IN A SHELTER (INCLUDING NOW)?: NO

## 2024-08-06 SDOH — ECONOMIC STABILITY: FOOD INSECURITY: WITHIN THE PAST 12 MONTHS, THE FOOD YOU BOUGHT JUST DIDN'T LAST AND YOU DIDN'T HAVE MONEY TO GET MORE.: NEVER TRUE

## 2024-08-06 SDOH — ECONOMIC STABILITY: FOOD INSECURITY: WITHIN THE PAST 12 MONTHS, YOU WORRIED THAT YOUR FOOD WOULD RUN OUT BEFORE YOU GOT MONEY TO BUY MORE.: NEVER TRUE

## 2024-08-06 SDOH — ECONOMIC STABILITY: INCOME INSECURITY: HOW HARD IS IT FOR YOU TO PAY FOR THE VERY BASICS LIKE FOOD, HOUSING, MEDICAL CARE, AND HEATING?: NOT HARD AT ALL

## 2024-08-06 ASSESSMENT — PATIENT HEALTH QUESTIONNAIRE - PHQ9
1. LITTLE INTEREST OR PLEASURE IN DOING THINGS: NOT AT ALL
6. FEELING BAD ABOUT YOURSELF - OR THAT YOU ARE A FAILURE OR HAVE LET YOURSELF OR YOUR FAMILY DOWN: NOT AT ALL
5. POOR APPETITE OR OVEREATING: NOT AT ALL
SUM OF ALL RESPONSES TO PHQ QUESTIONS 1-9: 0
4. FEELING TIRED OR HAVING LITTLE ENERGY: NOT AT ALL
SUM OF ALL RESPONSES TO PHQ QUESTIONS 1-9: 0
7. TROUBLE CONCENTRATING ON THINGS, SUCH AS READING THE NEWSPAPER OR WATCHING TELEVISION: NOT AT ALL
10. IF YOU CHECKED OFF ANY PROBLEMS, HOW DIFFICULT HAVE THESE PROBLEMS MADE IT FOR YOU TO DO YOUR WORK, TAKE CARE OF THINGS AT HOME, OR GET ALONG WITH OTHER PEOPLE: NOT DIFFICULT AT ALL
8. MOVING OR SPEAKING SO SLOWLY THAT OTHER PEOPLE COULD HAVE NOTICED. OR THE OPPOSITE, BEING SO FIGETY OR RESTLESS THAT YOU HAVE BEEN MOVING AROUND A LOT MORE THAN USUAL: NOT AT ALL
9. THOUGHTS THAT YOU WOULD BE BETTER OFF DEAD, OR OF HURTING YOURSELF: NOT AT ALL
SUM OF ALL RESPONSES TO PHQ QUESTIONS 1-9: 0
SUM OF ALL RESPONSES TO PHQ QUESTIONS 1-9: 0
3. TROUBLE FALLING OR STAYING ASLEEP: NOT AT ALL
2. FEELING DOWN, DEPRESSED OR HOPELESS: NOT AT ALL
SUM OF ALL RESPONSES TO PHQ9 QUESTIONS 1 & 2: 0

## 2024-08-06 NOTE — PROGRESS NOTES
Visit Information    Have you changed or started any medications since your last visit including any over-the-counter medicines, vitamins, or herbal medicines?    Have you stopped taking any of your medications? Is so, why? -    Are you having any side effects from any of your medications? -     Have you seen any other physician or provider since your last visit?     Have you had any other diagnostic tests since your last visit?     Have you been seen in the emergency room and/or had an admission in a hospital since we last saw you?     Have you had your routine dental cleaning in the past 6 months?       Do you have an active MyChart account? If no, what is the barrier?      No care team member to display    Medical History Review  Past Medical, Family, and Social History reviewed and  contribute to the patient presenting condition    Health Maintenance   Topic Date Due    Hepatitis B vaccine (1 of 3 - 3-dose series) Never done    Varicella vaccine (1 of 2 - 2-dose childhood series) Never done    COVID-19 Vaccine (1) Never done    Depression Monitoring  Never done    Diabetic retinal exam  Never done    Shingles vaccine (1 of 2) Never done    Pneumococcal 0-64 years Vaccine (2 of 2 - PCV) 02/22/2016    Cervical cancer screen  Never done    Diabetic Alb to Cr ratio (uACR) test  05/14/2022    A1C test (Diabetic or Prediabetic)  11/08/2023    Flu vaccine (1) 08/01/2024    Lipids  07/10/2024    Diabetic foot exam  06/19/2025    GFR test (Diabetes, CKD 3-4, OR last GFR 15-59)  07/29/2025    DTaP/Tdap/Td vaccine (2 - Td or Tdap) 09/17/2029    Hepatitis C screen  Completed    HIV screen  Completed    Hepatitis A vaccine  Aged Out    Hib vaccine  Aged Out    HPV vaccine  Aged Out    Polio vaccine  Aged Out    Meningococcal (ACWY) vaccine  Aged Out

## 2024-08-06 NOTE — PROGRESS NOTES
Attending Physician Statement  I  have discussed the care of Jane Saavedra including pertinent history and exam findings with the resident. I agree with the assessment, plan and orders as documented by the resident.      /75 (Site: Left Upper Arm, Position: Sitting, Cuff Size: Medium Adult)   Pulse 85   Temp 97.6 °F (36.4 °C) (Oral)   Wt 78.9 kg (174 lb)   LMP 08/05/2024 (Approximate)   BMI 24.97 kg/m²    BP Readings from Last 3 Encounters:   08/06/24 108/75   07/29/24 139/87   07/08/24 114/66     Wt Readings from Last 3 Encounters:   08/06/24 78.9 kg (174 lb)   07/29/24 70.3 kg (155 lb)   07/15/24 70.3 kg (155 lb)          Diagnosis Orders   1. Hospital discharge follow-up  CT DISCHARGE MEDS RECONCILED W/ CURRENT OUTPATIENT MED LIST      2. Type 2 diabetes mellitus with vascular disease (HCC)  Hemoglobin A1c    Microalbumin, Ur    Lipid Panel        On doxycycline- follow up with podiatry. A1c 14.3- not taking medications due to cost- in house pharmacy consult placed.      Leobardo Benavidez DO 8/8/2024 3:46 PM

## 2024-08-06 NOTE — PROGRESS NOTES
Post-Discharge Transitional Care  Follow Up      Jane Saavedra   YOB: 1990    Date of Office Visit:  8/6/2024  Date of Hospital Admission: 7/29/24  Date of Hospital Discharge: 7/29/24  Risk of hospital readmission (high >=14%. Medium >=10%) :Readmission Risk Score: 18.9    Care management risk score Rising risk (score 2-5) and Complex Care (Scores >=6): No Risk Score On File     Patient went to the emergency room for postop pain after noticing that her foot was having some foul smell and that her sutures were feeling loose on 7/29/2024.  Patient was given doxycycline and podiatry did come to see her at bedside in the ER.  Has 8 more days of doxycycline. Has follow up with podiatry on aug 12.  CABGx2 last year  EF 40-45%  She is on Eliquis bc: She was taken to the OR for angiogram and percutaneous mechanical thrombectomy of left popliteal artery and angioplasty of the left popliteal artery. Patient was started on aspirin, statin and Eliquis.      Patient and her pain was 10 out of 10.  Foot was wrapped today, was not able to expose and check.  Was in the midst of discussing potential need of short-term pain medication however, patient walked out before being able to precept with the attending.    Also Jabari, the pharmacist team member came into the room as patient is not taking any of her medications.  Patient cannot afford any of them.  Patient has an A1c of 14.3 and severe cardiac history and is unable to take any of the medications at this time.  Patient is a high risk «of mortality if this continues and worsening foot infection with uncontrolled diabetes.  Jabari and the pharmacist team will be in touch with the patient.     Non face to face  following discharge, date last encounter closed (first attempt may have been earlier): *No documented post hospital discharge outreach found in the last 14 days    Call initiated 2 business days of discharge: *No response recorded in the last 14

## 2024-08-09 ENCOUNTER — TELEPHONE (OUTPATIENT)
Dept: FAMILY MEDICINE CLINIC | Age: 34
End: 2024-08-09

## 2024-08-09 DIAGNOSIS — I21.4 NSTEMI (NON-ST ELEVATED MYOCARDIAL INFARCTION) (HCC): ICD-10-CM

## 2024-08-09 DIAGNOSIS — E11.59 TYPE 2 DIABETES MELLITUS WITH VASCULAR DISEASE (HCC): Primary | ICD-10-CM

## 2024-08-09 NOTE — TELEPHONE ENCOUNTER
Medication Management Service ([facility])  [facility]  [phone]      PharmD Consult - Medication Access/Coverage    Referring Provider: Dr. Wang    PharmD received consult from provider to assist with coverage of all her medications. Patient recently stopped taking all medications due to cost.    Pertinent PMH includes:  Patient Active Problem List   Diagnosis    Type II or unspecified type diabetes mellitus without mention of complication, uncontrolled    Dyslipidemia    Obesity (BMI 30-39.9)    Depressed    Type 2 diabetes mellitus without complication (HCC)    Essential hypertension    Tetrahydrocannabinol (THC) use disorder, moderate, dependence ([facility])    Chronic bilateral low back pain with bilateral sciatica    NSTEMI (non-ST elevated myocardial infarction) ([facility])    Medically noncompliant    Uncontrolled type 2 diabetes mellitus with hyperglycemia ([facility])    Ischemic cardiomyopathy    Multiple vessel coronary artery disease    Renal abscess    Septicemia (HCC)    Severe sepsis ([facility])    Fever and chills    Proteus infection    Nephrolithiasis    Neutrophilia    Abnormal CT of the abdomen    Renal and perinephric abscess    Poorly controlled diabetes mellitus (HCC)    Acute lower limb ischemia    Absent pulse in lower extremity    Pain of left calf    Ulcer of great toe, left, with fat layer exposed ([facility])    PAD (peripheral artery disease) ([facility])    Diabetic infection of left foot (HCC)    Ischemic ulcer diabetic foot (HCC)    Ischemia of left lower extremity    Popliteal artery occlusion, left (HCC)    Wound infection    Acute osteomyelitis (HCC)    Type 2 diabetes mellitus with vascular disease ([facility])     Current Medications:  Current Outpatient Medications   Medication Instructions    apixaban (ELIQUIS) 5 mg, Oral, 2 TIMES DAILY    aspirin 81 mg, Oral, DAILY    atorvastatin (LIPITOR) 80 mg, Oral, NIGHTLY    blood glucose monitor strips Test 1 times a day & as needed for symptoms of irregular blood glucose.

## 2024-08-12 ENCOUNTER — OFFICE VISIT (OUTPATIENT)
Dept: PODIATRY | Age: 34
End: 2024-08-12
Payer: COMMERCIAL

## 2024-08-12 VITALS
HEIGHT: 70 IN | BODY MASS INDEX: 24.91 KG/M2 | SYSTOLIC BLOOD PRESSURE: 138 MMHG | DIASTOLIC BLOOD PRESSURE: 91 MMHG | HEART RATE: 95 BPM | WEIGHT: 174 LBS

## 2024-08-12 DIAGNOSIS — E08.621 DIABETIC ULCER OF TOE OF LEFT FOOT ASSOCIATED WITH DIABETES MELLITUS DUE TO UNDERLYING CONDITION, WITH FAT LAYER EXPOSED (HCC): ICD-10-CM

## 2024-08-12 DIAGNOSIS — L97.522 DIABETIC ULCER OF TOE OF LEFT FOOT ASSOCIATED WITH DIABETES MELLITUS DUE TO UNDERLYING CONDITION, WITH FAT LAYER EXPOSED (HCC): ICD-10-CM

## 2024-08-12 DIAGNOSIS — T81.31XS DEHISCENCE OF SURGICAL WOUND, SEQUELA: Primary | ICD-10-CM

## 2024-08-12 DIAGNOSIS — M79.672 LEFT FOOT PAIN: ICD-10-CM

## 2024-08-12 PROCEDURE — 11042 DBRDMT SUBQ TIS 1ST 20SQCM/<: CPT

## 2024-08-12 NOTE — PROGRESS NOTES
Patient instructed to remove shoes and socks and instructed to sit in exam chair.  Current PCP is No primary care provider on file. and date of last visit was unknown.   Do you have a follow up visit scheduled?  No  If yes, the date is       Diabetic visit information    Blood pressure (Control is BP <140/90)  BP Readings from Last 3 Encounters:   08/06/24 108/75   07/29/24 139/87   07/08/24 114/66       BP taken with correct size cuff? - Yes   Repeated if > 140/90 Yes      Tobacco use:  Patient  reports that she has quit smoking. Her smoking use included cigarettes. She has never used smokeless tobacco.  If Smoker - Cessation materials given?- Yes       Diabetic Health Maintenance Items due  Diabetes Management   Topic Date Due    Diabetic retinal exam  Never done    Diabetic Alb to Cr ratio (uACR) test  05/14/2022    Lipids  07/10/2024    A1C test (Diabetic or Prediabetic)  08/08/2024       Diabetic retinal exam done in last year? - Yes   If No: remind patient that it is due and they should schedule an exam    Medications  Is patient taking any medications for diabetes? -   Yes  Have blood sugars been controlled?   Fasting blood sugars under 120   -   Yes   Random home sugars or today's POCT glucose is under 180 -   Yes   []  If No to the above then patient should schedule appt with PCP.     Diabetic Plan    A1C Plan  Lab Results   Component Value Date    LABA1C 10.4 08/08/2023    LABA1C 12.2 07/10/2023    LABA1C 12.0 07/04/2023      []  If A1C over 8 and last result >3 months ago - Order A1C and refer to PCP   []  If last A1C over 6 months ago - Order A1C and refer to PCP for follow up   []  If elevated blood sugars > 180 - refer to PCP for follow up    []  Blood sugar controlled - A1C under 8 and last check was < 6 months      Cholesterol Plan   No components found for: \"LDLCALC\", \"LDLCHOLESTEROL\"   []  If LDL > 100 and last result >3 months ago - order Fasting lipids and refer to PCP for follow up   []  If LDL 
today's visit.   Sharp excisional debridement performed utilizing a dermal curette. Please see procedural note below  Puricol utilized to wound   Dressings applied including   Adaptic, DSD and ace  Patient instructed to continue weightbearing as tolerated to the heel only in her surgical shoe  Patient instructed to keep her dressings dry clean and intact  The local and systemic signs of infection were described to the patient during today's visit.  Patient instructed to present to the emergency department should any of these arise.  Patient to RTC in 1 week(s)  Please, call the office with any questions or concerns     Procedure  Sharp excisional debridement of wound located at the distal aspect of the 1st MPJ performed down to and including the level of the subcutaneous tissue via dermal curette. 100% of wound debrided. Post debridement measurements are 1 cm x 0.5 cm x 0.5 cm. No anesthesia required. Hemostasis obtained with direct pressure. Patient tolerated procedure well. Patient reports 0/10 post debridement.           No orders of the defined types were placed in this encounter.    No orders of the defined types were placed in this encounter.      Juan Ramon Silva DPM   Podiatric Medicine & Surgery   8/16/2024 at 9:21 AM

## 2024-08-13 NOTE — PROGRESS NOTES
Patient instructed to remove shoes and socks and instructed to sit in exam chair.  Current PCP is No primary care provider on file. and date of last visit was unknown.   Do you have a follow up visit scheduled?  No  If yes, the date is       Diabetic visit information    Blood pressure (Control is BP <140/90)  BP Readings from Last 3 Encounters:   08/12/24 (!) 138/91   08/06/24 108/75   07/29/24 139/87       BP taken with correct size cuff? - Yes   Repeated if > 140/90 Yes      Tobacco use:  Patient  reports that she has quit smoking. Her smoking use included cigarettes. She has never used smokeless tobacco.  If Smoker - Cessation materials given?- Yes       Diabetic Health Maintenance Items due  Diabetes Management   Topic Date Due    Diabetic retinal exam  Never done    Diabetic Alb to Cr ratio (uACR) test  05/14/2022    Lipids  07/10/2024    A1C test (Diabetic or Prediabetic)  08/08/2024       Diabetic retinal exam done in last year? - Yes   If No: remind patient that it is due and they should schedule an exam    Medications  Is patient taking any medications for diabetes? -   Yes  Have blood sugars been controlled?   Fasting blood sugars under 120   -   Yes   Random home sugars or today's POCT glucose is under 180 -   Yes   []  If No to the above then patient should schedule appt with PCP.     Diabetic Plan    A1C Plan  Lab Results   Component Value Date    LABA1C 10.4 08/08/2023    LABA1C 12.2 07/10/2023    LABA1C 12.0 07/04/2023      []  If A1C over 8 and last result >3 months ago - Order A1C and refer to PCP   []  If last A1C over 6 months ago - Order A1C and refer to PCP for follow up   []  If elevated blood sugars > 180 - refer to PCP for follow up    []  Blood sugar controlled - A1C under 8 and last check was < 6 months      Cholesterol Plan   No components found for: \"LDLCALC\", \"LDLCHOLESTEROL\"   []  If LDL > 100 and last result >3 months ago - order Fasting lipids and refer to PCP for follow up   []  If

## 2024-08-19 ENCOUNTER — OFFICE VISIT (OUTPATIENT)
Dept: PODIATRY | Age: 34
End: 2024-08-19
Payer: COMMERCIAL

## 2024-08-19 VITALS
HEIGHT: 70 IN | BODY MASS INDEX: 24.91 KG/M2 | HEART RATE: 82 BPM | SYSTOLIC BLOOD PRESSURE: 120 MMHG | DIASTOLIC BLOOD PRESSURE: 86 MMHG | WEIGHT: 174 LBS

## 2024-08-19 DIAGNOSIS — S98.112A AMPUTATION OF LEFT GREAT TOE (HCC): ICD-10-CM

## 2024-08-19 DIAGNOSIS — T81.31XS DEHISCENCE OF SURGICAL WOUND, SEQUELA: ICD-10-CM

## 2024-08-19 DIAGNOSIS — L97.522 DIABETIC ULCER OF TOE OF LEFT FOOT ASSOCIATED WITH DIABETES MELLITUS DUE TO UNDERLYING CONDITION, WITH FAT LAYER EXPOSED (HCC): Primary | ICD-10-CM

## 2024-08-19 DIAGNOSIS — E08.621 DIABETIC ULCER OF TOE OF LEFT FOOT ASSOCIATED WITH DIABETES MELLITUS DUE TO UNDERLYING CONDITION, WITH FAT LAYER EXPOSED (HCC): Primary | ICD-10-CM

## 2024-08-19 DIAGNOSIS — M79.672 LEFT FOOT PAIN: ICD-10-CM

## 2024-08-19 PROCEDURE — 11042 DBRDMT SUBQ TIS 1ST 20SQCM/<: CPT

## 2024-08-19 PROCEDURE — 1036F TOBACCO NON-USER: CPT

## 2024-08-19 PROCEDURE — G8428 CUR MEDS NOT DOCUMENT: HCPCS

## 2024-08-19 PROCEDURE — G8420 CALC BMI NORM PARAMETERS: HCPCS

## 2024-08-19 PROCEDURE — 3074F SYST BP LT 130 MM HG: CPT

## 2024-08-19 PROCEDURE — 3079F DIAST BP 80-89 MM HG: CPT

## 2024-08-19 PROCEDURE — 99213 OFFICE O/P EST LOW 20 MIN: CPT

## 2024-08-19 NOTE — PROGRESS NOTES
Patient instructed to remove shoes and socks and instructed to sit in exam chair.  Current PCP is Marissa Wang MD and date of last visit was 8/6/24.   Do you have a follow up visit scheduled?  Yes  If yes, the date is 9/12/24

## 2024-08-19 NOTE — PROGRESS NOTES
[facility]  [address]   [address] [place] [address]  Tel: [phone]   Fax: [phone]    Subjective     Procedure: Left hallux amputation with fillet  DOS: 7/8/2024    Interval History:  Patient returns to clinic for reevaluation of her left hallux wound.  States that she has been compliant with her dressing changes every other day and has been utilizing the Puracol dispensed to her.  States that she still notices a significant odor from the area however denies any drainage, swelling or pain.  Explains that she just recently finished her course of oral antibiotics.  No other pedal complaints at this time.    Patient presents to the clinic for re-evaluation of her surgical site. She was recently seen in the emergency department for concerns for infection to the area. She was given oral antibiotics and told to follow up outpatient. She states she has noticed increased \"drainage\" from the area but has not noticed an increase in redness or swelling. Denies any systemic signs of infection.    HPI:  This 34 y.o. female presents for a post op visit. Patient states they are doing well. Pain is well controlled by pain medication.  Has been icing and elevating the extremity as instructed preoperatively and has been weightbearing as tolerated to the heel only while utilizing her surgical screw to the left lower extremity. Denies any current nausea, vomiting, fever, chills, shortness of breath, chest pain or calf pain. Denies any other pedal complaints    Primary care physician is No primary care provider on file..    ROS:    Constitutional: Denies nausea, vomiting, fever, chills.  Neurologic: Denies numbness, tingling, and burning in the feet.    Vascular: Denies symptoms of lower extremity claudication.    Skin: Denies open wounds.  Otherwise negative except as noted in the HPI.     PMH:  Past Medical History:   Diagnosis Date    Acute osteomyelitis (HCC)     LEFT FOOT    Bipolar 1 disorder  (TOPROL XL) 50 MG extended release tablet Take 1 tablet by mouth daily 6/20/24   Gm Ocampo, DO   Blood Pressure KIT 1 kit by Does not apply route daily 6/20/24   Gm Ocampo, DO   apixaban (ELIQUIS) 5 MG TABS tablet Take 1 tablet by mouth 2 times daily 6/27/24   Gm Ocampo, DO   glucose monitoring kit 1 kit by Does not apply route daily 6/20/24   Gm Ocampo, DO   Lancets MISC 1 each by Does not apply route daily 6/20/24   [facility]   sacubitril-valsartan (ENTRESTO) 24-26 MG per tablet Take 1 tablet by mouth 2 times daily 6/20/24   Gm Ocampo, DO   spironolactone (ALDACTONE) 25 MG tablet Take 0.5 tablets by mouth daily 6/20/24   Gm Ocampo, DO       Objective     Vitals:    08/19/24 1244   BP: 120/86   Pulse: 82       Lab Results   Component Value Date    LABA1C 10.4 08/08/2023       Physical Exam:  General:  Alert and oriented x3. In no acute distress.     Patient presents weightbearing as tolerated to the only to left leg. Dressing is dry, clean, and intact with mild strike through noted.     Problem focus examination to the left lower extremity:  Incision site is 90% coapted.  Wound noted to the central aspect of the incision site.  Measures approximately 0.3 x 0.2 x 0.3 cm base of wound is fibrotic. No erythema and edema surrounding surgical site.  No purulent drainage. Slight malodor present. No fluctuance or crepitus noted   No signs of acute infection.     Patient has no pain to palpation of calf. The calf is soft, supple and nontender without evidence of DVT.  Negative Mary's test.  Satisfactory alignment is noted.     Pedal pulses are palpable.  Capillary refill time is less than five seconds to all remaining digits.  Sensations are intact to light touch.      Imaging:     Pre debridement       Post debridement             Assessment   Jane Saavedra is a 34 y.o. female with     Diagnosis Orders   1. Diabetic ulcer of toe of left

## 2024-08-26 ENCOUNTER — OFFICE VISIT (OUTPATIENT)
Dept: PODIATRY | Age: 34
End: 2024-08-26
Payer: COMMERCIAL

## 2024-08-26 VITALS — HEIGHT: 70 IN | WEIGHT: 174 LBS | BODY MASS INDEX: 24.91 KG/M2

## 2024-08-26 DIAGNOSIS — I73.9 PAD (PERIPHERAL ARTERY DISEASE) (HCC): ICD-10-CM

## 2024-08-26 DIAGNOSIS — S98.112A AMPUTATION OF LEFT GREAT TOE (HCC): ICD-10-CM

## 2024-08-26 DIAGNOSIS — E08.621 DIABETIC ULCER OF TOE OF LEFT FOOT ASSOCIATED WITH DIABETES MELLITUS DUE TO UNDERLYING CONDITION, WITH FAT LAYER EXPOSED (HCC): Primary | ICD-10-CM

## 2024-08-26 DIAGNOSIS — L97.522 DIABETIC ULCER OF TOE OF LEFT FOOT ASSOCIATED WITH DIABETES MELLITUS DUE TO UNDERLYING CONDITION, WITH FAT LAYER EXPOSED (HCC): Primary | ICD-10-CM

## 2024-08-26 PROCEDURE — G8427 DOCREV CUR MEDS BY ELIG CLIN: HCPCS

## 2024-08-26 PROCEDURE — 99213 OFFICE O/P EST LOW 20 MIN: CPT

## 2024-08-26 PROCEDURE — 1036F TOBACCO NON-USER: CPT

## 2024-08-26 PROCEDURE — G8420 CALC BMI NORM PARAMETERS: HCPCS

## 2024-08-26 PROCEDURE — 11042 DBRDMT SUBQ TIS 1ST 20SQCM/<: CPT

## 2024-08-26 NOTE — PATIENT INSTRUCTIONS
Continue Dressing changes every3 days:     Gauze, betadine, bandage    Follow up with Dr Watson (Vascular Surgery):

## 2024-08-26 NOTE — PROGRESS NOTES
[facility]  [address]   [address] [place] [address]  Tel: [phone]   Fax: [phone]    Subjective     Procedure: Left hallux amputation with fillet  DOS: 7/8/2024    Interval:   Wound is healing slowly. No SOI. No cx symptoms this visit. Plan below.    Interval History:  Patient returns to clinic for reevaluation of her left hallux wound.  States that she has been compliant with her dressing changes every other day and has been utilizing the Puracol dispensed to her.  States that she still notices a significant odor from the area however denies any drainage, swelling or pain.  Explains that she just recently finished her course of oral antibiotics.  No other pedal complaints at this time.    Patient presents to the clinic for re-evaluation of her surgical site. She was recently seen in the emergency department for concerns for infection to the area. She was given oral antibiotics and told to follow up outpatient. She states she has noticed increased \"drainage\" from the area but has not noticed an increase in redness or swelling. Denies any systemic signs of infection.    HPI:  This 34 y.o. female presents for a post op visit. Patient states they are doing well. Pain is well controlled by pain medication.  Has been icing and elevating the extremity as instructed preoperatively and has been weightbearing as tolerated to the heel only while utilizing her surgical screw to the left lower extremity. Denies any current nausea, vomiting, fever, chills, shortness of breath, chest pain or calf pain. Denies any other pedal complaints    Primary care physician is No primary care provider on file..    ROS:    Constitutional: Denies nausea, vomiting, fever, chills.  Neurologic: Denies numbness, tingling, and burning in the feet.    Vascular: Denies symptoms of lower extremity claudication.    Skin: Denies open wounds.  Otherwise negative except as noted in the HPI.     PMH:  Past Medical History:

## 2024-08-28 ENCOUNTER — HOSPITAL ENCOUNTER (INPATIENT)
Age: 34
LOS: 1 days | Discharge: HOME | DRG: 264 | End: 2024-09-02
Attending: STUDENT IN AN ORGANIZED HEALTH CARE EDUCATION/TRAINING PROGRAM | Admitting: INTERNAL MEDICINE
Payer: COMMERCIAL

## 2024-08-28 ENCOUNTER — APPOINTMENT (OUTPATIENT)
Dept: GENERAL RADIOLOGY | Age: 34
DRG: 264 | End: 2024-08-28
Payer: COMMERCIAL

## 2024-08-28 DIAGNOSIS — M79.676 PAIN OF TOE, UNSPECIFIED LATERALITY: ICD-10-CM

## 2024-08-28 DIAGNOSIS — I20.9 ANGINA PECTORIS (HCC): ICD-10-CM

## 2024-08-28 DIAGNOSIS — R07.9 CHEST PAIN, UNSPECIFIED TYPE: Primary | ICD-10-CM

## 2024-08-28 LAB
ANION GAP SERPL CALCULATED.3IONS-SCNC: 12 MMOL/L (ref 9–17)
ATYPICAL LYMPHOCYTE ABSOLUTE COUNT: 0.08 K/UL
ATYPICAL LYMPHOCYTES: 1 %
BASOPHILS # BLD: 0 K/UL (ref 0–0.2)
BASOPHILS NFR BLD: 0 % (ref 0–2)
BUN SERPL-MCNC: 13 MG/DL (ref 6–20)
CALCIUM SERPL-MCNC: 8.9 MG/DL (ref 8.6–10.4)
CHLORIDE SERPL-SCNC: 98 MMOL/L (ref 98–107)
CO2 SERPL-SCNC: 23 MMOL/L (ref 20–31)
CREAT SERPL-MCNC: 0.5 MG/DL (ref 0.5–0.9)
CRP SERPL HS-MCNC: 3.5 MG/L (ref 0–5)
EOSINOPHIL # BLD: 0.17 K/UL (ref 0–0.4)
EOSINOPHILS RELATIVE PERCENT: 2 % (ref 0–4)
ERYTHROCYTE [DISTWIDTH] IN BLOOD BY AUTOMATED COUNT: 15.3 % (ref 11.5–14.9)
GFR, ESTIMATED: >90 ML/MIN/1.73M2
GLUCOSE BLD-MCNC: 229 MG/DL (ref 65–105)
GLUCOSE SERPL-MCNC: 518 MG/DL (ref 70–99)
HCT VFR BLD AUTO: 40.5 % (ref 36–46)
HGB BLD-MCNC: 13.3 G/DL (ref 12–16)
INR PPP: 1
LYMPHOCYTES NFR BLD: 4.87 K/UL (ref 1–4.8)
LYMPHOCYTES RELATIVE PERCENT: 58 % (ref 24–44)
MAGNESIUM SERPL-MCNC: 1.9 MG/DL (ref 1.6–2.6)
MCH RBC QN AUTO: 30.4 PG (ref 26–34)
MCHC RBC AUTO-ENTMCNC: 32.9 G/DL (ref 31–37)
MCV RBC AUTO: 92.5 FL (ref 80–100)
MONOCYTES NFR BLD: 0.34 K/UL (ref 0.1–1.3)
MONOCYTES NFR BLD: 4 % (ref 1–7)
MORPHOLOGY: ABNORMAL
MORPHOLOGY: ABNORMAL
NEUTROPHILS NFR BLD: 35 % (ref 36–66)
NEUTS SEG NFR BLD: 2.94 K/UL (ref 1.3–9.1)
PLATELET # BLD AUTO: 251 K/UL (ref 150–450)
PMV BLD AUTO: 8.1 FL (ref 6–12)
POTASSIUM SERPL-SCNC: 4.7 MMOL/L (ref 3.7–5.3)
PROTHROMBIN TIME: 13 SEC (ref 11.8–14.6)
RBC # BLD AUTO: 4.37 M/UL (ref 4–5.2)
SODIUM SERPL-SCNC: 133 MMOL/L (ref 135–144)
TROPONIN I SERPL HS-MCNC: 12 NG/L (ref 0–14)
TROPONIN I SERPL HS-MCNC: 14 NG/L (ref 0–14)
WBC OTHER # BLD: 8.4 K/UL (ref 3.5–11)

## 2024-08-28 PROCEDURE — 93005 ELECTROCARDIOGRAM TRACING: CPT | Performed by: STUDENT IN AN ORGANIZED HEALTH CARE EDUCATION/TRAINING PROGRAM

## 2024-08-28 PROCEDURE — G0378 HOSPITAL OBSERVATION PER HR: HCPCS

## 2024-08-28 PROCEDURE — 86140 C-REACTIVE PROTEIN: CPT

## 2024-08-28 PROCEDURE — 96374 THER/PROPH/DIAG INJ IV PUSH: CPT

## 2024-08-28 PROCEDURE — 6360000002 HC RX W HCPCS: Performed by: STUDENT IN AN ORGANIZED HEALTH CARE EDUCATION/TRAINING PROGRAM

## 2024-08-28 PROCEDURE — 36415 COLL VENOUS BLD VENIPUNCTURE: CPT

## 2024-08-28 PROCEDURE — 84484 ASSAY OF TROPONIN QUANT: CPT

## 2024-08-28 PROCEDURE — 6370000000 HC RX 637 (ALT 250 FOR IP): Performed by: STUDENT IN AN ORGANIZED HEALTH CARE EDUCATION/TRAINING PROGRAM

## 2024-08-28 PROCEDURE — 85025 COMPLETE CBC W/AUTO DIFF WBC: CPT

## 2024-08-28 PROCEDURE — 71045 X-RAY EXAM CHEST 1 VIEW: CPT

## 2024-08-28 PROCEDURE — 73630 X-RAY EXAM OF FOOT: CPT

## 2024-08-28 PROCEDURE — 99285 EMERGENCY DEPT VISIT HI MDM: CPT

## 2024-08-28 PROCEDURE — 80048 BASIC METABOLIC PNL TOTAL CA: CPT

## 2024-08-28 PROCEDURE — 85610 PROTHROMBIN TIME: CPT

## 2024-08-28 PROCEDURE — 96372 THER/PROPH/DIAG INJ SC/IM: CPT

## 2024-08-28 PROCEDURE — 83735 ASSAY OF MAGNESIUM: CPT

## 2024-08-28 PROCEDURE — 96375 TX/PRO/DX INJ NEW DRUG ADDON: CPT

## 2024-08-28 PROCEDURE — 82947 ASSAY GLUCOSE BLOOD QUANT: CPT

## 2024-08-28 RX ORDER — ASPIRIN 81 MG/1
324 TABLET, CHEWABLE ORAL ONCE
Status: COMPLETED | OUTPATIENT
Start: 2024-08-28 | End: 2024-08-28

## 2024-08-28 RX ORDER — MORPHINE SULFATE 4 MG/ML
4 INJECTION, SOLUTION INTRAMUSCULAR; INTRAVENOUS ONCE
Status: COMPLETED | OUTPATIENT
Start: 2024-08-28 | End: 2024-08-28

## 2024-08-28 RX ORDER — INSULIN LISPRO 100 [IU]/ML
14 INJECTION, SOLUTION INTRAVENOUS; SUBCUTANEOUS ONCE
Status: COMPLETED | OUTPATIENT
Start: 2024-08-28 | End: 2024-08-28

## 2024-08-28 RX ORDER — FENTANYL CITRATE 0.05 MG/ML
50 INJECTION, SOLUTION INTRAMUSCULAR; INTRAVENOUS ONCE
Status: COMPLETED | OUTPATIENT
Start: 2024-08-28 | End: 2024-08-28

## 2024-08-28 RX ADMIN — INSULIN LISPRO 14 UNITS: 100 INJECTION, SOLUTION INTRAVENOUS; SUBCUTANEOUS at 21:38

## 2024-08-28 RX ADMIN — FENTANYL CITRATE 50 MCG: 0.05 INJECTION, SOLUTION INTRAMUSCULAR; INTRAVENOUS at 22:39

## 2024-08-28 RX ADMIN — MORPHINE SULFATE 4 MG: 4 INJECTION, SOLUTION INTRAMUSCULAR; INTRAVENOUS at 20:55

## 2024-08-28 RX ADMIN — ASPIRIN 81 MG 324 MG: 81 TABLET ORAL at 20:55

## 2024-08-28 ASSESSMENT — PAIN DESCRIPTION - FREQUENCY: FREQUENCY: CONTINUOUS

## 2024-08-28 ASSESSMENT — PAIN SCALES - GENERAL
PAINLEVEL_OUTOF10: 10

## 2024-08-28 ASSESSMENT — PAIN DESCRIPTION - LOCATION
LOCATION: FOOT
LOCATION: FOOT
LOCATION: CHEST
LOCATION: FOOT

## 2024-08-28 ASSESSMENT — PAIN DESCRIPTION - ORIENTATION
ORIENTATION: RIGHT;LEFT

## 2024-08-28 ASSESSMENT — LIFESTYLE VARIABLES
HOW MANY STANDARD DRINKS CONTAINING ALCOHOL DO YOU HAVE ON A TYPICAL DAY: PATIENT DOES NOT DRINK
HOW OFTEN DO YOU HAVE A DRINK CONTAINING ALCOHOL: NEVER

## 2024-08-28 ASSESSMENT — HEART SCORE: ECG: NON-SPECIFC REPOLARIZATION DISTURBANCE/LBTB/PM

## 2024-08-28 ASSESSMENT — PAIN - FUNCTIONAL ASSESSMENT: PAIN_FUNCTIONAL_ASSESSMENT: 0-10

## 2024-08-29 LAB
ANION GAP SERPL CALCULATED.3IONS-SCNC: 7 MMOL/L (ref 9–17)
BASOPHILS # BLD: 0 K/UL (ref 0–0.2)
BASOPHILS NFR BLD: 0 % (ref 0–2)
BUN SERPL-MCNC: 13 MG/DL (ref 6–20)
CALCIUM SERPL-MCNC: 8.7 MG/DL (ref 8.6–10.4)
CHLORIDE SERPL-SCNC: 100 MMOL/L (ref 98–107)
CO2 SERPL-SCNC: 28 MMOL/L (ref 20–31)
CREAT SERPL-MCNC: 0.5 MG/DL (ref 0.5–0.9)
EKG ATRIAL RATE: 89 BPM
EKG P AXIS: 40 DEGREES
EKG P-R INTERVAL: 150 MS
EKG Q-T INTERVAL: 376 MS
EKG QRS DURATION: 80 MS
EKG QTC CALCULATION (BAZETT): 457 MS
EKG R AXIS: 87 DEGREES
EKG T AXIS: 13 DEGREES
EKG VENTRICULAR RATE: 89 BPM
EOSINOPHIL # BLD: 0.08 K/UL (ref 0–0.4)
EOSINOPHILS RELATIVE PERCENT: 1 % (ref 0–4)
ERYTHROCYTE [DISTWIDTH] IN BLOOD BY AUTOMATED COUNT: 14.9 % (ref 11.5–14.9)
ERYTHROCYTE [SEDIMENTATION RATE] IN BLOOD BY PHOTOMETRIC METHOD: 40 MM/HR (ref 0–20)
GFR, ESTIMATED: >90 ML/MIN/1.73M2
GLUCOSE BLD-MCNC: 190 MG/DL (ref 65–105)
GLUCOSE BLD-MCNC: 196 MG/DL (ref 65–105)
GLUCOSE BLD-MCNC: 276 MG/DL (ref 65–105)
GLUCOSE BLD-MCNC: 427 MG/DL (ref 65–105)
GLUCOSE SERPL-MCNC: 313 MG/DL (ref 70–99)
HCT VFR BLD AUTO: 39 % (ref 36–46)
HGB BLD-MCNC: 12.9 G/DL (ref 12–16)
LYMPHOCYTES NFR BLD: 4.94 K/UL (ref 1–4.8)
LYMPHOCYTES RELATIVE PERCENT: 61 % (ref 24–44)
MCH RBC QN AUTO: 30.5 PG (ref 26–34)
MCHC RBC AUTO-ENTMCNC: 33.2 G/DL (ref 31–37)
MCV RBC AUTO: 92 FL (ref 80–100)
MONOCYTES NFR BLD: 0.57 K/UL (ref 0.1–1.3)
MONOCYTES NFR BLD: 7 % (ref 1–7)
MORPHOLOGY: ABNORMAL
NEUTROPHILS NFR BLD: 31 % (ref 36–66)
NEUTS SEG NFR BLD: 2.51 K/UL (ref 1.3–9.1)
PLATELET # BLD AUTO: 235 K/UL (ref 150–450)
PMV BLD AUTO: 8 FL (ref 6–12)
POTASSIUM SERPL-SCNC: 4.4 MMOL/L (ref 3.7–5.3)
RBC # BLD AUTO: 4.24 M/UL (ref 4–5.2)
SODIUM SERPL-SCNC: 135 MMOL/L (ref 135–144)
WBC OTHER # BLD: 8.1 K/UL (ref 3.5–11)

## 2024-08-29 PROCEDURE — 85025 COMPLETE CBC W/AUTO DIFF WBC: CPT

## 2024-08-29 PROCEDURE — G0378 HOSPITAL OBSERVATION PER HR: HCPCS

## 2024-08-29 PROCEDURE — 0JBQ0ZZ EXCISION OF RIGHT FOOT SUBCUTANEOUS TISSUE AND FASCIA, OPEN APPROACH: ICD-10-PCS | Performed by: PODIATRIST

## 2024-08-29 PROCEDURE — 36415 COLL VENOUS BLD VENIPUNCTURE: CPT

## 2024-08-29 PROCEDURE — 2580000003 HC RX 258

## 2024-08-29 PROCEDURE — 99223 1ST HOSP IP/OBS HIGH 75: CPT | Performed by: INTERNAL MEDICINE

## 2024-08-29 PROCEDURE — 85652 RBC SED RATE AUTOMATED: CPT

## 2024-08-29 PROCEDURE — 6370000000 HC RX 637 (ALT 250 FOR IP): Performed by: INTERNAL MEDICINE

## 2024-08-29 PROCEDURE — 2580000003 HC RX 258: Performed by: INTERNAL MEDICINE

## 2024-08-29 PROCEDURE — 80048 BASIC METABOLIC PNL TOTAL CA: CPT

## 2024-08-29 PROCEDURE — 82947 ASSAY GLUCOSE BLOOD QUANT: CPT

## 2024-08-29 PROCEDURE — 6370000000 HC RX 637 (ALT 250 FOR IP)

## 2024-08-29 PROCEDURE — 6360000002 HC RX W HCPCS: Performed by: INTERNAL MEDICINE

## 2024-08-29 RX ORDER — DEXTROSE MONOHYDRATE 100 MG/ML
INJECTION, SOLUTION INTRAVENOUS CONTINUOUS PRN
Status: DISCONTINUED | OUTPATIENT
Start: 2024-08-29 | End: 2024-09-02 | Stop reason: HOSPADM

## 2024-08-29 RX ORDER — SPIRONOLACTONE 25 MG/1
12.5 TABLET ORAL DAILY
Status: DISCONTINUED | OUTPATIENT
Start: 2024-08-29 | End: 2024-09-01

## 2024-08-29 RX ORDER — BISACODYL 10 MG
10 SUPPOSITORY, RECTAL RECTAL DAILY PRN
Status: DISCONTINUED | OUTPATIENT
Start: 2024-08-29 | End: 2024-09-02 | Stop reason: HOSPADM

## 2024-08-29 RX ORDER — INSULIN GLARGINE 100 [IU]/ML
25 INJECTION, SOLUTION SUBCUTANEOUS
Status: DISCONTINUED | OUTPATIENT
Start: 2024-08-29 | End: 2024-08-30

## 2024-08-29 RX ORDER — POTASSIUM CHLORIDE 1500 MG/1
40 TABLET, EXTENDED RELEASE ORAL PRN
Status: DISCONTINUED | OUTPATIENT
Start: 2024-08-29 | End: 2024-09-02 | Stop reason: HOSPADM

## 2024-08-29 RX ORDER — POTASSIUM CHLORIDE 7.45 MG/ML
10 INJECTION INTRAVENOUS PRN
Status: DISCONTINUED | OUTPATIENT
Start: 2024-08-29 | End: 2024-09-02 | Stop reason: HOSPADM

## 2024-08-29 RX ORDER — SODIUM CHLORIDE 0.9 % (FLUSH) 0.9 %
10 SYRINGE (ML) INJECTION PRN
Status: DISCONTINUED | OUTPATIENT
Start: 2024-08-29 | End: 2024-09-02 | Stop reason: HOSPADM

## 2024-08-29 RX ORDER — MAGNESIUM SULFATE HEPTAHYDRATE 40 MG/ML
2000 INJECTION, SOLUTION INTRAVENOUS PRN
Status: DISCONTINUED | OUTPATIENT
Start: 2024-08-29 | End: 2024-09-02 | Stop reason: HOSPADM

## 2024-08-29 RX ORDER — SODIUM CHLORIDE 9 MG/ML
INJECTION, SOLUTION INTRAVENOUS PRN
Status: DISCONTINUED | OUTPATIENT
Start: 2024-08-29 | End: 2024-09-02 | Stop reason: HOSPADM

## 2024-08-29 RX ORDER — ACETAMINOPHEN 650 MG/1
650 SUPPOSITORY RECTAL EVERY 6 HOURS PRN
Status: DISCONTINUED | OUTPATIENT
Start: 2024-08-29 | End: 2024-09-02 | Stop reason: HOSPADM

## 2024-08-29 RX ORDER — METOPROLOL SUCCINATE 50 MG/1
50 TABLET, EXTENDED RELEASE ORAL DAILY
Status: DISCONTINUED | OUTPATIENT
Start: 2024-08-29 | End: 2024-09-02 | Stop reason: HOSPADM

## 2024-08-29 RX ORDER — ASPIRIN 81 MG/1
81 TABLET ORAL DAILY
Status: DISCONTINUED | OUTPATIENT
Start: 2024-08-29 | End: 2024-09-02 | Stop reason: HOSPADM

## 2024-08-29 RX ORDER — ONDANSETRON 2 MG/ML
4 INJECTION INTRAMUSCULAR; INTRAVENOUS EVERY 6 HOURS PRN
Status: DISCONTINUED | OUTPATIENT
Start: 2024-08-29 | End: 2024-09-02 | Stop reason: HOSPADM

## 2024-08-29 RX ORDER — ATORVASTATIN CALCIUM 80 MG/1
80 TABLET, FILM COATED ORAL NIGHTLY
Status: DISCONTINUED | OUTPATIENT
Start: 2024-08-29 | End: 2024-09-02 | Stop reason: HOSPADM

## 2024-08-29 RX ORDER — ACETAMINOPHEN 325 MG/1
650 TABLET ORAL EVERY 6 HOURS PRN
Status: DISCONTINUED | OUTPATIENT
Start: 2024-08-29 | End: 2024-09-02 | Stop reason: HOSPADM

## 2024-08-29 RX ORDER — POLYETHYLENE GLYCOL 3350 17 G/17G
17 POWDER, FOR SOLUTION ORAL DAILY PRN
Status: DISCONTINUED | OUTPATIENT
Start: 2024-08-29 | End: 2024-09-02 | Stop reason: HOSPADM

## 2024-08-29 RX ORDER — INSULIN LISPRO 100 [IU]/ML
0-16 INJECTION, SOLUTION INTRAVENOUS; SUBCUTANEOUS
Status: DISCONTINUED | OUTPATIENT
Start: 2024-08-29 | End: 2024-09-02 | Stop reason: HOSPADM

## 2024-08-29 RX ORDER — ONDANSETRON 4 MG/1
4 TABLET, ORALLY DISINTEGRATING ORAL EVERY 8 HOURS PRN
Status: DISCONTINUED | OUTPATIENT
Start: 2024-08-29 | End: 2024-09-02 | Stop reason: HOSPADM

## 2024-08-29 RX ORDER — ISOSORBIDE MONONITRATE 30 MG/1
30 TABLET, EXTENDED RELEASE ORAL DAILY
Status: DISCONTINUED | OUTPATIENT
Start: 2024-08-29 | End: 2024-09-02 | Stop reason: HOSPADM

## 2024-08-29 RX ORDER — LANOLIN ALCOHOL/MO/W.PET/CERES
3 CREAM (GRAM) TOPICAL NIGHTLY PRN
Status: DISCONTINUED | OUTPATIENT
Start: 2024-08-29 | End: 2024-09-02 | Stop reason: HOSPADM

## 2024-08-29 RX ORDER — OXYCODONE HYDROCHLORIDE 5 MG/1
5 TABLET ORAL EVERY 4 HOURS PRN
Status: COMPLETED | OUTPATIENT
Start: 2024-08-29 | End: 2024-08-30

## 2024-08-29 RX ORDER — EZETIMIBE 10 MG/1
10 TABLET ORAL DAILY
Status: DISCONTINUED | OUTPATIENT
Start: 2024-08-29 | End: 2024-09-02 | Stop reason: HOSPADM

## 2024-08-29 RX ORDER — SODIUM CHLORIDE 0.9 % (FLUSH) 0.9 %
5-40 SYRINGE (ML) INJECTION EVERY 12 HOURS SCHEDULED
Status: DISCONTINUED | OUTPATIENT
Start: 2024-08-29 | End: 2024-09-02 | Stop reason: HOSPADM

## 2024-08-29 RX ORDER — INSULIN LISPRO 100 [IU]/ML
0-4 INJECTION, SOLUTION INTRAVENOUS; SUBCUTANEOUS NIGHTLY
Status: DISCONTINUED | OUTPATIENT
Start: 2024-08-29 | End: 2024-09-02 | Stop reason: HOSPADM

## 2024-08-29 RX ORDER — INSULIN GLARGINE 100 [IU]/ML
10 INJECTION, SOLUTION SUBCUTANEOUS
Status: DISCONTINUED | OUTPATIENT
Start: 2024-08-29 | End: 2024-08-29

## 2024-08-29 RX ADMIN — ISOSORBIDE MONONITRATE 30 MG: 30 TABLET, EXTENDED RELEASE ORAL at 12:37

## 2024-08-29 RX ADMIN — PIPERACILLIN AND TAZOBACTAM 4500 MG: 4; .5 INJECTION, POWDER, LYOPHILIZED, FOR SOLUTION INTRAVENOUS at 12:47

## 2024-08-29 RX ADMIN — METOPROLOL SUCCINATE 50 MG: 50 TABLET, EXTENDED RELEASE ORAL at 10:03

## 2024-08-29 RX ADMIN — INSULIN LISPRO 16 UNITS: 100 INJECTION, SOLUTION INTRAVENOUS; SUBCUTANEOUS at 13:45

## 2024-08-29 RX ADMIN — ACETAMINOPHEN 650 MG: 325 TABLET ORAL at 05:53

## 2024-08-29 RX ADMIN — APIXABAN 5 MG: 5 TABLET, FILM COATED ORAL at 10:03

## 2024-08-29 RX ADMIN — ATORVASTATIN CALCIUM 80 MG: 80 TABLET, FILM COATED ORAL at 20:56

## 2024-08-29 RX ADMIN — ASPIRIN 81 MG: 81 TABLET, COATED ORAL at 10:03

## 2024-08-29 RX ADMIN — SODIUM CHLORIDE, PRESERVATIVE FREE 10 ML: 5 INJECTION INTRAVENOUS at 10:12

## 2024-08-29 RX ADMIN — OXYCODONE HYDROCHLORIDE 5 MG: 5 TABLET ORAL at 19:57

## 2024-08-29 RX ADMIN — PIPERACILLIN AND TAZOBACTAM 3375 MG: 3; .375 INJECTION, POWDER, LYOPHILIZED, FOR SOLUTION INTRAVENOUS at 18:03

## 2024-08-29 RX ADMIN — OXYCODONE HYDROCHLORIDE 5 MG: 5 TABLET ORAL at 14:29

## 2024-08-29 RX ADMIN — SACUBITRIL AND VALSARTAN 1 TABLET: 24; 26 TABLET, FILM COATED ORAL at 10:03

## 2024-08-29 RX ADMIN — VANCOMYCIN HYDROCHLORIDE 2000 MG: 1 INJECTION, POWDER, LYOPHILIZED, FOR SOLUTION INTRAVENOUS at 13:47

## 2024-08-29 RX ADMIN — SACUBITRIL AND VALSARTAN 1 TABLET: 24; 26 TABLET, FILM COATED ORAL at 20:56

## 2024-08-29 RX ADMIN — OXYCODONE HYDROCHLORIDE 5 MG: 5 TABLET ORAL at 10:15

## 2024-08-29 RX ADMIN — EZETIMIBE 10 MG: 10 TABLET ORAL at 10:03

## 2024-08-29 RX ADMIN — APIXABAN 5 MG: 5 TABLET, FILM COATED ORAL at 20:56

## 2024-08-29 RX ADMIN — INSULIN GLARGINE 25 UNITS: 100 INJECTION, SOLUTION SUBCUTANEOUS at 20:56

## 2024-08-29 RX ADMIN — OXYCODONE HYDROCHLORIDE 5 MG: 5 TABLET ORAL at 05:54

## 2024-08-29 RX ADMIN — SPIRONOLACTONE 12.5 MG: 25 TABLET ORAL at 10:02

## 2024-08-29 ASSESSMENT — PAIN DESCRIPTION - DESCRIPTORS
DESCRIPTORS: ACHING;BURNING;STABBING
DESCRIPTORS: ACHING

## 2024-08-29 ASSESSMENT — PAIN DESCRIPTION - ORIENTATION
ORIENTATION: LEFT;RIGHT
ORIENTATION: RIGHT;LEFT

## 2024-08-29 ASSESSMENT — PAIN SCALES - GENERAL
PAINLEVEL_OUTOF10: 9
PAINLEVEL_OUTOF10: 10
PAINLEVEL_OUTOF10: 9
PAINLEVEL_OUTOF10: 2
PAINLEVEL_OUTOF10: 9
PAINLEVEL_OUTOF10: 6

## 2024-08-29 ASSESSMENT — PAIN DESCRIPTION - LOCATION
LOCATION: FOOT

## 2024-08-29 ASSESSMENT — PAIN - FUNCTIONAL ASSESSMENT
PAIN_FUNCTIONAL_ASSESSMENT: PREVENTS OR INTERFERES SOME ACTIVE ACTIVITIES AND ADLS
PAIN_FUNCTIONAL_ASSESSMENT: PREVENTS OR INTERFERES SOME ACTIVE ACTIVITIES AND ADLS

## 2024-08-29 NOTE — ED TRIAGE NOTES
Pt here for CP x 3 days. Pt had a CABG was July 8, 2023. Pt had a right toe amputation was July 2024.  Pt noticed a blister and swelling on her right foot yesterday. More swollen today.

## 2024-08-29 NOTE — PLAN OF CARE
Problem: Discharge Planning  Goal: Discharge to home or other facility with appropriate resources  8/29/2024 1549 by Irene Henderson, RN  Outcome: Progressing     Problem: Pain  Goal: Verbalizes/displays adequate comfort level or baseline comfort level  8/29/2024 1549 by Irene Henderson, RN  Outcome: Progressing  Pt medicated with pain medication prn.  Assessed all pain characteristics including level, type, location, frequency, and onset.  Non-pharmacologic interventions offered to pt as well.  Pt does not complain of acute pain at this time. Will continue to monitor

## 2024-08-29 NOTE — H&P
[facility]   IN-PATIENT SERVICE   [facility]    HISTORY AND PHYSICAL EXAMINATION            Date:   8/29/2024  Patient name:  Jane Saavedra  Date of admission:  8/28/2024  8:24 PM  MRN:   [id number]  Account:  [id number]  YOB: 1990  PCP:    No primary care provider on file.  Room:   [address] [place] [address]  Code Status:    Full Code    Chief Complaint:     Chief Complaint   Patient presents with    Chest Pain       History Obtained From:     patient    History of Present Illness:     The patient is a 34 y.o.   /  female who presents with Chest Pain   and she is admitted to the hospital for the management of      Jane Saavedra is a 34 y.o.  /  female who presents with Chest Pain   and is admitted to the hospital for the management of Chest pain.     Patient seen in the ED for complaints of chest pain, pain in the ray of the first great toe, and a blister on the right lateral aspect of the foot. Patient has a significant medical hsiotry of type 2 diabetes, hypertension, PAD, hyperlipidemia, and a recent left great toe amputation (7/9/2024).     Patient states that she has been experiencing chest pain for three day, each day she had taken a sublingual Nitroglycerin that made the pain tolerable enough to get through the day. The patient has been dealing with pain in each foot ever since her amputation in July. The patient has been attending weekly podiatry appointments for dressing changes of her left first ray. (Wound photos available in chart). After original amputation patient experienced medication insecurity that caused a delay in her outpatient antibiotics, which resulted in patient experiencing a post op infection and pain. Troponin's are negative, patient's blood glucose was 514 on chemistry, short acting insulin given. XR of left and right foot both show soft tissue swelling. Appreciate input from podiatry on continuation of care.      Patient to

## 2024-08-29 NOTE — CONSULTS
Consult Note  Foot and Ankle Surgery    Subjective     Chief Complaint: Bilateral foot wounds    HPI:  Jane Saavedra is a 34 y.o. female seen at [facility] with complaint of bilateral foot wounds.  Patient is familiar to our service with a history of diabetic ulcerations and an amputation performed on 7/8/2024.  She has been following up at her outpatient clinic last seen on 8/26/2024.  Attention was directed to her left foot and the healing of the amputation site at that time.  Patient states after that visit she noticed a blister on the outside of her right foot.  It brought her pain and was progressing.  Patient does have a significant past medical history including diabetes mellitus with significant peripheral neuropathy, peripheral arterial disease, NSTEMI.  Sugars are 427.  Patient does also present with chest tightness and chest pain.  She denies any nausea, fever, chills, shortness of breath.  Bilateral x-rays were negative on my arrival.    PCP is No primary care provider on file.    ROS:   Review of Systems   Constitutional: Negative.    HENT: Negative.     Eyes: Negative.    Respiratory:  Positive for chest tightness.    Cardiovascular:  Positive for chest pain.   Gastrointestinal: Negative.    Endocrine: Negative.    Genitourinary: Negative.    Musculoskeletal:  Positive for arthralgias, gait problem and joint swelling.   Skin:  Positive for color change and wound.   Allergic/Immunologic: Negative.    Neurological:  Positive for numbness.   Hematological: Negative.    Psychiatric/Behavioral: Negative.         Past Medical History   has a past medical history of Acute osteomyelitis ([facility]), Bipolar 1 disorder (HCC), Cellulitis, Chronic diabetic ulcer of left foot determined by examination ([facility]), Depression, Diabetic foot infection ([facility]), H. pylori infection, History of recent hospitalization, Hyperlipidemia, Hypertension, Ischemic cardiomyopathy, Multiple vessel coronary artery disease,

## 2024-08-29 NOTE — ED PROVIDER NOTES
EMERGENCY DEPARTMENT ENCOUNTER    Pt Name: Jane Saavedra  MRN: [id number]  Birthdate 1990  Date of evaluation: 8/28/24  CHIEF COMPLAINT       Chief Complaint   Patient presents with    Chest Pain     HISTORY OF PRESENT ILLNESS   34-year-old presenting with a couple of issues tonight    Patient has extensive history including hypertension, hyperlipidemia, diabetes, CAD with previous stents and CABG, peripheral artery disease with left great toe amputation in the past    She is presenting with chest pain.  She has had chest pain intermittent for the last 3 days.  Feels like a pressure and ache.  Sometimes goes to the right arm.  Currently minimal pain.  She took nitro each of the last few days which did seem to help.  Does feel similar to previous cardiac chest pain for her    Also she has a blister on her right lateral foot    She is concerned it may be becoming infected and has some swelling around the area    Finally she has having some phantom pain where she had her amputation                  REVIEW OF SYSTEMS     Review of Systems   Constitutional:  Negative for chills and fever.   HENT:  Negative for rhinorrhea and sore throat.    Eyes:  Negative for discharge and redness.   Respiratory:  Negative for shortness of breath.    Cardiovascular:  Positive for chest pain.   Gastrointestinal:  Negative for abdominal pain, diarrhea, nausea and vomiting.   Genitourinary:  Negative for dysuria, frequency and urgency.   Musculoskeletal:  Negative for arthralgias and myalgias.        Phantom pain   Skin:  Positive for wound. Negative for rash.   Neurological:  Negative for weakness and numbness.   Psychiatric/Behavioral:  Negative for suicidal ideas.    All other systems reviewed and are negative.    PASTMEDICAL HISTORY     Past Medical History:   Diagnosis Date    Acute osteomyelitis (HCC)     LEFT FOOT    Bipolar 1 disorder (HCC)     Cellulitis     LT FOOT    Chronic diabetic ulcer of left foot determined by

## 2024-08-30 ENCOUNTER — APPOINTMENT (OUTPATIENT)
Dept: NUCLEAR MEDICINE | Age: 34
DRG: 264 | End: 2024-08-30
Payer: COMMERCIAL

## 2024-08-30 ENCOUNTER — APPOINTMENT (OUTPATIENT)
Age: 34
DRG: 264 | End: 2024-08-30
Attending: INTERNAL MEDICINE
Payer: COMMERCIAL

## 2024-08-30 ENCOUNTER — APPOINTMENT (OUTPATIENT)
Dept: MRI IMAGING | Age: 34
DRG: 264 | End: 2024-08-30
Payer: COMMERCIAL

## 2024-08-30 ENCOUNTER — HOSPITAL ENCOUNTER (OUTPATIENT)
Age: 34
Setting detail: OBSERVATION
Discharge: HOME | DRG: 264 | End: 2024-09-01
Payer: COMMERCIAL

## 2024-08-30 ENCOUNTER — CARE COORDINATION (OUTPATIENT)
Dept: FAMILY MEDICINE CLINIC | Age: 34
End: 2024-08-30

## 2024-08-30 PROBLEM — E11.628 TYPE 2 DIABETES MELLITUS WITH RIGHT DIABETIC FOOT INFECTION (HCC): Status: ACTIVE | Noted: 2024-07-08

## 2024-08-30 PROBLEM — L08.9 TYPE 2 DIABETES MELLITUS WITH RIGHT DIABETIC FOOT INFECTION (HCC): Status: ACTIVE | Noted: 2024-07-08

## 2024-08-30 LAB
ANION GAP SERPL CALCULATED.3IONS-SCNC: 13 MMOL/L (ref 9–17)
ATYPICAL LYMPHOCYTE ABSOLUTE COUNT: 0.08 K/UL
ATYPICAL LYMPHOCYTES: 1 %
BASOPHILS # BLD: 0 K/UL (ref 0–0.2)
BASOPHILS NFR BLD: 0 % (ref 0–2)
BUN SERPL-MCNC: 13 MG/DL (ref 6–20)
CALCIUM SERPL-MCNC: 8.6 MG/DL (ref 8.6–10.4)
CHLORIDE SERPL-SCNC: 100 MMOL/L (ref 98–107)
CO2 SERPL-SCNC: 22 MMOL/L (ref 20–31)
CREAT SERPL-MCNC: <0.4 MG/DL (ref 0.5–0.9)
DATE LAST DOSE: NORMAL
ECHO BSA: 1.97 M2
ECHO BSA: 1.97 M2
ECHO LV EDV A2C: 124 ML
ECHO LV EDV A4C: 119 ML
ECHO LV EDV INDEX A4C: 60 ML/M2
ECHO LV EDV NDEX A2C: 63 ML/M2
ECHO LV EF PHYSICIAN: 70 %
ECHO LV EJECTION FRACTION A2C: 72 %
ECHO LV EJECTION FRACTION A4C: 78 %
ECHO LV EJECTION FRACTION BIPLANE: 75 % (ref 55–100)
ECHO LV ESV A2C: 35 ML
ECHO LV ESV A4C: 27 ML
ECHO LV ESV INDEX A2C: 18 ML/M2
ECHO LV ESV INDEX A4C: 14 ML/M2
ECHO LV FRACTIONAL SHORTENING: 42 % (ref 28–44)
ECHO LV INTERNAL DIMENSION DIASTOLE INDEX: 2.28 CM/M2
ECHO LV INTERNAL DIMENSION DIASTOLIC: 4.5 CM (ref 3.9–5.3)
ECHO LV INTERNAL DIMENSION SYSTOLIC INDEX: 1.32 CM/M2
ECHO LV INTERNAL DIMENSION SYSTOLIC: 2.6 CM
ECHO LV IVSD: 1.4 CM (ref 0.6–0.9)
ECHO LV MASS 2D: 248.4 G (ref 67–162)
ECHO LV MASS INDEX 2D: 126.1 G/M2 (ref 43–95)
ECHO LV POSTERIOR WALL DIASTOLIC: 1.4 CM (ref 0.6–0.9)
ECHO LV RELATIVE WALL THICKNESS RATIO: 0.62
EOSINOPHIL # BLD: 0.16 K/UL (ref 0–0.4)
EOSINOPHILS RELATIVE PERCENT: 2 % (ref 0–4)
ERYTHROCYTE [DISTWIDTH] IN BLOOD BY AUTOMATED COUNT: 15.1 % (ref 11.5–14.9)
GFR, ESTIMATED: ABNORMAL ML/MIN/1.73M2
GLUCOSE BLD-MCNC: 221 MG/DL (ref 65–105)
GLUCOSE BLD-MCNC: 270 MG/DL (ref 65–105)
GLUCOSE BLD-MCNC: 405 MG/DL (ref 65–105)
GLUCOSE SERPL-MCNC: 296 MG/DL (ref 70–99)
HCG SERPL QL: NEGATIVE
HCT VFR BLD AUTO: 39.8 % (ref 36–46)
HGB BLD-MCNC: 12.9 G/DL (ref 12–16)
LYMPHOCYTES NFR BLD: 4.34 K/UL (ref 1–4.8)
LYMPHOCYTES RELATIVE PERCENT: 55 % (ref 24–44)
MCH RBC QN AUTO: 30.6 PG (ref 26–34)
MCHC RBC AUTO-ENTMCNC: 32.3 G/DL (ref 31–37)
MCV RBC AUTO: 94.6 FL (ref 80–100)
MONOCYTES NFR BLD: 0.4 K/UL (ref 0.1–1.3)
MONOCYTES NFR BLD: 5 % (ref 1–7)
MORPHOLOGY: ABNORMAL
NEUTROPHILS NFR BLD: 37 % (ref 36–66)
NEUTS SEG NFR BLD: 2.92 K/UL (ref 1.3–9.1)
PLATELET # BLD AUTO: 238 K/UL (ref 150–450)
PMV BLD AUTO: 7.7 FL (ref 6–12)
POTASSIUM SERPL-SCNC: 4.2 MMOL/L (ref 3.7–5.3)
RBC # BLD AUTO: 4.21 M/UL (ref 4–5.2)
SODIUM SERPL-SCNC: 135 MMOL/L (ref 135–144)
STRESS BASELINE DIAS BP: 59 MMHG
STRESS BASELINE HR: 65 BPM
STRESS BASELINE SYS BP: 105 MMHG
STRESS ESTIMATED WORKLOAD: 1 METS
STRESS PEAK DIAS BP: 52 MMHG
STRESS PEAK SYS BP: 105 MMHG
STRESS PERCENT HR ACHIEVED: 43 %
STRESS POST PEAK HR: 80 BPM
STRESS RATE PRESSURE PRODUCT: 8400 BPM*MMHG
STRESS ST DEPRESSION: 0 MM
STRESS STAGE RECOVERY 1 BP: NORMAL MMHG
STRESS STAGE RECOVERY 1 DURATION: 1 MIN:SEC
STRESS STAGE RECOVERY 1 HR: 80 BPM
STRESS STAGE RECOVERY 2 BP: NORMAL MMHG
STRESS STAGE RECOVERY 2 DURATION: 5 MIN:SEC
STRESS STAGE RECOVERY 2 HR: 81 BPM
STRESS TARGET HR: 185 BPM
TME LAST DOSE: 1247 H
VANCOMYCIN DOSE: 1000 MG
VANCOMYCIN SERPL-MCNC: 9.4 UG/ML
WBC OTHER # BLD: 7.9 K/UL (ref 3.5–11)

## 2024-08-30 PROCEDURE — 80202 ASSAY OF VANCOMYCIN: CPT

## 2024-08-30 PROCEDURE — 85025 COMPLETE CBC W/AUTO DIFF WBC: CPT

## 2024-08-30 PROCEDURE — G0378 HOSPITAL OBSERVATION PER HR: HCPCS

## 2024-08-30 PROCEDURE — 99254 IP/OBS CNSLTJ NEW/EST MOD 60: CPT | Performed by: INTERNAL MEDICINE

## 2024-08-30 PROCEDURE — 82947 ASSAY GLUCOSE BLOOD QUANT: CPT

## 2024-08-30 PROCEDURE — 99223 1ST HOSP IP/OBS HIGH 75: CPT | Performed by: INTERNAL MEDICINE

## 2024-08-30 PROCEDURE — 6360000002 HC RX W HCPCS: Performed by: INTERNAL MEDICINE

## 2024-08-30 PROCEDURE — 80048 BASIC METABOLIC PNL TOTAL CA: CPT

## 2024-08-30 PROCEDURE — G0378 HOSPITAL OBSERVATION PER HR: HCPCS | Performed by: INTERNAL MEDICINE

## 2024-08-30 PROCEDURE — A9579 GAD-BASE MR CONTRAST NOS,1ML: HCPCS

## 2024-08-30 PROCEDURE — 6370000000 HC RX 637 (ALT 250 FOR IP): Performed by: INTERNAL MEDICINE

## 2024-08-30 PROCEDURE — 99233 SBSQ HOSP IP/OBS HIGH 50: CPT | Performed by: INTERNAL MEDICINE

## 2024-08-30 PROCEDURE — 93017 CV STRESS TEST TRACING ONLY: CPT

## 2024-08-30 PROCEDURE — 6360000004 HC RX CONTRAST MEDICATION

## 2024-08-30 PROCEDURE — 6370000000 HC RX 637 (ALT 250 FOR IP)

## 2024-08-30 PROCEDURE — A9500 TC99M SESTAMIBI: HCPCS | Performed by: INTERNAL MEDICINE

## 2024-08-30 PROCEDURE — 3430000000 HC RX DIAGNOSTIC RADIOPHARMACEUTICAL: Performed by: INTERNAL MEDICINE

## 2024-08-30 PROCEDURE — 93018 CV STRESS TEST I&R ONLY: CPT | Performed by: INTERNAL MEDICINE

## 2024-08-30 PROCEDURE — 73720 MRI LWR EXTREMITY W/O&W/DYE: CPT

## 2024-08-30 PROCEDURE — 78452 HT MUSCLE IMAGE SPECT MULT: CPT

## 2024-08-30 PROCEDURE — 2580000003 HC RX 258

## 2024-08-30 PROCEDURE — 84703 CHORIONIC GONADOTROPIN ASSAY: CPT

## 2024-08-30 PROCEDURE — 36415 COLL VENOUS BLD VENIPUNCTURE: CPT

## 2024-08-30 PROCEDURE — 2580000003 HC RX 258: Performed by: INTERNAL MEDICINE

## 2024-08-30 PROCEDURE — 93308 TTE F-UP OR LMTD: CPT | Performed by: INTERNAL MEDICINE

## 2024-08-30 PROCEDURE — C8924 2D TTE W OR W/O FOL W/CON,FU: HCPCS

## 2024-08-30 PROCEDURE — 6360000004 HC RX CONTRAST MEDICATION: Performed by: INTERNAL MEDICINE

## 2024-08-30 RX ORDER — NITROGLYCERIN 0.4 MG/1
0.4 TABLET SUBLINGUAL EVERY 5 MIN PRN
Status: CANCELLED | OUTPATIENT
Start: 2024-08-30 | End: 2024-08-30

## 2024-08-30 RX ORDER — SODIUM CHLORIDE 0.9 % (FLUSH) 0.9 %
5-40 SYRINGE (ML) INJECTION PRN
Status: ACTIVE | OUTPATIENT
Start: 2024-08-30 | End: 2024-08-30

## 2024-08-30 RX ORDER — ALBUTEROL SULFATE 90 UG/1
2 AEROSOL, METERED RESPIRATORY (INHALATION) PRN
Status: ACTIVE | OUTPATIENT
Start: 2024-08-30 | End: 2024-08-30

## 2024-08-30 RX ORDER — SODIUM CHLORIDE 0.9 % (FLUSH) 0.9 %
10 SYRINGE (ML) INJECTION PRN
Status: DISCONTINUED | OUTPATIENT
Start: 2024-08-30 | End: 2024-09-02 | Stop reason: HOSPADM

## 2024-08-30 RX ORDER — INSULIN GLARGINE 100 [IU]/ML
35 INJECTION, SOLUTION SUBCUTANEOUS
Status: DISCONTINUED | OUTPATIENT
Start: 2024-08-30 | End: 2024-09-01

## 2024-08-30 RX ORDER — AMINOPHYLLINE 25 MG/ML
50 INJECTION, SOLUTION INTRAVENOUS PRN
Status: CANCELLED | OUTPATIENT
Start: 2024-08-30 | End: 2024-08-30

## 2024-08-30 RX ORDER — TETRAKIS(2-METHOXYISOBUTYLISOCYANIDE)COPPER(I) TETRAFLUOROBORATE 1 MG/ML
15 INJECTION, POWDER, LYOPHILIZED, FOR SOLUTION INTRAVENOUS
Status: COMPLETED | OUTPATIENT
Start: 2024-08-30 | End: 2024-08-30

## 2024-08-30 RX ORDER — LINEZOLID 600 MG/1
600 TABLET, FILM COATED ORAL EVERY 12 HOURS SCHEDULED
Status: DISCONTINUED | OUTPATIENT
Start: 2024-08-30 | End: 2024-09-02 | Stop reason: HOSPADM

## 2024-08-30 RX ORDER — METOPROLOL TARTRATE 1 MG/ML
5 INJECTION, SOLUTION INTRAVENOUS EVERY 5 MIN PRN
Status: ACTIVE | OUTPATIENT
Start: 2024-08-30 | End: 2024-08-30

## 2024-08-30 RX ORDER — SODIUM CHLORIDE 9 MG/ML
500 INJECTION, SOLUTION INTRAVENOUS CONTINUOUS PRN
Status: CANCELLED | OUTPATIENT
Start: 2024-08-30 | End: 2024-08-30

## 2024-08-30 RX ORDER — SODIUM CHLORIDE 0.9 % (FLUSH) 0.9 %
5-40 SYRINGE (ML) INJECTION PRN
Status: CANCELLED | OUTPATIENT
Start: 2024-08-30 | End: 2024-08-30

## 2024-08-30 RX ORDER — REGADENOSON 0.08 MG/ML
0.4 INJECTION, SOLUTION INTRAVENOUS
Status: CANCELLED | OUTPATIENT
Start: 2024-08-30

## 2024-08-30 RX ORDER — AMINOPHYLLINE 25 MG/ML
50 INJECTION, SOLUTION INTRAVENOUS PRN
Status: ACTIVE | OUTPATIENT
Start: 2024-08-30 | End: 2024-08-30

## 2024-08-30 RX ORDER — SODIUM CHLORIDE 9 MG/ML
500 INJECTION, SOLUTION INTRAVENOUS CONTINUOUS PRN
Status: ACTIVE | OUTPATIENT
Start: 2024-08-30 | End: 2024-08-30

## 2024-08-30 RX ORDER — TETRAKIS(2-METHOXYISOBUTYLISOCYANIDE)COPPER(I) TETRAFLUOROBORATE 1 MG/ML
35 INJECTION, POWDER, LYOPHILIZED, FOR SOLUTION INTRAVENOUS
Status: COMPLETED | OUTPATIENT
Start: 2024-08-30 | End: 2024-08-30

## 2024-08-30 RX ORDER — REGADENOSON 0.08 MG/ML
0.4 INJECTION, SOLUTION INTRAVENOUS
Status: COMPLETED | OUTPATIENT
Start: 2024-08-30 | End: 2024-08-30

## 2024-08-30 RX ORDER — METOPROLOL TARTRATE 1 MG/ML
5 INJECTION, SOLUTION INTRAVENOUS EVERY 5 MIN PRN
Status: CANCELLED | OUTPATIENT
Start: 2024-08-30 | End: 2024-08-30

## 2024-08-30 RX ORDER — NITROGLYCERIN 0.4 MG/1
0.4 TABLET SUBLINGUAL EVERY 5 MIN PRN
Status: ACTIVE | OUTPATIENT
Start: 2024-08-30 | End: 2024-08-30

## 2024-08-30 RX ORDER — ALBUTEROL SULFATE 90 UG/1
2 AEROSOL, METERED RESPIRATORY (INHALATION) PRN
Status: CANCELLED | OUTPATIENT
Start: 2024-08-30 | End: 2024-08-30

## 2024-08-30 RX ORDER — ATROPINE SULFATE 0.1 MG/ML
0.5 INJECTION INTRAVENOUS EVERY 5 MIN PRN
Status: CANCELLED | OUTPATIENT
Start: 2024-08-30 | End: 2024-08-30

## 2024-08-30 RX ORDER — ATROPINE SULFATE 0.1 MG/ML
0.5 INJECTION INTRAVENOUS EVERY 5 MIN PRN
Status: ACTIVE | OUTPATIENT
Start: 2024-08-30 | End: 2024-08-30

## 2024-08-30 RX ADMIN — INSULIN LISPRO 4 UNITS: 100 INJECTION, SOLUTION INTRAVENOUS; SUBCUTANEOUS at 17:58

## 2024-08-30 RX ADMIN — PIPERACILLIN AND TAZOBACTAM 3375 MG: 3; .375 INJECTION, POWDER, LYOPHILIZED, FOR SOLUTION INTRAVENOUS at 22:05

## 2024-08-30 RX ADMIN — METOPROLOL SUCCINATE 50 MG: 50 TABLET, EXTENDED RELEASE ORAL at 12:06

## 2024-08-30 RX ADMIN — Medication 16.6 MILLICURIE: at 09:50

## 2024-08-30 RX ADMIN — SODIUM CHLORIDE 1250 MG: 9 INJECTION, SOLUTION INTRAVENOUS at 12:04

## 2024-08-30 RX ADMIN — APIXABAN 5 MG: 5 TABLET, FILM COATED ORAL at 21:00

## 2024-08-30 RX ADMIN — OXYCODONE HYDROCHLORIDE 5 MG: 5 TABLET ORAL at 15:56

## 2024-08-30 RX ADMIN — Medication 35.8 MILLICURIE: at 13:23

## 2024-08-30 RX ADMIN — INSULIN GLARGINE 35 UNITS: 100 INJECTION, SOLUTION SUBCUTANEOUS at 21:00

## 2024-08-30 RX ADMIN — PIPERACILLIN AND TAZOBACTAM 3375 MG: 3; .375 INJECTION, POWDER, LYOPHILIZED, FOR SOLUTION INTRAVENOUS at 13:55

## 2024-08-30 RX ADMIN — SPIRONOLACTONE 12.5 MG: 25 TABLET ORAL at 12:06

## 2024-08-30 RX ADMIN — ATORVASTATIN CALCIUM 80 MG: 80 TABLET, FILM COATED ORAL at 21:00

## 2024-08-30 RX ADMIN — PERFLUTREN 2 ML: 6.52 INJECTION, SUSPENSION INTRAVENOUS at 10:12

## 2024-08-30 RX ADMIN — SODIUM CHLORIDE, PRESERVATIVE FREE 10 ML: 5 INJECTION INTRAVENOUS at 22:03

## 2024-08-30 RX ADMIN — REGADENOSON 0.4 MG: 0.08 INJECTION, SOLUTION INTRAVENOUS at 09:46

## 2024-08-30 RX ADMIN — PIPERACILLIN AND TAZOBACTAM 3375 MG: 3; .375 INJECTION, POWDER, LYOPHILIZED, FOR SOLUTION INTRAVENOUS at 02:26

## 2024-08-30 RX ADMIN — OXYCODONE HYDROCHLORIDE 5 MG: 5 TABLET ORAL at 21:05

## 2024-08-30 RX ADMIN — GADOTERIDOL 16 ML: 279.3 INJECTION, SOLUTION INTRAVENOUS at 20:30

## 2024-08-30 RX ADMIN — LINEZOLID 600 MG: 600 TABLET, FILM COATED ORAL at 21:00

## 2024-08-30 RX ADMIN — ASPIRIN 81 MG: 81 TABLET, COATED ORAL at 12:06

## 2024-08-30 RX ADMIN — INSULIN LISPRO 16 UNITS: 100 INJECTION, SOLUTION INTRAVENOUS; SUBCUTANEOUS at 12:06

## 2024-08-30 RX ADMIN — SACUBITRIL AND VALSARTAN 1 TABLET: 24; 26 TABLET, FILM COATED ORAL at 12:06

## 2024-08-30 RX ADMIN — VANCOMYCIN HYDROCHLORIDE 1000 MG: 1 INJECTION, POWDER, LYOPHILIZED, FOR SOLUTION INTRAVENOUS at 00:47

## 2024-08-30 RX ADMIN — ISOSORBIDE MONONITRATE 30 MG: 30 TABLET, EXTENDED RELEASE ORAL at 12:06

## 2024-08-30 RX ADMIN — EZETIMIBE 10 MG: 10 TABLET ORAL at 12:09

## 2024-08-30 ASSESSMENT — PAIN SCALES - GENERAL
PAINLEVEL_OUTOF10: 10
PAINLEVEL_OUTOF10: 5

## 2024-08-30 ASSESSMENT — PAIN DESCRIPTION - LOCATION: LOCATION: FOOT

## 2024-08-30 ASSESSMENT — PAIN DESCRIPTION - ORIENTATION: ORIENTATION: RIGHT;LEFT

## 2024-08-30 ASSESSMENT — PAIN DESCRIPTION - DESCRIPTORS: DESCRIPTORS: STABBING;THROBBING

## 2024-08-30 NOTE — PLAN OF CARE
Problem: Pain  Goal: Verbalizes/displays adequate comfort level or baseline comfort level  8/30/2024 0110 by Cherri Olivares RN  Outcome: Progressing     Problem: Safety - Adult  Goal: Free from fall injury  Outcome: Progressing     Problem: Nutrition Deficit:  Goal: Optimize nutritional status  Outcome: Progressing     Problem: Skin/Tissue Integrity  Goal: Absence of new skin breakdown  Description: 1.  Monitor for areas of redness and/or skin breakdown  2.  Assess vascular access sites hourly  3.  Every 4-6 hours minimum:  Change oxygen saturation probe site  4.  Every 4-6 hours:  If on nasal continuous positive airway pressure, respiratory therapy assess nares and determine need for appliance change or resting period.  Outcome: Progressing

## 2024-08-30 NOTE — CONSULTS
[facility] -   Infectious diseases evaluation  admission date 8/28/2024    reason for consultation:   Diabetic toe    Impression :   Current:  Right diabetic  foot wound with necrotic wound-by the fifth metatarsal bone  Recently infection of the left hallux that was resected  Currently chest pain relieved by nitroglycerin seems resolved now  Diabetes mellitus with neuropathy    Other:    Discussion / summary of stay / plan of care/ Recommendations:     HENCE:   Awaiting MRI of the right foot to better evaluate the soft tissue surrounding it for a possible abscess or osteomyelitis  Patient has been started on vancomycin, X1 dose  Continue with Zyvox and Zosyn until we have more information  Podiatry involved  Will follow    Infection Control Recommendations   [place] Precautions        Antimicrobial Stewardship Recommendations   Simplification of therapy  Targeted therapy      History of Present Illness:   Initial history:  Jane Saavedra is a 34 y.o.-year-old female with a hx of T2 Diabetes, presented with chest pain, relieved by nitroglycerin, and also found with a blister on R 5th toe, overlying the fifth metatarsal, that just occurred a few days ago, according to the patient it occurred right after she went to the podiatry clinic.  She sees a podiatry clinic status post left hallux amputation which wound is healing very well.  At the time of my exam the patient has no more chest pain, and the right foot has swelling over the forefoot, with minimal redness around the fifth and fourth dorsal metatarsal area.  The lateral fifth metatarsal area is showing the necrotic ulcer without much drainage    Currently the left metatarsal amputation site seems to be closed and not inflamed    No fever otherwise and no allergies, no immunosuppressive diseases.                  Interval changes  8/30/2024   Patient Vitals for the past 8 hrs:   BP Temp Temp src Pulse Resp SpO2

## 2024-08-30 NOTE — CONSULTS
Cardiology Consultation         Date:   8/30/2024  Patient name: Jane Saavedra  Date of admission:  8/28/2024  8:24 PM  MRN:   [id number]  YOB: 1990    Reason for Admission: cardiac evaluation     Chief Complaint: chest pain     History of present illness:     34-year-old female with multiple comorbidities, and pertinent cardiac history of multivessel coronary artery disease with previous PCI to LAD and OM in 01/2023 followed by CABG x 3 in July 2023 after presentation with stemi at [place], not following with any cardiologist since surgery, admitted with recurrent episodes of chest pain over last few weeks which she feels are similar to when she had her stent placed.  Denies any significant dyspnea.  Does have decline in functional capacity.  No orthopnea lower extremity edema.  The patient has been dealing with pain in each foot ever since her amputation in July. The patient has been attending weekly podiatry appointments for dressing changes of her left first ray. (Wound photos available in chart). After original amputation patient experienced medication insecurity that caused a delay in her outpatient antibiotics, which resulted in patient experiencing a post op infection and pain. Troponin's are negative, patient's blood glucose was 514 on chemistry, short acting insulin given. XR of left and right foot both show soft tissue swelling. Appreciate input from podiatry on continuation of care.       Past Medical History:   has a past medical history of Acute osteomyelitis ([facility]), Bipolar 1 disorder ([facility]), Cellulitis, Chronic diabetic ulcer of left foot determined by examination ([facility]), Depression, Diabetic foot infection ([facility]), H. pylori infection, History of recent hospitalization, Hyperlipidemia, Hypertension, Ischemic cardiomyopathy, Multiple vessel coronary artery disease, Neuropathy, Noncompliance, NSTEMI (non-ST elevated myocardial infarction) ([facility]), Osteomyelitis of foot, left,

## 2024-08-31 ENCOUNTER — APPOINTMENT (OUTPATIENT)
Dept: MRI IMAGING | Age: 34
DRG: 264 | End: 2024-08-31
Payer: COMMERCIAL

## 2024-08-31 PROBLEM — I25.83 CORONARY ARTERY DISEASE DUE TO LIPID RICH PLAQUE: Status: ACTIVE | Noted: 2023-07-06

## 2024-08-31 PROBLEM — I20.9 ANGINA PECTORIS (HCC): Status: ACTIVE | Noted: 2024-08-28

## 2024-08-31 PROBLEM — Z95.1 S/P CABG X 3: Status: ACTIVE | Noted: 2024-08-31

## 2024-08-31 PROBLEM — Z98.61 S/P PTCA (PERCUTANEOUS TRANSLUMINAL CORONARY ANGIOPLASTY): Status: ACTIVE | Noted: 2024-08-31

## 2024-08-31 PROBLEM — Z01.810 PREOP CARDIOVASCULAR EXAM: Status: ACTIVE | Noted: 2024-08-31

## 2024-08-31 LAB
ANION GAP SERPL CALCULATED.3IONS-SCNC: 11 MMOL/L (ref 9–17)
BASOPHILS # BLD: 0 K/UL (ref 0–0.2)
BASOPHILS # BLD: 0.08 K/UL (ref 0–0.2)
BASOPHILS NFR BLD: 0 % (ref 0–2)
BASOPHILS NFR BLD: 1 % (ref 0–2)
BUN SERPL-MCNC: 14 MG/DL (ref 6–20)
CALCIUM SERPL-MCNC: 8.5 MG/DL (ref 8.6–10.4)
CHLORIDE SERPL-SCNC: 102 MMOL/L (ref 98–107)
CO2 SERPL-SCNC: 24 MMOL/L (ref 20–31)
CREAT SERPL-MCNC: <0.4 MG/DL (ref 0.5–0.9)
CRP SERPL HS-MCNC: <3 MG/L (ref 0–5)
EOSINOPHIL # BLD: 0.24 K/UL (ref 0–0.4)
EOSINOPHIL # BLD: 0.28 K/UL (ref 0–0.4)
EOSINOPHILS RELATIVE PERCENT: 3 % (ref 0–4)
EOSINOPHILS RELATIVE PERCENT: 4 % (ref 0–4)
ERYTHROCYTE [DISTWIDTH] IN BLOOD BY AUTOMATED COUNT: 14.8 % (ref 11.5–14.9)
ERYTHROCYTE [DISTWIDTH] IN BLOOD BY AUTOMATED COUNT: 15.1 % (ref 11.5–14.9)
ERYTHROCYTE [SEDIMENTATION RATE] IN BLOOD BY PHOTOMETRIC METHOD: 56 MM/HR (ref 0–20)
GFR, ESTIMATED: ABNORMAL ML/MIN/1.73M2
GLUCOSE BLD-MCNC: 291 MG/DL (ref 65–105)
GLUCOSE BLD-MCNC: 298 MG/DL (ref 65–105)
GLUCOSE BLD-MCNC: 304 MG/DL (ref 65–105)
GLUCOSE BLD-MCNC: 305 MG/DL (ref 65–105)
GLUCOSE SERPL-MCNC: 281 MG/DL (ref 70–99)
HCT VFR BLD AUTO: 40.7 % (ref 36–46)
HCT VFR BLD AUTO: 42.9 % (ref 36–46)
HGB BLD-MCNC: 13.2 G/DL (ref 12–16)
HGB BLD-MCNC: 13.9 G/DL (ref 12–16)
LYMPHOCYTES NFR BLD: 4.14 K/UL (ref 1–4.8)
LYMPHOCYTES NFR BLD: 4.54 K/UL (ref 1–4.8)
LYMPHOCYTES RELATIVE PERCENT: 56 % (ref 24–44)
LYMPHOCYTES RELATIVE PERCENT: 60 % (ref 24–44)
MCH RBC QN AUTO: 29.9 PG (ref 26–34)
MCH RBC QN AUTO: 30 PG (ref 26–34)
MCHC RBC AUTO-ENTMCNC: 32.4 G/DL (ref 31–37)
MCHC RBC AUTO-ENTMCNC: 32.5 G/DL (ref 31–37)
MCV RBC AUTO: 91.9 FL (ref 80–100)
MCV RBC AUTO: 92.5 FL (ref 80–100)
MONOCYTES NFR BLD: 0.48 K/UL (ref 0.1–1.3)
MONOCYTES NFR BLD: 0.57 K/UL (ref 0.1–1.3)
MONOCYTES NFR BLD: 7 % (ref 1–7)
MONOCYTES NFR BLD: 7 % (ref 1–7)
MORPHOLOGY: ABNORMAL
MORPHOLOGY: NORMAL
NEUTROPHILS NFR BLD: 29 % (ref 36–66)
NEUTROPHILS NFR BLD: 33 % (ref 36–66)
NEUTS SEG NFR BLD: 2 K/UL (ref 1.3–9.1)
NEUTS SEG NFR BLD: 2.67 K/UL (ref 1.3–9.1)
PLATELET # BLD AUTO: 263 K/UL (ref 150–450)
PLATELET # BLD AUTO: 293 K/UL (ref 150–450)
PMV BLD AUTO: 8 FL (ref 6–12)
PMV BLD AUTO: 8.2 FL (ref 6–12)
POTASSIUM SERPL-SCNC: 4 MMOL/L (ref 3.7–5.3)
RBC # BLD AUTO: 4.43 M/UL (ref 4–5.2)
RBC # BLD AUTO: 4.64 M/UL (ref 4–5.2)
SODIUM SERPL-SCNC: 137 MMOL/L (ref 135–144)
WBC OTHER # BLD: 6.9 K/UL (ref 3.5–11)
WBC OTHER # BLD: 8.1 K/UL (ref 3.5–11)

## 2024-08-31 PROCEDURE — 0HBNXZZ EXCISION OF LEFT FOOT SKIN, EXTERNAL APPROACH: ICD-10-PCS | Performed by: PODIATRIST

## 2024-08-31 PROCEDURE — 99233 SBSQ HOSP IP/OBS HIGH 50: CPT | Performed by: INTERNAL MEDICINE

## 2024-08-31 PROCEDURE — 2580000003 HC RX 258: Performed by: INTERNAL MEDICINE

## 2024-08-31 PROCEDURE — 85025 COMPLETE CBC W/AUTO DIFF WBC: CPT

## 2024-08-31 PROCEDURE — 85652 RBC SED RATE AUTOMATED: CPT

## 2024-08-31 PROCEDURE — 2580000003 HC RX 258

## 2024-08-31 PROCEDURE — 80048 BASIC METABOLIC PNL TOTAL CA: CPT

## 2024-08-31 PROCEDURE — 6370000000 HC RX 637 (ALT 250 FOR IP): Performed by: NURSE PRACTITIONER

## 2024-08-31 PROCEDURE — 6370000000 HC RX 637 (ALT 250 FOR IP)

## 2024-08-31 PROCEDURE — 87205 SMEAR GRAM STAIN: CPT

## 2024-08-31 PROCEDURE — 6360000002 HC RX W HCPCS: Performed by: INTERNAL MEDICINE

## 2024-08-31 PROCEDURE — 99232 SBSQ HOSP IP/OBS MODERATE 35: CPT | Performed by: INTERNAL MEDICINE

## 2024-08-31 PROCEDURE — 36415 COLL VENOUS BLD VENIPUNCTURE: CPT

## 2024-08-31 PROCEDURE — 73720 MRI LWR EXTREMITY W/O&W/DYE: CPT

## 2024-08-31 PROCEDURE — 87070 CULTURE OTHR SPECIMN AEROBIC: CPT

## 2024-08-31 PROCEDURE — 6370000000 HC RX 637 (ALT 250 FOR IP): Performed by: INTERNAL MEDICINE

## 2024-08-31 PROCEDURE — 86140 C-REACTIVE PROTEIN: CPT

## 2024-08-31 PROCEDURE — 87075 CULTR BACTERIA EXCEPT BLOOD: CPT

## 2024-08-31 PROCEDURE — A9579 GAD-BASE MR CONTRAST NOS,1ML: HCPCS

## 2024-08-31 PROCEDURE — G0378 HOSPITAL OBSERVATION PER HR: HCPCS

## 2024-08-31 PROCEDURE — 6360000004 HC RX CONTRAST MEDICATION

## 2024-08-31 RX ORDER — SODIUM CHLORIDE 0.9 % (FLUSH) 0.9 %
10 SYRINGE (ML) INJECTION PRN
Status: DISCONTINUED | OUTPATIENT
Start: 2024-08-31 | End: 2024-09-02 | Stop reason: HOSPADM

## 2024-08-31 RX ORDER — HYDROCODONE BITARTRATE AND ACETAMINOPHEN 5; 325 MG/1; MG/1
1 TABLET ORAL ONCE
Status: COMPLETED | OUTPATIENT
Start: 2024-08-31 | End: 2024-08-31

## 2024-08-31 RX ADMIN — METOPROLOL SUCCINATE 50 MG: 50 TABLET, EXTENDED RELEASE ORAL at 09:52

## 2024-08-31 RX ADMIN — SACUBITRIL AND VALSARTAN 1 TABLET: 24; 26 TABLET, FILM COATED ORAL at 21:27

## 2024-08-31 RX ADMIN — APIXABAN 5 MG: 5 TABLET, FILM COATED ORAL at 21:27

## 2024-08-31 RX ADMIN — SPIRONOLACTONE 12.5 MG: 25 TABLET ORAL at 09:52

## 2024-08-31 RX ADMIN — ISOSORBIDE MONONITRATE 30 MG: 30 TABLET, EXTENDED RELEASE ORAL at 09:52

## 2024-08-31 RX ADMIN — INSULIN LISPRO 8 UNITS: 100 INJECTION, SOLUTION INTRAVENOUS; SUBCUTANEOUS at 16:51

## 2024-08-31 RX ADMIN — PIPERACILLIN AND TAZOBACTAM 3375 MG: 3; .375 INJECTION, POWDER, LYOPHILIZED, FOR SOLUTION INTRAVENOUS at 13:47

## 2024-08-31 RX ADMIN — GADOTERIDOL 15 ML: 279.3 INJECTION, SOLUTION INTRAVENOUS at 18:22

## 2024-08-31 RX ADMIN — SACUBITRIL AND VALSARTAN 1 TABLET: 24; 26 TABLET, FILM COATED ORAL at 09:52

## 2024-08-31 RX ADMIN — LINEZOLID 600 MG: 600 TABLET, FILM COATED ORAL at 09:52

## 2024-08-31 RX ADMIN — LINEZOLID 600 MG: 600 TABLET, FILM COATED ORAL at 21:27

## 2024-08-31 RX ADMIN — INSULIN LISPRO 12 UNITS: 100 INJECTION, SOLUTION INTRAVENOUS; SUBCUTANEOUS at 13:44

## 2024-08-31 RX ADMIN — EZETIMIBE 10 MG: 10 TABLET ORAL at 09:52

## 2024-08-31 RX ADMIN — PIPERACILLIN AND TAZOBACTAM 3375 MG: 3; .375 INJECTION, POWDER, LYOPHILIZED, FOR SOLUTION INTRAVENOUS at 21:34

## 2024-08-31 RX ADMIN — APIXABAN 5 MG: 5 TABLET, FILM COATED ORAL at 09:52

## 2024-08-31 RX ADMIN — PIPERACILLIN AND TAZOBACTAM 3375 MG: 3; .375 INJECTION, POWDER, LYOPHILIZED, FOR SOLUTION INTRAVENOUS at 06:09

## 2024-08-31 RX ADMIN — SODIUM CHLORIDE, PRESERVATIVE FREE 10 ML: 5 INJECTION INTRAVENOUS at 09:56

## 2024-08-31 RX ADMIN — ASPIRIN 81 MG: 81 TABLET, COATED ORAL at 09:52

## 2024-08-31 RX ADMIN — ATORVASTATIN CALCIUM 80 MG: 80 TABLET, FILM COATED ORAL at 21:27

## 2024-08-31 RX ADMIN — INSULIN GLARGINE 35 UNITS: 100 INJECTION, SOLUTION SUBCUTANEOUS at 21:28

## 2024-08-31 RX ADMIN — ACETAMINOPHEN 650 MG: 325 TABLET ORAL at 09:52

## 2024-08-31 RX ADMIN — HYDROCODONE BITARTRATE AND ACETAMINOPHEN 1 TABLET: 5; 325 TABLET ORAL at 22:26

## 2024-08-31 RX ADMIN — INSULIN LISPRO 8 UNITS: 100 INJECTION, SOLUTION INTRAVENOUS; SUBCUTANEOUS at 09:51

## 2024-08-31 ASSESSMENT — PAIN SCALES - GENERAL
PAINLEVEL_OUTOF10: 3
PAINLEVEL_OUTOF10: 1
PAINLEVEL_OUTOF10: 9

## 2024-08-31 ASSESSMENT — PAIN DESCRIPTION - LOCATION: LOCATION: FOOT

## 2024-08-31 ASSESSMENT — PAIN DESCRIPTION - ORIENTATION: ORIENTATION: RIGHT;LEFT

## 2024-08-31 NOTE — PLAN OF CARE
Problem: Discharge Planning  Goal: Discharge to home or other facility with appropriate resources  8/30/2024 2327 by Avril Clarke RN  Outcome: Progressing  8/30/2024 1719 by Christen Mon RN  Outcome: Progressing     Problem: Pain  Goal: Verbalizes/displays adequate comfort level or baseline comfort level  8/30/2024 2327 by Avril Clarke RN  Outcome: Progressing  8/30/2024 1719 by Christen Mon RN  Outcome: Progressing     Problem: Safety - Adult  Goal: Free from fall injury  8/30/2024 2327 by Avril Clarke RN  Outcome: Progressing  8/30/2024 1719 by Chritsen Mon RN  Outcome: Progressing     Problem: Nutrition Deficit:  Goal: Optimize nutritional status  8/30/2024 2327 by Avril Clarke RN  Outcome: Progressing  8/30/2024 1719 by Christen Mon RN  Outcome: Progressing     Problem: Skin/Tissue Integrity  Goal: Absence of new skin breakdown  Description: 1.  Monitor for areas of redness and/or skin breakdown  2.  Assess vascular access sites hourly  3.  Every 4-6 hours minimum:  Change oxygen saturation probe site  4.  Every 4-6 hours:  If on nasal continuous positive airway pressure, respiratory therapy assess nares and determine need for appliance change or resting period.  8/30/2024 2327 by Avril Clarke RN  Outcome: Progressing  8/30/2024 1719 by Christen Mon RN  Outcome: Progressing     Problem: Chronic Conditions and Co-morbidities  Goal: Patient's chronic conditions and co-morbidity symptoms are monitored and maintained or improved  8/30/2024 2327 by Avril Clarke RN  Outcome: Progressing  8/30/2024 1719 by Christen Mon RN  Outcome: Progressing

## 2024-08-31 NOTE — PLAN OF CARE
Problem: Discharge Planning  Goal: Discharge to home or other facility with appropriate resources  8/31/2024 1130 by Loreta Conti RN  Outcome: Progressing  8/30/2024 2327 by Avril Clarke RN  Outcome: Progressing     Problem: Pain  Goal: Verbalizes/displays adequate comfort level or baseline comfort level  8/31/2024 1130 by Loreta Conti RN  Outcome: Progressing  8/30/2024 2327 by Avril Clarke RN  Outcome: Progressing     Problem: Safety - Adult  Goal: Free from fall injury  8/31/2024 1130 by Loreta Conti RN  Outcome: Progressing  8/30/2024 2327 by Avril Clarke RN  Outcome: Progressing     Problem: Nutrition Deficit:  Goal: Optimize nutritional status  8/31/2024 1130 by Loreta Conti RN  Outcome: Progressing  8/30/2024 2327 by Avril Clarke RN  Outcome: Progressing     Problem: Skin/Tissue Integrity  Goal: Absence of new skin breakdown  Description: 1.  Monitor for areas of redness and/or skin breakdown  2.  Assess vascular access sites hourly  3.  Every 4-6 hours minimum:  Change oxygen saturation probe site  4.  Every 4-6 hours:  If on nasal continuous positive airway pressure, respiratory therapy assess nares and determine need for appliance change or resting period.  8/31/2024 1130 by Loreta Conti RN  Outcome: Progressing  8/30/2024 2327 by Avril Clarke RN  Outcome: Progressing     Problem: Chronic Conditions and Co-morbidities  Goal: Patient's chronic conditions and co-morbidity symptoms are monitored and maintained or improved  8/31/2024 1130 by Loreta Conti RN  Outcome: Progressing  8/30/2024 2327 by Avril Clarke RN  Outcome: Progressing

## 2024-09-01 LAB
BASOPHILS # BLD: 0 K/UL (ref 0–0.2)
BASOPHILS NFR BLD: 0 % (ref 0–2)
BUN SERPL-MCNC: 14 MG/DL (ref 6–20)
CREAT SERPL-MCNC: <0.4 MG/DL (ref 0.5–0.9)
CRP SERPL HS-MCNC: <3 MG/L (ref 0–5)
EOSINOPHIL # BLD: 0.23 K/UL (ref 0–0.4)
EOSINOPHILS RELATIVE PERCENT: 3 % (ref 0–4)
ERYTHROCYTE [DISTWIDTH] IN BLOOD BY AUTOMATED COUNT: 14.5 % (ref 11.5–14.9)
ERYTHROCYTE [SEDIMENTATION RATE] IN BLOOD BY PHOTOMETRIC METHOD: 16 MM/HR (ref 0–20)
GFR, ESTIMATED: ABNORMAL ML/MIN/1.73M2
HCT VFR BLD AUTO: 41.1 % (ref 36–46)
HGB BLD-MCNC: 13.3 G/DL (ref 12–16)
LYMPHOCYTES NFR BLD: 4.01 K/UL (ref 1–4.8)
LYMPHOCYTES RELATIVE PERCENT: 52 % (ref 24–44)
MCH RBC QN AUTO: 30.2 PG (ref 26–34)
MCHC RBC AUTO-ENTMCNC: 32.3 G/DL (ref 31–37)
MCV RBC AUTO: 93.7 FL (ref 80–100)
MONOCYTES NFR BLD: 0.23 K/UL (ref 0.1–1.3)
MONOCYTES NFR BLD: 3 % (ref 1–7)
MORPHOLOGY: NORMAL
NEUTROPHILS NFR BLD: 42 % (ref 36–66)
NEUTS SEG NFR BLD: 3.23 K/UL (ref 1.3–9.1)
PLATELET # BLD AUTO: 240 K/UL (ref 150–450)
PMV BLD AUTO: 8 FL (ref 6–12)
RBC # BLD AUTO: 4.39 M/UL (ref 4–5.2)
WBC OTHER # BLD: 7.7 K/UL (ref 3.5–11)

## 2024-09-01 PROCEDURE — 86140 C-REACTIVE PROTEIN: CPT

## 2024-09-01 PROCEDURE — 84520 ASSAY OF UREA NITROGEN: CPT

## 2024-09-01 PROCEDURE — 36415 COLL VENOUS BLD VENIPUNCTURE: CPT

## 2024-09-01 PROCEDURE — 6370000000 HC RX 637 (ALT 250 FOR IP)

## 2024-09-01 PROCEDURE — 85025 COMPLETE CBC W/AUTO DIFF WBC: CPT

## 2024-09-01 PROCEDURE — 6360000002 HC RX W HCPCS: Performed by: INTERNAL MEDICINE

## 2024-09-01 PROCEDURE — G0378 HOSPITAL OBSERVATION PER HR: HCPCS

## 2024-09-01 PROCEDURE — 6370000000 HC RX 637 (ALT 250 FOR IP): Performed by: INTERNAL MEDICINE

## 2024-09-01 PROCEDURE — 85652 RBC SED RATE AUTOMATED: CPT

## 2024-09-01 PROCEDURE — 82565 ASSAY OF CREATININE: CPT

## 2024-09-01 PROCEDURE — 2580000003 HC RX 258: Performed by: INTERNAL MEDICINE

## 2024-09-01 PROCEDURE — 99239 HOSP IP/OBS DSCHRG MGMT >30: CPT | Performed by: INTERNAL MEDICINE

## 2024-09-01 RX ORDER — DOXYCYCLINE HYCLATE 100 MG
100 TABLET ORAL 2 TIMES DAILY
Qty: 28 TABLET | Refills: 0 | Status: SHIPPED | OUTPATIENT
Start: 2024-09-01 | End: 2024-09-15

## 2024-09-01 RX ORDER — INSULIN GLARGINE 100 [IU]/ML
40 INJECTION, SOLUTION SUBCUTANEOUS
Status: DISCONTINUED | OUTPATIENT
Start: 2024-09-01 | End: 2024-09-02 | Stop reason: HOSPADM

## 2024-09-01 RX ORDER — LOSARTAN POTASSIUM 25 MG/1
25 TABLET ORAL DAILY
Qty: 30 TABLET | Refills: 3 | Status: SHIPPED | OUTPATIENT
Start: 2024-09-02

## 2024-09-01 RX ORDER — GLUCOSAMINE HCL/CHONDROITIN SU 500-400 MG
CAPSULE ORAL
Qty: 30 STRIP | Refills: 1 | Status: SHIPPED | OUTPATIENT
Start: 2024-09-01

## 2024-09-01 RX ORDER — METOPROLOL SUCCINATE 50 MG/1
50 TABLET, EXTENDED RELEASE ORAL DAILY
Qty: 30 TABLET | Refills: 3 | Status: SHIPPED | OUTPATIENT
Start: 2024-09-01

## 2024-09-01 RX ORDER — ISOSORBIDE MONONITRATE 30 MG/1
30 TABLET, EXTENDED RELEASE ORAL DAILY
Qty: 30 TABLET | Refills: 3 | Status: SHIPPED | OUTPATIENT
Start: 2024-09-02

## 2024-09-01 RX ORDER — LOSARTAN POTASSIUM 25 MG/1
25 TABLET ORAL DAILY
Status: DISCONTINUED | OUTPATIENT
Start: 2024-09-01 | End: 2024-09-02 | Stop reason: HOSPADM

## 2024-09-01 RX ORDER — ACETAMINOPHEN 650 MG
TABLET, EXTENDED RELEASE ORAL PRN
Status: DISCONTINUED | OUTPATIENT
Start: 2024-09-01 | End: 2024-09-02 | Stop reason: HOSPADM

## 2024-09-01 RX ORDER — INSULIN GLARGINE 100 [IU]/ML
30 INJECTION, SOLUTION SUBCUTANEOUS
Qty: 5 ADJUSTABLE DOSE PRE-FILLED PEN SYRINGE | Refills: 0 | Status: SHIPPED | OUTPATIENT
Start: 2024-09-01 | End: 2024-09-03

## 2024-09-01 RX ORDER — LANCETS 30 GAUGE
1 EACH MISCELLANEOUS DAILY
Qty: 100 EACH | Refills: 5 | Status: SHIPPED | OUTPATIENT
Start: 2024-09-01

## 2024-09-01 RX ORDER — BLOOD-GLUCOSE METER
1 KIT MISCELLANEOUS DAILY
Qty: 1 KIT | Refills: 0 | Status: SHIPPED | OUTPATIENT
Start: 2024-09-01

## 2024-09-01 RX ORDER — LEVOFLOXACIN 750 MG/1
750 TABLET, FILM COATED ORAL DAILY
Qty: 14 TABLET | Refills: 0 | Status: SHIPPED | OUTPATIENT
Start: 2024-09-01 | End: 2024-09-15

## 2024-09-01 RX ORDER — GLUCOSAMINE HCL/CHONDROITIN SU 500-400 MG
CAPSULE ORAL
Qty: 100 STRIP | Refills: 0 | Status: SHIPPED | OUTPATIENT
Start: 2024-09-01

## 2024-09-01 RX ORDER — LINEZOLID 600 MG/1
600 TABLET, FILM COATED ORAL EVERY 12 HOURS SCHEDULED
Qty: 28 TABLET | Refills: 0 | Status: SHIPPED | OUTPATIENT
Start: 2024-09-01 | End: 2024-09-01 | Stop reason: HOSPADM

## 2024-09-01 RX ADMIN — ACETAMINOPHEN 650 MG: 325 TABLET ORAL at 09:22

## 2024-09-01 RX ADMIN — ACETAMINOPHEN 650 MG: 325 TABLET ORAL at 16:07

## 2024-09-01 RX ADMIN — APIXABAN 5 MG: 5 TABLET, FILM COATED ORAL at 21:31

## 2024-09-01 RX ADMIN — INSULIN LISPRO 12 UNITS: 100 INJECTION, SOLUTION INTRAVENOUS; SUBCUTANEOUS at 12:37

## 2024-09-01 RX ADMIN — METOPROLOL SUCCINATE 50 MG: 50 TABLET, EXTENDED RELEASE ORAL at 09:15

## 2024-09-01 RX ADMIN — ASPIRIN 81 MG: 81 TABLET, COATED ORAL at 09:15

## 2024-09-01 RX ADMIN — PIPERACILLIN AND TAZOBACTAM 3375 MG: 3; .375 INJECTION, POWDER, LYOPHILIZED, FOR SOLUTION INTRAVENOUS at 05:56

## 2024-09-01 RX ADMIN — EZETIMIBE 10 MG: 10 TABLET ORAL at 09:16

## 2024-09-01 RX ADMIN — APIXABAN 5 MG: 5 TABLET, FILM COATED ORAL at 09:15

## 2024-09-01 RX ADMIN — INSULIN LISPRO 4 UNITS: 100 INJECTION, SOLUTION INTRAVENOUS; SUBCUTANEOUS at 09:13

## 2024-09-01 RX ADMIN — ATORVASTATIN CALCIUM 80 MG: 80 TABLET, FILM COATED ORAL at 21:31

## 2024-09-01 RX ADMIN — PIPERACILLIN AND TAZOBACTAM 3375 MG: 3; .375 INJECTION, POWDER, LYOPHILIZED, FOR SOLUTION INTRAVENOUS at 14:11

## 2024-09-01 RX ADMIN — LINEZOLID 600 MG: 600 TABLET, FILM COATED ORAL at 21:31

## 2024-09-01 RX ADMIN — LINEZOLID 600 MG: 600 TABLET, FILM COATED ORAL at 09:15

## 2024-09-01 RX ADMIN — PIPERACILLIN AND TAZOBACTAM 3375 MG: 3; .375 INJECTION, POWDER, LYOPHILIZED, FOR SOLUTION INTRAVENOUS at 21:42

## 2024-09-01 RX ADMIN — ACETAMINOPHEN 650 MG: 325 TABLET ORAL at 23:13

## 2024-09-01 RX ADMIN — ISOSORBIDE MONONITRATE 30 MG: 30 TABLET, EXTENDED RELEASE ORAL at 09:15

## 2024-09-01 RX ADMIN — INSULIN LISPRO 4 UNITS: 100 INJECTION, SOLUTION INTRAVENOUS; SUBCUTANEOUS at 21:32

## 2024-09-01 RX ADMIN — LOSARTAN POTASSIUM 25 MG: 25 TABLET, FILM COATED ORAL at 12:36

## 2024-09-01 RX ADMIN — INSULIN GLARGINE 40 UNITS: 100 INJECTION, SOLUTION SUBCUTANEOUS at 21:31

## 2024-09-01 ASSESSMENT — PAIN DESCRIPTION - DESCRIPTORS
DESCRIPTORS: ACHING
DESCRIPTORS: THROBBING;STABBING

## 2024-09-01 ASSESSMENT — PAIN SCALES - GENERAL
PAINLEVEL_OUTOF10: 10
PAINLEVEL_OUTOF10: 8
PAINLEVEL_OUTOF10: 2
PAINLEVEL_OUTOF10: 6

## 2024-09-01 ASSESSMENT — PAIN DESCRIPTION - ORIENTATION
ORIENTATION: RIGHT;LEFT

## 2024-09-01 ASSESSMENT — PAIN - FUNCTIONAL ASSESSMENT: PAIN_FUNCTIONAL_ASSESSMENT: PREVENTS OR INTERFERES SOME ACTIVE ACTIVITIES AND ADLS

## 2024-09-01 ASSESSMENT — PAIN DESCRIPTION - LOCATION
LOCATION: FOOT

## 2024-09-01 ASSESSMENT — PAIN DESCRIPTION - FREQUENCY: FREQUENCY: CONTINUOUS

## 2024-09-01 NOTE — CARE COORDINATION
DISCHARGE PLANNING NOTE:    Patient down for stress test.     Cardiology consulted-stress test today.     Podiatry-soft excisional debridement performed of right foot 8/29, recommend bilateral feet surgical shoes, however patient currently declining to wear them.    IV vanco, IV zosyn. MRI of the right foot to be completed.     DME: None.    VNS: None currently, referral to [facility] in the event she needs IV antibiotics.    Will continue to follow for additional discharge needs.    If patient is discharged prior to next notation, then this note serves as note for discharge by case management.    Electronically signed by Marium Acevedo RN on 8/30/2024 at 11:25 AM     ADDENDUM:    Genesis from [facility] ([employer]) contacted this writer to update and state that the reported insurance information is [place] Medicaid, therefore they will not be able to provide them with services. [place] in [place].     Electronically signed by Marium Acevedo RN on 8/30/2024 at 2:10 PM      
ONGOING DISCHARGE PLAN:    Patient is alert and oriented x4.    Spoke with patient regarding discharge plan and patient confirms that plan is still to discharge to home     Will follow for possible IV atb at home    Patient is + Osteomyelitis    WIll need benefits ran, referrals sent    Will need a picc line for discharge if home IV atb is needed     Will continue to follow for additional discharge needs.    If patient is discharged prior to next notation, then this note serves as note for discharge by case management.    Electronically signed by Lois Turner RN on 9/1/2024 at 1:37 PM    
DME:    Patient expects to discharge to: House  Plan for transportation at discharge: Friends    Financial    Payor: [employer] / Plan: [employer] / Product Type: *No Product type* /     Does insurance require precert for SNF: Yes    Potential assistance Purchasing Medications: No  Meds-to-Beds request:        [id number] - [place], [place] - [address] -  [phone] - F [phone]  [address] [place] [address]  Phone: [phone] Fax: [phone]    [id number] - [place], [place] - [address] -  [phone] - F [phone]  [address] [place] [address] [place] [address]  Phone: [phone] Fax: [phone]    [place], [place] - [address] -  [phone] - F [phone]  [address]  [place] [address]  Phone: [phone] Fax: [phone]      Notes:    Factors facilitating achievement of predicted outcomes: Friend support, Cooperative, and Pleasant    Barriers to discharge: Pain    Additional Case Management Notes: The patient is from a 2 story home w/friend, independent, uncle drives her to appointments. [facility] PTA.     DME: None. States she is diabetic and had a glucometer, but has not had one for over one year. Hemoglobin A1c last checked 8/8/23 and was 10.4.     VNS: Denies need.     Podiatry consulted for foot wound. Per patient she follows with Dr. Recio at [facility] outpatient.     The Plan for Transition of Care is related to the following treatment goals of Chest pain [R07.9]  Pain of toe, unspecified laterality [M79.676]  Chest pain, unspecified type [R07.9]    IF APPLICABLE: The Patient and/or patient representative Jane and her family were provided with a choice of provider and agrees with the discharge plan. Freedom of choice list with basic dialogue that supports the patient's individualized plan of care/goals and shares the quality data associated with the providers was provided to:

## 2024-09-01 NOTE — DISCHARGE INSTR - DIET

## 2024-09-01 NOTE — PLAN OF CARE
Problem: Pain  Goal: Verbalizes/displays adequate comfort level or baseline comfort level  Outcome: Progressing  Note: Patient is able to verbalize needs and concerns.  Patient was given a one time order pain medication.  Medication was effective for pain management.       Problem: Safety - Adult  Goal: Free from fall injury  Outcome: Progressing  Note: Bed in lowest and locked position.  2 bedrails used for pt safety.  Gait is steady.  Hourly rounds done by staff and RN.  Phone and call light within pt's reach.       Problem: Skin/Tissue Integrity  Goal: Absence of new skin breakdown  Description: 1.  Monitor for areas of redness and/or skin breakdown  2.  Assess vascular access sites hourly  3.  Every 4-6 hours minimum:  Change oxygen saturation probe site  4.  Every 4-6 hours:  If on nasal continuous positive airway pressure, respiratory therapy assess nares and determine need for appliance change or resting period.  Outcome: Progressing  Note: Patient is receiving IV antibiotics..  MRI of right foot did result.  Patient states that podiatry rounded and changed her dressing earlier in the day.  Dressings are CDI.  No new skin breakdown noted at this time.

## 2024-09-01 NOTE — DISCHARGE INSTRUCTIONS
Podiatry:  - Please make a follow-up visit with Dr. Recio outpatient 1 week after discharge  - Please keep your lower extremity wounds dry clean and covered at all times until follow-up visit  - Weightbearing status: Weightbearing as tolerated with surgical shoes to both feet  - Please take all prescribed medications as instructed  - Please restart all home medications as prescribed  - Please return to the hospital if any of the following local signs of infection arise: Increased/streaking redness, pain, swelling, drainage or malodor  - Please return to the hospital for any the following systemic signs of infection arise: Nausea, vomiting, fever, chills, diarrhea, shortness of breath or chest pain    
no

## 2024-09-01 NOTE — DISCHARGE INSTR - COC
Continuity of Care Form    Patient Name: Jane Saavedra   :  1990  MRN:  [id number]    Admit date:  2024  Discharge date:  24    Code Status Order: Full Code   Advance Directives:   Advance Care Flowsheet Documentation             Admitting Physician:  Dyan Mills MD  PCP: No primary care provider on file.    Discharging Nurse: Nupur BARRAZA  Discharging Hospital Unit/Room#: 2114/2114-01  Discharging Unit Phone Number: [phone]    Emergency Contact:   Extended Emergency Contact Information  Primary Emergency Contact: Tami Del Cid  Home Phone: [phone]  Relation: Friend  Secondary Emergency Contact: Don't,Contact  Relation: None    Past Surgical History:  Past Surgical History:   Procedure Laterality Date    BACK SURGERY Left 08/10/2023    FLANK ABSCESS I&D ,  performed by Sánchez Leonard MD at [facility] OR    CARDIAC CATHETERIZATION  2023    restenosis d/t medication non compliance : Fabiola Garcia in [facility]    CARPAL TUNNEL RELEASE Left 2020    CORONARY ANGIOPLASTY WITH STENT PLACEMENT  2023    REED x2 to L CX , REED to LAD : [place]    CORONARY ARTERY BYPASS GRAFT N/A 07/10/2023    x 3 vessels using LIMA and right saph,   performed by Malina Gilliam MD at [facility] CVOR    IR DRAIN SKIN ABSCESS  2023    drain tube placement    LAMINECTOMY AND MICRODISCECTOMY LUMBAR SPINE  2024    L4-5 , [place]    LEFT ATRIAL APPENDAGE LIGATION  07/10/2023    during bypass    TOE AMPUTATION Left 2024    GREAT TOE AMPUTATION (Left: Foot)    TOE AMPUTATION Left 2024    : GREAT TOE AMPUTATION (Left: Foot)    TOE AMPUTATION Left 2024    GREAT TOE AMPUTATION performed by Radha Recio DPM at [facility] OR    VASCULAR SURGERY Left 2024    LEFT LOWER LEG ANGIOGRAM,PERCUTANEOUS MECHANICAL THROMBECTOMY performed by Emperatriz Watson MD at [facility]       Immunization History:   Immunization History   Administered Date(s) Administered    Influenza Virus Vaccine

## 2024-09-01 NOTE — PLAN OF CARE
Problem: Discharge Planning  Goal: Discharge to home or other facility with appropriate resources  9/1/2024 1715 by Madhavi Gandhi RN  Outcome: Adequate for Discharge     Problem: Pain  Goal: Verbalizes/displays adequate comfort level or baseline comfort level  9/1/2024 1715 by Madhavi Gandhi RN  Outcome: Adequate for Discharge     Problem: Safety - Adult  Goal: Free from fall injury  9/1/2024 1715 by Madhavi Gandhi RN  Outcome: Adequate for Discharge     Problem: Nutrition Deficit:  Goal: Optimize nutritional status  9/1/2024 1715 by Madhavi Gandhi RN  Outcome: Adequate for Discharge     Problem: Skin/Tissue Integrity  Goal: Absence of new skin breakdown  Description: 1.  Monitor for areas of redness and/or skin breakdown  2.  Assess vascular access sites hourly  3.  Every 4-6 hours minimum:  Change oxygen saturation probe site  4.  Every 4-6 hours:  If on nasal continuous positive airway pressure, respiratory therapy assess nares and determine need for appliance change or resting period.  9/1/2024 1715 by Madhavi Gandhi RN  Outcome: Adequate for Discharge     Problem: Chronic Conditions and Co-morbidities  Goal: Patient's chronic conditions and co-morbidity symptoms are monitored and maintained or improved  9/1/2024 1715 by Madhavi Gandhi RN  Outcome: Adequate for Discharge

## 2024-09-01 NOTE — PLAN OF CARE
Problem: Discharge Planning  Goal: Discharge to home or other facility with appropriate resources  Outcome: Progressing     Problem: Pain  Goal: Verbalizes/displays adequate comfort level or baseline comfort level  9/1/2024 1520 by Madhavi Gandhi RN  Outcome: Progressing     Problem: Safety - Adult  Goal: Free from fall injury  9/1/2024 1520 by Madhavi Gandhi RN  Outcome: Progressing     Problem: Nutrition Deficit:  Goal: Optimize nutritional status  Outcome: Progressing     Problem: Skin/Tissue Integrity  Goal: Absence of new skin breakdown  Description: 1.  Monitor for areas of redness and/or skin breakdown  2.  Assess vascular access sites hourly  3.  Every 4-6 hours minimum:  Change oxygen saturation probe site  4.  Every 4-6 hours:  If on nasal continuous positive airway pressure, respiratory therapy assess nares and determine need for appliance change or resting period.  9/1/2024 1520 by Madhavi Gandhi RN  Outcome: Progressing     Problem: Chronic Conditions and Co-morbidities  Goal: Patient's chronic conditions and co-morbidity symptoms are monitored and maintained or improved  Outcome: Progressing

## 2024-09-02 VITALS
TEMPERATURE: 98.1 F | OXYGEN SATURATION: 100 % | WEIGHT: 174 LBS | RESPIRATION RATE: 18 BRPM | HEIGHT: 70 IN | BODY MASS INDEX: 24.91 KG/M2 | HEART RATE: 72 BPM | DIASTOLIC BLOOD PRESSURE: 55 MMHG | SYSTOLIC BLOOD PRESSURE: 105 MMHG

## 2024-09-02 PROBLEM — R07.9 CHEST PAIN: Status: ACTIVE | Noted: 2024-09-02

## 2024-09-02 LAB
GLUCOSE BLD-MCNC: 199 MG/DL (ref 65–105)
GLUCOSE BLD-MCNC: 212 MG/DL (ref 65–105)
GLUCOSE BLD-MCNC: 243 MG/DL (ref 65–105)
GLUCOSE BLD-MCNC: 342 MG/DL (ref 65–105)
GLUCOSE BLD-MCNC: 392 MG/DL (ref 65–105)

## 2024-09-02 PROCEDURE — 6370000000 HC RX 637 (ALT 250 FOR IP)

## 2024-09-02 PROCEDURE — 6370000000 HC RX 637 (ALT 250 FOR IP): Performed by: INTERNAL MEDICINE

## 2024-09-02 PROCEDURE — 2580000003 HC RX 258

## 2024-09-02 PROCEDURE — 2060000000 HC ICU INTERMEDIATE R&B

## 2024-09-02 PROCEDURE — 2580000003 HC RX 258: Performed by: INTERNAL MEDICINE

## 2024-09-02 PROCEDURE — G0378 HOSPITAL OBSERVATION PER HR: HCPCS

## 2024-09-02 PROCEDURE — 6360000002 HC RX W HCPCS: Performed by: INTERNAL MEDICINE

## 2024-09-02 PROCEDURE — 82947 ASSAY GLUCOSE BLOOD QUANT: CPT

## 2024-09-02 PROCEDURE — 99239 HOSP IP/OBS DSCHRG MGMT >30: CPT | Performed by: INTERNAL MEDICINE

## 2024-09-02 RX ADMIN — LOSARTAN POTASSIUM 25 MG: 25 TABLET, FILM COATED ORAL at 10:25

## 2024-09-02 RX ADMIN — PIPERACILLIN AND TAZOBACTAM 3375 MG: 3; .375 INJECTION, POWDER, LYOPHILIZED, FOR SOLUTION INTRAVENOUS at 05:49

## 2024-09-02 RX ADMIN — APIXABAN 5 MG: 5 TABLET, FILM COATED ORAL at 10:23

## 2024-09-02 RX ADMIN — SODIUM CHLORIDE, PRESERVATIVE FREE 10 ML: 5 INJECTION INTRAVENOUS at 10:26

## 2024-09-02 RX ADMIN — ACETAMINOPHEN 650 MG: 325 TABLET ORAL at 10:23

## 2024-09-02 RX ADMIN — ASPIRIN 81 MG: 81 TABLET, COATED ORAL at 10:23

## 2024-09-02 RX ADMIN — EZETIMIBE 10 MG: 10 TABLET ORAL at 10:25

## 2024-09-02 RX ADMIN — ISOSORBIDE MONONITRATE 30 MG: 30 TABLET, EXTENDED RELEASE ORAL at 10:23

## 2024-09-02 RX ADMIN — METOPROLOL SUCCINATE 50 MG: 50 TABLET, EXTENDED RELEASE ORAL at 10:25

## 2024-09-02 RX ADMIN — LINEZOLID 600 MG: 600 TABLET, FILM COATED ORAL at 10:23

## 2024-09-02 ASSESSMENT — PAIN SCALES - GENERAL: PAINLEVEL_OUTOF10: 8

## 2024-09-02 NOTE — DISCHARGE SUMMARY
you. Read the directions carefully, and ask your doctor or other care provider to review them with you.                CONTINUE taking these medications      apixaban 5 MG Tabs tablet  Commonly known as: ELIQUIS  Take 1 tablet by mouth 2 times daily     aspirin 81 MG EC tablet  Take 1 tablet by mouth daily     atorvastatin 80 MG tablet  Commonly known as: LIPITOR  Take 1 tablet by mouth nightly     Blood Pressure Kit  1 kit by Does not apply route daily     ezetimibe 10 MG tablet  Commonly known as: ZETIA  Take 1 tablet by mouth daily     metoprolol succinate 50 MG extended release tablet  Commonly known as: Toprol XL  Take 1 tablet by mouth daily            STOP taking these medications      sacubitril-valsartan 24-26 MG per tablet  Commonly known as: ENTRESTO     spironolactone 25 MG tablet  Commonly known as: Aldactone               Where to Get Your Medications        These medications were sent to [address] -  [phone] - F [phone]  [address] [place] [id number]      Phone: [phone]   apixaban 5 MG Tabs tablet  blood glucose test strips  blood glucose test strips  doxycycline hyclate 100 MG tablet  glucose monitoring kit  Insulin Pen Needle 32G X 4 MM Misc  isosorbide mononitrate 30 MG extended release tablet  Lancets Misc  Lantus SoloStar 100 UNIT/ML injection pen  levoFLOXacin 750 MG tablet  losartan 25 MG tablet  metoprolol succinate 50 MG extended release tablet         Time Spent on discharge is  35 mins in patient examination, evaluation, counseling as well as medication reconciliation, prescriptions for required medications, discharge plan and follow up.    Electronically signed by   Anuradha Ayoub MD  9/2/2024  10:45 AM      Thank you Dr. Perdomo primary care provider on file. for the opportunity to be involved in this patient's care.

## 2024-09-02 NOTE — PROGRESS NOTES
Cardiology Progress Note                     Date:   8/31/2024  Patient name: Jane Saavedra  Date of admission:  8/28/2024  8:24 PM  MRN:   [id number]  YOB: 1990  PCP: No primary care provider on file.    Reason for Admission:  diabetic foot wound, chest pain     Subjective:       Patient is feeling better, denies any further chest pain, on room air, no dyspnea, had echo and stress test yesterday     Scheduled Meds:   insulin glargine  35 Units SubCUTAneous QHS    linezolid  600 mg Oral 2 times per day    apixaban  5 mg Oral BID    aspirin  81 mg Oral Daily    atorvastatin  80 mg Oral Nightly    ezetimibe  10 mg Oral Daily    metoprolol succinate  50 mg Oral Daily    sacubitril-valsartan  1 tablet Oral BID    spironolactone  12.5 mg Oral Daily    sodium chloride flush  5-40 mL IntraVENous 2 times per day    isosorbide mononitrate  30 mg Oral Daily    piperacillin-tazobactam  3,375 mg IntraVENous Q8H    insulin lispro  0-16 Units SubCUTAneous TID WC    insulin lispro  0-4 Units SubCUTAneous Nightly       Continuous Infusions:   dextrose      sodium chloride         Labs:     CBC:   Recent Labs     08/30/24  0559 08/31/24  0441 08/31/24  1001   WBC 7.9 6.9 8.1   HGB 12.9 13.2 13.9    263 293     BMP:    Recent Labs     08/29/24  0535 08/30/24  0559 08/31/24  0441    135 137   K 4.4 4.2 4.0    100 102   CO2 28 22 24   BUN 13 13 14   CREATININE 0.5 <0.4* <0.4*   GLUCOSE 313* 296* 281*     Hepatic: No results for input(s): \"AST\", \"ALT\", \"BILITOT\", \"ALKPHOS\" in the last 72 hours.    Invalid input(s): \"ALB\"  Troponin: No results for input(s): \"TROPONINI\" in the last 72 hours.  BNP: No results for input(s): \"BNP\" in the last 72 hours.  Lipids: No results for input(s): \"CHOL\", \"HDL\" in the last 72 hours.    Invalid input(s): \"LDLCALCU\"  INR:   Recent Labs     08/28/24 2038   INR 1.0         Objective:     Vitals: /65   Pulse 61   Temp 98.1 °F (36.7 °C) (Oral)   
    [facility]   IN-PATIENT SERVICE   [facility]    HISTORY AND PHYSICAL EXAMINATION            Date:   8/31/2024  Patient name:  Jane Saavedra  Date of admission:  8/28/2024  8:24 PM  MRN:   [id number]  Account:  [id number]  YOB: 1990  PCP:    No primary care provider on file.  Room:   [address] [place] [address]  Code Status:    Full Code    Chief Complaint:     Chief Complaint   Patient presents with    Chest Pain       History Obtained From:     patient    History of Present Illness:     The patient is a 34 y.o.   /  female who presents with Chest Pain   and she is admitted to the hospital for the management of      Jane Saavedra is a 34 y.o.  /  female who presents with Chest Pain   and is admitted to the hospital for the management of Chest pain.     Patient seen in the ED for complaints of chest pain, pain in the ray of the first great toe, and a blister on the right lateral aspect of the foot. Patient has a significant medical hsiotry of type 2 diabetes, hypertension, PAD, hyperlipidemia, and a recent left great toe amputation (7/9/2024).     Patient states that she has been experiencing chest pain for three day, each day she had taken a sublingual Nitroglycerin that made the pain tolerable enough to get through the day. The patient has been dealing with pain in each foot ever since her amputation in July. The patient has been attending weekly podiatry appointments for dressing changes of her left first ray. (Wound photos available in chart). After original amputation patient experienced medication insecurity that caused a delay in her outpatient antibiotics, which resulted in patient experiencing a post op infection and pain. Troponin's are negative, patient's blood glucose was 514 on chemistry, short acting insulin given. XR of left and right foot both show soft tissue swelling. Appreciate input from podiatry on continuation of care.      Patient to 
    [facility] Internal Medicine  Domingo Virk MD; Uday Landaverde MD; Desmond Vilchis MD; MD Anuradha Partida MD; Lissett Kessler MD  [facility] Internal Medicine   IN-PATIENT SERVICE  [facility]                 Date:   8/29/2024  Patientname:  Jane Saavedra  Date of admission:  8/28/2024  8:24 PM  MRN:   [id number]  Account:  [id number]  YOB: 1990  PCP:    No primary care provider on file.  Room:   LETICIA/LETICIA  Code Status:    Prior      Chief Complaint:     Chief Complaint   Patient presents with    Chest Pain       History of Present Illness:     Jane Saavedra is a 34 y.o.  /  female who presents with Chest Pain   and is admitted to the hospital for the management of Chest pain.    Patient seen in the ED for complaints of chest pain, pain in the ray of the first great toe, and a blister on the right lateral aspect of the foot. Patient has a significant medical hsiotry of type 2 diabetes, hypertension, PAD, hyperlipidemia, and a recent left great toe amputation (7/9/2024).    Patient states that she has been experiencing chest pain for three day, each day she had taken a sublingual Nitroglycerin that made the pain tolerable enough to get through the day. The patient has been dealing with pain in each foot ever since her amputation in July. The patient has been attending weekly podiatry appointments for dressing changes of her left first ray. (Wound photos available in chart). After original amputation patient experienced medication insecurity that caused a delay in her outpatient antibiotics, which resulted in patient experiencing a post op infection and pain. Troponin's are negative, patient's blood glucose was 514 on chemistry, short acting insulin given. XR of left and right foot both show soft tissue swelling. Appreciate input from podiatry on continuation of care.     Patient to be admitted to the floor for observation. Consult made to 
   08/29/24 1248   Encounter Summary   Encounter Overview/Reason  Encounter   Service Provided For Patient   Last Encounter  08/29/24   Rituals, Rites and Sacraments   Type Blessings   Assessment/Intervention/Outcome   Intervention Prayer (assurance of)/[place]       
   09/02/24 1327   Encounter Summary   Encounter Overview/Reason Volunteer Encounter   Service Provided For Patient   Last Encounter  09/02/24   Rituals, Rites and Sacraments   Type [employer] Communion   Assessment/Intervention/Outcome   Intervention Prayer (assurance of)/[place]       
  Progress Note  Foot and Ankle Surgery    Subjective     CC: Bilateral foot wounds    Interval:   Patient seen and examined at bedside.  No acute events overnight.  Afebrile, vital signs stable, pain controlled  Patient was seen and evaluated bedside.  MRI was received yesterday.  Surgical shoes were ordered for the patient, she refuses to wear them.  When asked why she does not wear them patient states that \"they hurt her feet.\".  Bedside debridement was performed.  There was purulent drainage noted from the wounds.    Updated Labs:     WBC:   Lab Results   Component Value Date    WBC 8.1 08/31/2024     ESR:  Lab Results   Component Value Date    SEDRATE 40 (H) 08/29/2024     CRP:  Lab Results   Component Value Date    CRP <3.0 08/31/2024         HPI :  Jane Saavedra is a 34 y.o. female seen at [facility] with complaint of bilateral foot wounds.  Patient is familiar to our service with a history of diabetic ulcerations and an amputation performed on 7/8/2024.  She has been following up at her outpatient clinic last seen on 8/26/2024.  Attention was directed to her left foot and the healing of the amputation site at that time.  Patient states after that visit she noticed a blister on the outside of her right foot.  It brought her pain and was progressing.  Patient does have a significant past medical history including diabetes mellitus with significant peripheral neuropathy, peripheral arterial disease, NSTEMI.  Sugars are 427.  Patient does also present with chest tightness and chest pain.  She denies any nausea, fever, chills, shortness of breath.  Bilateral x-rays were negative on my arrival.     ROS: Denies N/V/F/C/SOB/CP.  Otherwise negative except at stated in the HPI.     Medications:  Scheduled Meds:   insulin glargine  35 Units SubCUTAneous QHS    linezolid  600 mg Oral 2 times per day    apixaban  5 mg Oral BID    aspirin  81 mg Oral Daily    atorvastatin  80 mg Oral Nightly    ezetimibe  10 mg 
 notified of new consult.  
[facility]   OCCUPATIONAL THERAPY MISSED TREATMENT NOTE   INPATIENT   Date: 24  Patient Name: Jane Saavedra       Room:   MRN: [id number]   Account #: [id number]    : 1990  (34 y.o.)  Gender: female                 REASON FOR MISSED TREATMENT:  Hold Occupational therapy evaluation at this time   -   Awaiting podiatry consult due to R foot wound. OT will continue to follow and see patient when appropriate.     Electronically signed by ANTONY Smyth on 24 at 10:41 AM EDT   
[facility]   OCCUPATIONAL THERAPY MISSED TREATMENT NOTE   INPATIENT   Date: 24  Patient Name: Jane Saavedra       Room:   MRN: [id number]   Account #: [id number]    : 1990  (34 y.o.)  Gender: female                 REASON FOR MISSED TREATMENT:  24    -   OT being discontinued at this time. Patient functioning at Premorbid Level  No further needs  - per RN and pt, pt is up independently in room, no concerns with ability to complete self-care upon discharge. Pt reports no needs for skilled occupational therapy at this time. D/C OT - please reorder if future OT needs arise.    [id number]      Electronically signed by ANTONY Aggarwal on 24 at 8:48 AM EDT    
[facility]   Pharmacy Pharmacokinetic Monitoring Service - Vancomycin    Consulting Provider: Dr. Mills   Indication: SSTI  Target Concentration: Goal trough of 10-15 mg/L and AUC/IDVINA <500 mg*hr/L  Day of Therapy: 2  Additional Antimicrobials: zosyn    Pertinent Laboratory Values:   Wt Readings from Last 1 Encounters:   08/28/24 78.9 kg (174 lb)     Temp Readings from Last 1 Encounters:   08/30/24 98.4 °F (36.9 °C) (Oral)     Estimated Creatinine Clearance: 170 mL/min (based on SCr of 0.5 mg/dL).  Recent Labs     08/28/24 2038 08/29/24 0535 08/30/24  0559   CREATININE 0.5 0.5  --    BUN 13 13  --    WBC 8.4 8.1 7.9     Procalcitonin:     Pertinent Cultures:  Culture Date Source Results        MRSA Nasal Swab: N/A. Non-respiratory infection.    Recent vancomycin administrations                     vancomycin (VANCOCIN) 1,000 mg in sodium chloride 0.9 % 250 mL IVPB ([id number]) (mg) 1,000 mg New Bag 08/30/24 0047    vancomycin (VANCOCIN) 2,000 mg in sodium chloride 0.9 % 500 mL IVPB (mg) 2,000 mg New Bag 08/29/24 1347                    Assessment:  Date/Time Current Dose Concentration Timing of Concentration (h) AUC   08/30 1000 mg q8h 9.4 0559 356   Note: Serum concentrations collected for AUC dosing may appear elevated if collected in close proximity to the dose administered, this is not necessarily an indication of toxicity    Plan:  Current dosing regimen is sub-therapeutic  Increase dose to 1250 mg every 8 hours.  Repeat vancomycin concentration ordered for 08/31/24 @ 0600   Pharmacy will continue to monitor patient and adjust therapy as indicated    Loading dose: N/A  Regimen: 1250 mg IV every 8 hours.  Start time: 08:47 on 08/30/2024  Exposure target: AUC24 (range)400-500 mg/L.hr   VLD98-18: 426 mg/L.hr  AUC24,ss: 444 mg/L.hr  Probability of AUC24 > 400: 73 %  Ctrough,ss: 10.9 mg/L  Probability of Ctrough,ss > 20: 0 %    Thank you for the consult,  Luis Carlos Cárdenas RPH  8/30/2024 6:40 AM    
[facility]   Pharmacy Pharmacokinetic Monitoring Service - Vancomycin     Jane Saavedra is a 34 y.o. female starting on vancomycin therapy for SSTI. Pharmacy consulted by Dr. Mills for monitoring and adjustment.    Target Concentration: Goal AUC/DIVINA 400-600 mg*hr/L    Additional Antimicrobials: zosyn    Pertinent Laboratory Values:   Wt Readings from Last 1 Encounters:   08/28/24 78.9 kg (174 lb)     Temp Readings from Last 1 Encounters:   08/29/24 98 °F (36.7 °C)     Estimated Creatinine Clearance: 170 mL/min (based on SCr of 0.5 mg/dL).  Recent Labs     08/28/24 2038 08/29/24  0535   CREATININE 0.5 0.5   BUN 13 13   WBC 8.4 8.1     Procalcitonin: n/a    Pertinent Cultures: n/a  MRSA Nasal Swab: N/A. Non-respiratory infection.    Loading dose: N/A  Regimen: 1000 mg IV every 8 hours.  Start time: 01:47 on 08/30/2024  Exposure target: AUC24 (range)400-500 mg/L.hr   [id number]: 400 mg/L.hr  AUC24,ss: 417 mg/L.hr  Probability of AUC24 > 400: 55 %  Ctrough,ss: 10.7 mg/L      Plan:  Dosing recommendations based on Bayesian software  Start vancomycin 2000 mg IVPB x 1 for loading dose, then vancomycin 1000 mg IVPB Q8H  Anticipated AUC of 467 and trough concentration of 10 mg/dL at steady state  Renal labs as indicated   Vancomycin concentration ordered for 08/30 @ 1200   Pharmacy will continue to monitor patient and adjust therapy as indicated    Thank you for the consult,  Lita Granda RPH  8/29/2024 1:45 PM   
Discussed with Dr. Feldman, recommended to discharge patient 2-week course of levofloxacin and Doxy, patient to follow-up with ID as outpatient, strongly recommended medication compliance, will discharge on insulin as well, glucometer supplies given  No surgery planned as per podiatry,  
Lu Feldman MD (ID) notified of consult.  
Patient given flat bottom fracture shoes and educated on them. Patient refusing at this point. RN left in the room at bedside for patient. RN did place dressing as advised by podiatry and RN placed nonslip socks over the dressing.   
Patient was not in room during rounds today.  Discussion with nurse stated that she was getting cardiac testing, still requires MRI.  Patient is currently refusing to wear dispensed surgical shoes.  Patient must remain nonweightbearing to the ulcerative site.  We will see patient tomorrow morning, and review MRI results.    Aggie Childs DPM  Foot and Ankle Surgery, PGY-2  [place], [place]    
Physical Therapy        Physical Therapy Cancel Note      DATE: 2024    NAME: Jane Saavedra  MRN: [id number]   : 1990      Patient not seen this date for Physical Therapy due to:    Patient independent with functional mobility. Will defer PT evaluation at this time. Please reorder PT if future needs arise.       Electronically signed by Alice Grullon PT on 2024 at 8:49 AM     
Physical Therapy        Physical Therapy Cancel Note      DATE: 2024    NAME: Jane Saavedra  MRN: [id number]   : 1990      Patient not seen this date for Physical Therapy due to:    Other: Podiatry consulted for R foot wound - follow orders after consult.       Electronically signed by Debi Hogue PT on 2024 at 10:26 AM      
Podiatry notified of new consult.  
RN notified Dr. Mills of patient unable to receive medications till Tuesday. Discharge held for tomorrow so patient can receive medications needed. Patient updated.  
otherwise and no allergies, no immunosuppressive diseases.            Interval changes  2024   Patient Vitals for the past 8 hrs:   BP Temp Temp src Pulse Resp SpO2   24 0854 112/65 98.1 °F (36.7 °C) Oral 61 18 98 %   24 0406 (!) 93/55 97.6 °F (36.4 °C) Axillary 63 17 96 %       No fevers  MRI + OM 5th toe and 5th MTS head  Left hallux amp site w drainage cx sent - pus described - past cx w SCN  in July    R 5th toe dressed today - aait OR  plans per podiatry - ordering cx from the [facility]        Summary of relevant labs:  Labs:  WBC 7.9 - 6.9  Creat 0.4 - 0.4  Hgb A1c 10.4  CRP 3.5  Micro:   Wound Culture Foot - pending    Procedures      Cardiology  TTE  EF 70-75; No valvular assessment  Lexiscan MP stress test  revealed a Large lateral wall infarct with brandy-infarct ischemia in the mid to apical region     Imagin/30 MRI Rt. Foot   Acute osteomyelitis of the 5th metatarsal head and 5th proximal phalanx.   2.   Cellulitis. No well-defined drainable abscess is seen.  3.   Increased signal of the Lisfranc ligament, suggest sequela of a mild sprain.  4.   Generalized muscle edema.     XR Left foot  1. Status post amputation of the left great toe at the level of the 1st   metatarsophalangeal joint.   2. Soft tissue swelling at the distal aspect of the left great toe with some   calcific densities in this area probably due to dystrophic calcification or   due to presence of some radiopaque gauze or bandage.  Findings suggesting   inflammation/infection in the soft tissues at the site of previous amputation.   3. Ill-defined low-density can be identified in the distal heads of the 1st   through 5th metatarsal bones suggestive of osteopenia.  However, early   changes of osteomyelitis at the distal head of the 1st metatarsal bone cannot   be excluded.     XR Right foot  1. No radiographic evidence of acute osseous abnormality in the right foot.   2. Soft tissue swelling on the 
C, DO   ezetimibe (ZETIA) 10 MG tablet Take 1 tablet by mouth daily 6/20/24  Yes Gm Ocampo, DO   metoprolol succinate (TOPROL XL) 50 MG extended release tablet Take 1 tablet by mouth daily 6/20/24  Yes Gm Ocampo, DO   Blood Pressure KIT 1 kit by Does not apply route daily 6/20/24  Yes Gm Ocampo DO   apixaban (ELIQUIS) 5 MG TABS tablet Take 1 tablet by mouth 2 times daily 6/27/24  Yes Gm Ocampo, DO   glucose monitoring kit 1 kit by Does not apply route daily 6/20/24  Yes Gm Ocampo, DO   Lancets MISC 1 each by Does not apply route daily 6/20/24  Yes Gm Ocampo, DO   sacubitril-valsartan (ENTRESTO) 24-26 MG per tablet Take 1 tablet by mouth 2 times daily 6/20/24  Yes Gm Ocampo DO   spironolactone (ALDACTONE) 25 MG tablet Take 0.5 tablets by mouth daily 6/20/24  Yes Gm Ocampo, DO        Allergies:     Patient has no known allergies.    Social History:     Tobacco:    reports that she has quit smoking. Her smoking use included cigarettes. She has never used smokeless tobacco.  Alcohol:      reports no history of alcohol use.  Drug Use:  reports that she does not currently use drugs after having used the following drugs: Marijuana (Weed).    Family History:     Family History   Problem Relation Age of Onset    Diabetes Mother        Review of Systems:     Positive and Negative as described in HPI.    CONSTITUTIONAL:  negative for fevers, chills, sweats, fatigue, weight loss  HEENT:  negative for vision, hearing changes, runny nose, throat pain  RESPIRATORY:  negative for shortness of breath, cough, congestion, wheezing.  CARDIOVASCULAR: Positive chest pain GASTROINTESTINAL:  negative for nausea, vomiting, diarrhea, constipation, change in bowel habits, abdominal pain   GENITOURINARY:  negative for difficulty of urination, burning with urination, frequency   INTEGUMENT:  negative for rash, skin lesions, easy bruising 
Status: Completed Specimen: Foot Updated: 09/01/24 1110     Specimen Description .FOOT LEFT     Direct Exam NO NEUTROPHILS      NO ORGANISMS SEEN     Culture NO GROWTH 16 HOURS    Culture, Wound [[id number]]     Order Status: No result Specimen: Foot               Assessment   Jane Saavedra is a 34 y.o. female with   S/p left hallux amputation (DOS 7/8/2024)  Surgical wound with delayed healing, left foot  Full-thickness ulceration extending subcutaneous tissue, right foot  Hyperglycemia  Uncontrolled type 2 diabetes mellitus with peripheral neuropathy  Peripheral vascular disease    Principal Problem:    Angina pectoris (HCC)  Active Problems:    S/P CABG x 3    Preop cardiovascular exam    S/P PTCA (percutaneous transluminal coronary angioplasty)    Type 2 diabetes mellitus without complication (HCC)    Coronary artery disease due to lipid rich plaque    Type 2 diabetes mellitus with right diabetic foot infection (HCC)  Resolved Problems:    * No resolved hospital problems. *       Plan   Patient examined and evaluated at bedside   Treatment options discussed in detail with the patient  Patient admitted under medicine for management of chest pain  Radiographic imaging order/examined and reviewed with the patient in detail at bedside  No obvious signs of osteomyelitis on either foot view.  There is interval amputation at the first MTPJ on the left foot.  MRI: Placed for right foot to further assess ulceration  Right foot: Acute osteomyelitis of the 5th metatarsal head and 5th proximal phalanx   Left foot: Acute osteomyelitis involving head and neck of the 1st metatarsal and the medial hallux sesamoid with adjacent cellulitis  Consults placed to the following services:  IM, cardiology  Patient started on antibiotic therapy:  Zosyn, Zyvox  The following labs were ordered and will be trended daily: WBC, CRP, ESR  Plan: No surgical intervention at this time. Patient stable for discharge from podiatry's standpoint 
admitted as inpatient status because of co-morbidities listed above, severity of signs and symptoms as outlined, requirement for current medical therapies and most importantly because of direct risk to patient if care not provided in a hospital setting.    Dyan Mills MD  9/1/2024  8:48 AM    Copy sent to Dr. Perdomo primary care provider on file.    Please note that this chart was generated using voice recognition Dragon dictation software.  Although every effort was made to ensure the accuracy of this automated transcription, some errors in transcription may have occurred.

## 2024-09-03 ENCOUNTER — TELEPHONE (OUTPATIENT)
Dept: FAMILY MEDICINE CLINIC | Age: 34
End: 2024-09-03

## 2024-09-03 ENCOUNTER — CARE COORDINATION (OUTPATIENT)
Dept: FAMILY MEDICINE CLINIC | Age: 34
End: 2024-09-03

## 2024-09-03 DIAGNOSIS — E11.59 TYPE 2 DIABETES MELLITUS WITH VASCULAR DISEASE (HCC): Primary | ICD-10-CM

## 2024-09-03 RX ORDER — INSULIN GLARGINE 100 [IU]/ML
INJECTION, SOLUTION SUBCUTANEOUS
Qty: 15 ML | Refills: 3 | Status: SHIPPED | OUTPATIENT
Start: 2024-09-03

## 2024-09-03 SDOH — ECONOMIC STABILITY: FOOD INSECURITY: WITHIN THE PAST 12 MONTHS, THE FOOD YOU BOUGHT JUST DIDN'T LAST AND YOU DIDN'T HAVE MONEY TO GET MORE.: NEVER TRUE

## 2024-09-03 SDOH — SOCIAL STABILITY: SOCIAL NETWORK: HOW OFTEN DO YOU ATTEND CHURCH OR RELIGIOUS SERVICES?: 1 TO 4 TIMES PER YEAR

## 2024-09-03 SDOH — ECONOMIC STABILITY: INCOME INSECURITY: HOW HARD IS IT FOR YOU TO PAY FOR THE VERY BASICS LIKE FOOD, HOUSING, MEDICAL CARE, AND HEATING?: SOMEWHAT HARD

## 2024-09-03 SDOH — HEALTH STABILITY: MENTAL HEALTH
STRESS IS WHEN SOMEONE FEELS TENSE, NERVOUS, ANXIOUS, OR CAN'T SLEEP AT NIGHT BECAUSE THEIR MIND IS TROUBLED. HOW STRESSED ARE YOU?: TO SOME EXTENT

## 2024-09-03 SDOH — SOCIAL STABILITY: SOCIAL NETWORK: HOW OFTEN DO YOU GET TOGETHER WITH FRIENDS OR RELATIVES?: MORE THAN THREE TIMES A WEEK

## 2024-09-03 SDOH — HEALTH STABILITY: PHYSICAL HEALTH: ON AVERAGE, HOW MANY MINUTES DO YOU ENGAGE IN EXERCISE AT THIS LEVEL?: 0 MIN

## 2024-09-03 SDOH — ECONOMIC STABILITY: FOOD INSECURITY: WITHIN THE PAST 12 MONTHS, YOU WORRIED THAT YOUR FOOD WOULD RUN OUT BEFORE YOU GOT MONEY TO BUY MORE.: NEVER TRUE

## 2024-09-03 SDOH — SOCIAL STABILITY: SOCIAL NETWORK: ARE YOU MARRIED, WIDOWED, DIVORCED, SEPARATED, NEVER MARRIED, OR LIVING WITH A PARTNER?: NEVER MARRIED

## 2024-09-03 SDOH — SOCIAL STABILITY: SOCIAL NETWORK: HOW OFTEN DO YOU ATTENT MEETINGS OF THE CLUB OR ORGANIZATION YOU BELONG TO?: NEVER

## 2024-09-03 SDOH — ECONOMIC STABILITY: TRANSPORTATION INSECURITY
IN THE PAST 12 MONTHS, HAS LACK OF TRANSPORTATION KEPT YOU FROM MEETINGS, WORK, OR FROM GETTING THINGS NEEDED FOR DAILY LIVING?: YES

## 2024-09-03 SDOH — ECONOMIC STABILITY: TRANSPORTATION INSECURITY
IN THE PAST 12 MONTHS, HAS THE LACK OF TRANSPORTATION KEPT YOU FROM MEDICAL APPOINTMENTS OR FROM GETTING MEDICATIONS?: YES

## 2024-09-03 SDOH — ECONOMIC STABILITY: INCOME INSECURITY: IN THE LAST 12 MONTHS, WAS THERE A TIME WHEN YOU WERE NOT ABLE TO PAY THE MORTGAGE OR RENT ON TIME?: YES

## 2024-09-03 SDOH — SOCIAL STABILITY: SOCIAL NETWORK
DO YOU BELONG TO ANY CLUBS OR ORGANIZATIONS SUCH AS CHURCH GROUPS UNIONS, FRATERNAL OR ATHLETIC GROUPS, OR SCHOOL GROUPS?: NO

## 2024-09-03 SDOH — SOCIAL STABILITY: SOCIAL NETWORK
IN A TYPICAL WEEK, HOW MANY TIMES DO YOU TALK ON THE PHONE WITH FAMILY, FRIENDS, OR NEIGHBORS?: MORE THAN THREE TIMES A WEEK

## 2024-09-03 SDOH — HEALTH STABILITY: PHYSICAL HEALTH: ON AVERAGE, HOW MANY DAYS PER WEEK DO YOU ENGAGE IN MODERATE TO STRENUOUS EXERCISE (LIKE A BRISK WALK)?: 0 DAYS

## 2024-09-03 ASSESSMENT — PATIENT HEALTH QUESTIONNAIRE - PHQ9
SUM OF ALL RESPONSES TO PHQ QUESTIONS 1-9: 2
1. LITTLE INTEREST OR PLEASURE IN DOING THINGS: SEVERAL DAYS
SUM OF ALL RESPONSES TO PHQ QUESTIONS 1-9: 2
2. FEELING DOWN, DEPRESSED OR HOPELESS: SEVERAL DAYS
SUM OF ALL RESPONSES TO PHQ9 QUESTIONS 1 & 2: 2

## 2024-09-03 NOTE — CARE COORDINATION
Met with Jane at bedside at [facility] to discuss program services of [facility]   Jane states she recently moved from [place] to [place] and is living with a friend.  She reports limited social support, no family support and no income to pay for housing or medical bills.    States she was unable to continue working due to increasing illness and applied for social security disability earlier in the year.  She has an interview scheduled with social security in September.    Jane plans to return to home of her friend after hospital discharge.  She is willing to engage with program services of [facility].  Home vs to be scheduled for initial evaluation with nursing after discharge from hospital.    Stacey Malone RN, BSN    [facility]

## 2024-09-04 ENCOUNTER — OFFICE VISIT (OUTPATIENT)
Dept: PODIATRY | Age: 34
End: 2024-09-04
Payer: COMMERCIAL

## 2024-09-04 ENCOUNTER — CARE COORDINATION (OUTPATIENT)
Dept: CARE COORDINATION | Age: 34
End: 2024-09-04

## 2024-09-04 ENCOUNTER — TELEPHONE (OUTPATIENT)
Dept: FAMILY MEDICINE CLINIC | Age: 34
End: 2024-09-04

## 2024-09-04 VITALS
BODY MASS INDEX: 24.91 KG/M2 | WEIGHT: 174 LBS | DIASTOLIC BLOOD PRESSURE: 78 MMHG | SYSTOLIC BLOOD PRESSURE: 125 MMHG | HEIGHT: 70 IN | HEART RATE: 82 BPM

## 2024-09-04 DIAGNOSIS — I73.9 PAD (PERIPHERAL ARTERY DISEASE) (HCC): ICD-10-CM

## 2024-09-04 DIAGNOSIS — E11.65 UNCONTROLLED TYPE 2 DIABETES MELLITUS WITH HYPERGLYCEMIA (HCC): Primary | ICD-10-CM

## 2024-09-04 DIAGNOSIS — E11.621 DIABETIC ULCER OF OTHER PART OF RIGHT FOOT ASSOCIATED WITH TYPE 2 DIABETES MELLITUS, UNSPECIFIED ULCER STAGE (HCC): ICD-10-CM

## 2024-09-04 DIAGNOSIS — M86.672 CHRONIC OSTEOMYELITIS INVOLVING ANKLE AND FOOT, LEFT (HCC): ICD-10-CM

## 2024-09-04 DIAGNOSIS — L97.519 DIABETIC ULCER OF OTHER PART OF RIGHT FOOT ASSOCIATED WITH TYPE 2 DIABETES MELLITUS, UNSPECIFIED ULCER STAGE (HCC): ICD-10-CM

## 2024-09-04 DIAGNOSIS — T81.31XS DEHISCENCE OF SURGICAL WOUND, SEQUELA: Primary | ICD-10-CM

## 2024-09-04 DIAGNOSIS — S98.112A AMPUTATION OF LEFT GREAT TOE (HCC): ICD-10-CM

## 2024-09-04 PROCEDURE — 3074F SYST BP LT 130 MM HG: CPT

## 2024-09-04 PROCEDURE — 3078F DIAST BP <80 MM HG: CPT

## 2024-09-04 PROCEDURE — 11042 DBRDMT SUBQ TIS 1ST 20SQCM/<: CPT

## 2024-09-04 PROCEDURE — 99213 OFFICE O/P EST LOW 20 MIN: CPT

## 2024-09-04 PROCEDURE — 3046F HEMOGLOBIN A1C LEVEL >9.0%: CPT

## 2024-09-04 RX ORDER — ACYCLOVIR 400 MG/1
TABLET ORAL
Qty: 1 EACH | Refills: 0 | Status: SHIPPED | COMMUNITY
Start: 2024-09-04

## 2024-09-04 NOTE — TELEPHONE ENCOUNTER
Medication Management Service ([facility])  [facility]  [phone]     CLINICAL PHARMACY NOTE:    Phone call from CHRISTI Mcneil who requested phone call to patient to follow up on morning reading and adherence with prescribed insulin regimen. Med [facility] team has received referral for DM management under the CPA.  RN shared that patient is limited with resources and is unable to afford additional co-pays.  RN reports that patient has a box of insulin glargine (Basaglar) and limited testing supplies in the home.      Spoke with patient by telephone.  Patient reports fasting blood glucose of 381mg/dL.  Patient shared that she had taken a dose of insulin glargine 40 units yesterday afternoon around 1:30pm while the RN was in the home.  Patient also reports taking her second dose today around 9:00am. Pt confirmed that she has limited testing supplies along with limited funds for purchasing additional things including medications.      While on the phone with patient.  Writer spoke with patient regarding CGM and the possibility of using office sample for assisting with monitoring of glucose.  Confirmed that patient has I-Phone and is able to download both the Dexcom G7 application and the Dexcom Clarity application.        Above information shared with RN.  Plan for RN to follow up with [facility] Outreach team to meet with patient to apply the Dexcom G7 sensor.   At that time, patient will need to link her Clarity account to the office account so that readings will be visible to office.      Clinic code: [email or web address]    No changes made to current insulin glargine regimen. With limited testing supplies concern for making changes too quickly.    Per Lexicomp, recommendation for Dosage adjustment is as follows:  For persistently elevated fasting plasma glucose: SUBQ: Increase daily dose by 2 to 4 units or by 10% to 20% every 2 to 3 days to achieve fasting plasma glucose target while avoiding hypoglycemia (Ref).    Next PCP

## 2024-09-04 NOTE — CARE COORDINATION
[facility] Outreach initial evaluation with nursing    9/3/2024      Jane Saavedra 1990    Living Situation    Jane is a 34 y.o. female living with a friend.  She has no income and is unable to afford housing.  The home is: 2 story apartment, number of outside stairs: none, number of inside stairs: 12, bedroom on 2nd  floor, bathroom on 2nd floor, tub-shower,   .  She has little social support.  Support includes: patient and friend.    Social History:   Social History     Socioeconomic History    Marital status: Single     Spouse name: Not on file    Number of children: Not on file    Years of education: Not on file    Highest education level: Not on file   Occupational History    Not on file   Tobacco Use    Smoking status: Former     Current packs/day: 0.25     Types: Cigarettes    Smokeless tobacco: Never   Vaping Use    Vaping status: Never Used   Substance and Sexual Activity    Alcohol use: No    Drug use: Not Currently     Types: Marijuana (Weed)    Sexual activity: Yes     Partners: Female   Other Topics Concern    Not on file   Social History Narrative    Not on file     Social Determinants of Health     Financial Resource Strain: Medium Risk (9/3/2024)    Overall Financial Resource Strain (CARDIA)     Difficulty of Paying Living Expenses: Somewhat hard   Food Insecurity: No Food Insecurity (9/3/2024)    Hunger Vital Sign     Worried About Running Out of Food in the Last Year: Never true     Ran Out of Food in the Last Year: Never true   Transportation Needs: Unmet Transportation Needs (9/3/2024)    PRAPARE - Transportation     Lack of Transportation (Medical): Yes     Lack of Transportation (Non-Medical): Yes   Physical Activity: Inactive (9/3/2024)    Exercise Vital Sign     Days of Exercise per Week: 0 days     Minutes of Exercise per Session: 0 min   Stress: Stress Concern Present (9/3/2024)    [place] [place] [employer] - Occupational Stress Questionnaire     Feeling of

## 2024-09-05 LAB
MICROORGANISM SPEC CULT: NORMAL
MICROORGANISM/AGENT SPEC: NORMAL
MICROORGANISM/AGENT SPEC: NORMAL
SPECIMEN DESCRIPTION: NORMAL

## 2024-09-05 NOTE — CARE COORDINATION
[facility] Outreach Program    Emotional/Coping: JUAN ALBERTO met with pt at podiatry appt. SW introduced self to pt and family/friend who was accompanying pt on appointment. Pt states that she always makes the podiatry appts. SW discussed with pt needs and pt states that all of her benefits from [place] have been stopped. Pt plans to reside in [place] and no plans to return to [place]. JUAN ALBERTO will assist pt with completing application for [place] Medicaid and once Medicaid is approved will assist with application for [facility] medical transportation for medical appts.     Housing: Pt is currently residing with friends and pt states friends are ok with her living there at this time and with her using the address for residency. Pt is not interested in going to shelter as pt states friends are concerned about her medical issues at this time and doesn't want her living at the shelter.     Plan: JUAN ALBERTO will assist with Medicaid application and follow up as needed.

## 2024-09-10 ENCOUNTER — CARE COORDINATION (OUTPATIENT)
Dept: CARE COORDINATION | Age: 34
End: 2024-09-10

## 2024-09-10 ENCOUNTER — TELEPHONE (OUTPATIENT)
Dept: FAMILY MEDICINE CLINIC | Age: 34
End: 2024-09-10

## 2024-09-10 DIAGNOSIS — E11.65 UNCONTROLLED TYPE 2 DIABETES MELLITUS WITH HYPERGLYCEMIA (HCC): Primary | ICD-10-CM

## 2024-09-10 RX ORDER — INSULIN GLARGINE 100 [IU]/ML
22 INJECTION, SOLUTION SUBCUTANEOUS 2 TIMES DAILY
Qty: 5 ADJUSTABLE DOSE PRE-FILLED PEN SYRINGE | Refills: 0
Start: 2024-09-10

## 2024-09-12 ENCOUNTER — OFFICE VISIT (OUTPATIENT)
Dept: FAMILY MEDICINE CLINIC | Age: 34
End: 2024-09-12
Payer: COMMERCIAL

## 2024-09-12 VITALS
BODY MASS INDEX: 27.17 KG/M2 | SYSTOLIC BLOOD PRESSURE: 129 MMHG | DIASTOLIC BLOOD PRESSURE: 78 MMHG | HEIGHT: 70 IN | HEART RATE: 92 BPM | WEIGHT: 189.8 LBS

## 2024-09-12 DIAGNOSIS — E11.65 UNCONTROLLED TYPE 2 DIABETES MELLITUS WITH HYPERGLYCEMIA (HCC): Primary | ICD-10-CM

## 2024-09-12 LAB — HBA1C MFR BLD: 13.4 %

## 2024-09-12 PROCEDURE — 83036 HEMOGLOBIN GLYCOSYLATED A1C: CPT

## 2024-09-12 ASSESSMENT — PATIENT HEALTH QUESTIONNAIRE - PHQ9
5. POOR APPETITE OR OVEREATING: NEARLY EVERY DAY
1. LITTLE INTEREST OR PLEASURE IN DOING THINGS: NEARLY EVERY DAY
7. TROUBLE CONCENTRATING ON THINGS, SUCH AS READING THE NEWSPAPER OR WATCHING TELEVISION: NEARLY EVERY DAY
2. FEELING DOWN, DEPRESSED OR HOPELESS: NEARLY EVERY DAY
4. FEELING TIRED OR HAVING LITTLE ENERGY: NEARLY EVERY DAY
8. MOVING OR SPEAKING SO SLOWLY THAT OTHER PEOPLE COULD HAVE NOTICED. OR THE OPPOSITE, BEING SO FIGETY OR RESTLESS THAT YOU HAVE BEEN MOVING AROUND A LOT MORE THAN USUAL: NEARLY EVERY DAY
SUM OF ALL RESPONSES TO PHQ QUESTIONS 1-9: 24
SUM OF ALL RESPONSES TO PHQ9 QUESTIONS 1 & 2: 6
10. IF YOU CHECKED OFF ANY PROBLEMS, HOW DIFFICULT HAVE THESE PROBLEMS MADE IT FOR YOU TO DO YOUR WORK, TAKE CARE OF THINGS AT HOME, OR GET ALONG WITH OTHER PEOPLE: EXTREMELY DIFFICULT
SUM OF ALL RESPONSES TO PHQ QUESTIONS 1-9: 24
6. FEELING BAD ABOUT YOURSELF - OR THAT YOU ARE A FAILURE OR HAVE LET YOURSELF OR YOUR FAMILY DOWN: NEARLY EVERY DAY
9. THOUGHTS THAT YOU WOULD BE BETTER OFF DEAD, OR OF HURTING YOURSELF: NOT AT ALL
3. TROUBLE FALLING OR STAYING ASLEEP: NEARLY EVERY DAY
SUM OF ALL RESPONSES TO PHQ QUESTIONS 1-9: 24
SUM OF ALL RESPONSES TO PHQ QUESTIONS 1-9: 24

## 2024-09-12 ASSESSMENT — COLUMBIA-SUICIDE SEVERITY RATING SCALE - C-SSRS
1. WITHIN THE PAST MONTH, HAVE YOU WISHED YOU WERE DEAD OR WISHED YOU COULD GO TO SLEEP AND NOT WAKE UP?: YES
2. HAVE YOU ACTUALLY HAD ANY THOUGHTS OF KILLING YOURSELF?: NO
6. HAVE YOU EVER DONE ANYTHING, STARTED TO DO ANYTHING, OR PREPARED TO DO ANYTHING TO END YOUR LIFE?: NO

## 2024-09-12 NOTE — PROGRESS NOTES
[facility] Residency Program - Outpatient Note      Subjective:    Jane Saavedra is a 34 y.o. female with  has a past medical history of Acute osteomyelitis ([facility]), Bipolar 1 disorder (HCC), Cellulitis, Chronic diabetic ulcer of left foot determined by examination ([facility]), Depression, Diabetic foot infection ([facility]), H. pylori infection, History of recent hospitalization, Hyperlipidemia, Hypertension, Ischemic cardiomyopathy, Multiple vessel coronary artery disease, Neuropathy, Noncompliance, NSTEMI (non-ST elevated myocardial infarction) ([facility]), Osteomyelitis of foot, left, acute ([facility]), PAD (peripheral artery disease) ([facility]), Renal abscess, left, STEMI (ST elevation myocardial infarction) ([facility]), Thrombus, Type II or unspecified type diabetes mellitus without mention of complication, uncontrolled, Under care of team, Under care of team, Under care of team, Under care of team, Under care of team, Under care of team, and Wellness examination.    Presented to the office today for:  Chief Complaint   Patient presents with    Follow-Up from Hospital       HPI    Patient was admitted from 8/28/2024 and discharged on 9/2/2024.  Patient has extensive history, type I diabetic with vascular complications of diabetic foot ulcers, history of PAD/CAD status post CABG/HFrEF    Patient has gotten her antibiotics, working closely with [facility] and her team  Patient almost finished antibiotics for osteomyelitis of the foot  Patient went to see podiatry on 9//24, has a follow-up appointment with them in a couple days    A1c checked today was 13.4    Review of Systems   Constitutional:  Negative for chills and fever.   HENT:  Negative for congestion.    Respiratory:  Negative for cough, shortness of breath and wheezing.    Cardiovascular:  Negative for chest pain, palpitations and leg swelling.   Gastrointestinal:  Negative for abdominal pain.   Genitourinary:  Negative for dysuria.   Skin:

## 2024-09-12 NOTE — PATIENT INSTRUCTIONS
Thank you for letting us take care of you today. We hope all your questions were addressed. If a question was overlooked or something else comes to mind after you return home, please contact a member of your Care Team listed below.      Your Care Team at [facility] is Team #1  Magui Nguyen M.D. (Faculty)  Courtney Mahan M.D. (Resident)  Lucie Alfred D.O. (Resident)  Jono Hong M.D. (Resident)  Maggi Patel M.D. (Resident)  Julianne Holt, Critical access hospital  Richard Lawler, Critical access hospital  Niki Grullon, [facility]  Caroline Loja, Critical access hospital  Licha Alvares, [facility]  Evelin Thao, Critical access hospital  Niecy Rader, [facility]  [employer]) Jose Alberto,   Marium Cat [facility] (Clinical Pharmacist)     Office phone number: [phone]    If you need to get in right away due to illness, please be advised we have \"Same Day\" appointments available Monday-Friday. Please call us at [phone] option #3 to schedule your \"Same Day\" appointment.

## 2024-09-12 NOTE — PROGRESS NOTES
Visit Information    Have you changed or started any medications since your last visit including any over-the-counter medicines, vitamins, or herbal medicines? no   Are you having any side effects from any of your medications? -  no  Have you stopped taking any of your medications? Is so, why? -  no    Have you seen any other physician or provider since your last visit? Yes - Records Obtained  Have you had any other diagnostic tests since your last visit? Yes - Records Obtained  Have you been seen in the emergency room and/or had an admission to a hospital since we last saw you? Yes - Records Obtained  Have you had your routine dental cleaning in the past 6 months? no    Have you activated your CloudWalk account? If not, what are your barriers? Yes     Patient Care Team:  Marissa Wang MD as PCP - General (Family Medicine)  Stacey Malone RN as Care Transitions Nurse  Dar Hernández RN as   Marium Cat [facility] as Pharmacist (Pharmacist)    Medical History Review  Past Medical, Family, and Social History reviewed and does not contribute to the patient presenting condition    Health Maintenance   Topic Date Due    COVID-19 Vaccine (1) Never done    Varicella vaccine (1 of 2 - 13+ 2-dose series) Never done    Diabetic retinal exam  Never done    Hepatitis B vaccine (1 of 3 - 19+ 3-dose series) Never done    Shingles vaccine (1 of 2) Never done    Pneumococcal 0-64 years Vaccine (2 of 2 - PCV) 02/22/2016    Cervical cancer screen  Never done    Diabetic Alb to Cr ratio (uACR) test  05/14/2022    Lipids  07/10/2024    Flu vaccine (1) 08/01/2024    A1C test (Diabetic or Prediabetic)  08/08/2024    Diabetic foot exam  08/28/2025    GFR test (Diabetes, CKD 3-4, OR last GFR 15-59)  09/01/2025    Depression Monitoring  09/03/2025    DTaP/Tdap/Td vaccine (2 - Td or Tdap) 09/17/2029    Hepatitis C screen  Completed    HIV screen  Completed    Hepatitis A vaccine  Aged Out    Hib vaccine  Aged Out    HPV

## 2024-09-12 NOTE — PROGRESS NOTES
Attending Physician Statement  I  have discussed the care of Jane Saavedra including pertinent history and exam findings with the resident. I agree with the assessment, plan and orders as documented by the resident.      /78 (Site: Left Upper Arm, Position: Sitting, Cuff Size: Medium Adult)   Pulse 92   Ht 1.778 m (5' 10\")   Wt 86.1 kg (189 lb 12.8 oz)   LMP 08/08/2024   BMI 27.23 kg/m²    BP Readings from Last 3 Encounters:   10/02/24 132/85   09/25/24 135/87   09/24/24 122/78     Wt Readings from Last 3 Encounters:   10/02/24 85.7 kg (189 lb)   09/25/24 85.7 kg (189 lb)   09/24/24 85.7 kg (189 lb)          Diagnosis Orders   1. Uncontrolled type 2 diabetes mellitus with hyperglycemia (HCC)  POCT glycosylated hemoglobin (Hb A1C)          A1c 13.4- pharmacy following- without medications d/t cost. Social work following. Will increase basaglar to 28 units BID. Could benefit from short acting however cost is an issue. Close follow up recommend at home monitoring with pharmacy input to titrate medication closely.     Leobardo Benavidez DO 10/15/2024 10:08 AM

## 2024-09-18 ENCOUNTER — TELEPHONE (OUTPATIENT)
Dept: FAMILY MEDICINE CLINIC | Age: 34
End: 2024-09-18

## 2024-09-19 ENCOUNTER — TELEPHONE (OUTPATIENT)
Dept: PODIATRY | Age: 34
End: 2024-09-19

## 2024-09-19 ENCOUNTER — APPOINTMENT (OUTPATIENT)
Dept: GENERAL RADIOLOGY | Age: 34
End: 2024-09-19
Payer: COMMERCIAL

## 2024-09-19 ENCOUNTER — HOSPITAL ENCOUNTER (EMERGENCY)
Age: 34
Discharge: HOME | End: 2024-09-19
Attending: EMERGENCY MEDICINE
Payer: COMMERCIAL

## 2024-09-19 VITALS
DIASTOLIC BLOOD PRESSURE: 68 MMHG | OXYGEN SATURATION: 100 % | TEMPERATURE: 97.4 F | RESPIRATION RATE: 16 BRPM | HEART RATE: 90 BPM | SYSTOLIC BLOOD PRESSURE: 111 MMHG

## 2024-09-19 DIAGNOSIS — E10.621 DIABETIC ULCER OF RIGHT FOOT ASSOCIATED WITH TYPE 1 DIABETES MELLITUS, UNSPECIFIED PART OF FOOT, UNSPECIFIED ULCER STAGE (HCC): Primary | ICD-10-CM

## 2024-09-19 DIAGNOSIS — L97.519 DIABETIC ULCER OF RIGHT FOOT ASSOCIATED WITH TYPE 1 DIABETES MELLITUS, UNSPECIFIED PART OF FOOT, UNSPECIFIED ULCER STAGE (HCC): Primary | ICD-10-CM

## 2024-09-19 LAB
ALBUMIN SERPL-MCNC: 4 G/DL (ref 3.5–5.2)
ALP SERPL-CCNC: 77 U/L (ref 35–104)
ALT SERPL-CCNC: 64 U/L (ref 5–33)
ANION GAP SERPL CALCULATED.3IONS-SCNC: 11 MMOL/L (ref 9–17)
AST SERPL-CCNC: 22 U/L
BASOPHILS # BLD: 0 K/UL (ref 0–0.2)
BASOPHILS NFR BLD: 0 % (ref 0–2)
BILIRUB SERPL-MCNC: 0.3 MG/DL (ref 0.3–1.2)
BUN SERPL-MCNC: 15 MG/DL (ref 6–20)
CALCIUM SERPL-MCNC: 9.3 MG/DL (ref 8.6–10.4)
CHLORIDE SERPL-SCNC: 101 MMOL/L (ref 98–107)
CO2 SERPL-SCNC: 24 MMOL/L (ref 20–31)
CREAT SERPL-MCNC: <0.4 MG/DL (ref 0.5–0.9)
CRP SERPL HS-MCNC: <3 MG/L (ref 0–5)
EOSINOPHIL # BLD: 0.18 K/UL (ref 0–0.4)
EOSINOPHILS RELATIVE PERCENT: 2 % (ref 0–4)
ERYTHROCYTE [DISTWIDTH] IN BLOOD BY AUTOMATED COUNT: 15.7 % (ref 11.5–14.9)
ERYTHROCYTE [SEDIMENTATION RATE] IN BLOOD BY PHOTOMETRIC METHOD: 39 MM/HR (ref 0–20)
GFR, ESTIMATED: ABNORMAL ML/MIN/1.73M2
GLUCOSE SERPL-MCNC: 212 MG/DL (ref 70–99)
HCT VFR BLD AUTO: 41.4 % (ref 36–46)
HGB BLD-MCNC: 13.8 G/DL (ref 12–16)
LYMPHOCYTES NFR BLD: 4.18 K/UL (ref 1–4.8)
LYMPHOCYTES RELATIVE PERCENT: 47 % (ref 24–44)
MCH RBC QN AUTO: 31 PG (ref 26–34)
MCHC RBC AUTO-ENTMCNC: 33.2 G/DL (ref 31–37)
MCV RBC AUTO: 93.2 FL (ref 80–100)
MONOCYTES NFR BLD: 0.27 K/UL (ref 0.1–1.3)
MONOCYTES NFR BLD: 3 % (ref 1–7)
MORPHOLOGY: ABNORMAL
NEUTROPHILS NFR BLD: 48 % (ref 36–66)
NEUTS SEG NFR BLD: 4.27 K/UL (ref 1.3–9.1)
PLATELET # BLD AUTO: 254 K/UL (ref 150–450)
PMV BLD AUTO: 7.5 FL (ref 6–12)
POTASSIUM SERPL-SCNC: 4.4 MMOL/L (ref 3.7–5.3)
PROT SERPL-MCNC: 7.2 G/DL (ref 6.4–8.3)
RBC # BLD AUTO: 4.44 M/UL (ref 4–5.2)
SODIUM SERPL-SCNC: 136 MMOL/L (ref 135–144)
WBC OTHER # BLD: 8.9 K/UL (ref 3.5–11)

## 2024-09-19 PROCEDURE — 86140 C-REACTIVE PROTEIN: CPT

## 2024-09-19 PROCEDURE — 73630 X-RAY EXAM OF FOOT: CPT

## 2024-09-19 PROCEDURE — 99284 EMERGENCY DEPT VISIT MOD MDM: CPT

## 2024-09-19 PROCEDURE — 6370000000 HC RX 637 (ALT 250 FOR IP): Performed by: EMERGENCY MEDICINE

## 2024-09-19 PROCEDURE — 80053 COMPREHEN METABOLIC PANEL: CPT

## 2024-09-19 PROCEDURE — 36415 COLL VENOUS BLD VENIPUNCTURE: CPT

## 2024-09-19 PROCEDURE — 85652 RBC SED RATE AUTOMATED: CPT

## 2024-09-19 PROCEDURE — 85025 COMPLETE CBC W/AUTO DIFF WBC: CPT

## 2024-09-19 RX ORDER — DOXYCYCLINE 100 MG/1
100 CAPSULE ORAL ONCE
Status: COMPLETED | OUTPATIENT
Start: 2024-09-19 | End: 2024-09-19

## 2024-09-19 RX ORDER — DOXYCYCLINE HYCLATE 100 MG
100 TABLET ORAL 2 TIMES DAILY
Qty: 14 TABLET | Refills: 0 | Status: SHIPPED | OUTPATIENT
Start: 2024-09-19 | End: 2024-09-26

## 2024-09-19 RX ORDER — LEVOFLOXACIN 750 MG/1
750 TABLET, FILM COATED ORAL DAILY
Qty: 7 TABLET | Refills: 0 | Status: SHIPPED | OUTPATIENT
Start: 2024-09-19 | End: 2024-09-26

## 2024-09-19 RX ORDER — LEVOFLOXACIN 750 MG/1
750 TABLET, FILM COATED ORAL DAILY
Status: DISCONTINUED | OUTPATIENT
Start: 2024-09-19 | End: 2024-09-19 | Stop reason: HOSPADM

## 2024-09-19 RX ADMIN — LEVOFLOXACIN 750 MG: 750 TABLET, FILM COATED ORAL at 18:35

## 2024-09-19 RX ADMIN — DOXYCYCLINE 100 MG: 100 CAPSULE ORAL at 18:35

## 2024-09-19 ASSESSMENT — ENCOUNTER SYMPTOMS
EYE PAIN: 0
BACK PAIN: 0
COLOR CHANGE: 0
ABDOMINAL PAIN: 0
SHORTNESS OF BREATH: 0

## 2024-09-19 NOTE — TELEPHONE ENCOUNTER
Patient is asking for a return call to reschedule her wound care follow up from 9/18 with Dr Corea due to [facility].  She states her wound looks infected with drainage and pus and would like a refill on her antibiotic.    Please return her call at the earliest convenience.    Thank you.

## 2024-09-20 ENCOUNTER — CARE COORDINATION (OUTPATIENT)
Dept: FAMILY MEDICINE CLINIC | Age: 34
End: 2024-09-20

## 2024-09-20 DIAGNOSIS — E11.65 UNCONTROLLED TYPE 2 DIABETES MELLITUS WITH HYPERGLYCEMIA (HCC): Primary | ICD-10-CM

## 2024-09-20 RX ORDER — INSULIN LISPRO 100 [IU]/ML
3 INJECTION, SOLUTION INTRAVENOUS; SUBCUTANEOUS
Qty: 3 ML | Refills: 3 | Status: SHIPPED | OUTPATIENT
Start: 2024-09-20

## 2024-09-24 ENCOUNTER — OFFICE VISIT (OUTPATIENT)
Dept: VASCULAR SURGERY | Age: 34
End: 2024-09-24
Payer: COMMERCIAL

## 2024-09-24 VITALS
OXYGEN SATURATION: 98 % | WEIGHT: 189 LBS | SYSTOLIC BLOOD PRESSURE: 132 MMHG | BODY MASS INDEX: 27.06 KG/M2 | DIASTOLIC BLOOD PRESSURE: 83 MMHG | HEART RATE: 87 BPM | HEIGHT: 70 IN | RESPIRATION RATE: 18 BRPM

## 2024-09-24 DIAGNOSIS — I70.235 ATHEROSCLEROSIS OF NATIVE ARTERY OF BOTH LOWER EXTREMITIES WITH BILATERAL ULCERATION OF OTHER PART OF FEET (HCC): Primary | ICD-10-CM

## 2024-09-24 DIAGNOSIS — I70.245 ATHEROSCLEROSIS OF NATIVE ARTERY OF BOTH LOWER EXTREMITIES WITH BILATERAL ULCERATION OF OTHER PART OF FEET (HCC): Primary | ICD-10-CM

## 2024-09-24 DIAGNOSIS — E11.65 UNCONTROLLED TYPE 2 DIABETES MELLITUS WITH HYPERGLYCEMIA (HCC): ICD-10-CM

## 2024-09-24 DIAGNOSIS — M79.89 LEG SWELLING: ICD-10-CM

## 2024-09-24 PROCEDURE — 99214 OFFICE O/P EST MOD 30 MIN: CPT | Performed by: STUDENT IN AN ORGANIZED HEALTH CARE EDUCATION/TRAINING PROGRAM

## 2024-09-24 PROCEDURE — 3079F DIAST BP 80-89 MM HG: CPT | Performed by: STUDENT IN AN ORGANIZED HEALTH CARE EDUCATION/TRAINING PROGRAM

## 2024-09-24 PROCEDURE — 3075F SYST BP GE 130 - 139MM HG: CPT | Performed by: STUDENT IN AN ORGANIZED HEALTH CARE EDUCATION/TRAINING PROGRAM

## 2024-09-24 RX ORDER — ACYCLOVIR 400 MG/1
TABLET ORAL
Qty: 3 EACH | Refills: 3 | Status: SHIPPED | OUTPATIENT
Start: 2024-09-24

## 2024-09-24 NOTE — PROGRESS NOTES
change, fever and unexpected weight change.   HENT:  Negative for trouble swallowing and voice change.    Eyes:  Negative for pain and visual disturbance.   Respiratory:  Negative for cough and chest tightness.    Cardiovascular:  Positive for leg swelling. Negative for chest pain and palpitations.   Gastrointestinal:  Negative for abdominal distention, abdominal pain and vomiting.   Endocrine: Negative for cold intolerance and heat intolerance.   Genitourinary:  Negative for dysuria, flank pain and hematuria.   Musculoskeletal:  Negative for joint swelling and neck pain.   Skin:  Positive for wound. Negative for color change and rash.   Allergic/Immunologic: Negative for immunocompromised state.   Neurological:  Negative for syncope, speech difficulty, weakness, numbness and headaches.   Hematological:  Negative for adenopathy.   Psychiatric/Behavioral:  Negative for behavioral problems and suicidal ideas.        Vitals:    09/24/24 1102   BP: 132/83   Site: Left Upper Arm   Position: Sitting   Cuff Size: Large Adult   Pulse: 87   Resp: 18   SpO2: 98%   Weight: 85.7 kg (189 lb)   Height: 1.778 m (5' 10\")          Physical Exam  Constitutional:       General: She is not in acute distress.  HENT:      Mouth/Throat:      Mouth: Mucous membranes are moist.      Pharynx: Oropharynx is clear.   Eyes:      General: No scleral icterus.     Extraocular Movements: Extraocular movements intact.      Conjunctiva/sclera: Conjunctivae normal.   Cardiovascular:      Rate and Rhythm: Normal rate and regular rhythm.      Heart sounds: No murmur heard.     Comments: Feet are warm, well perfused. Difficult to appreciate palpable pulses. Has healed left first toe amputation. Right side has wound on lateral aspect proximal to 5th metatarsal.  Pulmonary:      Effort: No respiratory distress.      Breath sounds: No rales.   Abdominal:      General: There is no distension.      Palpations: There is no mass.      Tenderness: There is no

## 2024-09-25 ENCOUNTER — CARE COORDINATION (OUTPATIENT)
Dept: CARE COORDINATION | Age: 34
End: 2024-09-25

## 2024-09-25 ENCOUNTER — OFFICE VISIT (OUTPATIENT)
Dept: PODIATRY | Age: 34
End: 2024-09-25
Payer: COMMERCIAL

## 2024-09-25 VITALS
WEIGHT: 189 LBS | HEIGHT: 70 IN | SYSTOLIC BLOOD PRESSURE: 135 MMHG | BODY MASS INDEX: 27.06 KG/M2 | DIASTOLIC BLOOD PRESSURE: 87 MMHG | HEART RATE: 86 BPM

## 2024-09-25 DIAGNOSIS — I73.9 PAD (PERIPHERAL ARTERY DISEASE) (HCC): ICD-10-CM

## 2024-09-25 DIAGNOSIS — L08.9 TYPE 2 DIABETES MELLITUS WITH RIGHT DIABETIC FOOT INFECTION (HCC): ICD-10-CM

## 2024-09-25 DIAGNOSIS — M86.671 CHRONIC OSTEOMYELITIS OF RIGHT FOOT: Primary | ICD-10-CM

## 2024-09-25 DIAGNOSIS — L97.522 CHRONIC ULCER OF LEFT FOOT WITH FAT LAYER EXPOSED (HCC): ICD-10-CM

## 2024-09-25 DIAGNOSIS — E11.628 TYPE 2 DIABETES MELLITUS WITH RIGHT DIABETIC FOOT INFECTION (HCC): ICD-10-CM

## 2024-09-25 PROCEDURE — 11042 DBRDMT SUBQ TIS 1ST 20SQCM/<: CPT

## 2024-09-25 PROCEDURE — 99213 OFFICE O/P EST LOW 20 MIN: CPT

## 2024-09-25 PROCEDURE — 3075F SYST BP GE 130 - 139MM HG: CPT

## 2024-09-25 PROCEDURE — 3079F DIAST BP 80-89 MM HG: CPT

## 2024-09-25 PROCEDURE — 3046F HEMOGLOBIN A1C LEVEL >9.0%: CPT

## 2024-09-25 NOTE — PROGRESS NOTES
Patient instructed to remove shoes and socks and instructed to sit in exam chair.  Current PCP is Marissa Wang MD and date of last visit was 8/6/24.   Do you have a follow up visit scheduled?  Yes  If yes, the date is 9/12/24    
physical exam, and MDM). Additional findings are as noted.     Electronically signed by Ambrocio Corea DPM on 10/2/2024 at 1:03 PM

## 2024-09-27 NOTE — PROGRESS NOTES
Patient instructed to remove shoes and socks and instructed to sit in exam chair.  Current PCP is Marissa Wang MD and date of last visit was 9/12/24.   Do you have a follow up visit scheduled?  Yes  If yes, the date is 11/1/24

## 2024-09-30 PROBLEM — Z01.810 PREOP CARDIOVASCULAR EXAM: Status: RESOLVED | Noted: 2024-08-31 | Resolved: 2024-09-30

## 2024-09-30 ASSESSMENT — ENCOUNTER SYMPTOMS
TROUBLE SWALLOWING: 0
EYE PAIN: 0
COUGH: 0
VOMITING: 0
VOICE CHANGE: 0
ABDOMINAL DISTENTION: 0
COLOR CHANGE: 0
ABDOMINAL PAIN: 0
CHEST TIGHTNESS: 0

## 2024-10-02 ENCOUNTER — OFFICE VISIT (OUTPATIENT)
Dept: PODIATRY | Age: 34
End: 2024-10-02
Payer: COMMERCIAL

## 2024-10-02 VITALS
HEART RATE: 90 BPM | DIASTOLIC BLOOD PRESSURE: 85 MMHG | WEIGHT: 189 LBS | HEIGHT: 70 IN | BODY MASS INDEX: 27.06 KG/M2 | OXYGEN SATURATION: 99 % | SYSTOLIC BLOOD PRESSURE: 132 MMHG

## 2024-10-02 DIAGNOSIS — M86.671 CHRONIC OSTEOMYELITIS OF RIGHT FOOT: Primary | ICD-10-CM

## 2024-10-02 DIAGNOSIS — M79.671 FOOT PAIN, RIGHT: ICD-10-CM

## 2024-10-02 DIAGNOSIS — M86.672 CHRONIC OSTEOMYELITIS INVOLVING ANKLE AND FOOT, LEFT: ICD-10-CM

## 2024-10-02 DIAGNOSIS — I73.9 PAD (PERIPHERAL ARTERY DISEASE) (HCC): ICD-10-CM

## 2024-10-02 DIAGNOSIS — T81.31XS DEHISCENCE OF SURGICAL WOUND, SEQUELA: ICD-10-CM

## 2024-10-02 DIAGNOSIS — S98.112A AMPUTATION OF LEFT GREAT TOE (HCC): ICD-10-CM

## 2024-10-02 DIAGNOSIS — L08.9 TYPE 2 DIABETES MELLITUS WITH RIGHT DIABETIC FOOT INFECTION (HCC): ICD-10-CM

## 2024-10-02 DIAGNOSIS — E11.621 DIABETIC ULCER OF OTHER PART OF RIGHT FOOT ASSOCIATED WITH TYPE 2 DIABETES MELLITUS, UNSPECIFIED ULCER STAGE (HCC): ICD-10-CM

## 2024-10-02 DIAGNOSIS — L97.519 DIABETIC ULCER OF OTHER PART OF RIGHT FOOT ASSOCIATED WITH TYPE 2 DIABETES MELLITUS, UNSPECIFIED ULCER STAGE (HCC): ICD-10-CM

## 2024-10-02 DIAGNOSIS — E11.628 TYPE 2 DIABETES MELLITUS WITH RIGHT DIABETIC FOOT INFECTION (HCC): ICD-10-CM

## 2024-10-02 DIAGNOSIS — L97.522 CHRONIC ULCER OF LEFT FOOT WITH FAT LAYER EXPOSED (HCC): ICD-10-CM

## 2024-10-02 PROCEDURE — 99024 POSTOP FOLLOW-UP VISIT: CPT

## 2024-10-02 PROCEDURE — 3046F HEMOGLOBIN A1C LEVEL >9.0%: CPT

## 2024-10-02 PROCEDURE — 3079F DIAST BP 80-89 MM HG: CPT

## 2024-10-02 PROCEDURE — 11042 DBRDMT SUBQ TIS 1ST 20SQCM/<: CPT

## 2024-10-02 PROCEDURE — 3075F SYST BP GE 130 - 139MM HG: CPT

## 2024-10-02 NOTE — PROGRESS NOTES
[facility]  [address]   [address] [place] [address]  Tel: [phone]   Fax: [phone]    Subjective     Procedure: Left hallux amputation with fillet (DOS: 7/8/2024), right foot wound    Interval history:  Patient follows up to clinic for wound to right foot. Patient denies any constitutional symptoms at this time. She admits to increased pain to the right foot. She has been doing daily dressing changes at home with betadine and bandaid application B/L. No other pedal complaints at this time.    HPI:  This 34 y.o. female presents for a post op visit. Patient states they are doing well. Pain is well controlled by pain medication.  Has been icing and elevating the extremity as instructed preoperatively and has been weightbearing as tolerated to the heel only while utilizing her surgical screw to the left lower extremity. Denies any current nausea, vomiting, fever, chills, shortness of breath, chest pain or calf pain. Denies any other pedal complaints    Primary care physician is Marissa Wang MD.    ROS:    Constitutional: Denies nausea, vomiting, fever, chills.  Neurologic: Denies numbness, tingling, and burning in the feet.    Vascular: Denies symptoms of lower extremity claudication.    Skin: Open wound present.  Otherwise negative except as noted in the HPI.     PMH:  Past Medical History:   Diagnosis Date    Acute osteomyelitis     LEFT FOOT    Bipolar 1 disorder (HCC)     Cellulitis     LT FOOT    Chronic diabetic ulcer of left foot determined by examination ([facility])     Depression     Diabetic foot infection (HCC)     H. pylori infection     recurrent    History of recent hospitalization 06/18/2024    til 6/20/2024 : [facility] , ischemic leg    Hyperlipidemia     tx started May 2015    Hypertension     tx started May 2015    Ischemic cardiomyopathy     Multiple vessel coronary artery disease 01/2023    Neuropathy     BLE    Noncompliance     NSTEMI (non-ST elevated myocardial

## 2024-10-07 ENCOUNTER — TELEPHONE (OUTPATIENT)
Dept: FAMILY MEDICINE CLINIC | Age: 34
End: 2024-10-07

## 2024-10-07 DIAGNOSIS — E11.65 UNCONTROLLED TYPE 2 DIABETES MELLITUS WITH HYPERGLYCEMIA (HCC): Primary | ICD-10-CM

## 2024-10-07 RX ORDER — INSULIN ASPART 100 [IU]/ML
3 INJECTION, SOLUTION INTRAVENOUS; SUBCUTANEOUS
Qty: 5 ADJUSTABLE DOSE PRE-FILLED PEN SYRINGE | Refills: 3 | Status: SHIPPED | OUTPATIENT
Start: 2024-10-07

## 2024-10-07 NOTE — TELEPHONE ENCOUNTER
Marcelino denied Lispro because Your plan only covers this drug when you meet one of these options: A) You have tried other products your plan covers (preferred products), and they did not work well for you, or B) Your doctor gives us a medical reason you cannot take those other products. For your plan, you may need to try up to three preferred products. We have denied your request because you do not meet any of these conditions. We reviewed the information we had. Your request has been denied. Your doctor can send us any new or missing information for us to review. The preferred products for your plan are: Fiasp, Novolog. Please Advise

## 2024-10-07 NOTE — TELEPHONE ENCOUNTER
[facility]  Insurance-[employer]  Waiting on response back

## 2024-10-08 ENCOUNTER — CARE COORDINATION (OUTPATIENT)
Dept: CARE COORDINATION | Age: 34
End: 2024-10-08

## 2024-10-08 NOTE — CARE COORDINATION
Writer contacted client regarding vascular appt and transportation. Client states that she is currently in [place] at [facility] trying to get back to [place]. Client states [facility] is currently having electrical issues and had delayed all transportation. Client will keep writer updated on what day she makes it back to [place] in case appt has to be rescheduled. Will continue to follow up.    Dar Hernández RN, BSN  [facility] Outreach Program

## 2024-10-08 NOTE — CARE COORDINATION
met with pt at her podiatry appointment, patient gave me some paperwork to drop off at PCP office and marcellar did a [employer] transportation application for patient to have more non emergency medical appointments since she has many appointments.

## 2024-10-08 NOTE — CARE COORDINATION
Client had follow up vascular and podiatry appts yesterday and today. Writer scheduled transportation for client. Writer also coordinated application for S cab for client with community health worker Steff. Client blood sugar results reviewed and insulin administration assessed. Client continues to be receptive of diabetic teachings. No questions or concerns at this time. Will continue to follow up.    Dar Hernández RN, BSN  [facility] Outreach Program

## 2024-10-14 ENCOUNTER — CARE COORDINATION (OUTPATIENT)
Dept: CARE COORDINATION | Age: 34
End: 2024-10-14

## 2024-10-15 ENCOUNTER — HOSPITAL ENCOUNTER (INPATIENT)
Age: 34
LOS: 10 days | Discharge: NURSING, REHAB OR LONG-TERM CARE | End: 2024-10-25
Attending: EMERGENCY MEDICINE | Admitting: STUDENT IN AN ORGANIZED HEALTH CARE EDUCATION/TRAINING PROGRAM
Payer: COMMERCIAL

## 2024-10-15 ENCOUNTER — HOSPITAL ENCOUNTER (OUTPATIENT)
Dept: VASCULAR LAB | Age: 34
Discharge: HOME | End: 2024-10-17
Attending: STUDENT IN AN ORGANIZED HEALTH CARE EDUCATION/TRAINING PROGRAM
Payer: COMMERCIAL

## 2024-10-15 ENCOUNTER — OFFICE VISIT (OUTPATIENT)
Dept: VASCULAR SURGERY | Age: 34
End: 2024-10-15
Payer: COMMERCIAL

## 2024-10-15 ENCOUNTER — ANESTHESIA (OUTPATIENT)
Dept: OPERATING ROOM | Age: 34
End: 2024-10-15
Payer: COMMERCIAL

## 2024-10-15 ENCOUNTER — OFFICE VISIT (OUTPATIENT)
Dept: OPERATING ROOM | Age: 34
End: 2024-10-15
Attending: INTERNAL MEDICINE

## 2024-10-15 ENCOUNTER — ANESTHESIA EVENT (OUTPATIENT)
Dept: OPERATING ROOM | Age: 34
End: 2024-10-15
Payer: COMMERCIAL

## 2024-10-15 VITALS
WEIGHT: 189 LBS | SYSTOLIC BLOOD PRESSURE: 111 MMHG | RESPIRATION RATE: 18 BRPM | HEART RATE: 94 BPM | BODY MASS INDEX: 27.06 KG/M2 | OXYGEN SATURATION: 98 % | DIASTOLIC BLOOD PRESSURE: 67 MMHG | HEIGHT: 70 IN

## 2024-10-15 DIAGNOSIS — I70.245 ATHEROSCLEROSIS OF NATIVE ARTERY OF BOTH LOWER EXTREMITIES WITH BILATERAL ULCERATION OF OTHER PART OF FEET (HCC): ICD-10-CM

## 2024-10-15 DIAGNOSIS — I99.8 ACUTE LOWER LIMB ISCHEMIA: Primary | ICD-10-CM

## 2024-10-15 DIAGNOSIS — I99.8 LOWER LIMB ISCHEMIA: Primary | ICD-10-CM

## 2024-10-15 DIAGNOSIS — T14.8XXA WOUND INFECTION: ICD-10-CM

## 2024-10-15 DIAGNOSIS — I70.235 ATHEROSCLEROSIS OF NATIVE ARTERY OF BOTH LOWER EXTREMITIES WITH BILATERAL ULCERATION OF OTHER PART OF FEET (HCC): ICD-10-CM

## 2024-10-15 DIAGNOSIS — M79.605 LEFT LEG PAIN: ICD-10-CM

## 2024-10-15 DIAGNOSIS — L08.9 WOUND INFECTION: ICD-10-CM

## 2024-10-15 DIAGNOSIS — I70.202 POPLITEAL ARTERY OCCLUSION, LEFT (HCC): ICD-10-CM

## 2024-10-15 PROBLEM — E11.9 DIABETES (HCC): Status: ACTIVE | Noted: 2024-10-15

## 2024-10-15 LAB
ABO + RH BLD: NORMAL
ACT BLD: 148 SEC (ref 79–149)
ACT BLD: 174 SEC (ref 79–149)
ACT BLD: 194 SEC (ref 79–149)
ACT BLD: 195 SEC (ref 79–149)
ACT BLD: 203 SEC (ref 79–149)
ACT BLD: 207 SEC (ref 79–149)
ACT BLD: 211 SEC (ref 79–149)
ACT BLD: 228 SEC (ref 79–149)
ACT BLD: 236 SEC (ref 79–149)
ANION GAP SERPL CALCULATED.3IONS-SCNC: 15 MMOL/L (ref 9–16)
ANTI-XA UNFRAC HEPARIN: 0.33 IU/L
ARM BAND NUMBER: NORMAL
BASOPHILS # BLD: 0.06 K/UL (ref 0–0.2)
BASOPHILS NFR BLD: 1 % (ref 0–2)
BLOOD BANK SAMPLE EXPIRATION: NORMAL
BLOOD GROUP ANTIBODIES SERPL: NEGATIVE
BUN SERPL-MCNC: 17 MG/DL (ref 6–20)
CALCIUM SERPL-MCNC: 9.2 MG/DL (ref 8.6–10.4)
CHLORIDE SERPL-SCNC: 102 MMOL/L (ref 98–107)
CO2 SERPL-SCNC: 20 MMOL/L (ref 20–31)
CREAT SERPL-MCNC: 0.8 MG/DL (ref 0.5–0.9)
ECHO BSA: 2.06 M2
EOSINOPHIL # BLD: 0.19 K/UL (ref 0–0.44)
EOSINOPHILS RELATIVE PERCENT: 2 % (ref 1–4)
ERYTHROCYTE [DISTWIDTH] IN BLOOD BY AUTOMATED COUNT: 14.1 % (ref 11.8–14.4)
GFR, ESTIMATED: >90 ML/MIN/1.73M2
GLUCOSE SERPL-MCNC: 325 MG/DL (ref 74–99)
HCT VFR BLD AUTO: 42.1 % (ref 36.3–47.1)
HGB BLD-MCNC: 13.3 G/DL (ref 11.9–15.1)
IMM GRANULOCYTES # BLD AUTO: 0.04 K/UL (ref 0–0.3)
IMM GRANULOCYTES NFR BLD: 0 %
INR PPP: 1.1
LYMPHOCYTES NFR BLD: 3.79 K/UL (ref 1.1–3.7)
LYMPHOCYTES RELATIVE PERCENT: 31 % (ref 24–43)
MCH RBC QN AUTO: 30 PG (ref 25.2–33.5)
MCHC RBC AUTO-ENTMCNC: 31.6 G/DL (ref 28.4–34.8)
MCV RBC AUTO: 94.8 FL (ref 82.6–102.9)
MONOCYTES NFR BLD: 1.33 K/UL (ref 0.1–1.2)
MONOCYTES NFR BLD: 11 % (ref 3–12)
NEUTROPHILS NFR BLD: 55 % (ref 36–65)
NEUTS SEG NFR BLD: 6.93 K/UL (ref 1.5–8.1)
NRBC BLD-RTO: 0 PER 100 WBC
PARTIAL THROMBOPLASTIN TIME: 26.8 SEC (ref 23–36.5)
PLATELET # BLD AUTO: 392 K/UL (ref 138–453)
PMV BLD AUTO: 9.6 FL (ref 8.1–13.5)
POTASSIUM SERPL-SCNC: 4.2 MMOL/L (ref 3.7–5.3)
PROTHROMBIN TIME: 14 SEC (ref 11.7–14.9)
RBC # BLD AUTO: 4.44 M/UL (ref 3.95–5.11)
SODIUM SERPL-SCNC: 137 MMOL/L (ref 136–145)
WBC OTHER # BLD: 12.3 K/UL (ref 3.5–11.3)

## 2024-10-15 PROCEDURE — 86850 RBC ANTIBODY SCREEN: CPT

## 2024-10-15 PROCEDURE — 93922 UPR/L XTREMITY ART 2 LEVELS: CPT

## 2024-10-15 PROCEDURE — 85730 THROMBOPLASTIN TIME PARTIAL: CPT

## 2024-10-15 PROCEDURE — 3700000000 HC ANESTHESIA ATTENDED CARE: Performed by: STUDENT IN AN ORGANIZED HEALTH CARE EDUCATION/TRAINING PROGRAM

## 2024-10-15 PROCEDURE — 2580000003 HC RX 258

## 2024-10-15 PROCEDURE — 3078F DIAST BP <80 MM HG: CPT | Performed by: STUDENT IN AN ORGANIZED HEALTH CARE EDUCATION/TRAINING PROGRAM

## 2024-10-15 PROCEDURE — 047K3ZZ DILATION OF RIGHT FEMORAL ARTERY, PERCUTANEOUS APPROACH: ICD-10-PCS | Performed by: STUDENT IN AN ORGANIZED HEALTH CARE EDUCATION/TRAINING PROGRAM

## 2024-10-15 PROCEDURE — C1887 CATHETER, GUIDING: HCPCS | Performed by: STUDENT IN AN ORGANIZED HEALTH CARE EDUCATION/TRAINING PROGRAM

## 2024-10-15 PROCEDURE — 85347 COAGULATION TIME ACTIVATED: CPT

## 2024-10-15 PROCEDURE — C1769 GUIDE WIRE: HCPCS | Performed by: STUDENT IN AN ORGANIZED HEALTH CARE EDUCATION/TRAINING PROGRAM

## 2024-10-15 PROCEDURE — 93925 LOWER EXTREMITY STUDY: CPT

## 2024-10-15 PROCEDURE — 86900 BLOOD TYPING SEROLOGIC ABO: CPT

## 2024-10-15 PROCEDURE — B44LZZ3 ULTRASONOGRAPHY OF FEMORAL ARTERY, INTRAVASCULAR: ICD-10-PCS | Performed by: STUDENT IN AN ORGANIZED HEALTH CARE EDUCATION/TRAINING PROGRAM

## 2024-10-15 PROCEDURE — 6360000002 HC RX W HCPCS: Performed by: REGISTERED NURSE

## 2024-10-15 PROCEDURE — 96374 THER/PROPH/DIAG INJ IV PUSH: CPT

## 2024-10-15 PROCEDURE — 6360000002 HC RX W HCPCS

## 2024-10-15 PROCEDURE — 2720000010 HC SURG SUPPLY STERILE: Performed by: STUDENT IN AN ORGANIZED HEALTH CARE EDUCATION/TRAINING PROGRAM

## 2024-10-15 PROCEDURE — C1876 STENT, NON-COA/NON-COV W/DEL: HCPCS | Performed by: STUDENT IN AN ORGANIZED HEALTH CARE EDUCATION/TRAINING PROGRAM

## 2024-10-15 PROCEDURE — A4217 STERILE WATER/SALINE, 500 ML: HCPCS | Performed by: STUDENT IN AN ORGANIZED HEALTH CARE EDUCATION/TRAINING PROGRAM

## 2024-10-15 PROCEDURE — 04CN3ZZ EXTIRPATION OF MATTER FROM LEFT POPLITEAL ARTERY, PERCUTANEOUS APPROACH: ICD-10-PCS | Performed by: STUDENT IN AN ORGANIZED HEALTH CARE EDUCATION/TRAINING PROGRAM

## 2024-10-15 PROCEDURE — 85610 PROTHROMBIN TIME: CPT

## 2024-10-15 PROCEDURE — 99285 EMERGENCY DEPT VISIT HI MDM: CPT

## 2024-10-15 PROCEDURE — 6370000000 HC RX 637 (ALT 250 FOR IP): Performed by: STUDENT IN AN ORGANIZED HEALTH CARE EDUCATION/TRAINING PROGRAM

## 2024-10-15 PROCEDURE — 75710 ARTERY X-RAYS ARM/LEG: CPT | Performed by: STUDENT IN AN ORGANIZED HEALTH CARE EDUCATION/TRAINING PROGRAM

## 2024-10-15 PROCEDURE — 04CS0ZZ EXTIRPATION OF MATTER FROM LEFT POSTERIOR TIBIAL ARTERY, OPEN APPROACH: ICD-10-PCS | Performed by: STUDENT IN AN ORGANIZED HEALTH CARE EDUCATION/TRAINING PROGRAM

## 2024-10-15 PROCEDURE — 047L3Z1 DILATION OF LEFT FEMORAL ARTERY USING DRUG-COATED BALLOON, PERCUTANEOUS APPROACH: ICD-10-PCS | Performed by: STUDENT IN AN ORGANIZED HEALTH CARE EDUCATION/TRAINING PROGRAM

## 2024-10-15 PROCEDURE — C2623 CATH, TRANSLUMIN, DRUG-COAT: HCPCS | Performed by: STUDENT IN AN ORGANIZED HEALTH CARE EDUCATION/TRAINING PROGRAM

## 2024-10-15 PROCEDURE — 2580000003 HC RX 258: Performed by: STUDENT IN AN ORGANIZED HEALTH CARE EDUCATION/TRAINING PROGRAM

## 2024-10-15 PROCEDURE — 6360000004 HC RX CONTRAST MEDICATION: Performed by: STUDENT IN AN ORGANIZED HEALTH CARE EDUCATION/TRAINING PROGRAM

## 2024-10-15 PROCEDURE — 37226 PR REVSC OPN/PRQ FEM/POP W/STNT/ANGIOP SM VSL: CPT | Performed by: STUDENT IN AN ORGANIZED HEALTH CARE EDUCATION/TRAINING PROGRAM

## 2024-10-15 PROCEDURE — 96375 TX/PRO/DX INJ NEW DRUG ADDON: CPT

## 2024-10-15 PROCEDURE — C1757 CATH, THROMBECTOMY/EMBOLECT: HCPCS | Performed by: STUDENT IN AN ORGANIZED HEALTH CARE EDUCATION/TRAINING PROGRAM

## 2024-10-15 PROCEDURE — 37253 INTRVASC US NONCORONARY ADDL: CPT | Performed by: STUDENT IN AN ORGANIZED HEALTH CARE EDUCATION/TRAINING PROGRAM

## 2024-10-15 PROCEDURE — 2500000003 HC RX 250 WO HCPCS: Performed by: STUDENT IN AN ORGANIZED HEALTH CARE EDUCATION/TRAINING PROGRAM

## 2024-10-15 PROCEDURE — 047N342 DILATION OF LEFT POPLITEAL ARTERY WITH DRUG-ELUTING INTRALUMINAL DEVICE, SUSTAINED RELEASE, PERCUTANEOUS APPROACH: ICD-10-PCS | Performed by: STUDENT IN AN ORGANIZED HEALTH CARE EDUCATION/TRAINING PROGRAM

## 2024-10-15 PROCEDURE — C1760 CLOSURE DEV, VASC: HCPCS | Performed by: STUDENT IN AN ORGANIZED HEALTH CARE EDUCATION/TRAINING PROGRAM

## 2024-10-15 PROCEDURE — 3074F SYST BP LT 130 MM HG: CPT | Performed by: STUDENT IN AN ORGANIZED HEALTH CARE EDUCATION/TRAINING PROGRAM

## 2024-10-15 PROCEDURE — 86901 BLOOD TYPING SEROLOGIC RH(D): CPT

## 2024-10-15 PROCEDURE — 80048 BASIC METABOLIC PNL TOTAL CA: CPT

## 2024-10-15 PROCEDURE — C1725 CATH, TRANSLUMIN NON-LASER: HCPCS | Performed by: STUDENT IN AN ORGANIZED HEALTH CARE EDUCATION/TRAINING PROGRAM

## 2024-10-15 PROCEDURE — 76937 US GUIDE VASCULAR ACCESS: CPT | Performed by: STUDENT IN AN ORGANIZED HEALTH CARE EDUCATION/TRAINING PROGRAM

## 2024-10-15 PROCEDURE — C1892 INTRO/SHEATH,FIXED,PEEL-AWAY: HCPCS | Performed by: STUDENT IN AN ORGANIZED HEALTH CARE EDUCATION/TRAINING PROGRAM

## 2024-10-15 PROCEDURE — 27602 DECOMPRESSION OF LOWER LEG: CPT | Performed by: STUDENT IN AN ORGANIZED HEALTH CARE EDUCATION/TRAINING PROGRAM

## 2024-10-15 PROCEDURE — 6360000002 HC RX W HCPCS: Performed by: STUDENT IN AN ORGANIZED HEALTH CARE EDUCATION/TRAINING PROGRAM

## 2024-10-15 PROCEDURE — 047N3Z1 DILATION OF LEFT POPLITEAL ARTERY USING DRUG-COATED BALLOON, PERCUTANEOUS APPROACH: ICD-10-PCS | Performed by: STUDENT IN AN ORGANIZED HEALTH CARE EDUCATION/TRAINING PROGRAM

## 2024-10-15 PROCEDURE — C1753 CATH, INTRAVAS ULTRASOUND: HCPCS | Performed by: STUDENT IN AN ORGANIZED HEALTH CARE EDUCATION/TRAINING PROGRAM

## 2024-10-15 PROCEDURE — P9017 PLASMA 1 DONOR FRZ W/IN 8 HR: HCPCS

## 2024-10-15 PROCEDURE — 0KNT0ZZ RELEASE LEFT LOWER LEG MUSCLE, OPEN APPROACH: ICD-10-PCS | Performed by: STUDENT IN AN ORGANIZED HEALTH CARE EDUCATION/TRAINING PROGRAM

## 2024-10-15 PROCEDURE — B41G1ZZ FLUOROSCOPY OF LEFT LOWER EXTREMITY ARTERIES USING LOW OSMOLAR CONTRAST: ICD-10-PCS | Performed by: STUDENT IN AN ORGANIZED HEALTH CARE EDUCATION/TRAINING PROGRAM

## 2024-10-15 PROCEDURE — 3600000007 HC SURGERY HYBRID BASE: Performed by: STUDENT IN AN ORGANIZED HEALTH CARE EDUCATION/TRAINING PROGRAM

## 2024-10-15 PROCEDURE — 86927 PLASMA FRESH FROZEN: CPT

## 2024-10-15 PROCEDURE — 3700000001 HC ADD 15 MINUTES (ANESTHESIA): Performed by: STUDENT IN AN ORGANIZED HEALTH CARE EDUCATION/TRAINING PROGRAM

## 2024-10-15 PROCEDURE — 37252 INTRVASC US NONCORONARY 1ST: CPT | Performed by: STUDENT IN AN ORGANIZED HEALTH CARE EDUCATION/TRAINING PROGRAM

## 2024-10-15 PROCEDURE — 2000000000 HC ICU R&B

## 2024-10-15 PROCEDURE — 2709999900 HC NON-CHARGEABLE SUPPLY: Performed by: STUDENT IN AN ORGANIZED HEALTH CARE EDUCATION/TRAINING PROGRAM

## 2024-10-15 PROCEDURE — 85025 COMPLETE CBC W/AUTO DIFF WBC: CPT

## 2024-10-15 PROCEDURE — 99215 OFFICE O/P EST HI 40 MIN: CPT | Performed by: STUDENT IN AN ORGANIZED HEALTH CARE EDUCATION/TRAINING PROGRAM

## 2024-10-15 PROCEDURE — 34203 REMOVAL OF LEG ARTERY CLOT: CPT | Performed by: STUDENT IN AN ORGANIZED HEALTH CARE EDUCATION/TRAINING PROGRAM

## 2024-10-15 PROCEDURE — 85520 HEPARIN ASSAY: CPT

## 2024-10-15 PROCEDURE — 3600000017 HC SURGERY HYBRID ADDL 15MIN: Performed by: STUDENT IN AN ORGANIZED HEALTH CARE EDUCATION/TRAINING PROGRAM

## 2024-10-15 PROCEDURE — C1894 INTRO/SHEATH, NON-LASER: HCPCS | Performed by: STUDENT IN AN ORGANIZED HEALTH CARE EDUCATION/TRAINING PROGRAM

## 2024-10-15 PROCEDURE — 2500000003 HC RX 250 WO HCPCS

## 2024-10-15 DEVICE — STENT PRB35-06-150-120 PROTEGE EF V07
Type: IMPLANTABLE DEVICE | Site: GROIN | Status: FUNCTIONAL
Brand: EVERFLEX™ PROTÉGÉ™ EVERFLEX™

## 2024-10-15 RX ORDER — ONDANSETRON 2 MG/ML
INJECTION INTRAMUSCULAR; INTRAVENOUS
Status: DISCONTINUED | OUTPATIENT
Start: 2024-10-15 | End: 2024-10-15 | Stop reason: SDUPTHER

## 2024-10-15 RX ORDER — MIDAZOLAM HYDROCHLORIDE 1 MG/ML
INJECTION INTRAMUSCULAR; INTRAVENOUS
Status: DISCONTINUED | OUTPATIENT
Start: 2024-10-15 | End: 2024-10-15 | Stop reason: SDUPTHER

## 2024-10-15 RX ORDER — GLUCAGON 1 MG/ML
1 KIT INJECTION PRN
Status: DISCONTINUED | OUTPATIENT
Start: 2024-10-15 | End: 2024-10-25 | Stop reason: HOSPADM

## 2024-10-15 RX ORDER — NITROGLYCERIN 20 MG/100ML
INJECTION INTRAVENOUS PRN
Status: DISCONTINUED | OUTPATIENT
Start: 2024-10-15 | End: 2024-10-15 | Stop reason: ALTCHOICE

## 2024-10-15 RX ORDER — DEXTROSE MONOHYDRATE 100 MG/ML
INJECTION, SOLUTION INTRAVENOUS CONTINUOUS PRN
Status: DISCONTINUED | OUTPATIENT
Start: 2024-10-15 | End: 2024-10-25 | Stop reason: HOSPADM

## 2024-10-15 RX ORDER — MAGNESIUM SULFATE IN WATER 40 MG/ML
2000 INJECTION, SOLUTION INTRAVENOUS PRN
Status: DISCONTINUED | OUTPATIENT
Start: 2024-10-15 | End: 2024-10-25 | Stop reason: HOSPADM

## 2024-10-15 RX ORDER — EZETIMIBE 10 MG/1
10 TABLET ORAL DAILY
Status: DISCONTINUED | OUTPATIENT
Start: 2024-10-16 | End: 2024-10-25 | Stop reason: HOSPADM

## 2024-10-15 RX ORDER — ROCURONIUM BROMIDE 10 MG/ML
INJECTION, SOLUTION INTRAVENOUS
Status: DISCONTINUED | OUTPATIENT
Start: 2024-10-15 | End: 2024-10-15 | Stop reason: SDUPTHER

## 2024-10-15 RX ORDER — IODIXANOL 320 MG/ML
INJECTION, SOLUTION INTRAVASCULAR PRN
Status: DISCONTINUED | OUTPATIENT
Start: 2024-10-15 | End: 2024-10-15 | Stop reason: ALTCHOICE

## 2024-10-15 RX ORDER — GLYCOPYRROLATE 1 MG/5 ML
SYRINGE (ML) INTRAVENOUS
Status: DISCONTINUED | OUTPATIENT
Start: 2024-10-15 | End: 2024-10-15 | Stop reason: SDUPTHER

## 2024-10-15 RX ORDER — HEPARIN SODIUM 1000 [USP'U]/ML
80 INJECTION, SOLUTION INTRAVENOUS; SUBCUTANEOUS PRN
Status: DISCONTINUED | OUTPATIENT
Start: 2024-10-15 | End: 2024-10-17

## 2024-10-15 RX ORDER — FENTANYL CITRATE 50 UG/ML
INJECTION, SOLUTION INTRAMUSCULAR; INTRAVENOUS
Status: DISCONTINUED | OUTPATIENT
Start: 2024-10-15 | End: 2024-10-15 | Stop reason: SDUPTHER

## 2024-10-15 RX ORDER — SODIUM CHLORIDE 9 MG/ML
INJECTION, SOLUTION INTRAVENOUS PRN
Status: DISCONTINUED | OUTPATIENT
Start: 2024-10-15 | End: 2024-10-17 | Stop reason: HOSPADM

## 2024-10-15 RX ORDER — INSULIN LISPRO 100 [IU]/ML
3 INJECTION, SOLUTION INTRAVENOUS; SUBCUTANEOUS
Status: DISCONTINUED | OUTPATIENT
Start: 2024-10-16 | End: 2024-10-16

## 2024-10-15 RX ORDER — HYDRALAZINE HYDROCHLORIDE 20 MG/ML
10 INJECTION INTRAMUSCULAR; INTRAVENOUS
Status: DISCONTINUED | OUTPATIENT
Start: 2024-10-15 | End: 2024-10-16

## 2024-10-15 RX ORDER — FENTANYL CITRATE 50 UG/ML
50 INJECTION, SOLUTION INTRAMUSCULAR; INTRAVENOUS ONCE
Status: COMPLETED | OUTPATIENT
Start: 2024-10-15 | End: 2024-10-15

## 2024-10-15 RX ORDER — ONDANSETRON 2 MG/ML
4 INJECTION INTRAMUSCULAR; INTRAVENOUS EVERY 6 HOURS PRN
Status: DISCONTINUED | OUTPATIENT
Start: 2024-10-15 | End: 2024-10-25 | Stop reason: HOSPADM

## 2024-10-15 RX ORDER — HEPARIN SODIUM 1000 [USP'U]/ML
80 INJECTION, SOLUTION INTRAVENOUS; SUBCUTANEOUS ONCE
Status: COMPLETED | OUTPATIENT
Start: 2024-10-15 | End: 2024-10-15

## 2024-10-15 RX ORDER — NEOSTIGMINE METHYLSULFATE 5 MG/5 ML
SYRINGE (ML) INTRAVENOUS
Status: DISCONTINUED | OUTPATIENT
Start: 2024-10-15 | End: 2024-10-15 | Stop reason: SDUPTHER

## 2024-10-15 RX ORDER — DEXAMETHASONE SODIUM PHOSPHATE 10 MG/ML
INJECTION, SOLUTION INTRAMUSCULAR; INTRAVENOUS
Status: DISCONTINUED | OUTPATIENT
Start: 2024-10-15 | End: 2024-10-15 | Stop reason: SDUPTHER

## 2024-10-15 RX ORDER — SODIUM CHLORIDE 0.9 % (FLUSH) 0.9 %
5-40 SYRINGE (ML) INJECTION PRN
Status: DISCONTINUED | OUTPATIENT
Start: 2024-10-15 | End: 2024-10-17 | Stop reason: HOSPADM

## 2024-10-15 RX ORDER — SODIUM CHLORIDE 9 MG/ML
INJECTION, SOLUTION INTRAVENOUS PRN
Status: DISCONTINUED | OUTPATIENT
Start: 2024-10-15 | End: 2024-10-25 | Stop reason: HOSPADM

## 2024-10-15 RX ORDER — SODIUM CHLORIDE, SODIUM LACTATE, POTASSIUM CHLORIDE, CALCIUM CHLORIDE 600; 310; 30; 20 MG/100ML; MG/100ML; MG/100ML; MG/100ML
INJECTION, SOLUTION INTRAVENOUS
Status: DISCONTINUED | OUTPATIENT
Start: 2024-10-15 | End: 2024-10-15 | Stop reason: SDUPTHER

## 2024-10-15 RX ORDER — SODIUM CHLORIDE 0.9 % (FLUSH) 0.9 %
5-40 SYRINGE (ML) INJECTION EVERY 12 HOURS SCHEDULED
Status: DISCONTINUED | OUTPATIENT
Start: 2024-10-16 | End: 2024-10-25 | Stop reason: HOSPADM

## 2024-10-15 RX ORDER — LOSARTAN POTASSIUM 50 MG/1
25 TABLET ORAL DAILY
Status: DISCONTINUED | OUTPATIENT
Start: 2024-10-16 | End: 2024-10-18

## 2024-10-15 RX ORDER — POTASSIUM CHLORIDE 1500 MG/1
40 TABLET, EXTENDED RELEASE ORAL PRN
Status: DISCONTINUED | OUTPATIENT
Start: 2024-10-15 | End: 2024-10-25 | Stop reason: HOSPADM

## 2024-10-15 RX ORDER — ONDANSETRON 2 MG/ML
4 INJECTION INTRAMUSCULAR; INTRAVENOUS
Status: ACTIVE | OUTPATIENT
Start: 2024-10-15 | End: 2024-10-16

## 2024-10-15 RX ORDER — LIDOCAINE HYDROCHLORIDE 10 MG/ML
INJECTION, SOLUTION EPIDURAL; INFILTRATION; INTRACAUDAL; PERINEURAL PRN
Status: DISCONTINUED | OUTPATIENT
Start: 2024-10-15 | End: 2024-10-15 | Stop reason: ALTCHOICE

## 2024-10-15 RX ORDER — HEPARIN SODIUM 1000 [USP'U]/ML
INJECTION, SOLUTION INTRAVENOUS; SUBCUTANEOUS
Status: DISCONTINUED | OUTPATIENT
Start: 2024-10-15 | End: 2024-10-15 | Stop reason: SDUPTHER

## 2024-10-15 RX ORDER — SODIUM CHLORIDE 0.9 % (FLUSH) 0.9 %
5-40 SYRINGE (ML) INJECTION EVERY 12 HOURS SCHEDULED
Status: DISCONTINUED | OUTPATIENT
Start: 2024-10-16 | End: 2024-10-17 | Stop reason: HOSPADM

## 2024-10-15 RX ORDER — ACETAMINOPHEN 325 MG/1
650 TABLET ORAL EVERY 6 HOURS PRN
Status: DISCONTINUED | OUTPATIENT
Start: 2024-10-15 | End: 2024-10-16

## 2024-10-15 RX ORDER — ISOSORBIDE MONONITRATE 30 MG/1
30 TABLET, EXTENDED RELEASE ORAL DAILY
Status: DISCONTINUED | OUTPATIENT
Start: 2024-10-16 | End: 2024-10-25 | Stop reason: HOSPADM

## 2024-10-15 RX ORDER — LIDOCAINE HYDROCHLORIDE 10 MG/ML
INJECTION, SOLUTION EPIDURAL; INFILTRATION; INTRACAUDAL; PERINEURAL
Status: DISCONTINUED | OUTPATIENT
Start: 2024-10-15 | End: 2024-10-15 | Stop reason: SDUPTHER

## 2024-10-15 RX ORDER — ACETAMINOPHEN 650 MG/1
650 SUPPOSITORY RECTAL EVERY 6 HOURS PRN
Status: DISCONTINUED | OUTPATIENT
Start: 2024-10-15 | End: 2024-10-16

## 2024-10-15 RX ORDER — NITROGLYCERIN 20 MG/100ML
INJECTION INTRAVENOUS
Status: DISPENSED
Start: 2024-10-15 | End: 2024-10-16

## 2024-10-15 RX ORDER — IODIXANOL 320 MG/ML
INJECTION, SOLUTION INTRAVASCULAR
Status: DISPENSED
Start: 2024-10-15 | End: 2024-10-16

## 2024-10-15 RX ORDER — METOPROLOL SUCCINATE 25 MG/1
50 TABLET, EXTENDED RELEASE ORAL DAILY
Status: DISCONTINUED | OUTPATIENT
Start: 2024-10-16 | End: 2024-10-25 | Stop reason: HOSPADM

## 2024-10-15 RX ORDER — POLYETHYLENE GLYCOL 3350 17 G/17G
17 POWDER, FOR SOLUTION ORAL DAILY PRN
Status: DISCONTINUED | OUTPATIENT
Start: 2024-10-15 | End: 2024-10-18

## 2024-10-15 RX ORDER — HEPARIN SODIUM 10000 [USP'U]/100ML
5-30 INJECTION, SOLUTION INTRAVENOUS CONTINUOUS
Status: DISCONTINUED | OUTPATIENT
Start: 2024-10-15 | End: 2024-10-15

## 2024-10-15 RX ORDER — PROPOFOL 10 MG/ML
INJECTION, EMULSION INTRAVENOUS
Status: DISCONTINUED | OUTPATIENT
Start: 2024-10-15 | End: 2024-10-15 | Stop reason: SDUPTHER

## 2024-10-15 RX ORDER — ASPIRIN 81 MG/1
81 TABLET ORAL DAILY
Status: DISCONTINUED | OUTPATIENT
Start: 2024-10-16 | End: 2024-10-25 | Stop reason: HOSPADM

## 2024-10-15 RX ORDER — POTASSIUM CHLORIDE 7.45 MG/ML
10 INJECTION INTRAVENOUS PRN
Status: DISCONTINUED | OUTPATIENT
Start: 2024-10-15 | End: 2024-10-25 | Stop reason: HOSPADM

## 2024-10-15 RX ORDER — MAGNESIUM HYDROXIDE 1200 MG/15ML
LIQUID ORAL CONTINUOUS PRN
Status: COMPLETED | OUTPATIENT
Start: 2024-10-15 | End: 2024-10-15

## 2024-10-15 RX ORDER — HEPARIN SODIUM 10000 [USP'U]/100ML
5-30 INJECTION, SOLUTION INTRAVENOUS CONTINUOUS
Status: DISCONTINUED | OUTPATIENT
Start: 2024-10-15 | End: 2024-10-16

## 2024-10-15 RX ORDER — NALOXONE HYDROCHLORIDE 0.4 MG/ML
INJECTION, SOLUTION INTRAMUSCULAR; INTRAVENOUS; SUBCUTANEOUS PRN
Status: DISCONTINUED | OUTPATIENT
Start: 2024-10-15 | End: 2024-10-17 | Stop reason: HOSPADM

## 2024-10-15 RX ORDER — ATORVASTATIN CALCIUM 80 MG/1
80 TABLET, FILM COATED ORAL NIGHTLY
Status: DISCONTINUED | OUTPATIENT
Start: 2024-10-16 | End: 2024-10-25 | Stop reason: HOSPADM

## 2024-10-15 RX ORDER — HEPARIN SODIUM 1000 [USP'U]/ML
80 INJECTION, SOLUTION INTRAVENOUS; SUBCUTANEOUS PRN
Status: DISCONTINUED | OUTPATIENT
Start: 2024-10-15 | End: 2024-10-15

## 2024-10-15 RX ORDER — SODIUM CHLORIDE 0.9 % (FLUSH) 0.9 %
5-40 SYRINGE (ML) INJECTION PRN
Status: DISCONTINUED | OUTPATIENT
Start: 2024-10-15 | End: 2024-10-25 | Stop reason: HOSPADM

## 2024-10-15 RX ORDER — INSULIN GLARGINE 100 [IU]/ML
22 INJECTION, SOLUTION SUBCUTANEOUS 2 TIMES DAILY
Status: DISCONTINUED | OUTPATIENT
Start: 2024-10-16 | End: 2024-10-16

## 2024-10-15 RX ORDER — HEPARIN SODIUM 1000 [USP'U]/ML
40 INJECTION, SOLUTION INTRAVENOUS; SUBCUTANEOUS PRN
Status: DISCONTINUED | OUTPATIENT
Start: 2024-10-15 | End: 2024-10-17

## 2024-10-15 RX ORDER — HEPARIN SODIUM 1000 [USP'U]/ML
80 INJECTION, SOLUTION INTRAVENOUS; SUBCUTANEOUS ONCE
Status: DISCONTINUED | OUTPATIENT
Start: 2024-10-15 | End: 2024-10-15

## 2024-10-15 RX ORDER — ONDANSETRON 4 MG/1
4 TABLET, ORALLY DISINTEGRATING ORAL EVERY 8 HOURS PRN
Status: DISCONTINUED | OUTPATIENT
Start: 2024-10-15 | End: 2024-10-25 | Stop reason: HOSPADM

## 2024-10-15 RX ORDER — LABETALOL HYDROCHLORIDE 5 MG/ML
10 INJECTION, SOLUTION INTRAVENOUS
Status: DISCONTINUED | OUTPATIENT
Start: 2024-10-15 | End: 2024-10-16

## 2024-10-15 RX ORDER — SODIUM CHLORIDE 9 MG/ML
INJECTION, SOLUTION INTRAVENOUS
Status: DISCONTINUED | OUTPATIENT
Start: 2024-10-15 | End: 2024-10-15 | Stop reason: SDUPTHER

## 2024-10-15 RX ORDER — HEPARIN SODIUM 1000 [USP'U]/ML
40 INJECTION, SOLUTION INTRAVENOUS; SUBCUTANEOUS PRN
Status: DISCONTINUED | OUTPATIENT
Start: 2024-10-15 | End: 2024-10-15

## 2024-10-15 RX ADMIN — PHENYLEPHRINE HYDROCHLORIDE 75 MCG/MIN: 10 INJECTION INTRAVENOUS at 18:29

## 2024-10-15 RX ADMIN — HEPARIN SODIUM 3000 UNITS: 1000 INJECTION, SOLUTION INTRAVENOUS; SUBCUTANEOUS at 20:17

## 2024-10-15 RX ADMIN — ROCURONIUM BROMIDE 20 MG: 10 INJECTION, SOLUTION INTRAVENOUS at 18:35

## 2024-10-15 RX ADMIN — HEPARIN SODIUM 4000 UNITS: 1000 INJECTION, SOLUTION INTRAVENOUS; SUBCUTANEOUS at 21:29

## 2024-10-15 RX ADMIN — ONDANSETRON 4 MG: 2 INJECTION INTRAMUSCULAR; INTRAVENOUS at 22:39

## 2024-10-15 RX ADMIN — ROCURONIUM BROMIDE 10 MG: 10 INJECTION, SOLUTION INTRAVENOUS at 22:00

## 2024-10-15 RX ADMIN — HEPARIN SODIUM 3000 UNITS: 1000 INJECTION, SOLUTION INTRAVENOUS; SUBCUTANEOUS at 19:27

## 2024-10-15 RX ADMIN — PHENYLEPHRINE HYDROCHLORIDE 100 MCG: 10 INJECTION INTRAVENOUS at 19:18

## 2024-10-15 RX ADMIN — FENTANYL CITRATE 100 MCG: 50 INJECTION, SOLUTION INTRAMUSCULAR; INTRAVENOUS at 21:18

## 2024-10-15 RX ADMIN — PHENYLEPHRINE HYDROCHLORIDE 100 MCG: 10 INJECTION INTRAVENOUS at 19:40

## 2024-10-15 RX ADMIN — HEPARIN SODIUM 2000 UNITS: 1000 INJECTION, SOLUTION INTRAVENOUS; SUBCUTANEOUS at 21:44

## 2024-10-15 RX ADMIN — HEPARIN SODIUM 8500 UNITS: 1000 INJECTION, SOLUTION INTRAVENOUS; SUBCUTANEOUS at 18:39

## 2024-10-15 RX ADMIN — Medication 0.4 MG: at 22:43

## 2024-10-15 RX ADMIN — PROPOFOL 150 MG: 10 INJECTION, EMULSION INTRAVENOUS at 17:45

## 2024-10-15 RX ADMIN — PHENYLEPHRINE HYDROCHLORIDE 50 MCG: 10 INJECTION INTRAVENOUS at 21:37

## 2024-10-15 RX ADMIN — LIDOCAINE HYDROCHLORIDE 50 MG: 10 INJECTION, SOLUTION EPIDURAL; INFILTRATION; INTRACAUDAL; PERINEURAL at 17:45

## 2024-10-15 RX ADMIN — Medication 3 MG: at 22:43

## 2024-10-15 RX ADMIN — PHENYLEPHRINE HYDROCHLORIDE 100 MCG: 10 INJECTION INTRAVENOUS at 18:22

## 2024-10-15 RX ADMIN — MIDAZOLAM 2 MG: 1 INJECTION INTRAMUSCULAR; INTRAVENOUS at 17:37

## 2024-10-15 RX ADMIN — ROCURONIUM BROMIDE 10 MG: 10 INJECTION, SOLUTION INTRAVENOUS at 22:25

## 2024-10-15 RX ADMIN — SODIUM CHLORIDE, POTASSIUM CHLORIDE, SODIUM LACTATE AND CALCIUM CHLORIDE: 600; 310; 30; 20 INJECTION, SOLUTION INTRAVENOUS at 19:30

## 2024-10-15 RX ADMIN — FENTANYL CITRATE 50 MCG: 50 INJECTION, SOLUTION INTRAMUSCULAR; INTRAVENOUS at 15:12

## 2024-10-15 RX ADMIN — ROCURONIUM BROMIDE 50 MG: 10 INJECTION, SOLUTION INTRAVENOUS at 17:45

## 2024-10-15 RX ADMIN — PHENYLEPHRINE HYDROCHLORIDE 150 MCG: 10 INJECTION INTRAVENOUS at 18:29

## 2024-10-15 RX ADMIN — ROCURONIUM BROMIDE 10 MG: 10 INJECTION, SOLUTION INTRAVENOUS at 21:33

## 2024-10-15 RX ADMIN — HEPARIN SODIUM 18 UNITS/KG/HR: 10000 INJECTION, SOLUTION INTRAVENOUS at 15:45

## 2024-10-15 RX ADMIN — ROCURONIUM BROMIDE 30 MG: 10 INJECTION, SOLUTION INTRAVENOUS at 18:15

## 2024-10-15 RX ADMIN — DEXAMETHASONE SODIUM PHOSPHATE 4 MG: 10 INJECTION, SOLUTION INTRAMUSCULAR; INTRAVENOUS at 17:59

## 2024-10-15 RX ADMIN — FENTANYL CITRATE 100 MCG: 50 INJECTION, SOLUTION INTRAMUSCULAR; INTRAVENOUS at 17:37

## 2024-10-15 RX ADMIN — FENTANYL CITRATE 50 MCG: 50 INJECTION, SOLUTION INTRAMUSCULAR; INTRAVENOUS at 22:55

## 2024-10-15 RX ADMIN — FENTANYL CITRATE 100 MCG: 50 INJECTION, SOLUTION INTRAMUSCULAR; INTRAVENOUS at 20:38

## 2024-10-15 RX ADMIN — PHENYLEPHRINE HYDROCHLORIDE 100 MCG: 10 INJECTION INTRAVENOUS at 18:19

## 2024-10-15 RX ADMIN — PHENYLEPHRINE HYDROCHLORIDE 50 MCG: 10 INJECTION INTRAVENOUS at 20:55

## 2024-10-15 RX ADMIN — SODIUM CHLORIDE, POTASSIUM CHLORIDE, SODIUM LACTATE AND CALCIUM CHLORIDE: 600; 310; 30; 20 INJECTION, SOLUTION INTRAVENOUS at 18:00

## 2024-10-15 RX ADMIN — FENTANYL CITRATE 50 MCG: 50 INJECTION, SOLUTION INTRAMUSCULAR; INTRAVENOUS at 22:49

## 2024-10-15 RX ADMIN — ROCURONIUM BROMIDE 20 MG: 10 INJECTION, SOLUTION INTRAVENOUS at 20:22

## 2024-10-15 RX ADMIN — SODIUM CHLORIDE: 0.9 INJECTION, SOLUTION INTRAVENOUS at 17:33

## 2024-10-15 RX ADMIN — HEPARIN SODIUM 3000 UNITS: 1000 INJECTION, SOLUTION INTRAVENOUS; SUBCUTANEOUS at 22:11

## 2024-10-15 RX ADMIN — HEPARIN SODIUM 6900 UNITS: 1000 INJECTION INTRAVENOUS; SUBCUTANEOUS at 15:44

## 2024-10-15 ASSESSMENT — PAIN SCALES - GENERAL: PAINLEVEL_OUTOF10: 10

## 2024-10-15 ASSESSMENT — PAIN - FUNCTIONAL ASSESSMENT: PAIN_FUNCTIONAL_ASSESSMENT: 0-10

## 2024-10-15 ASSESSMENT — ENCOUNTER SYMPTOMS
VOICE CHANGE: 0
ABDOMINAL DISTENTION: 0
EYE PAIN: 0
COUGH: 0
ABDOMINAL PAIN: 0
TROUBLE SWALLOWING: 0
VOMITING: 0
COLOR CHANGE: 0
CHEST TIGHTNESS: 0

## 2024-10-15 NOTE — ED NOTES
ED to inpatient nurses report      Chief Complaint:  Chief Complaint   Patient presents with    Leg Pain     left     Present to ED from: Dr. Watson's office    MOA:     LOC: alert and orientated to name, place, date  Mobility: Requires assistance * 2  Oxygen Baseline: RA    Current needs required: n/a   Pending ED orders: n/a  Present condition: stable     Why did the patient come to the ED? Cold left lower extremity  What is the plan? Surgery w/ Dr. Watson   Any procedures or intervention occur? Labs, IV pain meds   Any safety concerns??    Mental Status:  Level of Consciousness: Alert (0)    Psych Assessment:   Psychosocial  Psychosocial (WDL): Within Defined Limits  Vital signs   Vitals:    10/15/24 1503   BP: 138/83   Pulse: 93   Resp: 17   Temp: 97.7 °F (36.5 °C)   TempSrc: Oral   SpO2: 99%        Vitals:  Patient Vitals for the past 24 hrs:   BP Temp Temp src Pulse Resp SpO2   10/15/24 1503 138/83 97.7 °F (36.5 °C) Oral 93 17 99 %      Visit Vitals  /83   Pulse 93   Temp 97.7 °F (36.5 °C) (Oral)   Resp 17   SpO2 99%        LDAs:   Peripheral IV 10/15/24 Left Forearm (Active)   Site Assessment Clean, dry & intact 10/15/24 1505   Line Status Blood return noted;Flushed 10/15/24 1505   Phlebitis Assessment No symptoms 10/15/24 1505   Infiltration Assessment 0 10/15/24 1505   Dressing Status New dressing applied;Clean, dry & intact 10/15/24 1505   Dressing Type Transparent 10/15/24 1505   Dressing Intervention New 10/15/24 1505       Ambulatory Status:  Presents to emergency department  because of falls (Syncope, seizure, or loss of consciousness): No, Age > 70: No, Altered Mental Status, Intoxication with alcohol or substance confusion (Disorientation, impaired judgment, poor safety awaremess, or inability to follow instructions): No, Impaired Mobility: Ambulates or transfers with assistive devices or assistance; Unable to ambulate or transer.: Yes, Nursing Judgement: Yes    Diagnosis:  DISPOSITION Admitted  10/15/2024 03:54:28 PM   Final diagnoses:   Left leg pain        Consults:  IP CONSULT TO VASCULAR SURGERY  IP CONSULT TO INTERNAL MEDICINE  IP CONSULT TO CASE MANAGEMENT     Treatment Team:   Treatment Team:   Andreea Dee MD Phillips, Gilbert, DO Newell, Sydney, CHRISTI Watson, MD Luiz Vargas Deepti, MD    Treatment:          Skin Assessment:        Pain Score:  Pain Assessment  Pain Assessment: 0-10  Pain Level: 10      SOCIAL HISTORY       Social History     Socioeconomic History    Marital status: Single     Spouse name: None    Number of children: None    Years of education: None    Highest education level: None   Tobacco Use    Smoking status: Former     Current packs/day: 0.25     Types: Cigarettes    Smokeless tobacco: Never   Vaping Use    Vaping status: Never Used   Substance and Sexual Activity    Alcohol use: No    Drug use: Not Currently     Types: Marijuana (Weed)    Sexual activity: Yes     Partners: Female     Social Determinants of Health     Financial Resource Strain: Medium Risk (9/3/2024)    Overall Financial Resource Strain (CARDIA)     Difficulty of Paying Living Expenses: Somewhat hard   Food Insecurity: No Food Insecurity (9/3/2024)    Hunger Vital Sign     Worried About Running Out of Food in the Last Year: Never true     Ran Out of Food in the Last Year: Never true   Transportation Needs: Unmet Transportation Needs (9/3/2024)    PRAPARE - Transportation     Lack of Transportation (Medical): Yes     Lack of Transportation (Non-Medical): Yes   Physical Activity: Inactive (9/3/2024)    Exercise Vital Sign     Days of Exercise per Week: 0 days     Minutes of Exercise per Session: 0 min   Stress: Stress Concern Present (9/3/2024)    [employer] [place] [employer] - Occupational Stress Questionnaire     Feeling of Stress : To some extent   Social Connections: Moderately Isolated (9/3/2024)    Social Connection and Isolation Panel [NHANES]     Frequency of Communication

## 2024-10-15 NOTE — ED NOTES
Pt to ED from office visit with Dr. Watson due to cold left lower extremity. Pt had vascular testing done today and was sent to Dr. Blanca office. Dr. Watson wanted pt to come to the ED immediately for surgery on left leg. Pt states her left lower extremity has been painful, numb and tingling for 4 days. Pt had previous left popliteal artery thrombectomy on 06/19/24. Pt also had recent CABG in 2023. Pt is a diabetic. Pts left great toe is amputated. Pt placed on continuous cardiac monitor, bp and pulse ox. EKG completed, IV established, blood work drawn. Pt alert and oriented x4, talking in complete sentences, respirations even and unlabored. Pt acting age appropriate. Will continue to plan of care.

## 2024-10-15 NOTE — PROGRESS NOTES
[facility], [facility]  [address]  [place] [address]  Dept: [phone]     Patient: Jane Saavedra  : 1990  MRN: [id number]  DOS: 10/15/2024    HPI:  24: Jane Saavedra is a 34 y.o. female who comes to the office regarding follow up s/p left leg popliteal artery percutaneous thrombectomy with PTA on 24. She now has bilateral foot wounds. Also has leg swelling. Past tobacco use. History of CABG in . No rest pain or motor/sensory loss. H1C last year was 13.2.    10/15/24: Was evaluated in [place], [place] recently for foot infection. She developed pain in her left leg that she thinks has been there for last 3 days. She has not been able to move her foot or ankle due to the pain and this motor loss might have been there for 3 days. She underwent arterial duplex today which shows left popliteal artery occlusion with absent tibial flow distally. Right side shows patent vessels but monophasic flow. ABIs were performed which show R Sara 0.61 and L 0.17. A1C from 24 is 13.4.    Past Medical History:   Diagnosis Date    Acute osteomyelitis     LEFT FOOT    Bipolar 1 disorder ([facility])     Cellulitis     LT FOOT    Chronic diabetic ulcer of left foot determined by examination ([facility])     Depression     Diabetic foot infection ([facility])     H. pylori infection     recurrent    History of recent hospitalization 2024    til 2024 : [facility] , ischemic leg    Hyperlipidemia     tx started May 2015    Hypertension     tx started May 2015    Ischemic cardiomyopathy     Multiple vessel coronary artery disease 2023    Neuropathy     BLE    Noncompliance     NSTEMI (non-ST elevated myocardial infarction) ([facility]) 2023    Osteomyelitis of foot, left, acute 2024    PAD (peripheral artery disease) ([facility])     Renal abscess, left 2023    extending into psoas and paraspinal    STEMI (ST elevation

## 2024-10-15 NOTE — ANESTHESIA PRE PROCEDURE
Department of Anesthesiology  Preprocedure Note       Name:  Jane Saavedra   Age:  34 y.o.  :  1990                                          MRN:  [id number]         Date:  10/15/2024      Surgeon: Surgeon(s):  Emperatriz Watson MD    Procedure: Procedure(s):  LOWER EXTREMITY ANGIOGRAM, POSSIBLE OPEN THROMBECTOMY, POSSIBLE FEM POP BYPASS, POSSIBLE FASCIOTOMY    Medications prior to admission:   Prior to Admission medications    Medication Sig Start Date End Date Taking? Authorizing Provider   insulin aspart (NOVOLOG FLEXPEN) 100 UNIT/ML injection pen Inject 3 Units into the skin 3 times daily (before meals) 10/7/24   Marissa Wang MD   Misc. Devices MISC 1 PAIR OF DIABETIC SHOES (1 LEFT/ 1 RIGHT)  1-3 PAIRS OF INSERTS (LEFT/ RIGHT) 24   Roberto Dailey DPM   Continuous Glucose Sensor (DEXCOM G7 SENSOR) MISC Apply new sensor to back of arm every 10 days. 24   Marissa Wang MD   insulin glargine (BASAGLAR KWIKPEN) 100 UNIT/ML injection pen Inject 22 Units into the skin 2 times daily 9/10/24   Marissa Wang MD   isosorbide mononitrate (IMDUR) 30 MG extended release tablet Take 1 tablet by mouth daily 24   Dyan Mills MD   apixaban (ELIQUIS) 5 MG TABS tablet Take 1 tablet by mouth 2 times daily 24   Dyan Mills MD   losartan (COZAAR) 25 MG tablet Take 1 tablet by mouth daily 24   Dyan Mills MD   metoprolol succinate (TOPROL XL) 50 MG extended release tablet Take 1 tablet by mouth daily 24   Dyan Mills MD   blood glucose monitor strips Test 1 times a day & as needed for symptoms of irregular blood glucose. Dispense sufficient amount for indicated testing frequency plus additional to accommodate PRN testing needs. 24   Dyan Mills MD   glucose monitoring kit 1 kit by Does not apply route daily 24   Dyan Mills MD   Lancets MISC 1 each by Does not apply route daily 24   Dyan Mills MD   blood glucose monitor strips Test  times a day &

## 2024-10-15 NOTE — ED PROVIDER NOTES
[facility] ED  Emergency Department Encounter  Emergency Medicine Resident     Pt Name:Jane Saavedra  MRN: [id number]  Birthdate 1990  Date of evaluation: 10/15/24  PCP:  Marissa Wang MD  Note Started: 2:58 PM EDT      CHIEF COMPLAINT       Chief Complaint   Patient presents with    Leg Pain     left       HISTORY OF PRESENT ILLNESS  (Location/Symptom, Timing/Onset, Context/Setting, Quality, Duration, Modifying Factors, Severity.)      Jane Saavedra is a 34 y.o. female who presents with cold left leg.  Patient status post left leg popliteal artery percutaneous thrombectomy with PTA on 6/19/2024.  Extensive vessel disease.  History of CABG 2023.    Patient was seen in the office by Dr. Watson and told to come to the emergency department for likely angiography and potential intervention.  Patient reports extensive pain in the left leg below the knee.  Denying any chest pain or shortness of breath.    PAST MEDICAL / SURGICAL / SOCIAL / FAMILY HISTORY      has a past medical history of Acute osteomyelitis, Bipolar 1 disorder ([facility]), Cellulitis, Chronic diabetic ulcer of left foot determined by examination ([facility]), Depression, Diabetic foot infection ([facility]), H. pylori infection, History of recent hospitalization, Hyperlipidemia, Hypertension, Ischemic cardiomyopathy, Multiple vessel coronary artery disease, Neuropathy, Noncompliance, NSTEMI (non-ST elevated myocardial infarction) ([facility]), Osteomyelitis of foot, left, acute, PAD (peripheral artery disease) ([facility]), Renal abscess, left, STEMI (ST elevation myocardial infarction) ([facility]), Thrombus, Type II or unspecified type diabetes mellitus without mention of complication, uncontrolled, Under care of team, Under care of team, Under care of team, Under care of team, Under care of team, Under care of team, and Wellness examination.       has a past surgical history that includes Carpal tunnel release (Left, 07/22/2020); Coronary artery bypass graft  MG extended release tablet Take 1 tablet by mouth daily 9/1/24   Dyan Mills MD   blood glucose monitor strips Test 1 times a day & as needed for symptoms of irregular blood glucose. Dispense sufficient amount for indicated testing frequency plus additional to accommodate PRN testing needs. 9/1/24   Dyan Mills MD   glucose monitoring kit 1 kit by Does not apply route daily 9/1/24   Dyan Mills MD   Lancets MISC 1 each by Does not apply route daily 9/1/24   Dyan Mills MD   blood glucose monitor strips Test  times a day & as needed for symptoms of irregular blood glucose. Dispense sufficient amount for indicated testing frequency plus additional to accommodate PRN testing needs. 9/1/24   Dyan Mills MD   Insulin Pen Needle 32G X 4 MM MISC 1 each by Does not apply route daily 9/1/24   Dyan Mills MD   aspirin 81 MG EC tablet Take 1 tablet by mouth daily 7/29/24   Stewart Turner, DO   atorvastatin (LIPITOR) 80 MG tablet Take 1 tablet by mouth nightly 7/29/24   Stewart Turner, DO   Blood Glucose Monitoring Suppl (BLOOD GLUCOSE MONITOR KIT) KIT Check bs bid 7/29/24   Stewart Turner, DO   ezetimibe (ZETIA) 10 MG tablet Take 1 tablet by mouth daily 6/20/24   Gm Ocampo I, DO   Blood Pressure KIT 1 kit by Does not apply route daily 6/20/24   Alexandra Ocampoammad I, DO   glucose monitoring kit 1 kit by Does not apply route daily 6/20/24   Alexandra Ocampoammad I, DO   Lancets MISC 1 each by Does not apply route daily 6/20/24   Alexandra Ocampoammad I, DO         REVIEW OF SYSTEMS       Review of Systems  See HPI  PHYSICAL EXAM      INITIAL VITALS:   BP (!) 115/57   Pulse 85   Temp 97.7 °F (36.5 °C) (Oral)   Resp 22   SpO2 99%     Physical Exam  Constitutional:       General: She is in acute distress.   HENT:      Head: Normocephalic and atraumatic.      Mouth/Throat:      Mouth: Mucous membranes are moist.      Pharynx: Oropharynx is clear.   Eyes:      Extraocular Movements: Extraocular

## 2024-10-15 NOTE — CONSULTS
Division of Vascular Surgery        New Consult      Physician Requesting Consult:  Chuy    Reason for Consult:   Cold left leg     Chief Complaint:      Pain, numbness left lower extremity     History of Present Illness:      Jane Saavedra is a 34 y.o. female who presents to the ED at the request of Dr. Fink for increasing pain, numbness, coolness to the touch of the left foot over the past 3 days.  She was at her office follow-up after a left leg popliteal artery percutaneous thrombectomy on 6/ankle/24.  She now has bilateral foot wounds.  Has a history of CABG in 2023.  She was evaluated [place], [place] recently for foot infection. She developed pain in her left leg that she thinks has been there for last 3 days. She has not been able to move her foot or ankle due to the pain and this motor loss might have been there for 3 days. She underwent arterial duplex today which shows left popliteal artery occlusion with absent tibial flow distally. Right side shows patent vessels but monophasic flow. ABIs were performed which show R Sara 0.61 and L 0.17. A1C from 9/12/24 is 13.4.   Medical History:     Past Medical History:   Diagnosis Date    Acute osteomyelitis     LEFT FOOT    Bipolar 1 disorder ([facility])     Cellulitis     LT FOOT    Chronic diabetic ulcer of left foot determined by examination ([facility])     Depression     Diabetic foot infection ([facility])     H. pylori infection     recurrent    History of recent hospitalization 06/18/2024    til 6/20/2024 : [facility] , ischemic leg    Hyperlipidemia     tx started May 2015    Hypertension     tx started May 2015    Ischemic cardiomyopathy     Multiple vessel coronary artery disease 01/2023    Neuropathy     BLE    Noncompliance     NSTEMI (non-ST elevated myocardial infarction) ([facility]) 01/06/2023    Osteomyelitis of foot, left, acute 07/01/2024    PAD (peripheral artery disease) ([facility])     Renal abscess, left 08/2023    extending into psoas and paraspinal    STEMI (ST  (Oral)   Resp 17   SpO2 99%     Physical Exam  Constitutional:       Appearance: She is not ill-appearing, toxic-appearing or diaphoretic.   HENT:      Head: Normocephalic and atraumatic.      Right Ear: External ear normal.      Left Ear: External ear normal.      Nose: Nose normal.      Mouth/Throat:      Mouth: Mucous membranes are moist.   Eyes:      Extraocular Movements: Extraocular movements intact.   Cardiovascular:      Rate and Rhythm: Normal rate and regular rhythm.      Comments: Femoral pulses laterally, palpable DP pulses right lower extremity.  No Dopplerable signals in the left foot including anterior tibial, dorsalis pedis, posterior tibial  Pulmonary:      Effort: Pulmonary effort is normal. No respiratory distress.   Abdominal:      General: There is no distension.      Palpations: Abdomen is soft.   Neurological:      Mental Status: She is alert.         Imaging/Labs:     None    Assessment and Plan:     34-year-old female 3 days of increasing right lower extremity pain, numbness, coolness to the touch.  Seen in the office today referred to ED.    - Plan to proceed with left lower extremity angiogram, possible open thrombectomy, possible femoropopliteal bypass, possible fasciotomy  - Heparin drip  - Recommend medicine admission  - Risk, benefits, alternatives were discussed.  Procedure went over in detail.  Informed consent was obtained.  Anticipate ICU need postoperatively.  - Vascular surgery will continue to follow  - Case discussed with attending.    Heaven Hall DO 10/15/24  3:27 PM   General Surgery PGY 1           [facility] [place]  O: [phone]

## 2024-10-15 NOTE — H&P
[facility] Inpatient Service  [facility]  History & Physical Examination Note              Date:   10/15/2024  Patient name:  Jane Saavedra  Date of admission:  10/15/2024  2:57 PM  MRN:   [id number]  YOB: 1990    CHIEF COMPLAINT:       Chief Complaint   Patient presents with    Leg Pain     left       History Obtained From:  Patient and chart review.    HPI:       The patient is a 34 y.o.  female with history of severe PAD, type 2 diabetes that is uncontrolled, chronic osteomyelitis, with amputation of left hallux, HFrEF, PAD/CAD, status post CABG x 3, Status post left leg popliteal thrombectomy in June 2024 that initially presented to outpatient vascular clinic with 3 days of pain in left leg as well as loss of motor function.  Arterial duplex done in the clinic showed absent tibial flow with left popliteal artery occlusion. Right side shows patent vessels but monophasic flow. ABIs were performed which show R Sara 0.61 and L 0.17. A1C from 9/12/24 is 13.4.  Patient was sent to the emergency department and was transferred to the OR with vascular, started on heparin drip, had left lower extremity angiogram.  Patient evaluated in the surgical ICU.  Denies any pain at this time, denies any chest pain, shortness of breath.  Does have arterial line and at this time.  Per vascular, will remain on heparin drip, could be taken back to the OR.      PAST MEDICAL HISTORY:        has a past medical history of Acute osteomyelitis, Bipolar 1 disorder ([facility]), Cellulitis, Chronic diabetic ulcer of left foot determined by examination ([facility]), Depression, Diabetic foot infection ([facility]), H. pylori infection, History of recent hospitalization, Hyperlipidemia, Hypertension, Ischemic cardiomyopathy, Multiple vessel coronary artery disease, Neuropathy, Noncompliance, NSTEMI (non-ST elevated myocardial infarction) ([facility]), Osteomyelitis of foot, left, acute, PAD (peripheral artery disease)  status: Patient currently in surgical ICU, vascular to be primary, family medicine will take over once she is out ICU.          Severe PAD (Acute L limb ischemia) in the setting of any vascular complications and diabetic foot ulcers  -s/p left leg popliteal artery percutaneous thrombectomy with PTA on 6/19/24   -Left foot hallux amputation on 7/8/2024  -History of osteomyelitis in the left foot on 8/31/2024 MRI and right foot fifth metatarsal head and fifth proximal phalanx on MRI right foot  -arterial duplex today which shows left popliteal artery occlusion with absent tibial flow distally  in Dr Watson's office today  -ABIs were performed which show R Sara 0.61 and L 0.17 in Dr Blanca office   -Vascular surgery on board   -Patient had left lower extremity angiogram with popliteal artery thrombectomy as well as fasciotomy   -Will keep on heparin drip, as patient might be taken back to the OR   -Roxicodone q4hr PRN and Dilaudid q4hr for breakthrough pain     -Heparin drip started in the ED  -Aspirin, Eliquis on hold, patient might be taken back to the OR  -Resume high-dose statin    Uncontrolled type I diabetes  -Last A1c check was 9/12/2024 which was 13.4  -Patient remains n.p.o.  -Held home dose of Lantus 20 units twice daily and Humalog 3 units Premeal   -POCT glucose checks  -Hypoglycemia protocol in place    CABG x 2 in 2023  -Aspirin held secondary to procedure  -Resumed home statin high-dose and Imdur 30 mg daily  -Currently on heparin drip    HFrEF- compensated   -Echo in 7/5/2023 showing ejection fraction 40 to 45% however, recent echo on 8/30/2024 shows improved normal ejection fraction  -Patient on GDMT  -Will resume losartan 25 mg daily, metoprolol 50 mg daily  -Patient may be a candidate for Jardiance on discharge      DVT prophylaxis: Already on heparin drip  GI prophylaxis: Not indicated at this time  Diet: N.p.o. with sips with med in anticipation of surgery    Disposition: Patient is status post left

## 2024-10-15 NOTE — ED PROVIDER NOTES
[facility] ED     Emergency Department     Faculty Attestation    I performed a history and physical examination of the patient and discussed management with the resident. I reviewed the resident’s note and agree with the documented findings and plan of care. Any areas of disagreement are noted on the chart. I was personally present for the key portions of any procedures. I have documented in the chart those procedures where I was not present during the key portions. I have reviewed the emergency nurses triage note. I agree with the chief complaint, past medical history, past surgical history, allergies, medications, social and family history as documented unless otherwise noted below. For Physician Assistant/ Nurse Practitioner cases/documentation I have personally evaluated this patient and have completed at least one if not all key elements of the E/M (history, physical exam, and MDM). Additional findings are as noted.    3:33 PM EDT    Patient sent here by her vascular surgeon for a cold painful left lower extremity.  Patient states that her symptoms worsened over the past 3 days or so.  Vascular surgery resident is here in the emergency department and they plan to take patient to the OR this afternoon.  Will start heparin drip and admit.      Niki Vera MD  Attending Emergency  Physician

## 2024-10-16 ENCOUNTER — ANESTHESIA EVENT (OUTPATIENT)
Dept: OPERATING ROOM | Age: 34
End: 2024-10-16
Payer: COMMERCIAL

## 2024-10-16 ENCOUNTER — APPOINTMENT (OUTPATIENT)
Dept: GENERAL RADIOLOGY | Age: 34
End: 2024-10-16
Payer: COMMERCIAL

## 2024-10-16 LAB
ANION GAP SERPL CALCULATED.3IONS-SCNC: 11 MMOL/L (ref 9–16)
BASOPHILS # BLD: 0.04 K/UL (ref 0–0.2)
BASOPHILS NFR BLD: 0 % (ref 0–2)
BLOOD BANK BLOOD PRODUCT EXPIRATION DATE: NORMAL
BLOOD BANK DISPENSE STATUS: NORMAL
BLOOD BANK ISBT PRODUCT BLOOD TYPE: 6200
BLOOD BANK PRODUCT CODE: NORMAL
BLOOD BANK UNIT TYPE AND RH: NORMAL
BPU ID: NORMAL
BUN SERPL-MCNC: 12 MG/DL (ref 6–20)
CALCIUM SERPL-MCNC: 8.6 MG/DL (ref 8.6–10.4)
CHLORIDE SERPL-SCNC: 106 MMOL/L (ref 98–107)
CK SERPL-CCNC: 331 U/L (ref 26–192)
CO2 SERPL-SCNC: 23 MMOL/L (ref 20–31)
COMPONENT: NORMAL
CREAT SERPL-MCNC: 0.4 MG/DL (ref 0.5–0.9)
CRP SERPL HS-MCNC: 58.5 MG/L (ref 0–5)
EOSINOPHIL # BLD: <0.03 K/UL (ref 0–0.44)
EOSINOPHILS RELATIVE PERCENT: 0 % (ref 1–4)
ERYTHROCYTE [DISTWIDTH] IN BLOOD BY AUTOMATED COUNT: 14 % (ref 11.8–14.4)
ERYTHROCYTE [SEDIMENTATION RATE] IN BLOOD BY PHOTOMETRIC METHOD: 75 MM/HR (ref 0–20)
GFR, ESTIMATED: >90 ML/MIN/1.73M2
GLUCOSE BLD-MCNC: 201 MG/DL (ref 65–105)
GLUCOSE BLD-MCNC: 218 MG/DL (ref 65–105)
GLUCOSE BLD-MCNC: 240 MG/DL (ref 65–105)
GLUCOSE SERPL-MCNC: 231 MG/DL (ref 74–99)
HCT VFR BLD AUTO: 35.1 % (ref 36.3–47.1)
HGB BLD-MCNC: 11.3 G/DL (ref 11.9–15.1)
IMM GRANULOCYTES # BLD AUTO: 0.05 K/UL (ref 0–0.3)
IMM GRANULOCYTES NFR BLD: 0 %
LYMPHOCYTES NFR BLD: 2.18 K/UL (ref 1.1–3.7)
LYMPHOCYTES RELATIVE PERCENT: 18 % (ref 24–43)
MCH RBC QN AUTO: 29.8 PG (ref 25.2–33.5)
MCHC RBC AUTO-ENTMCNC: 32.2 G/DL (ref 28.4–34.8)
MCV RBC AUTO: 92.6 FL (ref 82.6–102.9)
MONOCYTES NFR BLD: 1.01 K/UL (ref 0.1–1.2)
MONOCYTES NFR BLD: 8 % (ref 3–12)
MYOGLOBIN SERPL-MCNC: 135 NG/ML (ref 25–58)
NEUTROPHILS NFR BLD: 74 % (ref 36–65)
NEUTS SEG NFR BLD: 8.82 K/UL (ref 1.5–8.1)
NRBC BLD-RTO: 0 PER 100 WBC
PARTIAL THROMBOPLASTIN TIME: 25.5 SEC (ref 23–36.5)
PARTIAL THROMBOPLASTIN TIME: 30.1 SEC (ref 23–36.5)
PARTIAL THROMBOPLASTIN TIME: 42.5 SEC (ref 23–36.5)
PLATELET # BLD AUTO: 348 K/UL (ref 138–453)
PMV BLD AUTO: 9.6 FL (ref 8.1–13.5)
POTASSIUM SERPL-SCNC: 4 MMOL/L (ref 3.7–5.3)
RBC # BLD AUTO: 3.79 M/UL (ref 3.95–5.11)
SODIUM SERPL-SCNC: 140 MMOL/L (ref 136–145)
TRANSFUSION STATUS: NORMAL
UNIT DIVISION: 0
UNIT ISSUE DATE/TIME: NORMAL
VAS LEFT ABI: 0.17
VAS LEFT ARM BP: 115 MMHG
VAS LEFT CFA PROX PSV: 265 CM/S
VAS LEFT PFA PROX PSV: 141 CM/S
VAS LEFT PTA BP: 19 MMHG
VAS LEFT SFA DIST PSV: 61.5 CM/S
VAS LEFT SFA DIST VEL RATIO: 0.73
VAS LEFT SFA MID PSV: 84.3 CM/S
VAS LEFT SFA MID VEL RATIO: 0.75
VAS LEFT SFA PROX PSV: 113 CM/S
VAS LEFT SFA PROX VEL RATIO: 0.43
VAS RIGHT ABI: 0.61
VAS RIGHT ARM BP: 109 MMHG
VAS RIGHT ATA MID PSV: 40.8 CM/S
VAS RIGHT CFA PROX PSV: 211 CM/S
VAS RIGHT DORSALIS PEDIS BP: 70 MMHG
VAS RIGHT PERONEAL MID PSV: 36.9 CM/S
VAS RIGHT PFA PROX PSV: 203 CM/S
VAS RIGHT POP A DIST PSV: 82.9 CM/S
VAS RIGHT POP A PROX PSV: 259 CM/S
VAS RIGHT POP A PROX VEL RATIO: 0.81
VAS RIGHT PTA BP: 68 MMHG
VAS RIGHT PTA MID PSV: 26.8 CM/S
VAS RIGHT SFA DIST PSV: 320 CM/S
VAS RIGHT SFA DIST VEL RATIO: 3.29
VAS RIGHT SFA MID PSV: 97.3 CM/S
VAS RIGHT SFA MID VEL RATIO: 0.7
VAS RIGHT SFA PROX PSV: 139 CM/S
VAS RIGHT SFA PROX VEL RATIO: 0.7
VAS RIGHT TBI: 0.43
VAS RIGHT TOE PRESSURE: 50 MMHG
WBC OTHER # BLD: 12.1 K/UL (ref 3.5–11.3)

## 2024-10-16 PROCEDURE — 6370000000 HC RX 637 (ALT 250 FOR IP)

## 2024-10-16 PROCEDURE — 6360000002 HC RX W HCPCS

## 2024-10-16 PROCEDURE — 2580000003 HC RX 258: Performed by: ANESTHESIOLOGY

## 2024-10-16 PROCEDURE — 2580000003 HC RX 258

## 2024-10-16 PROCEDURE — 85730 THROMBOPLASTIN TIME PARTIAL: CPT

## 2024-10-16 PROCEDURE — 82550 ASSAY OF CK (CPK): CPT

## 2024-10-16 PROCEDURE — 73630 X-RAY EXAM OF FOOT: CPT

## 2024-10-16 PROCEDURE — 82947 ASSAY GLUCOSE BLOOD QUANT: CPT

## 2024-10-16 PROCEDURE — 80048 BASIC METABOLIC PNL TOTAL CA: CPT

## 2024-10-16 PROCEDURE — 83874 ASSAY OF MYOGLOBIN: CPT

## 2024-10-16 PROCEDURE — 86140 C-REACTIVE PROTEIN: CPT

## 2024-10-16 PROCEDURE — 85652 RBC SED RATE AUTOMATED: CPT

## 2024-10-16 PROCEDURE — 2060000000 HC ICU INTERMEDIATE R&B

## 2024-10-16 PROCEDURE — 85025 COMPLETE CBC W/AUTO DIFF WBC: CPT

## 2024-10-16 PROCEDURE — 36415 COLL VENOUS BLD VENIPUNCTURE: CPT

## 2024-10-16 PROCEDURE — 99223 1ST HOSP IP/OBS HIGH 75: CPT | Performed by: PODIATRIST

## 2024-10-16 RX ORDER — INSULIN GLARGINE 100 [IU]/ML
28 INJECTION, SOLUTION SUBCUTANEOUS NIGHTLY
Status: DISCONTINUED | OUTPATIENT
Start: 2024-10-17 | End: 2024-10-16

## 2024-10-16 RX ORDER — OXYCODONE HYDROCHLORIDE 5 MG/1
5 TABLET ORAL EVERY 4 HOURS PRN
Status: DISCONTINUED | OUTPATIENT
Start: 2024-10-16 | End: 2024-10-25 | Stop reason: HOSPADM

## 2024-10-16 RX ORDER — GABAPENTIN 300 MG/1
300 CAPSULE ORAL 3 TIMES DAILY
Status: DISCONTINUED | OUTPATIENT
Start: 2024-10-16 | End: 2024-10-25 | Stop reason: HOSPADM

## 2024-10-16 RX ORDER — INSULIN LISPRO 100 [IU]/ML
0-16 INJECTION, SOLUTION INTRAVENOUS; SUBCUTANEOUS
Status: DISCONTINUED | OUTPATIENT
Start: 2024-10-16 | End: 2024-10-25 | Stop reason: HOSPADM

## 2024-10-16 RX ORDER — ACETAMINOPHEN 500 MG
1000 TABLET ORAL EVERY 8 HOURS SCHEDULED
Status: DISCONTINUED | OUTPATIENT
Start: 2024-10-16 | End: 2024-10-25 | Stop reason: HOSPADM

## 2024-10-16 RX ORDER — LABETALOL HYDROCHLORIDE 5 MG/ML
5 INJECTION, SOLUTION INTRAVENOUS EVERY 6 HOURS PRN
Status: DISCONTINUED | OUTPATIENT
Start: 2024-10-16 | End: 2024-10-25 | Stop reason: HOSPADM

## 2024-10-16 RX ORDER — OXYCODONE HYDROCHLORIDE 5 MG/1
10 TABLET ORAL EVERY 4 HOURS PRN
Status: DISCONTINUED | OUTPATIENT
Start: 2024-10-16 | End: 2024-10-25 | Stop reason: HOSPADM

## 2024-10-16 RX ORDER — INSULIN GLARGINE 100 [IU]/ML
28 INJECTION, SOLUTION SUBCUTANEOUS NIGHTLY
Status: DISCONTINUED | OUTPATIENT
Start: 2024-10-16 | End: 2024-10-19

## 2024-10-16 RX ORDER — HYDRALAZINE HYDROCHLORIDE 20 MG/ML
5 INJECTION INTRAMUSCULAR; INTRAVENOUS EVERY 6 HOURS PRN
Status: DISCONTINUED | OUTPATIENT
Start: 2024-10-16 | End: 2024-10-25 | Stop reason: HOSPADM

## 2024-10-16 RX ORDER — HEPARIN SODIUM 10000 [USP'U]/100ML
5-30 INJECTION, SOLUTION INTRAVENOUS CONTINUOUS
Status: DISPENSED | OUTPATIENT
Start: 2024-10-16 | End: 2024-10-17

## 2024-10-16 RX ADMIN — LOSARTAN POTASSIUM 25 MG: 50 TABLET, FILM COATED ORAL at 08:16

## 2024-10-16 RX ADMIN — ACETAMINOPHEN 1000 MG: 500 TABLET ORAL at 02:11

## 2024-10-16 RX ADMIN — SODIUM CHLORIDE, PRESERVATIVE FREE 10 ML: 5 INJECTION INTRAVENOUS at 20:58

## 2024-10-16 RX ADMIN — INSULIN LISPRO 4 UNITS: 100 INJECTION, SOLUTION INTRAVENOUS; SUBCUTANEOUS at 20:56

## 2024-10-16 RX ADMIN — INSULIN LISPRO 4 UNITS: 100 INJECTION, SOLUTION INTRAVENOUS; SUBCUTANEOUS at 16:28

## 2024-10-16 RX ADMIN — HEPARIN SODIUM 6900 UNITS: 1000 INJECTION INTRAVENOUS; SUBCUTANEOUS at 16:22

## 2024-10-16 RX ADMIN — OXYCODONE 10 MG: 5 TABLET ORAL at 08:17

## 2024-10-16 RX ADMIN — ATORVASTATIN CALCIUM 80 MG: 80 TABLET, FILM COATED ORAL at 00:00

## 2024-10-16 RX ADMIN — Medication 5 MG: at 22:23

## 2024-10-16 RX ADMIN — GABAPENTIN 300 MG: 300 CAPSULE ORAL at 15:46

## 2024-10-16 RX ADMIN — ACETAMINOPHEN 1000 MG: 500 TABLET ORAL at 15:45

## 2024-10-16 RX ADMIN — LABETALOL HYDROCHLORIDE 5 MG: 5 INJECTION, SOLUTION INTRAVENOUS at 01:11

## 2024-10-16 RX ADMIN — OXYCODONE 10 MG: 5 TABLET ORAL at 12:24

## 2024-10-16 RX ADMIN — HYDROMORPHONE HYDROCHLORIDE 0.5 MG: 1 INJECTION, SOLUTION INTRAMUSCULAR; INTRAVENOUS; SUBCUTANEOUS at 15:56

## 2024-10-16 RX ADMIN — OXYCODONE 10 MG: 5 TABLET ORAL at 03:46

## 2024-10-16 RX ADMIN — HEPARIN SODIUM 6900 UNITS: 1000 INJECTION INTRAVENOUS; SUBCUTANEOUS at 10:37

## 2024-10-16 RX ADMIN — HYDROMORPHONE HYDROCHLORIDE 0.5 MG: 1 INJECTION, SOLUTION INTRAMUSCULAR; INTRAVENOUS; SUBCUTANEOUS at 11:07

## 2024-10-16 RX ADMIN — EZETIMIBE 10 MG: 10 TABLET ORAL at 08:16

## 2024-10-16 RX ADMIN — GABAPENTIN 300 MG: 300 CAPSULE ORAL at 20:57

## 2024-10-16 RX ADMIN — METOPROLOL SUCCINATE 50 MG: 25 TABLET, EXTENDED RELEASE ORAL at 08:17

## 2024-10-16 RX ADMIN — ISOSORBIDE MONONITRATE 30 MG: 30 TABLET, EXTENDED RELEASE ORAL at 08:17

## 2024-10-16 RX ADMIN — ACETAMINOPHEN 1000 MG: 500 TABLET ORAL at 22:23

## 2024-10-16 RX ADMIN — HYDROMORPHONE HYDROCHLORIDE 0.5 MG: 1 INJECTION, SOLUTION INTRAMUSCULAR; INTRAVENOUS; SUBCUTANEOUS at 01:16

## 2024-10-16 RX ADMIN — INSULIN LISPRO 4 UNITS: 100 INJECTION, SOLUTION INTRAVENOUS; SUBCUTANEOUS at 09:51

## 2024-10-16 RX ADMIN — ACETAMINOPHEN 1000 MG: 500 TABLET ORAL at 05:57

## 2024-10-16 RX ADMIN — GABAPENTIN 300 MG: 300 CAPSULE ORAL at 08:16

## 2024-10-16 RX ADMIN — OXYCODONE 10 MG: 5 TABLET ORAL at 18:30

## 2024-10-16 RX ADMIN — ATORVASTATIN CALCIUM 80 MG: 80 TABLET, FILM COATED ORAL at 20:57

## 2024-10-16 RX ADMIN — SODIUM CHLORIDE, PRESERVATIVE FREE 10 ML: 5 INJECTION INTRAVENOUS at 20:57

## 2024-10-16 RX ADMIN — HEPARIN SODIUM 18 UNITS/KG/HR: 10000 INJECTION, SOLUTION INTRAVENOUS at 04:26

## 2024-10-16 RX ADMIN — HYDROMORPHONE HYDROCHLORIDE 0.5 MG: 1 INJECTION, SOLUTION INTRAMUSCULAR; INTRAVENOUS; SUBCUTANEOUS at 20:57

## 2024-10-16 RX ADMIN — HEPARIN SODIUM 26 UNITS/KG/HR: 10000 INJECTION, SOLUTION INTRAVENOUS at 18:24

## 2024-10-16 RX ADMIN — INSULIN GLARGINE 28 UNITS: 100 INJECTION, SOLUTION SUBCUTANEOUS at 20:56

## 2024-10-16 RX ADMIN — OXYCODONE 10 MG: 5 TABLET ORAL at 22:24

## 2024-10-16 ASSESSMENT — PAIN DESCRIPTION - DESCRIPTORS
DESCRIPTORS: ACHING;DISCOMFORT
DESCRIPTORS: SHARP;ACHING;DISCOMFORT
DESCRIPTORS: DISCOMFORT

## 2024-10-16 ASSESSMENT — PAIN SCALES - GENERAL
PAINLEVEL_OUTOF10: 0
PAINLEVEL_OUTOF10: 10
PAINLEVEL_OUTOF10: 7
PAINLEVEL_OUTOF10: 0
PAINLEVEL_OUTOF10: 6
PAINLEVEL_OUTOF10: 10
PAINLEVEL_OUTOF10: 7
PAINLEVEL_OUTOF10: 10
PAINLEVEL_OUTOF10: 9
PAINLEVEL_OUTOF10: 5
PAINLEVEL_OUTOF10: 10
PAINLEVEL_OUTOF10: 10
PAINLEVEL_OUTOF10: 9
PAINLEVEL_OUTOF10: 10
PAINLEVEL_OUTOF10: 5
PAINLEVEL_OUTOF10: 10

## 2024-10-16 ASSESSMENT — PAIN DESCRIPTION - ORIENTATION
ORIENTATION: LEFT

## 2024-10-16 ASSESSMENT — PAIN DESCRIPTION - LOCATION
LOCATION: LEG;FOOT
LOCATION: GENERALIZED
LOCATION: LEG
LOCATION: LEG;INCISION;FOOT
LOCATION: LEG;FOOT
LOCATION: INCISION;LEG
LOCATION: LEG
LOCATION: GENERALIZED
LOCATION: LEG;FOOT
LOCATION: GENERALIZED
LOCATION: LEG;FOOT
LOCATION: GENERALIZED
LOCATION: LEG

## 2024-10-16 ASSESSMENT — PAIN SCALES - WONG BAKER
WONGBAKER_NUMERICALRESPONSE: HURTS WORST
WONGBAKER_NUMERICALRESPONSE: NO HURT
WONGBAKER_NUMERICALRESPONSE: NO HURT

## 2024-10-16 NOTE — CONSULTS
Consultation Note  Foot and Ankle Surgery     Subjective     Chief Complaint: Right foot wound    HPI:  Jane Saavedra is a 34 y.o. female diabetic patient seen at [facility] for foot wounds to the right lower extremity. Patient evaluated in the ICU. Patient initially presented to outpatient vascular clinic yesterday with 3 days of pain in left leg as well as loss of motor function.  Arterial duplex done in the clinic showed absent tibial flow with left popliteal artery occlusion. Right side shows patent vessels but monophasic flow. ABIs were performed which show R Sara 0.61 and L 0.17. Patient was sent to the emergency department and was transferred to the OR with vascular, started on heparin drip, had left lower extremity thrombectomy and fasciotomy.  Patient tentatively scheduled for additional surgery with vascular 10/17/2024. Patient has been following up with Dr. Corea in the [facility] clinic for routine foot care and debridement. Patient recently had left hallux amputation 7/8/24 which appears healed.  Podiatry was consulted due to right fifth digit eschar and discoloration.    PCP is Marissa Wang MD     ROS:   Review of Systems   Constitutional:  Positive for activity change, appetite change and fatigue.   Musculoskeletal:  Positive for arthralgias and gait problem.   Skin:  Positive for color change and wound.       Past Medical History   has a past medical history of Acute osteomyelitis, Bipolar 1 disorder ([facility]), Cellulitis, Chronic diabetic ulcer of left foot determined by examination ([facility]), Depression, Diabetic foot infection ([facility]), H. pylori infection, History of recent hospitalization, Hyperlipidemia, Hypertension, Ischemic cardiomyopathy, Multiple vessel coronary artery disease, Neuropathy, Noncompliance, NSTEMI (non-ST elevated myocardial infarction) ([facility]), Osteomyelitis of foot, left, acute, PAD (peripheral artery disease) ([facility]), Renal abscess, left, STEMI (ST elevation myocardial  Min:115 , Max:163   ; Diastolic (24hrs), Av, Min:57, Max:83      PULSE OXIMETRY RANGE: SpO2  Av.3 %  Min: 97 %  Max: 100 %  I&O:  I/O last 3 completed shifts:  In: 1206.1 [I.V.:1056.1; Blood:150]  Out:  [Urine:1925; Blood:50]    CBC:  Recent Labs     10/15/24  1517 10/16/24  0358 10/16/24  1109   WBC 12.3* 12.1*  --    HGB 13.3 11.3*  --    HCT 42.1 35.1*  --     348  --    CRP  --   --  58.5*        BMP:  Recent Labs     10/15/24  1517 10/16/24  0358    140   K 4.2 4.0    106   CO2 20 23   BUN 17 12   CREATININE 0.8 0.4*   GLUCOSE 325* 231*   CALCIUM 9.2 8.6        Coags:  Recent Labs     10/15/24  1517 10/16/24  0345 10/16/24  0948   APTT 26.8 25.5 30.1   INR 1.1  --   --        Lab Results   Component Value Date    LABA1C 13.4 2024     Lab Results   Component Value Date    SEDRATE 75 (H) 10/16/2024     Lab Results   Component Value Date    CRP 58.5 (H) 10/16/2024         Physical Exam:  General:  Alert and oriented x3. In no acute distress.      Lower Extremity Physical Exam: RLE Focused     Vascular: DP and PT pulses are not palpable. Audible, monophasic waveforms on doppler. CFT < 5 seconds to all digits.  Nonpitting edema, to the medial left foot at hallux amputation surgical site and right lateral foot. Hair growth is absent to the level of the digits, Bilateral.      Neuro: Saph/sural/SP/DP/plantar sensation diminished to light touch.     Musculoskeletal: EHL/FHL/GS/TA gross motor intact. Tenderness to palpation of right 5th metatarsal head.  Gross deformity is left hallux amputation.  No pain noted to palpation of the cuboid or the adjacent metatarsals or lisfranc joint complex.     Dermatologic:   Left foot: Incision site at site of hallux amputation is well coapted.  No drainage. No malodor. No erythema. There is nonpitting edema to the medial left foot.  Right foot: Ulceration to the lateral right foot at the fifth metatarsal head which appears as a stable eschar.

## 2024-10-16 NOTE — PROGRESS NOTES
Occupational Therapy    [facility]  Occupational Therapy Not Seen Note    DATE: 10/16/2024    NAME: Jane Saavedra  MRN: [id number]   : 1990      Patient not seen this date for Occupational Therapy due to:    Other: OT will await podiatry consult to eval R foot. Additionally, per vascular notes there is a plan to return to the OR tomorrow. OT will evaluate post op    Next Scheduled Treatment: post-op    Electronically signed by ANTONY Najera on 10/16/2024 at 1:15 PM

## 2024-10-16 NOTE — PROGRESS NOTES
Division of Vascular Surgery             Progress Note      Name: Jane Saavedra  MRN: [id number]         Overnight Events:     No acute events overnight postoperatively.      Subjective:     Patient reports she is not having any pain at this time.    She also states that she has not been able to move her left leg for several years due to neuropathy.  She states she has neuropathy bilaterally, but she lost a lot of motor function a long time ago.    Physical Exam:     Vitals:  BP (!) 163/78   Pulse 100   Temp 100 °F (37.8 °C)   Resp (!) 6   Wt 84.5 kg (186 lb 4.6 oz)   SpO2 98%   BMI 26.73 kg/m²     General appearance - alert, well appearing and in no acute distress  Mental status - oriented to person, place and time with normal affect  Head - normocephalic and atraumatic  Neck - supple, normal ROM  Chest - clear to auscultation, normal effort  Heart - normal rate, regular rhythm, no murmurs  Abdomen - soft, non-tender, non-distended,   Neurological - normal speech, no focal findings or movement disorder noted, cranial nerves II through XII grossly intact  Extremities -right groin soft, peripheral pulses palpable, no pedal edema or calf pain with palpation  Skin - no gross lesions, rashes, or induration noted  Foot Exam -warm, well-perfused bilaterally  Vascular Exam -Doppler signals to PT and AT bilaterally      Imaging:   No results found.        Assessment:     Patient is a 34-year-old female who is POD 1 status post thrombectomy of left popliteal occlusion and left fasciotomy.      Plan:     Please continue heparin drip without boluses.  Possible return to the OR on Thursday for closure of fasciotomy incisions.  Podiatry consult this a.m. for continued follow-up.  Patient may be transferred to stepdown.  Melatonin ordered as patient reports she cannot sleep without it.  Dressings to remain in place unless saturated at which point, nursing staff may change dressings.  Plan discussed with   [facility] - [facility] [place]  O: [phone]

## 2024-10-16 NOTE — PROGRESS NOTES
Patient arrived to room, transferred to new bed and vitals obtained. Patient tearful and requesting pain medications. Given per PRN order, patient denies any needs or concerns at this time.

## 2024-10-16 NOTE — OP NOTE
Operative Note      Patient: Jane Saavedra  YOB: 1990  MRN: [id number]    Date of Procedure: 10/15/2024    Pre-Op Diagnosis: Left popliteal artery occlusion with acute limb ischemia    Post-Op Diagnosis: Same       Procedures:  1) Ultrasound guided access of right common femoral artery  2) Angiogram of aorta and bilateral iliac arteries with catheter in aorta  3) Left leg angiogram with catheter in left external iliac, common femoral, SFA and popliteal arteries  4) IVUS of left common femoral, SFA and popliteal arteries (018)  5) Percutaneous mechanical thrombectomy of left popliteal artery ([place]umbra [place] 7)  6) Balloon angioplasty of left popliteal artery (2.5 mm x 220 mm standard PTA balloon; 4 mm x 150 mm and 220 mm standard PTA balloons; 4 mm x 150 mm drug coated balloon)  7) Deployment of left above knee popliteal artery stent (6 mm x 150 mm self expanding bare metal stent)  8) Left below knee popliteal artery cutdown with open thrombectomy of anterior tibial and posterior tibial arteries  9) Left leg 4 compartment fasciotomy    Surgeon(s):  Emperatriz Watson MD    Assistant: None    Anesthesia: General    Estimated Blood Loss (mL): 200 mL    Complications: None    Specimens: None    Implants:  Implant Name Type Inv. Item Serial No.  Lot No. LRB No. Used Action   STENT VASC SELF EXP 0.035IN [id number] EVERFLEX - [id number] Peripheral stents STENT VASC SELF EXP 0.035IN [id number] LP EVERFLEX  [employer]ASC[employer]-WD [id number] Left 1 Implanted         Drains:   Urinary Catheter 10/15/24 Hall-Temperature (Active)       Findings:  Infection Present At Time Of Surgery (PATOS) (choose all levels that have infection present):  No infection present  Other Findings: Patient presented with 3 days of left leg pain and motor loss of left foot. Found to have popliteal artery occlusion on duplex. Angiogram revealed patent aorta.  Patent bilateral common and external iliac  level of distal aorta and angiograms performed.  I then went up and over to the left external iliac artery with the catheter and performed a left leg angiogram.  Weight-based heparin bolus was given after initial ACT revealed subtherapeutic levels despite patient being on heparin drip.  The heparin was rebolus throughout the case with periodic ACT levels.  Near the end of the case patient had difficulty going above 250 ACT despite heparin bolus.    A 7 Greek by 45 cm sheath was then placed at the level of the left common femoral artery after the previous 5 Greek sheath was removed.  I was able to cross the popliteal occlusion with a angled sveta cross catheter and V18 wire. Selective imaging was performed at level of SFA and distal popliteal arteries.  I then exchanged to an 014 wire and performed balloon angioplasty of the occlusion with a 2.5 mm x 220 mm standard PTA balloon.  This created a fine channel previous occlusion.  I then performed percutaneous mechanical thrombectomy of the left popliteal artery with penumbra [place] 7 device.  I struggled because the artery was small caliber and occlusive at times.  Minimal acute thrombus was removed.  I then performed balloon angioplasty of the popliteal artery with a 4 mm x 150 mm drug-coated balloon.  There was sluggish flow through the SFA and the popliteal artery had areas of stenosis concern for dissection.  I then performed an 018 IVUS of the left common femoral, SFA and popliteal arteries.  There were multiple dissection seen in the above-knee popliteal artery.  I then decided to stent this area of severe stenosis of the above-knee popliteal artery with a 6 mm x 150 mm standard PTA balloon.  I postdilated the stent with a 4 mm x 150 mm standard PTA balloon.  Completion angiogram showed flow through the left common femoral and SFA into the popliteal stent but then sluggish flow distally through the tibials.  I did give nitro as there was tibial spasm present.  At

## 2024-10-16 NOTE — PLAN OF CARE
After having thrombectomy and left lower fasciotomy patient was transferred straight to the surgical ICU.  Spoke with vascular surgery as well as family medicine attending.  As patient is currently in the surgical ICU, vascular to be primary.  Family medicine cannot take patient as primary or as a consult at this time.  Once she is out of the surgical ICU, family medicine can take over.      Joseph Stone , DO  Family Medicine Resident PGY3

## 2024-10-16 NOTE — PROGRESS NOTES
Physical Therapy        Physical Therapy Cancel Note      DATE: 10/16/2024    NAME: Jane Saavedra  MRN: [id number]   : 1990      Patient not seen this date for Physical Therapy due to:    Other: pending R foot xray, podiatry consult. Clarifying POC and WB status. Will plan to check pm as able or 10/17.        Electronically signed by Stevie Gutierrez PT on 10/16/2024 at 12:22 PM

## 2024-10-16 NOTE — PLAN OF CARE
Problem: Chronic Conditions and Co-morbidities  Goal: Patient's chronic conditions and co-morbidity symptoms are monitored and maintained or improved  10/16/2024 0839 by Gene Ya RN  Outcome: Progressing  10/16/2024 0032 by Tato Salguero RN  Outcome: Progressing     Problem: Discharge Planning  Goal: Discharge to home or other facility with appropriate resources  10/16/2024 0839 by Gene Ya RN  Outcome: Progressing  10/16/2024 0032 by Tato Salguero RN  Outcome: Progressing     Problem: Pain  Goal: Verbalizes/displays adequate comfort level or baseline comfort level  10/16/2024 0839 by Gene Ya RN  Outcome: Progressing  10/16/2024 0032 by Tato Salguero RN  Outcome: Progressing     Problem: Safety - Adult  Goal: Free from fall injury  Outcome: Progressing     Problem: Skin/Tissue Integrity  Goal: Absence of new skin breakdown  Description: 1.  Monitor for areas of redness and/or skin breakdown  2.  Assess vascular access sites hourly  3.  Every 4-6 hours minimum:  Change oxygen saturation probe site  4.  Every 4-6 hours:  If on nasal continuous positive airway pressure, respiratory therapy assess nares and determine need for appliance change or resting period.  Outcome: Progressing

## 2024-10-16 NOTE — ANESTHESIA PRE PROCEDURE
AM       CMP:   Lab Results   Component Value Date/Time     10/16/2024 03:58 AM    K 4.0 10/16/2024 03:58 AM    K 4.1 08/16/2023 04:40 AM     10/16/2024 03:58 AM    CO2 23 10/16/2024 03:58 AM    BUN 12 10/16/2024 03:58 AM    CREATININE 0.4 10/16/2024 03:58 AM    GFRAA >60 05/14/2021 10:34 AM    AGRATIO 0.7 08/16/2023 04:40 AM    LABGLOM >90 10/16/2024 03:58 AM    LABGLOM >60 08/16/2023 04:40 AM    GLUCOSE 231 10/16/2024 03:58 AM    CALCIUM 8.6 10/16/2024 03:58 AM    BILITOT 0.3 09/19/2024 01:09 PM    ALKPHOS 77 09/19/2024 01:09 PM    AST 22 09/19/2024 01:09 PM    ALT 64 09/19/2024 01:09 PM       POC Tests:   Recent Labs     10/16/24  0942   POCGLU 201*       Coags:   Lab Results   Component Value Date/Time    PROTIME 14.0 10/15/2024 03:17 PM    INR 1.1 10/15/2024 03:17 PM    APTT 30.1 10/16/2024 09:48 AM    APTT 27.5 08/05/2023 05:02 AM       HCG (If Applicable):   Lab Results   Component Value Date    PREGTESTUR Negative 08/10/2023    PREGSERUM NEGATIVE 08/30/2024        ABGs:   Lab Results   Component Value Date/Time    PHART 7.387 07/10/2023 03:23 PM    PO2ART 143.6 07/10/2023 03:23 PM    MPY1GKA 42.9 07/10/2023 03:23 PM    NNG9BYS 25.8 07/10/2023 03:23 PM    BEART 1 07/10/2023 03:23 PM    A0IPKHVC 99 07/10/2023 03:23 PM        Type & Screen (If Applicable):  No results found for: \"LABABO\"    Drug/Infectious Status (If Applicable):  Lab Results   Component Value Date/Time    HIV Non-Reactive 08/04/2023 05:30 PM    HEPCAB NONREACTIVE 05/14/2021 10:34 AM       COVID-19 Screening (If Applicable): No results found for: \"COVID19\"        Anesthesia Evaluation  Patient summary reviewed and Nursing notes reviewed   no history of anesthetic complications:   Airway: Mallampati: I  TM distance: >3 FB   Neck ROM: full  Mouth opening: > = 3 FB   Dental:    (+) upper dentures      Pulmonary:normal exam                               Cardiovascular:    (+) hypertension:, angina:, past MI:, CAD:, CABG/stent:, CHF:,

## 2024-10-16 NOTE — CARE COORDINATION
Case Management Assessment  Initial Evaluation    Date/Time of Evaluation: 10/16/2024 10:39 AM  Assessment Completed by: Taryn Blanchard RN    If patient is discharged prior to next notation, then this note serves as note for discharge by case management.    Patient Name: Jane Saavedra                   YOB: 1990  Diagnosis: Wound infection [T14.8XXA, L08.9]  Left leg pain [M79.605]  Lower limb ischemia [I99.8]  Popliteal artery occlusion, left (HCC) [I70.202]  Acute lower limb ischemia [I99.8]                   Date / Time: 10/15/2024  2:57 PM    Patient Admission Status: Inpatient   Readmission Risk (Low < 19, Mod (19-27), High > 27): Readmission Risk Score: 23.2    Current PCP: Marissa Wang MD  PCP verified by CM? (P) Yes (Marissa Wang MD)    Chart Reviewed: Yes      History Provided by: (P) Patient  Patient Orientation: (P) Alert and Oriented, Person, Place, Situation, Self    Patient Cognition: (P) Alert    Hospitalization in the last 30 days (Readmission):  No    If yes, Readmission Assessment in CM Navigator will be completed.    Advance Directives:      Code Status: Full Code   Patient's Primary Decision Maker is: (P) Legal Next of Kin (not , no kids, parents , has siblings- does not talk to)    Primary Decision Maker: Don't,Contact    Discharge Planning:    Patient lives with: (P) Spouse/Significant Other Type of Home: (P) Other (Comment) (hotel)  Primary Care Giver: (P) Self  Patient Support Systems include: (P) Spouse/Significant Other, Friends/Neighbors   Current Financial resources: (P) Medicaid  Current community resources: (P) None  Current services prior to admission: (P) None            Current DME:              Type of Home Care services:  (P) None    ADLS  Prior functional level: (P) Independent in ADLs/IADLs  Current functional level: (P) Assistance with the following:, Bathing, Dressing, Toileting, Mobility    PT AM-PAC:     OT AM-PAC:       Family  can provide assistance at DC: (P) Other (comment) (filayne can help)  Would you like Case Management to discuss the discharge plan with any other family members/significant others, and if so, who? (P) No  Plans to Return to Present Housing: (P) Yes  Other Identified Issues/Barriers to RETURNING to current housing:   Potential Assistance needed at discharge: (P) Durable Medical Equipment            Potential DME: (P) Walker  Patient expects to discharge to: (P) Other (comment) ([facility])  Plan for transportation at discharge: (P) Self    Financial    Payor: [employer] [place] / Plan: [employer] [place] / Product Type: *No Product type* /     Does insurance require precert for SNF: Yes    Potential assistance Purchasing Medications: (P) No  Meds-to-Beds request: Yes      [id number] - [place], [place] - [address] -  [phone] - F [phone]  [address] [place] [id number]  Phone: [phone] Fax: [phone]    [id number] - [address] [phone] - F [phone]  [address] [place] [phone]  Phone: [phone] Fax: [phone]    [address] [phone] - F [phone]  [address] [place] [address]  Phone: [phone] Fax: [phone]      Notes:    Factors facilitating achievement of predicted outcomes: Friend support    Barriers to discharge: No family support, Lower extremity weakness, and Wound Care    Additional Case Management Notes: Patient says she lives with missael in [facility] in [facility]. She is in process of moving from [place] to [place]. Finance can provide transportation and support. She can do dressing changes if wound care needed.     The Plan for Transition of Care is related to the following treatment goals of Wound infection [T14.8XXA, L08.9]  Left leg pain [M79.605]  Lower limb ischemia [I99.8]  Popliteal artery occlusion, left (HCC)

## 2024-10-16 NOTE — DISCHARGE INSTRUCTIONS
Podiatry:  - Please make a follow-up visit with Dr. Corea outpatient 2 weeks after discharge  - Please keep your lower extremity wounds dry clean and covered at all times until follow-up visit  - Weightbearing status: weightbear as tolerated in a surgical shoe  - Please take all prescribed medications as instructed  - Please restart all home medications as prescribed  - Please return to the hospital if any of the following local signs of infection arise: Increased/streaking redness, pain, swelling, drainage or malodor  - Please return to the hospital for any the following systemic signs of infection arise: Nausea, vomiting, fever, chills, diarrhea, shortness of breath or chest pain

## 2024-10-17 ENCOUNTER — ANESTHESIA (OUTPATIENT)
Dept: OPERATING ROOM | Age: 34
End: 2024-10-17
Payer: COMMERCIAL

## 2024-10-17 LAB
ANION GAP SERPL CALCULATED.3IONS-SCNC: 7 MMOL/L (ref 9–16)
BASOPHILS # BLD: 0.04 K/UL (ref 0–0.2)
BASOPHILS NFR BLD: 0 % (ref 0–2)
BUN SERPL-MCNC: 9 MG/DL (ref 6–20)
CALCIUM SERPL-MCNC: 8.4 MG/DL (ref 8.6–10.4)
CHLORIDE SERPL-SCNC: 102 MMOL/L (ref 98–107)
CO2 SERPL-SCNC: 24 MMOL/L (ref 20–31)
CREAT SERPL-MCNC: 0.4 MG/DL (ref 0.5–0.9)
EOSINOPHIL # BLD: 0.16 K/UL (ref 0–0.44)
EOSINOPHILS RELATIVE PERCENT: 1 % (ref 1–4)
ERYTHROCYTE [DISTWIDTH] IN BLOOD BY AUTOMATED COUNT: 14 % (ref 11.8–14.4)
GFR, ESTIMATED: >90 ML/MIN/1.73M2
GLUCOSE BLD-MCNC: 198 MG/DL (ref 65–105)
GLUCOSE BLD-MCNC: 214 MG/DL (ref 65–105)
GLUCOSE BLD-MCNC: 226 MG/DL (ref 65–105)
GLUCOSE BLD-MCNC: 244 MG/DL (ref 65–105)
GLUCOSE SERPL-MCNC: 274 MG/DL (ref 74–99)
HCT VFR BLD AUTO: 33.1 % (ref 36.3–47.1)
HGB BLD-MCNC: 10.4 G/DL (ref 11.9–15.1)
IMM GRANULOCYTES # BLD AUTO: 0.05 K/UL (ref 0–0.3)
IMM GRANULOCYTES NFR BLD: 0 %
LYMPHOCYTES NFR BLD: 2.74 K/UL (ref 1.1–3.7)
LYMPHOCYTES RELATIVE PERCENT: 24 % (ref 24–43)
MCH RBC QN AUTO: 29.5 PG (ref 25.2–33.5)
MCHC RBC AUTO-ENTMCNC: 31.4 G/DL (ref 28.4–34.8)
MCV RBC AUTO: 93.8 FL (ref 82.6–102.9)
MONOCYTES NFR BLD: 1.03 K/UL (ref 0.1–1.2)
MONOCYTES NFR BLD: 9 % (ref 3–12)
NEUTROPHILS NFR BLD: 66 % (ref 36–65)
NEUTS SEG NFR BLD: 7.24 K/UL (ref 1.5–8.1)
NRBC BLD-RTO: 0 PER 100 WBC
PARTIAL THROMBOPLASTIN TIME: 50.8 SEC (ref 23–36.5)
PARTIAL THROMBOPLASTIN TIME: 69.4 SEC (ref 23–36.5)
PARTIAL THROMBOPLASTIN TIME: 73.8 SEC (ref 23–36.5)
PLATELET # BLD AUTO: 301 K/UL (ref 138–453)
PMV BLD AUTO: 9.8 FL (ref 8.1–13.5)
POTASSIUM SERPL-SCNC: 3.8 MMOL/L (ref 3.7–5.3)
RBC # BLD AUTO: 3.53 M/UL (ref 3.95–5.11)
SODIUM SERPL-SCNC: 133 MMOL/L (ref 136–145)
WBC OTHER # BLD: 11.3 K/UL (ref 3.5–11.3)

## 2024-10-17 PROCEDURE — 36415 COLL VENOUS BLD VENIPUNCTURE: CPT

## 2024-10-17 PROCEDURE — 2580000003 HC RX 258: Performed by: ANESTHESIOLOGY

## 2024-10-17 PROCEDURE — 13160 SEC CLSR SURG WND/DEHSN XTN: CPT | Performed by: STUDENT IN AN ORGANIZED HEALTH CARE EDUCATION/TRAINING PROGRAM

## 2024-10-17 PROCEDURE — 3700000000 HC ANESTHESIA ATTENDED CARE: Performed by: STUDENT IN AN ORGANIZED HEALTH CARE EDUCATION/TRAINING PROGRAM

## 2024-10-17 PROCEDURE — 3600000004 HC SURGERY LEVEL 4 BASE: Performed by: STUDENT IN AN ORGANIZED HEALTH CARE EDUCATION/TRAINING PROGRAM

## 2024-10-17 PROCEDURE — 27880 AMPUTATION OF LOWER LEG: CPT | Performed by: STUDENT IN AN ORGANIZED HEALTH CARE EDUCATION/TRAINING PROGRAM

## 2024-10-17 PROCEDURE — 6370000000 HC RX 637 (ALT 250 FOR IP)

## 2024-10-17 PROCEDURE — 94761 N-INVAS EAR/PLS OXIMETRY MLT: CPT

## 2024-10-17 PROCEDURE — 7100000010 HC PHASE II RECOVERY - FIRST 15 MIN: Performed by: STUDENT IN AN ORGANIZED HEALTH CARE EDUCATION/TRAINING PROGRAM

## 2024-10-17 PROCEDURE — 2709999900 HC NON-CHARGEABLE SUPPLY: Performed by: STUDENT IN AN ORGANIZED HEALTH CARE EDUCATION/TRAINING PROGRAM

## 2024-10-17 PROCEDURE — 2580000003 HC RX 258

## 2024-10-17 PROCEDURE — 6360000002 HC RX W HCPCS

## 2024-10-17 PROCEDURE — 80048 BASIC METABOLIC PNL TOTAL CA: CPT

## 2024-10-17 PROCEDURE — 7100000000 HC PACU RECOVERY - FIRST 15 MIN: Performed by: STUDENT IN AN ORGANIZED HEALTH CARE EDUCATION/TRAINING PROGRAM

## 2024-10-17 PROCEDURE — 82947 ASSAY GLUCOSE BLOOD QUANT: CPT

## 2024-10-17 PROCEDURE — 3700000001 HC ADD 15 MINUTES (ANESTHESIA): Performed by: STUDENT IN AN ORGANIZED HEALTH CARE EDUCATION/TRAINING PROGRAM

## 2024-10-17 PROCEDURE — 85730 THROMBOPLASTIN TIME PARTIAL: CPT

## 2024-10-17 PROCEDURE — 0Y6J0Z1 DETACHMENT AT LEFT LOWER LEG, HIGH, OPEN APPROACH: ICD-10-PCS | Performed by: STUDENT IN AN ORGANIZED HEALTH CARE EDUCATION/TRAINING PROGRAM

## 2024-10-17 PROCEDURE — 2500000003 HC RX 250 WO HCPCS

## 2024-10-17 PROCEDURE — 85025 COMPLETE CBC W/AUTO DIFF WBC: CPT

## 2024-10-17 PROCEDURE — 3600000014 HC SURGERY LEVEL 4 ADDTL 15MIN: Performed by: STUDENT IN AN ORGANIZED HEALTH CARE EDUCATION/TRAINING PROGRAM

## 2024-10-17 PROCEDURE — 7100000001 HC PACU RECOVERY - ADDTL 15 MIN: Performed by: STUDENT IN AN ORGANIZED HEALTH CARE EDUCATION/TRAINING PROGRAM

## 2024-10-17 PROCEDURE — 88307 TISSUE EXAM BY PATHOLOGIST: CPT

## 2024-10-17 PROCEDURE — 88311 DECALCIFY TISSUE: CPT

## 2024-10-17 PROCEDURE — 2580000003 HC RX 258: Performed by: STUDENT IN AN ORGANIZED HEALTH CARE EDUCATION/TRAINING PROGRAM

## 2024-10-17 PROCEDURE — 2060000000 HC ICU INTERMEDIATE R&B

## 2024-10-17 RX ORDER — PROPOFOL 10 MG/ML
INJECTION, EMULSION INTRAVENOUS
Status: DISCONTINUED | OUTPATIENT
Start: 2024-10-17 | End: 2024-10-17 | Stop reason: SDUPTHER

## 2024-10-17 RX ORDER — ONDANSETRON 2 MG/ML
INJECTION INTRAMUSCULAR; INTRAVENOUS
Status: DISCONTINUED | OUTPATIENT
Start: 2024-10-17 | End: 2024-10-17 | Stop reason: SDUPTHER

## 2024-10-17 RX ORDER — GLUCOSAMINE HCL/CHONDROITIN SU 500-400 MG
1 CAPSULE ORAL PRN
Status: DISCONTINUED | OUTPATIENT
Start: 2024-10-17 | End: 2024-10-25 | Stop reason: HOSPADM

## 2024-10-17 RX ORDER — CEFAZOLIN SODIUM 1 G/3ML
INJECTION, POWDER, FOR SOLUTION INTRAMUSCULAR; INTRAVENOUS
Status: DISCONTINUED | OUTPATIENT
Start: 2024-10-17 | End: 2024-10-17 | Stop reason: SDUPTHER

## 2024-10-17 RX ORDER — METHOCARBAMOL 750 MG/1
750 TABLET, FILM COATED ORAL EVERY 6 HOURS
Status: DISCONTINUED | OUTPATIENT
Start: 2024-10-17 | End: 2024-10-25 | Stop reason: HOSPADM

## 2024-10-17 RX ORDER — SODIUM CHLORIDE 0.9 % (FLUSH) 0.9 %
5-40 SYRINGE (ML) INJECTION EVERY 12 HOURS SCHEDULED
Status: DISCONTINUED | OUTPATIENT
Start: 2024-10-17 | End: 2024-10-17 | Stop reason: HOSPADM

## 2024-10-17 RX ORDER — FENTANYL CITRATE 50 UG/ML
50 INJECTION, SOLUTION INTRAMUSCULAR; INTRAVENOUS EVERY 5 MIN PRN
Status: DISCONTINUED | OUTPATIENT
Start: 2024-10-17 | End: 2024-10-17 | Stop reason: HOSPADM

## 2024-10-17 RX ORDER — FENTANYL CITRATE 50 UG/ML
INJECTION, SOLUTION INTRAMUSCULAR; INTRAVENOUS
Status: DISCONTINUED | OUTPATIENT
Start: 2024-10-17 | End: 2024-10-17 | Stop reason: SDUPTHER

## 2024-10-17 RX ORDER — FENTANYL CITRATE 50 UG/ML
25 INJECTION, SOLUTION INTRAMUSCULAR; INTRAVENOUS
Status: DISCONTINUED | OUTPATIENT
Start: 2024-10-17 | End: 2024-10-17

## 2024-10-17 RX ORDER — SODIUM CHLORIDE 0.9 % (FLUSH) 0.9 %
5-40 SYRINGE (ML) INJECTION PRN
Status: DISCONTINUED | OUTPATIENT
Start: 2024-10-17 | End: 2024-10-17 | Stop reason: HOSPADM

## 2024-10-17 RX ORDER — DEXAMETHASONE SODIUM PHOSPHATE 10 MG/ML
INJECTION, SOLUTION INTRAMUSCULAR; INTRAVENOUS
Status: DISCONTINUED | OUTPATIENT
Start: 2024-10-17 | End: 2024-10-17 | Stop reason: SDUPTHER

## 2024-10-17 RX ORDER — FENTANYL CITRATE 50 UG/ML
50 INJECTION, SOLUTION INTRAMUSCULAR; INTRAVENOUS
Status: DISCONTINUED | OUTPATIENT
Start: 2024-10-17 | End: 2024-10-19

## 2024-10-17 RX ORDER — NALOXONE HYDROCHLORIDE 0.4 MG/ML
INJECTION, SOLUTION INTRAMUSCULAR; INTRAVENOUS; SUBCUTANEOUS PRN
Status: DISCONTINUED | OUTPATIENT
Start: 2024-10-17 | End: 2024-10-17 | Stop reason: HOSPADM

## 2024-10-17 RX ORDER — ROCURONIUM BROMIDE 10 MG/ML
INJECTION, SOLUTION INTRAVENOUS
Status: DISCONTINUED | OUTPATIENT
Start: 2024-10-17 | End: 2024-10-17 | Stop reason: SDUPTHER

## 2024-10-17 RX ORDER — SODIUM CHLORIDE 9 MG/ML
INJECTION, SOLUTION INTRAVENOUS PRN
Status: DISCONTINUED | OUTPATIENT
Start: 2024-10-17 | End: 2024-10-17 | Stop reason: HOSPADM

## 2024-10-17 RX ORDER — LIDOCAINE HYDROCHLORIDE 10 MG/ML
INJECTION, SOLUTION EPIDURAL; INFILTRATION; INTRACAUDAL; PERINEURAL
Status: DISCONTINUED | OUTPATIENT
Start: 2024-10-17 | End: 2024-10-17 | Stop reason: SDUPTHER

## 2024-10-17 RX ORDER — ONDANSETRON 2 MG/ML
4 INJECTION INTRAMUSCULAR; INTRAVENOUS
Status: DISCONTINUED | OUTPATIENT
Start: 2024-10-17 | End: 2024-10-17 | Stop reason: HOSPADM

## 2024-10-17 RX ORDER — FENTANYL CITRATE 50 UG/ML
25 INJECTION, SOLUTION INTRAMUSCULAR; INTRAVENOUS EVERY 5 MIN PRN
Status: DISCONTINUED | OUTPATIENT
Start: 2024-10-17 | End: 2024-10-17 | Stop reason: HOSPADM

## 2024-10-17 RX ORDER — SODIUM CHLORIDE, SODIUM LACTATE, POTASSIUM CHLORIDE, CALCIUM CHLORIDE 600; 310; 30; 20 MG/100ML; MG/100ML; MG/100ML; MG/100ML
INJECTION, SOLUTION INTRAVENOUS CONTINUOUS
Status: DISCONTINUED | OUTPATIENT
Start: 2024-10-17 | End: 2024-10-17

## 2024-10-17 RX ORDER — MAGNESIUM HYDROXIDE 1200 MG/15ML
LIQUID ORAL CONTINUOUS PRN
Status: COMPLETED | OUTPATIENT
Start: 2024-10-17 | End: 2024-10-17

## 2024-10-17 RX ADMIN — GABAPENTIN 300 MG: 300 CAPSULE ORAL at 09:43

## 2024-10-17 RX ADMIN — HYDROMORPHONE HYDROCHLORIDE 0.5 MG: 1 INJECTION, SOLUTION INTRAMUSCULAR; INTRAVENOUS; SUBCUTANEOUS at 01:07

## 2024-10-17 RX ADMIN — GABAPENTIN 300 MG: 300 CAPSULE ORAL at 20:46

## 2024-10-17 RX ADMIN — FENTANYL CITRATE 100 MCG: 50 INJECTION, SOLUTION INTRAMUSCULAR; INTRAVENOUS at 13:25

## 2024-10-17 RX ADMIN — HYDROMORPHONE HYDROCHLORIDE 0.5 MG: 1 INJECTION, SOLUTION INTRAMUSCULAR; INTRAVENOUS; SUBCUTANEOUS at 09:46

## 2024-10-17 RX ADMIN — HYDROMORPHONE HYDROCHLORIDE 0.5 MG: 1 INJECTION, SOLUTION INTRAMUSCULAR; INTRAVENOUS; SUBCUTANEOUS at 05:46

## 2024-10-17 RX ADMIN — ISOSORBIDE MONONITRATE 30 MG: 30 TABLET, EXTENDED RELEASE ORAL at 09:45

## 2024-10-17 RX ADMIN — HYDROMORPHONE HYDROCHLORIDE 0.5 MG: 1 INJECTION, SOLUTION INTRAMUSCULAR; INTRAVENOUS; SUBCUTANEOUS at 16:24

## 2024-10-17 RX ADMIN — FENTANYL CITRATE 50 MCG: 50 INJECTION, SOLUTION INTRAMUSCULAR; INTRAVENOUS at 12:37

## 2024-10-17 RX ADMIN — LOSARTAN POTASSIUM 25 MG: 50 TABLET, FILM COATED ORAL at 09:45

## 2024-10-17 RX ADMIN — ACETAMINOPHEN 1000 MG: 500 TABLET ORAL at 05:46

## 2024-10-17 RX ADMIN — LIDOCAINE HYDROCHLORIDE 50 MG: 10 INJECTION, SOLUTION EPIDURAL; INFILTRATION; INTRACAUDAL; PERINEURAL at 12:37

## 2024-10-17 RX ADMIN — OXYCODONE 10 MG: 5 TABLET ORAL at 02:32

## 2024-10-17 RX ADMIN — FENTANYL CITRATE 50 MCG: 50 INJECTION, SOLUTION INTRAMUSCULAR; INTRAVENOUS at 14:36

## 2024-10-17 RX ADMIN — METOPROLOL SUCCINATE 50 MG: 25 TABLET, EXTENDED RELEASE ORAL at 09:45

## 2024-10-17 RX ADMIN — SODIUM CHLORIDE, PRESERVATIVE FREE 10 ML: 5 INJECTION INTRAVENOUS at 09:47

## 2024-10-17 RX ADMIN — ACETAMINOPHEN 1000 MG: 500 TABLET ORAL at 17:50

## 2024-10-17 RX ADMIN — HEPARIN SODIUM 26 UNITS/KG/HR: 10000 INJECTION, SOLUTION INTRAVENOUS at 05:56

## 2024-10-17 RX ADMIN — OXYCODONE 10 MG: 5 TABLET ORAL at 17:49

## 2024-10-17 RX ADMIN — INSULIN LISPRO 6 UNITS: 100 INJECTION, SOLUTION INTRAVENOUS; SUBCUTANEOUS at 20:47

## 2024-10-17 RX ADMIN — ONDANSETRON 4 MG: 2 INJECTION INTRAMUSCULAR; INTRAVENOUS at 14:14

## 2024-10-17 RX ADMIN — ROCURONIUM BROMIDE 20 MG: 10 INJECTION, SOLUTION INTRAVENOUS at 12:58

## 2024-10-17 RX ADMIN — SODIUM CHLORIDE, PRESERVATIVE FREE 10 ML: 5 INJECTION INTRAVENOUS at 20:46

## 2024-10-17 RX ADMIN — Medication 5 MG: at 20:58

## 2024-10-17 RX ADMIN — HEPARIN SODIUM 6900 UNITS: 1000 INJECTION INTRAVENOUS; SUBCUTANEOUS at 06:22

## 2024-10-17 RX ADMIN — OXYCODONE 10 MG: 5 TABLET ORAL at 22:30

## 2024-10-17 RX ADMIN — ROCURONIUM BROMIDE 50 MG: 10 INJECTION, SOLUTION INTRAVENOUS at 12:37

## 2024-10-17 RX ADMIN — METHOCARBAMOL 750 MG: 750 TABLET ORAL at 19:20

## 2024-10-17 RX ADMIN — INSULIN LISPRO 6 UNITS: 100 INJECTION, SOLUTION INTRAVENOUS; SUBCUTANEOUS at 17:56

## 2024-10-17 RX ADMIN — FENTANYL CITRATE 50 MCG: 50 INJECTION, SOLUTION INTRAMUSCULAR; INTRAVENOUS at 13:21

## 2024-10-17 RX ADMIN — SUGAMMADEX 200 MG: 100 INJECTION, SOLUTION INTRAVENOUS at 14:23

## 2024-10-17 RX ADMIN — SODIUM CHLORIDE, PRESERVATIVE FREE 10 ML: 5 INJECTION INTRAVENOUS at 09:45

## 2024-10-17 RX ADMIN — FENTANYL CITRATE 50 MCG: 50 INJECTION, SOLUTION INTRAMUSCULAR; INTRAVENOUS at 14:34

## 2024-10-17 RX ADMIN — EZETIMIBE 10 MG: 10 TABLET ORAL at 09:45

## 2024-10-17 RX ADMIN — FENTANYL CITRATE 50 MCG: 50 INJECTION, SOLUTION INTRAMUSCULAR; INTRAVENOUS at 20:58

## 2024-10-17 RX ADMIN — FENTANYL CITRATE 50 MCG: 50 INJECTION, SOLUTION INTRAMUSCULAR; INTRAVENOUS at 13:42

## 2024-10-17 RX ADMIN — CEFAZOLIN 2 G: 1 INJECTION, POWDER, FOR SOLUTION INTRAMUSCULAR; INTRAVENOUS at 12:55

## 2024-10-17 RX ADMIN — ATORVASTATIN CALCIUM 80 MG: 80 TABLET, FILM COATED ORAL at 20:46

## 2024-10-17 RX ADMIN — ACETAMINOPHEN 1000 MG: 500 TABLET ORAL at 22:30

## 2024-10-17 RX ADMIN — PROPOFOL 150 MG: 10 INJECTION, EMULSION INTRAVENOUS at 12:37

## 2024-10-17 RX ADMIN — SODIUM CHLORIDE, POTASSIUM CHLORIDE, SODIUM LACTATE AND CALCIUM CHLORIDE: 600; 310; 30; 20 INJECTION, SOLUTION INTRAVENOUS at 10:16

## 2024-10-17 RX ADMIN — DEXAMETHASONE SODIUM PHOSPHATE 10 MG: 10 INJECTION, SOLUTION INTRAMUSCULAR; INTRAVENOUS at 12:46

## 2024-10-17 RX ADMIN — INSULIN GLARGINE 28 UNITS: 100 INJECTION, SOLUTION SUBCUTANEOUS at 20:47

## 2024-10-17 RX ADMIN — FENTANYL CITRATE 50 MCG: 50 INJECTION, SOLUTION INTRAMUSCULAR; INTRAVENOUS at 13:49

## 2024-10-17 RX ADMIN — METHOCARBAMOL 750 MG: 750 TABLET ORAL at 23:59

## 2024-10-17 ASSESSMENT — PAIN DESCRIPTION - LOCATION
LOCATION: LEG

## 2024-10-17 ASSESSMENT — PAIN DESCRIPTION - ORIENTATION
ORIENTATION: LEFT

## 2024-10-17 ASSESSMENT — PAIN SCALES - GENERAL
PAINLEVEL_OUTOF10: 10
PAINLEVEL_OUTOF10: 0
PAINLEVEL_OUTOF10: 10
PAINLEVEL_OUTOF10: 9
PAINLEVEL_OUTOF10: 0
PAINLEVEL_OUTOF10: 10
PAINLEVEL_OUTOF10: 7

## 2024-10-17 ASSESSMENT — PAIN DESCRIPTION - DESCRIPTORS
DESCRIPTORS: SHARP;DISCOMFORT;ACHING
DESCRIPTORS: SHARP;DISCOMFORT;ACHING
DESCRIPTORS: SHARP
DESCRIPTORS: SHARP;DISCOMFORT;ACHING
DESCRIPTORS: ACHING;SHARP;DISCOMFORT
DESCRIPTORS: DISCOMFORT;ACHING;SHARP
DESCRIPTORS: SHARP;DISCOMFORT;ACHING
DESCRIPTORS: ACHING

## 2024-10-17 ASSESSMENT — PAIN SCALES - WONG BAKER
WONGBAKER_NUMERICALRESPONSE: NO HURT
WONGBAKER_NUMERICALRESPONSE: HURTS WORST

## 2024-10-17 NOTE — PROGRESS NOTES
[facility]  Occupational Therapy Not Seen Note    DATE: 10/17/2024    NAME: Jane Saavedra  MRN: [id number]   : 1990      Patient not seen this date for Occupational Therapy due to:    Surgery/Procedure: Plan for BKA today per chart     Next Scheduled Treatment: Ck 10/18     Electronically signed by Amy Quintero OT on 10/17/2024 at 7:31 AM

## 2024-10-17 NOTE — PROGRESS NOTES
Division of Vascular Surgery             Progress Note      Name: Jane Saavedra  MRN: [id number]         Overnight Events:     No acute events overnight postoperatively.      Subjective:     Patient reports she is not having any pain at this time.    She also states that she has not been able to move her left leg for several years due to neuropathy.  She states she has neuropathy bilaterally, but she lost a lot of motor function a long time ago.    She discussed amputation with Dr. Fink yesterday.  She still wants to proceed with amputation of LLE today    Physical Exam:     Vitals:  /77   Pulse 81   Temp 97.9 °F (36.6 °C) (Oral)   Resp 19   Wt 84.5 kg (186 lb 4.6 oz)   SpO2 96%   BMI 26.73 kg/m²     General appearance - alert, well appearing and in no acute distress  Mental status - oriented to person, place and time with normal affect  Head - normocephalic and atraumatic  Neck - supple, normal ROM  Chest - clear to auscultation, normal effort  Heart - normal rate, regular rhythm, no murmurs  Abdomen - soft, non-tender, non-distended,   Neurological - normal speech, no focal findings or movement disorder noted, cranial nerves II through XII grossly intact  Extremities -right groin soft, peripheral pulses palpable, no pedal edema or calf pain with palpation  Skin - no gross lesions, rashes, or induration noted  Foot Exam -warm, well-perfused bilaterally  Vascular Exam -Doppler signals to PT and AT bilaterally      Imaging:   No results found.        Assessment:     Patient is a 34-year-old female who is POD 2 status post thrombectomy of left popliteal occlusion and left fasciotomy.      Plan:     Please continue heparin drip without boluses until noon, at which point heparin drip should be stopped for operative plans  OR today for left lower extremity below-knee amputation, possible above-knee amputation.  Informed consent obtained after thorough discussion of risks, benefits, and

## 2024-10-17 NOTE — PROGRESS NOTES
Physical Therapy        Physical Therapy Cancel Note      DATE: 10/17/2024    NAME: Jane Saavedra  MRN: [id number]   : 1990      Patient not seen this date for Physical Therapy due to:    Surgery/Procedure: OR today for left lower extremity below-knee amputation, possible above-knee amputation      Electronically signed by Stevie Gutierrez PT on 10/17/2024 at 12:01 PM

## 2024-10-17 NOTE — PLAN OF CARE
Problem: Chronic Conditions and Co-morbidities  Goal: Patient's chronic conditions and co-morbidity symptoms are monitored and maintained or improved  10/16/2024 2131 by Gita Corrales RN  Outcome: Progressing  10/16/2024 0839 by Gene Ya RN  Outcome: Progressing     Problem: Discharge Planning  Goal: Discharge to home or other facility with appropriate resources  10/16/2024 2131 by Gita Corrales RN  Outcome: Progressing  10/16/2024 0839 by Gene Ya RN  Outcome: Progressing     Problem: Pain  Goal: Verbalizes/displays adequate comfort level or baseline comfort level  10/16/2024 2131 by Gita Corrales RN  Outcome: Progressing  10/16/2024 0839 by Gene Ya RN  Outcome: Progressing     Problem: Safety - Adult  Goal: Free from fall injury  10/16/2024 2131 by Gita Corrales RN  Outcome: Progressing  10/16/2024 0839 by Gene Ya RN  Outcome: Progressing     Problem: Skin/Tissue Integrity  Goal: Absence of new skin breakdown  Description: 1.  Monitor for areas of redness and/or skin breakdown  2.  Assess vascular access sites hourly  3.  Every 4-6 hours minimum:  Change oxygen saturation probe site  4.  Every 4-6 hours:  If on nasal continuous positive airway pressure, respiratory therapy assess nares and determine need for appliance change or resting period.  10/16/2024 2131 by Gita Corrales RN  Outcome: Progressing  10/16/2024 0839 by Gene Ya RN  Outcome: Progressing

## 2024-10-17 NOTE — ANESTHESIA POSTPROCEDURE EVALUATION
Department of Anesthesiology  Postprocedure Note    Patient: Jane Saavedra  MRN: [id number]  YOB: 1990  Date of evaluation: 10/17/2024    Procedure Summary       Date: 10/17/24 Room / Location: [address]    Anesthesia Start: 1227 Anesthesia Stop: 1500    Procedure: BELOW KNEE AMPUTATION, POSSIBLE ABOVE KNEE AMPUTATION (Left: Leg Lower) Diagnosis:       Lower limb ischemia      (Lower limb ischemia [I99.8])    Surgeons: Emperatriz Watson MD Responsible Provider: Iain Ramirez MD    Anesthesia Type: general ASA Status: 3            Anesthesia Type: No value filed.    Shanon Phase I: Shanon Score: 10    Shanon Phase II: Shanon Score: 9    Anesthesia Post Evaluation    Patient location during evaluation: PACU  Patient participation: complete - patient participated  Level of consciousness: awake and alert  Pain score: 3  Airway patency: patent  Nausea & Vomiting: no nausea and no vomiting  Cardiovascular status: hemodynamically stable  Respiratory status: acceptable  Hydration status: euvolemic  Pain management: adequate    No notable events documented.

## 2024-10-17 NOTE — OP NOTE
Operative Note      Patient: Jane Saavedra  YOB: 1990  MRN: [id number]    Date of Procedure: 10/17/2024    Pre-Op Diagnosis Codes:      * Lower limb ischemia [I99.8]    Post-Op Diagnosis: Same       Procedure:  1) Left below knee amputation  2) Closure of previous left leg fasciotomy incisions    Surgeon(s):  Emperatriz Watson MD    Assistant: Jayne Melgoza DO (Resident)    Anesthesia: General    Estimated Blood Loss (mL): 25 mL    Complications: None    Specimens:   ID Type Source Tests Collected by Time Destination   A : LEFT BELOW KNEE AMPUTATION Tissue Leg SURGICAL PATHOLOGY Emperatriz Watson MD 10/17/2024 1335        Implants: None      Drains:   Urinary Catheter 10/17/24 Hall (Active)       [REMOVED] Urinary Catheter 10/15/24 Hall-Temperature (Removed)   Catheter Indications Perioperative use for selected surgical procedures 10/16/24 2000   Site Assessment No urethral drainage 10/16/24 2000   Urine Color Yellow 10/16/24 2000   Urine Appearance Clear 10/16/24 2000   Collection Container Standard 10/16/24 2000   Securement Method Leg strap 10/16/24 2000   Catheter Care  Soap and water 10/16/24 1200   Catheter Best Practices  Drainage tube clipped to bed;Catheter secured to thigh;Tamper seal intact;Bag below bladder;Bag not on floor;Lack of dependent loop in tubing;Drainage bag less than half full 10/16/24 2000   Status Draining 10/16/24 2000   Output (mL) 50 mL 10/16/24 1800       Findings:  Infection Present At Time Of Surgery (PATOS) (choose all levels that have infection present):  No infection present  Other Findings: Healthy viable tissue at level of left below knee amputation. No obvious infection. Good hemostasis.    Detailed Description of Procedure:   Ms. Saavedra was brought to the OR and placed in supine position.  She was intubated and sedated by the anesthesia team.  Her left leg was prepped and draped in standard sterile fashion.  A timeout was performed.  I marked my  and lateral incisions. Xeroform was placed on the suture line and then on the medial/lateral incision was used to keep loosely skin open. Clean surgical dressing was applied. The patient tolerated the procedure well.    Electronically signed by BRIELLE SWEET MD on 10/17/2024 at 2:55 PM

## 2024-10-18 LAB
ANION GAP SERPL CALCULATED.3IONS-SCNC: 12 MMOL/L (ref 9–16)
BASOPHILS # BLD: 0.03 K/UL (ref 0–0.2)
BASOPHILS NFR BLD: 0 % (ref 0–2)
BUN SERPL-MCNC: 9 MG/DL (ref 6–20)
CALCIUM SERPL-MCNC: 8.7 MG/DL (ref 8.6–10.4)
CHLORIDE SERPL-SCNC: 102 MMOL/L (ref 98–107)
CO2 SERPL-SCNC: 22 MMOL/L (ref 20–31)
CREAT SERPL-MCNC: 0.3 MG/DL (ref 0.5–0.9)
EOSINOPHIL # BLD: 0.03 K/UL (ref 0–0.44)
EOSINOPHILS RELATIVE PERCENT: 0 % (ref 1–4)
ERYTHROCYTE [DISTWIDTH] IN BLOOD BY AUTOMATED COUNT: 13.6 % (ref 11.8–14.4)
GFR, ESTIMATED: >90 ML/MIN/1.73M2
GLUCOSE BLD-MCNC: 167 MG/DL (ref 65–105)
GLUCOSE BLD-MCNC: 200 MG/DL (ref 65–105)
GLUCOSE BLD-MCNC: 220 MG/DL (ref 65–105)
GLUCOSE BLD-MCNC: 296 MG/DL (ref 65–105)
GLUCOSE SERPL-MCNC: 170 MG/DL (ref 74–99)
HCT VFR BLD AUTO: 36.7 % (ref 36.3–47.1)
HGB BLD-MCNC: 11.3 G/DL (ref 11.9–15.1)
IMM GRANULOCYTES # BLD AUTO: 0.05 K/UL (ref 0–0.3)
IMM GRANULOCYTES NFR BLD: 0 %
LYMPHOCYTES NFR BLD: 2.94 K/UL (ref 1.1–3.7)
LYMPHOCYTES RELATIVE PERCENT: 26 % (ref 24–43)
MCH RBC QN AUTO: 28.8 PG (ref 25.2–33.5)
MCHC RBC AUTO-ENTMCNC: 30.8 G/DL (ref 28.4–34.8)
MCV RBC AUTO: 93.6 FL (ref 82.6–102.9)
MONOCYTES NFR BLD: 1.01 K/UL (ref 0.1–1.2)
MONOCYTES NFR BLD: 9 % (ref 3–12)
NEUTROPHILS NFR BLD: 65 % (ref 36–65)
NEUTS SEG NFR BLD: 7.16 K/UL (ref 1.5–8.1)
NRBC BLD-RTO: 0 PER 100 WBC
PLATELET # BLD AUTO: 342 K/UL (ref 138–453)
PMV BLD AUTO: 9.5 FL (ref 8.1–13.5)
POTASSIUM SERPL-SCNC: 4.6 MMOL/L (ref 3.7–5.3)
RBC # BLD AUTO: 3.92 M/UL (ref 3.95–5.11)
SODIUM SERPL-SCNC: 136 MMOL/L (ref 136–145)
SURGICAL PATHOLOGY REPORT: NORMAL
WBC OTHER # BLD: 11.2 K/UL (ref 3.5–11.3)

## 2024-10-18 PROCEDURE — 97162 PT EVAL MOD COMPLEX 30 MIN: CPT

## 2024-10-18 PROCEDURE — 97110 THERAPEUTIC EXERCISES: CPT

## 2024-10-18 PROCEDURE — 6370000000 HC RX 637 (ALT 250 FOR IP)

## 2024-10-18 PROCEDURE — 80048 BASIC METABOLIC PNL TOTAL CA: CPT

## 2024-10-18 PROCEDURE — G0108 DIAB MANAGE TRN  PER INDIV: HCPCS

## 2024-10-18 PROCEDURE — 99232 SBSQ HOSP IP/OBS MODERATE 35: CPT | Performed by: FAMILY MEDICINE

## 2024-10-18 PROCEDURE — 97166 OT EVAL MOD COMPLEX 45 MIN: CPT

## 2024-10-18 PROCEDURE — 6360000002 HC RX W HCPCS

## 2024-10-18 PROCEDURE — 2580000003 HC RX 258

## 2024-10-18 PROCEDURE — 97535 SELF CARE MNGMENT TRAINING: CPT

## 2024-10-18 PROCEDURE — 99254 IP/OBS CNSLTJ NEW/EST MOD 60: CPT | Performed by: PHYSICAL MEDICINE & REHABILITATION

## 2024-10-18 PROCEDURE — 94761 N-INVAS EAR/PLS OXIMETRY MLT: CPT

## 2024-10-18 PROCEDURE — 82947 ASSAY GLUCOSE BLOOD QUANT: CPT

## 2024-10-18 PROCEDURE — 85025 COMPLETE CBC W/AUTO DIFF WBC: CPT

## 2024-10-18 PROCEDURE — 36415 COLL VENOUS BLD VENIPUNCTURE: CPT

## 2024-10-18 PROCEDURE — 2060000000 HC ICU INTERMEDIATE R&B

## 2024-10-18 RX ORDER — LOSARTAN POTASSIUM 50 MG/1
50 TABLET ORAL DAILY
Status: DISCONTINUED | OUTPATIENT
Start: 2024-10-18 | End: 2024-10-21

## 2024-10-18 RX ORDER — POLYETHYLENE GLYCOL 3350 17 G/17G
17 POWDER, FOR SOLUTION ORAL DAILY
Status: DISCONTINUED | OUTPATIENT
Start: 2024-10-18 | End: 2024-10-25 | Stop reason: HOSPADM

## 2024-10-18 RX ORDER — POLYETHYLENE GLYCOL 3350 17 G/17G
17 POWDER, FOR SOLUTION ORAL DAILY
Status: DISCONTINUED | OUTPATIENT
Start: 2024-10-18 | End: 2024-10-18 | Stop reason: CLARIF

## 2024-10-18 RX ORDER — ENOXAPARIN SODIUM 100 MG/ML
40 INJECTION SUBCUTANEOUS DAILY
Status: DISPENSED | OUTPATIENT
Start: 2024-10-18 | End: 2024-10-19

## 2024-10-18 RX ADMIN — OXYCODONE HYDROCHLORIDE 5 MG: 5 TABLET ORAL at 11:32

## 2024-10-18 RX ADMIN — INSULIN LISPRO 6 UNITS: 100 INJECTION, SOLUTION INTRAVENOUS; SUBCUTANEOUS at 21:35

## 2024-10-18 RX ADMIN — FENTANYL CITRATE 50 MCG: 50 INJECTION, SOLUTION INTRAMUSCULAR; INTRAVENOUS at 10:29

## 2024-10-18 RX ADMIN — SODIUM CHLORIDE, PRESERVATIVE FREE 10 ML: 5 INJECTION INTRAVENOUS at 21:36

## 2024-10-18 RX ADMIN — GABAPENTIN 300 MG: 300 CAPSULE ORAL at 08:35

## 2024-10-18 RX ADMIN — INSULIN LISPRO 6 UNITS: 100 INJECTION, SOLUTION INTRAVENOUS; SUBCUTANEOUS at 12:13

## 2024-10-18 RX ADMIN — EZETIMIBE 10 MG: 10 TABLET ORAL at 08:34

## 2024-10-18 RX ADMIN — METOPROLOL SUCCINATE 50 MG: 25 TABLET, EXTENDED RELEASE ORAL at 08:34

## 2024-10-18 RX ADMIN — ISOSORBIDE MONONITRATE 30 MG: 30 TABLET, EXTENDED RELEASE ORAL at 08:35

## 2024-10-18 RX ADMIN — FENTANYL CITRATE 50 MCG: 50 INJECTION, SOLUTION INTRAMUSCULAR; INTRAVENOUS at 21:23

## 2024-10-18 RX ADMIN — ACETAMINOPHEN 1000 MG: 500 TABLET ORAL at 05:55

## 2024-10-18 RX ADMIN — INSULIN LISPRO 10 UNITS: 100 INJECTION, SOLUTION INTRAVENOUS; SUBCUTANEOUS at 17:05

## 2024-10-18 RX ADMIN — METHOCARBAMOL 750 MG: 750 TABLET ORAL at 05:55

## 2024-10-18 RX ADMIN — POLYETHYLENE GLYCOL 3350 17 G: 17 POWDER, FOR SOLUTION ORAL at 21:32

## 2024-10-18 RX ADMIN — METHOCARBAMOL 750 MG: 750 TABLET ORAL at 11:32

## 2024-10-18 RX ADMIN — GABAPENTIN 300 MG: 300 CAPSULE ORAL at 21:34

## 2024-10-18 RX ADMIN — ACETAMINOPHEN 1000 MG: 500 TABLET ORAL at 21:34

## 2024-10-18 RX ADMIN — LOSARTAN POTASSIUM 50 MG: 50 TABLET, FILM COATED ORAL at 08:35

## 2024-10-18 RX ADMIN — INSULIN GLARGINE 28 UNITS: 100 INJECTION, SOLUTION SUBCUTANEOUS at 21:35

## 2024-10-18 RX ADMIN — ATORVASTATIN CALCIUM 80 MG: 80 TABLET, FILM COATED ORAL at 21:35

## 2024-10-18 RX ADMIN — ACETAMINOPHEN 1000 MG: 500 TABLET ORAL at 14:32

## 2024-10-18 RX ADMIN — GABAPENTIN 300 MG: 300 CAPSULE ORAL at 14:32

## 2024-10-18 RX ADMIN — FENTANYL CITRATE 50 MCG: 50 INJECTION, SOLUTION INTRAMUSCULAR; INTRAVENOUS at 14:40

## 2024-10-18 RX ADMIN — ASPIRIN 81 MG: 81 TABLET, COATED ORAL at 08:34

## 2024-10-18 RX ADMIN — SODIUM CHLORIDE, PRESERVATIVE FREE 10 ML: 5 INJECTION INTRAVENOUS at 08:35

## 2024-10-18 RX ADMIN — METHOCARBAMOL 750 MG: 750 TABLET ORAL at 17:05

## 2024-10-18 RX ADMIN — OXYCODONE 10 MG: 5 TABLET ORAL at 02:16

## 2024-10-18 ASSESSMENT — PAIN DESCRIPTION - ORIENTATION
ORIENTATION: LEFT

## 2024-10-18 ASSESSMENT — PAIN SCALES - WONG BAKER
WONGBAKER_NUMERICALRESPONSE: NO HURT

## 2024-10-18 ASSESSMENT — PAIN SCALES - GENERAL
PAINLEVEL_OUTOF10: 10
PAINLEVEL_OUTOF10: 6
PAINLEVEL_OUTOF10: 8
PAINLEVEL_OUTOF10: 8
PAINLEVEL_OUTOF10: 0
PAINLEVEL_OUTOF10: 10
PAINLEVEL_OUTOF10: 10
PAINLEVEL_OUTOF10: 6
PAINLEVEL_OUTOF10: 10
PAINLEVEL_OUTOF10: 0

## 2024-10-18 ASSESSMENT — PAIN DESCRIPTION - DESCRIPTORS
DESCRIPTORS: DISCOMFORT;ACHING
DESCRIPTORS: DISCOMFORT
DESCRIPTORS: SHARP;DISCOMFORT;ACHING

## 2024-10-18 ASSESSMENT — PAIN DESCRIPTION - LOCATION
LOCATION: LEG

## 2024-10-18 NOTE — CONSULTS
Physical Medicine & Rehabilitation  Consult Note      Admitting Physician: Eliseo De Paz MD    Primary Care Provider: Marissa Wang MD     Reason for Consult:  Acute Inpatient Rehabilitation    Chief Complaint: Left leg pain    History of Present Illness:  Referring Provider is requesting an evaluation for appropriate placement upon discharge from acute care.     Ms. Jane Saavedra is a 34 y.o. female who was admitted to [facility] on 10/15/2024 with Leg Pain (left)    34-year-old male with severe peripheral artery disease, type 2 diabetes uncontrolled, chronic osteomyelitis with amputation of left hallux, heart failure with reduced ejection fraction, peripheral artery disease/coronary disease, status post CABG x 3 status post leg popliteal thrombectomy in June 2024 presented with 3-day history of pain left leg and loss of motor function arterial duplex showed absent tibial flow with left popliteal artery occlusion JEN showed right JEN 0.61 and left 0.17 A1c was 13.49/12 patient transfer to OR with vascular start heparin drip    Family practice status post BKA due to severe peripheral artery disease and limb ischemia patient on heparin drip, plan to resume aspirin/Eliquis as at home continues on Roxicodone has Isabel, uncontrolled diabetes on insulin, History of CABG x 2 history of heart failure reduced ejection fraction hypertension    Vascular status post thrombectomy left popliteal occlusion and left fasciotomy and then left lower extremity BKA can resume Eliquis 10/19    Radiology:  XR FOOT RIGHT (MIN 3 VIEWS)    Result Date: 10/16/2024  No definite osseous destruction.     XR FOOT RIGHT (MIN 3 VIEWS)    Result Date: 9/19/2024  Also noted along the lateral aspect of the foot adjacent to the head of the 5th metatarsal.  No radiographic evidence for osteomyelitis. Soft tissue swelling of the forefoot. If there is further concern for osteomyelitis, MRI is recommended for further evaluation.        Review of Systems:  Constitutional: negative for anorexia, chills, fatigue, fevers, sweats and weight loss  Eyes: negative for redness and visual disturbance  Ears, nose, mouth, throat, and face: negative for earaches, sore throat and tinnitus  Respiratory: negative for cough and shortness of breath  Cardiovascular: negative for chest pain, dyspnea and palpitations  Gastrointestinal: negative for abdominal pain, change in bowel habits, constipation, nausea and vomiting  Genitourinary:negative for dysuria, frequency, hesitancy and urinary incontinence  Integument/breast: negative for pruritus and rash  Musculoskeletal:negative for muscle weakness and stiff joints  Neurological: negative for dizziness, headaches and weakness  Behavioral/Psych: negative for decreased appetite, depression and fatigue    Functional History:  PTA: Independent with all activities.    Current:  PT:       Restrictions/Precautions  Restrictions/Precautions: Up as Tolerated, Weight Bearing  Lower Extremity Weight Bearing Restrictions  Right Lower Extremity Weight Bearing: Weight Bearing As Tolerated  Partial Weight Bearing Percentage Or Pounds: wound on R plantar surface  Position Activity Restriction  Other position/activity restrictions: L BKA 10/17/24       Bed mobility  Rolling to Left: Contact guard assistance  Supine to Sit: Contact guard assistance  Sit to Supine: Contact guard assistance  Scooting: Contact guard assistance  Bed Mobility Comments: Use of bedrails to self assist       Transfers  Sit to Stand: Minimal Assistance  Stand to Sit: Minimal Assistance  Bed to Chair: Minimal assistance  Comment: up to rw with cues to WB through R heel as able, mild loss of balance and cues for safety with reaching back to chairs    Ambulation  Surface: Level tile  Device: Rolling Walker  Assistance: Minimal assistance  Gait Deviations: Shuffles  Distance: 1-2 steps bed to commode then to recliner  More Ambulation?: No    OT:   ADL  Feeding:

## 2024-10-18 NOTE — PLAN OF CARE
Problem: Chronic Conditions and Co-morbidities  Goal: Patient's chronic conditions and co-morbidity symptoms are monitored and maintained or improved  Outcome: Progressing     Problem: Discharge Planning  Goal: Discharge to home or other facility with appropriate resources  Outcome: Progressing     Problem: Pain  Goal: Verbalizes/displays adequate comfort level or baseline comfort level  Outcome: Progressing  Flowsheets (Taken 10/17/2024 0934 by Shital Manzanares RN)  Verbalizes/displays adequate comfort level or baseline comfort level:   Encourage patient to monitor pain and request assistance   Assess pain using appropriate pain scale   Administer analgesics based on type and severity of pain and evaluate response   Consider cultural and social influences on pain and pain management   Implement non-pharmacological measures as appropriate and evaluate response

## 2024-10-18 NOTE — PROGRESS NOTES
rehabilitation services?: Yes  Family / Caregiver Present: No  Follows Commands: Within Functional Limits  General Comment  Comments: RN and pt agreeable to treatment session  Subjective  Subjective: Pt reports no pain, received pain meds just prior.  No numbness or tingling.         Social/Functional History  Social/Functional History  Lives With:  (fiance and mother in law)  Type of Home:  (currently living in [facility] in [facility])  Home Layout: One level  Home Access: Elevator, Level entry  Bathroom Equipment: None  Home Equipment: None  ADL Assistance: Independent  Homemaking Assistance: Independent  Ambulation Assistance: Independent  Transfer Assistance: Independent  Active : Yes  Additional Comments: pt reports apartment shopping, hoping to have set up after rehab  Vision/Hearing  Vision  Vision: Impaired  Vision Exceptions:  (does not have glasses, reports needing to see optometrist)  Hearing  Hearing: Within functional limits    Cognition   Orientation  Overall Orientation Status: Within Normal Limits  Cognition  Overall Cognitive Status: WFL    Objective  Temp: 98 °F (36.7 °C)  Pulse: 83  Heart Rate Source: Monitor  Respirations: 16  SpO2: 96 %  BP: 135/72  MAP (Calculated): 93             AROM RLE (degrees)  RLE AROM: WFL  AROM LLE (degrees)  LLE AROM : WFL  LLE General AROM: L BKA, knee to 90 degrees flexion AROM seated  Strength RLE  Comment: 4-/5 grossly  Strength LLE  Strength LLE: Exception  L Hip Flexion: 3+/5  L Knee Flexion: 3/5  L Knee Extension: 3/5           Bed mobility  Rolling to Left: Contact guard assistance  Supine to Sit: Contact guard assistance  Sit to Supine: Contact guard assistance  Scooting: Contact guard assistance  Bed Mobility Comments: use of bedrails to self assist  Transfers  Sit to Stand: Minimal Assistance  Stand to Sit: Minimal Assistance  Bed to Chair: Minimal assistance  Comment: up to rw with cues to WB through R heel as able, mild loss of balance and cues for  understanding      Therapy Time   Individual Concurrent Group Co-treatment   Time In 1440         Time Out 1512         Minutes 32         Timed Code Treatment Minutes: 10 Minutes       Eileen Lowe PT

## 2024-10-18 NOTE — ANESTHESIA POSTPROCEDURE EVALUATION
Department of Anesthesiology  Postprocedure Note    Patient: Jane Saavedra  MRN: [id number]  YOB: 1990  Date of evaluation: 10/18/2024    Procedure Summary       Date: 10/15/24 Room / Location: [place] [id number] / [facility]    Anesthesia Start: 1733 Anesthesia Stop: 2331    Procedure: LEFT LOWER EXTREMITY ANGIOGRAM VIA RIGHT FEMORAL ARTERY / BALLOON ANGIOPLASTY / MECHANICAL PERCUTANEOUS THROMBECTOMY / EVERFLEX 6MM X 150CM STENT / FASCIOTOMY WITH OPEN THROMBECTOMY (Left: Groin) Diagnosis:       Sensation of cold in leg      (Sensation of cold in leg [R20.9])    Surgeons: Emperatriz Watson MD Responsible Provider: Maverick Bucio MD    Anesthesia Type: general ASA Status: 3            Anesthesia Type: No value filed.    Shanon Phase I: Shanon Score: 10    Shanon Phase II: Shanon Score: 9    Anesthesia Post Evaluation    Patient location during evaluation: PACU  Patient participation: complete - patient participated  Level of consciousness: awake  Airway patency: patent  Nausea & Vomiting: no nausea and no vomiting  Cardiovascular status: blood pressure returned to baseline  Respiratory status: acceptable  Hydration status: euvolemic  Comments: BP (!) 149/79   Pulse 79   Temp 98 °F (36.7 °C) (Oral)   Resp 18   Wt 84.5 kg (186 lb 4.6 oz)   SpO2 97%   BMI 26.73 kg/m²   Pain Assessment: None - Denies         No notable events documented.

## 2024-10-18 NOTE — PROGRESS NOTES
Inpatient Diabetes Education    Jane Saavedra was seen for evaluation and education on       Lab Results   Component Value Date    LABA1C 13.4 09/12/2024    LABA1C 10.4 08/08/2023    LABA1C 12.2 07/10/2023     Per chart review, Jane had an office visit with her PCP in Sept of 2024. A portion of that note highlights the plan is below for reference.       The patient gave me the update that she has been able to get short acting insulin. We covered in detail types of insulin and insulin action times.     Patient has a general understanding that she should avoid sugary beverages. She likes water and will only have diet or \"zero\" pop/beverages occasionally to get a break from plain water. Jane states that she does not know about carb counting. We had a good introduction to this concept.     Following Survival Skills education topics were covered as appropriate to patient plan of for care:  _X__ A1c - hand out given and reviewed  _X__ Healthy eating - Carb Cting book given and reviewed  _X__ Blood Glucose Self-Monitoring   __X_ Blood sugar targets Pre meal 80 - 130 and 2 hours post meal < 180  __X_ Hyperglycemia  (BG over 250) signs and symptoms and treatment   _X__ Hypoglycemia (BG < than 70) signs and symptoms and treatment \"Rule of 15\"  _X__ Medications -  Insulin Lantus - Basal (long acting)/Humalog -  Bolus (pre meal) regime - insulin action times   _X__ Importance of dosing pre meal rapid insulin from BG result at time of start of meal & administering  within 15 minutes of eating meals   __X_ Importance of taking long acting insulin at same time each day and not to skip doses       Education Folder provided with following support information was left at bedside:   _x__ Diabetes ID card - ADA Low and High Blood Sugar and treatments  _x__ [facility] Diabetes Education brochure/ contact card    Patient verbalizes understanding  of survival skills education.       RECOMMENDATIONS FOR OUTPATIENT PLAN:    Diabetes

## 2024-10-18 NOTE — PLAN OF CARE
Patient seen and examined at bedside this morning.    Patient is status post left BKA.  Discussed that we got PM&R for ARU consideration.  Patient is understanding about it.  We will see if she qualifies and get pre-cert.   Patient also encouraged to eat, changed diet to stricter diabetic/cardiac diet as patient has uncontrolled diabetes/large cardiac history.    Patient has been n.p.o. for a while, her insulin regimen is normally 28 units twice daily however, on admission we will start with only 28 once nightly and adjust accordingly.  We will keep high-dose sliding scale for now.    Patient does endorse that her pain is 10 out of 10 right now, patient has pain medications both IV and oral in place.  Advised to asked nurse when she needs it.  To avoid opioid-induced constipation, we will change GlycoLax regimen to daily for now.    Spoke to vascular surgery this morning, okay to get the Hall out.  Orders placed.  They put DVT prophylaxis for today, will most likely start Eliquis tomorrow.  Patient will need to follow-up with her outpatient cardiologist and regarding keeping Eliquis long-term.    Electronically signed by Marissa Wang MD on 10/18/2024 at 8:58 AM

## 2024-10-18 NOTE — TRANSFER CENTER NOTE
Transfer of care     34 y.o.  female with history of severe PAD, type 2 diabetes that is uncontrolled, chronic osteomyelitis, with amputation of left hallux, HFrEF, PAD/CAD, status post CABG x 3, Status post left leg popliteal thrombectomy in June 2024 that initially presented to outpatient vascular clinic with 3 days of pain in left leg as well as loss of motor function.  Arterial duplex done in the clinic showed absent tibial flow with left popliteal artery occlusion. Right side shows patent vessels but monophasic flow. ABIs were performed which show R Sara 0.61 and L 0.17. A1C from 9/12/24 is 13.4.  Patient was sent to the emergency department and was transferred to the OR with vascular, started on heparin drip, had left lower extremity angiogram.    10/16  Patient was taken to the OR, had left lower extremity angiogram with popliteal artery thrombectomy as well as fasciotomy on that extremity.  From the OR patient was taken straight to the surgical ICU.  At that time vascular surgery took primary.  Podiatry also consulted, MRI of the right lower extremity showed chronic osteomyelitis of the right fifth digit however no surgical intervention, given patient's vascular status.    10/17  Patient remained on the heparin drip after fasciotomy.  Per chart review, discussion with patient was done, for possible below-knee amputation or possibly above-knee amputation.  She was taken to the OR again and had a below-knee amputation as well as closure of previous left leg fasciotomy incisions.    On my evaluation this morning, patient states that she still has discomfort at the area of amputation.  Has not had a bowel movement since postop.    Assessment and plan      Status post left BKA secondary to severe PAD and limb ischemia  - Patient had left BKA on 10/17, per chart review tolerated it well  - Was previously on heparin drip prior to the procedure, will resume patient's aspirin and Eliquis that she is on at home  -  Patient had a history of severe PAD with ABIs that showed right JEN of 0.61 and left of 0.17 and was sent straight from the vascular outpatient office to the ED and then to the OR for the procedure  - Currently on Roxicodone 5 mg every 4 hours and breakthrough pain Roxicodone 10 mg every 4 hours  - PM&R consulted for acute rehab placement  - Hemoglobin stable at 11.3  - Still has a urinary Hall catheter catheter, will try to remove that in the next day or so      Uncontrolled type I diabetes  -Last A1c check was 9/12/2024 which was 13.4  -POCT glucose checks  -Hypoglycemia protocol in place  -Currently on 28 units Lantus nightly  -HD SS     CABG x 2 in 2023  -Aspirin held secondary to procedure, will resume it  -Resumed home statin high-dose and Imdur 30 mg daily  -Will resume patient's aspirin and Eliquis     HFrEF- compensated   -Echo in 7/5/2023 showing ejection fraction 40 to 45% however, recent echo on 8/30/2024 shows improved normal ejection fraction  -Patient on GDMT  -Will resume losartan 25 mg daily, metoprolol 50 mg daily  -Patient may be a candidate for Jardiance on discharge    Essential hypertension  -Continue resume patient's home dose of Imdur, losartan and metoprolol  -Will increase patient's losartan from 25 to 50 mg today, blood pressure has been in the range of 150 to 160 systolic    DVT prophylaxis: Eliquis 5 mg bid   GI prophylaxis: Not indicated at this time  Diet: regular diet      Disposition: Patient is status post left BKA.  Will need to work with PMNR for acute rehab placement.  Patient will also need to follow-up with vascular outpatient for severe PAD.

## 2024-10-18 NOTE — PROGRESS NOTES
Division of Vascular Surgery             Progress Note      Name: Jane Saavedra  MRN: [id number]         Overnight Events:     No acute events overnight postoperatively.      Subjective:     Patient reports she is having pain to the left amputation site upon awakening, but does report that pain medications help control her pain.          Physical Exam:     Vitals:  BP (!) 158/84   Pulse 84   Temp 98 °F (36.7 °C) (Oral)   Resp 18   Wt 84.5 kg (186 lb 4.6 oz)   SpO2 98%   BMI 26.73 kg/m²     General appearance - alert, well appearing and in no acute distress  Mental status - oriented to person, place and time with normal affect  Head - normocephalic and atraumatic  Neck - supple, normal ROM  Chest - clear to auscultation, normal effort  Heart - normal rate, regular rhythm, no murmurs  Abdomen - soft, non-tender, non-distended,   Neurological - normal speech, no focal findings or movement disorder noted, cranial nerves II through XII grossly intact  Extremities -LLE BKA with dry dressings, right groin soft, peripheral pulses palpable, no pedal edema or calf pain with palpation  Skin - no gross lesions, rashes, or induration noted  Foot Exam -warm, well-perfused bilaterally  Vascular Exam -Doppler signals to PT and AT bilaterally      Imaging:   No results found.        Assessment:     Patient is a 34-year-old female who is POD 2 status post thrombectomy of left popliteal occlusion and left fasciotomy. POD 1 s/p LLE BKA      Plan:     Patient no longer requires heparin.  PM&R consulted for rehab placement.  Anticipate patient will be able to be discharged to facility once placement is arranged  Podiatry following  RLE dressings per podiatry  LLE amputation site dressings to remain until POD 2, at which time vascular surgery resident will change packing and dressings.  Hall may be removed  Patient will be okay to restart her Eliquis on 10/19  Plan discussed with attending        [facility]

## 2024-10-18 NOTE — PLAN OF CARE
Problem: Chronic Conditions and Co-morbidities  Goal: Patient's chronic conditions and co-morbidity symptoms are monitored and maintained or improved  Outcome: Progressing  Flowsheets (Taken 10/18/2024 0800)  Care Plan - Patient's Chronic Conditions and Co-Morbidity Symptoms are Monitored and Maintained or Improved:   Monitor and assess patient's chronic conditions and comorbid symptoms for stability, deterioration, or improvement   Collaborate with multidisciplinary team to address chronic and comorbid conditions and prevent exacerbation or deterioration   Update acute care plan with appropriate goals if chronic or comorbid symptoms are exacerbated and prevent overall improvement and discharge     Problem: Discharge Planning  Goal: Discharge to home or other facility with appropriate resources  Outcome: Progressing  Flowsheets (Taken 10/18/2024 0800)  Discharge to home or other facility with appropriate resources: Identify barriers to discharge with patient and caregiver     Problem: Pain  Goal: Verbalizes/displays adequate comfort level or baseline comfort level  Outcome: Progressing     Problem: Safety - Adult  Goal: Free from fall injury  Outcome: Progressing     Problem: Skin/Tissue Integrity  Goal: Absence of new skin breakdown  Description: 1.  Monitor for areas of redness and/or skin breakdown  2.  Assess vascular access sites hourly  3.  Every 4-6 hours minimum:  Change oxygen saturation probe site  4.  Every 4-6 hours:  If on nasal continuous positive airway pressure, respiratory therapy assess nares and determine need for appliance change or resting period.  Outcome: Progressing

## 2024-10-18 NOTE — PROGRESS NOTES
Occupational Therapy    Occupational Therapy Initial Evaluation  Facility/Department: [facility] 2- STEPDOWN     Patient Name: Jane Saavedra        MRN: [id number]    : 1990    Date of Service: 10/18/2024    Discharge Recommendations  Discharge Recommendations: Patient would benefit from continued therapy after discharge       Chief Complaint   Patient presents with    Leg Pain     left     Past Medical History:  has a past medical history of Acute osteomyelitis, Bipolar 1 disorder ([facility]), Cellulitis, Chronic diabetic ulcer of left foot determined by examination ([facility]), Depression, Diabetic foot infection ([facility]), H. pylori infection, History of recent hospitalization, Hyperlipidemia, Hypertension, Ischemic cardiomyopathy, Multiple vessel coronary artery disease, Neuropathy, Noncompliance, NSTEMI (non-ST elevated myocardial infarction) ([facility]), Osteomyelitis of foot, left, acute, PAD (peripheral artery disease) ([facility]), Renal abscess, left, STEMI (ST elevation myocardial infarction) ([facility]), Thrombus, Type II or unspecified type diabetes mellitus without mention of complication, uncontrolled, Under care of team, Under care of team, Under care of team, Under care of team, Under care of team, Under care of team, and Wellness examination.  Past Surgical History:  has a past surgical history that includes Carpal tunnel release (Left, 2020); Coronary artery bypass graft (N/A, 07/10/2023); back surgery (Left, 08/10/2023); vascular surgery (Left, 2024); left atrial appendage ligation (07/10/2023); IR ABSCESS DRAINAGE (2023); Laminectomy and microdiscectomy lumbar spine (2024); Coronary angioplasty with stent (2023); Cardiac catheterization (2023); Toe amputation (Left, 2024); Toe amputation (Left, 2024); Toe amputation (Left, 2024); vascular surgery (Left, 10/15/2024); and Leg amputation below knee (Left, 10/17/2024).    Assessment  Assessment: Patient is normaly  needed    Goals  Short Term Goals  Time Frame for Short Term Goals: Prior to discharge  Short Term Goal 1: Patient to demonstrate ipsalateral functional reaching with Min A with one hand support in standing  Short Term Goal 2: Patient to demonstrate HEP B UE strengthening with handout modified indep  Short Term Goal 3: Patient to apply 1-2 non medication pain  when performing functional tasks  Short Term Goal 4: Patient to perform functional transfers with modified indep  Short Term Goal 5: Patient to demonstarte personal ADLs with set up    Plan  Occupational Therapy Plan  Times Per Week: 3-4x/wk  Current Treatment Recommendations: Strengthening, ROM, Balance training, Functional mobility training, Endurance training, Neuromuscular re-education, Cognitive reorientation, Pain management, Safety education & training, Equipment evaluation, education, & procurement, Self-Care / ADL, Home management training    AM-PAC Daily Activities Inpatient  AM-PAC Daily Activity - Inpatient   How much help is needed for putting on and taking off regular lower body clothing?: A Little  How much help is needed for bathing (which includes washing, rinsing, drying)?: A Little  How much help is needed for toileting (which includes using toilet, bedpan, or urinal)?: A Little  How much help is needed for putting on and taking off regular upper body clothing?: A Little  How much help is needed for taking care of personal grooming?: A Little  How much help for eating meals?: A Little  AM-[facility] Inpatient Daily Activity Raw Score: 18  AM-PAC Inpatient ADL T-Scale Score : 38.66  ADL Inpatient CMS 0-100% Score: 46.65  ADL Inpatient CMS G-Code Modifier : CK    Minutes  OT Individual Minutes  Time In: 1440  Time Out: 1512  Minutes: 32  Time Code Minutes   Timed Code Treatment Minutes: 10 Minutes    Electronically signed by Mile Escobar OT on 10/18/24 at 4:43 PM EDT

## 2024-10-18 NOTE — TRANSFER CENTER NOTE
Transfer of care     34 y.o.  female with history of severe PAD, type 2 diabetes that is uncontrolled, chronic osteomyelitis, with amputation of left hallux, HFrEF, PAD/CAD, status post CABG x 3, Status post left leg popliteal thrombectomy in June 2024 that initially presented to outpatient vascular clinic with 3 days of pain in left leg as well as loss of motor function.  Arterial duplex done in the clinic showed absent tibial flow with left popliteal artery occlusion. Right side shows patent vessels but monophasic flow. ABIs were performed which show R Jen 0.61 and L 0.17. A1C from 9/12/24 is 13.4.  Patient was sent to the emergency department and was transferred to the OR with vascular, started on heparin drip, had left lower extremity angiogram.    10/16  Patient was taken to the OR, had left lower extremity angiogram with popliteal artery thrombectomy as well as fasciotomy on that extremity.  From the OR patient was taken straight to the surgical ICU.  At that time vascular surgery took primary.  Podiatry also consulted, MRI of the right lower extremity showed chronic osteomyelitis of the right fifth digit however no surgical intervention, given patient's vascular status.    10/17  Patient remained on the heparin drip after fasciotomy.  Per chart review, discussion with patient was done, for possible below-knee amputation or possibly above-knee amputation.  She was taken to the OR again and had a below-knee amputation as well as closure of previous left leg fasciotomy incisions.        Assessment and plan      Status post left BKA secondary to severe PAD and limb ischemia  - Patient had left BKA on 10/17, per chart review tolerated it well  - Was previously on heparin drip prior to the procedure, will resume patient's aspirin and Eliquis that she is on at home  - Patient had a history of severe PAD with ABIs that showed right JEN of 0.61 and left of 0.17 and was sent straight from the vascular outpatient office

## 2024-10-19 LAB
ANION GAP SERPL CALCULATED.3IONS-SCNC: 10 MMOL/L (ref 9–16)
BASOPHILS # BLD: 0.03 K/UL (ref 0–0.2)
BASOPHILS NFR BLD: 0 % (ref 0–2)
BUN SERPL-MCNC: 10 MG/DL (ref 6–20)
CALCIUM SERPL-MCNC: 8.5 MG/DL (ref 8.6–10.4)
CHLORIDE SERPL-SCNC: 104 MMOL/L (ref 98–107)
CO2 SERPL-SCNC: 24 MMOL/L (ref 20–31)
CREAT SERPL-MCNC: 0.3 MG/DL (ref 0.5–0.9)
EOSINOPHIL # BLD: 0.18 K/UL (ref 0–0.44)
EOSINOPHILS RELATIVE PERCENT: 2 % (ref 1–4)
ERYTHROCYTE [DISTWIDTH] IN BLOOD BY AUTOMATED COUNT: 13.6 % (ref 11.8–14.4)
GFR, ESTIMATED: >90 ML/MIN/1.73M2
GLUCOSE BLD-MCNC: 183 MG/DL (ref 65–105)
GLUCOSE BLD-MCNC: 220 MG/DL (ref 65–105)
GLUCOSE BLD-MCNC: 223 MG/DL (ref 65–105)
GLUCOSE BLD-MCNC: 256 MG/DL (ref 65–105)
GLUCOSE SERPL-MCNC: 226 MG/DL (ref 74–99)
HCT VFR BLD AUTO: 38.4 % (ref 36.3–47.1)
HGB BLD-MCNC: 12 G/DL (ref 11.9–15.1)
IMM GRANULOCYTES # BLD AUTO: <0.03 K/UL (ref 0–0.3)
IMM GRANULOCYTES NFR BLD: 0 %
LYMPHOCYTES NFR BLD: 2.62 K/UL (ref 1.1–3.7)
LYMPHOCYTES RELATIVE PERCENT: 28 % (ref 24–43)
MCH RBC QN AUTO: 29.1 PG (ref 25.2–33.5)
MCHC RBC AUTO-ENTMCNC: 31.3 G/DL (ref 28.4–34.8)
MCV RBC AUTO: 93.2 FL (ref 82.6–102.9)
MONOCYTES NFR BLD: 0.78 K/UL (ref 0.1–1.2)
MONOCYTES NFR BLD: 8 % (ref 3–12)
NEUTROPHILS NFR BLD: 62 % (ref 36–65)
NEUTS SEG NFR BLD: 5.86 K/UL (ref 1.5–8.1)
NRBC BLD-RTO: 0 PER 100 WBC
PLATELET # BLD AUTO: 406 K/UL (ref 138–453)
PMV BLD AUTO: 9.4 FL (ref 8.1–13.5)
POTASSIUM SERPL-SCNC: 4 MMOL/L (ref 3.7–5.3)
RBC # BLD AUTO: 4.12 M/UL (ref 3.95–5.11)
SODIUM SERPL-SCNC: 138 MMOL/L (ref 136–145)
WBC OTHER # BLD: 9.5 K/UL (ref 3.5–11.3)

## 2024-10-19 PROCEDURE — 99232 SBSQ HOSP IP/OBS MODERATE 35: CPT | Performed by: FAMILY MEDICINE

## 2024-10-19 PROCEDURE — 6370000000 HC RX 637 (ALT 250 FOR IP)

## 2024-10-19 PROCEDURE — 90792 PSYCH DIAG EVAL W/MED SRVCS: CPT | Performed by: PSYCHIATRY & NEUROLOGY

## 2024-10-19 PROCEDURE — 2580000003 HC RX 258

## 2024-10-19 PROCEDURE — 85025 COMPLETE CBC W/AUTO DIFF WBC: CPT

## 2024-10-19 PROCEDURE — 2060000000 HC ICU INTERMEDIATE R&B

## 2024-10-19 PROCEDURE — 82947 ASSAY GLUCOSE BLOOD QUANT: CPT

## 2024-10-19 PROCEDURE — 6360000002 HC RX W HCPCS

## 2024-10-19 PROCEDURE — 80048 BASIC METABOLIC PNL TOTAL CA: CPT

## 2024-10-19 PROCEDURE — 2500000003 HC RX 250 WO HCPCS

## 2024-10-19 PROCEDURE — 36415 COLL VENOUS BLD VENIPUNCTURE: CPT

## 2024-10-19 PROCEDURE — 94761 N-INVAS EAR/PLS OXIMETRY MLT: CPT

## 2024-10-19 RX ORDER — FENTANYL CITRATE 50 UG/ML
50 INJECTION, SOLUTION INTRAMUSCULAR; INTRAVENOUS
Status: DISCONTINUED | OUTPATIENT
Start: 2024-10-19 | End: 2024-10-19

## 2024-10-19 RX ORDER — SENNA AND DOCUSATE SODIUM 50; 8.6 MG/1; MG/1
2 TABLET, FILM COATED ORAL DAILY
Status: DISCONTINUED | OUTPATIENT
Start: 2024-10-19 | End: 2024-10-20

## 2024-10-19 RX ORDER — FENTANYL CITRATE 50 UG/ML
50 INJECTION, SOLUTION INTRAMUSCULAR; INTRAVENOUS ONCE
Status: DISCONTINUED | OUTPATIENT
Start: 2024-10-19 | End: 2024-10-19

## 2024-10-19 RX ORDER — DULOXETIN HYDROCHLORIDE 20 MG/1
20 CAPSULE, DELAYED RELEASE ORAL DAILY
Status: DISCONTINUED | OUTPATIENT
Start: 2024-10-19 | End: 2024-10-25 | Stop reason: HOSPADM

## 2024-10-19 RX ORDER — INSULIN GLARGINE 100 [IU]/ML
40 INJECTION, SOLUTION SUBCUTANEOUS NIGHTLY
Status: DISCONTINUED | OUTPATIENT
Start: 2024-10-19 | End: 2024-10-20

## 2024-10-19 RX ORDER — LIDOCAINE HYDROCHLORIDE 10 MG/ML
20 INJECTION, SOLUTION INFILTRATION; PERINEURAL ONCE
Status: COMPLETED | OUTPATIENT
Start: 2024-10-19 | End: 2024-10-19

## 2024-10-19 RX ADMIN — SODIUM CHLORIDE, PRESERVATIVE FREE 10 ML: 5 INJECTION INTRAVENOUS at 20:32

## 2024-10-19 RX ADMIN — ATORVASTATIN CALCIUM 80 MG: 80 TABLET, FILM COATED ORAL at 20:31

## 2024-10-19 RX ADMIN — Medication 5 MG: at 20:31

## 2024-10-19 RX ADMIN — FENTANYL CITRATE 50 MCG: 50 INJECTION, SOLUTION INTRAMUSCULAR; INTRAVENOUS at 04:32

## 2024-10-19 RX ADMIN — ACETAMINOPHEN 1000 MG: 500 TABLET ORAL at 20:32

## 2024-10-19 RX ADMIN — INSULIN LISPRO 6 UNITS: 100 INJECTION, SOLUTION INTRAVENOUS; SUBCUTANEOUS at 08:48

## 2024-10-19 RX ADMIN — GABAPENTIN 300 MG: 300 CAPSULE ORAL at 08:47

## 2024-10-19 RX ADMIN — METHOCARBAMOL 750 MG: 750 TABLET ORAL at 12:23

## 2024-10-19 RX ADMIN — ACETAMINOPHEN 1000 MG: 500 TABLET ORAL at 06:21

## 2024-10-19 RX ADMIN — ASPIRIN 81 MG: 81 TABLET, COATED ORAL at 08:47

## 2024-10-19 RX ADMIN — METHOCARBAMOL 750 MG: 750 TABLET ORAL at 06:21

## 2024-10-19 RX ADMIN — ISOSORBIDE MONONITRATE 30 MG: 30 TABLET, EXTENDED RELEASE ORAL at 08:47

## 2024-10-19 RX ADMIN — METHOCARBAMOL 750 MG: 750 TABLET ORAL at 01:22

## 2024-10-19 RX ADMIN — METHOCARBAMOL 750 MG: 750 TABLET ORAL at 18:19

## 2024-10-19 RX ADMIN — DULOXETINE HYDROCHLORIDE 20 MG: 20 CAPSULE, DELAYED RELEASE ORAL at 17:20

## 2024-10-19 RX ADMIN — OXYCODONE HYDROCHLORIDE 5 MG: 5 TABLET ORAL at 16:46

## 2024-10-19 RX ADMIN — LIDOCAINE HYDROCHLORIDE 20 ML: 10 INJECTION, SOLUTION INFILTRATION; PERINEURAL at 07:15

## 2024-10-19 RX ADMIN — APIXABAN 5 MG: 5 TABLET, FILM COATED ORAL at 08:47

## 2024-10-19 RX ADMIN — INSULIN LISPRO 10 UNITS: 100 INJECTION, SOLUTION INTRAVENOUS; SUBCUTANEOUS at 16:28

## 2024-10-19 RX ADMIN — SODIUM CHLORIDE, PRESERVATIVE FREE 10 ML: 5 INJECTION INTRAVENOUS at 08:48

## 2024-10-19 RX ADMIN — FENTANYL CITRATE 50 MCG: 50 INJECTION, SOLUTION INTRAMUSCULAR; INTRAVENOUS at 01:22

## 2024-10-19 RX ADMIN — GABAPENTIN 300 MG: 300 CAPSULE ORAL at 13:12

## 2024-10-19 RX ADMIN — ACETAMINOPHEN 1000 MG: 500 TABLET ORAL at 13:13

## 2024-10-19 RX ADMIN — GABAPENTIN 300 MG: 300 CAPSULE ORAL at 20:31

## 2024-10-19 RX ADMIN — METOPROLOL SUCCINATE 50 MG: 25 TABLET, EXTENDED RELEASE ORAL at 08:47

## 2024-10-19 RX ADMIN — INSULIN LISPRO 6 UNITS: 100 INJECTION, SOLUTION INTRAVENOUS; SUBCUTANEOUS at 12:23

## 2024-10-19 RX ADMIN — FENTANYL CITRATE 50 MCG: 50 INJECTION, SOLUTION INTRAMUSCULAR; INTRAVENOUS at 08:58

## 2024-10-19 RX ADMIN — INSULIN LISPRO 6 UNITS: 100 INJECTION, SOLUTION INTRAVENOUS; SUBCUTANEOUS at 20:31

## 2024-10-19 RX ADMIN — EZETIMIBE 10 MG: 10 TABLET ORAL at 08:47

## 2024-10-19 RX ADMIN — SENNOSIDES AND DOCUSATE SODIUM 2 TABLET: 50; 8.6 TABLET ORAL at 16:27

## 2024-10-19 RX ADMIN — LOSARTAN POTASSIUM 50 MG: 50 TABLET, FILM COATED ORAL at 08:47

## 2024-10-19 RX ADMIN — FENTANYL CITRATE 50 MCG: 50 INJECTION, SOLUTION INTRAMUSCULAR; INTRAVENOUS at 06:58

## 2024-10-19 RX ADMIN — APIXABAN 5 MG: 5 TABLET, FILM COATED ORAL at 20:33

## 2024-10-19 RX ADMIN — OXYCODONE 10 MG: 5 TABLET ORAL at 21:48

## 2024-10-19 ASSESSMENT — PAIN DESCRIPTION - ORIENTATION
ORIENTATION: LEFT
ORIENTATION: RIGHT;OTHER (COMMENT)
ORIENTATION: LEFT

## 2024-10-19 ASSESSMENT — PAIN SCALES - GENERAL
PAINLEVEL_OUTOF10: 10
PAINLEVEL_OUTOF10: 6
PAINLEVEL_OUTOF10: 10
PAINLEVEL_OUTOF10: 7
PAINLEVEL_OUTOF10: 9
PAINLEVEL_OUTOF10: 10
PAINLEVEL_OUTOF10: 10
PAINLEVEL_OUTOF10: 0
PAINLEVEL_OUTOF10: 10
PAINLEVEL_OUTOF10: 6
PAINLEVEL_OUTOF10: 10
PAINLEVEL_OUTOF10: 7
PAINLEVEL_OUTOF10: 6
PAINLEVEL_OUTOF10: 9
PAINLEVEL_OUTOF10: 0

## 2024-10-19 ASSESSMENT — PAIN DESCRIPTION - DESCRIPTORS
DESCRIPTORS: DISCOMFORT;ACHING
DESCRIPTORS: ACHING
DESCRIPTORS: DISCOMFORT;ACHING
DESCRIPTORS: DISCOMFORT;ACHING
DESCRIPTORS: ACHING;DISCOMFORT
DESCRIPTORS: ACHING

## 2024-10-19 ASSESSMENT — PAIN DESCRIPTION - LOCATION
LOCATION: LEG

## 2024-10-19 ASSESSMENT — PAIN SCALES - WONG BAKER
WONGBAKER_NUMERICALRESPONSE: NO HURT

## 2024-10-19 NOTE — PLAN OF CARE
Patient seen and examined at bedside  Is vitally stable  She is status post 1 day left BKA  Will resume patient's Eliquis tomorrow morning, DC Lovenox.  Patient was evaluated by PMNR today, does not qualify for ARU at this time.  Will consider SNF options.  Left lower extremity amputation site dressing to remain until 48 hours postop, vascular to change packing and dressings at that time.      Joseph Stone , DO  Family Medicine Resident PGY3

## 2024-10-19 NOTE — PROGRESS NOTES
Division of Vascular Surgery             Progress Note      Name: Jane Saavedra  MRN: [id number]         Overnight Events:     Patient reporting serosanguineous drainage on her pillow underneath her stump      Subjective:     Patient seen and examined at the bedside.  Overnight the patient reported that she saw a small amount of serosanguineous drainage on her pillow underneath her stump.  Patient reports she is having pain to the left amputation site upon awakening, but does report that pain medications help control her pain.      Physical Exam:     Vitals:  BP (!) 149/81   Pulse 82   Temp 98.5 °F (36.9 °C) (Oral)   Resp 16   Ht 1.778 m (5' 10\")   Wt 81.6 kg (180 lb)   SpO2 97%   BMI 25.83 kg/m²     General appearance - alert, well appearing and in no acute distress  Mental status - oriented to person, place and time with normal affect  Head - normocephalic and atraumatic  Neck - supple, normal ROM  Chest - clear to auscultation, normal effort  Heart - normal rate, regular rhythm, no murmurs  Abdomen - soft, non-tender, non-distended,   Neurological - normal speech, no focal findings or movement disorder noted, cranial nerves II through XII grossly intact  Extremities - right groin soft, peripheral pulses palpable, no pedal edema or calf pain with palpation  Skin -left below-knee amputation site with skin dehiscence on the left.  Appears sutures have pulled through the skin.  Foot Exam -warm, well-perfused bilaterally  Vascular Exam -Doppler signals to PT and AT right lower extremity      Imaging:   No results found.        Assessment:     Patient is a 34-year-old female who is POD 2 status post thrombectomy of left popliteal occlusion and left fasciotomy. POD 1 s/p LLE BKA      Plan:     Left below-knee amputation dressing sites changed by vascular surgery resident today at bedside.  It appears that the lateral incision site sutures pulled through the skin overnight.  There was an area of similar

## 2024-10-19 NOTE — CONSULTS
Department of Psychiatry   Psychiatric Assessment      Thank you very much for allowing us to participate in the care of this patient.      Reason for Consult: Depression    HISTORY OF PRESENT ILLNESS:    Patient is 34-year-old female with no significant psychiatric history admitted initially with left leg pain and she is currently status post BKA.  Psychiatry was consulted to evaluate for depression.  Patient does report her depression has been getting worse for the last 2 weeks now.  Mentioned that she never dealt with this level of depression in her life.  Reports that she had a significant brief episode when her parents passed away.  Reports feeling somewhat helpless and hopeless.  Reports having constant thoughts of wishing she was dead but denies any active thoughts to kill herself.  Reports good support from her partner as a protective factor.  Reports dealing with 10 x 10 pain.  Reports some trouble falling asleep and staying asleep secondary to pain.  Reports poor appetite.  Could not elicit any manic or hypomanic symptoms.  Could not elicit any psychotic symptoms.    PSYCHIATRIC HISTORY:  Denies any significant previous psychiatric history.  No previous suicide attempts or inpatient psychiatric hospitalizations      Lifetime Psychiatric Review of Systems         Obsessions and Compulsions: Denies       Danyelle or Hypomania: Denies     Hallucinations: Denies     Panic Attacks:  Denies     Delusions:  Denies     Phobias:  Denies     Trauma: Denies    Prior to Admission medications    Medication Sig Start Date End Date Taking? Authorizing Provider   insulin aspart (NOVOLOG FLEXPEN) 100 UNIT/ML injection pen Inject 3 Units into the skin 3 times daily (before meals) 10/7/24  Yes Marissa Wang MD   Misc. Devices MISC 1 PAIR OF DIABETIC SHOES (1 LEFT/ 1 RIGHT)  1-3 PAIRS OF INSERTS (LEFT/ RIGHT) 9/25/24  Yes Roberto Dailey DPM   Continuous Glucose Sensor (DEXCOM G7 SENSOR) MISC Apply new sensor to back of arm  Patient has no known allergies.      Social History:    Reports that she is from [facility].  Currently living by herself.  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  SUBSTANCE USE HISTORY: denies any    Family Medical and Psychiatric History:     Denies any significant psychiatric issues in family        Problem Relation Age of Onset    Diabetes Mother        Physical  BP (!) 147/83   Pulse 90   Temp 98.2 °F (36.8 °C) (Oral)   Resp 17   Ht 1.778 m (5' 10\")   Wt 81.6 kg (180 lb)   SpO2 96%   BMI 25.83 kg/m²     Mental Status Examination:  Level of consciousness:  Within normal limits  Appearance: hospital attire, lying in bed, fair grooming  Behavior/Motor:  no abnormalities noted  Attitude toward examiner:  cooperative, attentive and good eye contact  Speech:  Spontaneous, normal rate and volume  Mood: Depressed  Affect: mood congruent  Thought processes:  Linear, goal directed, coherent  Thought content: Passive suicidal ideations   Denies homicidal ideations    Denies hallucinations   Denies delusions  Cognition:  Oriented to self, situation, location, date  Concentration clinically adequate  Memory age appropriate  Insight & Judgment:  fair    DSM-5 DIAGNOSIS:  MDD single episode severe without psychosis    Stressors     Severity of stressors is moderate  Source of stressors include:  Other psychosocial and environmental stressors    PLAN:    We will recommend starting Cymbalta 20 mg daily to help with her mood and pain.  Supportive psychotherapy offered today.  Does not require [facility] admission.  Will continue to follow.    Thank you very much for allowing us to participate in the care of this patient.      Electronically signed by Marylou Voss MD on 10/19/24 at 4:20 PM EDT

## 2024-10-19 NOTE — PROGRESS NOTES
Collection Time: 10/18/24 11:55 AM   Result Value Ref Range    POC Glucose 220 (H) 65 - 105 mg/dL   POC Glucose Fingerstick    Collection Time: 10/18/24  4:47 PM   Result Value Ref Range    POC Glucose 296 (H) 65 - 105 mg/dL   POC Glucose Fingerstick    Collection Time: 10/18/24  8:08 PM   Result Value Ref Range    POC Glucose 200 (H) 65 - 105 mg/dL   Basic Metabolic Panel w/ Reflex to MG    Collection Time: 10/19/24  8:10 AM   Result Value Ref Range    Sodium 138 136 - 145 mmol/L    Potassium 4.0 3.7 - 5.3 mmol/L    Chloride 104 98 - 107 mmol/L    CO2 24 20 - 31 mmol/L    Anion Gap 10 9 - 16 mmol/L    Glucose 226 (H) 74 - 99 mg/dL    BUN 10 6 - 20 mg/dL    Creatinine 0.3 (L) 0.50 - 0.90 mg/dL    Est, Glom Filt Rate >90 >60 mL/min/1.73m2    Calcium 8.5 (L) 8.6 - 10.4 mg/dL   CBC with Auto Differential    Collection Time: 10/19/24  8:10 AM   Result Value Ref Range    WBC 9.5 3.5 - 11.3 k/uL    RBC 4.12 3.95 - 5.11 m/uL    Hemoglobin 12.0 11.9 - 15.1 g/dL    Hematocrit 38.4 36.3 - 47.1 %    MCV 93.2 82.6 - 102.9 fL    MCH 29.1 25.2 - 33.5 pg    MCHC 31.3 28.4 - 34.8 g/dL    RDW 13.6 11.8 - 14.4 %    Platelets 406 138 - 453 k/uL    MPV 9.4 8.1 - 13.5 fL    NRBC Automated 0.0 0.0 per 100 WBC    Neutrophils % 62 36 - 65 %    Lymphocytes % 28 24 - 43 %    Monocytes % 8 3 - 12 %    Eosinophils % 2 1 - 4 %    Basophils % 0 0 - 2 %    Immature Granulocytes % 0 0 %    Neutrophils Absolute 5.86 1.50 - 8.10 k/uL    Lymphocytes Absolute 2.62 1.10 - 3.70 k/uL    Monocytes Absolute 0.78 0.10 - 1.20 k/uL    Eosinophils Absolute 0.18 0.00 - 0.44 k/uL    Basophils Absolute 0.03 0.00 - 0.20 k/uL    Immature Granulocytes Absolute <0.03 0.00 - 0.30 k/uL   POC Glucose Fingerstick    Collection Time: 10/19/24  8:33 AM   Result Value Ref Range    POC Glucose 220 (H) 65 - 105 mg/dL         Imaging/Diagonstics:    Vascular duplex lower extremity arteries bilateral    Result Date: 10/16/2024    Right: Biphasic proximal flow followed by  (LANTUS) injection vial 40 Units  40 Units SubCUTAneous Nightly ChaseEdson juarezratulain, DO        lidocaine 1 % injection 20 mL  20 mL IntraDERmal Once Heaven Hall DO        losartan (COZAAR) tablet 50 mg  50 mg Oral Daily Edson Stoneratulain, DO   50 mg at 10/19/24 0847    polyethylene glycol (GLYCOLAX) packet 17 g  17 g Oral Daily Marissa Wang MD   17 g at 10/18/24 2132    methocarbamol (ROBAXIN) tablet 750 mg  750 mg Oral Q6H Jayne Melgoza, DO   750 mg at 10/19/24 0621    fentaNYL (SUBLIMAZE) injection 50 mcg  50 mcg IntraVENous Q2H PRN Jayne Melgoza, DO   50 mcg at 10/19/24 0858    blood glucose test strips 1 strip  1 strip Other PRN Heaven Hall DO        blood glucose test strips 1 strip  1 strip Other PRN Heaven Hall DO        acetaminophen (TYLENOL) tablet 1,000 mg  1,000 mg Oral 3 times per day Jayne Melgoza, DO   1,000 mg at 10/19/24 0621    gabapentin (NEURONTIN) capsule 300 mg  300 mg Oral TID Jayne Melgoza, DO   300 mg at 10/19/24 0847    oxyCODONE (ROXICODONE) immediate release tablet 5 mg  5 mg Oral Q4H PRN Jayne Melgoza, DO   5 mg at 10/18/24 1132    Or    oxyCODONE (ROXICODONE) immediate release tablet 10 mg  10 mg Oral Q4H PRN Jayne Melgoza, DO   10 mg at 10/18/24 0216    hydrALAZINE (APRESOLINE) injection 5 mg  5 mg IntraVENous Q6H PRN Jayne Melgoza, DO        labetalol (NORMODYNE;TRANDATE) injection 5 mg  5 mg IntraVENous Q6H PRN Jayne Melgoza, DO   5 mg at 10/16/24 0111    melatonin tablet 5 mg  5 mg Oral Nightly PRN Jayne Melgoza, DO   5 mg at 10/17/24 2058    insulin lispro (HUMALOG,ADMELOG) injection vial 0-16 Units  0-16 Units SubCUTAneous 4x Daily AC & HS Jayne Melgoza, DO   6 Units at 10/19/24 0848    sodium chloride flush 0.9 % injection 5-40 mL  5-40 mL IntraVENous 2 times per day Jayne Melgoza DO   10 mL at 10/19/24 0848    sodium chloride flush 0.9 % injection 5-40 mL  5-40 mL IntraVENous PRN Jayne Melgoza DO        0.9 % sodium chloride infusion

## 2024-10-19 NOTE — CARE COORDINATION
Spoke to Dr. Valdez regarding prosthetic consult and fitting. Order placed for DME Prosthesis placed by Dr. Valdez and Patient provided list through her insurance provider to f/u with in outpatient setting. Awaiting patient's choice for outpatient prosthesis f/u.

## 2024-10-20 PROBLEM — I50.20 HFREF (HEART FAILURE WITH REDUCED EJECTION FRACTION) (HCC): Status: ACTIVE | Noted: 2024-10-20

## 2024-10-20 PROBLEM — S88.112A UNILATERAL COMPLETE BKA, LEFT, INITIAL ENCOUNTER (HCC): Status: ACTIVE | Noted: 2024-10-20

## 2024-10-20 PROBLEM — I25.810 ARTERIOSCLEROSIS OF ARTERIAL CORONARY ARTERY BYPASS GRAFT: Status: ACTIVE | Noted: 2024-10-20

## 2024-10-20 PROBLEM — F43.21 ADJUSTMENT DISORDER WITH DEPRESSED MOOD: Status: ACTIVE | Noted: 2024-10-20

## 2024-10-20 PROBLEM — E10.65 UNCONTROLLED TYPE 1 DIABETES MELLITUS WITH HYPERGLYCEMIA (HCC): Status: ACTIVE | Noted: 2023-08-06

## 2024-10-20 LAB
ANION GAP SERPL CALCULATED.3IONS-SCNC: 12 MMOL/L (ref 9–16)
BASOPHILS # BLD: 0.05 K/UL (ref 0–0.2)
BASOPHILS NFR BLD: 1 % (ref 0–2)
BUN SERPL-MCNC: 7 MG/DL (ref 6–20)
CALCIUM SERPL-MCNC: 8.9 MG/DL (ref 8.6–10.4)
CHLORIDE SERPL-SCNC: 101 MMOL/L (ref 98–107)
CO2 SERPL-SCNC: 24 MMOL/L (ref 20–31)
CREAT SERPL-MCNC: 0.3 MG/DL (ref 0.5–0.9)
EOSINOPHIL # BLD: 0.19 K/UL (ref 0–0.44)
EOSINOPHILS RELATIVE PERCENT: 2 % (ref 1–4)
ERYTHROCYTE [DISTWIDTH] IN BLOOD BY AUTOMATED COUNT: 13.4 % (ref 11.8–14.4)
GFR, ESTIMATED: >90 ML/MIN/1.73M2
GLUCOSE BLD-MCNC: 192 MG/DL (ref 65–105)
GLUCOSE BLD-MCNC: 200 MG/DL (ref 65–105)
GLUCOSE BLD-MCNC: 206 MG/DL (ref 65–105)
GLUCOSE BLD-MCNC: 224 MG/DL (ref 65–105)
GLUCOSE SERPL-MCNC: 236 MG/DL (ref 74–99)
HCT VFR BLD AUTO: 40.4 % (ref 36.3–47.1)
HGB BLD-MCNC: 12.8 G/DL (ref 11.9–15.1)
IMM GRANULOCYTES # BLD AUTO: 0.04 K/UL (ref 0–0.3)
IMM GRANULOCYTES NFR BLD: 0 %
LYMPHOCYTES NFR BLD: 2.71 K/UL (ref 1.1–3.7)
LYMPHOCYTES RELATIVE PERCENT: 27 % (ref 24–43)
MCH RBC QN AUTO: 29.2 PG (ref 25.2–33.5)
MCHC RBC AUTO-ENTMCNC: 31.7 G/DL (ref 28.4–34.8)
MCV RBC AUTO: 92 FL (ref 82.6–102.9)
MONOCYTES NFR BLD: 0.8 K/UL (ref 0.1–1.2)
MONOCYTES NFR BLD: 8 % (ref 3–12)
NEUTROPHILS NFR BLD: 62 % (ref 36–65)
NEUTS SEG NFR BLD: 6.4 K/UL (ref 1.5–8.1)
NRBC BLD-RTO: 0 PER 100 WBC
PLATELET # BLD AUTO: 440 K/UL (ref 138–453)
PMV BLD AUTO: 9.4 FL (ref 8.1–13.5)
POTASSIUM SERPL-SCNC: 4 MMOL/L (ref 3.7–5.3)
RBC # BLD AUTO: 4.39 M/UL (ref 3.95–5.11)
SODIUM SERPL-SCNC: 137 MMOL/L (ref 136–145)
WBC OTHER # BLD: 10.2 K/UL (ref 3.5–11.3)

## 2024-10-20 PROCEDURE — 6370000000 HC RX 637 (ALT 250 FOR IP)

## 2024-10-20 PROCEDURE — 80048 BASIC METABOLIC PNL TOTAL CA: CPT

## 2024-10-20 PROCEDURE — 99232 SBSQ HOSP IP/OBS MODERATE 35: CPT | Performed by: FAMILY MEDICINE

## 2024-10-20 PROCEDURE — 36415 COLL VENOUS BLD VENIPUNCTURE: CPT

## 2024-10-20 PROCEDURE — 2060000000 HC ICU INTERMEDIATE R&B

## 2024-10-20 PROCEDURE — 6360000002 HC RX W HCPCS

## 2024-10-20 PROCEDURE — 82947 ASSAY GLUCOSE BLOOD QUANT: CPT

## 2024-10-20 PROCEDURE — 2580000003 HC RX 258

## 2024-10-20 PROCEDURE — 85025 COMPLETE CBC W/AUTO DIFF WBC: CPT

## 2024-10-20 RX ORDER — FENTANYL CITRATE 50 UG/ML
25 INJECTION, SOLUTION INTRAMUSCULAR; INTRAVENOUS ONCE
Status: COMPLETED | OUTPATIENT
Start: 2024-10-20 | End: 2024-10-20

## 2024-10-20 RX ORDER — INSULIN GLARGINE 100 [IU]/ML
35 INJECTION, SOLUTION SUBCUTANEOUS NIGHTLY
Status: DISCONTINUED | OUTPATIENT
Start: 2024-10-20 | End: 2024-10-22

## 2024-10-20 RX ORDER — INSULIN GLARGINE 100 [IU]/ML
28 INJECTION, SOLUTION SUBCUTANEOUS NIGHTLY
Status: DISCONTINUED | OUTPATIENT
Start: 2024-10-20 | End: 2024-10-20

## 2024-10-20 RX ORDER — FENTANYL CITRATE 50 UG/ML
50 INJECTION, SOLUTION INTRAMUSCULAR; INTRAVENOUS ONCE
Status: COMPLETED | OUTPATIENT
Start: 2024-10-20 | End: 2024-10-20

## 2024-10-20 RX ORDER — SENNA AND DOCUSATE SODIUM 50; 8.6 MG/1; MG/1
2 TABLET, FILM COATED ORAL 2 TIMES DAILY
Status: DISCONTINUED | OUTPATIENT
Start: 2024-10-20 | End: 2024-10-25 | Stop reason: HOSPADM

## 2024-10-20 RX ORDER — INSULIN GLARGINE 100 [IU]/ML
20 INJECTION, SOLUTION SUBCUTANEOUS 2 TIMES DAILY
Status: DISCONTINUED | OUTPATIENT
Start: 2024-10-20 | End: 2024-10-20

## 2024-10-20 RX ADMIN — METHOCARBAMOL 750 MG: 750 TABLET ORAL at 17:31

## 2024-10-20 RX ADMIN — OXYCODONE 10 MG: 5 TABLET ORAL at 19:56

## 2024-10-20 RX ADMIN — LOSARTAN POTASSIUM 50 MG: 50 TABLET, FILM COATED ORAL at 08:45

## 2024-10-20 RX ADMIN — EZETIMIBE 10 MG: 10 TABLET ORAL at 08:45

## 2024-10-20 RX ADMIN — APIXABAN 5 MG: 5 TABLET, FILM COATED ORAL at 19:55

## 2024-10-20 RX ADMIN — SODIUM CHLORIDE, PRESERVATIVE FREE 10 ML: 5 INJECTION INTRAVENOUS at 19:57

## 2024-10-20 RX ADMIN — METHOCARBAMOL 750 MG: 750 TABLET ORAL at 05:48

## 2024-10-20 RX ADMIN — ACETAMINOPHEN 1000 MG: 500 TABLET ORAL at 05:48

## 2024-10-20 RX ADMIN — OXYCODONE 10 MG: 5 TABLET ORAL at 03:22

## 2024-10-20 RX ADMIN — INSULIN LISPRO 6 UNITS: 100 INJECTION, SOLUTION INTRAVENOUS; SUBCUTANEOUS at 16:34

## 2024-10-20 RX ADMIN — Medication 5 MG: at 22:50

## 2024-10-20 RX ADMIN — FENTANYL CITRATE 25 MCG: 50 INJECTION, SOLUTION INTRAMUSCULAR; INTRAVENOUS at 22:31

## 2024-10-20 RX ADMIN — INSULIN LISPRO 6 UNITS: 100 INJECTION, SOLUTION INTRAVENOUS; SUBCUTANEOUS at 08:44

## 2024-10-20 RX ADMIN — SENNOSIDES AND DOCUSATE SODIUM 2 TABLET: 50; 8.6 TABLET ORAL at 19:56

## 2024-10-20 RX ADMIN — DULOXETINE HYDROCHLORIDE 20 MG: 20 CAPSULE, DELAYED RELEASE ORAL at 08:45

## 2024-10-20 RX ADMIN — GABAPENTIN 300 MG: 300 CAPSULE ORAL at 19:56

## 2024-10-20 RX ADMIN — ATORVASTATIN CALCIUM 80 MG: 80 TABLET, FILM COATED ORAL at 19:56

## 2024-10-20 RX ADMIN — METHOCARBAMOL 750 MG: 750 TABLET ORAL at 12:55

## 2024-10-20 RX ADMIN — ISOSORBIDE MONONITRATE 30 MG: 30 TABLET, EXTENDED RELEASE ORAL at 08:45

## 2024-10-20 RX ADMIN — INSULIN GLARGINE 35 UNITS: 100 INJECTION, SOLUTION SUBCUTANEOUS at 22:32

## 2024-10-20 RX ADMIN — METOPROLOL SUCCINATE 50 MG: 25 TABLET, EXTENDED RELEASE ORAL at 08:45

## 2024-10-20 RX ADMIN — ACETAMINOPHEN 1000 MG: 500 TABLET ORAL at 22:32

## 2024-10-20 RX ADMIN — ACETAMINOPHEN 1000 MG: 500 TABLET ORAL at 12:55

## 2024-10-20 RX ADMIN — APIXABAN 5 MG: 5 TABLET, FILM COATED ORAL at 08:45

## 2024-10-20 RX ADMIN — ASPIRIN 81 MG: 81 TABLET, COATED ORAL at 08:45

## 2024-10-20 RX ADMIN — GABAPENTIN 300 MG: 300 CAPSULE ORAL at 08:45

## 2024-10-20 RX ADMIN — SODIUM CHLORIDE, PRESERVATIVE FREE 10 ML: 5 INJECTION INTRAVENOUS at 08:46

## 2024-10-20 RX ADMIN — SENNOSIDES AND DOCUSATE SODIUM 2 TABLET: 50; 8.6 TABLET ORAL at 08:45

## 2024-10-20 RX ADMIN — GABAPENTIN 300 MG: 300 CAPSULE ORAL at 13:40

## 2024-10-20 RX ADMIN — INSULIN LISPRO 6 UNITS: 100 INJECTION, SOLUTION INTRAVENOUS; SUBCUTANEOUS at 12:54

## 2024-10-20 RX ADMIN — FENTANYL CITRATE 50 MCG: 50 INJECTION, SOLUTION INTRAMUSCULAR; INTRAVENOUS at 08:15

## 2024-10-20 ASSESSMENT — PAIN DESCRIPTION - LOCATION
LOCATION: LEG

## 2024-10-20 ASSESSMENT — PAIN SCALES - GENERAL
PAINLEVEL_OUTOF10: 10
PAINLEVEL_OUTOF10: 9
PAINLEVEL_OUTOF10: 10
PAINLEVEL_OUTOF10: 9
PAINLEVEL_OUTOF10: 8
PAINLEVEL_OUTOF10: 6
PAINLEVEL_OUTOF10: 9
PAINLEVEL_OUTOF10: 10

## 2024-10-20 ASSESSMENT — PAIN DESCRIPTION - DESCRIPTORS
DESCRIPTORS: ACHING;DISCOMFORT

## 2024-10-20 ASSESSMENT — PAIN DESCRIPTION - PAIN TYPE: TYPE: ACUTE PAIN

## 2024-10-20 ASSESSMENT — PAIN DESCRIPTION - ORIENTATION
ORIENTATION: RIGHT;LEFT
ORIENTATION: LEFT
ORIENTATION: RIGHT;LEFT
ORIENTATION: LEFT

## 2024-10-20 ASSESSMENT — PAIN - FUNCTIONAL ASSESSMENT
PAIN_FUNCTIONAL_ASSESSMENT: ACTIVITIES ARE NOT PREVENTED
PAIN_FUNCTIONAL_ASSESSMENT: PREVENTS OR INTERFERES SOME ACTIVE ACTIVITIES AND ADLS

## 2024-10-20 ASSESSMENT — PAIN SCALES - WONG BAKER: WONGBAKER_NUMERICALRESPONSE: NO HURT

## 2024-10-20 NOTE — PROGRESS NOTES
[facility]  PROGRESS NOTE    Shift date: 10/19/2024  Shift day: Saturday   Shift # 2    Room #    Name: Jane Saavedra                Orthodox: Sikh   Place of Hinduism: n/a    Referral:  nurse    Admit Date & Time: 10/15/2024  2:57 PM    Assessment:  Jane Saavedra is a 34 y.o. female in the hospital because blood clots behind knee. Upon entering the room writer observes calm, coping, but there was despair.      Intervention:  Writer introduced self and title as  Writer offered space for patient  to express feelings, needs, and concerns and provided a ministry presence.  offered active listening;Discussed meaning/purpose;Explored/Affirmed feelings, thoughts, concerns;Explored Coping Skills/Resources;Sustaining Presence/Ministry of presence.  Patient mentioned that he is not in communication with existing siblings and parents are .    Outcome:   engaged in conversation about \"why this was happening to her,\" moral crisis.Please continue to visit.    Plan:  Chaplains will remain available to offer spiritual and emotional support as needed.      Electronically signed by Chaplain CASEY, on 10/19/2024 at 9:28 PM.  [facility]  [phone]       10/19/24 2120   Encounter Summary   Encounter Overview/Reason Initial Encounter   Encounter Code  Assessment by  services   Service Provided For Patient   Referral/Consult From Nurse   Support System Unknown   Last Encounter  10/19/24   Complexity of Encounter Moderate   Begin Time    End Time     Total Time Calculated 30 min   Crisis   Type Emotional distress   Assessment/Intervention/Outcome   Assessment Calm;Coping;Despair   Intervention Active listening;Discussed meaning/purpose;Explored/Affirmed feelings, thoughts, concerns;Explored Coping Skills/Resources;Sustaining Presence/Ministry of presence   Outcome Engaged in conversation   Plan and  Referrals   Plan/Referrals Continue Support (comment)  (Patient would like continued visits.)

## 2024-10-20 NOTE — PROGRESS NOTES
[facility] Medicine Inpatient Service  [facility]  Progress Note    Date:   10/20/2024  Patient name:  Jane Saavedra  Date of admission:  10/15/2024  2:57 PM  MRN:   [id number]  YOB: 1990    SUBJECTIVE/Last 24 hours update:     Patient seen and examined at bedside.  ANO X3.  Vital stable.  Patient reports pain meds are not helping her pain, asking for more pain control.    Patient has had no bowel movement since procedure, is passing gas.  Was given Senokot yesterday, patient reports no improvement.    Patient does not qualify for ARU for any PM&R, working with case management for SNF options.      HPI:     34 y.o.  female with history of severe PAD, type 2 diabetes that is uncontrolled, chronic osteomyelitis, with amputation of left hallux, HFrEF, PAD/CAD, status post CABG x 3, Status post left leg popliteal thrombectomy in June 2024 that initially presented to outpatient vascular clinic with 3 days of pain in left leg as well as loss of motor function.  Arterial duplex done in the clinic showed absent tibial flow with left popliteal artery occlusion. ABIs were performed which show R Sara 0.61 and L 0.17. A1C from 9/12/24 is 13.4.  Patient was sent to the emergency department and was transferred to the OR with vascular. Had left lower extremity angiogram with popliteal artery thrombectomy as well as fasciotomy on that extremity. Had left BKA on 10/17/24.     REVIEW OF SYSTEMS:   Review of Systems  Review of Systems   Constitutional:  Negative for fatigue.   Eyes:  Negative for visual disturbance.   Respiratory:  Negative for shortness of breath.    Cardiovascular:  Negative for chest pain and palpitations.   Gastrointestinal:  Negative for abdominal pain, constipation, diarrhea, nausea and vomiting.   Endocrine: Negative.    Genitourinary:  Negative for dysuria.   Musculoskeletal: Negative.    Skin:  Negative for rash.   Neurological:  Negative for headaches.

## 2024-10-20 NOTE — CARE COORDINATION
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         Readmission Risk              Risk of Unplanned Readmission:  33

## 2024-10-20 NOTE — PLAN OF CARE
Problem: Chronic Conditions and Co-morbidities  Goal: Patient's chronic conditions and co-morbidity symptoms are monitored and maintained or improved  10/20/2024 0139 by Rachana Quinn RN  Outcome: Progressing  10/19/2024 1734 by Yazmin August RN  Outcome: Progressing     Problem: Discharge Planning  Goal: Discharge to home or other facility with appropriate resources  10/20/2024 0139 by Rachana Quinn RN  Outcome: Progressing  10/19/2024 1734 by Yazmin August RN  Outcome: Progressing     Problem: Pain  Goal: Verbalizes/displays adequate comfort level or baseline comfort level  10/20/2024 0139 by Rachana Quinn RN  Outcome: Progressing  10/19/2024 1734 by Yazmin August RN  Outcome: Progressing     Problem: Safety - Adult  Goal: Free from fall injury  10/20/2024 0139 by Rachana Quinn RN  Outcome: Progressing  10/19/2024 1734 by Yazmin August RN  Outcome: Progressing     Problem: ABCDS Injury Assessment  Goal: Absence of physical injury  10/20/2024 0139 by Rachana Quinn RN  Outcome: Progressing  10/19/2024 1734 by Yazmin August RN  Outcome: Progressing

## 2024-10-20 NOTE — PLAN OF CARE
Patient seen and examined at bedside  States she still having discomfort at the area of left BKA.  Dressing changed today per vascular.  Patient was switched to Eliquis today morning.  Stated that she does not have an appetite and has not eaten much all day.  Also has not had a bowel movement since procedure on 10/17, was started on a bowel regimen today.  Is passing gas.  Otherwise is hemodynamically stable.      Joseph Stone ,   Family Medicine Resident PGY3

## 2024-10-20 NOTE — PROGRESS NOTES
Division of Vascular Surgery             Progress Note      Name: Jane Saavedra  MRN: [id number]         Overnight Events:     None      Subjective:     Patient seen and examined at the bedside. No acute events reported overnight.  Patient reports she is having pain to the left amputation site upon awakening, but does report that pain medications help control her pain.      Physical Exam:     Vitals:  BP (!) 155/90   Pulse 86   Temp 98.3 °F (36.8 °C) (Oral)   Resp 14   Ht 1.778 m (5' 10\")   Wt 81.6 kg (180 lb)   SpO2 98%   BMI 25.83 kg/m²     General appearance - alert, well appearing and in no acute distress  Mental status - oriented to person, place and time with normal affect  Head - normocephalic and atraumatic  Neck - supple, normal ROM  Chest - clear to auscultation, normal effort  Heart - normal rate, regular rhythm, no murmurs  Abdomen - soft, non-tender, non-distended,   Neurological - normal speech, no focal findings or movement disorder noted, cranial nerves II through XII grossly intact  Extremities - right groin soft, peripheral pulses palpable, no pedal edema or calf pain with palpation  Skin -left below-knee amputation incisions well-approximated, sutures intact.  Small amount of serosanguineous drainage noted on dressings.  Foot Exam -warm, well-perfused bilaterally  Vascular Exam -Doppler signals to PT and AT right lower extremity      Imaging:   No results found.        Assessment:     Patient is a 34-year-old female who is POD 2 status post thrombectomy of left popliteal occlusion and left fasciotomy. POD 3 s/p LLE BKA      Plan:     Left below-knee amputation dressing sites changed by vascular surgery resident today at bedside.  Small amount of serosanguineous drainage noted to packing.  Dressings changed at bedside.  Repacked with half-inch iodoform gauze, fluffs, Kerlix, Ace wrap.  Patient no longer requires heparin.  PM&R consulted for rehab placement.  Anticipate patient will be

## 2024-10-21 ENCOUNTER — TELEPHONE (OUTPATIENT)
Dept: VASCULAR SURGERY | Age: 34
End: 2024-10-21

## 2024-10-21 LAB
ANION GAP SERPL CALCULATED.3IONS-SCNC: 11 MMOL/L (ref 9–16)
BASOPHILS # BLD: 0.04 K/UL (ref 0–0.2)
BASOPHILS # BLD: 0.06 K/UL (ref 0–0.2)
BASOPHILS NFR BLD: 0 % (ref 0–2)
BASOPHILS NFR BLD: 0 % (ref 0–2)
BUN SERPL-MCNC: 6 MG/DL (ref 6–20)
CALCIUM SERPL-MCNC: 8.8 MG/DL (ref 8.6–10.4)
CHLORIDE SERPL-SCNC: 98 MMOL/L (ref 98–107)
CO2 SERPL-SCNC: 26 MMOL/L (ref 20–31)
CREAT SERPL-MCNC: 0.4 MG/DL (ref 0.5–0.9)
EOSINOPHIL # BLD: 0.18 K/UL (ref 0–0.44)
EOSINOPHIL # BLD: 0.2 K/UL (ref 0–0.44)
EOSINOPHILS RELATIVE PERCENT: 1 % (ref 1–4)
EOSINOPHILS RELATIVE PERCENT: 1 % (ref 1–4)
ERYTHROCYTE [DISTWIDTH] IN BLOOD BY AUTOMATED COUNT: 13.6 % (ref 11.8–14.4)
ERYTHROCYTE [DISTWIDTH] IN BLOOD BY AUTOMATED COUNT: 13.8 % (ref 11.8–14.4)
GFR, ESTIMATED: >90 ML/MIN/1.73M2
GLUCOSE BLD-MCNC: 178 MG/DL (ref 65–105)
GLUCOSE BLD-MCNC: 190 MG/DL (ref 65–105)
GLUCOSE BLD-MCNC: 213 MG/DL (ref 65–105)
GLUCOSE BLD-MCNC: 221 MG/DL (ref 65–105)
GLUCOSE SERPL-MCNC: 345 MG/DL (ref 74–99)
HCT VFR BLD AUTO: 38.4 % (ref 36.3–47.1)
HCT VFR BLD AUTO: 39.3 % (ref 36.3–47.1)
HGB BLD-MCNC: 12.5 G/DL (ref 11.9–15.1)
HGB BLD-MCNC: 12.7 G/DL (ref 11.9–15.1)
IMM GRANULOCYTES # BLD AUTO: 0.05 K/UL (ref 0–0.3)
IMM GRANULOCYTES # BLD AUTO: 0.06 K/UL (ref 0–0.3)
IMM GRANULOCYTES NFR BLD: 0 %
IMM GRANULOCYTES NFR BLD: 1 %
LACTIC ACID, WHOLE BLOOD: 1 MMOL/L (ref 0.7–2.1)
LYMPHOCYTES NFR BLD: 2.87 K/UL (ref 1.1–3.7)
LYMPHOCYTES NFR BLD: 3.4 K/UL (ref 1.1–3.7)
LYMPHOCYTES RELATIVE PERCENT: 22 % (ref 24–43)
LYMPHOCYTES RELATIVE PERCENT: 24 % (ref 24–43)
MCH RBC QN AUTO: 29.3 PG (ref 25.2–33.5)
MCH RBC QN AUTO: 29.5 PG (ref 25.2–33.5)
MCHC RBC AUTO-ENTMCNC: 32.3 G/DL (ref 28.4–34.8)
MCHC RBC AUTO-ENTMCNC: 32.6 G/DL (ref 28.4–34.8)
MCV RBC AUTO: 90.6 FL (ref 82.6–102.9)
MCV RBC AUTO: 90.8 FL (ref 82.6–102.9)
MONOCYTES NFR BLD: 1.09 K/UL (ref 0.1–1.2)
MONOCYTES NFR BLD: 1.1 K/UL (ref 0.1–1.2)
MONOCYTES NFR BLD: 8 % (ref 3–12)
MONOCYTES NFR BLD: 9 % (ref 3–12)
NEUTROPHILS NFR BLD: 67 % (ref 36–65)
NEUTROPHILS NFR BLD: 67 % (ref 36–65)
NEUTS SEG NFR BLD: 8.57 K/UL (ref 1.5–8.1)
NEUTS SEG NFR BLD: 9.48 K/UL (ref 1.5–8.1)
NRBC BLD-RTO: 0 PER 100 WBC
NRBC BLD-RTO: 0 PER 100 WBC
PLATELET # BLD AUTO: 439 K/UL (ref 138–453)
PLATELET # BLD AUTO: 447 K/UL (ref 138–453)
PMV BLD AUTO: 9.3 FL (ref 8.1–13.5)
PMV BLD AUTO: 9.4 FL (ref 8.1–13.5)
POTASSIUM SERPL-SCNC: 4.5 MMOL/L (ref 3.7–5.3)
PROCALCITONIN SERPL-MCNC: 0.04 NG/ML (ref 0–0.09)
RBC # BLD AUTO: 4.24 M/UL (ref 3.95–5.11)
RBC # BLD AUTO: 4.33 M/UL (ref 3.95–5.11)
SODIUM SERPL-SCNC: 135 MMOL/L (ref 136–145)
WBC OTHER # BLD: 12.8 K/UL (ref 3.5–11.3)
WBC OTHER # BLD: 14.3 K/UL (ref 3.5–11.3)

## 2024-10-21 PROCEDURE — 6370000000 HC RX 637 (ALT 250 FOR IP)

## 2024-10-21 PROCEDURE — 85025 COMPLETE CBC W/AUTO DIFF WBC: CPT

## 2024-10-21 PROCEDURE — 97535 SELF CARE MNGMENT TRAINING: CPT

## 2024-10-21 PROCEDURE — 2580000003 HC RX 258

## 2024-10-21 PROCEDURE — 83605 ASSAY OF LACTIC ACID: CPT

## 2024-10-21 PROCEDURE — 87040 BLOOD CULTURE FOR BACTERIA: CPT

## 2024-10-21 PROCEDURE — 99232 SBSQ HOSP IP/OBS MODERATE 35: CPT | Performed by: FAMILY MEDICINE

## 2024-10-21 PROCEDURE — 84145 PROCALCITONIN (PCT): CPT

## 2024-10-21 PROCEDURE — 97530 THERAPEUTIC ACTIVITIES: CPT

## 2024-10-21 PROCEDURE — 80048 BASIC METABOLIC PNL TOTAL CA: CPT

## 2024-10-21 PROCEDURE — 2060000000 HC ICU INTERMEDIATE R&B

## 2024-10-21 PROCEDURE — 82947 ASSAY GLUCOSE BLOOD QUANT: CPT

## 2024-10-21 PROCEDURE — 6360000002 HC RX W HCPCS

## 2024-10-21 PROCEDURE — G0108 DIAB MANAGE TRN  PER INDIV: HCPCS

## 2024-10-21 PROCEDURE — 36415 COLL VENOUS BLD VENIPUNCTURE: CPT

## 2024-10-21 RX ORDER — INSULIN LISPRO 100 [IU]/ML
5 INJECTION, SOLUTION INTRAVENOUS; SUBCUTANEOUS
Status: DISCONTINUED | OUTPATIENT
Start: 2024-10-21 | End: 2024-10-25 | Stop reason: HOSPADM

## 2024-10-21 RX ORDER — FENTANYL CITRATE 50 UG/ML
50 INJECTION, SOLUTION INTRAMUSCULAR; INTRAVENOUS DAILY PRN
Status: DISCONTINUED | OUTPATIENT
Start: 2024-10-21 | End: 2024-10-25 | Stop reason: HOSPADM

## 2024-10-21 RX ORDER — LOSARTAN POTASSIUM 50 MG/1
100 TABLET ORAL DAILY
Status: DISCONTINUED | OUTPATIENT
Start: 2024-10-22 | End: 2024-10-25 | Stop reason: HOSPADM

## 2024-10-21 RX ADMIN — OXYCODONE 10 MG: 5 TABLET ORAL at 03:41

## 2024-10-21 RX ADMIN — METHOCARBAMOL 750 MG: 750 TABLET ORAL at 12:20

## 2024-10-21 RX ADMIN — INSULIN LISPRO 5 UNITS: 100 INJECTION, SOLUTION INTRAVENOUS; SUBCUTANEOUS at 16:42

## 2024-10-21 RX ADMIN — ACETAMINOPHEN 1000 MG: 500 TABLET ORAL at 06:05

## 2024-10-21 RX ADMIN — INSULIN GLARGINE 35 UNITS: 100 INJECTION, SOLUTION SUBCUTANEOUS at 20:36

## 2024-10-21 RX ADMIN — GABAPENTIN 300 MG: 300 CAPSULE ORAL at 20:37

## 2024-10-21 RX ADMIN — OXYCODONE 10 MG: 5 TABLET ORAL at 20:35

## 2024-10-21 RX ADMIN — METHOCARBAMOL 750 MG: 750 TABLET ORAL at 19:00

## 2024-10-21 RX ADMIN — GABAPENTIN 300 MG: 300 CAPSULE ORAL at 09:42

## 2024-10-21 RX ADMIN — OXYCODONE 10 MG: 5 TABLET ORAL at 09:42

## 2024-10-21 RX ADMIN — SENNOSIDES AND DOCUSATE SODIUM 2 TABLET: 50; 8.6 TABLET ORAL at 09:42

## 2024-10-21 RX ADMIN — SENNOSIDES AND DOCUSATE SODIUM 2 TABLET: 50; 8.6 TABLET ORAL at 20:35

## 2024-10-21 RX ADMIN — ACETAMINOPHEN 1000 MG: 500 TABLET ORAL at 20:35

## 2024-10-21 RX ADMIN — INSULIN LISPRO 6 UNITS: 100 INJECTION, SOLUTION INTRAVENOUS; SUBCUTANEOUS at 20:45

## 2024-10-21 RX ADMIN — SODIUM CHLORIDE, PRESERVATIVE FREE 10 ML: 5 INJECTION INTRAVENOUS at 20:38

## 2024-10-21 RX ADMIN — METOPROLOL SUCCINATE 50 MG: 25 TABLET, EXTENDED RELEASE ORAL at 09:43

## 2024-10-21 RX ADMIN — DULOXETINE HYDROCHLORIDE 20 MG: 20 CAPSULE, DELAYED RELEASE ORAL at 09:43

## 2024-10-21 RX ADMIN — APIXABAN 5 MG: 5 TABLET, FILM COATED ORAL at 09:43

## 2024-10-21 RX ADMIN — ATORVASTATIN CALCIUM 80 MG: 80 TABLET, FILM COATED ORAL at 20:35

## 2024-10-21 RX ADMIN — METHOCARBAMOL 750 MG: 750 TABLET ORAL at 06:05

## 2024-10-21 RX ADMIN — INSULIN LISPRO 6 UNITS: 100 INJECTION, SOLUTION INTRAVENOUS; SUBCUTANEOUS at 13:00

## 2024-10-21 RX ADMIN — GABAPENTIN 300 MG: 300 CAPSULE ORAL at 15:54

## 2024-10-21 RX ADMIN — FENTANYL CITRATE 50 MCG: 50 INJECTION, SOLUTION INTRAMUSCULAR; INTRAVENOUS at 12:19

## 2024-10-21 RX ADMIN — INSULIN LISPRO 6 UNITS: 100 INJECTION, SOLUTION INTRAVENOUS; SUBCUTANEOUS at 09:43

## 2024-10-21 RX ADMIN — APIXABAN 5 MG: 5 TABLET, FILM COATED ORAL at 20:35

## 2024-10-21 RX ADMIN — METHOCARBAMOL 750 MG: 750 TABLET ORAL at 00:30

## 2024-10-21 RX ADMIN — EZETIMIBE 10 MG: 10 TABLET ORAL at 09:43

## 2024-10-21 RX ADMIN — ASPIRIN 81 MG: 81 TABLET, COATED ORAL at 09:43

## 2024-10-21 RX ADMIN — OXYCODONE 10 MG: 5 TABLET ORAL at 15:54

## 2024-10-21 RX ADMIN — ACETAMINOPHEN 1000 MG: 500 TABLET ORAL at 15:54

## 2024-10-21 RX ADMIN — SODIUM CHLORIDE, PRESERVATIVE FREE 10 ML: 5 INJECTION INTRAVENOUS at 09:45

## 2024-10-21 RX ADMIN — ISOSORBIDE MONONITRATE 30 MG: 30 TABLET, EXTENDED RELEASE ORAL at 09:43

## 2024-10-21 ASSESSMENT — PAIN SCALES - GENERAL
PAINLEVEL_OUTOF10: 10
PAINLEVEL_OUTOF10: 10
PAINLEVEL_OUTOF10: 9
PAINLEVEL_OUTOF10: 10
PAINLEVEL_OUTOF10: 9
PAINLEVEL_OUTOF10: 10
PAINLEVEL_OUTOF10: 9
PAINLEVEL_OUTOF10: 9
PAINLEVEL_OUTOF10: 10
PAINLEVEL_OUTOF10: 10
PAINLEVEL_OUTOF10: 9
PAINLEVEL_OUTOF10: 9

## 2024-10-21 ASSESSMENT — PAIN DESCRIPTION - DESCRIPTORS
DESCRIPTORS: PATIENT UNABLE TO DESCRIBE
DESCRIPTORS: ACHING;DISCOMFORT
DESCRIPTORS: PATIENT UNABLE TO DESCRIBE
DESCRIPTORS: ACHING;DISCOMFORT

## 2024-10-21 ASSESSMENT — PAIN DESCRIPTION - ORIENTATION
ORIENTATION: LEFT

## 2024-10-21 ASSESSMENT — PAIN - FUNCTIONAL ASSESSMENT
PAIN_FUNCTIONAL_ASSESSMENT: ACTIVITIES ARE NOT PREVENTED
PAIN_FUNCTIONAL_ASSESSMENT: PREVENTS OR INTERFERES SOME ACTIVE ACTIVITIES AND ADLS
PAIN_FUNCTIONAL_ASSESSMENT: PREVENTS OR INTERFERES SOME ACTIVE ACTIVITIES AND ADLS
PAIN_FUNCTIONAL_ASSESSMENT: ACTIVITIES ARE NOT PREVENTED

## 2024-10-21 ASSESSMENT — PAIN DESCRIPTION - LOCATION
LOCATION: LEG

## 2024-10-21 NOTE — PROGRESS NOTES
Occupational Therapy  Facility/Department: [facility] CAR 2- STEPDOWN   Daily Treatment Note      Patient Name: Jane Saavedra        MRN: [id number]    : 1990    Date of Service: 10/21/2024    Discharge Recommendations  Discharge Recommendations: Patient would benefit from continued therapy after discharge       Assessment  Performance deficits / Impairments: Decreased safe awareness;Decreased functional mobility ;Decreased ADL status;Decreased balance;Decreased high-level IADLs  Assessment: Patient is normally independent with functional mobility, personal ADLs and household tasks. At  this time patient requires up to MIN A with functional mobility. Patient would benefit from continued therapy during acute stay. Patient is safe to return to prior living arrangement with / assistance to safely perform personal ADLs and mobility.  Prognosis: Good  Activity Tolerance  Activity Tolerance: Patient Tolerated treatment well;Patient limited by pain  Safety Devices  Type of Devices: Call light within reach;Gait belt;Left in chair;Nurse notified  Restraints  Restraints Initially in Place: No    Restrictions/Precautions  Restrictions/Precautions  Restrictions/Precautions: Up as Tolerated;Weight Bearing  Required Braces or Orthoses?: No  Lower Extremity Weight Bearing Restrictions  Right Lower Extremity Weight Bearing: Weight Bearing As Tolerated  Partial Weight Bearing Percentage Or Pounds: wound on R plantar surface  Position Activity Restriction  Other position/activity restrictions: L BKA 10/17/24    Subjective  General  Patient assessed for rehabilitation services?: Yes  Response to previous treatment: Patient with no complaints from previous session  Family / Caregiver Present: No  General Comment  Comments: RN ok'd patient for OT treatment this date. Pt supine in bed sleeping upon JOYNER arrival with left knee flexed. Pt required ~5 minute to wake and participate with cues. Pt with flat affect throughout. Pt  reports 10/10 pain in LLE residual limb with RN notified. Pt positioned in recliner for best comfort at end of session.    Objective  Orientation  Overall Orientation Status: Within Normal Limits  Cognition  Overall Cognitive Status: WFL  Cognition Comment: Pt with flat affect and minimal verbalizations throughout session    Activities of Daily Living  UE Dressing: Modified independent   UE Dressing Skilled Clinical Factors: Pt demo appropriate movements and simulation for don/doff shirt while seated  LE Dressing: Stand by assistance;Setup  LE Dressing Skilled Clinical Factors: Pt donned right slipper/shoe while seated EOB with no LOB demonstrated. It is expected patient would require some assist for hike of shorts/clothing mgt  Additional Comments: Pt completed above tasks while seated. Pt education on left residual limb desensitization techniques in prep for eventual prosthesis with patient verbal understanding. Pt also educated on positioning to promote full extension of left knee in prep for prosthesis. Pt positioned in recliner with left knee in full extension at this time.    Balance  Balance  Sitting: Without support (Pt sat EOB ~10-15 minutes supervision for safety only and no LOB in prep for transfer.)  Standing: With support (Pt stood with RW CGA and min verbal cues for safety and allowing for L knee extension. Pt unable to relax LLE while in standing with hip and knee flexed. Pt stood total 1-1/2 minutes for transfer)    Transfers/Mobility  Bed mobility  Supine to Sit: Supervision  Sit to Supine: Unable to assess  Scooting: Supervision  Bed Mobility Comments: HOB elevated ~40 degrees, use of bedrail and increased effort to left side of bed. Pt concluded session seated in recliner    Transfers  Sit to stand: Contact guard assistance  Stand to sit: Minimal assistance  Transfer Comments: Pt completed stand from EOB with min cues for hand placement and technique with RW and completed functional mobility to

## 2024-10-21 NOTE — TELEPHONE ENCOUNTER
Patient states she is still in the hospital and will likely be discharged to rehab. Patient states that she will call back to schedule follow-up.

## 2024-10-21 NOTE — PROGRESS NOTES
[facility] Medicine Inpatient Service  [facility]  Progress Note    Date:   10/21/2024  Patient name:  Jane Saavedra  Date of admission:  10/15/2024  2:57 PM  MRN:   [id number]  YOB: 1990    SUBJECTIVE/Last 24 hours update:     Patient seen and examined at bedside.  ANO X3.  Blood pressure running high, 165/96.  Other vitals stable.    Patient still complains of pain, reports improved appetite, did have dinner last night, reports no bowel movement since surgery.  Already senokot, will maxout dose.    Denies fever, chills, shortness of breath, chest pain, nausea, vomiting or any other complaints.    Working with  to finalize discharge plans.     HPI:     34 y.o.  female with history of severe PAD, type 2 diabetes that is uncontrolled, chronic osteomyelitis, with amputation of left hallux, HFrEF, PAD/CAD, status post CABG x 3, Status post left leg popliteal thrombectomy in June 2024 that initially presented to outpatient vascular clinic with 3 days of pain in left leg as well as loss of motor function.  Arterial duplex done in the clinic showed absent tibial flow with left popliteal artery occlusion. ABIs were performed which show R Sara 0.61 and L 0.17. A1C from 9/12/24 is 13.4.  Patient was sent to the emergency department and was transferred to the OR with vascular. Had left lower extremity angiogram with popliteal artery thrombectomy as well as fasciotomy on that extremity. Had left BKA on 10/17/24.     REVIEW OF SYSTEMS:   Review of Systems    Review of Systems   Constitutional: Positive for fatigue.   Eyes:  Negative for visual disturbance.   Respiratory:  Negative for shortness of breath.    Cardiovascular:  Negative for chest pain and palpitations.   Gastrointestinal:  Negative for abdominal pain, constipation, diarrhea, nausea and vomiting.   Endocrine: Negative.    Genitourinary:  Negative for dysuria.   Musculoskeletal: Positive for pain.    Skin:   Testing:    Recent Results (from the past 24 hour(s))   POC Glucose Fingerstick    Collection Time: 10/20/24 11:24 AM   Result Value Ref Range    POC Glucose 192 (H) 65 - 105 mg/dL   POC Glucose Fingerstick    Collection Time: 10/20/24  3:21 PM   Result Value Ref Range    POC Glucose 200 (H) 65 - 105 mg/dL   POC Glucose Fingerstick    Collection Time: 10/20/24  7:08 PM   Result Value Ref Range    POC Glucose 224 (H) 65 - 105 mg/dL   Basic Metabolic Panel w/ Reflex to MG    Collection Time: 10/21/24  5:17 AM   Result Value Ref Range    Sodium 135 (L) 136 - 145 mmol/L    Potassium 4.5 3.7 - 5.3 mmol/L    Chloride 98 98 - 107 mmol/L    CO2 26 20 - 31 mmol/L    Anion Gap 11 9 - 16 mmol/L    Glucose 345 (H) 74 - 99 mg/dL    BUN 6 6 - 20 mg/dL    Creatinine 0.4 (L) 0.50 - 0.90 mg/dL    Est, Glom Filt Rate >90 >60 mL/min/1.73m2    Calcium 8.8 8.6 - 10.4 mg/dL   CBC with Auto Differential    Collection Time: 10/21/24  5:17 AM   Result Value Ref Range    WBC 12.8 (H) 3.5 - 11.3 k/uL    RBC 4.33 3.95 - 5.11 m/uL    Hemoglobin 12.7 11.9 - 15.1 g/dL    Hematocrit 39.3 36.3 - 47.1 %    MCV 90.8 82.6 - 102.9 fL    MCH 29.3 25.2 - 33.5 pg    MCHC 32.3 28.4 - 34.8 g/dL    RDW 13.6 11.8 - 14.4 %    Platelets 439 138 - 453 k/uL    MPV 9.3 8.1 - 13.5 fL    NRBC Automated 0.0 0.0 per 100 WBC    Neutrophils % 67 (H) 36 - 65 %    Lymphocytes % 22 (L) 24 - 43 %    Monocytes % 9 3 - 12 %    Eosinophils % 1 1 - 4 %    Basophils % 0 0 - 2 %    Immature Granulocytes % 1 (H) 0 %    Neutrophils Absolute 8.57 (H) 1.50 - 8.10 k/uL    Lymphocytes Absolute 2.87 1.10 - 3.70 k/uL    Monocytes Absolute 1.09 0.10 - 1.20 k/uL    Eosinophils Absolute 0.18 0.00 - 0.44 k/uL    Basophils Absolute 0.04 0.00 - 0.20 k/uL    Immature Granulocytes Absolute 0.06 0.00 - 0.30 k/uL   POC Glucose Fingerstick    Collection Time: 10/21/24  7:48 AM   Result Value Ref Range    POC Glucose 221 (H) 65 - 105 mg/dL         Imaging/Diagonstics:    Vascular duplex lower

## 2024-10-21 NOTE — PLAN OF CARE
Problem: Chronic Conditions and Co-morbidities  Goal: Patient's chronic conditions and co-morbidity symptoms are monitored and maintained or improved  10/21/2024 0400 by Cheyenne Abreu RN  Outcome: Progressing  Flowsheets (Taken 10/20/2024 2000)  Care Plan - Patient's Chronic Conditions and Co-Morbidity Symptoms are Monitored and Maintained or Improved:   Monitor and assess patient's chronic conditions and comorbid symptoms for stability, deterioration, or improvement   Collaborate with multidisciplinary team to address chronic and comorbid conditions and prevent exacerbation or deterioration   Update acute care plan with appropriate goals if chronic or comorbid symptoms are exacerbated and prevent overall improvement and discharge  10/20/2024 1611 by Yazmin August RN  Outcome: Progressing     Problem: Discharge Planning  Goal: Discharge to home or other facility with appropriate resources  10/21/2024 0400 by Cheyenne Abreu RN  Outcome: Progressing  Flowsheets (Taken 10/20/2024 2000)  Discharge to home or other facility with appropriate resources:   Identify barriers to discharge with patient and caregiver   Arrange for needed discharge resources and transportation as appropriate   Identify discharge learning needs (meds, wound care, etc)  10/20/2024 1611 by Yazmin August RN  Outcome: Progressing     Problem: Pain  Goal: Verbalizes/displays adequate comfort level or baseline comfort level  10/21/2024 0400 by Cheyenne Abreu RN  Outcome: Progressing  10/20/2024 1611 by Yazmin August RN  Outcome: Progressing     Problem: Safety - Adult  Goal: Free from fall injury  10/21/2024 0400 by Cheyenne Abreu RN  Outcome: Progressing  10/20/2024 1611 by Yazmin August RN  Outcome: Progressing     Problem: Skin/Tissue Integrity  Goal: Absence of new skin breakdown  Description: 1.  Monitor for areas of redness and/or skin breakdown  2.  Assess vascular access sites hourly  3.  Every 4-6

## 2024-10-21 NOTE — PROGRESS NOTES
Division of Vascular Surgery             Progress Note      Name: Jane Saavedra  MRN: [id number]         Overnight Events:     None      Subjective:     Patient seen and examined at the bedside. No acute events reported overnight.  Patient reports she is having pain to the left amputation site upon awakening, but does report that pain medications help control her pain.      Physical Exam:     Vitals:  BP (!) 165/96   Pulse 92   Temp 98.3 °F (36.8 °C) (Oral)   Resp 16   Ht 1.778 m (5' 10\")   Wt 81.6 kg (180 lb)   SpO2 96%   BMI 25.83 kg/m²     General appearance - alert, well appearing and in no acute distress  Mental status - oriented to person, place and time with normal affect  Head - normocephalic and atraumatic  Neck - supple, normal ROM  Chest - clear to auscultation, normal effort  Heart - normal rate, regular rhythm, no murmurs  Abdomen - soft, non-tender, non-distended,   Neurological - normal speech, no focal findings or movement disorder noted, cranial nerves II through XII grossly intact  Extremities - right groin soft, peripheral pulses palpable, no pedal edema or calf pain with palpation  Skin -left below-knee amputation incisions well-approximated, sutures intact.  Small amount of serosanguineous drainage noted on dressings.  Foot Exam -warm, well-perfused bilaterally  Vascular Exam -Doppler signals to PT and AT right lower extremity      Imaging:   No results found.        Assessment:     Patient is a 34-year-old female who is POD 3 status post thrombectomy of left popliteal occlusion and left fasciotomy. POD 4 s/p LLE BKA      Plan:     Left below-knee amputation dressing sites changed by vascular surgery resident today at bedside.  Small amount of serosanguineous drainage noted to packing.  Dressings changed at bedside.  Repacked with half-inch iodoform gauze, fluffs, Kerlix, Ace wrap.  We will repack with quarter inch iodoform today  Patient no longer requires heparin.  Continue  Eliquis per primary team  PM&R consulted for rehab placement.   Podiatry following  RLE dressings per podiatry  Patient is medically stable for discharge to facility per vascular surgery perspective  Plan discussed with attending    Jayne Melgoza DO 10/21/24  8:26 AM   General Surgery PGY 3         [facility]  O: [phone]

## 2024-10-21 NOTE — PLAN OF CARE
Patient seen and examined at bedside.     Patient CBC WBC elevated at 14.3.  However, patient does not fit qSOFA or SIRS criteria at this time.  We will still get Pro-Moi, lactic acid and blood cultures x 2 for now.    If any of those come back positive/elevated, we will consider doing further workup such as UA, chest x-ray etc.  However, does not seem necessary at this exact moment. patient resting comfortably.   Follow CBC in the morning.    Electronically signed by Marissa Wang MD on 10/21/2024 at 7:09 PM

## 2024-10-21 NOTE — PROGRESS NOTES
Writer reached out to covering MD Cem Tuttle through a secured text message via [employer] due to complain of pain in left leg. One time Fentanyl was order. Please view MAR for details.    Patient BG was also 224. Patient endorses a  decrease in appetite with no plan to has HS snack. Dr. Tuttle verbalized to give Lantus 35 units, but  to hold insulin lispro 6 units. Plan of care ongoing.

## 2024-10-21 NOTE — CARE COORDINATION
Writer contacted client regarding appt tomorrow for vascular follow up. Client disclosed that she is in [facility], at the hospital due to having pain in her foot. States test have been ran and she was found to have clots in her LLE. Client states recommendation has also been made to amputate LLE pinky toe. Client states staff have communicated with Dr. Watson and recommended a follow up after discharge. Client states she has a ride to her appt tomorrow and nothing needs to be arranged by [facility]. Client states she is currently having issues with maintaining housing in [place] and is trying to make her way back to [place] to reside. Writer instructed client to please keep [facility] updated on her decision for relocation. No questions or concerns at this time. Writer will follow up after client appt on 10/15/24.    Dar simms RN, BSN  [facility] Outreach Program    No current facility-administered medications on file prior to visit.     Current Outpatient Medications on File Prior to Visit   Medication Sig Dispense Refill    insulin aspart (NOVOLOG FLEXPEN) 100 UNIT/ML injection pen Inject 3 Units into the skin 3 times daily (before meals) 5 Adjustable Dose Pre-filled Pen Syringe 3    Misc. Devices MISC 1 PAIR OF DIABETIC SHOES (1 LEFT/ 1 RIGHT)  1-3 PAIRS OF INSERTS (LEFT/ RIGHT) 2 each 0    Continuous Glucose Sensor (DEXCOM G7 SENSOR) MISC Apply new sensor to back of arm every 10 days. 3 each 3    insulin glargine (BASAGLAR KWIKPEN) 100 UNIT/ML injection pen Inject 22 Units into the skin 2 times daily 5 Adjustable Dose Pre-filled Pen Syringe 0    isosorbide mononitrate (IMDUR) 30 MG extended release tablet Take 1 tablet by mouth daily 30 tablet 3    apixaban (ELIQUIS) 5 MG TABS tablet Take 1 tablet by mouth 2 times daily 60 tablet 0    losartan (COZAAR) 25 MG tablet Take 1 tablet by mouth daily 30 tablet 3    metoprolol succinate (TOPROL XL) 50 MG extended release tablet Take 1 tablet by mouth daily 30 tablet

## 2024-10-21 NOTE — DISCHARGE INSTR - COC
Continuity of Care Form    Patient Name: Jane Saavedra   :  1990  MRN:  [id number]    Admit date:  10/15/2024  Discharge date:  10/25/2024    Code Status Order: Full Code   Advance Directives:   Advance Care Flowsheet Documentation             Admitting Physician:  Emperatriz Watson MD  PCP: Marissa Wang MD    Discharging Nurse: Bridgett BARRAZA  Discharging Hospital Unit/Room#:   Discharging Unit Phone Number: [phone]    Emergency Contact:   Extended Emergency Contact Information  Primary Emergency Contact: Ken Mcintosh  Home Phone: [phone]  Work Phone: [phone]  Mobile Phone: [phone]  Relation: Girlfriend   needed? No  Secondary Emergency Contact: Don't,Contact  Relation: None    Past Surgical History:  Past Surgical History:   Procedure Laterality Date    BACK SURGERY Left 08/10/2023    FLANK ABSCESS I&D ,  performed by Sánchez Leonard MD at [facility] OR    CARDIAC CATHETERIZATION  2023    restenosis d/t medication non compliance : Fabiola Garcia in [facility]    CARPAL TUNNEL RELEASE Left 2020    CORONARY ANGIOPLASTY WITH STENT PLACEMENT  2023    REED x2 to L CX , REED to LAD : Brian    CORONARY ARTERY BYPASS GRAFT N/A 07/10/2023    x 3 vessels using LIMA and right saph,   performed by Malina Gilliam MD at [facility] CVOR    IR DRAIN SKIN ABSCESS  2023    drain tube placement    LAMINECTOMY AND MICRODISCECTOMY LUMBAR SPINE  2024    L4-5 , [facility]    LEFT ATRIAL APPENDAGE LIGATION  07/10/2023    during bypass    LEG AMPUTATION BELOW KNEE Left 10/17/2024    BELOW KNEE AMPUTATION, POSSIBLE ABOVE KNEE AMPUTATION performed by Emperatriz Watson MD at [facility] CVOR    TOE AMPUTATION Left 2024    GREAT TOE AMPUTATION (Left: Foot)    TOE AMPUTATION Left 2024    : GREAT TOE AMPUTATION (Left: Foot)    TOE AMPUTATION Left 2024    GREAT TOE AMPUTATION performed by Radha Recio DPM at [facility] OR    VASCULAR SURGERY Left  Dry dressing;Gauze dressing/dressing sponge 10/16/24 1600   Number of days: 5       Wound 08/29/24 Toe (Comment  which one) Left Amputation (Active)   Number of days: 52       Wound 08/29/24 Right;Plantar diabetic ulcer (Active)   Number of days: 52       Wound 10/17/24 Leg Left;Proximal (Active)   Dressing Status Clean;Dry;Intact 10/21/24 1253   Wound Cleansed Other (Comment) 10/20/24 2000   Dressing/Treatment Ace wrap 10/20/24 2000   Wound Assessment Other (Comment) 10/20/24 2000   Drainage Amount Other (Comment) 10/20/24 2000   Drainage Description Other (Comment) 10/20/24 1600   Odor Other (comment) 10/20/24 1600   Franci-wound Assessment Other (Comment) 10/20/24 1600   Margins Other (Comment) 10/20/24 1600   Number of days: 4       Wound 10/17/24 Leg Left;Proximal;Lower amputation (Active)   Dressing Status Clean;Dry;Intact 10/21/24 1253   Wound Cleansed Other (Comment) 10/20/24 2000   Dressing/Treatment Ace wrap 10/20/24 2000   Wound Assessment Other (Comment) 10/20/24 2000   Drainage Amount Other (Comment) 10/20/24 2000   Drainage Description Other (Comment) 10/20/24 1600   Odor Other (comment) 10/20/24 1600   Franci-wound Assessment Other (Comment) 10/20/24 1600   Margins Other (Comment) 10/20/24 1600   Number of days: 4       Wound 10/18/24 Foot Right (Active)   Wound Etiology Diabetic 10/20/24 2000   Dressing Status Clean;Dry;Intact 10/20/24 2000   Wound Cleansed Not Cleansed 10/20/24 2000   Dressing/Treatment Dry dressing 10/20/24 2000   Wound Assessment Other (Comment) 10/20/24 2000   Drainage Amount Other (Comment) 10/20/24 2000   Drainage Description Other (Comment) 10/20/24 2000   Odor Other (comment) 10/20/24 1600   Franci-wound Assessment Other (Comment) 10/20/24 1600   Margins Other (Comment) 10/20/24 1600   Number of days: 3        Elimination:  Continence:   Bowel: Yes  Bladder: Yes  Urinary Catheter: None   Colostomy/Ileostomy/Ileal Conduit: Yes       Date of Last BM: 10/25    Intake/Output Summary

## 2024-10-21 NOTE — CARE COORDINATION
Received social work consult for housing needs.  Met with pt to complete assessment and offer resources.  Pt states that prior to admission she was staying at a hotel.  She plans to go to rehab upon discharge and then once discharged from rehab she will be going to live with her boyfriend in [place].  Pt was not interested in housing resources at this time.  Noted from chart pt is dealing with some recent depression and was evaluated by psychiatry.  Provided pt with list of mental health agencies in [facility] and also in [place] for follow up upon discharge from rehab.

## 2024-10-21 NOTE — CARE COORDINATION
Met with patient to discuss transitional planning. She plans on going to SNF. Referrals sent to [facility], [facility], [facility], [facility] as requested    1255 spoke to Bethanie from [facility]. Requested precert to be started. Patient updated    1640 Zahra requesting HENS and updated PT/OT. Notified her that HENS is completed and updated OT is available. She will submit

## 2024-10-21 NOTE — PROGRESS NOTES
Inpatient Diabetes Education -  Follow up Visit 10/21/24    Initial note from Adina Huber RN copied below for background assessment:     Jane Saavedra was seen for evaluation and education on       Lab Results   Component Value Date    LABA1C 13.4 09/12/2024    LABA1C 10.4 08/08/2023    LABA1C 12.2 07/10/2023     Per chart review, Jane had an office visit with her PCP in Sept of 2024. A portion of that note highlights the plan is below for reference.       The patient gave me the update that she has been able to get short acting insulin. We covered in detail types of insulin and insulin action times.     Patient has a general understanding that she should avoid sugary beverages. She likes water and will only have diet or \"zero\" pop/beverages occasionally to get a break from plain water. Jane states that she does not know about carb counting. We had a good introduction to this concept.     Following Survival Skills education topics were covered as appropriate to patient plan of for care:  _X__ A1c - hand out given and reviewed  _X__ Healthy eating - Carb Cting book given and reviewed  _X__ Blood Glucose Self-Monitoring   __X_ Blood sugar targets Pre meal 80 - 130 and 2 hours post meal < 180  __X_ Hyperglycemia  (BG over 250) signs and symptoms and treatment   _X__ Hypoglycemia (BG < than 70) signs and symptoms and treatment \"Rule of 15\"  _X__ Medications -  Insulin Lantus - Basal (long acting)/Humalog -  Bolus (pre meal) regime - insulin action times   _X__ Importance of dosing pre meal rapid insulin from BG result at time of start of meal & administering  within 15 minutes of eating meals   __X_ Importance of taking long acting insulin at same time each day and not to skip doses       Education Folder provided with following support information was left at bedside:   _x__ Diabetes ID card - ADA Low and High Blood Sugar and treatments  _x__ [facility] Education brochure/ contact card    Patient  verbalizes understanding  of survival skills education.       RECOMMENDATIONS FOR OUTPATIENT PLAN:    Diabetes Self-Monitoring Supplies:  States that she has Dexcom G7 CGM at home. Reports that she also has a glucometer to use as a back up.    Diabetes Medications:  States that she has both long and short acting insulin at home.     Diabetes Education / HCP follow -up:   _x_ Would recommend follow-up education at outpatient diabetes education at [facility]. An ordered is needed for this service and can be placed via EHR. Discharge Navigator --- Med Reconciliation -- New orders for discharge tab -- search diabetic ed - REF20 -  [facility]  - review and sign   An order for outpatient Diabetes Education after discharge can come from the PCP or from a hospital provider.     _X__ Follow -up with HCP / PCP within one week.    Thank you for the referral. It was great idea to have a session with the patient while hospitalized. She was in a good stage of readiness to learn about improved blood sugar control.  SONAL BARBER RN       10/21/24  - Follow up  visit - provided general support and reassurance, patient did not have any follow up questions.  She related she was not feeling great today and asked for follow up tomorrow.  KRISTAL SHERMAN RN

## 2024-10-22 ENCOUNTER — APPOINTMENT (OUTPATIENT)
Dept: GENERAL RADIOLOGY | Age: 34
End: 2024-10-22
Payer: COMMERCIAL

## 2024-10-22 PROBLEM — M79.605 LEFT LEG PAIN: Status: ACTIVE | Noted: 2024-10-22

## 2024-10-22 LAB
ANION GAP SERPL CALCULATED.3IONS-SCNC: 13 MMOL/L (ref 9–16)
BACTERIA URNS QL MICRO: ABNORMAL
BASOPHILS # BLD: 0.05 K/UL (ref 0–0.2)
BASOPHILS NFR BLD: 0 % (ref 0–2)
BILIRUB UR QL STRIP: ABNORMAL
BUN SERPL-MCNC: 10 MG/DL (ref 6–20)
CALCIUM SERPL-MCNC: 8.6 MG/DL (ref 8.6–10.4)
CASTS #/AREA URNS LPF: ABNORMAL /LPF (ref 0–8)
CHLORIDE SERPL-SCNC: 98 MMOL/L (ref 98–107)
CLARITY UR: ABNORMAL
CO2 SERPL-SCNC: 24 MMOL/L (ref 20–31)
COLOR UR: ABNORMAL
CREAT SERPL-MCNC: 0.4 MG/DL (ref 0.5–0.9)
EOSINOPHIL # BLD: 0.23 K/UL (ref 0–0.44)
EOSINOPHILS RELATIVE PERCENT: 2 % (ref 1–4)
EPI CELLS #/AREA URNS HPF: ABNORMAL /HPF (ref 0–5)
ERYTHROCYTE [DISTWIDTH] IN BLOOD BY AUTOMATED COUNT: 13.6 % (ref 11.8–14.4)
GFR, ESTIMATED: >90 ML/MIN/1.73M2
GLUCOSE BLD-MCNC: 157 MG/DL (ref 65–105)
GLUCOSE BLD-MCNC: 188 MG/DL (ref 65–105)
GLUCOSE BLD-MCNC: 188 MG/DL (ref 65–105)
GLUCOSE BLD-MCNC: 192 MG/DL (ref 65–105)
GLUCOSE SERPL-MCNC: 206 MG/DL (ref 74–99)
GLUCOSE UR STRIP-MCNC: ABNORMAL MG/DL
HCT VFR BLD AUTO: 39.2 % (ref 36.3–47.1)
HGB BLD-MCNC: 12.4 G/DL (ref 11.9–15.1)
HGB UR QL STRIP.AUTO: ABNORMAL
IMM GRANULOCYTES # BLD AUTO: 0.04 K/UL (ref 0–0.3)
IMM GRANULOCYTES NFR BLD: 0 %
KETONES UR STRIP-MCNC: ABNORMAL MG/DL
LEUKOCYTE ESTERASE UR QL STRIP: ABNORMAL
LYMPHOCYTES NFR BLD: 3.19 K/UL (ref 1.1–3.7)
LYMPHOCYTES RELATIVE PERCENT: 22 % (ref 24–43)
MCH RBC QN AUTO: 28.6 PG (ref 25.2–33.5)
MCHC RBC AUTO-ENTMCNC: 31.6 G/DL (ref 28.4–34.8)
MCV RBC AUTO: 90.3 FL (ref 82.6–102.9)
MONOCYTES NFR BLD: 1.17 K/UL (ref 0.1–1.2)
MONOCYTES NFR BLD: 8 % (ref 3–12)
MUCOUS THREADS URNS QL MICRO: ABNORMAL
NEUTROPHILS NFR BLD: 68 % (ref 36–65)
NEUTS SEG NFR BLD: 10.07 K/UL (ref 1.5–8.1)
NITRITE UR QL STRIP: NEGATIVE
NRBC BLD-RTO: 0 PER 100 WBC
PH UR STRIP: 5.5 [PH] (ref 5–8)
PLATELET # BLD AUTO: 461 K/UL (ref 138–453)
PMV BLD AUTO: 9.2 FL (ref 8.1–13.5)
POTASSIUM SERPL-SCNC: 4 MMOL/L (ref 3.7–5.3)
PROT UR STRIP-MCNC: ABNORMAL MG/DL
RBC # BLD AUTO: 4.34 M/UL (ref 3.95–5.11)
RBC #/AREA URNS HPF: ABNORMAL /HPF (ref 0–4)
SODIUM SERPL-SCNC: 135 MMOL/L (ref 136–145)
SP GR UR STRIP: 1.04 (ref 1–1.03)
UROBILINOGEN UR STRIP-ACNC: NORMAL EU/DL (ref 0–1)
WBC #/AREA URNS HPF: ABNORMAL /HPF (ref 0–5)
WBC OTHER # BLD: 14.8 K/UL (ref 3.5–11.3)
YEAST URNS QL MICRO: ABNORMAL

## 2024-10-22 PROCEDURE — 6360000002 HC RX W HCPCS

## 2024-10-22 PROCEDURE — 6370000000 HC RX 637 (ALT 250 FOR IP)

## 2024-10-22 PROCEDURE — G0108 DIAB MANAGE TRN  PER INDIV: HCPCS

## 2024-10-22 PROCEDURE — 36415 COLL VENOUS BLD VENIPUNCTURE: CPT

## 2024-10-22 PROCEDURE — 97110 THERAPEUTIC EXERCISES: CPT

## 2024-10-22 PROCEDURE — 82947 ASSAY GLUCOSE BLOOD QUANT: CPT

## 2024-10-22 PROCEDURE — 81001 URINALYSIS AUTO W/SCOPE: CPT

## 2024-10-22 PROCEDURE — 99232 SBSQ HOSP IP/OBS MODERATE 35: CPT | Performed by: FAMILY MEDICINE

## 2024-10-22 PROCEDURE — 87086 URINE CULTURE/COLONY COUNT: CPT

## 2024-10-22 PROCEDURE — 71046 X-RAY EXAM CHEST 2 VIEWS: CPT

## 2024-10-22 PROCEDURE — 85025 COMPLETE CBC W/AUTO DIFF WBC: CPT

## 2024-10-22 PROCEDURE — 80048 BASIC METABOLIC PNL TOTAL CA: CPT

## 2024-10-22 PROCEDURE — 97116 GAIT TRAINING THERAPY: CPT

## 2024-10-22 PROCEDURE — 2060000000 HC ICU INTERMEDIATE R&B

## 2024-10-22 PROCEDURE — 2580000003 HC RX 258

## 2024-10-22 RX ORDER — INSULIN GLARGINE 100 [IU]/ML
40 INJECTION, SOLUTION SUBCUTANEOUS NIGHTLY
Status: DISCONTINUED | OUTPATIENT
Start: 2024-10-22 | End: 2024-10-25 | Stop reason: HOSPADM

## 2024-10-22 RX ORDER — APIXABAN 5 MG/1
5 TABLET, FILM COATED ORAL 2 TIMES DAILY
Qty: 60 TABLET | Refills: 0 | OUTPATIENT
Start: 2024-10-22

## 2024-10-22 RX ORDER — NITROFURANTOIN 25; 75 MG/1; MG/1
100 CAPSULE ORAL EVERY 12 HOURS SCHEDULED
Status: DISCONTINUED | OUTPATIENT
Start: 2024-10-22 | End: 2024-10-25 | Stop reason: HOSPADM

## 2024-10-22 RX ORDER — GRANULES FOR ORAL 3 G/1
3 POWDER ORAL ONCE
Status: DISCONTINUED | OUTPATIENT
Start: 2024-10-22 | End: 2024-10-22

## 2024-10-22 RX ADMIN — INSULIN LISPRO 5 UNITS: 100 INJECTION, SOLUTION INTRAVENOUS; SUBCUTANEOUS at 08:09

## 2024-10-22 RX ADMIN — METHOCARBAMOL 750 MG: 750 TABLET ORAL at 23:43

## 2024-10-22 RX ADMIN — OXYCODONE 10 MG: 5 TABLET ORAL at 23:43

## 2024-10-22 RX ADMIN — OXYCODONE 10 MG: 5 TABLET ORAL at 19:40

## 2024-10-22 RX ADMIN — SENNOSIDES AND DOCUSATE SODIUM 2 TABLET: 50; 8.6 TABLET ORAL at 19:40

## 2024-10-22 RX ADMIN — ACETAMINOPHEN 1000 MG: 500 TABLET ORAL at 05:00

## 2024-10-22 RX ADMIN — ISOSORBIDE MONONITRATE 30 MG: 30 TABLET, EXTENDED RELEASE ORAL at 08:07

## 2024-10-22 RX ADMIN — OXYCODONE 10 MG: 5 TABLET ORAL at 15:47

## 2024-10-22 RX ADMIN — OXYCODONE 10 MG: 5 TABLET ORAL at 09:21

## 2024-10-22 RX ADMIN — ACETAMINOPHEN 1000 MG: 500 TABLET ORAL at 12:26

## 2024-10-22 RX ADMIN — GABAPENTIN 300 MG: 300 CAPSULE ORAL at 12:26

## 2024-10-22 RX ADMIN — DULOXETINE HYDROCHLORIDE 20 MG: 20 CAPSULE, DELAYED RELEASE ORAL at 08:08

## 2024-10-22 RX ADMIN — NITROFURANTOIN MONOHYDRATE/MACROCRYSTALS 100 MG: 75; 25 CAPSULE ORAL at 21:05

## 2024-10-22 RX ADMIN — INSULIN LISPRO 6 UNITS: 100 INJECTION, SOLUTION INTRAVENOUS; SUBCUTANEOUS at 21:04

## 2024-10-22 RX ADMIN — METHOCARBAMOL 750 MG: 750 TABLET ORAL at 12:26

## 2024-10-22 RX ADMIN — FENTANYL CITRATE 50 MCG: 50 INJECTION, SOLUTION INTRAMUSCULAR; INTRAVENOUS at 06:31

## 2024-10-22 RX ADMIN — APIXABAN 5 MG: 5 TABLET, FILM COATED ORAL at 08:08

## 2024-10-22 RX ADMIN — GABAPENTIN 300 MG: 300 CAPSULE ORAL at 08:08

## 2024-10-22 RX ADMIN — OXYCODONE 10 MG: 5 TABLET ORAL at 00:33

## 2024-10-22 RX ADMIN — APIXABAN 5 MG: 5 TABLET, FILM COATED ORAL at 19:40

## 2024-10-22 RX ADMIN — LOSARTAN POTASSIUM 100 MG: 50 TABLET, FILM COATED ORAL at 08:07

## 2024-10-22 RX ADMIN — INSULIN GLARGINE 40 UNITS: 100 INJECTION, SOLUTION SUBCUTANEOUS at 21:05

## 2024-10-22 RX ADMIN — Medication 5 MG: at 00:33

## 2024-10-22 RX ADMIN — SODIUM CHLORIDE, PRESERVATIVE FREE 10 ML: 5 INJECTION INTRAVENOUS at 08:09

## 2024-10-22 RX ADMIN — INSULIN LISPRO 6 UNITS: 100 INJECTION, SOLUTION INTRAVENOUS; SUBCUTANEOUS at 12:27

## 2024-10-22 RX ADMIN — SENNOSIDES AND DOCUSATE SODIUM 2 TABLET: 50; 8.6 TABLET ORAL at 08:07

## 2024-10-22 RX ADMIN — INSULIN LISPRO 6 UNITS: 100 INJECTION, SOLUTION INTRAVENOUS; SUBCUTANEOUS at 08:08

## 2024-10-22 RX ADMIN — ACETAMINOPHEN 1000 MG: 500 TABLET ORAL at 21:05

## 2024-10-22 RX ADMIN — ATORVASTATIN CALCIUM 80 MG: 80 TABLET, FILM COATED ORAL at 19:41

## 2024-10-22 RX ADMIN — OXYCODONE 10 MG: 5 TABLET ORAL at 05:00

## 2024-10-22 RX ADMIN — ASPIRIN 81 MG: 81 TABLET, COATED ORAL at 08:08

## 2024-10-22 RX ADMIN — EZETIMIBE 10 MG: 10 TABLET ORAL at 08:07

## 2024-10-22 RX ADMIN — SODIUM CHLORIDE, PRESERVATIVE FREE 10 ML: 5 INJECTION INTRAVENOUS at 23:43

## 2024-10-22 RX ADMIN — INSULIN LISPRO 5 UNITS: 100 INJECTION, SOLUTION INTRAVENOUS; SUBCUTANEOUS at 15:47

## 2024-10-22 RX ADMIN — METOPROLOL SUCCINATE 50 MG: 25 TABLET, EXTENDED RELEASE ORAL at 08:07

## 2024-10-22 RX ADMIN — INSULIN LISPRO 5 UNITS: 100 INJECTION, SOLUTION INTRAVENOUS; SUBCUTANEOUS at 12:27

## 2024-10-22 RX ADMIN — METHOCARBAMOL 750 MG: 750 TABLET ORAL at 00:33

## 2024-10-22 RX ADMIN — METHOCARBAMOL 750 MG: 750 TABLET ORAL at 05:00

## 2024-10-22 RX ADMIN — GABAPENTIN 300 MG: 300 CAPSULE ORAL at 21:05

## 2024-10-22 RX ADMIN — POLYETHYLENE GLYCOL 3350 17 G: 17 POWDER, FOR SOLUTION ORAL at 21:05

## 2024-10-22 RX ADMIN — METHOCARBAMOL 750 MG: 750 TABLET ORAL at 17:08

## 2024-10-22 ASSESSMENT — ENCOUNTER SYMPTOMS
BACK PAIN: 0
SHORTNESS OF BREATH: 0
SORE THROAT: 0
NAUSEA: 0
COUGH: 0
DIARRHEA: 0
RHINORRHEA: 0
ABDOMINAL PAIN: 0

## 2024-10-22 ASSESSMENT — PAIN SCALES - GENERAL
PAINLEVEL_OUTOF10: 10
PAINLEVEL_OUTOF10: 9
PAINLEVEL_OUTOF10: 8
PAINLEVEL_OUTOF10: 9
PAINLEVEL_OUTOF10: 9
PAINLEVEL_OUTOF10: 3
PAINLEVEL_OUTOF10: 10
PAINLEVEL_OUTOF10: 9
PAINLEVEL_OUTOF10: 6
PAINLEVEL_OUTOF10: 9
PAINLEVEL_OUTOF10: 7
PAINLEVEL_OUTOF10: 4
PAINLEVEL_OUTOF10: 8

## 2024-10-22 ASSESSMENT — PAIN DESCRIPTION - DESCRIPTORS
DESCRIPTORS: DULL;SHARP
DESCRIPTORS: SHARP
DESCRIPTORS: DISCOMFORT
DESCRIPTORS: ACHING;BURNING
DESCRIPTORS: DULL;SHARP

## 2024-10-22 ASSESSMENT — PAIN DESCRIPTION - LOCATION
LOCATION: LEG

## 2024-10-22 ASSESSMENT — PAIN DESCRIPTION - ORIENTATION
ORIENTATION: LEFT

## 2024-10-22 ASSESSMENT — PAIN SCALES - WONG BAKER
WONGBAKER_NUMERICALRESPONSE: NO HURT
WONGBAKER_NUMERICALRESPONSE: NO HURT

## 2024-10-22 ASSESSMENT — PAIN - FUNCTIONAL ASSESSMENT
PAIN_FUNCTIONAL_ASSESSMENT: ACTIVITIES ARE NOT PREVENTED

## 2024-10-22 NOTE — PROGRESS NOTES
Inpatient Diabetes Education    Jane Saavedra was seen for follow. Previous progress note has been copied below for reference.    Lab Results   Component Value Date    LABA1C 13.4 09/12/2024    LABA1C 10.4 08/08/2023    LABA1C 12.2 07/10/2023     Jane remembered me from last week. A general overview done with her.     Verbally reviewed the following Diabetes Survival Skills with patient:   A1C, Blood glucose targets, hypo and hyperglycemia, importance of home blood glucose monitoring, healthy eating, be active as recommended by health care providers, take medications       Education Folder provided with following support information was left at bedside previously.  _x__  Handout - What is diabetes? cornerstones4 care 2019  _x__  Handout - Eating Healthy for Life at every Meal / Plate method and grocery list  from www.DiabetesBetterific [employer].org  _x__ Handout - How to Check Your Blood Sugar from www.DiabetesBetter[employer].org  _x__ Handout - Be safe with Needles teaching sheet - / www.[employer]abetes[employer]GA [employer]store.org    _x__ Handout - How to Use an Insulin Pen - handout with QR code - [employer] wise Current as of Sept 22 2021  _x__ Emergency Insert sheet for your Vehicle - ADA Diabetes Emergencies   _x__ Diabetes ID card - ADA Low and High Blood Sugar and treatments  _x__ [facility] Education brochure/ contact card    Patient verbalizes understanding  of survival skills education.    RECOMMENDATIONS FOR OUTPATIENT PLAN:  Per chart review and confirmed by patient, updated plan is for her to go to a SNF for a period of time. Discussed this concept with patient - to be an active participant in her diabetes care - be curious about insulin dosing and BG readings to help her prepare for a time of independent diabetes self-care.     Thank you for the referral.  Will sign off.  Adina Huber RN    The note below is from Friday of last week and has been included below for reference.   Inpatient Diabetes Education    Jane  STONEY Saavedra was seen for evaluation and education on       Lab Results   Component Value Date    LABA1C 13.4 09/12/2024    LABA1C 10.4 08/08/2023    LABA1C 12.2 07/10/2023     Per chart review, Jane had an office visit with her PCP in Sept of 2024. A portion of that note highlights the plan is below for reference.       The patient gave me the update that she has been able to get short acting insulin. We covered in detail types of insulin and insulin action times.     Patient has a general understanding that she should avoid sugary beverages. She likes water and will only have diet or \"zero\" pop/beverages occasionally to get a break from plain water. Jane states that she does not know about carb counting. We had a good introduction to this concept.     Following Survival Skills education topics were covered as appropriate to patient plan of for care:  _X__ A1c - hand out given and reviewed  _X__ Healthy eating - Carb Cting book given and reviewed  _X__ Blood Glucose Self-Monitoring   __X_ Blood sugar targets Pre meal 80 - 130 and 2 hours post meal < 180  __X_ Hyperglycemia  (BG over 250) signs and symptoms and treatment   _X__ Hypoglycemia (BG < than 70) signs and symptoms and treatment \"Rule of 15\"  _X__ Medications -  Insulin Lantus - Basal (long acting)/Humalog -  Bolus (pre meal) regime - insulin action times   _X__ Importance of dosing pre meal rapid insulin from BG result at time of start of meal & administering  within 15 minutes of eating meals   __X_ Importance of taking long acting insulin at same time each day and not to skip doses       Education Folder provided with following support information was left at bedside:   _x__ Diabetes ID card - ADA Low and High Blood Sugar and treatments  _x__ [facility] brochure/ contact card    Patient verbalizes understanding  of survival skills education.       RECOMMENDATIONS FOR OUTPATIENT PLAN:    Diabetes Self-Monitoring Supplies:  States that

## 2024-10-22 NOTE — PROGRESS NOTES
[facility] Medicine Inpatient Service  [facility]  Progress Note    Date:   10/22/2024  Patient name:  Jane Saavedra  Date of admission:  10/15/2024  2:57 PM  MRN:   [id number]  YOB: 1990    SUBJECTIVE/Last 24 hours update:     Patient was not present in the room  Getting chest x-ray done  CBC did show elevated white blood cell count from last night as well as this morning, sustaining at 14.8  Chest x-ray unremarkable.  UA shows trace leukocyte Estrace and microscopic urinalysis does show elevated white blood cell count of 20-50.    Patient has been vitally stable.  Pro-Moi, lactic and blood cultures showed no growth and are unremarkable.  Denies any dysuria, increased frequency.  Does state that she is currently on her menstrual cycle.       HPI:     34 y.o.  female with history of severe PAD, type 2 diabetes that is uncontrolled, chronic osteomyelitis, with amputation of left hallux, HFrEF, PAD/CAD, status post CABG x 3, Status post left leg popliteal thrombectomy in June 2024 that initially presented to outpatient vascular clinic with 3 days of pain in left leg as well as loss of motor function.  Arterial duplex done in the clinic showed absent tibial flow with left popliteal artery occlusion. ABIs were performed which show R Sara 0.61 and L 0.17. A1C from 9/12/24 is 13.4.  Patient was sent to the emergency department and was transferred to the OR with vascular. Had left lower extremity angiogram with popliteal artery thrombectomy as well as fasciotomy on that extremity. Had left BKA on 10/17/24.     REVIEW OF SYSTEMS:   Review of Systems   Constitutional:  Positive for appetite change and fatigue. Negative for chills and fever.   HENT:  Negative for rhinorrhea and sore throat.    Eyes:  Negative for visual disturbance.   Respiratory:  Negative for cough and shortness of breath.    Cardiovascular:  Negative for chest pain and leg swelling.   Gastrointestinal:  Negative for

## 2024-10-22 NOTE — PLAN OF CARE
Problem: Chronic Conditions and Co-morbidities  Goal: Patient's chronic conditions and co-morbidity symptoms are monitored and maintained or improved  10/22/2024 0844 by Demetri Calvert RN  Outcome: Progressing  10/22/2024 0155 by Cheyenne Abreu RN  Outcome: Progressing  Flowsheets (Taken 10/21/2024 2000)  Care Plan - Patient's Chronic Conditions and Co-Morbidity Symptoms are Monitored and Maintained or Improved:   Monitor and assess patient's chronic conditions and comorbid symptoms for stability, deterioration, or improvement   Collaborate with multidisciplinary team to address chronic and comorbid conditions and prevent exacerbation or deterioration   Update acute care plan with appropriate goals if chronic or comorbid symptoms are exacerbated and prevent overall improvement and discharge     Problem: Discharge Planning  Goal: Discharge to home or other facility with appropriate resources  10/22/2024 0844 by Demetri Calvert RN  Outcome: Progressing  10/22/2024 0155 by Cheyenne Abreu RN  Outcome: Progressing  Flowsheets (Taken 10/21/2024 2000)  Discharge to home or other facility with appropriate resources:   Identify barriers to discharge with patient and caregiver   Arrange for needed discharge resources and transportation as appropriate   Identify discharge learning needs (meds, wound care, etc)     Problem: Pain  Goal: Verbalizes/displays adequate comfort level or baseline comfort level  10/22/2024 0844 by Demetri Calvert RN  Outcome: Progressing  10/22/2024 0155 by Cheyenne Abreu RN  Outcome: Progressing     Problem: Safety - Adult  Goal: Free from fall injury  10/22/2024 0844 by Demetri Calvert RN  Outcome: Progressing  10/22/2024 0155 by Cheyenne Abreu RN  Outcome: Progressing     Problem: Skin/Tissue Integrity  Goal: Absence of new skin breakdown  Description: 1.  Monitor for areas of redness and/or skin breakdown  2.  Assess vascular access sites hourly  3.  Every 4-6 hours minimum:  Change oxygen

## 2024-10-22 NOTE — PROGRESS NOTES
Comprehensive Nutrition Assessment    Type and Reason for Visit:  Initial, RD Nutrition Re-Screen/LOS    Nutrition Recommendations/Plan:   Modify current diet to 3 carb choices, KILEY  Start low shawna/ high protein ONS BID  Will monitor labs, weights, intake, GI status, and plan of care.      Malnutrition Assessment:  Malnutrition Status:  No malnutrition (10/22/24 1456)    Context:  Acute Illness     Findings of the 6 clinical characteristics of malnutrition:  Energy Intake:  Mild decrease in energy intake (Comment)  Weight Loss:  No significant weight loss     Body Fat Loss:  No significant body fat loss     Muscle Mass Loss:  No significant muscle mass loss    Fluid Accumulation:  No significant fluid accumulation     Strength:  Not Performed    Nutrition Assessment:    Pt admit for lower limb ischemia. Pt had R BKA on 10/17. Seen for LOS. Pt states she had a decrease in appetite since being in the hospital but it is now stating to return. Noted 50-75% of lunch tray ate. Pt states she hasn't had any wt loss but has noticed some wt gain. Due to decreased appetite and recent amputation pt agreeable to high protein ONS.    Nutrition Related Findings:    No edema. Labs: Na 135, Glu 206, POC glu 188, 188. Meds: lantus, humalog, senokot. Wound Type: Surgical Incision       Current Nutrition Intake & Therapies:    Average Meal Intake: 51-75%  Average Supplements Intake: None Ordered  ADULT DIET; Regular; 3 carb choices (45 gm/meal); Low Fat/Low Chol/High Fiber/KILEY    Anthropometric Measures:  Height: 177.8 cm (5' 10\")  Ideal Body Weight (IBW): 150 lbs (68 kg)       Current Body Weight: 81.6 kg (179 lb 14.3 oz), 119.9 % IBW. Weight Source: Bed Scale  Current BMI (kg/m2): 25.8  Usual Body Weight: 75.9 kg (167 lb 5.3 oz) (7/12/24)  % Weight Change (Calculated): 7.5  Weight Adjustment For: Amputation  Total Adjusted Percentage (Calculated): 5.9  Adjusted Ideal Body Weight (lbs) (Calculated): 141.2 lbs  Adjusted Ideal Body  Weight (kg) (Calculated): 64.18 kg  Adjusted % Ideal Body Weight (Calculated): 127.4  Adjusted BMI (kg/m2) (Calculated): 27.3  BMI Categories: Overweight (BMI 25.0-29.9)    Estimated Daily Nutrient Needs:  Energy Requirements Based On: Kcal/kg  Weight Used for Energy Requirements: Current  Energy (kcal/day): 6987-9941 kcal/d per 20-25 kcal/kg  Weight Used for Protein Requirements: Ideal  Protein (g/day): 77-90 g/d per 1.2-1.4 g/kg  Method Used for Fluid Requirements: 1 ml/kcal  Fluid (ml/day): or per physician    Nutrition Diagnosis:   Increased nutrient needs related to  (healing) as evidenced by wounds    Nutrition Interventions:   Food and/or Nutrient Delivery: Start Oral Nutrition Supplement, Modify Current Diet  Nutrition Education/Counseling: No recommendation at this time  Coordination of Nutrition Care: Continue to monitor while inpatient       Goals:  Previous Goal Met:  (goal set)  Goals: Meet at least 75% of estimated needs, prior to discharge       Nutrition Monitoring and Evaluation:   Behavioral-Environmental Outcomes: None Identified  Food/Nutrient Intake Outcomes: Food and Nutrient Intake, Supplement Intake  Physical Signs/Symptoms Outcomes: Biochemical Data, Skin, Weight, GI Status, Fluid Status or Edema    Discharge Planning:    Too soon to determine     WILBER DIAZ  Contact: [phone]

## 2024-10-22 NOTE — PROGRESS NOTES
Division of Vascular Surgery             Progress Note      Name: Jane Saavedra  MRN: [id number]         Overnight Events:     None      Subjective:     Patient seen and examined at the bedside.  She reports that her pain is improving.  She continues to practice straightening her knee, and she is willing to work with PT      Physical Exam:     Vitals:  /65   Pulse 89   Temp 97.7 °F (36.5 °C) (Oral)   Resp 16   Ht 1.778 m (5' 10\")   Wt 81.6 kg (180 lb)   SpO2 98%   BMI 25.83 kg/m²     General appearance - alert, well appearing and in no acute distress  Mental status - oriented to person, place and time with normal affect  Head - normocephalic and atraumatic  Neck - supple, normal ROM  Chest - clear to auscultation, normal effort  Heart - normal rate, regular rhythm, no murmurs  Abdomen - soft, non-tender, non-distended,   Neurological - normal speech, no focal findings or movement disorder noted, cranial nerves II through XII grossly intact  Extremities - right groin soft, peripheral pulses palpable, no pedal edema or calf pain with palpation  Skin -left below-knee amputation sutures intact.  Dusky appearance to the lateral flap of amputation. small amount of serosanguineous drainage noted on dressings.  Foot Exam -warm, well-perfused bilaterally  Vascular Exam -Doppler signals to PT and AT right lower extremity              Imaging:   No results found.        Assessment:     Patient is a 34-year-old female who is POD 4 status post thrombectomy of left popliteal occlusion and left fasciotomy. POD 5 s/p LLE BKA      Plan:     Left below-knee amputation dressing sites changed by vascular surgery resident today at bedside.  Small amount of serosanguineous drainage noted to packing.  Dressings changed at bedside.  Repacked with half-inch iodoform gauze, fluffs, Kerlix, Ace wrap.    Patient no longer requires heparin.  Continue Eliquis per primary team  PM&R consulted for rehab placement.   Podiatry

## 2024-10-22 NOTE — PROGRESS NOTES
Physical Therapy  Facility/Department: [facility] 2- [facility]  Physical Therapy Daily Treatment Note    Name: Jane Saavedra  : 1990  MRN: [id number]  Date of Service: 10/22/2024    Discharge Recommendations:  Patient would benefit from continued therapy after discharge   PT Equipment Recommendations  Equipment Needed: No      Patient Diagnosis(es): The primary encounter diagnosis was Lower limb ischemia. Diagnoses of Left leg pain, Wound infection, and Popliteal artery occlusion, left (HCC) were also pertinent to this visit.  Past Medical History:  has a past medical history of Acute osteomyelitis, Bipolar 1 disorder (HCC), Cellulitis, Chronic diabetic ulcer of left foot determined by examination ([facility]), Depression, Diabetic foot infection ([facility]), H. pylori infection, History of recent hospitalization, Hyperlipidemia, Hypertension, Ischemic cardiomyopathy, Multiple vessel coronary artery disease, Neuropathy, Noncompliance, NSTEMI (non-ST elevated myocardial infarction) ([facility]), Osteomyelitis of foot, left, acute, PAD (peripheral artery disease) ([facility]), Renal abscess, left, STEMI (ST elevation myocardial infarction) ([facility]), Thrombus, Type II or unspecified type diabetes mellitus without mention of complication, uncontrolled, Under care of team, Under care of team, Under care of team, Under care of team, Under care of team, Under care of team, and Wellness examination.  Past Surgical History:  has a past surgical history that includes Carpal tunnel release (Left, 2020); Coronary artery bypass graft (N/A, 07/10/2023); back surgery (Left, 08/10/2023); vascular surgery ([facility], 2024); left atrial appendage ligation (07/10/2023); IR ABSCESS DRAINAGE (2023); Laminectomy and microdiscectomy lumbar spine (2024); Coronary angioplasty with stent (2023); Cardiac catheterization (2023); Toe amputation (Left, 2024); Toe amputation (Left, 2024); Toe amputation (Left, 2024);  vascular surgery (Left, 10/15/2024); and Leg amputation below knee (Left, 10/17/2024).    Assessment  Body Structures, Functions, Activity Limitations Requiring Skilled Therapeutic Intervention: Decreased functional mobility ;Decreased strength;Decreased safe awareness;Decreased endurance;Decreased balance;Decreased high-level IADLs  Assessment: pt completes bed mobility with SBA. STS to RW Pawan-CGA with vc for safe hand placement.  Ambulating with RW Pawan ~15ft x 2, requiring increased time.  Pt is unsafe to return to previous living siutation due to limited mobility and strength.  Pt will benefit from continued therapy to improve deficits and progress function.  Therapy Prognosis: Good  Activity Tolerance  Activity Tolerance: Patient tolerated treatment well;Patient limited by pain    Plan  Physical Therapy Plan  General Plan:  (5-6x week)  Current Treatment Recommendations: Strengthening, Balance training, Functional mobility training, Transfer training, Endurance training, Gait training, Therapeutic activities, Safety education & training, Patient/Caregiver education & training  Safety Devices  Type of Devices: Call light within reach, Gait belt, Left in chair, Nurse notified  Restraints  Restraints Initially in Place: No    Restrictions  Restrictions/Precautions  Restrictions/Precautions: Up as Tolerated, Weight Bearing  Required Braces or Orthoses?: No  Lower Extremity Weight Bearing Restrictions  Right Lower Extremity Weight Bearing: Weight Bearing As Tolerated  Partial Weight Bearing Percentage Or Pounds: wound on R plantar surface  Position Activity Restriction  Other position/activity restrictions: L BKA 10/17/24     Subjective  General  Patient assessed for rehabilitation services?: Yes  Response To Previous Treatment: Patient with no complaints from previous session.  Family / Caregiver Present: No  Follows Commands: Within Functional Limits  General Comment  Comments: pt laying supine in bed sleeping

## 2024-10-22 NOTE — PLAN OF CARE
Problem: Chronic Conditions and Co-morbidities  Goal: Patient's chronic conditions and co-morbidity symptoms are monitored and maintained or improved  Outcome: Progressing  Flowsheets (Taken 10/21/2024 2000)  Care Plan - Patient's Chronic Conditions and Co-Morbidity Symptoms are Monitored and Maintained or Improved:   Monitor and assess patient's chronic conditions and comorbid symptoms for stability, deterioration, or improvement   Collaborate with multidisciplinary team to address chronic and comorbid conditions and prevent exacerbation or deterioration   Update acute care plan with appropriate goals if chronic or comorbid symptoms are exacerbated and prevent overall improvement and discharge     Problem: Discharge Planning  Goal: Discharge to home or other facility with appropriate resources  Outcome: Progressing  Flowsheets (Taken 10/21/2024 2000)  Discharge to home or other facility with appropriate resources:   Identify barriers to discharge with patient and caregiver   Arrange for needed discharge resources and transportation as appropriate   Identify discharge learning needs (meds, wound care, etc)     Problem: Pain  Goal: Verbalizes/displays adequate comfort level or baseline comfort level  Outcome: Progressing     Problem: Safety - Adult  Goal: Free from fall injury  Outcome: Progressing     Problem: Skin/Tissue Integrity  Goal: Absence of new skin breakdown  Description: 1.  Monitor for areas of redness and/or skin breakdown  2.  Assess vascular access sites hourly  3.  Every 4-6 hours minimum:  Change oxygen saturation probe site  4.  Every 4-6 hours:  If on nasal continuous positive airway pressure, respiratory therapy assess nares and determine need for appliance change or resting period.  Outcome: Progressing     Problem: ABCDS Injury Assessment  Goal: Absence of physical injury  Outcome: Progressing

## 2024-10-23 ENCOUNTER — TELEPHONE (OUTPATIENT)
Dept: FAMILY MEDICINE CLINIC | Age: 34
End: 2024-10-23

## 2024-10-23 ENCOUNTER — APPOINTMENT (OUTPATIENT)
Dept: GENERAL RADIOLOGY | Age: 34
End: 2024-10-23
Payer: COMMERCIAL

## 2024-10-23 PROBLEM — N30.00 ACUTE CYSTITIS WITHOUT HEMATURIA: Status: ACTIVE | Noted: 2024-10-23

## 2024-10-23 PROBLEM — D75.839 THROMBOCYTOSIS: Status: ACTIVE | Noted: 2024-10-23

## 2024-10-23 LAB
ANION GAP SERPL CALCULATED.3IONS-SCNC: 9 MMOL/L (ref 9–16)
BASOPHILS # BLD: 0.06 K/UL (ref 0–0.2)
BASOPHILS NFR BLD: 0 % (ref 0–2)
BUN SERPL-MCNC: 9 MG/DL (ref 6–20)
CALCIUM SERPL-MCNC: 9.2 MG/DL (ref 8.6–10.4)
CHLORIDE SERPL-SCNC: 98 MMOL/L (ref 98–107)
CO2 SERPL-SCNC: 30 MMOL/L (ref 20–31)
CREAT SERPL-MCNC: 0.5 MG/DL (ref 0.5–0.9)
CRP SERPL HS-MCNC: 102 MG/L (ref 0–5)
EOSINOPHIL # BLD: 0.39 K/UL (ref 0–0.44)
EOSINOPHILS RELATIVE PERCENT: 3 % (ref 1–4)
ERYTHROCYTE [DISTWIDTH] IN BLOOD BY AUTOMATED COUNT: 13.5 % (ref 11.8–14.4)
ERYTHROCYTE [SEDIMENTATION RATE] IN BLOOD BY PHOTOMETRIC METHOD: 41 MM/HR (ref 0–20)
GFR, ESTIMATED: >90 ML/MIN/1.73M2
GLUCOSE BLD-MCNC: 153 MG/DL (ref 65–105)
GLUCOSE BLD-MCNC: 163 MG/DL (ref 65–105)
GLUCOSE BLD-MCNC: 166 MG/DL (ref 65–105)
GLUCOSE BLD-MCNC: 179 MG/DL (ref 65–105)
GLUCOSE SERPL-MCNC: 149 MG/DL (ref 74–99)
HCT VFR BLD AUTO: 37.3 % (ref 36.3–47.1)
HGB BLD-MCNC: 11.9 G/DL (ref 11.9–15.1)
IMM GRANULOCYTES # BLD AUTO: 0.07 K/UL (ref 0–0.3)
IMM GRANULOCYTES NFR BLD: 1 %
LYMPHOCYTES NFR BLD: 3.71 K/UL (ref 1.1–3.7)
LYMPHOCYTES RELATIVE PERCENT: 24 % (ref 24–43)
MCH RBC QN AUTO: 29.1 PG (ref 25.2–33.5)
MCHC RBC AUTO-ENTMCNC: 31.9 G/DL (ref 28.4–34.8)
MCV RBC AUTO: 91.2 FL (ref 82.6–102.9)
MICROORGANISM SPEC CULT: NORMAL
MONOCYTES NFR BLD: 1.09 K/UL (ref 0.1–1.2)
MONOCYTES NFR BLD: 7 % (ref 3–12)
NEUTROPHILS NFR BLD: 65 % (ref 36–65)
NEUTS SEG NFR BLD: 9.91 K/UL (ref 1.5–8.1)
NRBC BLD-RTO: 0 PER 100 WBC
PLATELET # BLD AUTO: 495 K/UL (ref 138–453)
PMV BLD AUTO: 9.1 FL (ref 8.1–13.5)
POTASSIUM SERPL-SCNC: 4.2 MMOL/L (ref 3.7–5.3)
RBC # BLD AUTO: 4.09 M/UL (ref 3.95–5.11)
SODIUM SERPL-SCNC: 137 MMOL/L (ref 136–145)
SPECIMEN DESCRIPTION: NORMAL
WBC OTHER # BLD: 15.2 K/UL (ref 3.5–11.3)

## 2024-10-23 PROCEDURE — 2580000003 HC RX 258

## 2024-10-23 PROCEDURE — 80048 BASIC METABOLIC PNL TOTAL CA: CPT

## 2024-10-23 PROCEDURE — 82947 ASSAY GLUCOSE BLOOD QUANT: CPT

## 2024-10-23 PROCEDURE — 36415 COLL VENOUS BLD VENIPUNCTURE: CPT

## 2024-10-23 PROCEDURE — 6370000000 HC RX 637 (ALT 250 FOR IP)

## 2024-10-23 PROCEDURE — 99232 SBSQ HOSP IP/OBS MODERATE 35: CPT | Performed by: FAMILY MEDICINE

## 2024-10-23 PROCEDURE — 86140 C-REACTIVE PROTEIN: CPT

## 2024-10-23 PROCEDURE — 85025 COMPLETE CBC W/AUTO DIFF WBC: CPT

## 2024-10-23 PROCEDURE — 99222 1ST HOSP IP/OBS MODERATE 55: CPT | Performed by: PODIATRIST

## 2024-10-23 PROCEDURE — 73630 X-RAY EXAM OF FOOT: CPT

## 2024-10-23 PROCEDURE — 85652 RBC SED RATE AUTOMATED: CPT

## 2024-10-23 PROCEDURE — 6370000000 HC RX 637 (ALT 250 FOR IP): Performed by: STUDENT IN AN ORGANIZED HEALTH CARE EDUCATION/TRAINING PROGRAM

## 2024-10-23 PROCEDURE — 2060000000 HC ICU INTERMEDIATE R&B

## 2024-10-23 RX ORDER — OXYCODONE HYDROCHLORIDE 5 MG/1
10 TABLET ORAL ONCE
Status: COMPLETED | OUTPATIENT
Start: 2024-10-23 | End: 2024-10-23

## 2024-10-23 RX ADMIN — SENNOSIDES AND DOCUSATE SODIUM 2 TABLET: 50; 8.6 TABLET ORAL at 08:36

## 2024-10-23 RX ADMIN — GABAPENTIN 300 MG: 300 CAPSULE ORAL at 20:40

## 2024-10-23 RX ADMIN — GABAPENTIN 300 MG: 300 CAPSULE ORAL at 08:35

## 2024-10-23 RX ADMIN — OXYCODONE 10 MG: 5 TABLET ORAL at 03:48

## 2024-10-23 RX ADMIN — DULOXETINE HYDROCHLORIDE 20 MG: 20 CAPSULE, DELAYED RELEASE ORAL at 08:35

## 2024-10-23 RX ADMIN — POLYETHYLENE GLYCOL 3350 17 G: 17 POWDER, FOR SOLUTION ORAL at 20:40

## 2024-10-23 RX ADMIN — OXYCODONE 10 MG: 5 TABLET ORAL at 01:53

## 2024-10-23 RX ADMIN — ISOSORBIDE MONONITRATE 30 MG: 30 TABLET, EXTENDED RELEASE ORAL at 08:35

## 2024-10-23 RX ADMIN — OXYCODONE 10 MG: 5 TABLET ORAL at 16:14

## 2024-10-23 RX ADMIN — ATORVASTATIN CALCIUM 80 MG: 80 TABLET, FILM COATED ORAL at 20:40

## 2024-10-23 RX ADMIN — ASPIRIN 81 MG: 81 TABLET, COATED ORAL at 08:35

## 2024-10-23 RX ADMIN — EZETIMIBE 10 MG: 10 TABLET ORAL at 08:35

## 2024-10-23 RX ADMIN — GABAPENTIN 300 MG: 300 CAPSULE ORAL at 14:03

## 2024-10-23 RX ADMIN — INSULIN LISPRO 5 UNITS: 100 INJECTION, SOLUTION INTRAVENOUS; SUBCUTANEOUS at 08:36

## 2024-10-23 RX ADMIN — NITROFURANTOIN MONOHYDRATE/MACROCRYSTALS 100 MG: 75; 25 CAPSULE ORAL at 08:35

## 2024-10-23 RX ADMIN — SENNOSIDES AND DOCUSATE SODIUM 2 TABLET: 50; 8.6 TABLET ORAL at 20:45

## 2024-10-23 RX ADMIN — METHOCARBAMOL 750 MG: 750 TABLET ORAL at 11:43

## 2024-10-23 RX ADMIN — Medication 5 MG: at 20:45

## 2024-10-23 RX ADMIN — NITROFURANTOIN MONOHYDRATE/MACROCRYSTALS 100 MG: 75; 25 CAPSULE ORAL at 20:46

## 2024-10-23 RX ADMIN — OXYCODONE 10 MG: 5 TABLET ORAL at 20:40

## 2024-10-23 RX ADMIN — OXYCODONE 10 MG: 5 TABLET ORAL at 12:29

## 2024-10-23 RX ADMIN — SODIUM CHLORIDE, PRESERVATIVE FREE 10 ML: 5 INJECTION INTRAVENOUS at 20:46

## 2024-10-23 RX ADMIN — METHOCARBAMOL 750 MG: 750 TABLET ORAL at 06:40

## 2024-10-23 RX ADMIN — ACETAMINOPHEN 1000 MG: 500 TABLET ORAL at 06:40

## 2024-10-23 RX ADMIN — APIXABAN 5 MG: 5 TABLET, FILM COATED ORAL at 08:36

## 2024-10-23 RX ADMIN — METOPROLOL SUCCINATE 50 MG: 25 TABLET, EXTENDED RELEASE ORAL at 08:35

## 2024-10-23 RX ADMIN — INSULIN LISPRO 5 UNITS: 100 INJECTION, SOLUTION INTRAVENOUS; SUBCUTANEOUS at 17:45

## 2024-10-23 RX ADMIN — APIXABAN 5 MG: 5 TABLET, FILM COATED ORAL at 20:40

## 2024-10-23 RX ADMIN — LOSARTAN POTASSIUM 100 MG: 50 TABLET, FILM COATED ORAL at 08:36

## 2024-10-23 RX ADMIN — ACETAMINOPHEN 1000 MG: 500 TABLET ORAL at 14:03

## 2024-10-23 RX ADMIN — INSULIN GLARGINE 40 UNITS: 100 INJECTION, SOLUTION SUBCUTANEOUS at 20:42

## 2024-10-23 RX ADMIN — ACETAMINOPHEN 1000 MG: 500 TABLET ORAL at 22:10

## 2024-10-23 RX ADMIN — SODIUM CHLORIDE, PRESERVATIVE FREE 10 ML: 5 INJECTION INTRAVENOUS at 08:37

## 2024-10-23 RX ADMIN — METHOCARBAMOL 750 MG: 750 TABLET ORAL at 17:45

## 2024-10-23 RX ADMIN — INSULIN LISPRO 5 UNITS: 100 INJECTION, SOLUTION INTRAVENOUS; SUBCUTANEOUS at 11:43

## 2024-10-23 RX ADMIN — OXYCODONE 10 MG: 5 TABLET ORAL at 08:36

## 2024-10-23 ASSESSMENT — PAIN DESCRIPTION - ORIENTATION
ORIENTATION: LEFT

## 2024-10-23 ASSESSMENT — PAIN SCALES - GENERAL
PAINLEVEL_OUTOF10: 10
PAINLEVEL_OUTOF10: 10
PAINLEVEL_OUTOF10: 0
PAINLEVEL_OUTOF10: 10
PAINLEVEL_OUTOF10: 7
PAINLEVEL_OUTOF10: 7
PAINLEVEL_OUTOF10: 0
PAINLEVEL_OUTOF10: 8
PAINLEVEL_OUTOF10: 9
PAINLEVEL_OUTOF10: 10
PAINLEVEL_OUTOF10: 0
PAINLEVEL_OUTOF10: 6
PAINLEVEL_OUTOF10: 9
PAINLEVEL_OUTOF10: 7
PAINLEVEL_OUTOF10: 8
PAINLEVEL_OUTOF10: 10
PAINLEVEL_OUTOF10: 8

## 2024-10-23 ASSESSMENT — PAIN DESCRIPTION - DESCRIPTORS
DESCRIPTORS: ACHING;BURNING
DESCRIPTORS: ACHING;DISCOMFORT;BURNING
DESCRIPTORS: ACHING;BURNING
DESCRIPTORS: ACHING;BURNING

## 2024-10-23 ASSESSMENT — ENCOUNTER SYMPTOMS
ALLERGIC/IMMUNOLOGIC NEGATIVE: 1
COLOR CHANGE: 1
RESPIRATORY NEGATIVE: 1
EYES NEGATIVE: 1

## 2024-10-23 ASSESSMENT — PAIN DESCRIPTION - LOCATION
LOCATION: LEG

## 2024-10-23 NOTE — PROGRESS NOTES
0130 Patient complaining of 10/10 breakthrough pain in residual limb from left BKA.  On-call resident Dr. Mariano notified via [facility], additional pain control requested. Informed provider patient received 10mg roxicodone PO around 2345 and wasn't due for another dose until 0345. Order received for one time dose of additional 10mg roxicodone PO. Provider states ok to give 0345 PRN dose if necessary. Care ongoing.

## 2024-10-23 NOTE — CONSULTS
vascular surgery  Patient's right lower extremity is stable with dry gangrene.  Will allow autoamputation and outpatient follow-up.  Podiatry signed off  Nursing to apply dressing daily Betadine wet-to-dry with DSD  Will discuss with Dr. Hemal Dailey DPM   Foot and Ankle Surgery  10/23/2024 at 3:33 PM    I performed a history and physical examination of the patient and discussed management with the resident. I reviewed the resident’s note and agree with the documented findings and plan of care. Any areas of disagreement are noted on the chart. I was personally present for the key portions of any procedures. I have documented in the chart those procedures where I was not present during the key portions. I have reviewed the Podiatry Resident progress note. I agree with the chief complaint, past medical history, past surgical history, allergies, medications, social and family history as documented unless otherwise noted below. Documentation of the HPI, Physical Exam and Medical Decision Making performed by medical students or scribes is based on my personal performance of the HPI, PE and MDM. I have personally evaluated this patient and have completed at least one if not all key elements of the E/M (history, physical exam, and MDM). Additional findings are as noted.     Radha Recio DPM on 10/23/2024 at 4:20 PM  Board Certified, American Board of Podiatric Surgery  Fellow, American College [employer] Foot [employer] Ankle Surgeons

## 2024-10-23 NOTE — TELEPHONE ENCOUNTER
Writer spoke with the patient to reschedule appointment on 11/01/2024, patient is currently in the hospital and stated she was not sure if she was going to rehab after, patient will schedule an appointment when she knows what the next steps are.

## 2024-10-23 NOTE — PLAN OF CARE
Problem: Chronic Conditions and Co-morbidities  Goal: Patient's chronic conditions and co-morbidity symptoms are monitored and maintained or improved  10/23/2024 1005 by Shy Shook RN  Outcome: Progressing     Problem: Discharge Planning  Goal: Discharge to home or other facility with appropriate resources  10/23/2024 1005 by Shy Shook RN  Outcome: Progressing     Problem: Pain  Goal: Verbalizes/displays adequate comfort level or baseline comfort level  10/23/2024 1005 by Shy Shook RN  Outcome: Progressing     Problem: Safety - Adult  Goal: Free from fall injury  10/23/2024 1005 by Shy Shook RN  Outcome: Progressing     Problem: Skin/Tissue Integrity  Goal: Absence of new skin breakdown  Description: 1.  Monitor for areas of redness and/or skin breakdown  2.  Assess vascular access sites hourly  3.  Every 4-6 hours minimum:  Change oxygen saturation probe site  4.  Every 4-6 hours:  If on nasal continuous positive airway pressure, respiratory therapy assess nares and determine need for appliance change or resting period.  10/23/2024 1005 by Shy Shook RN  Outcome: Progressing     Problem: ABCDS Injury Assessment  Goal: Absence of physical injury  10/23/2024 1005 by Shy Shook RN  Outcome: Progressing     Problem: Nutrition Deficit:  Goal: Optimize nutritional status  10/23/2024 1005 by Shy Shook RN  Outcome: Progressing

## 2024-10-23 NOTE — CARE COORDINATION
PS sent to Dr De Paz to notify that precert has been approved for [facility]. Patient updated

## 2024-10-23 NOTE — PROGRESS NOTES
Division of Vascular Surgery             Progress Note      Name: Jane Saavedra  MRN: [id number]         Overnight Events:     None      Subjective:     Patient seen and examined at the bedside.  She reports that her pain is improving, however she did have some burning-like sensations last night.  She continues to practice straightening her knee, and she is willing to work with PT      Physical Exam:     Vitals:  BP (!) 166/93   Pulse 83   Temp 98 °F (36.7 °C) (Oral)   Resp 18   Ht 1.778 m (5' 10\")   Wt 81.6 kg (180 lb)   SpO2 99%   BMI 25.83 kg/m²     General appearance - alert, well appearing and in no acute distress  Mental status - oriented to person, place and time with normal affect  Head - normocephalic and atraumatic  Neck - supple, normal ROM  Chest - clear to auscultation, normal effort  Heart - normal rate, regular rhythm, no murmurs  Abdomen - soft, non-tender, non-distended,   Neurological - normal speech, no focal findings or movement disorder noted, cranial nerves II through XII grossly intact  Extremities - right groin soft, peripheral pulses palpable, no pedal edema or calf pain with palpation  Skin -left below-knee amputation sutures intact.  Dusky appearance to the lateral flap of amputation. small amount of serosanguineous drainage noted on dressings.  Foot Exam -warm, well-perfused bilaterally  Vascular Exam -Doppler signals to PT and AT right lower extremity    Imaging:   No results found.        Assessment:     Patient is a 34-year-old female who is POD 4 status post thrombectomy of left popliteal occlusion and left fasciotomy. POD 6 s/p LLE BKA      Plan:     Left below-knee amputation dressing sites changed by vascular surgery resident today at bedside.  Small amount of serosanguineous drainage noted to packing.  Dressings changed at bedside, fluffs, Kerlix, Ace wrap.    Patient no longer requires heparin.  Continue Eliquis per primary team  PM&R consulted for rehab placement.

## 2024-10-23 NOTE — PLAN OF CARE
Problem: Chronic Conditions and Co-morbidities  Goal: Patient's chronic conditions and co-morbidity symptoms are monitored and maintained or improved  Outcome: Progressing  Flowsheets (Taken 10/22/2024 2000)  Care Plan - Patient's Chronic Conditions and Co-Morbidity Symptoms are Monitored and Maintained or Improved:   Monitor and assess patient's chronic conditions and comorbid symptoms for stability, deterioration, or improvement   Collaborate with multidisciplinary team to address chronic and comorbid conditions and prevent exacerbation or deterioration   Update acute care plan with appropriate goals if chronic or comorbid symptoms are exacerbated and prevent overall improvement and discharge     Problem: Discharge Planning  Goal: Discharge to home or other facility with appropriate resources  Outcome: Progressing  Flowsheets (Taken 10/22/2024 2000)  Discharge to home or other facility with appropriate resources:   Identify barriers to discharge with patient and caregiver   Arrange for needed discharge resources and transportation as appropriate   Identify discharge learning needs (meds, wound care, etc)     Problem: Pain  Goal: Verbalizes/displays adequate comfort level or baseline comfort level  Outcome: Progressing     Problem: Safety - Adult  Goal: Free from fall injury  Outcome: Progressing     Problem: Skin/Tissue Integrity  Goal: Absence of new skin breakdown  Description: 1.  Monitor for areas of redness and/or skin breakdown  2.  Assess vascular access sites hourly  3.  Every 4-6 hours minimum:  Change oxygen saturation probe site  4.  Every 4-6 hours:  If on nasal continuous positive airway pressure, respiratory therapy assess nares and determine need for appliance change or resting period.  Outcome: Progressing     Problem: ABCDS Injury Assessment  Goal: Absence of physical injury  Outcome: Progressing  Flowsheets (Taken 10/22/2024 2000)  Absence of Physical Injury: Implement safety measures based on

## 2024-10-23 NOTE — CARE COORDINATION
Received a VM from Bethanie from [facility] to inform that Precert is approved. Callback number [phone]

## 2024-10-24 ENCOUNTER — APPOINTMENT (OUTPATIENT)
Dept: CT IMAGING | Age: 34
End: 2024-10-24
Payer: COMMERCIAL

## 2024-10-24 PROBLEM — D72.829 LEUKOCYTOSIS: Status: ACTIVE | Noted: 2023-08-06

## 2024-10-24 LAB
ANION GAP SERPL CALCULATED.3IONS-SCNC: 9 MMOL/L (ref 9–16)
BASOPHILS # BLD: 0.05 K/UL (ref 0–0.2)
BASOPHILS NFR BLD: 0 % (ref 0–2)
BUN SERPL-MCNC: 8 MG/DL (ref 6–20)
CALCIUM SERPL-MCNC: 8.9 MG/DL (ref 8.6–10.4)
CHLORIDE SERPL-SCNC: 96 MMOL/L (ref 98–107)
CO2 SERPL-SCNC: 27 MMOL/L (ref 20–31)
CREAT SERPL-MCNC: 0.4 MG/DL (ref 0.5–0.9)
CRP SERPL HS-MCNC: 96.7 MG/L (ref 0–5)
EOSINOPHIL # BLD: 0.28 K/UL (ref 0–0.44)
EOSINOPHILS RELATIVE PERCENT: 2 % (ref 1–4)
ERYTHROCYTE [DISTWIDTH] IN BLOOD BY AUTOMATED COUNT: 13.4 % (ref 11.8–14.4)
ERYTHROCYTE [SEDIMENTATION RATE] IN BLOOD BY PHOTOMETRIC METHOD: 94 MM/HR (ref 0–20)
GFR, ESTIMATED: >90 ML/MIN/1.73M2
GLUCOSE BLD-MCNC: 140 MG/DL (ref 65–105)
GLUCOSE BLD-MCNC: 156 MG/DL (ref 65–105)
GLUCOSE BLD-MCNC: 199 MG/DL (ref 65–105)
GLUCOSE BLD-MCNC: 228 MG/DL (ref 65–105)
GLUCOSE SERPL-MCNC: 227 MG/DL (ref 74–99)
HCT VFR BLD AUTO: 37.1 % (ref 36.3–47.1)
HGB BLD-MCNC: 11.7 G/DL (ref 11.9–15.1)
IMM GRANULOCYTES # BLD AUTO: 0.06 K/UL (ref 0–0.3)
IMM GRANULOCYTES NFR BLD: 0 %
LYMPHOCYTES NFR BLD: 2.87 K/UL (ref 1.1–3.7)
LYMPHOCYTES RELATIVE PERCENT: 21 % (ref 24–43)
MCH RBC QN AUTO: 29.3 PG (ref 25.2–33.5)
MCHC RBC AUTO-ENTMCNC: 31.5 G/DL (ref 28.4–34.8)
MCV RBC AUTO: 93 FL (ref 82.6–102.9)
MONOCYTES NFR BLD: 1.09 K/UL (ref 0.1–1.2)
MONOCYTES NFR BLD: 8 % (ref 3–12)
NEUTROPHILS NFR BLD: 69 % (ref 36–65)
NEUTS SEG NFR BLD: 9.6 K/UL (ref 1.5–8.1)
NRBC BLD-RTO: 0 PER 100 WBC
PLATELET # BLD AUTO: 513 K/UL (ref 138–453)
PMV BLD AUTO: 9.2 FL (ref 8.1–13.5)
POTASSIUM SERPL-SCNC: 4.3 MMOL/L (ref 3.7–5.3)
RBC # BLD AUTO: 3.99 M/UL (ref 3.95–5.11)
SODIUM SERPL-SCNC: 132 MMOL/L (ref 136–145)
WBC OTHER # BLD: 14 K/UL (ref 3.5–11.3)

## 2024-10-24 PROCEDURE — 80048 BASIC METABOLIC PNL TOTAL CA: CPT

## 2024-10-24 PROCEDURE — 75635 CT ANGIO ABDOMINAL ARTERIES: CPT

## 2024-10-24 PROCEDURE — 99232 SBSQ HOSP IP/OBS MODERATE 35: CPT | Performed by: FAMILY MEDICINE

## 2024-10-24 PROCEDURE — 85305 CLOT INHIBIT PROT S TOTAL: CPT

## 2024-10-24 PROCEDURE — 6360000002 HC RX W HCPCS

## 2024-10-24 PROCEDURE — 2580000003 HC RX 258

## 2024-10-24 PROCEDURE — 6370000000 HC RX 637 (ALT 250 FOR IP)

## 2024-10-24 PROCEDURE — 36415 COLL VENOUS BLD VENIPUNCTURE: CPT

## 2024-10-24 PROCEDURE — 85302 CLOT INHIBIT PROT C ANTIGEN: CPT

## 2024-10-24 PROCEDURE — 85025 COMPLETE CBC W/AUTO DIFF WBC: CPT

## 2024-10-24 PROCEDURE — 85652 RBC SED RATE AUTOMATED: CPT

## 2024-10-24 PROCEDURE — 74176 CT ABD & PELVIS W/O CONTRAST: CPT

## 2024-10-24 PROCEDURE — 81241 F5 GENE: CPT

## 2024-10-24 PROCEDURE — 81240 F2 GENE: CPT

## 2024-10-24 PROCEDURE — 82947 ASSAY GLUCOSE BLOOD QUANT: CPT

## 2024-10-24 PROCEDURE — 85301 ANTITHROMBIN III ANTIGEN: CPT

## 2024-10-24 PROCEDURE — 6360000004 HC RX CONTRAST MEDICATION

## 2024-10-24 PROCEDURE — 2060000000 HC ICU INTERMEDIATE R&B

## 2024-10-24 PROCEDURE — 86140 C-REACTIVE PROTEIN: CPT

## 2024-10-24 RX ORDER — MORPHINE SULFATE 2 MG/ML
2 INJECTION, SOLUTION INTRAMUSCULAR; INTRAVENOUS EVERY 8 HOURS PRN
Status: DISCONTINUED | OUTPATIENT
Start: 2024-10-24 | End: 2024-10-25 | Stop reason: HOSPADM

## 2024-10-24 RX ORDER — IOPAMIDOL 755 MG/ML
100 INJECTION, SOLUTION INTRAVASCULAR
Status: COMPLETED | OUTPATIENT
Start: 2024-10-24 | End: 2024-10-24

## 2024-10-24 RX ADMIN — METHOCARBAMOL 750 MG: 750 TABLET ORAL at 17:43

## 2024-10-24 RX ADMIN — METHOCARBAMOL 750 MG: 750 TABLET ORAL at 00:10

## 2024-10-24 RX ADMIN — GABAPENTIN 300 MG: 300 CAPSULE ORAL at 21:28

## 2024-10-24 RX ADMIN — METOPROLOL SUCCINATE 50 MG: 25 TABLET, EXTENDED RELEASE ORAL at 08:27

## 2024-10-24 RX ADMIN — OXYCODONE 10 MG: 5 TABLET ORAL at 22:22

## 2024-10-24 RX ADMIN — INSULIN LISPRO 5 UNITS: 100 INJECTION, SOLUTION INTRAVENOUS; SUBCUTANEOUS at 17:43

## 2024-10-24 RX ADMIN — POLYETHYLENE GLYCOL 3350 17 G: 17 POWDER, FOR SOLUTION ORAL at 20:16

## 2024-10-24 RX ADMIN — INSULIN LISPRO 6 UNITS: 100 INJECTION, SOLUTION INTRAVENOUS; SUBCUTANEOUS at 17:44

## 2024-10-24 RX ADMIN — LOSARTAN POTASSIUM 100 MG: 50 TABLET, FILM COATED ORAL at 08:27

## 2024-10-24 RX ADMIN — MORPHINE SULFATE 2 MG: 2 INJECTION, SOLUTION INTRAMUSCULAR; INTRAVENOUS at 14:59

## 2024-10-24 RX ADMIN — DULOXETINE HYDROCHLORIDE 20 MG: 20 CAPSULE, DELAYED RELEASE ORAL at 08:27

## 2024-10-24 RX ADMIN — OXYCODONE 10 MG: 5 TABLET ORAL at 01:46

## 2024-10-24 RX ADMIN — IOPAMIDOL 125 ML: 755 INJECTION, SOLUTION INTRAVENOUS at 12:58

## 2024-10-24 RX ADMIN — SENNOSIDES AND DOCUSATE SODIUM 2 TABLET: 50; 8.6 TABLET ORAL at 08:27

## 2024-10-24 RX ADMIN — INSULIN GLARGINE 40 UNITS: 100 INJECTION, SOLUTION SUBCUTANEOUS at 21:28

## 2024-10-24 RX ADMIN — INSULIN LISPRO 6 UNITS: 100 INJECTION, SOLUTION INTRAVENOUS; SUBCUTANEOUS at 08:29

## 2024-10-24 RX ADMIN — NITROFURANTOIN MONOHYDRATE/MACROCRYSTALS 100 MG: 75; 25 CAPSULE ORAL at 08:27

## 2024-10-24 RX ADMIN — METHOCARBAMOL 750 MG: 750 TABLET ORAL at 06:24

## 2024-10-24 RX ADMIN — SODIUM CHLORIDE, PRESERVATIVE FREE 10 ML: 5 INJECTION INTRAVENOUS at 08:32

## 2024-10-24 RX ADMIN — MAGNESIUM HYDROXIDE 30 ML: 400 SUSPENSION ORAL at 14:59

## 2024-10-24 RX ADMIN — ACETAMINOPHEN 1000 MG: 500 TABLET ORAL at 13:04

## 2024-10-24 RX ADMIN — SODIUM CHLORIDE, PRESERVATIVE FREE 10 ML: 5 INJECTION INTRAVENOUS at 21:29

## 2024-10-24 RX ADMIN — ACETAMINOPHEN 1000 MG: 500 TABLET ORAL at 06:24

## 2024-10-24 RX ADMIN — GABAPENTIN 300 MG: 300 CAPSULE ORAL at 13:03

## 2024-10-24 RX ADMIN — PIPERACILLIN AND TAZOBACTAM 4500 MG: 4; .5 INJECTION, POWDER, FOR SOLUTION INTRAVENOUS at 20:16

## 2024-10-24 RX ADMIN — METHOCARBAMOL 750 MG: 750 TABLET ORAL at 13:04

## 2024-10-24 RX ADMIN — ATORVASTATIN CALCIUM 80 MG: 80 TABLET, FILM COATED ORAL at 21:27

## 2024-10-24 RX ADMIN — APIXABAN 5 MG: 5 TABLET, FILM COATED ORAL at 08:27

## 2024-10-24 RX ADMIN — INSULIN LISPRO 5 UNITS: 100 INJECTION, SOLUTION INTRAVENOUS; SUBCUTANEOUS at 13:04

## 2024-10-24 RX ADMIN — SENNOSIDES AND DOCUSATE SODIUM 2 TABLET: 50; 8.6 TABLET ORAL at 21:28

## 2024-10-24 RX ADMIN — NITROFURANTOIN MONOHYDRATE/MACROCRYSTALS 100 MG: 75; 25 CAPSULE ORAL at 21:29

## 2024-10-24 RX ADMIN — EZETIMIBE 10 MG: 10 TABLET ORAL at 08:27

## 2024-10-24 RX ADMIN — OXYCODONE 10 MG: 5 TABLET ORAL at 13:04

## 2024-10-24 RX ADMIN — ASPIRIN 81 MG: 81 TABLET, COATED ORAL at 08:27

## 2024-10-24 RX ADMIN — INSULIN LISPRO 5 UNITS: 100 INJECTION, SOLUTION INTRAVENOUS; SUBCUTANEOUS at 08:28

## 2024-10-24 RX ADMIN — OXYCODONE 10 MG: 5 TABLET ORAL at 08:28

## 2024-10-24 RX ADMIN — OXYCODONE 10 MG: 5 TABLET ORAL at 17:43

## 2024-10-24 RX ADMIN — ISOSORBIDE MONONITRATE 30 MG: 30 TABLET, EXTENDED RELEASE ORAL at 08:27

## 2024-10-24 RX ADMIN — ACETAMINOPHEN 1000 MG: 500 TABLET ORAL at 21:27

## 2024-10-24 RX ADMIN — GABAPENTIN 300 MG: 300 CAPSULE ORAL at 08:26

## 2024-10-24 ASSESSMENT — PAIN DESCRIPTION - ORIENTATION
ORIENTATION: LEFT

## 2024-10-24 ASSESSMENT — ENCOUNTER SYMPTOMS
RHINORRHEA: 0
SHORTNESS OF BREATH: 0
SORE THROAT: 0
ABDOMINAL PAIN: 0
COUGH: 0
NAUSEA: 0
BACK PAIN: 0
DIARRHEA: 0

## 2024-10-24 ASSESSMENT — PAIN DESCRIPTION - LOCATION
LOCATION: LEG
LOCATION: SHOULDER
LOCATION: LEG

## 2024-10-24 ASSESSMENT — PAIN SCALES - GENERAL
PAINLEVEL_OUTOF10: 6
PAINLEVEL_OUTOF10: 9
PAINLEVEL_OUTOF10: 7
PAINLEVEL_OUTOF10: 8
PAINLEVEL_OUTOF10: 10
PAINLEVEL_OUTOF10: 9
PAINLEVEL_OUTOF10: 9
PAINLEVEL_OUTOF10: 10
PAINLEVEL_OUTOF10: 10
PAINLEVEL_OUTOF10: 7
PAINLEVEL_OUTOF10: 8
PAINLEVEL_OUTOF10: 8
PAINLEVEL_OUTOF10: 9
PAINLEVEL_OUTOF10: 8
PAINLEVEL_OUTOF10: 8
PAINLEVEL_OUTOF10: 9

## 2024-10-24 ASSESSMENT — PAIN DESCRIPTION - DESCRIPTORS
DESCRIPTORS: ACHING;DISCOMFORT;BURNING
DESCRIPTORS: ACHING;BURNING;DISCOMFORT
DESCRIPTORS: ACHING;BURNING;DISCOMFORT;PRESSURE
DESCRIPTORS: ACHING;BURNING;DISCOMFORT
DESCRIPTORS: ACHING;BURNING;DISCOMFORT

## 2024-10-24 ASSESSMENT — PAIN SCALES - WONG BAKER: WONGBAKER_NUMERICALRESPONSE: NO HURT

## 2024-10-24 NOTE — PROGRESS NOTES
MD Marissa   17 g at 10/23/24 2040    methocarbamol (ROBAXIN) tablet 750 mg  750 mg Oral Q6H Jayne Melgoza, DO   750 mg at 10/24/24 0624    blood glucose test strips 1 strip  1 strip Other PRN Heaven Hall,         blood glucose test strips 1 strip  1 strip Other PRN Heaven Hall, DO        acetaminophen (TYLENOL) tablet 1,000 mg  1,000 mg Oral 3 times per day Jayne Melgoza, DO   1,000 mg at 10/24/24 0624    gabapentin (NEURONTIN) capsule 300 mg  300 mg Oral TID Jayne Melgoza, DO   300 mg at 10/24/24 0826    oxyCODONE (ROXICODONE) immediate release tablet 5 mg  5 mg Oral Q4H PRN Jayne Melgoza, DO   5 mg at 10/19/24 1646    Or    oxyCODONE (ROXICODONE) immediate release tablet 10 mg  10 mg Oral Q4H PRN Jayne Melgoza, DO   10 mg at 10/24/24 0828    hydrALAZINE (APRESOLINE) injection 5 mg  5 mg IntraVENous Q6H PRN Jayne Melgoza, DO        labetalol (NORMODYNE;TRANDATE) injection 5 mg  5 mg IntraVENous Q6H PRN Jayne Melgoza, DO   5 mg at 10/16/24 0111    melatonin tablet 5 mg  5 mg Oral Nightly PRN Jayne Melgoza, DO   5 mg at 10/23/24 2045    insulin lispro (HUMALOG,ADMELOG) injection vial 0-16 Units  0-16 Units SubCUTAneous 4x Daily AC & HS Jayne Melgoza, DO   6 Units at 10/24/24 0829    sodium chloride flush 0.9 % injection 5-40 mL  5-40 mL IntraVENous 2 times per day Jayne Melgoza, DO   10 mL at 10/23/24 2046    sodium chloride flush 0.9 % injection 5-40 mL  5-40 mL IntraVENous PRN Jayne Melgoza, DO        0.9 % sodium chloride infusion   IntraVENous PRN Jayne Melgoza, DO        potassium chloride (KLOR-CON M) extended release tablet 40 mEq  40 mEq Oral PRN Jayne Melgoza,         Or    potassium bicarb-citric acid (EFFER-K) effervescent tablet 40 mEq  40 mEq Oral PRN Jayne Melgoza, DO        Or    potassium chloride 10 mEq/100 mL IVPB (Peripheral Line)  10 mEq IntraVENous PRN Jayne Melgoza, DO        magnesium sulfate 2000 mg in 50 mL IVPB premix  2,000 mg IntraVENous PRN  Jayne Melgoza, DO        ondansetron (ZOFRAN-ODT) disintegrating tablet 4 mg  4 mg Oral Q8H PRN Jayne Melgoza DO        Or    ondansetron (ZOFRAN) injection 4 mg  4 mg IntraVENous Q6H PRN Jayne Melgoza, DO        apixaban (ELIQUIS) tablet 5 mg  5 mg Oral BID Chase, Quratulain, DO   5 mg at 10/24/24 0827    aspirin EC tablet 81 mg  81 mg Oral Daily Chase, Quratulain, DO   81 mg at 10/24/24 0827    atorvastatin (LIPITOR) tablet 80 mg  80 mg Oral Nightly Jayne Melgoza, DO   80 mg at 10/23/24 2040    ezetimibe (ZETIA) tablet 10 mg  10 mg Oral Daily Jayne Melgoza, DO   10 mg at 10/24/24 0827    isosorbide mononitrate (IMDUR) extended release tablet 30 mg  30 mg Oral Daily Jayne Melgoza, DO   30 mg at 10/24/24 0827    metoprolol succinate (TOPROL XL) extended release tablet 50 mg  50 mg Oral Daily Jayne Melgoza, DO   50 mg at 10/24/24 0827    glucose chewable tablet 16 g  4 tablet Oral PRN Jayne Melgoza,         dextrose bolus 10% 125 mL  125 mL IntraVENous PRN Jayne Megloza DO        Or    dextrose bolus 10% 250 mL  250 mL IntraVENous PRN Jayne Melgoza,         glucagon injection 1 mg  1 mg SubCUTAneous PRN Jayne Melgoza,         dextrose 10 % infusion   IntraVENous Continuous PRN Jayne Melgoza DO           ASSESSMENT:     Principal Problem:    Lower limb ischemia  Active Problems:    Dyslipidemia    Essential hypertension    Ischemic cardiomyopathy    Uncontrolled type 1 diabetes mellitus with hyperglycemia ([facility])    Diabetes ([facility])    Unilateral complete BKA, left, initial encounter ([facility])    Arteriosclerosis of arterial coronary artery bypass graft    HFrEF (heart failure with reduced ejection fraction) ([facility])    Adjustment disorder with depressed mood    Left leg pain    Acute cystitis without hematuria    Thrombocytosis  Resolved Problems:    * No resolved hospital problems. *      PLAN:     Status post left BKA secondary to severe PAD and limb ischemia  - POD 7  - Patient had left BKA

## 2024-10-24 NOTE — PROGRESS NOTES
Division of Vascular Surgery             Progress Note      Name: Jane Saavedra  MRN: [id number]         Overnight Events:     None    Rising WBC, UTI yesterday, started on Nitrofurantoin      Subjective:     Patient seen and examined at the bedside.  She reports that her pain is improving, however she did have some burning-like sensations last night.  She continues to practice straightening her knee, and she is willing to work with PT      Physical Exam:     Vitals:  BP (!) 146/90   Pulse 82   Temp 98.8 °F (37.1 °C) (Oral)   Resp 16   Ht 1.778 m (5' 10\")   Wt 81.6 kg (180 lb)   SpO2 94%   BMI 25.83 kg/m²     General appearance - alert, well appearing and in no acute distress  Mental status - oriented to person, place and time with normal affect  Head - normocephalic and atraumatic  Neck - supple, normal ROM  Chest - clear to auscultation, normal effort  Heart - normal rate, regular rhythm, no murmurs  Abdomen - soft, non-tender, non-distended,   Neurological - normal speech, no focal findings or movement disorder noted, cranial nerves II through XII grossly intact  Extremities - right groin soft, peripheral pulses palpable, no pedal edema or calf pain with palpation  Skin -left below-knee amputation sutures intact.  Dusky appearance to the lateral flap of amputation. small amount of serosanguineous drainage noted on dressings.  Foot Exam -warm, well-perfused bilaterally  Vascular Exam -Doppler signals to PT and AT right lower extremity    Imaging:   No results found.        Assessment:     Patient is a 34-year-old female who is status post thrombectomy of left popliteal occlusion and left fasciotomy. POD 7 s/p LLE BKA      Plan:     Left below-knee amputation dressing sites changed by vascular surgery resident today at bedside.  Small amount of serosanguineous drainage noted to packing.  Dressings changed at bedside, fluffs, Kerlix, Ace wrap.    Patient no longer requires heparin.  Continue Eliquis

## 2024-10-24 NOTE — PROGRESS NOTES
[facility] Medicine Inpatient Service  [facility]  Progress Note    Date:   10/24/2024  Patient name:  Jane Saavedra  Date of admission:  10/15/2024  2:57 PM  MRN:   [id number]  YOB: 1990    SUBJECTIVE/Last 24 hours update:     Patient seen and examined in the afternoon  Still has not had a bowel movement since surgery on 10/17  Denies any abdominal pain, cramping  Patient has been approved for pre-CERT with nursing facility.  Podiatry consulted to rule out any infection of the right foot, signed off.      HPI:     34 y.o.  female with history of severe PAD, type 2 diabetes that is uncontrolled, chronic osteomyelitis, with amputation of left hallux, HFrEF, PAD/CAD, status post CABG x 3, Status post left leg popliteal thrombectomy in June 2024 that initially presented to outpatient vascular clinic with 3 days of pain in left leg as well as loss of motor function.  Arterial duplex done in the clinic showed absent tibial flow with left popliteal artery occlusion. ABIs were performed which show R Sara 0.61 and L 0.17. A1C from 9/12/24 is 13.4.  Patient was sent to the emergency department and was transferred to the OR with vascular. Had left lower extremity angiogram with popliteal artery thrombectomy as well as fasciotomy on that extremity. Had left BKA on 10/17/24.     REVIEW OF SYSTEMS:   Review of Systems   Constitutional:  Positive for appetite change and fatigue. Negative for chills and fever.   HENT:  Negative for rhinorrhea and sore throat.    Eyes:  Negative for visual disturbance.   Respiratory:  Negative for cough and shortness of breath.    Cardiovascular:  Negative for chest pain and leg swelling.   Gastrointestinal:  Negative for abdominal pain, diarrhea and nausea.   Genitourinary:  Negative for dysuria and hematuria.   Musculoskeletal:  Positive for myalgias. Negative for back pain.   Skin:  Negative for rash.   Neurological:  Positive for weakness. Negative for  mother.    HOME MEDICATIONS:      Prior to Admission medications    Medication Sig Start Date End Date Taking? Authorizing Provider   insulin aspart (NOVOLOG FLEXPEN) 100 UNIT/ML injection pen Inject 3 Units into the skin 3 times daily (before meals) 10/7/24  Yes Marissa Wang MD   Misc. Devices MIS 1 PAIR OF DIABETIC SHOES (1 LEFT/ 1 RIGHT)  1-3 PAIRS OF INSERTS (LEFT/ RIGHT) 9/25/24  Yes Roberto Dailey DPM   Continuous Glucose Sensor (DEXCOM G7 SENSOR) MISC Apply new sensor to back of arm every 10 days. 9/24/24  Yes Marissa Wang MD   insulin glargine (BASAGLAR KWIKPEN) 100 UNIT/ML injection pen Inject 22 Units into the skin 2 times daily 9/10/24  Yes Marissa Wang MD   isosorbide mononitrate (IMDUR) 30 MG extended release tablet Take 1 tablet by mouth daily 9/2/24  Yes Dyan Mills MD   apixaban (ELIQUIS) 5 MG TABS tablet Take 1 tablet by mouth 2 times daily 9/1/24  Yes Dyan Mills MD   losartan (COZAAR) 25 MG tablet Take 1 tablet by mouth daily 9/2/24  Yes Dyan Mills MD   metoprolol succinate (TOPROL XL) 50 MG extended release tablet Take 1 tablet by mouth daily 9/1/24  Yes Dyan Mills MD   blood glucose monitor strips Test 1 times a day & as needed for symptoms of irregular blood glucose. Dispense sufficient amount for indicated testing frequency plus additional to accommodate PRN testing needs. 9/1/24  Yes Dyan Mills MD   glucose monitoring kit 1 kit by Does not apply route daily 9/1/24  Yes Dyan Mills MD   Lancets MISC 1 each by Does not apply route daily 9/1/24  Yes Dyan Mills MD   blood glucose monitor strips Test  times a day & as needed for symptoms of irregular blood glucose. Dispense sufficient amount for indicated testing frequency plus additional to accommodate PRN testing needs. 9/1/24  Yes Dyan Mills MD   Insulin Pen Needle 32G X 4 MM MISC 1 each by Does not apply route daily 9/1/24  Yes Dyan Mills MD   aspirin 81 MG EC tablet Take 1 tablet by mouth

## 2024-10-24 NOTE — PLAN OF CARE
Problem: Chronic Conditions and Co-morbidities  Goal: Patient's chronic conditions and co-morbidity symptoms are monitored and maintained or improved  10/24/2024 0952 by Shy Shook RN  Outcome: Progressing     Problem: Discharge Planning  Goal: Discharge to home or other facility with appropriate resources  10/24/2024 0952 by Shy Shook RN  Outcome: Progressing     Problem: Pain  Goal: Verbalizes/displays adequate comfort level or baseline comfort level  10/24/2024 0952 by Shy Shook RN  Outcome: Progressing     Problem: Safety - Adult  Goal: Free from fall injury  10/24/2024 0952 by Shy Shook RN  Outcome: Progressing     Problem: Skin/Tissue Integrity  Goal: Absence of new skin breakdown  Description: 1.  Monitor for areas of redness and/or skin breakdown  2.  Assess vascular access sites hourly  3.  Every 4-6 hours minimum:  Change oxygen saturation probe site  4.  Every 4-6 hours:  If on nasal continuous positive airway pressure, respiratory therapy assess nares and determine need for appliance change or resting period.  10/24/2024 0952 by Shy Shook RN  Outcome: Progressing     Problem: ABCDS Injury Assessment  Goal: Absence of physical injury  10/24/2024 0952 by Shy Shook RN  Outcome: Progressing     Problem: Nutrition Deficit:  Goal: Optimize nutritional status  10/24/2024 0952 by Shy Shook RN  Outcome: Progressing

## 2024-10-24 NOTE — CARE COORDINATION
Updated Bethanie from [facility]. She will check on how long precert is valid    1153 received call from Bethanie. Auth is valid until 10/27

## 2024-10-25 VITALS
WEIGHT: 180 LBS | BODY MASS INDEX: 25.77 KG/M2 | TEMPERATURE: 98 F | HEIGHT: 70 IN | SYSTOLIC BLOOD PRESSURE: 141 MMHG | RESPIRATION RATE: 18 BRPM | HEART RATE: 85 BPM | DIASTOLIC BLOOD PRESSURE: 82 MMHG | OXYGEN SATURATION: 99 %

## 2024-10-25 LAB
ANION GAP SERPL CALCULATED.3IONS-SCNC: 9 MMOL/L (ref 9–16)
BASOPHILS # BLD: 0.06 K/UL (ref 0–0.2)
BASOPHILS NFR BLD: 0 % (ref 0–2)
BUN SERPL-MCNC: 8 MG/DL (ref 6–20)
CALCIUM SERPL-MCNC: 9.4 MG/DL (ref 8.6–10.4)
CHLORIDE SERPL-SCNC: 97 MMOL/L (ref 98–107)
CO2 SERPL-SCNC: 27 MMOL/L (ref 20–31)
CREAT SERPL-MCNC: 0.4 MG/DL (ref 0.5–0.9)
CRP SERPL HS-MCNC: 105 MG/L (ref 0–5)
EOSINOPHIL # BLD: 0.25 K/UL (ref 0–0.44)
EOSINOPHILS RELATIVE PERCENT: 2 % (ref 1–4)
ERYTHROCYTE [DISTWIDTH] IN BLOOD BY AUTOMATED COUNT: 13.4 % (ref 11.8–14.4)
ERYTHROCYTE [SEDIMENTATION RATE] IN BLOOD BY PHOTOMETRIC METHOD: 102 MM/HR (ref 0–20)
GFR, ESTIMATED: >90 ML/MIN/1.73M2
GLUCOSE BLD-MCNC: 156 MG/DL (ref 65–105)
GLUCOSE BLD-MCNC: 214 MG/DL (ref 65–105)
GLUCOSE BLD-MCNC: 260 MG/DL (ref 65–105)
GLUCOSE SERPL-MCNC: 232 MG/DL (ref 74–99)
HCT VFR BLD AUTO: 38.8 % (ref 36.3–47.1)
HGB BLD-MCNC: 12.1 G/DL (ref 11.9–15.1)
IMM GRANULOCYTES # BLD AUTO: 0.06 K/UL (ref 0–0.3)
IMM GRANULOCYTES NFR BLD: 0 %
LYMPHOCYTES NFR BLD: 2.66 K/UL (ref 1.1–3.7)
LYMPHOCYTES RELATIVE PERCENT: 19 % (ref 24–43)
MCH RBC QN AUTO: 29 PG (ref 25.2–33.5)
MCHC RBC AUTO-ENTMCNC: 31.2 G/DL (ref 28.4–34.8)
MCV RBC AUTO: 93 FL (ref 82.6–102.9)
MONOCYTES NFR BLD: 0.96 K/UL (ref 0.1–1.2)
MONOCYTES NFR BLD: 7 % (ref 3–12)
NEUTROPHILS NFR BLD: 72 % (ref 36–65)
NEUTS SEG NFR BLD: 10.16 K/UL (ref 1.5–8.1)
NRBC BLD-RTO: 0 PER 100 WBC
PLATELET # BLD AUTO: 578 K/UL (ref 138–453)
PMV BLD AUTO: 9.1 FL (ref 8.1–13.5)
POTASSIUM SERPL-SCNC: 4.6 MMOL/L (ref 3.7–5.3)
RBC # BLD AUTO: 4.17 M/UL (ref 3.95–5.11)
SODIUM SERPL-SCNC: 133 MMOL/L (ref 136–145)
WBC OTHER # BLD: 14.2 K/UL (ref 3.5–11.3)

## 2024-10-25 PROCEDURE — 6370000000 HC RX 637 (ALT 250 FOR IP)

## 2024-10-25 PROCEDURE — 6360000002 HC RX W HCPCS

## 2024-10-25 PROCEDURE — 85652 RBC SED RATE AUTOMATED: CPT

## 2024-10-25 PROCEDURE — 80048 BASIC METABOLIC PNL TOTAL CA: CPT

## 2024-10-25 PROCEDURE — 85025 COMPLETE CBC W/AUTO DIFF WBC: CPT

## 2024-10-25 PROCEDURE — 86140 C-REACTIVE PROTEIN: CPT

## 2024-10-25 PROCEDURE — 82947 ASSAY GLUCOSE BLOOD QUANT: CPT

## 2024-10-25 PROCEDURE — 2580000003 HC RX 258

## 2024-10-25 PROCEDURE — 99239 HOSP IP/OBS DSCHRG MGMT >30: CPT | Performed by: STUDENT IN AN ORGANIZED HEALTH CARE EDUCATION/TRAINING PROGRAM

## 2024-10-25 PROCEDURE — 36415 COLL VENOUS BLD VENIPUNCTURE: CPT

## 2024-10-25 RX ORDER — DULOXETIN HYDROCHLORIDE 20 MG/1
20 CAPSULE, DELAYED RELEASE ORAL DAILY
Qty: 30 CAPSULE | Refills: 3 | Status: ON HOLD | OUTPATIENT
Start: 2024-10-26

## 2024-10-25 RX ORDER — BISACODYL 10 MG
10 SUPPOSITORY, RECTAL RECTAL DAILY PRN
Qty: 30 SUPPOSITORY | Refills: 0 | Status: ON HOLD | OUTPATIENT
Start: 2024-10-25 | End: 2024-11-24

## 2024-10-25 RX ORDER — METHOCARBAMOL 750 MG/1
750 TABLET, FILM COATED ORAL EVERY 6 HOURS
Qty: 40 TABLET | Refills: 0 | Status: ON HOLD | OUTPATIENT
Start: 2024-10-25 | End: 2024-11-04

## 2024-10-25 RX ORDER — INSULIN LISPRO 100 [IU]/ML
5 INJECTION, SOLUTION INTRAVENOUS; SUBCUTANEOUS
Qty: 30 EACH | Refills: 0 | Status: ON HOLD | OUTPATIENT
Start: 2024-10-25 | End: 2024-11-24

## 2024-10-25 RX ORDER — MORPHINE SULFATE 2 MG/ML
2 INJECTION, SOLUTION INTRAMUSCULAR; INTRAVENOUS ONCE
Status: COMPLETED | OUTPATIENT
Start: 2024-10-25 | End: 2024-10-25

## 2024-10-25 RX ORDER — LOSARTAN POTASSIUM 100 MG/1
100 TABLET ORAL DAILY
Qty: 30 TABLET | Refills: 3 | Status: ON HOLD | OUTPATIENT
Start: 2024-10-26

## 2024-10-25 RX ORDER — NITROFURANTOIN 25; 75 MG/1; MG/1
100 CAPSULE ORAL EVERY 12 HOURS SCHEDULED
Qty: 20 CAPSULE | Refills: 0 | Status: ON HOLD | OUTPATIENT
Start: 2024-10-25 | End: 2024-11-04

## 2024-10-25 RX ORDER — GABAPENTIN 300 MG/1
300 CAPSULE ORAL 3 TIMES DAILY
Qty: 90 CAPSULE | Refills: 0 | Status: ON HOLD | OUTPATIENT
Start: 2024-10-25 | End: 2024-11-24

## 2024-10-25 RX ORDER — ONDANSETRON 4 MG/1
4 TABLET, ORALLY DISINTEGRATING ORAL EVERY 8 HOURS PRN
Qty: 30 TABLET | Refills: 5 | Status: ON HOLD | OUTPATIENT
Start: 2024-10-25

## 2024-10-25 RX ORDER — POLYETHYLENE GLYCOL 3350 17 G/17G
17 POWDER, FOR SOLUTION ORAL DAILY PRN
Qty: 527 G | Refills: 1 | Status: ON HOLD | OUTPATIENT
Start: 2024-10-25 | End: 2024-12-26

## 2024-10-25 RX ORDER — INSULIN GLARGINE 100 [IU]/ML
40 INJECTION, SOLUTION SUBCUTANEOUS NIGHTLY
Qty: 10 ML | Refills: 3 | Status: ON HOLD | OUTPATIENT
Start: 2024-10-25

## 2024-10-25 RX ORDER — OXYCODONE HYDROCHLORIDE 5 MG/1
5 TABLET ORAL EVERY 6 HOURS PRN
Qty: 28 TABLET | Refills: 0 | Status: ON HOLD | OUTPATIENT
Start: 2024-10-25 | End: 2024-11-01

## 2024-10-25 RX ORDER — SENNA AND DOCUSATE SODIUM 50; 8.6 MG/1; MG/1
2 TABLET, FILM COATED ORAL 2 TIMES DAILY PRN
Qty: 60 TABLET | Refills: 0 | Status: ON HOLD | OUTPATIENT
Start: 2024-10-25

## 2024-10-25 RX ORDER — BISACODYL 10 MG
10 SUPPOSITORY, RECTAL RECTAL DAILY
Status: DISCONTINUED | OUTPATIENT
Start: 2024-10-25 | End: 2024-10-25 | Stop reason: HOSPADM

## 2024-10-25 RX ADMIN — METHOCARBAMOL 750 MG: 750 TABLET ORAL at 00:01

## 2024-10-25 RX ADMIN — INSULIN LISPRO 5 UNITS: 100 INJECTION, SOLUTION INTRAVENOUS; SUBCUTANEOUS at 16:53

## 2024-10-25 RX ADMIN — OXYCODONE 10 MG: 5 TABLET ORAL at 03:50

## 2024-10-25 RX ADMIN — INSULIN LISPRO 5 UNITS: 100 INJECTION, SOLUTION INTRAVENOUS; SUBCUTANEOUS at 09:21

## 2024-10-25 RX ADMIN — MORPHINE SULFATE 2 MG: 2 INJECTION, SOLUTION INTRAMUSCULAR; INTRAVENOUS at 00:00

## 2024-10-25 RX ADMIN — INSULIN LISPRO 6 UNITS: 100 INJECTION, SOLUTION INTRAVENOUS; SUBCUTANEOUS at 09:22

## 2024-10-25 RX ADMIN — SODIUM CHLORIDE, PRESERVATIVE FREE 10 ML: 5 INJECTION INTRAVENOUS at 09:21

## 2024-10-25 RX ADMIN — ACETAMINOPHEN 1000 MG: 500 TABLET ORAL at 06:41

## 2024-10-25 RX ADMIN — INSULIN LISPRO 5 UNITS: 100 INJECTION, SOLUTION INTRAVENOUS; SUBCUTANEOUS at 12:15

## 2024-10-25 RX ADMIN — GABAPENTIN 300 MG: 300 CAPSULE ORAL at 14:30

## 2024-10-25 RX ADMIN — ACETAMINOPHEN 1000 MG: 500 TABLET ORAL at 14:30

## 2024-10-25 RX ADMIN — METHOCARBAMOL 750 MG: 750 TABLET ORAL at 16:54

## 2024-10-25 RX ADMIN — EZETIMIBE 10 MG: 10 TABLET ORAL at 09:13

## 2024-10-25 RX ADMIN — OXYCODONE 10 MG: 5 TABLET ORAL at 10:17

## 2024-10-25 RX ADMIN — SENNOSIDES AND DOCUSATE SODIUM 2 TABLET: 50; 8.6 TABLET ORAL at 09:13

## 2024-10-25 RX ADMIN — APIXABAN 5 MG: 5 TABLET, FILM COATED ORAL at 09:13

## 2024-10-25 RX ADMIN — INSULIN LISPRO 10 UNITS: 100 INJECTION, SOLUTION INTRAVENOUS; SUBCUTANEOUS at 16:53

## 2024-10-25 RX ADMIN — MAGNESIUM HYDROXIDE 30 ML: 400 SUSPENSION ORAL at 09:13

## 2024-10-25 RX ADMIN — DULOXETINE HYDROCHLORIDE 20 MG: 20 CAPSULE, DELAYED RELEASE ORAL at 10:17

## 2024-10-25 RX ADMIN — OXYCODONE 10 MG: 5 TABLET ORAL at 17:05

## 2024-10-25 RX ADMIN — GABAPENTIN 300 MG: 300 CAPSULE ORAL at 09:13

## 2024-10-25 RX ADMIN — ASPIRIN 81 MG: 81 TABLET, COATED ORAL at 09:13

## 2024-10-25 RX ADMIN — BISACODYL 10 MG: 10 SUPPOSITORY RECTAL at 10:32

## 2024-10-25 RX ADMIN — LOSARTAN POTASSIUM 100 MG: 50 TABLET, FILM COATED ORAL at 09:13

## 2024-10-25 RX ADMIN — NITROFURANTOIN MONOHYDRATE/MACROCRYSTALS 100 MG: 75; 25 CAPSULE ORAL at 10:17

## 2024-10-25 RX ADMIN — METHOCARBAMOL 750 MG: 750 TABLET ORAL at 12:15

## 2024-10-25 RX ADMIN — FENTANYL CITRATE 50 MCG: 50 INJECTION, SOLUTION INTRAMUSCULAR; INTRAVENOUS at 06:45

## 2024-10-25 RX ADMIN — METOPROLOL SUCCINATE 50 MG: 25 TABLET, EXTENDED RELEASE ORAL at 09:13

## 2024-10-25 RX ADMIN — METHOCARBAMOL 750 MG: 750 TABLET ORAL at 06:41

## 2024-10-25 RX ADMIN — MORPHINE SULFATE 2 MG: 2 INJECTION, SOLUTION INTRAMUSCULAR; INTRAVENOUS at 09:10

## 2024-10-25 RX ADMIN — ISOSORBIDE MONONITRATE 30 MG: 30 TABLET, EXTENDED RELEASE ORAL at 09:13

## 2024-10-25 RX ADMIN — PIPERACILLIN SODIUM AND TAZOBACTAM SODIUM 3375 MG: 3; .375 INJECTION, SOLUTION INTRAVENOUS at 01:08

## 2024-10-25 ASSESSMENT — ENCOUNTER SYMPTOMS
SHORTNESS OF BREATH: 0
NAUSEA: 0
DIARRHEA: 0
COUGH: 0
RHINORRHEA: 0
SORE THROAT: 0
ABDOMINAL PAIN: 0
CONSTIPATION: 1
BACK PAIN: 0

## 2024-10-25 ASSESSMENT — PAIN SCALES - GENERAL
PAINLEVEL_OUTOF10: 5
PAINLEVEL_OUTOF10: 7
PAINLEVEL_OUTOF10: 5
PAINLEVEL_OUTOF10: 10
PAINLEVEL_OUTOF10: 0
PAINLEVEL_OUTOF10: 6
PAINLEVEL_OUTOF10: 9
PAINLEVEL_OUTOF10: 8

## 2024-10-25 ASSESSMENT — PAIN DESCRIPTION - ORIENTATION
ORIENTATION: LEFT
ORIENTATION: RIGHT;LEFT

## 2024-10-25 ASSESSMENT — PAIN DESCRIPTION - DESCRIPTORS
DESCRIPTORS: DISCOMFORT
DESCRIPTORS: ACHING;THROBBING;BURNING
DESCRIPTORS: BURNING;SHOOTING;ACHING

## 2024-10-25 ASSESSMENT — PAIN DESCRIPTION - LOCATION
LOCATION: LEG

## 2024-10-25 NOTE — PLAN OF CARE
Problem: Pain  Goal: Verbalizes/displays adequate comfort level or baseline comfort level  10/25/2024 1137 by Bridgett Rivera, RN  Outcome: Progressing     Problem: Safety - Adult  Goal: Free from fall injury  10/25/2024 1137 by Bridgett Rivera, RN  Outcome: Progressing

## 2024-10-25 NOTE — PROGRESS NOTES
Discharge order noted. AVS printed and reviewed with patient; all questions answered. RODRICK completed. Care plan and education completed. IV removed. All belongings sent with patient. Tele box removed. Patient discharged home to [facility]. Report called to RN at [facility].

## 2024-10-25 NOTE — PROGRESS NOTES
[facility] Medicine Inpatient Service  [facility]  Progress Note    Date:   10/25/2024  Patient name:  Jane Saavedra  Date of admission:  10/15/2024  2:57 PM  MRN:   [id number]  YOB: 1990    SUBJECTIVE/Last 24 hours update:     Patient seen and eval at bedside.  ANO X3.  Vital stable.    Pre-CERT has been completed.  No acute events overnight  Patient is visibly distressed, in tears, experiencing significant pain at the left BKA site.  States that she has not had a bowel movement.  Per vascular patient is okay to be discharged with outpatient follow-up.  Patient denies dysuria, frequency urgency, any pain during urination or any other urinary symptoms.  On nitrofurantoin for UTI  Patient denies any bowel movement, ordered CT abdomen, results pending          HPI:     34 y.o.  female with history of severe PAD, type 2 diabetes that is uncontrolled, chronic osteomyelitis, with amputation of left hallux, HFrEF, PAD/CAD, status post CABG x 3, Status post left leg popliteal thrombectomy in June 2024 that initially presented to outpatient vascular clinic with 3 days of pain in left leg as well as loss of motor function.  Arterial duplex done in the clinic showed absent tibial flow with left popliteal artery occlusion. ABIs were performed which show R Sara 0.61 and L 0.17. A1C from 9/12/24 is 13.4.  Patient was sent to the emergency department and was transferred to the OR with vascular. Had left lower extremity angiogram with popliteal artery thrombectomy as well as fasciotomy on that extremity. Had left BKA on 10/17/24.     REVIEW OF SYSTEMS:   Review of Systems   Constitutional:  Positive for fatigue. Negative for chills and fever.   HENT:  Negative for rhinorrhea and sore throat.    Eyes:  Negative for visual disturbance.   Respiratory:  Negative for cough and shortness of breath.    Cardiovascular:  Negative for chest pain and leg swelling.   Gastrointestinal:  Positive for

## 2024-10-25 NOTE — CARE COORDINATION
Voicemail left for Bethanie from [facility] requesting return call to notify that patient should be ready for discharge today    [phone] transportation request faxed to [facility]    1603 spoke to Mary from [facility]. Patient scheduled for 5 pm via [employer]. Patient updated. Bethanie from [facility] notified    [phone] AVS faxed    Discharge Report    [facility]  Clinical Case Management Department  Written by: Savannah Do RN    Patient Name: Jane Saavedra  Attending Provider: Eliseo De Paz MD  Admit Date: 10/15/2024  2:57 PM  MRN: [id number]  Account: [id number]                     : 1990  Discharge Date: 10/25/2024      Disposition: [facility]    Savannah Do RN

## 2024-10-25 NOTE — PROGRESS NOTES
Division of Vascular Surgery             Progress Note      Name: Jane Saavedra  MRN: [id number]         Overnight Events:     None      Subjective:     Patient seen and examined at the bedside.  She reports that her pain is improving, however she did have some burning-like sensations last night.  She continues to practice straightening her knee.      Physical Exam:     Vitals:  BP (!) 151/87   Pulse 82   Temp 98.3 °F (36.8 °C) (Oral)   Resp 14   Ht 1.778 m (5' 10\")   Wt 81.6 kg (180 lb)   SpO2 98%   BMI 25.83 kg/m²     General appearance - alert, well appearing and in no acute distress  Mental status - oriented to person, place and time with normal affect  Head - normocephalic and atraumatic  Neck - supple, normal ROM  Chest - clear to auscultation, normal effort  Heart - normal rate, regular rhythm, no murmurs  Abdomen - soft, non-tender, non-distended,   Neurological - normal speech, no focal findings or movement disorder noted, cranial nerves II through XII grossly intact  Extremities - right groin soft, peripheral pulses palpable, no pedal edema or calf pain with palpation  Skin -left below-knee amputation sutures intact.  Dusky appearance to the lateral flap of amputation. small amount of serosanguineous drainage noted on dressings.  Foot Exam -warm, well-perfused bilaterally  Vascular Exam -Doppler signals to PT and AT right lower extremity          Imaging:   No results found.        Assessment:     Patient is a 34-year-old female who is status post thrombectomy of left popliteal occlusion and left fasciotomy. POD 8 s/p LLE BKA      Plan:     CTA aorta with bilateral lower extremity runoff reviewed from yesterday.  Appears to have adequate blood supply to the left BKA site. She is okay to follow-up outpatient with us.  No future plans pending as of now for any surgical intervention  Left below-knee amputation dressing sites changed by vascular surgery resident today at bedside.  Small amount of

## 2024-10-25 NOTE — PLAN OF CARE
Problem: Chronic Conditions and Co-morbidities  Goal: Patient's chronic conditions and co-morbidity symptoms are monitored and maintained or improved  10/25/2024 1611 by Bridgett Rivera RN  Outcome: Completed  10/25/2024 0400 by Ambrocio Mckinnon RN  Outcome: Progressing     Problem: Discharge Planning  Goal: Discharge to home or other facility with appropriate resources  10/25/2024 1611 by Bridgett Rivera RN  Outcome: Completed  10/25/2024 0400 by Ambrocio Mckinnon RN  Outcome: Progressing     Problem: Pain  Goal: Verbalizes/displays adequate comfort level or baseline comfort level  10/25/2024 1611 by Bridgett Rivera RN  Outcome: Completed  10/25/2024 1137 by Bridgett Rivera RN  Outcome: Progressing  10/25/2024 0400 by Ambrocio Mckinnon RN  Outcome: Progressing     Problem: Safety - Adult  Goal: Free from fall injury  10/25/2024 1611 by Bridgett Rivera RN  Outcome: Completed  10/25/2024 1137 by Bridgett Rivera RN  Outcome: Progressing  10/25/2024 0400 by Ambrocio Mckinnon RN  Outcome: Progressing     Problem: Skin/Tissue Integrity  Goal: Absence of new skin breakdown  Description: 1.  Monitor for areas of redness and/or skin breakdown  2.  Assess vascular access sites hourly  3.  Every 4-6 hours minimum:  Change oxygen saturation probe site  4.  Every 4-6 hours:  If on nasal continuous positive airway pressure, respiratory therapy assess nares and determine need for appliance change or resting period.  10/25/2024 1611 by Bridgett Rivera RN  Outcome: Completed  10/25/2024 0400 by Ambrocio Mckinnon RN  Outcome: Progressing     Problem: ABCDS Injury Assessment  Goal: Absence of physical injury  10/25/2024 1611 by Bridgett Rivera RN  Outcome: Completed  10/25/2024 0400 by Ambrocio Mckinnon RN  Outcome: Progressing     Problem: Nutrition Deficit:  Goal: Optimize nutritional status  10/25/2024 1611 by Bridgett Rivera RN  Outcome: Completed  10/25/2024 0400 by Ambrocio Mckinnon RN  Outcome: Progressing

## 2024-10-26 ENCOUNTER — HOSPITAL ENCOUNTER (INPATIENT)
Age: 34
LOS: 2 days | Discharge: HOME HEALTH | DRG: 197 | End: 2024-10-28
Attending: EMERGENCY MEDICINE | Admitting: STUDENT IN AN ORGANIZED HEALTH CARE EDUCATION/TRAINING PROGRAM
Payer: COMMERCIAL

## 2024-10-26 DIAGNOSIS — Z78.9 UNABLE TO CARE FOR SELF: ICD-10-CM

## 2024-10-26 DIAGNOSIS — G89.18 POSTOPERATIVE PAIN: Primary | ICD-10-CM

## 2024-10-26 DIAGNOSIS — Z89.512 S/P BKA (BELOW KNEE AMPUTATION) UNILATERAL, LEFT (HCC): ICD-10-CM

## 2024-10-26 LAB
AT III AG ACT/NOR PPP IA: 128 % (ref 82–136)
MICROORGANISM SPEC CULT: NORMAL
MICROORGANISM SPEC CULT: NORMAL
PROT C AG ACT/NOR PPP IA: >95 % (ref 63–153)
PROT S AG ACT/NOR PPP IA: 165 % (ref 63–126)
SERVICE CMNT-IMP: NORMAL
SERVICE CMNT-IMP: NORMAL
SPECIMEN DESCRIPTION: NORMAL
SPECIMEN DESCRIPTION: NORMAL

## 2024-10-26 PROCEDURE — 6370000000 HC RX 637 (ALT 250 FOR IP): Performed by: EMERGENCY MEDICINE

## 2024-10-26 PROCEDURE — 99285 EMERGENCY DEPT VISIT HI MDM: CPT

## 2024-10-26 PROCEDURE — 2580000003 HC RX 258

## 2024-10-26 PROCEDURE — 1200000000 HC SEMI PRIVATE

## 2024-10-26 PROCEDURE — 6370000000 HC RX 637 (ALT 250 FOR IP)

## 2024-10-26 RX ORDER — ONDANSETRON 2 MG/ML
4 INJECTION INTRAMUSCULAR; INTRAVENOUS EVERY 6 HOURS PRN
Status: DISCONTINUED | OUTPATIENT
Start: 2024-10-26 | End: 2024-10-28 | Stop reason: HOSPADM

## 2024-10-26 RX ORDER — ASPIRIN 81 MG/1
81 TABLET ORAL DAILY
Status: DISCONTINUED | OUTPATIENT
Start: 2024-10-27 | End: 2024-10-28 | Stop reason: HOSPADM

## 2024-10-26 RX ORDER — GABAPENTIN 300 MG/1
300 CAPSULE ORAL 3 TIMES DAILY
Status: DISCONTINUED | OUTPATIENT
Start: 2024-10-26 | End: 2024-10-28 | Stop reason: HOSPADM

## 2024-10-26 RX ORDER — EZETIMIBE 10 MG/1
10 TABLET ORAL DAILY
Status: DISCONTINUED | OUTPATIENT
Start: 2024-10-27 | End: 2024-10-28 | Stop reason: HOSPADM

## 2024-10-26 RX ORDER — SODIUM CHLORIDE 0.9 % (FLUSH) 0.9 %
5-40 SYRINGE (ML) INJECTION EVERY 12 HOURS SCHEDULED
Status: DISCONTINUED | OUTPATIENT
Start: 2024-10-26 | End: 2024-10-28 | Stop reason: HOSPADM

## 2024-10-26 RX ORDER — METHOCARBAMOL 750 MG/1
750 TABLET, FILM COATED ORAL EVERY 6 HOURS
Status: DISCONTINUED | OUTPATIENT
Start: 2024-10-26 | End: 2024-10-28 | Stop reason: HOSPADM

## 2024-10-26 RX ORDER — GLUCAGON 1 MG/ML
1 KIT INJECTION PRN
Status: DISCONTINUED | OUTPATIENT
Start: 2024-10-26 | End: 2024-10-28 | Stop reason: HOSPADM

## 2024-10-26 RX ORDER — SENNA AND DOCUSATE SODIUM 50; 8.6 MG/1; MG/1
2 TABLET, FILM COATED ORAL 2 TIMES DAILY PRN
Status: DISCONTINUED | OUTPATIENT
Start: 2024-10-26 | End: 2024-10-28 | Stop reason: HOSPADM

## 2024-10-26 RX ORDER — ACETAMINOPHEN 325 MG/1
650 TABLET ORAL EVERY 6 HOURS PRN
Status: DISCONTINUED | OUTPATIENT
Start: 2024-10-26 | End: 2024-10-28 | Stop reason: HOSPADM

## 2024-10-26 RX ORDER — SODIUM CHLORIDE 9 MG/ML
INJECTION, SOLUTION INTRAVENOUS PRN
Status: DISCONTINUED | OUTPATIENT
Start: 2024-10-26 | End: 2024-10-28 | Stop reason: HOSPADM

## 2024-10-26 RX ORDER — POTASSIUM CHLORIDE 7.45 MG/ML
10 INJECTION INTRAVENOUS PRN
Status: DISCONTINUED | OUTPATIENT
Start: 2024-10-26 | End: 2024-10-28 | Stop reason: HOSPADM

## 2024-10-26 RX ORDER — OXYCODONE HYDROCHLORIDE 5 MG/1
5 TABLET ORAL EVERY 4 HOURS PRN
Status: DISCONTINUED | OUTPATIENT
Start: 2024-10-26 | End: 2024-10-28 | Stop reason: HOSPADM

## 2024-10-26 RX ORDER — LOSARTAN POTASSIUM 50 MG/1
100 TABLET ORAL DAILY
Status: DISCONTINUED | OUTPATIENT
Start: 2024-10-27 | End: 2024-10-28 | Stop reason: HOSPADM

## 2024-10-26 RX ORDER — INSULIN LISPRO 100 [IU]/ML
0-8 INJECTION, SOLUTION INTRAVENOUS; SUBCUTANEOUS
Status: DISCONTINUED | OUTPATIENT
Start: 2024-10-26 | End: 2024-10-28 | Stop reason: HOSPADM

## 2024-10-26 RX ORDER — NITROFURANTOIN 25; 75 MG/1; MG/1
100 CAPSULE ORAL EVERY 12 HOURS SCHEDULED
Status: DISCONTINUED | OUTPATIENT
Start: 2024-10-26 | End: 2024-10-28 | Stop reason: HOSPADM

## 2024-10-26 RX ORDER — ONDANSETRON 4 MG/1
4 TABLET, ORALLY DISINTEGRATING ORAL EVERY 8 HOURS PRN
Status: DISCONTINUED | OUTPATIENT
Start: 2024-10-26 | End: 2024-10-28 | Stop reason: HOSPADM

## 2024-10-26 RX ORDER — ACETAMINOPHEN 500 MG
1000 TABLET ORAL ONCE
Status: COMPLETED | OUTPATIENT
Start: 2024-10-26 | End: 2024-10-26

## 2024-10-26 RX ORDER — DULOXETIN HYDROCHLORIDE 20 MG/1
20 CAPSULE, DELAYED RELEASE ORAL DAILY
Status: DISCONTINUED | OUTPATIENT
Start: 2024-10-27 | End: 2024-10-28 | Stop reason: HOSPADM

## 2024-10-26 RX ORDER — METOPROLOL SUCCINATE 25 MG/1
50 TABLET, EXTENDED RELEASE ORAL DAILY
Status: DISCONTINUED | OUTPATIENT
Start: 2024-10-27 | End: 2024-10-28 | Stop reason: HOSPADM

## 2024-10-26 RX ORDER — POLYETHYLENE GLYCOL 3350 17 G/17G
17 POWDER, FOR SOLUTION ORAL DAILY
Status: DISCONTINUED | OUTPATIENT
Start: 2024-10-27 | End: 2024-10-28 | Stop reason: HOSPADM

## 2024-10-26 RX ORDER — INSULIN GLARGINE 100 [IU]/ML
40 INJECTION, SOLUTION SUBCUTANEOUS NIGHTLY
Status: DISCONTINUED | OUTPATIENT
Start: 2024-10-26 | End: 2024-10-28 | Stop reason: HOSPADM

## 2024-10-26 RX ORDER — MAGNESIUM SULFATE IN WATER 40 MG/ML
2000 INJECTION, SOLUTION INTRAVENOUS PRN
Status: DISCONTINUED | OUTPATIENT
Start: 2024-10-26 | End: 2024-10-28 | Stop reason: HOSPADM

## 2024-10-26 RX ORDER — ACETAMINOPHEN 650 MG/1
650 SUPPOSITORY RECTAL EVERY 6 HOURS PRN
Status: DISCONTINUED | OUTPATIENT
Start: 2024-10-26 | End: 2024-10-28 | Stop reason: HOSPADM

## 2024-10-26 RX ORDER — ISOSORBIDE MONONITRATE 30 MG/1
30 TABLET, EXTENDED RELEASE ORAL DAILY
Status: DISCONTINUED | OUTPATIENT
Start: 2024-10-27 | End: 2024-10-28 | Stop reason: HOSPADM

## 2024-10-26 RX ORDER — OXYCODONE HYDROCHLORIDE 5 MG/1
5 TABLET ORAL ONCE
Status: COMPLETED | OUTPATIENT
Start: 2024-10-26 | End: 2024-10-26

## 2024-10-26 RX ORDER — POTASSIUM CHLORIDE 1500 MG/1
40 TABLET, EXTENDED RELEASE ORAL PRN
Status: DISCONTINUED | OUTPATIENT
Start: 2024-10-26 | End: 2024-10-28 | Stop reason: HOSPADM

## 2024-10-26 RX ORDER — ATORVASTATIN CALCIUM 80 MG/1
80 TABLET, FILM COATED ORAL NIGHTLY
Status: DISCONTINUED | OUTPATIENT
Start: 2024-10-26 | End: 2024-10-28 | Stop reason: HOSPADM

## 2024-10-26 RX ORDER — INSULIN LISPRO 100 [IU]/ML
3 INJECTION, SOLUTION INTRAVENOUS; SUBCUTANEOUS
Status: DISCONTINUED | OUTPATIENT
Start: 2024-10-27 | End: 2024-10-27

## 2024-10-26 RX ORDER — DEXTROSE MONOHYDRATE 100 MG/ML
INJECTION, SOLUTION INTRAVENOUS CONTINUOUS PRN
Status: DISCONTINUED | OUTPATIENT
Start: 2024-10-26 | End: 2024-10-28 | Stop reason: HOSPADM

## 2024-10-26 RX ORDER — SODIUM CHLORIDE 0.9 % (FLUSH) 0.9 %
5-40 SYRINGE (ML) INJECTION PRN
Status: DISCONTINUED | OUTPATIENT
Start: 2024-10-26 | End: 2024-10-28 | Stop reason: HOSPADM

## 2024-10-26 RX ADMIN — GABAPENTIN 300 MG: 300 CAPSULE ORAL at 23:54

## 2024-10-26 RX ADMIN — METHOCARBAMOL 750 MG: 750 TABLET ORAL at 23:54

## 2024-10-26 RX ADMIN — OXYCODONE 5 MG: 5 TABLET ORAL at 21:46

## 2024-10-26 RX ADMIN — ACETAMINOPHEN 1000 MG: 500 TABLET ORAL at 21:46

## 2024-10-26 RX ADMIN — INSULIN GLARGINE 40 UNITS: 100 INJECTION, SOLUTION SUBCUTANEOUS at 23:55

## 2024-10-26 RX ADMIN — SODIUM CHLORIDE, PRESERVATIVE FREE 10 ML: 5 INJECTION INTRAVENOUS at 23:55

## 2024-10-26 RX ADMIN — APIXABAN 5 MG: 5 TABLET, FILM COATED ORAL at 23:54

## 2024-10-26 RX ADMIN — INSULIN LISPRO 4 UNITS: 100 INJECTION, SOLUTION INTRAVENOUS; SUBCUTANEOUS at 23:54

## 2024-10-26 RX ADMIN — ATORVASTATIN CALCIUM 80 MG: 80 TABLET, FILM COATED ORAL at 23:54

## 2024-10-26 ASSESSMENT — PAIN DESCRIPTION - DESCRIPTORS: DESCRIPTORS: DULL;BURNING

## 2024-10-26 ASSESSMENT — PAIN - FUNCTIONAL ASSESSMENT: PAIN_FUNCTIONAL_ASSESSMENT: 0-10

## 2024-10-26 ASSESSMENT — PAIN SCALES - GENERAL
PAINLEVEL_OUTOF10: 9
PAINLEVEL_OUTOF10: 10
PAINLEVEL_OUTOF10: 10

## 2024-10-26 ASSESSMENT — PAIN DESCRIPTION - ORIENTATION
ORIENTATION: LEFT
ORIENTATION: LEFT

## 2024-10-26 ASSESSMENT — PAIN DESCRIPTION - LOCATION
LOCATION: LEG
LOCATION: LEG

## 2024-10-27 PROBLEM — Z78.9 UNABLE TO CARE FOR SELF: Status: ACTIVE | Noted: 2024-10-27

## 2024-10-27 PROBLEM — G89.18 POSTOPERATIVE PAIN: Status: ACTIVE | Noted: 2024-10-27

## 2024-10-27 LAB
ANION GAP SERPL CALCULATED.3IONS-SCNC: 11 MMOL/L (ref 9–16)
BASOPHILS # BLD: 0.06 K/UL (ref 0–0.2)
BASOPHILS NFR BLD: 0 % (ref 0–2)
BUN SERPL-MCNC: 9 MG/DL (ref 6–20)
CALCIUM SERPL-MCNC: 9.4 MG/DL (ref 8.6–10.4)
CHLORIDE SERPL-SCNC: 99 MMOL/L (ref 98–107)
CO2 SERPL-SCNC: 28 MMOL/L (ref 20–31)
CREAT SERPL-MCNC: 0.4 MG/DL (ref 0.5–0.9)
EOSINOPHIL # BLD: 0.27 K/UL (ref 0–0.44)
EOSINOPHILS RELATIVE PERCENT: 2 % (ref 1–4)
ERYTHROCYTE [DISTWIDTH] IN BLOOD BY AUTOMATED COUNT: 13.6 % (ref 11.8–14.4)
GFR, ESTIMATED: >90 ML/MIN/1.73M2
GLUCOSE BLD-MCNC: 154 MG/DL (ref 65–105)
GLUCOSE BLD-MCNC: 208 MG/DL (ref 65–105)
GLUCOSE BLD-MCNC: 224 MG/DL (ref 65–105)
GLUCOSE BLD-MCNC: 227 MG/DL (ref 65–105)
GLUCOSE SERPL-MCNC: 171 MG/DL (ref 74–99)
HCT VFR BLD AUTO: 36.1 % (ref 36.3–47.1)
HGB BLD-MCNC: 11.4 G/DL (ref 11.9–15.1)
IMM GRANULOCYTES # BLD AUTO: 0.07 K/UL (ref 0–0.3)
IMM GRANULOCYTES NFR BLD: 1 %
LYMPHOCYTES NFR BLD: 4.16 K/UL (ref 1.1–3.7)
LYMPHOCYTES RELATIVE PERCENT: 29 % (ref 24–43)
MCH RBC QN AUTO: 29 PG (ref 25.2–33.5)
MCHC RBC AUTO-ENTMCNC: 31.6 G/DL (ref 28.4–34.8)
MCV RBC AUTO: 91.9 FL (ref 82.6–102.9)
MONOCYTES NFR BLD: 1.08 K/UL (ref 0.1–1.2)
MONOCYTES NFR BLD: 8 % (ref 3–12)
NEUTROPHILS NFR BLD: 60 % (ref 36–65)
NEUTS SEG NFR BLD: 8.76 K/UL (ref 1.5–8.1)
NRBC BLD-RTO: 0 PER 100 WBC
PLATELET # BLD AUTO: 611 K/UL (ref 138–453)
PMV BLD AUTO: 8.9 FL (ref 8.1–13.5)
POTASSIUM SERPL-SCNC: 4.3 MMOL/L (ref 3.7–5.3)
RBC # BLD AUTO: 3.93 M/UL (ref 3.95–5.11)
SODIUM SERPL-SCNC: 138 MMOL/L (ref 136–145)
WBC OTHER # BLD: 14.4 K/UL (ref 3.5–11.3)

## 2024-10-27 PROCEDURE — 99222 1ST HOSP IP/OBS MODERATE 55: CPT | Performed by: STUDENT IN AN ORGANIZED HEALTH CARE EDUCATION/TRAINING PROGRAM

## 2024-10-27 PROCEDURE — 1200000000 HC SEMI PRIVATE

## 2024-10-27 PROCEDURE — 80048 BASIC METABOLIC PNL TOTAL CA: CPT

## 2024-10-27 PROCEDURE — 2580000003 HC RX 258

## 2024-10-27 PROCEDURE — 85025 COMPLETE CBC W/AUTO DIFF WBC: CPT

## 2024-10-27 PROCEDURE — 36415 COLL VENOUS BLD VENIPUNCTURE: CPT

## 2024-10-27 PROCEDURE — 82947 ASSAY GLUCOSE BLOOD QUANT: CPT

## 2024-10-27 PROCEDURE — 6360000002 HC RX W HCPCS

## 2024-10-27 PROCEDURE — 6370000000 HC RX 637 (ALT 250 FOR IP)

## 2024-10-27 PROCEDURE — 99024 POSTOP FOLLOW-UP VISIT: CPT | Performed by: SURGERY

## 2024-10-27 RX ORDER — KETOROLAC TROMETHAMINE 15 MG/ML
15 INJECTION, SOLUTION INTRAMUSCULAR; INTRAVENOUS EVERY 6 HOURS PRN
Status: DISCONTINUED | OUTPATIENT
Start: 2024-10-27 | End: 2024-10-28 | Stop reason: HOSPADM

## 2024-10-27 RX ORDER — MORPHINE SULFATE 2 MG/ML
1 INJECTION, SOLUTION INTRAMUSCULAR; INTRAVENOUS EVERY 4 HOURS PRN
Status: DISCONTINUED | OUTPATIENT
Start: 2024-10-27 | End: 2024-10-27

## 2024-10-27 RX ORDER — INSULIN LISPRO 100 [IU]/ML
5 INJECTION, SOLUTION INTRAVENOUS; SUBCUTANEOUS
Status: DISCONTINUED | OUTPATIENT
Start: 2024-10-28 | End: 2024-10-28 | Stop reason: HOSPADM

## 2024-10-27 RX ADMIN — EZETIMIBE 10 MG: 10 TABLET ORAL at 08:37

## 2024-10-27 RX ADMIN — GABAPENTIN 300 MG: 300 CAPSULE ORAL at 08:37

## 2024-10-27 RX ADMIN — ACETAMINOPHEN 650 MG: 325 TABLET ORAL at 13:17

## 2024-10-27 RX ADMIN — MORPHINE SULFATE 1 MG: 2 INJECTION, SOLUTION INTRAMUSCULAR; INTRAVENOUS at 19:44

## 2024-10-27 RX ADMIN — NITROFURANTOIN MONOHYDRATE/MACROCRYSTALS 100 MG: 75; 25 CAPSULE ORAL at 08:46

## 2024-10-27 RX ADMIN — APIXABAN 5 MG: 5 TABLET, FILM COATED ORAL at 19:53

## 2024-10-27 RX ADMIN — LOSARTAN POTASSIUM 100 MG: 50 TABLET, FILM COATED ORAL at 08:37

## 2024-10-27 RX ADMIN — HYDROMORPHONE HYDROCHLORIDE 0.25 MG: 1 INJECTION, SOLUTION INTRAMUSCULAR; INTRAVENOUS; SUBCUTANEOUS at 22:03

## 2024-10-27 RX ADMIN — ISOSORBIDE MONONITRATE 30 MG: 30 TABLET, EXTENDED RELEASE ORAL at 08:37

## 2024-10-27 RX ADMIN — NITROFURANTOIN MONOHYDRATE/MACROCRYSTALS 100 MG: 75; 25 CAPSULE ORAL at 00:28

## 2024-10-27 RX ADMIN — SENNOSIDES AND DOCUSATE SODIUM 2 TABLET: 50; 8.6 TABLET ORAL at 13:24

## 2024-10-27 RX ADMIN — GABAPENTIN 300 MG: 300 CAPSULE ORAL at 19:53

## 2024-10-27 RX ADMIN — INSULIN GLARGINE 40 UNITS: 100 INJECTION, SOLUTION SUBCUTANEOUS at 20:01

## 2024-10-27 RX ADMIN — SODIUM CHLORIDE, PRESERVATIVE FREE 10 ML: 5 INJECTION INTRAVENOUS at 08:37

## 2024-10-27 RX ADMIN — DULOXETINE HYDROCHLORIDE 20 MG: 20 CAPSULE, DELAYED RELEASE ORAL at 08:46

## 2024-10-27 RX ADMIN — KETOROLAC TROMETHAMINE 15 MG: 15 INJECTION, SOLUTION INTRAMUSCULAR; INTRAVENOUS at 12:16

## 2024-10-27 RX ADMIN — SODIUM CHLORIDE, PRESERVATIVE FREE 10 ML: 5 INJECTION INTRAVENOUS at 20:04

## 2024-10-27 RX ADMIN — METHOCARBAMOL 750 MG: 750 TABLET ORAL at 11:43

## 2024-10-27 RX ADMIN — POLYETHYLENE GLYCOL 3350 17 G: 17 POWDER, FOR SOLUTION ORAL at 08:37

## 2024-10-27 RX ADMIN — METOPROLOL SUCCINATE 50 MG: 25 TABLET, EXTENDED RELEASE ORAL at 08:37

## 2024-10-27 RX ADMIN — APIXABAN 5 MG: 5 TABLET, FILM COATED ORAL at 08:37

## 2024-10-27 RX ADMIN — ATORVASTATIN CALCIUM 80 MG: 80 TABLET, FILM COATED ORAL at 19:53

## 2024-10-27 RX ADMIN — INSULIN LISPRO 2 UNITS: 100 INJECTION, SOLUTION INTRAVENOUS; SUBCUTANEOUS at 11:48

## 2024-10-27 RX ADMIN — NITROFURANTOIN MONOHYDRATE/MACROCRYSTALS 100 MG: 75; 25 CAPSULE ORAL at 19:56

## 2024-10-27 RX ADMIN — OXYCODONE 5 MG: 5 TABLET ORAL at 21:13

## 2024-10-27 RX ADMIN — GABAPENTIN 300 MG: 300 CAPSULE ORAL at 12:16

## 2024-10-27 RX ADMIN — INSULIN LISPRO 3 UNITS: 100 INJECTION, SOLUTION INTRAVENOUS; SUBCUTANEOUS at 17:09

## 2024-10-27 RX ADMIN — METHOCARBAMOL 750 MG: 750 TABLET ORAL at 23:06

## 2024-10-27 RX ADMIN — SENNOSIDES AND DOCUSATE SODIUM 2 TABLET: 50; 8.6 TABLET ORAL at 00:03

## 2024-10-27 RX ADMIN — KETOROLAC TROMETHAMINE 15 MG: 15 INJECTION, SOLUTION INTRAMUSCULAR; INTRAVENOUS at 18:04

## 2024-10-27 RX ADMIN — INSULIN LISPRO 2 UNITS: 100 INJECTION, SOLUTION INTRAVENOUS; SUBCUTANEOUS at 17:09

## 2024-10-27 RX ADMIN — ASPIRIN 81 MG: 81 TABLET, COATED ORAL at 08:37

## 2024-10-27 RX ADMIN — OXYCODONE 5 MG: 5 TABLET ORAL at 08:38

## 2024-10-27 RX ADMIN — OXYCODONE 5 MG: 5 TABLET ORAL at 17:08

## 2024-10-27 RX ADMIN — METHOCARBAMOL 750 MG: 750 TABLET ORAL at 04:26

## 2024-10-27 RX ADMIN — INSULIN LISPRO 3 UNITS: 100 INJECTION, SOLUTION INTRAVENOUS; SUBCUTANEOUS at 08:38

## 2024-10-27 RX ADMIN — OXYCODONE 5 MG: 5 TABLET ORAL at 13:18

## 2024-10-27 RX ADMIN — OXYCODONE 5 MG: 5 TABLET ORAL at 00:28

## 2024-10-27 RX ADMIN — INSULIN LISPRO 3 UNITS: 100 INJECTION, SOLUTION INTRAVENOUS; SUBCUTANEOUS at 11:48

## 2024-10-27 RX ADMIN — METHOCARBAMOL 750 MG: 750 TABLET ORAL at 17:08

## 2024-10-27 RX ADMIN — INSULIN LISPRO 2 UNITS: 100 INJECTION, SOLUTION INTRAVENOUS; SUBCUTANEOUS at 19:57

## 2024-10-27 ASSESSMENT — PAIN SCALES - GENERAL
PAINLEVEL_OUTOF10: 8
PAINLEVEL_OUTOF10: 6
PAINLEVEL_OUTOF10: 9
PAINLEVEL_OUTOF10: 6
PAINLEVEL_OUTOF10: 10
PAINLEVEL_OUTOF10: 8
PAINLEVEL_OUTOF10: 8
PAINLEVEL_OUTOF10: 9
PAINLEVEL_OUTOF10: 7
PAINLEVEL_OUTOF10: 10
PAINLEVEL_OUTOF10: 10
PAINLEVEL_OUTOF10: 1
PAINLEVEL_OUTOF10: 10
PAINLEVEL_OUTOF10: 9
PAINLEVEL_OUTOF10: 8
PAINLEVEL_OUTOF10: 6
PAINLEVEL_OUTOF10: 9
PAINLEVEL_OUTOF10: 10
PAINLEVEL_OUTOF10: 9

## 2024-10-27 ASSESSMENT — PAIN DESCRIPTION - LOCATION
LOCATION: LEG
LOCATION: INCISION;LEG
LOCATION: LEG;INCISION

## 2024-10-27 ASSESSMENT — PAIN DESCRIPTION - DESCRIPTORS
DESCRIPTORS: THROBBING
DESCRIPTORS: ACHING;DULL;SORE

## 2024-10-27 ASSESSMENT — PAIN DESCRIPTION - ORIENTATION
ORIENTATION: LEFT

## 2024-10-27 NOTE — ED PROVIDER NOTES
[facility] ED  Emergency Department Encounter  Emergency Medicine Resident     Pt Name:Jane Saavedra  MRN: [id number]  Birthdate 1990  Date of evaluation: 10/26/24  PCP:  Marissa Wang MD  Note Started: 9:12 PM EDT      CHIEF COMPLAINT       Chief Complaint   Patient presents with    Leg Pain     L; had amputation on the 15th; at rehab facility       HISTORY OF PRESENT ILLNESS  (Location/Symptom, Timing/Onset, Context/Setting, Quality, Duration, Modifying Factors, Severity.)      Jane Saavedra is a 34 y.o. female who presents with concern for infection and pain in her L BKA. She had her closure on 10/17 and was discharged to [facility] for rehab. She left AMA today because she states they had not yet changed her dressing and she felt unsafe there. She denies fevers, chills, bodyaches. No purulent drainage from the wound. She hit her leg on something earlier and has had some bleeding from the wound since then. She was discharged from our facility yesterday. She states she does not have any of her medications and does not have anywhere to go tonight. Her plan was to stay with her uncle on Monday and call her  then to try and get home nursing set up.     PAST MEDICAL / SURGICAL / SOCIAL / FAMILY HISTORY      has a past medical history of Acute osteomyelitis, Bipolar 1 disorder ([facility]), Cellulitis, Chronic diabetic ulcer of left foot determined by examination ([facility]), Depression, Diabetic foot infection ([facility]), H. pylori infection, History of recent hospitalization, Hyperlipidemia, Hypertension, Ischemic cardiomyopathy, Multiple vessel coronary artery disease, Neuropathy, Noncompliance, NSTEMI (non-ST elevated myocardial infarction) ([facility]), Osteomyelitis of foot, left, acute, PAD (peripheral artery disease) ([facility]), Renal abscess, left, STEMI (ST elevation myocardial infarction) ([facility]), Thrombus, Type II or unspecified type diabetes mellitus without mention of complication,

## 2024-10-27 NOTE — ED PROVIDER NOTES
[facility]     Emergency Department     Faculty Attestation    I performed a history and physical examination of the patient and discussed management with the resident. I reviewed the resident’s note and agree with the documented findings and plan of care. Any areas of disagreement are noted on the chart. I was personally present for the key portions of any procedures. I have documented in the chart those procedures where I was not present during the key portions. I have reviewed the emergency nurses triage note. I agree with the chief complaint, past medical history, past surgical history, allergies, medications, social and family history as documented unless otherwise noted below. Documentation of the HPI, Physical Exam and Medical Decision Making performed by medical students or scribes is based on my personal performance of the HPI, PE and MDM. For Physician Assistant/ Nurse Practitioner cases/documentation I have personally evaluated this patient and have completed at least one if not all key elements of the E/M (history, physical exam, and MDM). Additional findings are as noted.    Vital signs:   Vitals:    10/26/24 2100   BP: 126/82   Pulse: 96   Resp: 18   Temp: 98.2 °F (36.8 °C)   SpO2: 99%      S/p left BKA, recently discharged to a rehab. She states they have not been changing her dressing and she was concerned she would get an infection. No fever.             Traci Prado M.D,  Attending Emergency  Physician            Traci Prado MD  10/26/24 [id number]

## 2024-10-27 NOTE — CONSULTS
Division of Vascular Surgery        New Consult      Physician Requesting Consult: Left rehab AMA    Reason for Consult:   Recent left BKA    Chief Complaint:      \" I do not like my facility\"    History of Present Illness:      Jane Saavedra is a 34 y.o. woman who returns to the hospital in a cab from rehab facility after leaving AMA.  She reports she did not like the care she was getting there.  Vascular was consulted due to her recent left below-knee amputation done 10/17.  She was last seen day prior to return to ED.  She denies any new issues with her amputation site kostas that she disliked her facility.      Medical History:     Past Medical History:   Diagnosis Date    Acute osteomyelitis     LEFT FOOT    Bipolar 1 disorder ([facility])     Cellulitis     LT FOOT    Chronic diabetic ulcer of left foot determined by examination ([facility])     Depression     Diabetic foot infection ([facility])     H. pylori infection     recurrent    History of recent hospitalization 06/18/2024    til 6/20/2024 : [facility] , ischemic leg    Hyperlipidemia     tx started May 2015    Hypertension     tx started May 2015    Ischemic cardiomyopathy     Multiple vessel coronary artery disease 01/2023    Neuropathy     BLE    Noncompliance     NSTEMI (non-ST elevated myocardial infarction) ([facility]) 01/06/2023    Osteomyelitis of foot, left, acute 07/01/2024    PAD (peripheral artery disease) ([facility])     Renal abscess, left 08/2023    extending into psoas and paraspinal    STEMI (ST elevation myocardial infarction) ([facility]) 07/04/2023    Thrombus     LT LOWER EXTREMITY    Type II or unspecified type diabetes mellitus without mention of complication, uncontrolled 02/21/2015    tx started May 2015    Under care of team     CardiologyBrian Mi , last seen 12/7/2023    Under care of team     [place] , last seen 4/11/2024    Under care of team     Podiatry, last seen 7/1/2024    Under care of team     Endocrinology , ? ( dc

## 2024-10-27 NOTE — PLAN OF CARE
Problem: Chronic Conditions and Co-morbidities  Goal: Patient's chronic conditions and co-morbidity symptoms are monitored and maintained or improved  10/27/2024 0924 by Sharon Laura RN  Outcome: Progressing  Flowsheets (Taken 10/27/2024 0800)  Care Plan - Patient's Chronic Conditions and Co-Morbidity Symptoms are Monitored and Maintained or Improved: Monitor and assess patient's chronic conditions and comorbid symptoms for stability, deterioration, or improvement  10/27/2024 0334 by Tamie Velasquez RN  Outcome: Progressing     Problem: Discharge Planning  Goal: Discharge to home or other facility with appropriate resources  10/27/2024 0924 by Sharon Laura RN  Outcome: Progressing  Flowsheets (Taken 10/27/2024 0800)  Discharge to home or other facility with appropriate resources: Identify barriers to discharge with patient and caregiver  10/27/2024 0334 by Tamie Velasquez RN  Outcome: Progressing     Problem: Pain  Goal: Verbalizes/displays adequate comfort level or baseline comfort level  10/27/2024 0924 by Sharon Laura RN  Outcome: Progressing  10/27/2024 0334 by Taime Velasquez RN  Outcome: Progressing     Problem: Safety - Adult  Goal: Free from fall injury  10/27/2024 0924 by Sharon Laura RN  Outcome: Progressing  10/27/2024 0334 by Tamie Velasquez RN  Outcome: Progressing     Problem: ABCDS Injury Assessment  Goal: Absence of physical injury  10/27/2024 0924 by Sharon Laura RN  Outcome: Progressing  10/27/2024 0334 by Tamie Velasquez RN  Outcome: Progressing

## 2024-10-27 NOTE — ED NOTES
ED to inpatient nurses report      Chief Complaint:  Chief Complaint   Patient presents with    Leg Pain     L; had amputation on the 15th; at rehab facility     Present to ED from: from facility    MOA:     LOC: alert and orientated to name, place, date  Mobility: Requires assistance * 1  Oxygen Baseline: RA    Current needs required: n/a   Pending ED orders: none  Present condition: stable    Why did the patient come to the ED?   Pt presents to the ED with c/o L lower leg pain following a BTK amputation on 10/15.   Pt states she has been staying at a rehab facility, but reports she has not had her dressing changed since she last saw her surgeon.   Pt also reporting she has not received her pain medication while at the facility.   Pt awake, alert, no distress, RR even and unlabored. Dr. Yu unwraps lower leg. BTK amputation noted with well appearing surgical incision.  What is the plan?   Admission  Vascular consult   to see  Any procedures or intervention occur?   Tbd  Any safety concerns??    Mental Status:  Level of Consciousness: Alert (0)    Psych Assessment:      Vital signs   Vitals:    10/26/24 2100   BP: 126/82   Pulse: 96   Resp: 18   Temp: 98.2 °F (36.8 °C)   TempSrc: Oral   SpO2: 99%   Weight: 83.9 kg (185 lb)        Vitals:  Patient Vitals for the past 24 hrs:   BP Temp Temp src Pulse Resp SpO2 Weight   10/26/24 2100 126/82 98.2 °F (36.8 °C) Oral 96 18 99 % 83.9 kg (185 lb)      Visit Vitals  /82   Pulse 96   Temp 98.2 °F (36.8 °C) (Oral)   Resp 18   Wt 83.9 kg (185 lb)   SpO2 99%   BMI 26.54 kg/m²        LDAs:      Ambulatory Status:  Presents to emergency department  because of falls (Syncope, seizure, or loss of consciousness): No, Age > 70: No, Altered Mental Status, Intoxication with alcohol or substance confusion (Disorientation, impaired judgment, poor safety awaremess, or inability to follow instructions): No, Impaired Mobility: Ambulates or transfers with assistive devices

## 2024-10-27 NOTE — CARE COORDINATION
Case Management Assessment  Initial Evaluation    Date/Time of Evaluation: 10/27/2024 11:21 AM  Assessment Completed by: Taryn Blanchard RN    If patient is discharged prior to next notation, then this note serves as note for discharge by case management.    Patient Name: Jane Saavedra                   YOB: 1990  Diagnosis: Postoperative pain [G89.18]  S/P BKA (below knee amputation) unilateral, left (HCC) [Z89.512]  Unable to care for self [Z78.9]                   Date / Time: 10/26/2024  8:52 PM    Patient Admission Status: Inpatient   Readmission Risk (Low < 19, Mod (19-27), High > 27): Readmission Risk Score: 27.6    Current PCP: Marissa Wang MD  PCP verified by ?      Chart Reviewed: Yes      History Provided by:    Patient Orientation:      Patient Cognition:      Hospitalization in the last 30 days (Readmission):  Yes    If yes, Readmission Assessment in  Navigator will be completed.    Advance Directives:      Code Status: Full Code   Patient's Primary Decision Maker is:      Primary Decision Maker: Don't,Contact    Discharge Planning:    Patient lives with: Spouse/Significant Other Type of Home: Other (Comment) (hot)  Primary Care Giver:    Patient Support Systems include: Spouse/Significant Other, Friends/Neighbors   Current Financial resources:    Current community resources:    Current services prior to admission: Other (Comment) ([facility])            Current DME:              Type of Home Care services:  None    ADLS  Prior functional level:    Current functional level:      PT AM-PAC:   /24  OT AM-PAC:   /24    Family can provide assistance at DC:    Would you like Case Management to discuss the discharge plan with any other family members/significant others, and if so, who?    Plans to Return to Present Housing:    Other Identified Issues/Barriers to RETURNING to current housing:    Potential Assistance needed at discharge: Durable Medical Equipment

## 2024-10-27 NOTE — ED NOTES
Pt presents to the ED with c/o L lower leg pain following a BTK amputation on 10/15.   Pt states she has been staying at a rehab facility, but reports she has not had her dressing changed since she last saw her surgeon.   Pt also reporting she has not received her pain medication while at the facility.   Pt awake, alert, no distress, RR even and unlabored. Dr. Yu unwraps lower leg. BTK amputation noted with well appearing surgical incision.   Call light within reach.

## 2024-10-27 NOTE — H&P
10 MG suppository Place 1 suppository rectally daily as needed for Constipation 10/25/24 11/24/24  Jefferson Stonetulain, DO   polyethylene glycol (GLYCOLAX) 17 g packet Take 1 packet by mouth daily as needed for Constipation 10/25/24 12/26/24  Jefferson Stonetulain, DO   sennosides-docusate sodium (SENOKOT-S) 8.6-50 MG tablet Take 2 tablets by mouth 2 times daily as needed for Constipation 10/25/24   Jefferson Stonetulain, DO   nitrofurantoin, macrocrystal-monohydrate, (MACROBID) 100 MG capsule Take 1 capsule by mouth every 12 hours for 10 days 10/25/24 11/4/24  Mari Stonein, DO   methocarbamol (ROBAXIN) 750 MG tablet Take 1 tablet by mouth every 6 hours for 10 days 10/25/24 11/4/24  Joseph Stone, DO   insulin aspart (NOVOLOG FLEXPEN) 100 UNIT/ML injection pen Inject 3 Units into the skin 3 times daily (before meals) 10/7/24   Marissa Wang MD   Misc. Devices MISC 1 PAIR OF DIABETIC SHOES (1 LEFT/ 1 RIGHT)  1-3 PAIRS OF INSERTS (LEFT/ RIGHT) 9/25/24   Roberto Dailey DPM   Continuous Glucose Sensor (DEXCOM G7 SENSOR) MISC Apply new sensor to back of arm every 10 days. 9/24/24   Marissa Wang MD   isosorbide mononitrate (IMDUR) 30 MG extended release tablet Take 1 tablet by mouth daily 9/2/24   Dyan Mills MD   apixaban (ELIQUIS) 5 MG TABS tablet Take 1 tablet by mouth 2 times daily 9/1/24   Dyan Mills MD   metoprolol succinate (TOPROL XL) 50 MG extended release tablet Take 1 tablet by mouth daily 9/1/24   Dyan Mills MD   blood glucose monitor strips Test 1 times a day & as needed for symptoms of irregular blood glucose. Dispense sufficient amount for indicated testing frequency plus additional to accommodate PRN testing needs. 9/1/24   Dyan Mills MD   glucose monitoring kit 1 kit by Does not apply route daily 9/1/24   Dyan Mills MD   Lancets MISC 1 each by Does not apply route daily 9/1/24   Dyan Mills MD   blood glucose monitor strips Test  times a day & as needed for symptoms of

## 2024-10-27 NOTE — ED NOTES
SW met with pt who reported that she left [facility] due to report that they were not completing wound care and giving medications  in a timely manor. Pt also reported that she felt the facility had unclean conditions.  Pt reported that she called EMS and they would only transport her to [facility]  because it was the closest and pt wanted to return to [facility] due to already receiving care here for her amputation. Pt reports she called a cab to transport her to the ED and signed discharge papers before leaving the facility. Pt refuses to go back to a [facility]. Pt states she is unable to care for herself and is aware that she would have to begin the admission process all over again and wants to start that process. Dr Yu made aware.

## 2024-10-28 VITALS
OXYGEN SATURATION: 97 % | TEMPERATURE: 98.2 F | BODY MASS INDEX: 25.79 KG/M2 | DIASTOLIC BLOOD PRESSURE: 64 MMHG | SYSTOLIC BLOOD PRESSURE: 128 MMHG | HEART RATE: 88 BPM | WEIGHT: 180.12 LBS | RESPIRATION RATE: 18 BRPM | HEIGHT: 70 IN

## 2024-10-28 LAB
ANION GAP SERPL CALCULATED.3IONS-SCNC: 10 MMOL/L (ref 9–16)
BASOPHILS # BLD: 0.05 K/UL (ref 0–0.2)
BASOPHILS NFR BLD: 0 % (ref 0–2)
BUN SERPL-MCNC: 15 MG/DL (ref 6–20)
CALCIUM SERPL-MCNC: 9.2 MG/DL (ref 8.6–10.4)
CHLORIDE SERPL-SCNC: 98 MMOL/L (ref 98–107)
CO2 SERPL-SCNC: 28 MMOL/L (ref 20–31)
CREAT SERPL-MCNC: 0.4 MG/DL (ref 0.5–0.9)
EOSINOPHIL # BLD: 0.27 K/UL (ref 0–0.44)
EOSINOPHILS RELATIVE PERCENT: 2 % (ref 1–4)
ERYTHROCYTE [DISTWIDTH] IN BLOOD BY AUTOMATED COUNT: 13.5 % (ref 11.8–14.4)
GFR, ESTIMATED: >90 ML/MIN/1.73M2
GLUCOSE BLD-MCNC: 204 MG/DL (ref 65–105)
GLUCOSE BLD-MCNC: 248 MG/DL (ref 65–105)
GLUCOSE BLD-MCNC: 278 MG/DL (ref 65–105)
GLUCOSE BLD-MCNC: 318 MG/DL (ref 65–105)
GLUCOSE SERPL-MCNC: 246 MG/DL (ref 74–99)
HCT VFR BLD AUTO: 35 % (ref 36.3–47.1)
HGB BLD-MCNC: 10.9 G/DL (ref 11.9–15.1)
IMM GRANULOCYTES # BLD AUTO: 0.07 K/UL (ref 0–0.3)
IMM GRANULOCYTES NFR BLD: 1 %
LYMPHOCYTES NFR BLD: 4.03 K/UL (ref 1.1–3.7)
LYMPHOCYTES RELATIVE PERCENT: 28 % (ref 24–43)
MCH RBC QN AUTO: 28.9 PG (ref 25.2–33.5)
MCHC RBC AUTO-ENTMCNC: 31.1 G/DL (ref 28.4–34.8)
MCV RBC AUTO: 92.8 FL (ref 82.6–102.9)
MONOCYTES NFR BLD: 1.13 K/UL (ref 0.1–1.2)
MONOCYTES NFR BLD: 8 % (ref 3–12)
NEUTROPHILS NFR BLD: 61 % (ref 36–65)
NEUTS SEG NFR BLD: 9.07 K/UL (ref 1.5–8.1)
NRBC BLD-RTO: 0 PER 100 WBC
PLATELET # BLD AUTO: 569 K/UL (ref 138–453)
PMV BLD AUTO: 9 FL (ref 8.1–13.5)
POTASSIUM SERPL-SCNC: 4.7 MMOL/L (ref 3.7–5.3)
RBC # BLD AUTO: 3.77 M/UL (ref 3.95–5.11)
SODIUM SERPL-SCNC: 136 MMOL/L (ref 136–145)
WBC OTHER # BLD: 14.6 K/UL (ref 3.5–11.3)

## 2024-10-28 PROCEDURE — 97535 SELF CARE MNGMENT TRAINING: CPT

## 2024-10-28 PROCEDURE — 97530 THERAPEUTIC ACTIVITIES: CPT

## 2024-10-28 PROCEDURE — 80048 BASIC METABOLIC PNL TOTAL CA: CPT

## 2024-10-28 PROCEDURE — 6370000000 HC RX 637 (ALT 250 FOR IP)

## 2024-10-28 PROCEDURE — 82947 ASSAY GLUCOSE BLOOD QUANT: CPT

## 2024-10-28 PROCEDURE — 97162 PT EVAL MOD COMPLEX 30 MIN: CPT

## 2024-10-28 PROCEDURE — 99239 HOSP IP/OBS DSCHRG MGMT >30: CPT | Performed by: STUDENT IN AN ORGANIZED HEALTH CARE EDUCATION/TRAINING PROGRAM

## 2024-10-28 PROCEDURE — 2580000003 HC RX 258

## 2024-10-28 PROCEDURE — 6360000002 HC RX W HCPCS

## 2024-10-28 PROCEDURE — 36415 COLL VENOUS BLD VENIPUNCTURE: CPT

## 2024-10-28 PROCEDURE — 85025 COMPLETE CBC W/AUTO DIFF WBC: CPT

## 2024-10-28 PROCEDURE — 97166 OT EVAL MOD COMPLEX 45 MIN: CPT

## 2024-10-28 RX ORDER — INSULIN LISPRO 100 [IU]/ML
15 INJECTION, SOLUTION INTRAVENOUS; SUBCUTANEOUS ONCE
Status: COMPLETED | OUTPATIENT
Start: 2024-10-28 | End: 2024-10-28

## 2024-10-28 RX ORDER — OXYCODONE HYDROCHLORIDE 5 MG/1
5 TABLET ORAL EVERY 6 HOURS PRN
Qty: 28 TABLET | Refills: 0 | Status: ON HOLD | OUTPATIENT
Start: 2024-10-28 | End: 2024-11-04

## 2024-10-28 RX ADMIN — INSULIN LISPRO 15 UNITS: 100 INJECTION, SOLUTION INTRAVENOUS; SUBCUTANEOUS at 14:45

## 2024-10-28 RX ADMIN — SODIUM CHLORIDE, PRESERVATIVE FREE 10 ML: 5 INJECTION INTRAVENOUS at 08:40

## 2024-10-28 RX ADMIN — METHOCARBAMOL 750 MG: 750 TABLET ORAL at 10:58

## 2024-10-28 RX ADMIN — KETOROLAC TROMETHAMINE 15 MG: 15 INJECTION, SOLUTION INTRAMUSCULAR; INTRAVENOUS at 01:24

## 2024-10-28 RX ADMIN — METHOCARBAMOL 750 MG: 750 TABLET ORAL at 05:02

## 2024-10-28 RX ADMIN — HYDROMORPHONE HYDROCHLORIDE 0.25 MG: 1 INJECTION, SOLUTION INTRAMUSCULAR; INTRAVENOUS; SUBCUTANEOUS at 05:57

## 2024-10-28 RX ADMIN — GABAPENTIN 300 MG: 300 CAPSULE ORAL at 14:41

## 2024-10-28 RX ADMIN — OXYCODONE 5 MG: 5 TABLET ORAL at 08:38

## 2024-10-28 RX ADMIN — INSULIN LISPRO 5 UNITS: 100 INJECTION, SOLUTION INTRAVENOUS; SUBCUTANEOUS at 12:13

## 2024-10-28 RX ADMIN — METOPROLOL SUCCINATE 50 MG: 25 TABLET, EXTENDED RELEASE ORAL at 08:39

## 2024-10-28 RX ADMIN — ASPIRIN 81 MG: 81 TABLET, COATED ORAL at 08:39

## 2024-10-28 RX ADMIN — NITROFURANTOIN MONOHYDRATE/MACROCRYSTALS 100 MG: 75; 25 CAPSULE ORAL at 08:41

## 2024-10-28 RX ADMIN — DULOXETINE HYDROCHLORIDE 20 MG: 20 CAPSULE, DELAYED RELEASE ORAL at 08:41

## 2024-10-28 RX ADMIN — INSULIN LISPRO 5 UNITS: 100 INJECTION, SOLUTION INTRAVENOUS; SUBCUTANEOUS at 08:48

## 2024-10-28 RX ADMIN — EZETIMIBE 10 MG: 10 TABLET ORAL at 08:40

## 2024-10-28 RX ADMIN — INSULIN LISPRO 2 UNITS: 100 INJECTION, SOLUTION INTRAVENOUS; SUBCUTANEOUS at 08:48

## 2024-10-28 RX ADMIN — INSULIN LISPRO 6 UNITS: 100 INJECTION, SOLUTION INTRAVENOUS; SUBCUTANEOUS at 12:13

## 2024-10-28 RX ADMIN — ISOSORBIDE MONONITRATE 30 MG: 30 TABLET, EXTENDED RELEASE ORAL at 08:39

## 2024-10-28 RX ADMIN — GABAPENTIN 300 MG: 300 CAPSULE ORAL at 08:40

## 2024-10-28 RX ADMIN — POLYETHYLENE GLYCOL 3350 17 G: 17 POWDER, FOR SOLUTION ORAL at 08:40

## 2024-10-28 RX ADMIN — APIXABAN 5 MG: 5 TABLET, FILM COATED ORAL at 08:40

## 2024-10-28 RX ADMIN — LOSARTAN POTASSIUM 100 MG: 50 TABLET, FILM COATED ORAL at 08:39

## 2024-10-28 ASSESSMENT — PAIN SCALES - GENERAL
PAINLEVEL_OUTOF10: 9
PAINLEVEL_OUTOF10: 9
PAINLEVEL_OUTOF10: 10
PAINLEVEL_OUTOF10: 9
PAINLEVEL_OUTOF10: 0
PAINLEVEL_OUTOF10: 9
PAINLEVEL_OUTOF10: 10
PAINLEVEL_OUTOF10: 10

## 2024-10-28 ASSESSMENT — PAIN DESCRIPTION - DESCRIPTORS
DESCRIPTORS: THROBBING
DESCRIPTORS: SHARP;BURNING
DESCRIPTORS: THROBBING
DESCRIPTORS: BURNING;SHARP
DESCRIPTORS: THROBBING

## 2024-10-28 ASSESSMENT — PAIN DESCRIPTION - LOCATION
LOCATION: LEG;INCISION
LOCATION: INCISION;LEG

## 2024-10-28 ASSESSMENT — PAIN DESCRIPTION - ORIENTATION
ORIENTATION: LEFT

## 2024-10-28 ASSESSMENT — PAIN SCALES - WONG BAKER: WONGBAKER_NUMERICALRESPONSE: NO HURT

## 2024-10-28 NOTE — PLAN OF CARE
Problem: Chronic Conditions and Co-morbidities  Goal: Patient's chronic conditions and co-morbidity symptoms are monitored and maintained or improved  10/28/2024 1239 by Christofer Beach RN  Outcome: Completed  10/28/2024 1138 by Christofer Beach RN  Outcome: Progressing  10/27/2024 2318 by Maria C Tan RN  Outcome: Progressing     Problem: Discharge Planning  Goal: Discharge to home or other facility with appropriate resources  10/28/2024 1239 by Christofer Beach RN  Outcome: Completed  10/28/2024 1138 by Christofer Beach RN  Outcome: Progressing  10/27/2024 2318 by Maria C Tan RN  Outcome: Progressing     Problem: Pain  Goal: Verbalizes/displays adequate comfort level or baseline comfort level  10/28/2024 1239 by Christofer Beach RN  Outcome: Completed  10/28/2024 1138 by Christofer Beach RN  Outcome: Progressing  10/27/2024 2318 by Maria C Tan RN  Outcome: Progressing     Problem: Safety - Adult  Goal: Free from fall injury  10/28/2024 1239 by Christofer Beach RN  Outcome: Completed  10/28/2024 1138 by Christofer Beach RN  Outcome: Progressing  10/27/2024 2318 by Maria C Tan RN  Outcome: Progressing     Problem: ABCDS Injury Assessment  Goal: Absence of physical injury  10/28/2024 1239 by Christofer Beach RN  Outcome: Completed  10/28/2024 1138 by Christofer Beach RN  Outcome: Progressing  10/27/2024 2318 by Maria C Tan RN  Outcome: Progressing

## 2024-10-28 NOTE — PLAN OF CARE
Problem: Chronic Conditions and Co-morbidities  Goal: Patient's chronic conditions and co-morbidity symptoms are monitored and maintained or improved  10/27/2024 2318 by Maria C Tan RN  Outcome: Progressing  10/27/2024 0924 by Sharon Laura RN  Outcome: Progressing  Flowsheets (Taken 10/27/2024 0800)  Care Plan - Patient's Chronic Conditions and Co-Morbidity Symptoms are Monitored and Maintained or Improved: Monitor and assess patient's chronic conditions and comorbid symptoms for stability, deterioration, or improvement     Problem: Discharge Planning  Goal: Discharge to home or other facility with appropriate resources  10/27/2024 2318 by Maria C Tan RN  Outcome: Progressing  10/27/2024 0924 by Sharon Laura RN  Outcome: Progressing  Flowsheets (Taken 10/27/2024 0800)  Discharge to home or other facility with appropriate resources: Identify barriers to discharge with patient and caregiver     Problem: Pain  Goal: Verbalizes/displays adequate comfort level or baseline comfort level  10/27/2024 2318 by Maria C Tan RN  Outcome: Progressing  10/27/2024 0924 by Sharon Laura RN  Outcome: Progressing     Problem: Safety - Adult  Goal: Free from fall injury  10/27/2024 2318 by Maria C Tan RN  Outcome: Progressing  10/27/2024 0924 by Sharon Laura RN  Outcome: Progressing     Problem: ABCDS Injury Assessment  Goal: Absence of physical injury  10/27/2024 2318 by Maria C Tan RN  Outcome: Progressing  10/27/2024 0924 by Sharon Laura RN  Outcome: Progressing

## 2024-10-28 NOTE — DISCHARGE INSTR - COC
2000   Wound Assessment Other (Comment) 10/28/24 0800   Drainage Amount Other (Comment) 10/28/24 0800   Drainage Description Other (Comment) 10/28/24 0800   Odor Other (comment) 10/28/24 0800   Franci-wound Assessment Other (Comment) 10/28/24 0800   Margins Other (Comment) 10/28/24 0800   Number of days: 10        Elimination:  Continence:   Bowel: Yes  Bladder: Yes  Urinary Catheter: None   Colostomy/Ileostomy/Ileal Conduit: No       Date of Last BM: 10/28/2024    Intake/Output Summary (Last 24 hours) at 10/28/2024 1258  Last data filed at 10/28/2024 0838  Gross per 24 hour   Intake 5 ml   Output --   Net 5 ml     I/O last 3 completed shifts:  In: 240 [P.O.:240]  Out: -     Safety Concerns:     At Risk for Falls    Impairments/Disabilities:      None    Nutrition Therapy:  Current Nutrition Therapy:   - Oral Diet:  General    Routes of Feeding: Oral  Liquids: No Restrictions  Daily Fluid Restriction: no  Last Modified Barium Swallow with Video (Video Swallowing Test): not done    Treatments at the Time of Hospital Discharge:   Respiratory Treatments: None  Oxygen Therapy:  is not on home oxygen therapy.  Ventilator:    - No ventilator support    Rehab Therapies: Physical Therapy  Weight Bearing Status/Restrictions: No weight bearing restrictions - Left BKA  Other Medical Equipment (for information only, NOT a DME order):  walker  Other Treatments: skilled nursing    Patient's personal belongings (please select all that are sent with patient):  Jamin BARRAZA SIGNATURE:  Electronically signed by CHRISTI Beaulieu on 10/28/24 at 1:12 PM EDT    CASE MANAGEMENT/SOCIAL WORK SECTION    Inpatient Status Date: ***    Readmission Risk Assessment Score:  Readmission Risk              Risk of Unplanned Readmission:  36           Discharging to Facility/ Agency   Name: [facility]    / signature: Electronically signed by Savannah Do RN on 10/28/24 at 2:39 PM EDT    PHYSICIAN SECTION    Prognosis:

## 2024-10-28 NOTE — PLAN OF CARE
Jane Saavedra was evaluated today and a DME order was entered for a wheeled walker with seat because she requires this to successfully complete daily living tasks of eating, toileting, personal cares, ambulating, dressing upper body, dressing lower body, and meal preparation.  A wheeled walker with seat is necessary due to the patient's unsteady gait, upper body weakness, inability to  and ambulation device, ambulating only short distances by pushing a walker, and the need to sit for a short time before resuming ambulation.  These tasks cannot be completed with a lesser ambulation device such as a cane, crutch, or standard walker.  The need for this equipment was discussed with the patient and she understands and is in agreement.          Joseph Stone DO  Family Medicine Resident PGY3

## 2024-10-28 NOTE — PLAN OF CARE
Problem: Chronic Conditions and Co-morbidities  Goal: Patient's chronic conditions and co-morbidity symptoms are monitored and maintained or improved  10/28/2024 1138 by Christofer Beach RN  Outcome: Progressing  10/27/2024 2318 by Maria C Tan RN  Outcome: Progressing     Problem: Discharge Planning  Goal: Discharge to home or other facility with appropriate resources  10/28/2024 1138 by Christofer Beach RN  Outcome: Progressing  10/27/2024 2318 by Maria C Tan RN  Outcome: Progressing     Problem: Pain  Goal: Verbalizes/displays adequate comfort level or baseline comfort level  10/28/2024 1138 by Christofer Beach RN  Outcome: Progressing  10/27/2024 2318 by Maria C Tan RN  Outcome: Progressing     Problem: Safety - Adult  Goal: Free from fall injury  10/28/2024 1138 by Christofer Beach RN  Outcome: Progressing  10/27/2024 2318 by Maria C Tan RN  Outcome: Progressing     Problem: ABCDS Injury Assessment  Goal: Absence of physical injury  10/28/2024 1138 by Christofer Beach RN  Outcome: Progressing  10/27/2024 2318 by Maria C Tan RN  Outcome: Progressing

## 2024-10-28 NOTE — CARE COORDINATION
Met with patient to discuss transitional planning. She is going to her uncle's house at [address] [place] [address]. She is requesting home care for nursing if possible. She is also requesting a rolling walker. PS sent to Dr Mohan. Referrals sent to [facility], [facility]    1446 spoke to Marium from [facility]. They are able to accept. AVS faxed. Patient updated. Awaiting rolling walker order. Freedom of choice provided. Order will be faxed to [facility]    1513 order received and faxed to [facility]    Discharge Report    [facility]  Clinical Case Management Department  Written by: Savannah Do RN    Patient Name: Jane Saavedra  Attending Provider: Alyssa Mariano MD  Admit Date: 10/26/2024  8:52 PM  MRN: [id number]  Account: [id number]                     : 1990  Discharge Date: 10/28/2024      Disposition: home    Savannah Do RN

## 2024-10-28 NOTE — DISCHARGE INSTR - DIET

## 2024-10-29 ENCOUNTER — PREP FOR PROCEDURE (OUTPATIENT)
Dept: VASCULAR SURGERY | Age: 34
End: 2024-10-29

## 2024-10-29 ENCOUNTER — TELEPHONE (OUTPATIENT)
Dept: FAMILY MEDICINE CLINIC | Age: 34
End: 2024-10-29

## 2024-10-29 ENCOUNTER — TELEPHONE (OUTPATIENT)
Dept: VASCULAR SURGERY | Age: 34
End: 2024-10-29

## 2024-10-29 DIAGNOSIS — I96 GANGRENE (HCC): ICD-10-CM

## 2024-10-29 LAB
F2 C.20210G>A GENO BLD/T: NEGATIVE
F5 P.R506Q BLD/T QL: NEGATIVE
SPECIMEN SOURCE: NORMAL
SPECIMEN SOURCE: NORMAL

## 2024-10-29 NOTE — TELEPHONE ENCOUNTER
Called and spoke to patient - scheduled for Friday 11/1/2024 1:00 pm - arrive 11:30 am.  Patient aware of NPO / stop Eliquis after today / location ([address]) / arrival time

## 2024-10-29 NOTE — TELEPHONE ENCOUNTER
Care Transitions Initial Follow Up Call    Outreach made within 2 business days of discharge: Yes    Patient: Jane Saavedra Patient : 1990   MRN: [id number]  Reason for Admission: Lower limb ischemia & S/P BKA (below knee amputation) unilateral left  Discharge Date: 10/28/24       Spoke with: Patient is being readmitted on Friday for continued amputation. Will schedule appt after final amputation.     Discharge [facility]/facility[facility]

## 2024-10-31 ENCOUNTER — APPOINTMENT (OUTPATIENT)
Dept: CT IMAGING | Age: 34
DRG: 181 | End: 2024-10-31
Payer: COMMERCIAL

## 2024-10-31 ENCOUNTER — HOSPITAL ENCOUNTER (INPATIENT)
Age: 34
LOS: 10 days | Discharge: NURSING, REHAB OR LONG-TERM CARE | DRG: 181 | End: 2024-11-11
Attending: EMERGENCY MEDICINE | Admitting: FAMILY MEDICINE
Payer: COMMERCIAL

## 2024-10-31 ENCOUNTER — ANESTHESIA EVENT (OUTPATIENT)
Dept: OPERATING ROOM | Age: 34
End: 2024-10-31
Payer: COMMERCIAL

## 2024-10-31 DIAGNOSIS — I96 GANGRENE (HCC): ICD-10-CM

## 2024-10-31 DIAGNOSIS — Z89.612 S/P AKA (ABOVE KNEE AMPUTATION) UNILATERAL, LEFT (HCC): ICD-10-CM

## 2024-10-31 DIAGNOSIS — R07.9 CHEST PAIN, UNSPECIFIED TYPE: Primary | ICD-10-CM

## 2024-10-31 DIAGNOSIS — I73.9 PAD (PERIPHERAL ARTERY DISEASE) (HCC): ICD-10-CM

## 2024-10-31 DIAGNOSIS — D72.829 LEUKOCYTOSIS, UNSPECIFIED TYPE: ICD-10-CM

## 2024-10-31 DIAGNOSIS — R73.9 HYPERGLYCEMIA: ICD-10-CM

## 2024-10-31 DIAGNOSIS — S88.112A BELOW-KNEE AMPUTATION OF LEFT LOWER EXTREMITY, INITIAL ENCOUNTER (HCC): ICD-10-CM

## 2024-10-31 DIAGNOSIS — I25.810 ARTERIOSCLEROSIS OF ARTERIAL CORONARY ARTERY BYPASS GRAFT: ICD-10-CM

## 2024-10-31 DIAGNOSIS — M86.179 ACUTE OSTEOMYELITIS OF TOE, UNSPECIFIED LATERALITY: ICD-10-CM

## 2024-10-31 DIAGNOSIS — Z89.512 S/P BKA (BELOW KNEE AMPUTATION) UNILATERAL, LEFT (HCC): ICD-10-CM

## 2024-10-31 LAB
B-OH-BUTYR SERPL-MCNC: 0.06 MMOL/L (ref 0.02–0.27)
BASOPHILS # BLD: 0.05 K/UL (ref 0–0.2)
BASOPHILS NFR BLD: 0 % (ref 0–2)
BUN BLD-MCNC: 9 MG/DL (ref 8–26)
CA-I BLD-SCNC: 1.11 MMOL/L (ref 1.13–1.33)
CA-I BLD-SCNC: 1.16 MMOL/L (ref 1.15–1.33)
CHLORIDE BLD-SCNC: 97 MMOL/L (ref 98–107)
CO2 BLD CALC-SCNC: 25 MMOL/L (ref 22–30)
EGFR, POC: >90 ML/MIN/1.73M2
EOSINOPHIL # BLD: 0.17 K/UL (ref 0–0.44)
EOSINOPHILS RELATIVE PERCENT: 1 % (ref 1–4)
ERYTHROCYTE [DISTWIDTH] IN BLOOD BY AUTOMATED COUNT: 13.5 % (ref 11.8–14.4)
GLUCOSE BLD-MCNC: 581 MG/DL (ref 74–100)
HCO3 VENOUS: 25.4 MMOL/L (ref 22–29)
HCT VFR BLD AUTO: 35.1 % (ref 36.3–47.1)
HCT VFR BLD AUTO: 37 % (ref 36–46)
HGB BLD-MCNC: 11.1 G/DL (ref 11.9–15.1)
IMM GRANULOCYTES # BLD AUTO: 0.08 K/UL (ref 0–0.3)
IMM GRANULOCYTES NFR BLD: 1 %
INR PPP: 1.1
LACTIC ACID, SEPSIS WHOLE BLOOD: 3.4 MMOL/L (ref 0.5–1.9)
LYMPHOCYTES NFR BLD: 3.92 K/UL (ref 1.1–3.7)
LYMPHOCYTES RELATIVE PERCENT: 26 % (ref 24–43)
MCH RBC QN AUTO: 29.2 PG (ref 25.2–33.5)
MCHC RBC AUTO-ENTMCNC: 31.6 G/DL (ref 28.4–34.8)
MCV RBC AUTO: 92.4 FL (ref 82.6–102.9)
MONOCYTES NFR BLD: 0.81 K/UL (ref 0.1–1.2)
MONOCYTES NFR BLD: 5 % (ref 3–12)
NEUTROPHILS NFR BLD: 67 % (ref 36–65)
NEUTS SEG NFR BLD: 10.04 K/UL (ref 1.5–8.1)
NRBC BLD-RTO: 0 PER 100 WBC
O2 SAT, VEN: 58.4 % (ref 60–85)
PARTIAL THROMBOPLASTIN TIME: 27.4 SEC (ref 23–36.5)
PCO2 VENOUS: 40 MM HG (ref 41–51)
PH VENOUS: 7.41 (ref 7.32–7.43)
PLATELET # BLD AUTO: 561 K/UL (ref 138–453)
PMV BLD AUTO: 9 FL (ref 8.1–13.5)
PO2 VENOUS: 30.2 MM HG (ref 30–50)
POC ANION GAP: 14 MMOL/L (ref 7–16)
POC CREATININE: 0.6 MG/DL (ref 0.51–1.19)
POC HEMOGLOBIN (CALC): 12.5 G/DL (ref 12–16)
POC LACTIC ACID: 3.9 MMOL/L (ref 0.56–1.39)
POSITIVE BASE EXCESS, VEN: 0.7 MMOL/L (ref 0–3)
POTASSIUM BLD-SCNC: 4.2 MMOL/L (ref 3.5–4.5)
PROTHROMBIN TIME: 13.7 SEC (ref 11.7–14.9)
RBC # BLD AUTO: 3.8 M/UL (ref 3.95–5.11)
SODIUM BLD-SCNC: 135 MMOL/L (ref 138–146)
WBC OTHER # BLD: 15.1 K/UL (ref 3.5–11.3)

## 2024-10-31 PROCEDURE — 86140 C-REACTIVE PROTEIN: CPT

## 2024-10-31 PROCEDURE — 93005 ELECTROCARDIOGRAM TRACING: CPT

## 2024-10-31 PROCEDURE — 71260 CT THORAX DX C+: CPT

## 2024-10-31 PROCEDURE — 85610 PROTHROMBIN TIME: CPT

## 2024-10-31 PROCEDURE — 82565 ASSAY OF CREATININE: CPT

## 2024-10-31 PROCEDURE — 82010 KETONE BODYS QUAN: CPT

## 2024-10-31 PROCEDURE — 80048 BASIC METABOLIC PNL TOTAL CA: CPT

## 2024-10-31 PROCEDURE — 85014 HEMATOCRIT: CPT

## 2024-10-31 PROCEDURE — 84484 ASSAY OF TROPONIN QUANT: CPT

## 2024-10-31 PROCEDURE — 96375 TX/PRO/DX INJ NEW DRUG ADDON: CPT

## 2024-10-31 PROCEDURE — 80051 ELECTROLYTE PANEL: CPT

## 2024-10-31 PROCEDURE — 85730 THROMBOPLASTIN TIME PARTIAL: CPT

## 2024-10-31 PROCEDURE — 82947 ASSAY GLUCOSE BLOOD QUANT: CPT

## 2024-10-31 PROCEDURE — 83880 ASSAY OF NATRIURETIC PEPTIDE: CPT

## 2024-10-31 PROCEDURE — 82330 ASSAY OF CALCIUM: CPT

## 2024-10-31 PROCEDURE — 6360000002 HC RX W HCPCS

## 2024-10-31 PROCEDURE — 83605 ASSAY OF LACTIC ACID: CPT

## 2024-10-31 PROCEDURE — 83735 ASSAY OF MAGNESIUM: CPT

## 2024-10-31 PROCEDURE — 85025 COMPLETE CBC W/AUTO DIFF WBC: CPT

## 2024-10-31 PROCEDURE — 82803 BLOOD GASES ANY COMBINATION: CPT

## 2024-10-31 PROCEDURE — 99285 EMERGENCY DEPT VISIT HI MDM: CPT

## 2024-10-31 PROCEDURE — 84520 ASSAY OF UREA NITROGEN: CPT

## 2024-10-31 PROCEDURE — 6360000002 HC RX W HCPCS: Performed by: EMERGENCY MEDICINE

## 2024-10-31 RX ORDER — IOPAMIDOL 755 MG/ML
75 INJECTION, SOLUTION INTRAVASCULAR
Status: COMPLETED | OUTPATIENT
Start: 2024-10-31 | End: 2024-11-01

## 2024-10-31 RX ORDER — MORPHINE SULFATE 4 MG/ML
4 INJECTION INTRAVENOUS ONCE
Status: COMPLETED | OUTPATIENT
Start: 2024-10-31 | End: 2024-10-31

## 2024-10-31 RX ORDER — ONDANSETRON 2 MG/ML
4 INJECTION INTRAMUSCULAR; INTRAVENOUS ONCE
Status: COMPLETED | OUTPATIENT
Start: 2024-10-31 | End: 2024-10-31

## 2024-10-31 RX ADMIN — ONDANSETRON 4 MG: 2 INJECTION INTRAMUSCULAR; INTRAVENOUS at 23:11

## 2024-10-31 RX ADMIN — MORPHINE SULFATE 4 MG: 4 INJECTION INTRAVENOUS at 23:10

## 2024-10-31 ASSESSMENT — PAIN - FUNCTIONAL ASSESSMENT: PAIN_FUNCTIONAL_ASSESSMENT: 0-10

## 2024-10-31 ASSESSMENT — PAIN DESCRIPTION - LOCATION: LOCATION: CHEST

## 2024-10-31 ASSESSMENT — PAIN SCALES - GENERAL: PAINLEVEL_OUTOF10: 8

## 2024-11-01 ENCOUNTER — ANESTHESIA (OUTPATIENT)
Dept: OPERATING ROOM | Age: 34
End: 2024-11-01
Payer: COMMERCIAL

## 2024-11-01 PROBLEM — A41.9 SEPSIS (HCC): Status: ACTIVE | Noted: 2024-11-01

## 2024-11-01 LAB
ANION GAP SERPL CALCULATED.3IONS-SCNC: 12 MMOL/L (ref 9–16)
BACTERIA URNS QL MICRO: ABNORMAL
BILIRUB UR QL STRIP: NEGATIVE
BNP SERPL-MCNC: 278 PG/ML (ref 0–125)
BUN SERPL-MCNC: 10 MG/DL (ref 6–20)
CALCIUM SERPL-MCNC: 8.5 MG/DL (ref 8.6–10.4)
CASTS #/AREA URNS LPF: ABNORMAL /LPF (ref 0–8)
CHLORIDE SERPL-SCNC: 98 MMOL/L (ref 98–107)
CLARITY UR: ABNORMAL
CO2 SERPL-SCNC: 25 MMOL/L (ref 20–31)
COLOR UR: YELLOW
CREAT SERPL-MCNC: 0.7 MG/DL (ref 0.6–0.9)
CRP SERPL HS-MCNC: 30.3 MG/L (ref 0–5)
EPI CELLS #/AREA URNS HPF: ABNORMAL /HPF (ref 0–5)
GFR, ESTIMATED: >90 ML/MIN/1.73M2
GLUCOSE BLD-MCNC: 206 MG/DL (ref 65–105)
GLUCOSE BLD-MCNC: 237 MG/DL (ref 65–105)
GLUCOSE BLD-MCNC: 239 MG/DL (ref 65–105)
GLUCOSE BLD-MCNC: 293 MG/DL (ref 65–105)
GLUCOSE BLD-MCNC: 299 MG/DL (ref 65–105)
GLUCOSE BLD-MCNC: 331 MG/DL (ref 65–105)
GLUCOSE BLD-MCNC: 362 MG/DL (ref 65–105)
GLUCOSE SERPL-MCNC: 526 MG/DL (ref 74–99)
GLUCOSE UR STRIP-MCNC: ABNORMAL MG/DL
HGB UR QL STRIP.AUTO: ABNORMAL
KETONES UR STRIP-MCNC: NEGATIVE MG/DL
LACTIC ACID, WHOLE BLOOD: 1.8 MMOL/L (ref 0.7–2.1)
LEUKOCYTE ESTERASE UR QL STRIP: NEGATIVE
MAGNESIUM SERPL-MCNC: 1.9 MG/DL (ref 1.6–2.6)
MRSA, DNA, NASAL: NEGATIVE
NITRITE UR QL STRIP: NEGATIVE
PH UR STRIP: 5.5 [PH] (ref 5–8)
POTASSIUM SERPL-SCNC: 4.3 MMOL/L (ref 3.7–5.3)
PROT UR STRIP-MCNC: ABNORMAL MG/DL
RBC #/AREA URNS HPF: ABNORMAL /HPF (ref 0–4)
SODIUM SERPL-SCNC: 135 MMOL/L (ref 136–145)
SP GR UR STRIP: 1.04 (ref 1–1.03)
SPECIMEN DESCRIPTION: NORMAL
TROPONIN I SERPL HS-MCNC: 14 NG/L (ref 0–14)
TROPONIN I SERPL HS-MCNC: 14 NG/L (ref 0–14)
UROBILINOGEN UR STRIP-ACNC: NORMAL EU/DL (ref 0–1)
VANCOMYCIN SERPL-MCNC: 11.1 UG/ML (ref 5–40)
WBC #/AREA URNS HPF: ABNORMAL /HPF (ref 0–5)

## 2024-11-01 PROCEDURE — 2580000003 HC RX 258: Performed by: NURSE ANESTHETIST, CERTIFIED REGISTERED

## 2024-11-01 PROCEDURE — B41DYZZ FLUOROSCOPY OF AORTA AND BILATERAL LOWER EXTREMITY ARTERIES USING OTHER CONTRAST: ICD-10-PCS | Performed by: STUDENT IN AN ORGANIZED HEALTH CARE EDUCATION/TRAINING PROGRAM

## 2024-11-01 PROCEDURE — 6370000000 HC RX 637 (ALT 250 FOR IP)

## 2024-11-01 PROCEDURE — 96365 THER/PROPH/DIAG IV INF INIT: CPT

## 2024-11-01 PROCEDURE — 2720000010 HC SURG SUPPLY STERILE: Performed by: STUDENT IN AN ORGANIZED HEALTH CARE EDUCATION/TRAINING PROGRAM

## 2024-11-01 PROCEDURE — 6360000002 HC RX W HCPCS

## 2024-11-01 PROCEDURE — 047K3Z1 DILATION OF RIGHT FEMORAL ARTERY USING DRUG-COATED BALLOON, PERCUTANEOUS APPROACH: ICD-10-PCS | Performed by: STUDENT IN AN ORGANIZED HEALTH CARE EDUCATION/TRAINING PROGRAM

## 2024-11-01 PROCEDURE — 2580000003 HC RX 258

## 2024-11-01 PROCEDURE — 27590 AMPUTATE LEG AT THIGH: CPT | Performed by: STUDENT IN AN ORGANIZED HEALTH CARE EDUCATION/TRAINING PROGRAM

## 2024-11-01 PROCEDURE — 047P3ZZ DILATION OF RIGHT ANTERIOR TIBIAL ARTERY, PERCUTANEOUS APPROACH: ICD-10-PCS | Performed by: STUDENT IN AN ORGANIZED HEALTH CARE EDUCATION/TRAINING PROGRAM

## 2024-11-01 PROCEDURE — 94760 N-INVAS EAR/PLS OXIMETRY 1: CPT

## 2024-11-01 PROCEDURE — 87641 MR-STAPH DNA AMP PROBE: CPT

## 2024-11-01 PROCEDURE — 6360000002 HC RX W HCPCS: Performed by: NURSE ANESTHETIST, CERTIFIED REGISTERED

## 2024-11-01 PROCEDURE — 2500000003 HC RX 250 WO HCPCS

## 2024-11-01 PROCEDURE — 2500000003 HC RX 250 WO HCPCS: Performed by: NURSE ANESTHETIST, CERTIFIED REGISTERED

## 2024-11-01 PROCEDURE — 7100000000 HC PACU RECOVERY - FIRST 15 MIN: Performed by: STUDENT IN AN ORGANIZED HEALTH CARE EDUCATION/TRAINING PROGRAM

## 2024-11-01 PROCEDURE — 83605 ASSAY OF LACTIC ACID: CPT

## 2024-11-01 PROCEDURE — 84484 ASSAY OF TROPONIN QUANT: CPT

## 2024-11-01 PROCEDURE — 0Y6D0Z3 DETACHMENT AT LEFT UPPER LEG, LOW, OPEN APPROACH: ICD-10-PCS | Performed by: STUDENT IN AN ORGANIZED HEALTH CARE EDUCATION/TRAINING PROGRAM

## 2024-11-01 PROCEDURE — 81001 URINALYSIS AUTO W/SCOPE: CPT

## 2024-11-01 PROCEDURE — 2580000003 HC RX 258: Performed by: STUDENT IN AN ORGANIZED HEALTH CARE EDUCATION/TRAINING PROGRAM

## 2024-11-01 PROCEDURE — 2500000003 HC RX 250 WO HCPCS: Performed by: ANESTHESIOLOGY

## 2024-11-01 PROCEDURE — 7100000001 HC PACU RECOVERY - ADDTL 15 MIN: Performed by: STUDENT IN AN ORGANIZED HEALTH CARE EDUCATION/TRAINING PROGRAM

## 2024-11-01 PROCEDURE — 3700000001 HC ADD 15 MINUTES (ANESTHESIA): Performed by: STUDENT IN AN ORGANIZED HEALTH CARE EDUCATION/TRAINING PROGRAM

## 2024-11-01 PROCEDURE — 88311 DECALCIFY TISSUE: CPT

## 2024-11-01 PROCEDURE — 3600000014 HC SURGERY LEVEL 4 ADDTL 15MIN: Performed by: STUDENT IN AN ORGANIZED HEALTH CARE EDUCATION/TRAINING PROGRAM

## 2024-11-01 PROCEDURE — 3E0T3BZ INTRODUCTION OF ANESTHETIC AGENT INTO PERIPHERAL NERVES AND PLEXI, PERCUTANEOUS APPROACH: ICD-10-PCS | Performed by: ANESTHESIOLOGY

## 2024-11-01 PROCEDURE — 047M3ZZ DILATION OF RIGHT POPLITEAL ARTERY, PERCUTANEOUS APPROACH: ICD-10-PCS | Performed by: STUDENT IN AN ORGANIZED HEALTH CARE EDUCATION/TRAINING PROGRAM

## 2024-11-01 PROCEDURE — 3600000004 HC SURGERY LEVEL 4 BASE: Performed by: STUDENT IN AN ORGANIZED HEALTH CARE EDUCATION/TRAINING PROGRAM

## 2024-11-01 PROCEDURE — 36415 COLL VENOUS BLD VENIPUNCTURE: CPT

## 2024-11-01 PROCEDURE — 88307 TISSUE EXAM BY PATHOLOGIST: CPT

## 2024-11-01 PROCEDURE — 87086 URINE CULTURE/COLONY COUNT: CPT

## 2024-11-01 PROCEDURE — 87040 BLOOD CULTURE FOR BACTERIA: CPT

## 2024-11-01 PROCEDURE — 99222 1ST HOSP IP/OBS MODERATE 55: CPT | Performed by: STUDENT IN AN ORGANIZED HEALTH CARE EDUCATION/TRAINING PROGRAM

## 2024-11-01 PROCEDURE — 6360000004 HC RX CONTRAST MEDICATION

## 2024-11-01 PROCEDURE — 6360000002 HC RX W HCPCS: Performed by: ANESTHESIOLOGY

## 2024-11-01 PROCEDURE — 3700000000 HC ANESTHESIA ATTENDED CARE: Performed by: STUDENT IN AN ORGANIZED HEALTH CARE EDUCATION/TRAINING PROGRAM

## 2024-11-01 PROCEDURE — 82947 ASSAY GLUCOSE BLOOD QUANT: CPT

## 2024-11-01 PROCEDURE — 64447 NJX AA&/STRD FEMORAL NRV IMG: CPT | Performed by: ANESTHESIOLOGY

## 2024-11-01 PROCEDURE — 80202 ASSAY OF VANCOMYCIN: CPT

## 2024-11-01 PROCEDURE — 2709999900 HC NON-CHARGEABLE SUPPLY: Performed by: STUDENT IN AN ORGANIZED HEALTH CARE EDUCATION/TRAINING PROGRAM

## 2024-11-01 PROCEDURE — 2060000000 HC ICU INTERMEDIATE R&B

## 2024-11-01 RX ORDER — PROPOFOL 10 MG/ML
INJECTION, EMULSION INTRAVENOUS
Status: DISCONTINUED | OUTPATIENT
Start: 2024-11-01 | End: 2024-11-01 | Stop reason: SDUPTHER

## 2024-11-01 RX ORDER — BISACODYL 10 MG
10 SUPPOSITORY, RECTAL RECTAL DAILY PRN
Status: DISCONTINUED | OUTPATIENT
Start: 2024-11-01 | End: 2024-11-11 | Stop reason: HOSPADM

## 2024-11-01 RX ORDER — FENTANYL CITRATE 50 UG/ML
50 INJECTION, SOLUTION INTRAMUSCULAR; INTRAVENOUS EVERY 5 MIN PRN
Status: DISCONTINUED | OUTPATIENT
Start: 2024-11-01 | End: 2024-11-01 | Stop reason: HOSPADM

## 2024-11-01 RX ORDER — ONDANSETRON 2 MG/ML
INJECTION INTRAMUSCULAR; INTRAVENOUS
Status: DISCONTINUED | OUTPATIENT
Start: 2024-11-01 | End: 2024-11-01 | Stop reason: SDUPTHER

## 2024-11-01 RX ORDER — GABAPENTIN 300 MG/1
300 CAPSULE ORAL 3 TIMES DAILY
Status: DISCONTINUED | OUTPATIENT
Start: 2024-11-01 | End: 2024-11-11 | Stop reason: HOSPADM

## 2024-11-01 RX ORDER — GLUCAGON 1 MG/ML
1 KIT INJECTION PRN
Status: DISCONTINUED | OUTPATIENT
Start: 2024-11-01 | End: 2024-11-11 | Stop reason: HOSPADM

## 2024-11-01 RX ORDER — SODIUM CHLORIDE 9 MG/ML
INJECTION, SOLUTION INTRAVENOUS CONTINUOUS
Status: DISCONTINUED | OUTPATIENT
Start: 2024-11-01 | End: 2024-11-02

## 2024-11-01 RX ORDER — LIDOCAINE HYDROCHLORIDE 10 MG/ML
INJECTION, SOLUTION INFILTRATION; PERINEURAL
Status: COMPLETED | OUTPATIENT
Start: 2024-11-01 | End: 2024-11-01

## 2024-11-01 RX ORDER — DEXTROSE MONOHYDRATE 100 MG/ML
INJECTION, SOLUTION INTRAVENOUS CONTINUOUS PRN
Status: DISCONTINUED | OUTPATIENT
Start: 2024-11-01 | End: 2024-11-11 | Stop reason: HOSPADM

## 2024-11-01 RX ORDER — SODIUM CHLORIDE 0.9 % (FLUSH) 0.9 %
5-40 SYRINGE (ML) INJECTION EVERY 12 HOURS SCHEDULED
Status: DISCONTINUED | OUTPATIENT
Start: 2024-11-01 | End: 2024-11-11 | Stop reason: HOSPADM

## 2024-11-01 RX ORDER — ONDANSETRON 4 MG/1
4 TABLET, ORALLY DISINTEGRATING ORAL EVERY 8 HOURS PRN
Status: DISCONTINUED | OUTPATIENT
Start: 2024-11-01 | End: 2024-11-11 | Stop reason: HOSPADM

## 2024-11-01 RX ORDER — NALOXONE HYDROCHLORIDE 0.4 MG/ML
INJECTION, SOLUTION INTRAMUSCULAR; INTRAVENOUS; SUBCUTANEOUS PRN
Status: DISCONTINUED | OUTPATIENT
Start: 2024-11-01 | End: 2024-11-01 | Stop reason: HOSPADM

## 2024-11-01 RX ORDER — ISOSORBIDE MONONITRATE 30 MG/1
30 TABLET, EXTENDED RELEASE ORAL DAILY
Status: DISCONTINUED | OUTPATIENT
Start: 2024-11-01 | End: 2024-11-07

## 2024-11-01 RX ORDER — INSULIN LISPRO 100 [IU]/ML
0-16 INJECTION, SOLUTION INTRAVENOUS; SUBCUTANEOUS EVERY 6 HOURS SCHEDULED
Status: DISCONTINUED | OUTPATIENT
Start: 2024-11-01 | End: 2024-11-09

## 2024-11-01 RX ORDER — MORPHINE SULFATE 2 MG/ML
2 INJECTION, SOLUTION INTRAMUSCULAR; INTRAVENOUS
Status: DISCONTINUED | OUTPATIENT
Start: 2024-11-01 | End: 2024-11-08

## 2024-11-01 RX ORDER — SODIUM CHLORIDE 9 MG/ML
INJECTION, SOLUTION INTRAVENOUS PRN
Status: DISCONTINUED | OUTPATIENT
Start: 2024-11-01 | End: 2024-11-01 | Stop reason: HOSPADM

## 2024-11-01 RX ORDER — LOSARTAN POTASSIUM 50 MG/1
100 TABLET ORAL DAILY
Status: DISCONTINUED | OUTPATIENT
Start: 2024-11-01 | End: 2024-11-06

## 2024-11-01 RX ORDER — ACETAMINOPHEN 650 MG/1
650 SUPPOSITORY RECTAL EVERY 6 HOURS PRN
Status: DISCONTINUED | OUTPATIENT
Start: 2024-11-01 | End: 2024-11-11 | Stop reason: HOSPADM

## 2024-11-01 RX ORDER — ATORVASTATIN CALCIUM 80 MG/1
80 TABLET, FILM COATED ORAL NIGHTLY
Status: DISCONTINUED | OUTPATIENT
Start: 2024-11-01 | End: 2024-11-11 | Stop reason: HOSPADM

## 2024-11-01 RX ORDER — ASPIRIN 81 MG/1
81 TABLET ORAL DAILY
Status: DISCONTINUED | OUTPATIENT
Start: 2024-11-01 | End: 2024-11-11 | Stop reason: HOSPADM

## 2024-11-01 RX ORDER — POTASSIUM CHLORIDE 1500 MG/1
40 TABLET, EXTENDED RELEASE ORAL PRN
Status: DISCONTINUED | OUTPATIENT
Start: 2024-11-01 | End: 2024-11-11 | Stop reason: HOSPADM

## 2024-11-01 RX ORDER — SODIUM CHLORIDE 0.9 % (FLUSH) 0.9 %
5-40 SYRINGE (ML) INJECTION PRN
Status: DISCONTINUED | OUTPATIENT
Start: 2024-11-01 | End: 2024-11-11 | Stop reason: HOSPADM

## 2024-11-01 RX ORDER — PHENYLEPHRINE HCL IN 0.9% NACL 1 MG/10 ML
SYRINGE (ML) INTRAVENOUS
Status: DISCONTINUED | OUTPATIENT
Start: 2024-11-01 | End: 2024-11-01 | Stop reason: SDUPTHER

## 2024-11-01 RX ORDER — POLYETHYLENE GLYCOL 3350 17 G/17G
17 POWDER, FOR SOLUTION ORAL DAILY PRN
Status: DISCONTINUED | OUTPATIENT
Start: 2024-11-01 | End: 2024-11-11 | Stop reason: HOSPADM

## 2024-11-01 RX ORDER — SODIUM CHLORIDE 9 MG/ML
INJECTION, SOLUTION INTRAVENOUS PRN
Status: DISCONTINUED | OUTPATIENT
Start: 2024-11-01 | End: 2024-11-11 | Stop reason: HOSPADM

## 2024-11-01 RX ORDER — KETOROLAC TROMETHAMINE 15 MG/ML
15 INJECTION, SOLUTION INTRAMUSCULAR; INTRAVENOUS EVERY 6 HOURS PRN
Status: DISPENSED | OUTPATIENT
Start: 2024-11-01 | End: 2024-11-06

## 2024-11-01 RX ORDER — FENTANYL CITRATE 50 UG/ML
INJECTION, SOLUTION INTRAMUSCULAR; INTRAVENOUS
Status: DISCONTINUED | OUTPATIENT
Start: 2024-11-01 | End: 2024-11-01 | Stop reason: SDUPTHER

## 2024-11-01 RX ORDER — SODIUM CHLORIDE 0.9 % (FLUSH) 0.9 %
5-40 SYRINGE (ML) INJECTION PRN
Status: DISCONTINUED | OUTPATIENT
Start: 2024-11-01 | End: 2024-11-01 | Stop reason: HOSPADM

## 2024-11-01 RX ORDER — INSULIN LISPRO 100 [IU]/ML
10 INJECTION, SOLUTION INTRAVENOUS; SUBCUTANEOUS ONCE
Status: DISCONTINUED | OUTPATIENT
Start: 2024-11-01 | End: 2024-11-09

## 2024-11-01 RX ORDER — ONDANSETRON 2 MG/ML
4 INJECTION INTRAMUSCULAR; INTRAVENOUS
Status: DISCONTINUED | OUTPATIENT
Start: 2024-11-01 | End: 2024-11-01 | Stop reason: HOSPADM

## 2024-11-01 RX ORDER — ROPIVACAINE HYDROCHLORIDE 5 MG/ML
INJECTION, SOLUTION EPIDURAL; INFILTRATION; PERINEURAL
Status: COMPLETED | OUTPATIENT
Start: 2024-11-01 | End: 2024-11-01

## 2024-11-01 RX ORDER — SODIUM CHLORIDE 0.9 % (FLUSH) 0.9 %
5-40 SYRINGE (ML) INJECTION EVERY 12 HOURS SCHEDULED
Status: DISCONTINUED | OUTPATIENT
Start: 2024-11-01 | End: 2024-11-01 | Stop reason: HOSPADM

## 2024-11-01 RX ORDER — ACETAMINOPHEN 500 MG
1000 TABLET ORAL EVERY 8 HOURS SCHEDULED
Status: DISCONTINUED | OUTPATIENT
Start: 2024-11-01 | End: 2024-11-05

## 2024-11-01 RX ORDER — SODIUM CHLORIDE 9 MG/ML
INJECTION, SOLUTION INTRAVENOUS
Status: DISCONTINUED | OUTPATIENT
Start: 2024-11-01 | End: 2024-11-01 | Stop reason: SDUPTHER

## 2024-11-01 RX ORDER — MAGNESIUM SULFATE IN WATER 40 MG/ML
2000 INJECTION, SOLUTION INTRAVENOUS PRN
Status: DISCONTINUED | OUTPATIENT
Start: 2024-11-01 | End: 2024-11-11 | Stop reason: HOSPADM

## 2024-11-01 RX ORDER — DEXAMETHASONE SODIUM PHOSPHATE 10 MG/ML
INJECTION, SOLUTION INTRAMUSCULAR; INTRAVENOUS
Status: DISCONTINUED | OUTPATIENT
Start: 2024-11-01 | End: 2024-11-01 | Stop reason: SDUPTHER

## 2024-11-01 RX ORDER — MIDAZOLAM HYDROCHLORIDE 1 MG/ML
INJECTION, SOLUTION INTRAMUSCULAR; INTRAVENOUS
Status: DISCONTINUED | OUTPATIENT
Start: 2024-11-01 | End: 2024-11-01 | Stop reason: SDUPTHER

## 2024-11-01 RX ORDER — VANCOMYCIN 1.75 G/350ML
1250 INJECTION, SOLUTION INTRAVENOUS EVERY 8 HOURS
Status: DISCONTINUED | OUTPATIENT
Start: 2024-11-01 | End: 2024-11-03

## 2024-11-01 RX ORDER — SENNA AND DOCUSATE SODIUM 50; 8.6 MG/1; MG/1
2 TABLET, FILM COATED ORAL DAILY PRN
Status: DISCONTINUED | OUTPATIENT
Start: 2024-11-01 | End: 2024-11-11 | Stop reason: HOSPADM

## 2024-11-01 RX ORDER — ACETAMINOPHEN 325 MG/1
650 TABLET ORAL EVERY 6 HOURS PRN
Status: DISCONTINUED | OUTPATIENT
Start: 2024-11-01 | End: 2024-11-11 | Stop reason: HOSPADM

## 2024-11-01 RX ORDER — ONDANSETRON 2 MG/ML
4 INJECTION INTRAMUSCULAR; INTRAVENOUS EVERY 6 HOURS PRN
Status: DISCONTINUED | OUTPATIENT
Start: 2024-11-01 | End: 2024-11-11 | Stop reason: HOSPADM

## 2024-11-01 RX ORDER — ROCURONIUM BROMIDE 10 MG/ML
INJECTION, SOLUTION INTRAVENOUS
Status: DISCONTINUED | OUTPATIENT
Start: 2024-11-01 | End: 2024-11-01 | Stop reason: SDUPTHER

## 2024-11-01 RX ORDER — INSULIN GLARGINE 100 [IU]/ML
40 INJECTION, SOLUTION SUBCUTANEOUS NIGHTLY
Status: DISCONTINUED | OUTPATIENT
Start: 2024-11-01 | End: 2024-11-11 | Stop reason: HOSPADM

## 2024-11-01 RX ORDER — METOPROLOL SUCCINATE 25 MG/1
50 TABLET, EXTENDED RELEASE ORAL DAILY
Status: DISCONTINUED | OUTPATIENT
Start: 2024-11-01 | End: 2024-11-08

## 2024-11-01 RX ORDER — MAGNESIUM HYDROXIDE 1200 MG/15ML
LIQUID ORAL CONTINUOUS PRN
Status: COMPLETED | OUTPATIENT
Start: 2024-11-01 | End: 2024-11-01

## 2024-11-01 RX ORDER — POTASSIUM CHLORIDE 7.45 MG/ML
10 INJECTION INTRAVENOUS PRN
Status: DISCONTINUED | OUTPATIENT
Start: 2024-11-01 | End: 2024-11-11 | Stop reason: HOSPADM

## 2024-11-01 RX ORDER — OXYCODONE HYDROCHLORIDE 5 MG/1
5 TABLET ORAL EVERY 6 HOURS PRN
Status: DISCONTINUED | OUTPATIENT
Start: 2024-11-01 | End: 2024-11-04

## 2024-11-01 RX ORDER — ROPIVACAINE HYDROCHLORIDE 5 MG/ML
INJECTION, SOLUTION EPIDURAL; INFILTRATION; PERINEURAL
Status: COMPLETED
Start: 2024-11-01 | End: 2024-11-01

## 2024-11-01 RX ORDER — CEFAZOLIN SODIUM 1 G/3ML
INJECTION, POWDER, FOR SOLUTION INTRAMUSCULAR; INTRAVENOUS
Status: DISCONTINUED | OUTPATIENT
Start: 2024-11-01 | End: 2024-11-01 | Stop reason: SDUPTHER

## 2024-11-01 RX ORDER — LIDOCAINE HYDROCHLORIDE 10 MG/ML
INJECTION, SOLUTION EPIDURAL; INFILTRATION; INTRACAUDAL; PERINEURAL
Status: DISCONTINUED | OUTPATIENT
Start: 2024-11-01 | End: 2024-11-01 | Stop reason: SDUPTHER

## 2024-11-01 RX ORDER — FENTANYL CITRATE 50 UG/ML
25 INJECTION, SOLUTION INTRAMUSCULAR; INTRAVENOUS EVERY 5 MIN PRN
Status: DISCONTINUED | OUTPATIENT
Start: 2024-11-01 | End: 2024-11-01 | Stop reason: HOSPADM

## 2024-11-01 RX ADMIN — FENTANYL CITRATE 50 MCG: 50 INJECTION, SOLUTION INTRAMUSCULAR; INTRAVENOUS at 15:36

## 2024-11-01 RX ADMIN — ACETAMINOPHEN 1000 MG: 500 TABLET ORAL at 20:53

## 2024-11-01 RX ADMIN — INSULIN GLARGINE 40 UNITS: 100 INJECTION, SOLUTION SUBCUTANEOUS at 20:54

## 2024-11-01 RX ADMIN — Medication: at 17:40

## 2024-11-01 RX ADMIN — SODIUM CHLORIDE 25 ML: 9 INJECTION, SOLUTION INTRAVENOUS at 21:05

## 2024-11-01 RX ADMIN — MORPHINE SULFATE 2 MG: 2 INJECTION, SOLUTION INTRAMUSCULAR; INTRAVENOUS at 20:01

## 2024-11-01 RX ADMIN — PROPOFOL 200 MG: 10 INJECTION, EMULSION INTRAVENOUS at 14:42

## 2024-11-01 RX ADMIN — CEFAZOLIN 2 G: 1 INJECTION, POWDER, FOR SOLUTION INTRAMUSCULAR; INTRAVENOUS at 14:54

## 2024-11-01 RX ADMIN — GABAPENTIN 300 MG: 300 CAPSULE ORAL at 20:54

## 2024-11-01 RX ADMIN — VANCOMYCIN 1250 MG: 1.75 INJECTION, SOLUTION INTRAVENOUS at 09:33

## 2024-11-01 RX ADMIN — METOPROLOL SUCCINATE 50 MG: 25 TABLET, EXTENDED RELEASE ORAL at 07:50

## 2024-11-01 RX ADMIN — MORPHINE SULFATE 2 MG: 2 INJECTION, SOLUTION INTRAMUSCULAR; INTRAVENOUS at 03:47

## 2024-11-01 RX ADMIN — DEXAMETHASONE SODIUM PHOSPHATE 5 MG: 10 INJECTION, SOLUTION INTRAMUSCULAR; INTRAVENOUS at 14:47

## 2024-11-01 RX ADMIN — ACETAMINOPHEN 1000 MG: 500 TABLET ORAL at 06:10

## 2024-11-01 RX ADMIN — PIPERACILLIN SODIUM AND TAZOBACTAM SODIUM 3375 MG: 3; .375 INJECTION, SOLUTION INTRAVENOUS at 12:31

## 2024-11-01 RX ADMIN — IOPAMIDOL 75 ML: 755 INJECTION, SOLUTION INTRAVENOUS at 00:04

## 2024-11-01 RX ADMIN — SODIUM CHLORIDE: 9 INJECTION, SOLUTION INTRAVENOUS at 21:03

## 2024-11-01 RX ADMIN — KETOROLAC TROMETHAMINE 15 MG: 15 INJECTION, SOLUTION INTRAMUSCULAR; INTRAVENOUS at 06:16

## 2024-11-01 RX ADMIN — SODIUM CHLORIDE: 9 INJECTION, SOLUTION INTRAVENOUS at 14:34

## 2024-11-01 RX ADMIN — GABAPENTIN 300 MG: 300 CAPSULE ORAL at 09:25

## 2024-11-01 RX ADMIN — VANCOMYCIN 1250 MG: 1.75 INJECTION, SOLUTION INTRAVENOUS at 01:57

## 2024-11-01 RX ADMIN — ROPIVACAINE HYDROCHLORIDE 25 ML: 5 INJECTION EPIDURAL; INFILTRATION; PERINEURAL at 14:44

## 2024-11-01 RX ADMIN — SODIUM CHLORIDE, PRESERVATIVE FREE 10 ML: 5 INJECTION INTRAVENOUS at 21:07

## 2024-11-01 RX ADMIN — PIPERACILLIN AND TAZOBACTAM 3375 MG: 3; .375 INJECTION, POWDER, FOR SOLUTION INTRAVENOUS at 01:04

## 2024-11-01 RX ADMIN — SODIUM CHLORIDE: 9 INJECTION, SOLUTION INTRAVENOUS at 12:25

## 2024-11-01 RX ADMIN — FENTANYL CITRATE 50 MCG: 50 INJECTION, SOLUTION INTRAMUSCULAR; INTRAVENOUS at 15:43

## 2024-11-01 RX ADMIN — ONDANSETRON 4 MG: 2 INJECTION INTRAMUSCULAR; INTRAVENOUS at 14:47

## 2024-11-01 RX ADMIN — LOSARTAN POTASSIUM 100 MG: 50 TABLET, FILM COATED ORAL at 07:50

## 2024-11-01 RX ADMIN — ROCURONIUM BROMIDE 20 MG: 10 INJECTION, SOLUTION INTRAVENOUS at 15:30

## 2024-11-01 RX ADMIN — ROCURONIUM BROMIDE 50 MG: 10 INJECTION, SOLUTION INTRAVENOUS at 14:42

## 2024-11-01 RX ADMIN — MORPHINE SULFATE 2 MG: 2 INJECTION, SOLUTION INTRAMUSCULAR; INTRAVENOUS at 09:04

## 2024-11-01 RX ADMIN — INSULIN LISPRO 12 UNITS: 100 INJECTION, SOLUTION INTRAVENOUS; SUBCUTANEOUS at 06:10

## 2024-11-01 RX ADMIN — INSULIN LISPRO 4 UNITS: 100 INJECTION, SOLUTION INTRAVENOUS; SUBCUTANEOUS at 18:13

## 2024-11-01 RX ADMIN — MIDAZOLAM 2 MG: 1 INJECTION INTRAMUSCULAR; INTRAVENOUS at 14:37

## 2024-11-01 RX ADMIN — PIPERACILLIN AND TAZOBACTAM 4500 MG: 4; .5 INJECTION, POWDER, FOR SOLUTION INTRAVENOUS at 07:00

## 2024-11-01 RX ADMIN — ATORVASTATIN CALCIUM 80 MG: 80 TABLET, FILM COATED ORAL at 20:54

## 2024-11-01 RX ADMIN — PIPERACILLIN SODIUM AND TAZOBACTAM SODIUM 3375 MG: 3; .375 INJECTION, SOLUTION INTRAVENOUS at 21:07

## 2024-11-01 RX ADMIN — ISOSORBIDE MONONITRATE 30 MG: 30 TABLET, EXTENDED RELEASE ORAL at 07:50

## 2024-11-01 RX ADMIN — KETOROLAC TROMETHAMINE 15 MG: 15 INJECTION, SOLUTION INTRAMUSCULAR; INTRAVENOUS at 20:59

## 2024-11-01 RX ADMIN — LIDOCAINE HYDROCHLORIDE 5 ML: 10 INJECTION, SOLUTION INFILTRATION; PERINEURAL at 14:44

## 2024-11-01 RX ADMIN — Medication 100 MCG: at 15:54

## 2024-11-01 RX ADMIN — SODIUM CHLORIDE 25 ML: 9 INJECTION, SOLUTION INTRAVENOUS at 06:59

## 2024-11-01 RX ADMIN — FENTANYL CITRATE 100 MCG: 50 INJECTION, SOLUTION INTRAMUSCULAR; INTRAVENOUS at 14:37

## 2024-11-01 RX ADMIN — SUGAMMADEX 200 MG: 100 INJECTION, SOLUTION INTRAVENOUS at 16:20

## 2024-11-01 RX ADMIN — SODIUM CHLORIDE, PRESERVATIVE FREE 10 ML: 5 INJECTION INTRAVENOUS at 07:50

## 2024-11-01 RX ADMIN — LIDOCAINE HYDROCHLORIDE 50 MG: 10 INJECTION, SOLUTION EPIDURAL; INFILTRATION; INTRACAUDAL; PERINEURAL at 14:42

## 2024-11-01 RX ADMIN — INSULIN LISPRO 4 UNITS: 100 INJECTION, SOLUTION INTRAVENOUS; SUBCUTANEOUS at 12:25

## 2024-11-01 RX ADMIN — SODIUM CHLORIDE: 9 INJECTION, SOLUTION INTRAVENOUS at 03:58

## 2024-11-01 ASSESSMENT — PAIN SCALES - GENERAL
PAINLEVEL_OUTOF10: 9
PAINLEVEL_OUTOF10: 9
PAINLEVEL_OUTOF10: 3
PAINLEVEL_OUTOF10: 7
PAINLEVEL_OUTOF10: 8
PAINLEVEL_OUTOF10: 7
PAINLEVEL_OUTOF10: 4
PAINLEVEL_OUTOF10: 4
PAINLEVEL_OUTOF10: 10

## 2024-11-01 ASSESSMENT — PAIN DESCRIPTION - DESCRIPTORS
DESCRIPTORS: ACHING;DISCOMFORT

## 2024-11-01 ASSESSMENT — PAIN DESCRIPTION - ORIENTATION
ORIENTATION: LEFT

## 2024-11-01 ASSESSMENT — PAIN DESCRIPTION - LOCATION
LOCATION: LEG

## 2024-11-01 NOTE — PROGRESS NOTES
[facility]   Pharmacy Pharmacokinetic Monitoring Service - Vancomycin    Consulting Provider: Dr Turner   Indication: SSTI  Target Concentration: Goal AUC/DIVINA 400-600 mg*hr/L  Day of Therapy: 1  Additional Antimicrobials: Zosyn    Pertinent Laboratory Values:   Wt Readings from Last 1 Encounters:   10/31/24 81.6 kg (180 lb)     Temp Readings from Last 1 Encounters:   11/01/24 98.8 °F (37.1 °C) (Oral)     Estimated Creatinine Clearance: 121 mL/min (based on SCr of 0.7 mg/dL).  Recent Labs     10/31/24  2303 10/31/24  2306   CREATININE 0.6 0.7   BUN  --  10   WBC  --  15.1*     Pertinent Cultures:  Culture Date Source Results   11/1/24 blood No growth yet   MRSA Nasal Swab: was negative on 11/1/24, ordered for respiratory indication, pharmacy to contact provider about discontinuing vancomycin    Recent vancomycin administrations                     vancomycin (VANCOCIN) 1250 mg in 250 mL IVPB (mg) 1,250 mg New Bag 11/01/24 0933     1,250 mg New Bag  0157                    Assessment:  Date/Time Current Dose Concentration Timing of Concentration (h) AUC   11/1/24 1250 mg q8hrs 11.1 4.75 hrs 440   Note: Serum concentrations collected for AUC dosing may appear elevated if collected in close proximity to the dose administered, this is not necessarily an indication of toxicity    Plan:  Current dosing regimen is therapeutic  Continue current dose and recheck dose when at steady-state  Repeat vancomycin concentration will be ordered when needed   Pharmacy will continue to monitor patient and adjust therapy as indicated    Thank you for the consult,  Justin Ramírez RPH  11/1/2024 4:36 PM

## 2024-11-01 NOTE — ED PROVIDER NOTES
[facility] ED  Emergency Department Encounter  Emergency Medicine Resident     Pt Name:Jane Saavedra  MRN: [id number]  Birthdate 1990  Date of evaluation: 10/31/24  PCP:  Marissa Wang MD  Note Started: 10:56 PM EDT      CHIEF COMPLAINT       Chief Complaint   Patient presents with    Chest Pain    Hyperglycemia       HISTORY OF PRESENT ILLNESS  (Location/Symptom, Timing/Onset, Context/Setting, Quality, Duration, Modifying Factors, Severity.)      Jane Saavedra is a 34-year-old female who presents to the ED complaining of chest pressure as well as concerns for hyperglycemia per EMS readings.  Patient has an extensive heart history including CABG x 3, PAD as well as a BKA that was performed 5 days ago by Dr. Mc here at [facility].  Patient is scheduled for a revision and AKA tomorrow with Dr. Watson.  Patient has a history significant for blood clots.  She is currently taking Eliquis but has held the medication per her vascular surgeon for upcoming surgery.  Patient denies any bandage changes to the left BKA as she was not instructed to change it.  She denies any saturation or strikethrough.  No reported malodorous scent.  Denies any acute headaches, vision changes, shortness of breath but does endorse chest pain.  She also states she has had mild nausea without vomiting.  No acute abdominal pain.  No changes in bowel movements or urination.      PAST MEDICAL / SURGICAL / SOCIAL / FAMILY HISTORY      has a past medical history of Acute osteomyelitis, Bipolar 1 disorder ([facility]), Cellulitis, Chronic diabetic ulcer of left foot determined by examination ([facility]), Depression, Diabetic foot infection ([facility]), H. pylori infection, History of recent hospitalization, Hyperlipidemia, Hypertension, Ischemic cardiomyopathy, Multiple vessel coronary artery disease, Neuropathy, Noncompliance, NSTEMI (non-ST elevated myocardial infarction) ([facility]), Osteomyelitis of foot, left, acute, PAD (peripheral artery

## 2024-11-01 NOTE — ANESTHESIA PROCEDURE NOTES
Peripheral Block    Patient location during procedure: OR  Reason for block: post-op pain management and at surgeon's request  Start time: 11/1/2024 2:44 PM  End time: 11/1/2024 2:49 PM  Staffing  Performed: anesthesiologist   Anesthesiologist: Binta Guevara MD  Performed by: Binta Guevara MD  Authorized by: Iain Ramirez MD    Preanesthetic Checklist  Completed: patient identified, IV checked, site marked, risks and benefits discussed, surgical/procedural consents, equipment checked, pre-op evaluation, timeout performed, anesthesia consent given and monitors applied/VS acknowledged  Peripheral Block   Patient position: supine  Prep: ChloraPrep  Provider prep: mask and sterile gloves  Patient monitoring: cardiac monitor, continuous pulse ox, continuous capnometry, frequent blood pressure checks, IV access and oxygen  Block type: Femoral  Femoral crease  Laterality: left  Injection technique: single-shot  Guidance: ultrasound guided    Needle   Needle type: insulated echogenic nerve stimulator needle   Needle gauge: 20 G  Needle localization: ultrasound guidance  Needle length: 10 cm  Assessment   Injection assessment: negative aspiration for heme, local visualized surrounding nerve on ultrasound, no intravascular symptoms and no paresthesia on injection  Slow fractionated injection: yes  Hemodynamics: stable  Outcomes: uncomplicated    Medications Administered  ropivacaine (NAROPIN) injection 0.5% - Perineural   25 mL - 11/1/2024 2:44:00 PM  lidocaine injection 1% - Perineural   5 mL - 11/1/2024 2:44:00 PM

## 2024-11-01 NOTE — CONSULTS
tube placement    LAMINECTOMY AND MICRODISCECTOMY LUMBAR SPINE  03/28/2024    L4-5 , [place]    LEFT ATRIAL APPENDAGE LIGATION  07/10/2023    during bypass    LEG AMPUTATION BELOW KNEE Left 10/17/2024    BELOW KNEE AMPUTATION, POSSIBLE ABOVE KNEE AMPUTATION performed by Emperatriz Watson MD at [facility]OR    TOE AMPUTATION Left 07/08/2024    GREAT TOE AMPUTATION (Left: Foot)    TOE AMPUTATION Left 07/08/2024    : GREAT TOE AMPUTATION (Left: Foot)    TOE AMPUTATION Left 7/8/2024    GREAT TOE AMPUTATION performed by Radha Recio DPM at [facility] OR    VASCULAR SURGERY Left 06/19/2024    LEFT LOWER LEG ANGIOGRAM,PERCUTANEOUS MECHANICAL THROMBECTOMY performed by Emperatriz Watson MD at [facility]    VASCULAR SURGERY Left 10/15/2024    LEFT LOWER EXTREMITY ANGIOGRAM VIA RIGHT FEMORAL ARTERY / BALLOON ANGIOPLASTY / MECHANICAL PERCUTANEOUS THROMBECTOMY / EVERFLEX 6MM X 150CM STENT / FASCIOTOMY WITH OPEN THROMBECTOMY performed by Emperatriz Watson MD at [facility]       Family History:     Family History   Problem Relation Age of Onset    Diabetes Mother        Allergies:       Patient has no known allergies.    Medications:      Current Facility-Administered Medications   Medication Dose Route Frequency Provider Last Rate Last Admin    insulin lispro (HUMALOG,ADMELOG) injection vial 10 Units  10 Units SubCUTAneous Once Stewart Turner DO        vancomycin (VANCOCIN) 1250 mg in 250 mL IVPB  1,250 mg IntraVENous Q8H Stewart Turner .7 mL/hr at 11/01/24 0157 1,250 mg at 11/01/24 0157    vancomycin (VANCOCIN) intermittent dosing (placeholder)   Other RX Placeholder Stewart Turner DO         Current Outpatient Medications   Medication Sig Dispense Refill    oxyCODONE (ROXICODONE) 5 MG immediate release tablet Take 1 tablet by mouth every 6 hours as needed for Pain for up to 7 days. Max Daily Amount: 20 mg 28 tablet 0    gabapentin (NEURONTIN) 300 MG capsule Take 1 capsule by mouth 3 times daily  goal less than 180 for surgical intervention  Cardiac workup per primary  Betadine to incision line, dry dressings, avoid pressure to stump, keep leg elevated  Patient in severe pain on my exam, Tylenol, Roxicodone, Toradol and morphine added as needed  We will plan for above-knee amputation once patient stable      Electronically signed by Shayy Figueredo DO on 11/1/24 at 2:45 AM EDT

## 2024-11-01 NOTE — PLAN OF CARE
Problem: Chronic Conditions and Co-morbidities  Goal: Patient's chronic conditions and co-morbidity symptoms are monitored and maintained or improved  11/1/2024 1526 by Valentino Acosta RN  Outcome: Progressing  11/1/2024 0526 by Ofe Ervin RN  Outcome: /\[facility] [place] [facility]\"" Not Progressing  Flowsheets (Taken 11/1/2024 0400)  Care Plan - Patient's Chronic Conditions and Co-Morbidity Symptoms are Monitored and Maintained or Improved:   Monitor and assess patient's chronic conditions and comorbid symptoms for stability, deterioration, or improvement   Collaborate with multidisciplinary team to address chronic and comorbid conditions and prevent exacerbation or deterioration   Update acute care plan with appropriate goals if chronic or comorbid symptoms are exacerbated and prevent overall improvement and discharge     Problem: Discharge Planning  Goal: Discharge to home or other facility with appropriate resources  11/1/2024 1526 by Valentino Acosta RN  Outcome: Progressing  Flowsheets (Taken 11/1/2024 0800)  Discharge to home or other facility with appropriate resources:   Identify barriers to discharge with patient and caregiver   Arrange for needed discharge resources and transportation as appropriate   Identify discharge learning needs (meds, wound care, etc)   Refer to discharge planning if patient needs post-hospital services based on physician order or complex needs related to functional status, cognitive ability or social support system  11/1/2024 0526 by Ofe Ervin RN  Outcome: /\[facility] [place] [facility]\"" Not Progressing  Flowsheets (Taken 11/1/2024 0400)  Discharge to home or other facility with appropriate resources:   Identify barriers to discharge with patient and caregiver   Arrange for needed discharge resources and transportation as appropriate   Identify discharge learning needs (meds, wound care, etc)     Problem: Pain  Goal: Verbalizes/displays adequate comfort level or baseline comfort level  11/1/2024 1526 by

## 2024-11-01 NOTE — PROGRESS NOTES
Physical Therapy        Physical Therapy Cancel Note      DATE: 2024    NAME: Jane Saavedra  MRN: [id number]   : 1990      Patient not seen this date for Physical Therapy due to:    Surgery/Procedure: plan for AKA per chart. Ck       Electronically signed by Jane Boykin PT on 2024 at 12:17 PM

## 2024-11-01 NOTE — PROGRESS NOTES
Division of Vascular Surgery             Progress Note      Name: Jane Saavedra  MRN: [id number]         Overnight Events:      None      Subjective:     Patient seen and examined at the bedside. No acute events reported overnight.     Physical Exam:     Vitals:  /72   Pulse 86   Temp 98.6 °F (37 °C) (Oral)   Resp 18   Ht 1.778 m (5' 10\")   Wt 81.7 kg (180 lb 1.9 oz)   SpO2 98%   BMI 25.84 kg/m²       General appearance - alert, well appearing and in no acute distress  Mental status - oriented to person, place and time with normal affect  Head - normocephalic and atraumatic  Neck - supple, no carotid bruits, thyroid not palpable, no JVD  Chest - clear to auscultation, normal effort  Heart - normal rate, regular rhythm, no murmurs  Abdomen - soft, non-tender, non-distended, bowel sounds present all four quadrants, no masses  Neurological - normal speech, no focal findings or movement disorder noted, cranial nerves II through XII grossly intact  Extremities - peripheral pulses palpable, no pedal edema or calf pain with palpation  Skin - no gross lesions, rashes, or induration noted  Foot Exam - warm right foot.   Vascular Exam -Palpable radial and femoral pulses bilaterally       Imaging:   CT Result (most recent):  CTA ABDOMINAL AORTA W BILAT RUNOFF WCONTRAST 10/24/2024    Narrative  EXAMINATION:  CTA OF THE AORTA WITH LOWER EXTREMITY RUNOFF 10/24/2024 1:00 pm    TECHNIQUE:  CTA of the pelvis and bilateral lower extremities was performed after the  administration of intravenous contrast.   Multiplanar reformatted images are  provided for review.  MIP images are provided for review. Automated exposure  control, iterative reconstruction, and/or weight based adjustment of the  mA/kV was utilized to reduce the radiation dose to as low as reasonably  achievable.    COMPARISON:  10/24/2024.    HISTORY:  ORDERING SYSTEM PROVIDED HISTORY: YURY quiroz viability  TECHNOLOGIST PROVIDED HISTORY:  YURY quiroz 
  Physician Progress Note      PATIENT:               DWAIN SONG  [facility] #:                  [id number]  :                       1990  ADMIT DATE:       10/26/2024 8:52 PM  DISCH DATE:        10/28/2024 3:28 PM  RESPONDING  PROVIDER #:        MAAME GILES          QUERY TEXT:    Pt admitted with Left BKA care. Pt noted to have PAD and Type 1 DM. If   possible, please document the relationship, if any, between Left BKA and PAD   and Type 1 DM.    The medical record reflects the following:  Risk Factors: PAD, Type 1 DM. Smoker.    Clinical Indicators: 24 A1C 13.4 Serum glucose 171, 246, and POC Glucose   278, 154, 227, up to 318  CTA: 2. Complete occlusion of the left popliteal artery stent. I do not   clearly see any collateral reconstitution of any named vessels at the level of   the  knee.  3. Diffuse atherosclerotic disease involving the right superficial femoral and   popliteal arteries. There are at least 2 areas of significant stenosis  involving the above the knee right popliteal artery.    H&P: Severe PAD (Acute L limb ischemia) in the setting of any vascular   complications and diabetic foot ulcers s/p L BKA on 10/17/24    Treatment: POC glucose testing, resume home Humalog pre meals and nightly   Lantus. Vascular consult. Planned Left AKA 10/31 or 24.    Thank-you,  Sydnee Khan RN, CDS  Options provided:  -- Lt limb ischemia and BKA stump care due to both uncontrolled Type 1 DM and   severe PAD.  -- Lt limb ischemia, BKA stump care due to uncontrolled  Type 1 DM.  -- Lt limb ischemia and BKA stump care due to severe PAD.  -- Other - I will add my own diagnosis  -- Disagree - Not applicable / Not valid  -- Disagree - Clinically unable to determine / Unknown  -- Refer to Clinical Documentation Reviewer    PROVIDER RESPONSE TEXT:    Lt limb ischemia and BKA stump care due to both uncontrolled Type 1 DM and   severe PAD.    Query created by: Sydnee Khan on 10/31/2024 10:05 
  Physician Progress Note      PATIENT:               DWAIN SONG  [facility] #:                  [id number]  :                       1990  ADMIT DATE:       10/26/2024 8:52 PM  DISCH DATE:        10/28/2024 3:28 PM  RESPONDING  PROVIDER #:        ROE BTULER          QUERY TEXT:    Patient admitted with s/p L BKA care. Documentation reflects  gas and soft   tissue swelling in the amputated stump. If possible, please document if   infection in L BKA stump was:    The medical record reflects the following:  Risk Factors: Pt returns to the hospital in a cab from rehab facility after   leaving AMA.  She reports she did not like the care she was getting there.    Vascular was consulted due to her recent left below-knee amputation done   10/17.    Clinical Indicators: Admit 10/26-10/28/24. WBC 14.4 and 14.6 T max 37.6 CRP of   105 and Sed rate of 102 on 10/25.  10/24 CTA abd aorta: 1. Status post below-the-knee amputation of the left   lower extremity with gas and soft tissue swelling in the amputated stump.    This is concerning for gas-forming infection.  2. Complete occlusion of the left popliteal artery stent. I do not clearly see   any collateral reconstitution of any named vessels at the level of the knee    10/28 Vascular progress note: Status post below-the-knee amputation of the   left lower extremity with gas and soft tissue swelling in the amputated stump.    This is concerning for gas-forming infection.    Treatment:  No urgent vascular surgery intervention required at this time.   Will plan for left AKA this week. Patient prefers to discharge home and   complete procedure as an OP. This can be done either Thursday or Friday. On   Macrobid for UTI.    Thank-you,  Sydnee Khan RN, CDS  Options provided:  -- Left BKA stump infection confirmed after study  -- Left BKA stump infection ruled out after study  -- Other - I will add my own diagnosis  -- Disagree - Not applicable / Not valid  -- Disagree - 
Discharge order noted. AVS printed and reviewed with patient; all questions answered. Script sent with patient. Care plan and education completed. IV removed. All belongings sent with patient. Tele box removed. Patient discharged home with home care. DME order for walker sent with patient. Phone number for vascular surgery provided to patient.   
Occupational Therapy  Occupational Therapy Initial Evaluation  Facility/Department: [facility] CAR 2- STEPDOWN     Patient Name: Jane Saavedra        MRN: [id number]    : 1990    Date of Service: 10/28/2024    Discharge Recommendations  Discharge Recommendations: Patient would benefit from continued therapy after discharge  OT Equipment Recommendations  Equipment Needed: Yes  Mobility Devices: Walker, ADL Assistive Devices  Walker: Rolling  ADL Assistive Devices: Transfer Tub Bench    Chief Complaint   Patient presents with    Leg Pain     L; had amputation on the 15; at rehab facility     Past Medical History:  has a past medical history of Acute osteomyelitis, Bipolar 1 disorder ([facility]), Cellulitis, Chronic diabetic ulcer of left foot determined by examination ([facility]), Depression, Diabetic foot infection ([facility]), H. pylori infection, History of recent hospitalization, Hyperlipidemia, Hypertension, Ischemic cardiomyopathy, Multiple vessel coronary artery disease, Neuropathy, Noncompliance, NSTEMI (non-ST elevated myocardial infarction) ([facility]), Osteomyelitis of foot, left, acute, PAD (peripheral artery disease) ([facility]), Renal abscess, left, STEMI (ST elevation myocardial infarction) ([facility]), Thrombus, Type II or unspecified type diabetes mellitus without mention of complication, uncontrolled, Under care of team, Under care of team, Under care of team, Under care of team, Under care of team, Under care of team, and Wellness examination.  Past Surgical History:  has a past surgical history that includes Carpal tunnel release (Left, 2020); Coronary artery bypass graft (N/A, 07/10/2023); back surgery (Left, 08/10/2023); vascular surgery ([facility], 2024); left atrial appendage ligation (07/10/2023); IR ABSCESS DRAINAGE (2023); Laminectomy and microdiscectomy lumbar spine (2024); Coronary angioplasty with stent (2023); Cardiac catheterization (2023); Toe amputation (Left, 2024); Toe 
Patient was seen and evaluated this morning.    Patient was sleeping comfortably in bed.  Vital signs this morning was within normal limit and patient was saturating well on room air.    Patient did not have any acute complaint or concerns this morning.  Patient was admitted to help with going to a different facility.  Patient left AMA from previous facility and was dissatisfied with the service there.  Patient had UTI on previous admission; will continue with nitrofurantoin.  Urine culture reviewed from previous admission and did not show any significant growth.    Labs reviewed this morning; white count elevated however at baseline from previous admission.  Will continue to monitor.    Vascular surgery evaluated her this morning due to patient s/p left lower extremity below-knee amputation done on 10/17.  No further vascular intervention required.    Padmini Padilla MD  Family Medicine Resident, PGY-2  [facility] [place]  10/27/2024, 9:49 AM    
Physical Therapy  Facility/Department: [facility] CAR 2- STEPDOWN  Physical Therapy Initial Assessment    Name: Jane Saavedra  : 1990  MRN: [id number]  Date of Service: 10/28/2024    Discharge Recommendations: Further therapy recommended at discharge.  Chief Complaint   Patient presents with    Leg Pain     L; had amputation on the ; at rehab facility     The patient is a 34 y.o.  female with history of severe PAD, type 1 diabetes that is uncontrolled, chronic osteomyelitis, with amputation of left hallux, HFrEF, PAD/CAD, status post CABG x 3, Status post left leg popliteal thrombectomy in 2024. S/p L AMNA on 10/17/24 came by an uber as she felt \"  Unsafe\" at her SNF placement. She was recently discharged to [facility] yesterday and states that they were not changing her dressing or giving her pain meds and was fearful that she was going to get another infection.       PT Equipment Recommendations  Equipment Needed: Yes  Mobility Devices: Walker  Walker: Rolling      Patient Diagnosis(es): The primary encounter diagnosis was Postoperative pain. A diagnosis of Unable to care for self was also pertinent to this visit.  Past Medical History:  has a past medical history of Acute osteomyelitis, Bipolar 1 disorder ([facility]), Cellulitis, Chronic diabetic ulcer of left foot determined by examination ([facility]), Depression, Diabetic foot infection ([facility]), H. pylori infection, History of recent hospitalization, Hyperlipidemia, Hypertension, Ischemic cardiomyopathy, Multiple vessel coronary artery disease, Neuropathy, Noncompliance, NSTEMI (non-ST elevated myocardial infarction) ([facility]), Osteomyelitis of foot, left, acute, PAD (peripheral artery disease) ([facility]), Renal abscess, left, STEMI (ST elevation myocardial infarction) ([facility]), Thrombus, Type II or unspecified type diabetes mellitus without mention of complication, uncontrolled, Under care of team, Under care of team, Under care of team, Under care of team, Under care of team, 
mg at 10/27/24 1317    Or    acetaminophen (TYLENOL) suppository 650 mg  650 mg Rectal Q6H PRN Marissa Wang MD        glucose chewable tablet 16 g  4 tablet Oral PRN Marissa Wang MD        dextrose bolus 10% 125 mL  125 mL IntraVENous PRN Marissa Wang MD        Or    dextrose bolus 10% 250 mL  250 mL IntraVENous PRN Marissa Wang MD        glucagon injection 1 mg  1 mg SubCUTAneous PRN Marissa Wang MD        dextrose 10 % infusion   IntraVENous Continuous PRN Marissa Wang MD        insulin lispro (HUMALOG,ADMELOG) injection vial 0-8 Units  0-8 Units SubCUTAneous 4x Daily AC & HS Marissa Wang MD   2 Units at 10/28/24 0848    nitrofurantoin (macrocrystal-monohydrate) (MACROBID) capsule 100 mg  100 mg Oral 2 times per day Marissa Wang MD   100 mg at 10/28/24 0841       ASSESSMENT:     Principal Problem:    S/P BKA (below knee amputation) unilateral, left (HCC)  Active Problems:    Postoperative pain    Unable to care for self  Resolved Problems:    * No resolved hospital problems. *      PLAN:     Patient status: Admit the patient as Inpatient in the Med/Surge Unit     Severe PAD (Acute L limb ischemia) in the setting of any vascular complications and diabetic foot ulcers s/p L BKA on 10/17/24  -s/p left leg popliteal artery percutaneous thrombectomy with PTA on 6/19/24   -Left foot hallux amputation on 7/8/2024  -History of osteomyelitis in the left foot on 8/31/2024 MRI and right foot fifth metatarsal head and fifth proximal phalanx on MRI right foot  -Patient had left BKA on 10/17  -Patient had a history of severe PAD with ABIs that showed right JEN of 0.61 and left of 0.17 and was sent straight from the vascular outpatient office   -sent to [facility] but patient left AMA- will need another placement  -will do Lexie 5mg q4h for pain, also Dilaudid and Toradol  -vascular surgery saw pt in ED; will continue to follow case  -wound care eval and treat     UTI  -pt was given

## 2024-11-01 NOTE — ED PROVIDER NOTES
[facility] ED     Emergency Department     Faculty Attestation        I performed a history and physical examination of the patient and discussed management with the resident. I reviewed the resident’s note and agree with the documented findings and plan of care. Any areas of disagreement are noted on the chart. I was personally present for the key portions of any procedures. I have documented in the chart those procedures where I was not present during the key portions. I have reviewed the emergency nurses triage note. I agree with the chief complaint, past medical history, past surgical history, allergies, medications, social and family history as documented unless otherwise noted below.  For Physician Assistant/ Nurse Practitioner cases/documentation I have personally evaluated this patient and have completed at least one if not all key elements of the E/M (history, physical exam, and MDM). Additional findings are as noted.      Vital Signs: BP: 133/76  Pulse: 98  Respirations: 16  Temp: 98.4 °F (36.9 °C) SpO2: 97 %  PCP:  Marissa Wang MD  Note Started: 10/31/24, 11:05 PM EDT    Pertinent Comments:     Patient is a 34-year-old female with multiple medical problems including very brittle and uncontrolled diabetes as well as relatively recent BKA that is awaiting revision to AKA with vascular surgery currently off of her Eliquis for previous blood clots.    Also patient has significant history of CABG x 3 vessels at a very young age.    Patient had been having leg pain in her affected leg with the BKA however now has been having some chest pain that does go to the back slightly.   Some perhaps mild shortness of breath associated but no diaphoresis or vomiting.   Abdomen is soft/nontender and lungs clear to auscultation bilaterally with midline trachea heart regular rate and rhythm to slightly tachycardic.   Blood sugars been running \"high\" and does

## 2024-11-01 NOTE — OP NOTE
Operative Note      Patient: Jane Saavedra  YOB: 1990  MRN: [id number]    Date of Procedure: 11/1/2024    Pre-Op Diagnosis: Left ischemic BKA stump    Post-Op Diagnosis: Same       Procedure: Left above knee amputation    Surgeon(s):  Emperatriz Watson MD    Assistant: Devante Valencia DO (Resident)    Anesthesia: General    Estimated Blood Loss (mL): 50 mL    Complications: None    Specimens:   ID Type Source Tests Collected by Time Destination   A : ABOVE THE KNEE LEFT LEG AMPUTAION Tissue Leg SURGICAL PATHOLOGY Emperatriz Watson MD 11/1/2024 1531        Implants: None      Drains:   Urinary Catheter 11/01/24 Hall (Active)       [REMOVED] Urinary Catheter 10/15/24 Hall-Temperature (Removed)   Catheter Indications Perioperative use for selected surgical procedures 10/16/24 2000   Site Assessment No urethral drainage 10/16/24 2000   Urine Color Yellow 10/16/24 2000   Urine Appearance Clear 10/16/24 2000   Collection Container Standard 10/16/24 2000   Securement Method Leg strap 10/16/24 2000   Catheter Care  Soap and water 10/16/24 1200   Catheter Best Practices  Drainage tube clipped to bed;Catheter secured to thigh;Tamper seal intact;Bag below bladder;Bag not on floor;Lack of dependent loop in tubing;Drainage bag less than half full 10/16/24 2000   Status Draining 10/16/24 2000   Output (mL) 50 mL 10/16/24 1800       [REMOVED] Urinary Catheter 10/17/24 Hall (Removed)   Catheter Indications Perioperative use for selected surgical procedures 10/18/24 0800   Site Assessment No urethral drainage 10/18/24 0800   Urine Color Marilynn 10/18/24 0800   Urine Appearance Clear 10/18/24 0800   Collection Container Standard 10/18/24 0800   Securement Method Securing device (Describe) 10/18/24 0800   Catheter Care  Soap and water 10/18/24 0400   Catheter Best Practices  Drainage tube clipped to bed;Catheter secured to thigh;Tamper seal intact;Bag below bladder;Bag not on floor;Lack of dependent loop in

## 2024-11-01 NOTE — ED PROVIDER NOTES
Faculty Sign-Out Attestation  Handoff taken on the following patient from prior Attending Physician: Orqvist  Note Started: 11:53 PM EDT    I was available and discussed any additional care issues that arose and coordinated the management plans with the resident(s) caring for the patient during my duty period. Any areas of disagreement with resident’s documentation of care or procedures are noted on the chart. I was personally present for the key portions of any/all procedures during my duty period. I have documented in the chart those procedures where I was not present during the key portions.    CP, hi glucose, pH stable,   ct r/o pe pending,   Needing admit     > ct no pe, >>>>>>>> admit    Fantasma Mcleod DO  Attending Physician      Fantasma Mcleod,   10/31/24 8937       Fantasma Mcleod,   11/01/24 0055       Fantasma Mcleod,   11/01/24 0238

## 2024-11-01 NOTE — ED NOTES
ED to inpatient nurses report      Chief Complaint:  Chief Complaint   Patient presents with    Chest Pain    Hyperglycemia     Present to ED from: Home    MOA:     LOC: alert and orientated to name, place, date  Mobility: Requires assistance * 1  Oxygen Baseline: RA    Current needs required: RA   Pending ED orders: IV antibiotics  Present condition: resting in ed cot     Why did the patient come to the ED? Pt arriving to ed 09 via ems   Co of chest pain radiating to the back that started around 11 am. Also hyperglycemic on arrival, hx of type 1 diabetes, hx of cabg, hx of bellow knee amputation on the 15th of left leg due to vascular complications rt to diabetes. Pt was scheduled to come back tomorrow for above knee amputation due to vascular complications  Pt was informed two days prior about operation and as informed to stop blood thinners  1 L ns initiated by EMS and finished in ed   What is the plan? IV antibiotics, vascular consult, surgery later today for above knee amp  Any procedures or intervention occur? Line, labs, ct, ekg    Mental Status:  Level of Consciousness: Alert (0)    Psych Assessment:   Psychosocial  Psychosocial (WDL): Within Defined Limits  Vital signs   Vitals:    10/31/24 2259 11/01/24 0100 11/01/24 0115   BP: 133/76 121/77 126/77   Pulse: 98 85 84   Resp: 16 20 16   Temp: 98.4 °F (36.9 °C)     TempSrc: Oral     SpO2: 97% 96% 96%   Weight: 81.6 kg (180 lb)     Height: 1.778 m (5' 10\")          Vitals:  Patient Vitals for the past 24 hrs:   BP Temp Temp src Pulse Resp SpO2 Height Weight   11/01/24 0115 126/77 -- -- 84 16 96 % -- --   11/01/24 0100 121/77 -- -- 85 20 96 % -- --   10/31/24 2259 133/76 98.4 °F (36.9 °C) Oral 98 16 97 % 1.778 m (5' 10\") 81.6 kg (180 lb)      Visit Vitals  /77   Pulse 84   Temp 98.4 °F (36.9 °C) (Oral)   Resp 16   Ht 1.778 m (5' 10\")   Wt 81.6 kg (180 lb)   SpO2 96%   BMI 25.83 kg/m²        LDAs:   Peripheral IV 10/31/24 Left;Posterior Forearm (Active)     History of recent hospitalization 06/18/2024    til 6/20/2024 : [facility] , ischemic leg    Hyperlipidemia     tx started May 2015    Hypertension     tx started May 2015    Ischemic cardiomyopathy     Multiple vessel coronary artery disease 01/2023    Neuropathy     BLE    Noncompliance     NSTEMI (non-ST elevated myocardial infarction) ([facility]) 01/06/2023    Osteomyelitis of foot, left, acute 07/01/2024    PAD (peripheral artery disease) ([facility])     Renal abscess, left 08/2023    extending into psoas and paraspinal    STEMI (ST elevation myocardial infarction) ([facility]) 07/04/2023    Thrombus     LT LOWER EXTREMITY    Type II or unspecified type diabetes mellitus without mention of complication, uncontrolled 02/21/2015    tx started May 2015    Under care of team     Cardiology, Brian Mi , last seen 12/7/2023    Under care of team     Neurosurgery, Corson Mi , last seen 4/11/2024    Under care of team     Podiatry, last seen 7/1/2024    Under care of team     Endocrinology , ? ( dc instructions state to call for appointment )    Under care of team     Vascular , Dr. Watson , ( dc instructions state to call for appointment )    Under care of team     Cardiology , ?  ( dc instructions state to call for appointment )    Wellness examination     PCP , ? ( dc instructions state to call for appointment )       Labs:  Labs Reviewed   BASIC METABOLIC PANEL - Abnormal; Notable for the following components:       Result Value    Sodium 135 (*)     Glucose 526 (*)     Calcium 8.5 (*)     All other components within normal limits   BRAIN NATRIURETIC PEPTIDE - Abnormal; Notable for the following components:    NT Pro- (*)     All other components within normal limits   CALCIUM, IONIZED - Abnormal; Notable for the following components:    Calcium, Ionized 1.11 (*)     All other components within normal limits   CBC WITH AUTO DIFFERENTIAL - Abnormal; Notable for the following components:    WBC 15.1 (*)     RBC 3.80 (*)

## 2024-11-01 NOTE — PROGRESS NOTES
Blood glucose 239 / report called to Valentino.  Hall patent / clear yellow urine.  Left AKA dressing unchanged.  Simple mask off / taking sips of water without distress.

## 2024-11-01 NOTE — PROGRESS NOTES
[facility]   Pharmacy Pharmacokinetic Monitoring Service - Vancomycin     Jane Saavedra is a 34 y.o. female starting on vancomycin therapy for skin and soft tissue infection. Pharmacy consulted by Dr. Turner for monitoring and adjustment.    Target Concentration: Goal AUC/DIVINA 400-600 mg*hr/L    Additional Antimicrobials: Zosyn    Pertinent Laboratory Values:   Wt Readings from Last 1 Encounters:   10/31/24 81.6 kg (180 lb)     Temp Readings from Last 1 Encounters:   10/31/24 98.4 °F (36.9 °C) (Oral)     Estimated Creatinine Clearance: 121 mL/min (based on SCr of 0.7 mg/dL).  Recent Labs     10/31/24  2303 10/31/24  2306   CREATININE 0.6 0.7   BUN  --  10   WBC  --  15.1*     Procalcitonin: n/a    Pertinent Cultures:  Culture Date Source Results        MRSA Nasal Swab: N/A. Non-respiratory infection.    Plan:  Dosing recommendations based on Bayesian software  Start vancomycin 1250mg IVPB every 8 hours  Anticipated AUC of 476 and trough concentration of 11.9 at steady state  Renal labs as indicated   Vancomycin concentration ordered for  @    Pharmacy will continue to monitor patient and adjust therapy as indicated    Thank you for the consult,  Devante Olivia RPH  11/1/2024 12:50 AM

## 2024-11-01 NOTE — PLAN OF CARE
Problem: Chronic Conditions and Co-morbidities  Goal: Patient's chronic conditions and co-morbidity symptoms are monitored and maintained or improved  Outcome: HH/HSPC Not Progressing  Flowsheets (Taken 11/1/2024 0400)  Care Plan - Patient's Chronic Conditions and Co-Morbidity Symptoms are Monitored and Maintained or Improved:   Monitor and assess patient's chronic conditions and comorbid symptoms for stability, deterioration, or improvement   Collaborate with multidisciplinary team to address chronic and comorbid conditions and prevent exacerbation or deterioration   Update acute care plan with appropriate goals if chronic or comorbid symptoms are exacerbated and prevent overall improvement and discharge     Problem: Discharge Planning  Goal: Discharge to home or other facility with appropriate resources  Outcome: HH/HSPC Not Progressing  Flowsheets (Taken 11/1/2024 0400)  Discharge to home or other facility with appropriate resources:   Identify barriers to discharge with patient and caregiver   Arrange for needed discharge resources and transportation as appropriate   Identify discharge learning needs (meds, wound care, etc)     Problem: Pain  Goal: Verbalizes/displays adequate comfort level or baseline comfort level  Outcome: HH/HSPC Not Progressing     Problem: Safety - Adult  Goal: Free from fall injury  Outcome: HH/HSPC Not Progressing     Problem: ABCDS Injury Assessment  Goal: Absence of physical injury  Outcome: HH/HSPC Not Progressing

## 2024-11-01 NOTE — H&P
[facility]  Office: [phone]  Jad Gracia DO, Yanick Nick DO, Andrew Wall DO, Dagoberto Anderson DO, Vinicius Church MD, Ruchi Vuong MD, Kasia Lewis MD, Jeanette Yates MD,  Owen Castro MD, Aleshia Watson MD, Nahomy Muller MD,  Gm Ocampo DO, Mindy Ronquillo MD, Sam Morris MD, Eliseo Gracia DO, Teresa Antunez MD,  Ambrocio Moore DO, Enriqueta Farrell MD, Andreea Dee MD, Mami Austin MD, Brianna Hurtado MD,  Lenny Ventura MD, Tad Palacios MD, Janelle Medina MD, Igor Hyde MD, Juan Carlos Oropeza MD, Andrew Tripathi MD, Justin Daugherty DO, Rico Chauhan MD, Shirley Waterhouse, CNP,  Tennille Bañuelos, CNP, Justin Hines, CNP,  Niki Haines, DEEPIKA, Radha Farooq, CNP, Karlie Chang, CNP, Jayne Bruno, CNP, Hawa Khalil, CNP, Eve Calloway PA-C, Meghan Patrick PA-C, Caity Monson, CNP, Destiny Castillo, CNP,  Era Wu, CNP, Marilynn Smith, CNP,  Carol Singleton, CNP, Emilie Osborn, CNP         [facility]   IN-PATIENT SERVICE   [facility]    HISTORY AND PHYSICAL EXAMINATION            Date:   11/1/2024  Patient name:  Jane Saavedra  Date of admission:  10/31/2024 10:53 PM  MRN:   [id number]  Account:  [id number]  YOB: 1990  PCP:    Marissa Wang MD  Room:   2003/2003-01  Code Status:    Full Code    Chief Complaint:     Chief Complaint   Patient presents with   • Chest Pain   • Hyperglycemia       History Obtained From:     patient, electronic medical record    History of Present Illness:     34-year-old female with past medical history of acute left limb ischemia resulting in BKA October 2024, type 2 diabetes, hypertension, CAD status post CABG 2023 presented to the ED emergency department endorsing chest pain and anxiety associated with her upcoming vascular procedure.  She states that she started to have chest pain located in the center of her chest yesterday when she started to think about her above-knee  Dr. Watson , ( dc instructions state to call for appointment )    Under care of team     Cardiology , ?  ( dc instructions state to call for appointment )    Wellness examination     PCP , ? ( dc instructions state to call for appointment )        Past Surgical History:     Past Surgical History:   Procedure Laterality Date    BACK SURGERY Left 08/10/2023    FLANK ABSCESS I&D ,  performed by Sánchez Leonard MD at [facility] OR    CARDIAC CATHETERIZATION  07/05/2023    restenosis d/t medication non compliance : Fabiola Garcia in [facility]    CARPAL TUNNEL RELEASE Left 07/22/2020    CORONARY ANGIOPLASTY WITH STENT PLACEMENT  01/06/2023    REED x2 to L CX , REED to LAD : [place]    CORONARY ARTERY BYPASS GRAFT N/A 07/10/2023    x 3 vessels using LIMA and right saph,   performed by Malina Gilliam MD at [facility] CVOR    IR DRAIN SKIN ABSCESS  11/26/2023    drain tube placement    LAMINECTOMY AND MICRODISCECTOMY LUMBAR SPINE  03/28/2024    L4-5 , [place] Mi    LEFT ATRIAL APPENDAGE LIGATION  07/10/2023    during bypass    LEG AMPUTATION BELOW KNEE Left 10/17/2024    BELOW KNEE AMPUTATION, POSSIBLE ABOVE KNEE AMPUTATION performed by Emperatriz Watson MD at [facility] CVOR    TOE AMPUTATION Left 07/08/2024    GREAT TOE AMPUTATION (Left: Foot)    TOE AMPUTATION Left 07/08/2024    : GREAT TOE AMPUTATION (Left: Foot)    TOE AMPUTATION Left 7/8/2024    GREAT TOE AMPUTATION performed by Radha Recio DPM at [facility] OR    VASCULAR SURGERY Left 06/19/2024    LEFT LOWER LEG ANGIOGRAM,PERCUTANEOUS MECHANICAL THROMBECTOMY performed by Emperatriz Watson MD at [facility]    VASCULAR SURGERY Left 10/15/2024    LEFT LOWER EXTREMITY ANGIOGRAM VIA RIGHT FEMORAL ARTERY / BALLOON ANGIOPLASTY / MECHANICAL PERCUTANEOUS THROMBECTOMY / EVERFLEX 6MM X 150CM STENT / FASCIOTOMY WITH OPEN THROMBECTOMY performed by Emperatriz Watson MD at [facility]        Medications Prior to Admission:     Prior to Admission medications    Medication Sig Start

## 2024-11-01 NOTE — CARE COORDINATION
Unable to complete initial assessment at this time. Patient just returned from OR and is currently sleeping

## 2024-11-01 NOTE — ANESTHESIA POSTPROCEDURE EVALUATION
Department of Anesthesiology  Postprocedure Note    Patient: Jane Saavedra  MRN: [id number]  YOB: 1990  Date of evaluation: 11/1/2024    Procedure Summary       Date: 11/01/24 Room / Location: [place] [address] / [facility]    Anesthesia Start: 1434 Anesthesia Stop: 1644    Procedure: ABOVE KNEE AMPUTATION (Left: Leg Upper) Diagnosis:       Gangrene (HCC)      (Gangrene (HCC) [I96])    Surgeons: Emperatriz Watson MD Responsible Provider: Iain Ramirez MD    Anesthesia Type: general ASA Status: 4            Anesthesia Type: No value filed.    Shanon Phase I: Shanon Score: 8    Shanon Phase II:    POST-OP ANESTHESIA NOTE       /61   Pulse 81   Temp 96.8 °F (36 °C)   Resp 25   Ht 1.778 m (5' 10\")   Wt 81.6 kg (180 lb)   SpO2 98%   BMI 25.83 kg/m²    Pain Assessment: None - Denies Pain  Pain Level: 7        Anesthesia Post Evaluation    Patient location during evaluation: PACU  Patient participation: complete - patient participated  Level of consciousness: awake  Pain score: 7  Airway patency: patent  Nausea & Vomiting: no vomiting and no nausea  Cardiovascular status: hemodynamically stable  Respiratory status: acceptable  Hydration status: stable  Pain management: adequate        No notable events documented.

## 2024-11-01 NOTE — ED NOTES
Pt arriving to ed 09 via ems   Co of chest pain radiating to the back that started around 11 am. Also hyperglycemic on arrival, hx of type 1 diabetes, hx of cabg, hx of bellow knee amputation on the 15th of left leg due to vascular complications rt to diabetes. Pt was scheduled to come back tomorrow for above knee amputation due to vascular complications  Pt was informed two days prior about operation and as informed to stop blood thinnners   1 L ns initiated by ems and finished in ed   Pt placed on continuous cardiac monitoring, BP, Pulse ox. EKG obtained. IV established and labs drawn.   Pt is resting on stretcher with call light within reach.  Breathing is non labored and no acute distress is noted.   Will continue to follow plan of care

## 2024-11-01 NOTE — PROGRESS NOTES
RECOVERY COMPLETED / transported per bed with patient clothing to 2003 with hand off at bedside with RN.  Patient states comfortable.  Left AKA dressing remains intact / dressing clean and dry.

## 2024-11-01 NOTE — ED NOTES
The following labs were labeled with appropriate pt sticker and tubed to lab:     [x] Blue     [x] Lavender   [] on ice  [x] Green/yellow  [x] Green/black [] on ice  [] Clemens  [] on ice  [x] Yellow  [] Red  [] Pink  [] Type/ Screen  [] ABG  [] VBG    [] COVID-19 swab    [] Rapid  [] PCR  [] Flu swab  [] Peds Viral Panel     [] Urine Sample  [] Fecal Sample  [] Pelvic Cultures  [] Blood Cultures  [] X 2  [] STREP Cultures

## 2024-11-01 NOTE — ANESTHESIA PRE PROCEDURE
with ECG of 08-JUL-2024 12:47,  Questionable change in initial forces of Anterior leads      Stress test 8/30/2024  [Large lateral wall infarct with brandy-infarct ischemia in the mid to apical region.   Normal LVEF.  61%   Risk stratification: [Low]      ECG: Resting ECG demonstrates normal sinus rhythm.    ECG: The stress ECG was negative for ischemia.    Stress Test: A pharmacological stress test was performed using regadenoson (Lexiscan).     EKG portion Negative for ischemia       TTE 8/30/2024    Left Ventricle: Hyperdynamic left ventricular systolic function with a visually estimated EF of 70 - 75%. Left ventricle size is normal. Moderately increased wall thickness.    IVC/SVC: IVC diameter is less than or equal to 21 mm and decreases greater than 50% during inspiration; therefore the estimated right atrial pressure is normal (~3 mmHg). IVC size is normal.    Image quality is fair. Contrast used: Definity.      Anesthesia Evaluation  Patient summary reviewed  Airway: Mallampati: II  TM distance: >3 FB   Neck ROM: full  Mouth opening: > = 3 FB   Dental:          Pulmonary: breath sounds clear to auscultation                             Cardiovascular:    (+) hypertension:, angina:, past MI:, CAD:, CABG/stent:        Rhythm: regular  Rate: normal                    Neuro/Psych:   (+) neuromuscular disease:, psychiatric history:            GI/Hepatic/Renal:             Endo/Other:    (+) DiabetesType I DM, Type II DM.                 Abdominal:       Abdomen: soft.      Vascular:          Other Findings:             Anesthesia Plan      general     ASA 4     (Reviewed all available relevant information, laboratory results and diagnostic tests.  Discussed risks , benefits and alternatives of anesthetic and sedation options. Possible need  for blood transfusions discussed.  Pt / family given opportunity to ask questions.  All questions answered.)  Induction: intravenous.    MIPS: Postoperative opioids intended and

## 2024-11-01 NOTE — PROGRESS NOTES
PACU RECOVERY INITIATED  /  PARS 8 as patient arrives to pcc room 5 in bed /  sleepy but awkes briefly to name.  Hand off at bedside.  Simple mask on  /  no distress.  Left AKA with ace wrap clean dry and secure.  Elevated on pillow.  Assessment further reviewed.  Side rails up with writer at bedside.

## 2024-11-02 LAB
ANION GAP SERPL CALCULATED.3IONS-SCNC: 9 MMOL/L (ref 9–16)
BASOPHILS # BLD: 0.03 K/UL (ref 0–0.2)
BASOPHILS NFR BLD: 0 % (ref 0–2)
BUN SERPL-MCNC: 9 MG/DL (ref 6–20)
CALCIUM SERPL-MCNC: 8.4 MG/DL (ref 8.6–10.4)
CHLORIDE SERPL-SCNC: 106 MMOL/L (ref 98–107)
CO2 SERPL-SCNC: 22 MMOL/L (ref 20–31)
CREAT SERPL-MCNC: 0.3 MG/DL (ref 0.6–0.9)
EKG ATRIAL RATE: 97 BPM
EKG P AXIS: 44 DEGREES
EKG P-R INTERVAL: 156 MS
EKG Q-T INTERVAL: 356 MS
EKG QRS DURATION: 84 MS
EKG QTC CALCULATION (BAZETT): 452 MS
EKG R AXIS: 83 DEGREES
EKG T AXIS: 20 DEGREES
EKG VENTRICULAR RATE: 97 BPM
EOSINOPHIL # BLD: 0.06 K/UL (ref 0–0.44)
EOSINOPHILS RELATIVE PERCENT: 1 % (ref 1–4)
ERYTHROCYTE [DISTWIDTH] IN BLOOD BY AUTOMATED COUNT: 13.5 % (ref 11.8–14.4)
GFR, ESTIMATED: >90 ML/MIN/1.73M2
GLUCOSE BLD-MCNC: 198 MG/DL (ref 65–105)
GLUCOSE BLD-MCNC: 214 MG/DL (ref 65–105)
GLUCOSE BLD-MCNC: 227 MG/DL (ref 65–105)
GLUCOSE BLD-MCNC: 281 MG/DL (ref 65–105)
GLUCOSE BLD-MCNC: 294 MG/DL (ref 65–105)
GLUCOSE BLD-MCNC: 303 MG/DL (ref 65–105)
GLUCOSE SERPL-MCNC: 225 MG/DL (ref 74–99)
HCT VFR BLD AUTO: 31 % (ref 36.3–47.1)
HGB BLD-MCNC: 9.5 G/DL (ref 11.9–15.1)
IMM GRANULOCYTES # BLD AUTO: 0.07 K/UL (ref 0–0.3)
IMM GRANULOCYTES NFR BLD: 1 %
LYMPHOCYTES NFR BLD: 3.94 K/UL (ref 1.1–3.7)
LYMPHOCYTES RELATIVE PERCENT: 31 % (ref 24–43)
MCH RBC QN AUTO: 28.9 PG (ref 25.2–33.5)
MCHC RBC AUTO-ENTMCNC: 30.6 G/DL (ref 28.4–34.8)
MCV RBC AUTO: 94.2 FL (ref 82.6–102.9)
MICROORGANISM SPEC CULT: NO GROWTH
MICROORGANISM SPEC CULT: NORMAL
MONOCYTES NFR BLD: 0.79 K/UL (ref 0.1–1.2)
MONOCYTES NFR BLD: 6 % (ref 3–12)
NEUTROPHILS NFR BLD: 61 % (ref 36–65)
NEUTS SEG NFR BLD: 7.86 K/UL (ref 1.5–8.1)
NRBC BLD-RTO: 0 PER 100 WBC
PLATELET # BLD AUTO: 430 K/UL (ref 138–453)
PMV BLD AUTO: 9.5 FL (ref 8.1–13.5)
POTASSIUM SERPL-SCNC: 3.8 MMOL/L (ref 3.7–5.3)
RBC # BLD AUTO: 3.29 M/UL (ref 3.95–5.11)
SERVICE CMNT-IMP: NORMAL
SODIUM SERPL-SCNC: 137 MMOL/L (ref 136–145)
SPECIMEN DESCRIPTION: NORMAL
SPECIMEN DESCRIPTION: NORMAL
WBC OTHER # BLD: 12.8 K/UL (ref 3.5–11.3)

## 2024-11-02 PROCEDURE — 6360000002 HC RX W HCPCS

## 2024-11-02 PROCEDURE — 80048 BASIC METABOLIC PNL TOTAL CA: CPT

## 2024-11-02 PROCEDURE — 97535 SELF CARE MNGMENT TRAINING: CPT

## 2024-11-02 PROCEDURE — 99232 SBSQ HOSP IP/OBS MODERATE 35: CPT | Performed by: STUDENT IN AN ORGANIZED HEALTH CARE EDUCATION/TRAINING PROGRAM

## 2024-11-02 PROCEDURE — 6370000000 HC RX 637 (ALT 250 FOR IP)

## 2024-11-02 PROCEDURE — 97162 PT EVAL MOD COMPLEX 30 MIN: CPT

## 2024-11-02 PROCEDURE — 36415 COLL VENOUS BLD VENIPUNCTURE: CPT

## 2024-11-02 PROCEDURE — 2580000003 HC RX 258

## 2024-11-02 PROCEDURE — 97116 GAIT TRAINING THERAPY: CPT

## 2024-11-02 PROCEDURE — 82947 ASSAY GLUCOSE BLOOD QUANT: CPT

## 2024-11-02 PROCEDURE — 2060000000 HC ICU INTERMEDIATE R&B

## 2024-11-02 PROCEDURE — 85025 COMPLETE CBC W/AUTO DIFF WBC: CPT

## 2024-11-02 PROCEDURE — 6370000000 HC RX 637 (ALT 250 FOR IP): Performed by: STUDENT IN AN ORGANIZED HEALTH CARE EDUCATION/TRAINING PROGRAM

## 2024-11-02 PROCEDURE — 93010 ELECTROCARDIOGRAM REPORT: CPT | Performed by: INTERNAL MEDICINE

## 2024-11-02 PROCEDURE — 97166 OT EVAL MOD COMPLEX 45 MIN: CPT

## 2024-11-02 RX ORDER — INSULIN GLARGINE 100 [IU]/ML
10 INJECTION, SOLUTION SUBCUTANEOUS DAILY
Status: DISCONTINUED | OUTPATIENT
Start: 2024-11-02 | End: 2024-11-11 | Stop reason: HOSPADM

## 2024-11-02 RX ADMIN — INSULIN GLARGINE 40 UNITS: 100 INJECTION, SOLUTION SUBCUTANEOUS at 20:42

## 2024-11-02 RX ADMIN — PIPERACILLIN SODIUM AND TAZOBACTAM SODIUM 3375 MG: 3; .375 INJECTION, SOLUTION INTRAVENOUS at 05:27

## 2024-11-02 RX ADMIN — MORPHINE SULFATE 2 MG: 2 INJECTION, SOLUTION INTRAMUSCULAR; INTRAVENOUS at 06:05

## 2024-11-02 RX ADMIN — INSULIN LISPRO 12 UNITS: 100 INJECTION, SOLUTION INTRAVENOUS; SUBCUTANEOUS at 18:28

## 2024-11-02 RX ADMIN — VANCOMYCIN 1250 MG: 1.75 INJECTION, SOLUTION INTRAVENOUS at 01:18

## 2024-11-02 RX ADMIN — SODIUM CHLORIDE: 9 INJECTION, SOLUTION INTRAVENOUS at 05:25

## 2024-11-02 RX ADMIN — ACETAMINOPHEN 1000 MG: 500 TABLET ORAL at 12:39

## 2024-11-02 RX ADMIN — METOPROLOL SUCCINATE 50 MG: 25 TABLET, EXTENDED RELEASE ORAL at 10:00

## 2024-11-02 RX ADMIN — OXYCODONE 5 MG: 5 TABLET ORAL at 21:56

## 2024-11-02 RX ADMIN — ATORVASTATIN CALCIUM 80 MG: 80 TABLET, FILM COATED ORAL at 20:40

## 2024-11-02 RX ADMIN — ACETAMINOPHEN 1000 MG: 500 TABLET ORAL at 20:40

## 2024-11-02 RX ADMIN — GABAPENTIN 300 MG: 300 CAPSULE ORAL at 12:39

## 2024-11-02 RX ADMIN — INSULIN LISPRO 4 UNITS: 100 INJECTION, SOLUTION INTRAVENOUS; SUBCUTANEOUS at 06:05

## 2024-11-02 RX ADMIN — KETOROLAC TROMETHAMINE 15 MG: 15 INJECTION, SOLUTION INTRAMUSCULAR; INTRAVENOUS at 08:46

## 2024-11-02 RX ADMIN — GABAPENTIN 300 MG: 300 CAPSULE ORAL at 10:00

## 2024-11-02 RX ADMIN — INSULIN LISPRO 8 UNITS: 100 INJECTION, SOLUTION INTRAVENOUS; SUBCUTANEOUS at 00:08

## 2024-11-02 RX ADMIN — ISOSORBIDE MONONITRATE 30 MG: 30 TABLET, EXTENDED RELEASE ORAL at 10:00

## 2024-11-02 RX ADMIN — KETOROLAC TROMETHAMINE 15 MG: 15 INJECTION, SOLUTION INTRAMUSCULAR; INTRAVENOUS at 18:30

## 2024-11-02 RX ADMIN — LOSARTAN POTASSIUM 100 MG: 50 TABLET, FILM COATED ORAL at 10:00

## 2024-11-02 RX ADMIN — MORPHINE SULFATE 2 MG: 2 INJECTION, SOLUTION INTRAMUSCULAR; INTRAVENOUS at 22:59

## 2024-11-02 RX ADMIN — OXYCODONE 5 MG: 5 TABLET ORAL at 01:20

## 2024-11-02 RX ADMIN — MORPHINE SULFATE 2 MG: 2 INJECTION, SOLUTION INTRAMUSCULAR; INTRAVENOUS at 00:09

## 2024-11-02 RX ADMIN — GABAPENTIN 300 MG: 300 CAPSULE ORAL at 20:40

## 2024-11-02 RX ADMIN — ACETAMINOPHEN 1000 MG: 500 TABLET ORAL at 06:05

## 2024-11-02 RX ADMIN — PIPERACILLIN SODIUM AND TAZOBACTAM SODIUM 3375 MG: 3; .375 INJECTION, SOLUTION INTRAVENOUS at 13:11

## 2024-11-02 RX ADMIN — INSULIN GLARGINE 10 UNITS: 100 INJECTION, SOLUTION SUBCUTANEOUS at 10:00

## 2024-11-02 RX ADMIN — OXYCODONE 5 MG: 5 TABLET ORAL at 15:55

## 2024-11-02 RX ADMIN — SODIUM CHLORIDE, PRESERVATIVE FREE 10 ML: 5 INJECTION INTRAVENOUS at 20:41

## 2024-11-02 RX ADMIN — PIPERACILLIN SODIUM AND TAZOBACTAM SODIUM 3375 MG: 3; .375 INJECTION, SOLUTION INTRAVENOUS at 20:57

## 2024-11-02 RX ADMIN — SODIUM CHLORIDE 25 ML: 9 INJECTION, SOLUTION INTRAVENOUS at 01:18

## 2024-11-02 RX ADMIN — SODIUM CHLORIDE 25 ML: 9 INJECTION, SOLUTION INTRAVENOUS at 05:26

## 2024-11-02 RX ADMIN — VANCOMYCIN 1250 MG: 1.75 INJECTION, SOLUTION INTRAVENOUS at 17:29

## 2024-11-02 RX ADMIN — MORPHINE SULFATE 2 MG: 2 INJECTION, SOLUTION INTRAMUSCULAR; INTRAVENOUS at 12:41

## 2024-11-02 RX ADMIN — INSULIN LISPRO 8 UNITS: 100 INJECTION, SOLUTION INTRAVENOUS; SUBCUTANEOUS at 12:39

## 2024-11-02 RX ADMIN — VANCOMYCIN 1250 MG: 1.75 INJECTION, SOLUTION INTRAVENOUS at 10:21

## 2024-11-02 ASSESSMENT — PAIN SCALES - GENERAL
PAINLEVEL_OUTOF10: 8
PAINLEVEL_OUTOF10: 6
PAINLEVEL_OUTOF10: 4
PAINLEVEL_OUTOF10: 8
PAINLEVEL_OUTOF10: 6
PAINLEVEL_OUTOF10: 6
PAINLEVEL_OUTOF10: 5
PAINLEVEL_OUTOF10: 7
PAINLEVEL_OUTOF10: 3
PAINLEVEL_OUTOF10: 7
PAINLEVEL_OUTOF10: 4
PAINLEVEL_OUTOF10: 8
PAINLEVEL_OUTOF10: 6
PAINLEVEL_OUTOF10: 5
PAINLEVEL_OUTOF10: 8
PAINLEVEL_OUTOF10: 10
PAINLEVEL_OUTOF10: 3

## 2024-11-02 ASSESSMENT — PAIN DESCRIPTION - LOCATION
LOCATION: LEG

## 2024-11-02 ASSESSMENT — PAIN DESCRIPTION - ORIENTATION
ORIENTATION: LEFT

## 2024-11-02 ASSESSMENT — PAIN DESCRIPTION - DESCRIPTORS
DESCRIPTORS: ACHING;DISCOMFORT
DESCRIPTORS: ACHING;DISCOMFORT

## 2024-11-02 ASSESSMENT — PAIN SCALES - WONG BAKER: WONGBAKER_NUMERICALRESPONSE: HURTS A LITTLE BIT

## 2024-11-02 NOTE — PROGRESS NOTES
[facility]  Office: [phone]  Jad Gracia DO, Yanick Nick DO, Andrew Wall DO, Dagoberto Anderson DO, Vinicius Church MD, Ruchi Vuong MD, Kasia Lewis MD, Jeanette Yates MD,  Owen Castro MD, Aleshia Watson MD, Nahomy Muller MD,  Gm Ocampo DO, Mindy Ronquillo MD, Sam Morris MD, Eliseo Gracia DO, Teresa Antunez MD,  Ambrocio Moore DO, Enriqueta Farrell MD, Andreea Dee MD, Mami Austin MD, Brianna Hurtado MD,  Lenny Ventura MD, Tad Palacios MD, Janelle Medina MD, Igor Hyde MD, Juan Carlos Oropeza MD, Andrew Tripathi MD, Justin Daugherty DO, Rico Chauhan MD, Shirley Waterhouse, CNP,  Tennille Bañuelos, CNP, Justin Hines, CNP,  Niki Haines, DEEPIKA, Radha Farooq, CNP, Karlie Chang, CNP, Jayne Bruno, CNP, Hawa Khalil, CNP, ERICKA De LeonC, ERICKA CanasC, Caity Monson, CNP, Destiny Castillo, CNP,  Era Wu, CNP, Marilynn Smith, CNP,  Carol Singleton, CNP, Emilie Osborn, CNP         [facility]   IN-PATIENT SERVICE   [facility]    Progress Note    11/2/2024    8:20 AM    Name:   Jane Saavedra  MRN:     [id number]     Acct:      [id number]   Room:   2003/2003-01  IP Day:  1  Admit Date:  10/31/2024 10:53 PM    PCP:   Marissa Wang MD  Code Status:  Full Code    Subjective:     C/C:   Chief Complaint   Patient presents with    Chest Pain    Hyperglycemia   Interval History Status: not changed.     Patient seen and examined at bedside this morning.  Patient feeling down.  Has not had any chest pain since admission.  Denies any lightheadedness, dizziness, fever or chills.  Continues to have anxiety    Brief History:     34-year-old female with PMHx of acute left limb ischemia resulting in BKA October 2024, DM2, HTN, CAD s/p CABG in 2023 who presented to the ED emergency department endorsing chest pain and anxiety associated with her upcoming vascular procedure.  Patient was planned to have above-knee amputation  induration    Assessment:        Hospital Problems             Last Modified POA    * (Principal) Gangrene (HCC) 10/29/2024 Unknown    PAD (peripheral artery disease) ([facility]) 6/19/2024 Unknown    Sepsis ([facility]) 11/1/2024 Yes       Plan:        Chest pain with Hx of CAD s/p CABG 2023  Likely related to anxiety versus cardiac etiology  Troponins unremarkable, EKG with sinus rhythm, left atrial lodgment, no acute ischemic changes  Continue on atorvastatin, holding aspirin. Resume when ok with vascular  Continue Telemetry monitoring      Hyperglycemia in the setting of DM2  Continue home Lantus 40 units nightly   Will start on Lantus 10 units daily to help control BG  High sliding scale while inpatient  Goal blood glucose 140-180 to promote wound healing      Hypertension  Continued home losartan 100 mg daily, isosorbide mononitrate 30 mg daily, metoprolol succinate 50 mg daily  RCRI: 2 points, class III risk, 10.5% 30-day risk of death, MI, or cardiac arrest     Left lower extremity acute ischemia status post BKA  Vascular surgery following   Completed left AKA on 11/1       Andrew Tripathi MD  11/2/2024  8:20 AM

## 2024-11-02 NOTE — PLAN OF CARE
Problem: Pain  Goal: Verbalizes/displays adequate comfort level or baseline comfort level  11/2/2024 1042 by Liza Goldstein RN  Outcome: Progressing     Problem: Safety - Adult  Goal: Free from fall injury  11/2/2024 1042 by Liza Goldstein RN  Outcome: Progressing     Problem: ABCDS Injury Assessment  Goal: Absence of physical injury  11/2/2024 1042 by Liza Goldstein RN  Outcome: Progressing

## 2024-11-02 NOTE — PROGRESS NOTES
Division of Vascular Surgery             Progress Note      Name: Jane Saavedra  MRN: [id number]         Overnight Events:     None    Subjective:     Patient is a 34-year-old female who presented with chest pain and hyperglycemia on 10/31.  Patient was scheduled for a left above-knee amputation on 11/1 which she underwent with Dr. Watson.  Patient was seen and examined at bedside this morning.  Patient is doing well postoperatively.  Patient is eating and drinking without any issues.    Physical Exam:     Vitals:  /68   Pulse 73   Temp 98.4 °F (36.9 °C) (Oral)   Resp 16   Ht 1.778 m (5' 10\")   Wt 81.6 kg (180 lb)   SpO2 96%   BMI 25.83 kg/m²     General appearance - alert, well appearing and in no acute distress  Mental status - oriented to person, place and time with normal affect  Head - normocephalic and atraumatic  Neck - supple, no carotid bruits, thyroid not palpable, no JVD  Chest - clear to auscultation, normal effort  Heart - normal rate, regular rhythm, no murmurs  Abdomen - soft, non-tender, non-distended, bowel sounds present all four quadrants, no masses  Neurological - normal speech, no focal findings or movement disorder noted, cranial nerves II through XII grossly intact  Extremities - peripheral pulses palpable, no pedal edema or calf pain with palpation  Skin - no gross lesions, rashes, or induration noted  Foot Exam - L AKA wrapped  Vascular Exam - palp LLE throughout      Imaging:     CT CHEST PULMONARY EMBOLISM W CONTRAST    Result Date: 11/1/2024  No evidence of pulmonary embolism or acute cardiopulmonary process.     CTA ABDOMINAL AORTA W BILAT RUNOFF W CONTRAST    Result Date: 10/24/2024  1. Status post below-the-knee amputation of the left lower extremity with gas and soft tissue swelling in the amputated stump.  This is concerning for gas-forming infection. 2. Complete occlusion of the left popliteal artery stent. I do not clearly see any collateral reconstitution of any

## 2024-11-02 NOTE — PROGRESS NOTES
Occupational Therapy  Occupational Therapy Initial Evaluation  Facility/Department: [facility] CAR 2- STEPDOWN     Patient Name: Jane Saavedra        MRN: [id number]    : 1990    Date of Service: 2024    Discharge Recommendations  Further therapy recommended at discharge.The patient should be able to tolerate at least 3 hours of therapy per day over 5 days or 15 hours over 7 days.   This patient may benefit from a Physical Medicine and Rehab consult.     OT Equipment Recommendations  Equipment Needed: Yes  Equipment recommendations listed below are based on what the patient would need if they were able to return to prior living arrangements at the time of discharge.   Mobility Devices: Wheelchair, ADL Assistive Devices  Wheelchair: Standard, Wheelchair Cushion Pressure Relieving  ADL Assistive Devices: Transfer Tub Bench, Toileting - 3-in-1 Commode    Chief Complaint   Patient presents with    Chest Pain    Hyperglycemia     Past Medical History:  has a past medical history of Acute osteomyelitis, Bipolar 1 disorder ([facility]), Cellulitis, Chronic diabetic ulcer of left foot determined by examination ([facility]), Depression, Diabetic foot infection ([facility]), H. pylori infection, History of recent hospitalization, Hyperlipidemia, Hypertension, Ischemic cardiomyopathy, Multiple vessel coronary artery disease, Neuropathy, Noncompliance, NSTEMI (non-ST elevated myocardial infarction) ([facility]), Osteomyelitis of foot, left, acute, PAD (peripheral artery disease) ([facility]), Renal abscess, left, STEMI (ST elevation myocardial infarction) ([facility]), Thrombus, Type II or unspecified type diabetes mellitus without mention of complication, uncontrolled, Under care of team, Under care of team, Under care of team, Under care of team, Under care of team, Under care of team, and Wellness examination.  Past Surgical History:  has a past surgical history that includes Carpal tunnel release (Left, 2020); Coronary artery bypass graft (N/A,

## 2024-11-02 NOTE — PROGRESS NOTES
to increased pain.  Strength RLE  Strength RLE: WFL  Comment: Grossly 4+/5  Strength LLE  Strength LLE: WFL  Comment: L AKA, hip grossly 4/5  Strength RUE  Comment: Co-eval with OT, see OT note for UE detail.  Strength LUE  Comment: Co-eval with OT, see OT note for UE detail.      Bed mobility  Supine to Sit: Stand by assistance  Sit to Supine:  (pt retired to sitting at the EOB.)  Scooting: Stand by assistance  Bed Mobility Comments: HOB elevated ~30 degrees, increased time/effort required.  Transfers  Sit to Stand: Contact guard assistance  Stand to Sit: Contact guard assistance  Comment: Transfers performed with a RW.  Verbal cues for UE placement.  Increased effort to perform.  Ambulation  Surface: Level tile  Device: Rolling Walker  Assistance: Contact guard assistance  Quality of Gait: fair stability, decreased stride length, decreased foot clearance, no true LOB.  Gait Deviations: Slow Robyn;Decreased step length;Decreased step height  Distance: 3 feet R at the EOB, standing rest break, 20 feet  More Ambulation?: No  Stairs/Curb  Stairs?: No     Balance  Posture: Fair  Sitting - Static: Good  Sitting - Dynamic: Good;-  Standing - Static: Fair  Standing - Dynamic: Fair  Comments: standing balance assessed while using a RW.                                                  AM-PAC - Mobility    AM-PAC Basic Mobility - Inpatient   How much help is needed turning from your back to your side while in a flat bed without using bedrails?: None  How much help is needed moving from lying on your back to sitting on the side of a flat bed without using bedrails?: A Little  How much help is needed moving to and from a bed to a chair?: A Little  How much help is needed standing up from a chair using your arms?: A Little  How much help is needed walking in hospital room?: A Little  How much help is needed climbing 3-5 steps with a railing?: Total  AM-PAC Inpatient Mobility Raw Score : 17  AM-PAC Inpatient T-Scale Score :

## 2024-11-02 NOTE — PLAN OF CARE
Problem: Chronic Conditions and Co-morbidities  Goal: Patient's chronic conditions and co-morbidity symptoms are monitored and maintained or improved  11/2/2024 0032 by Ofe Ervin RN  Outcome: Progressing  11/1/2024 1526 by Valentino Acosta RN  Outcome: Progressing     Problem: Discharge Planning  Goal: Discharge to home or other facility with appropriate resources  11/2/2024 0032 by Ofe Ervin RN  Outcome: Progressing  11/1/2024 1526 by Valentino Acosta RN  Outcome: Progressing  Flowsheets (Taken 11/1/2024 0800)  Discharge to home or other facility with appropriate resources:   Identify barriers to discharge with patient and caregiver   Arrange for needed discharge resources and transportation as appropriate   Identify discharge learning needs (meds, wound care, etc)   Refer to discharge planning if patient needs post-hospital services based on physician order or complex needs related to functional status, cognitive ability or social support system     Problem: Pain  Goal: Verbalizes/displays adequate comfort level or baseline comfort level  11/2/2024 0032 by Ofe Ervin RN  Outcome: Progressing  11/1/2024 1526 by Valentino Acosta RN  Outcome: Progressing     Problem: Safety - Adult  Goal: Free from fall injury  11/2/2024 0032 by Ofe Ervin RN  Outcome: Progressing  11/1/2024 1526 by Valentino Acosta RN  Outcome: Progressing     Problem: ABCDS Injury Assessment  Goal: Absence of physical injury  11/2/2024 0032 by Ofe Ervin RN  Outcome: Progressing  11/1/2024 1526 by Valentino Acosta RN  Outcome: Progressing

## 2024-11-03 LAB
GLUCOSE BLD-MCNC: 182 MG/DL (ref 65–105)
GLUCOSE BLD-MCNC: 196 MG/DL (ref 65–105)
GLUCOSE BLD-MCNC: 205 MG/DL (ref 65–105)
GLUCOSE BLD-MCNC: 216 MG/DL (ref 65–105)
GLUCOSE BLD-MCNC: 237 MG/DL (ref 65–105)
GLUCOSE BLD-MCNC: 251 MG/DL (ref 65–105)
GLUCOSE BLD-MCNC: 253 MG/DL (ref 65–105)

## 2024-11-03 PROCEDURE — 6370000000 HC RX 637 (ALT 250 FOR IP)

## 2024-11-03 PROCEDURE — 99232 SBSQ HOSP IP/OBS MODERATE 35: CPT | Performed by: STUDENT IN AN ORGANIZED HEALTH CARE EDUCATION/TRAINING PROGRAM

## 2024-11-03 PROCEDURE — 6360000002 HC RX W HCPCS

## 2024-11-03 PROCEDURE — 6370000000 HC RX 637 (ALT 250 FOR IP): Performed by: STUDENT IN AN ORGANIZED HEALTH CARE EDUCATION/TRAINING PROGRAM

## 2024-11-03 PROCEDURE — 2060000000 HC ICU INTERMEDIATE R&B

## 2024-11-03 PROCEDURE — 97535 SELF CARE MNGMENT TRAINING: CPT

## 2024-11-03 PROCEDURE — 82947 ASSAY GLUCOSE BLOOD QUANT: CPT

## 2024-11-03 PROCEDURE — 97530 THERAPEUTIC ACTIVITIES: CPT

## 2024-11-03 PROCEDURE — 2580000003 HC RX 258

## 2024-11-03 RX ORDER — FENTANYL CITRATE 50 UG/ML
50 INJECTION, SOLUTION INTRAMUSCULAR; INTRAVENOUS ONCE
Status: COMPLETED | OUTPATIENT
Start: 2024-11-03 | End: 2024-11-03

## 2024-11-03 RX ORDER — SERTRALINE HYDROCHLORIDE 25 MG/1
25 TABLET, FILM COATED ORAL DAILY
Status: DISCONTINUED | OUTPATIENT
Start: 2024-11-03 | End: 2024-11-11 | Stop reason: HOSPADM

## 2024-11-03 RX ORDER — POLYETHYLENE GLYCOL 3350 17 G/17G
17 POWDER, FOR SOLUTION ORAL DAILY
Status: DISCONTINUED | OUTPATIENT
Start: 2024-11-03 | End: 2024-11-11 | Stop reason: HOSPADM

## 2024-11-03 RX ORDER — SENNA AND DOCUSATE SODIUM 50; 8.6 MG/1; MG/1
2 TABLET, FILM COATED ORAL DAILY
Status: DISCONTINUED | OUTPATIENT
Start: 2024-11-03 | End: 2024-11-11 | Stop reason: HOSPADM

## 2024-11-03 RX ADMIN — MORPHINE SULFATE 2 MG: 2 INJECTION, SOLUTION INTRAMUSCULAR; INTRAVENOUS at 12:37

## 2024-11-03 RX ADMIN — ATORVASTATIN CALCIUM 80 MG: 80 TABLET, FILM COATED ORAL at 21:09

## 2024-11-03 RX ADMIN — LOSARTAN POTASSIUM 100 MG: 50 TABLET, FILM COATED ORAL at 09:23

## 2024-11-03 RX ADMIN — GABAPENTIN 300 MG: 300 CAPSULE ORAL at 21:09

## 2024-11-03 RX ADMIN — VANCOMYCIN 1250 MG: 1.75 INJECTION, SOLUTION INTRAVENOUS at 01:12

## 2024-11-03 RX ADMIN — INSULIN LISPRO 8 UNITS: 100 INJECTION, SOLUTION INTRAVENOUS; SUBCUTANEOUS at 18:40

## 2024-11-03 RX ADMIN — SODIUM CHLORIDE, PRESERVATIVE FREE 10 ML: 5 INJECTION INTRAVENOUS at 09:23

## 2024-11-03 RX ADMIN — GABAPENTIN 300 MG: 300 CAPSULE ORAL at 09:23

## 2024-11-03 RX ADMIN — OXYCODONE 5 MG: 5 TABLET ORAL at 05:24

## 2024-11-03 RX ADMIN — INSULIN LISPRO 4 UNITS: 100 INJECTION, SOLUTION INTRAVENOUS; SUBCUTANEOUS at 05:30

## 2024-11-03 RX ADMIN — FENTANYL CITRATE 50 MCG: 50 INJECTION, SOLUTION INTRAMUSCULAR; INTRAVENOUS at 08:56

## 2024-11-03 RX ADMIN — METOPROLOL SUCCINATE 50 MG: 25 TABLET, EXTENDED RELEASE ORAL at 09:23

## 2024-11-03 RX ADMIN — SODIUM CHLORIDE, PRESERVATIVE FREE 10 ML: 5 INJECTION INTRAVENOUS at 21:10

## 2024-11-03 RX ADMIN — VANCOMYCIN 1250 MG: 1.75 INJECTION, SOLUTION INTRAVENOUS at 10:59

## 2024-11-03 RX ADMIN — ACETAMINOPHEN 1000 MG: 500 TABLET ORAL at 21:09

## 2024-11-03 RX ADMIN — MORPHINE SULFATE 2 MG: 2 INJECTION, SOLUTION INTRAMUSCULAR; INTRAVENOUS at 15:17

## 2024-11-03 RX ADMIN — INSULIN LISPRO 4 UNITS: 100 INJECTION, SOLUTION INTRAVENOUS; SUBCUTANEOUS at 12:39

## 2024-11-03 RX ADMIN — INSULIN GLARGINE 10 UNITS: 100 INJECTION, SOLUTION SUBCUTANEOUS at 09:23

## 2024-11-03 RX ADMIN — ACETAMINOPHEN 1000 MG: 500 TABLET ORAL at 05:23

## 2024-11-03 RX ADMIN — POLYETHYLENE GLYCOL 3350 17 G: 17 POWDER, FOR SOLUTION ORAL at 12:40

## 2024-11-03 RX ADMIN — ISOSORBIDE MONONITRATE 30 MG: 30 TABLET, EXTENDED RELEASE ORAL at 09:23

## 2024-11-03 RX ADMIN — PIPERACILLIN SODIUM AND TAZOBACTAM SODIUM 3375 MG: 3; .375 INJECTION, SOLUTION INTRAVENOUS at 05:28

## 2024-11-03 RX ADMIN — PIPERACILLIN SODIUM AND TAZOBACTAM SODIUM 3375 MG: 3; .375 INJECTION, SOLUTION INTRAVENOUS at 12:33

## 2024-11-03 RX ADMIN — ACETAMINOPHEN 1000 MG: 500 TABLET ORAL at 14:30

## 2024-11-03 RX ADMIN — SENNOSIDES AND DOCUSATE SODIUM 2 TABLET: 50; 8.6 TABLET ORAL at 12:35

## 2024-11-03 RX ADMIN — SERTRALINE HYDROCHLORIDE 25 MG: 25 TABLET ORAL at 12:34

## 2024-11-03 RX ADMIN — MORPHINE SULFATE 2 MG: 2 INJECTION, SOLUTION INTRAMUSCULAR; INTRAVENOUS at 03:38

## 2024-11-03 RX ADMIN — MORPHINE SULFATE 2 MG: 2 INJECTION, SOLUTION INTRAMUSCULAR; INTRAVENOUS at 22:11

## 2024-11-03 RX ADMIN — OXYCODONE 5 MG: 5 TABLET ORAL at 17:14

## 2024-11-03 RX ADMIN — KETOROLAC TROMETHAMINE 15 MG: 15 INJECTION, SOLUTION INTRAMUSCULAR; INTRAVENOUS at 01:18

## 2024-11-03 RX ADMIN — INSULIN GLARGINE 40 UNITS: 100 INJECTION, SOLUTION SUBCUTANEOUS at 21:09

## 2024-11-03 RX ADMIN — GABAPENTIN 300 MG: 300 CAPSULE ORAL at 12:34

## 2024-11-03 RX ADMIN — INSULIN LISPRO 4 UNITS: 100 INJECTION, SOLUTION INTRAVENOUS; SUBCUTANEOUS at 01:04

## 2024-11-03 RX ADMIN — MORPHINE SULFATE 2 MG: 2 INJECTION, SOLUTION INTRAMUSCULAR; INTRAVENOUS at 18:40

## 2024-11-03 RX ADMIN — OXYCODONE 5 MG: 5 TABLET ORAL at 23:23

## 2024-11-03 ASSESSMENT — PAIN DESCRIPTION - LOCATION
LOCATION: LEG

## 2024-11-03 ASSESSMENT — PAIN SCALES - GENERAL
PAINLEVEL_OUTOF10: 6
PAINLEVEL_OUTOF10: 7
PAINLEVEL_OUTOF10: 8
PAINLEVEL_OUTOF10: 7
PAINLEVEL_OUTOF10: 5
PAINLEVEL_OUTOF10: 5
PAINLEVEL_OUTOF10: 7
PAINLEVEL_OUTOF10: 8
PAINLEVEL_OUTOF10: 8
PAINLEVEL_OUTOF10: 6
PAINLEVEL_OUTOF10: 8
PAINLEVEL_OUTOF10: 7
PAINLEVEL_OUTOF10: 8
PAINLEVEL_OUTOF10: 6
PAINLEVEL_OUTOF10: 6

## 2024-11-03 ASSESSMENT — PAIN DESCRIPTION - ORIENTATION
ORIENTATION: LEFT

## 2024-11-03 ASSESSMENT — PAIN SCALES - WONG BAKER
WONGBAKER_NUMERICALRESPONSE: HURTS A LITTLE BIT
WONGBAKER_NUMERICALRESPONSE: HURTS A LITTLE BIT

## 2024-11-03 NOTE — PROGRESS NOTES
Occupational Therapy  Facility/Department: [facility] CAR 2- STEPDOWN   Daily Treatment Note  Patient Name: Jane Saavedra        MRN: [id number]    : 1990    Date of Service: 11/3/2024    Discharge Recommendations  Discharge Recommendations: Patient would benefit from continued therapy after discharge    Further therapy recommended at discharge.The patient should be able to tolerate at least 3 hours of therapy per day over 5 days or 15 hours over 7 days.   This patient may benefit from a Physical Medicine and Rehab consult.      OT Equipment Recommendations  Equipment Needed: Yes  Wheelchair: Standard;Wheelchair Cushion Pressure Relieving  ADL Assistive Devices: Transfer Tub Bench;Toileting - 3-in-1 Commode    Assessment  Performance deficits / Impairments: Decreased functional mobility ;Decreased ADL status;Decreased endurance;Decreased balance;Decreased high-level IADLs;Decreased sensation  Prognosis: Good  Decision Making: Medium Complexity  REQUIRES OT FOLLOW-UP: Yes  Activity Tolerance  Activity Tolerance: Patient limited by pain  Safety Devices  Type of Devices: Bed alarm in place;Call light within reach;Gait belt;Nurse notified;Left in bed  Restraints  Restraints Initially in Place: No    Restrictions/Precautions  Restrictions/Precautions  Restrictions/Precautions: Up as Tolerated  Required Braces or Orthoses?: No  Position Activity Restriction  Other position/activity restrictions: s/p L AKA 2024    Subjective  General  Family / Caregiver Present: No  General Comment  Comments: RN ok'd pt for OT this date. Pt agreed to session and was pleasant/cooperative throughout. Pt reports pain of 10/10 to LLE and muscle cramping, RN notified and was assisted with increased activity, distraction, desensitization, and repositioning for optimal comfort at end of session with good return  Pain Screening  Pain at present: 10    Objective  Orientation  Overall Orientation Status: Within Functional  Minutes    Electronically signed by RIK Darnell on 11/3/24 at 4:13 PM EST

## 2024-11-03 NOTE — PROGRESS NOTES
[facility]  Office: [phone]  Jad Gracia DO, Yanick Nick DO, Andrew Wall DO, Dagoberto Anderson DO, Vinicius Church MD, Ruchi Vuong MD, Kasia Lewis MD, Jeanette Yates MD,  Owen Castro MD, Aleshia Watson MD, Nahomy Muller MD,  Gm Ocampo DO, Mindy Ronquillo MD, Sam Morris MD, Eliseo Gracia DO, Teresa Antunez MD,  Ambrocio Moore DO, Enriqueta Farrell MD, Andreea Dee MD, Mami Austin MD, Brianna uHrtado MD,  Lenny Ventura MD, Tad Palacios MD, Janelle Medina MD, Igor Hyde MD, Juan Carlos Oropeza MD, Andrew Tripathi MD, Justin Daugherty DO, Rico Chauhan MD, Shirley Waterhouse, CNP,  Tennille Bañuelos, CNP, Justin Hines, CNP,  Niki Haines, DEEPIKA, Radha Farooq, CNP, Karlie Chang, CNP, Jayne Bruno, CNP, Hawa Khalil, CNP, ERICKA De LeonC, ERICKA CanasC, Caity Monson, CNP, Destiny Castillo, CNP,  Era Wu, CNP, Marilynn Smith, CNP,  Carol Singleton, CNP, Emilie Osborn, CNP         [facility]   IN-PATIENT SERVICE   [facility]    Progress Note    11/3/2024    8:01 AM    Name:   Jane Saavedra  MRN:     [id number]     Acct:      [id number]   Room:   2003/2003-01  IP Day:  2  Admit Date:  10/31/2024 10:53 PM    PCP:   Marissa Wang MD  Code Status:  Full Code    Subjective:     C/C:   Chief Complaint   Patient presents with    Chest Pain    Hyperglycemia     Interval History Status: not changed.     Patient does not talk much.  She appears depressed.  She does say that her mood is not well.  She is willing to start medication for her mood.  Does not report any chest pain or shortness of breath.  Pain in the stump is controlled.  She is working with therapy.  Vitals stable  Blood sugar 205    Brief History:     34-year-old female with PMHx of acute left limb ischemia resulting in BKA October 2024, DM2, HTN, CAD s/p CABG in 2023 who presented to the ED emergency department endorsing chest pain and anxiety associated  aspirin and Eliquis when okay with vascular surgery, awaiting PM&R consult    Chest pain with history of CAD with CABG-likely related to anxiety, resolved    Hyperglycemia with type 2 diabetes mellitus-adjust Lantus, continue sliding scale, continue to watch blood sugars    Hypertension on losartan, isosorbide mononitrate, metoprolol    Depression-start Zoloft    PM&R consulted    Mindy Ronquillo MD  11/3/2024  8:01 AM

## 2024-11-03 NOTE — PROGRESS NOTES
Division of Vascular Surgery            Progress Note      Name: Jane Saavedra  MRN: [id number]       Overnight Events:     None    Subjective:     Patient is a 34-year-old female who presented with chest pain and hyperglycemia on 10/31.  Patient was scheduled for a left above-knee amputation on 11/1 which she underwent with Dr. Watson.  Patient was seen and examined at bedside this morning.  Patient is doing well postoperatively.  Patient is eating and drinking without any issues. She is doing well with physical therapy. Patient reports that the rooms are warm and she is sweating even with the temperature being adjusted.    Physical Exam:     Vitals:  /74   Pulse 75   Temp 97.3 °F (36.3 °C) (Oral)   Resp 18   Ht 1.778 m (5' 10\")   Wt 81.6 kg (180 lb)   SpO2 95%   BMI 25.83 kg/m²     General appearance - alert, well appearing and in no acute distress  Mental status - oriented to person, place and time with normal affect  Head - normocephalic and atraumatic  Neck - supple, no carotid bruits, thyroid not palpable, no JVD  Chest - clear to auscultation, normal effort  Heart - normal rate, regular rhythm, no murmurs  Abdomen - soft, non-tender, non-distended, bowel sounds present all four quadrants, no masses  Neurological - normal speech, no focal findings or movement disorder noted, cranial nerves II through XII grossly intact  Extremities - peripheral pulses palpable, no pedal edema or calf pain with palpation  Skin - no gross lesions, rashes, or induration noted  Foot Exam - L AKA wrapped  Vascular Exam - palp RLE throughout, palp L femoral pulse    Imaging:     CT CHEST PULMONARY EMBOLISM W CONTRAST    Result Date: 11/1/2024  No evidence of pulmonary embolism or acute cardiopulmonary process.     CTA ABDOMINAL AORTA W BILAT RUNOFF W CONTRAST    Result Date: 10/24/2024  1. Status post below-the-knee amputation of the left lower extremity with gas and soft tissue swelling in the amputated stump.

## 2024-11-03 NOTE — PLAN OF CARE
Problem: Chronic Conditions and Co-morbidities  Goal: Patient's chronic conditions and co-morbidity symptoms are monitored and maintained or improved  Outcome: Progressing     Problem: Discharge Planning  Goal: Discharge to home or other facility with appropriate resources  Outcome: Progressing     Problem: Pain  Goal: Verbalizes/displays adequate comfort level or baseline comfort level  11/2/2024 2220 by Gita Holcomb RN  Outcome: Progressing  11/2/2024 1042 by Liza Goldstein RN  Outcome: Progressing     Problem: Safety - Adult  Goal: Free from fall injury  11/2/2024 2220 by Gita Holcomb RN  Outcome: Progressing  11/2/2024 1042 by Liza Goldstein RN  Outcome: Progressing     Problem: ABCDS Injury Assessment  Goal: Absence of physical injury  11/2/2024 2220 by Gita Holcomb RN  Outcome: Progressing  11/2/2024 1042 by Liza Goldstein RN  Outcome: Progressing

## 2024-11-03 NOTE — PLAN OF CARE
Problem: Pain  Goal: Verbalizes/displays adequate comfort level or baseline comfort level  11/3/2024 0942 by Liza Goldstein, RN  Outcome: Progressing     Problem: Safety - Adult  Goal: Free from fall injury  11/3/2024 0942 by Liza Goldstein, RN  Outcome: Progressing

## 2024-11-04 ENCOUNTER — ANESTHESIA (OUTPATIENT)
Dept: OPERATING ROOM | Age: 34
End: 2024-11-04
Payer: COMMERCIAL

## 2024-11-04 ENCOUNTER — ANESTHESIA EVENT (OUTPATIENT)
Dept: OPERATING ROOM | Age: 34
End: 2024-11-04
Payer: COMMERCIAL

## 2024-11-04 ENCOUNTER — APPOINTMENT (OUTPATIENT)
Dept: GENERAL RADIOLOGY | Age: 34
DRG: 181 | End: 2024-11-04
Payer: COMMERCIAL

## 2024-11-04 ENCOUNTER — OFFICE VISIT (OUTPATIENT)
Dept: OPERATING ROOM | Age: 34
End: 2024-11-04
Attending: STUDENT IN AN ORGANIZED HEALTH CARE EDUCATION/TRAINING PROGRAM

## 2024-11-04 LAB
ANION GAP SERPL CALCULATED.3IONS-SCNC: 8 MMOL/L (ref 9–16)
BASOPHILS # BLD: 0.03 K/UL (ref 0–0.2)
BASOPHILS NFR BLD: 0 % (ref 0–2)
BUN SERPL-MCNC: 6 MG/DL (ref 6–20)
CALCIUM SERPL-MCNC: 8.6 MG/DL (ref 8.6–10.4)
CHLORIDE SERPL-SCNC: 105 MMOL/L (ref 98–107)
CO2 SERPL-SCNC: 26 MMOL/L (ref 20–31)
CREAT SERPL-MCNC: 0.3 MG/DL (ref 0.6–0.9)
ECHO BSA: 2.01 M2
EOSINOPHIL # BLD: 0.26 K/UL (ref 0–0.44)
EOSINOPHILS RELATIVE PERCENT: 2 % (ref 1–4)
ERYTHROCYTE [DISTWIDTH] IN BLOOD BY AUTOMATED COUNT: 14 % (ref 11.8–14.4)
GFR, ESTIMATED: >90 ML/MIN/1.73M2
GLUCOSE BLD-MCNC: 142 MG/DL (ref 65–105)
GLUCOSE BLD-MCNC: 153 MG/DL (ref 65–105)
GLUCOSE BLD-MCNC: 159 MG/DL (ref 65–105)
GLUCOSE BLD-MCNC: 168 MG/DL (ref 65–105)
GLUCOSE BLD-MCNC: 175 MG/DL (ref 65–105)
GLUCOSE SERPL-MCNC: 165 MG/DL (ref 74–99)
HCG, PREGNANCY URINE (POC): NEGATIVE
HCT VFR BLD AUTO: 29.9 % (ref 36.3–47.1)
HGB BLD-MCNC: 9.2 G/DL (ref 11.9–15.1)
IMM GRANULOCYTES # BLD AUTO: 0.03 K/UL (ref 0–0.3)
IMM GRANULOCYTES NFR BLD: 0 %
LYMPHOCYTES NFR BLD: 3.98 K/UL (ref 1.1–3.7)
LYMPHOCYTES RELATIVE PERCENT: 37 % (ref 24–43)
MCH RBC QN AUTO: 28.5 PG (ref 25.2–33.5)
MCHC RBC AUTO-ENTMCNC: 30.8 G/DL (ref 28.4–34.8)
MCV RBC AUTO: 92.6 FL (ref 82.6–102.9)
MONOCYTES NFR BLD: 0.73 K/UL (ref 0.1–1.2)
MONOCYTES NFR BLD: 7 % (ref 3–12)
NEUTROPHILS NFR BLD: 54 % (ref 36–65)
NEUTS SEG NFR BLD: 5.83 K/UL (ref 1.5–8.1)
NRBC BLD-RTO: 0 PER 100 WBC
PLATELET # BLD AUTO: 472 K/UL (ref 138–453)
PMV BLD AUTO: 9.2 FL (ref 8.1–13.5)
POTASSIUM SERPL-SCNC: 3.8 MMOL/L (ref 3.7–5.3)
RBC # BLD AUTO: 3.23 M/UL (ref 3.95–5.11)
SODIUM SERPL-SCNC: 139 MMOL/L (ref 136–145)
WBC OTHER # BLD: 10.9 K/UL (ref 3.5–11.3)

## 2024-11-04 PROCEDURE — C1769 GUIDE WIRE: HCPCS | Performed by: STUDENT IN AN ORGANIZED HEALTH CARE EDUCATION/TRAINING PROGRAM

## 2024-11-04 PROCEDURE — C2623 CATH, TRANSLUMIN, DRUG-COAT: HCPCS | Performed by: STUDENT IN AN ORGANIZED HEALTH CARE EDUCATION/TRAINING PROGRAM

## 2024-11-04 PROCEDURE — 6360000002 HC RX W HCPCS

## 2024-11-04 PROCEDURE — 85025 COMPLETE CBC W/AUTO DIFF WBC: CPT

## 2024-11-04 PROCEDURE — 99223 1ST HOSP IP/OBS HIGH 75: CPT | Performed by: PODIATRIST

## 2024-11-04 PROCEDURE — 6370000000 HC RX 637 (ALT 250 FOR IP)

## 2024-11-04 PROCEDURE — 73630 X-RAY EXAM OF FOOT: CPT

## 2024-11-04 PROCEDURE — C1894 INTRO/SHEATH, NON-LASER: HCPCS | Performed by: STUDENT IN AN ORGANIZED HEALTH CARE EDUCATION/TRAINING PROGRAM

## 2024-11-04 PROCEDURE — 2580000003 HC RX 258: Performed by: STUDENT IN AN ORGANIZED HEALTH CARE EDUCATION/TRAINING PROGRAM

## 2024-11-04 PROCEDURE — C1887 CATHETER, GUIDING: HCPCS | Performed by: STUDENT IN AN ORGANIZED HEALTH CARE EDUCATION/TRAINING PROGRAM

## 2024-11-04 PROCEDURE — 37232 PR REVSC OPN/PRQ TIB/PERO W/ANGIOPLASTY UNI EA VSL: CPT | Performed by: STUDENT IN AN ORGANIZED HEALTH CARE EDUCATION/TRAINING PROGRAM

## 2024-11-04 PROCEDURE — 75710 ARTERY X-RAYS ARM/LEG: CPT | Performed by: STUDENT IN AN ORGANIZED HEALTH CARE EDUCATION/TRAINING PROGRAM

## 2024-11-04 PROCEDURE — 37224 PR REVSC OPN/PRG FEM/POP W/ANGIOPLASTY UNI: CPT | Performed by: STUDENT IN AN ORGANIZED HEALTH CARE EDUCATION/TRAINING PROGRAM

## 2024-11-04 PROCEDURE — 2709999900 HC NON-CHARGEABLE SUPPLY: Performed by: STUDENT IN AN ORGANIZED HEALTH CARE EDUCATION/TRAINING PROGRAM

## 2024-11-04 PROCEDURE — 6370000000 HC RX 637 (ALT 250 FOR IP): Performed by: STUDENT IN AN ORGANIZED HEALTH CARE EDUCATION/TRAINING PROGRAM

## 2024-11-04 PROCEDURE — 3700000000 HC ANESTHESIA ATTENDED CARE: Performed by: STUDENT IN AN ORGANIZED HEALTH CARE EDUCATION/TRAINING PROGRAM

## 2024-11-04 PROCEDURE — 99254 IP/OBS CNSLTJ NEW/EST MOD 60: CPT | Performed by: STUDENT IN AN ORGANIZED HEALTH CARE EDUCATION/TRAINING PROGRAM

## 2024-11-04 PROCEDURE — 99232 SBSQ HOSP IP/OBS MODERATE 35: CPT | Performed by: STUDENT IN AN ORGANIZED HEALTH CARE EDUCATION/TRAINING PROGRAM

## 2024-11-04 PROCEDURE — 81025 URINE PREGNANCY TEST: CPT

## 2024-11-04 PROCEDURE — C1892 INTRO/SHEATH,FIXED,PEEL-AWAY: HCPCS | Performed by: STUDENT IN AN ORGANIZED HEALTH CARE EDUCATION/TRAINING PROGRAM

## 2024-11-04 PROCEDURE — 3600000007 HC SURGERY HYBRID BASE: Performed by: STUDENT IN AN ORGANIZED HEALTH CARE EDUCATION/TRAINING PROGRAM

## 2024-11-04 PROCEDURE — 2500000003 HC RX 250 WO HCPCS: Performed by: STUDENT IN AN ORGANIZED HEALTH CARE EDUCATION/TRAINING PROGRAM

## 2024-11-04 PROCEDURE — C1725 CATH, TRANSLUMIN NON-LASER: HCPCS | Performed by: STUDENT IN AN ORGANIZED HEALTH CARE EDUCATION/TRAINING PROGRAM

## 2024-11-04 PROCEDURE — 2580000003 HC RX 258

## 2024-11-04 PROCEDURE — 37228 PR REVSC OPN/PRQ TIB/PERO W/ANGIOPLASTY UNI: CPT | Performed by: STUDENT IN AN ORGANIZED HEALTH CARE EDUCATION/TRAINING PROGRAM

## 2024-11-04 PROCEDURE — 6360000004 HC RX CONTRAST MEDICATION: Performed by: STUDENT IN AN ORGANIZED HEALTH CARE EDUCATION/TRAINING PROGRAM

## 2024-11-04 PROCEDURE — 6360000002 HC RX W HCPCS: Performed by: STUDENT IN AN ORGANIZED HEALTH CARE EDUCATION/TRAINING PROGRAM

## 2024-11-04 PROCEDURE — 76937 US GUIDE VASCULAR ACCESS: CPT | Performed by: STUDENT IN AN ORGANIZED HEALTH CARE EDUCATION/TRAINING PROGRAM

## 2024-11-04 PROCEDURE — 36415 COLL VENOUS BLD VENIPUNCTURE: CPT

## 2024-11-04 PROCEDURE — 2060000000 HC ICU INTERMEDIATE R&B

## 2024-11-04 PROCEDURE — 3600000017 HC SURGERY HYBRID ADDL 15MIN: Performed by: STUDENT IN AN ORGANIZED HEALTH CARE EDUCATION/TRAINING PROGRAM

## 2024-11-04 PROCEDURE — 2500000003 HC RX 250 WO HCPCS

## 2024-11-04 PROCEDURE — 82947 ASSAY GLUCOSE BLOOD QUANT: CPT

## 2024-11-04 PROCEDURE — 75625 CONTRAST EXAM ABDOMINL AORTA: CPT | Performed by: STUDENT IN AN ORGANIZED HEALTH CARE EDUCATION/TRAINING PROGRAM

## 2024-11-04 PROCEDURE — C1760 CLOSURE DEV, VASC: HCPCS | Performed by: STUDENT IN AN ORGANIZED HEALTH CARE EDUCATION/TRAINING PROGRAM

## 2024-11-04 PROCEDURE — 80048 BASIC METABOLIC PNL TOTAL CA: CPT

## 2024-11-04 PROCEDURE — A4217 STERILE WATER/SALINE, 500 ML: HCPCS | Performed by: STUDENT IN AN ORGANIZED HEALTH CARE EDUCATION/TRAINING PROGRAM

## 2024-11-04 PROCEDURE — 3700000001 HC ADD 15 MINUTES (ANESTHESIA): Performed by: STUDENT IN AN ORGANIZED HEALTH CARE EDUCATION/TRAINING PROGRAM

## 2024-11-04 RX ORDER — FENTANYL CITRATE 50 UG/ML
25 INJECTION, SOLUTION INTRAMUSCULAR; INTRAVENOUS EVERY 5 MIN PRN
Status: DISCONTINUED | OUTPATIENT
Start: 2024-11-04 | End: 2024-11-04 | Stop reason: HOSPADM

## 2024-11-04 RX ORDER — LIDOCAINE HYDROCHLORIDE 10 MG/ML
INJECTION, SOLUTION EPIDURAL; INFILTRATION; INTRACAUDAL; PERINEURAL PRN
Status: DISCONTINUED | OUTPATIENT
Start: 2024-11-04 | End: 2024-11-04 | Stop reason: ALTCHOICE

## 2024-11-04 RX ORDER — MIDAZOLAM HYDROCHLORIDE 1 MG/ML
INJECTION, SOLUTION INTRAMUSCULAR; INTRAVENOUS
Status: DISCONTINUED | OUTPATIENT
Start: 2024-11-04 | End: 2024-11-04 | Stop reason: SDUPTHER

## 2024-11-04 RX ORDER — NALOXONE HYDROCHLORIDE 0.4 MG/ML
INJECTION, SOLUTION INTRAMUSCULAR; INTRAVENOUS; SUBCUTANEOUS PRN
Status: DISCONTINUED | OUTPATIENT
Start: 2024-11-04 | End: 2024-11-04 | Stop reason: HOSPADM

## 2024-11-04 RX ORDER — SODIUM CHLORIDE 0.9 % (FLUSH) 0.9 %
5-40 SYRINGE (ML) INJECTION PRN
Status: DISCONTINUED | OUTPATIENT
Start: 2024-11-04 | End: 2024-11-04 | Stop reason: HOSPADM

## 2024-11-04 RX ORDER — PHENYLEPHRINE HCL IN 0.9% NACL 1 MG/10 ML
SYRINGE (ML) INTRAVENOUS
Status: DISCONTINUED | OUTPATIENT
Start: 2024-11-04 | End: 2024-11-04 | Stop reason: SDUPTHER

## 2024-11-04 RX ORDER — IODIXANOL 320 MG/ML
INJECTION, SOLUTION INTRAVASCULAR
Status: DISPENSED
Start: 2024-11-04 | End: 2024-11-05

## 2024-11-04 RX ORDER — CYCLOBENZAPRINE HCL 10 MG
5 TABLET ORAL 2 TIMES DAILY
Status: DISCONTINUED | OUTPATIENT
Start: 2024-11-04 | End: 2024-11-05

## 2024-11-04 RX ORDER — ONDANSETRON 2 MG/ML
4 INJECTION INTRAMUSCULAR; INTRAVENOUS
Status: DISCONTINUED | OUTPATIENT
Start: 2024-11-04 | End: 2024-11-04 | Stop reason: HOSPADM

## 2024-11-04 RX ORDER — SODIUM CHLORIDE, SODIUM LACTATE, POTASSIUM CHLORIDE, CALCIUM CHLORIDE 600; 310; 30; 20 MG/100ML; MG/100ML; MG/100ML; MG/100ML
INJECTION, SOLUTION INTRAVENOUS
Status: DISCONTINUED | OUTPATIENT
Start: 2024-11-04 | End: 2024-11-04 | Stop reason: SDUPTHER

## 2024-11-04 RX ORDER — FENTANYL CITRATE 50 UG/ML
50 INJECTION, SOLUTION INTRAMUSCULAR; INTRAVENOUS EVERY 5 MIN PRN
Status: DISCONTINUED | OUTPATIENT
Start: 2024-11-04 | End: 2024-11-04 | Stop reason: HOSPADM

## 2024-11-04 RX ORDER — SODIUM CHLORIDE 0.9 % (FLUSH) 0.9 %
5-40 SYRINGE (ML) INJECTION EVERY 12 HOURS SCHEDULED
Status: DISCONTINUED | OUTPATIENT
Start: 2024-11-04 | End: 2024-11-04 | Stop reason: HOSPADM

## 2024-11-04 RX ORDER — LIDOCAINE HYDROCHLORIDE 10 MG/ML
INJECTION, SOLUTION INFILTRATION; PERINEURAL
Status: COMPLETED
Start: 2024-11-04 | End: 2024-11-04

## 2024-11-04 RX ORDER — FENTANYL CITRATE 50 UG/ML
INJECTION, SOLUTION INTRAMUSCULAR; INTRAVENOUS
Status: DISCONTINUED | OUTPATIENT
Start: 2024-11-04 | End: 2024-11-04 | Stop reason: SDUPTHER

## 2024-11-04 RX ORDER — PROPOFOL 10 MG/ML
INJECTION, EMULSION INTRAVENOUS
Status: DISCONTINUED | OUTPATIENT
Start: 2024-11-04 | End: 2024-11-04 | Stop reason: SDUPTHER

## 2024-11-04 RX ORDER — IODIXANOL 320 MG/ML
INJECTION, SOLUTION INTRAVASCULAR PRN
Status: DISCONTINUED | OUTPATIENT
Start: 2024-11-04 | End: 2024-11-04 | Stop reason: ALTCHOICE

## 2024-11-04 RX ORDER — SODIUM CHLORIDE 9 MG/ML
INJECTION, SOLUTION INTRAVENOUS PRN
Status: DISCONTINUED | OUTPATIENT
Start: 2024-11-04 | End: 2024-11-04 | Stop reason: HOSPADM

## 2024-11-04 RX ORDER — HEPARIN SODIUM 1000 [USP'U]/ML
INJECTION, SOLUTION INTRAVENOUS; SUBCUTANEOUS
Status: DISCONTINUED | OUTPATIENT
Start: 2024-11-04 | End: 2024-11-04 | Stop reason: SDUPTHER

## 2024-11-04 RX ORDER — OXYCODONE AND ACETAMINOPHEN 5; 325 MG/1; MG/1
1 TABLET ORAL EVERY 4 HOURS PRN
Status: DISCONTINUED | OUTPATIENT
Start: 2024-11-04 | End: 2024-11-08

## 2024-11-04 RX ORDER — LIDOCAINE HYDROCHLORIDE 10 MG/ML
INJECTION, SOLUTION INFILTRATION; PERINEURAL
Status: DISCONTINUED | OUTPATIENT
Start: 2024-11-04 | End: 2024-11-04 | Stop reason: SDUPTHER

## 2024-11-04 RX ORDER — PROPOFOL 10 MG/ML
INJECTION, EMULSION INTRAVENOUS
Status: COMPLETED
Start: 2024-11-04 | End: 2024-11-04

## 2024-11-04 RX ADMIN — ACETAMINOPHEN 1000 MG: 500 TABLET ORAL at 06:38

## 2024-11-04 RX ADMIN — Medication 100 MCG: at 15:37

## 2024-11-04 RX ADMIN — OXYCODONE HYDROCHLORIDE AND ACETAMINOPHEN 1 TABLET: 5; 325 TABLET ORAL at 11:39

## 2024-11-04 RX ADMIN — GABAPENTIN 300 MG: 300 CAPSULE ORAL at 17:46

## 2024-11-04 RX ADMIN — KETOROLAC TROMETHAMINE 15 MG: 15 INJECTION, SOLUTION INTRAMUSCULAR; INTRAVENOUS at 13:09

## 2024-11-04 RX ADMIN — FENTANYL CITRATE 25 MCG: 50 INJECTION, SOLUTION INTRAMUSCULAR; INTRAVENOUS at 15:11

## 2024-11-04 RX ADMIN — CYCLOBENZAPRINE 5 MG: 10 TABLET, FILM COATED ORAL at 21:01

## 2024-11-04 RX ADMIN — PROPOFOL 150 MCG/KG/MIN: 10 INJECTION, EMULSION INTRAVENOUS at 15:00

## 2024-11-04 RX ADMIN — MORPHINE SULFATE 2 MG: 2 INJECTION, SOLUTION INTRAMUSCULAR; INTRAVENOUS at 03:03

## 2024-11-04 RX ADMIN — LIDOCAINE HYDROCHLORIDE 50 MG: 10 INJECTION, SOLUTION INFILTRATION; PERINEURAL at 15:00

## 2024-11-04 RX ADMIN — INSULIN GLARGINE 40 UNITS: 100 INJECTION, SOLUTION SUBCUTANEOUS at 21:01

## 2024-11-04 RX ADMIN — CYCLOBENZAPRINE 5 MG: 10 TABLET, FILM COATED ORAL at 11:43

## 2024-11-04 RX ADMIN — MORPHINE SULFATE 2 MG: 2 INJECTION, SOLUTION INTRAMUSCULAR; INTRAVENOUS at 21:55

## 2024-11-04 RX ADMIN — MIDAZOLAM 2 MG: 1 INJECTION INTRAMUSCULAR; INTRAVENOUS at 14:55

## 2024-11-04 RX ADMIN — GABAPENTIN 300 MG: 300 CAPSULE ORAL at 21:00

## 2024-11-04 RX ADMIN — ACETAMINOPHEN 1000 MG: 500 TABLET ORAL at 17:45

## 2024-11-04 RX ADMIN — OXYCODONE HYDROCHLORIDE AND ACETAMINOPHEN 1 TABLET: 5; 325 TABLET ORAL at 17:45

## 2024-11-04 RX ADMIN — SODIUM CHLORIDE, POTASSIUM CHLORIDE, SODIUM LACTATE AND CALCIUM CHLORIDE: 600; 310; 30; 20 INJECTION, SOLUTION INTRAVENOUS at 14:47

## 2024-11-04 RX ADMIN — PROPOFOL 70 MG: 10 INJECTION, EMULSION INTRAVENOUS at 15:01

## 2024-11-04 RX ADMIN — OXYCODONE HYDROCHLORIDE AND ACETAMINOPHEN 1 TABLET: 5; 325 TABLET ORAL at 23:57

## 2024-11-04 RX ADMIN — ONDANSETRON 4 MG: 2 INJECTION INTRAMUSCULAR; INTRAVENOUS at 21:05

## 2024-11-04 RX ADMIN — METOPROLOL SUCCINATE 50 MG: 25 TABLET, EXTENDED RELEASE ORAL at 08:22

## 2024-11-04 RX ADMIN — SODIUM CHLORIDE, PRESERVATIVE FREE 10 ML: 5 INJECTION INTRAVENOUS at 21:05

## 2024-11-04 RX ADMIN — LOSARTAN POTASSIUM 100 MG: 50 TABLET, FILM COATED ORAL at 08:22

## 2024-11-04 RX ADMIN — MORPHINE SULFATE 2 MG: 2 INJECTION, SOLUTION INTRAMUSCULAR; INTRAVENOUS at 18:50

## 2024-11-04 RX ADMIN — ISOSORBIDE MONONITRATE 30 MG: 30 TABLET, EXTENDED RELEASE ORAL at 08:22

## 2024-11-04 RX ADMIN — SERTRALINE HYDROCHLORIDE 25 MG: 25 TABLET ORAL at 08:22

## 2024-11-04 RX ADMIN — SENNOSIDES AND DOCUSATE SODIUM 2 TABLET: 50; 8.6 TABLET ORAL at 08:22

## 2024-11-04 RX ADMIN — ACETAMINOPHEN 1000 MG: 500 TABLET ORAL at 21:56

## 2024-11-04 RX ADMIN — ATORVASTATIN CALCIUM 80 MG: 80 TABLET, FILM COATED ORAL at 21:01

## 2024-11-04 RX ADMIN — MORPHINE SULFATE 2 MG: 2 INJECTION, SOLUTION INTRAMUSCULAR; INTRAVENOUS at 11:39

## 2024-11-04 RX ADMIN — MORPHINE SULFATE 2 MG: 2 INJECTION, SOLUTION INTRAMUSCULAR; INTRAVENOUS at 08:21

## 2024-11-04 RX ADMIN — FENTANYL CITRATE 50 MCG: 50 INJECTION, SOLUTION INTRAMUSCULAR; INTRAVENOUS at 15:00

## 2024-11-04 RX ADMIN — INSULIN LISPRO 4 UNITS: 100 INJECTION, SOLUTION INTRAVENOUS; SUBCUTANEOUS at 01:23

## 2024-11-04 RX ADMIN — GABAPENTIN 300 MG: 300 CAPSULE ORAL at 08:22

## 2024-11-04 RX ADMIN — INSULIN GLARGINE 10 UNITS: 100 INJECTION, SOLUTION SUBCUTANEOUS at 08:21

## 2024-11-04 RX ADMIN — Medication 100 MCG: at 15:41

## 2024-11-04 RX ADMIN — OXYCODONE 5 MG: 5 TABLET ORAL at 06:38

## 2024-11-04 RX ADMIN — HEPARIN SODIUM 8000 UNITS: 1000 INJECTION, SOLUTION INTRAVENOUS; SUBCUTANEOUS at 15:18

## 2024-11-04 RX ADMIN — FENTANYL CITRATE 25 MCG: 50 INJECTION, SOLUTION INTRAMUSCULAR; INTRAVENOUS at 15:08

## 2024-11-04 RX ADMIN — SODIUM CHLORIDE, PRESERVATIVE FREE 10 ML: 5 INJECTION INTRAVENOUS at 08:23

## 2024-11-04 ASSESSMENT — PAIN DESCRIPTION - LOCATION
LOCATION: LEG

## 2024-11-04 ASSESSMENT — PAIN SCALES - GENERAL
PAINLEVEL_OUTOF10: 7
PAINLEVEL_OUTOF10: 8
PAINLEVEL_OUTOF10: 7
PAINLEVEL_OUTOF10: 8
PAINLEVEL_OUTOF10: 8
PAINLEVEL_OUTOF10: 7
PAINLEVEL_OUTOF10: 8
PAINLEVEL_OUTOF10: 7
PAINLEVEL_OUTOF10: 7
PAINLEVEL_OUTOF10: 6
PAINLEVEL_OUTOF10: 7
PAINLEVEL_OUTOF10: 7

## 2024-11-04 ASSESSMENT — PAIN DESCRIPTION - ORIENTATION
ORIENTATION: LEFT

## 2024-11-04 NOTE — CONSULTS
Exam:  Vascular: DP and PT pulses are palpable. CFT <3 seconds to all digits.  Hair growth is absent to the level of the digits.  No edema.      Neuro: Saph/sural/SP/DP/plantar sensation diminished to light touch.    Musculoskeletal: Deferred due to post-operative state.    Dermatologic: Dry gangrene noted to 5th right toe and 5th metatarsal head. No erythema. No drainage. No malodor.       Clinical:   11/4/24      Imaging:   CT CHEST PULMONARY EMBOLISM W CONTRAST   Final Result   No evidence of pulmonary embolism or acute cardiopulmonary process.         XR FOOT RIGHT (MIN 3 VIEWS)    (Results Pending)         Assessment     Jane Saavedra is a 34 y.o. female with   Dry gangrene 5th digit, right foot  S/P AKA, left lower extremity  Peripheral Vascular Disease  Peripheral Neuropathy    Principal Problem:    Gangrene (HCC)  Active Problems:    PAD (peripheral artery disease) (HCC)    Sepsis ([facility])  Resolved Problems:    * No resolved hospital problems. *        Plan     Patient examined and evaluated at bedside   Treatment options discussed in detail with the patient  Patient is admitted to [facility].   Radiographic imaging order/examined and reviewed with the patient in detail at bedside  No soft tissue gas or osseous abnormality found.  MRI from 8/30/24 showed acute osteomyelitis of the 5th metatarsal head and 5th proximal phalanx.  Patient started on antibiotic therapy:  Vanco, Zosyn  Antibiotics per infectious disease  Surgical intervention from podiatry will likely be needed. Will update the plan accordingly as more information is obtained.   Dressing changed to Right lower extremity: betadine, gauze, and kerlix in place from vascular procedure. I applied more betadine.   Will discuss with Dr. Corea.     Marlene Marin DPM PGY1  Foot and Ankle Surgery  11/4/2024 at 8:35 AM      I performed a history and physical examination of the patient and discussed management with the resident. I reviewed the resident’s note  documented findings and plan of care. Any areas of disagreement are noted on the chart. I was personally present for the key portions of any procedures. I have documented in the chart those procedures where I was not present during the key portions. I have reviewed the Podiatry Resident progress note. I agree with the chief complaint, past medical history, past surgical history, allergies, medications, social and family history as documented unless otherwise noted below. Documentation of the HPI, Physical Exam and Medical Decision Making performed by medical students or scribes is based on my personal performance of the HPI, PE and MDM. I have personally evaluated this patient and have completed at least one if not all key elements of the E/M (history, physical exam, and MDM). Additional findings are as noted.     Electronically signed by Ambrocio Corea DPM on 11/4/2024

## 2024-11-04 NOTE — PROGRESS NOTES
has a past medical history of Acute osteomyelitis, Bipolar 1 disorder (Formerly [facility]), Cellulitis, Chronic diabetic ulcer of left foot determined by examination ([facility]ly [facility]), Depression, Diabetic foot infection ([facility]), H. pylori infection, History of recent hospitalization, Hyperlipidemia, Hypertension, Ischemic cardiomyopathy, Multiple vessel coronary artery disease, Neuropathy, Noncompliance, NSTEMI (non-ST elevated myocardial infarction) ([facility]), Osteomyelitis of foot, left, acute, PAD (peripheral artery disease) ([facility]), Renal abscess, left, STEMI (ST elevation myocardial infarction) ([facility]), Thrombus, Type II or unspecified type diabetes mellitus without mention of complication, uncontrolled, Under care of team, Under care of team, Under care of team, Under care of team, Under care of team, Under care of team, and Wellness examination.    Social History:   reports that she has quit smoking. Her smoking use included cigarettes. She has never used smokeless tobacco. She reports that she does not currently use drugs after having used the following drugs: Marijuana (Weed). She reports that she does not drink alcohol.     Family History:   Family History   Problem Relation Age of Onset    Diabetes Mother        Vitals:  /76   Pulse 79   Temp 98.2 °F (36.8 °C) (Oral)   Resp 16   Ht 1.778 m (5' 10\")   Wt 81.6 kg (180 lb)   SpO2 98%   BMI 25.83 kg/m²   Temp (24hrs), Av °F (36.7 °C), Min:97.7 °F (36.5 °C), Max:98.2 °F (36.8 °C)    Recent Labs     24  2339 24  0630 24  0748 24  1131   POCGLU 253* 168* 159* 175*       I/O (24Hr):  No intake or output data in the 24 hours ending 24 1447      Labs:  Hematology:  Recent Labs     24  0817 24  0659   WBC 12.8* 10.9   RBC 3.29* 3.23*   HGB 9.5* 9.2*   HCT 31.0* 29.9*   MCV 94.2 92.6   MCH 28.9 28.5   MCHC 30.6 30.8   RDW 13.5 14.0    472*   MPV 9.5 9.2     Chemistry:  Recent Labs     24  0817 24  0659    139   K 3.8  11/04/24  0659    139   K 3.8 3.8    105   CO2 22 26   GLUCOSE 225* 165*   BUN 9 6   CREATININE 0.3* 0.3*   ANIONGAP 9 8*   LABGLOM >90 >90   CALCIUM 8.4* 8.6     Recent Labs     11/03/24  1837 11/03/24  2108 11/03/24  2339 11/04/24  0630 11/04/24  0748 11/04/24  1131   POCGLU 251* 216* 253* 168* 159* 175*     ABG:  Lab Results   Component Value Date/Time    PHART 7.387 07/10/2023 03:23 PM    ISP6NWV 42.9 07/10/2023 03:23 PM    PO2ART 143.6 07/10/2023 03:23 PM    OSX9CCU 25.8 07/10/2023 03:23 PM    BEART 1 07/10/2023 03:23 PM    NVF2DJW 27 07/10/2023 03:23 PM    T5CCFDRU 99 07/10/2023 03:23 PM     Lab Results   Component Value Date/Time    SPECIAL Site: Urine 11/01/2024 07:47 PM     Lab Results   Component Value Date/Time    CULTURE NO GROWTH 11/01/2024 07:47 PM       Radiology:  CT CHEST PULMONARY EMBOLISM W CONTRAST    Result Date: 11/1/2024  No evidence of pulmonary embolism or acute cardiopulmonary process.       Physical Examination:        General appearance:  alert, cooperative and no distress  Mental Status:  oriented to person, place and time and normal affect  Lungs:  clear to auscultation bilaterally, normal effort  Heart:  regular rate and rhythm, no murmur  Abdomen:  soft, nontender, nondistended, normal bowel sounds, no masses, hepatomegaly, splenomegaly  Extremities: Left AKA  Skin:  no gross lesions, rashes, induration    Assessment:        Hospital Problems             Last Modified POA    * (Principal) Gangrene ([facility]) 10/29/2024 Unknown    PAD (peripheral artery disease) ([facility]) 6/19/2024 Unknown    Sepsis ([facility]) 11/1/2024 Yes       Plan:        Peripheral arterial disease-plan for angiogram right side, left lower extremity acute ischemia s/p above-knee amputation on 11/1, vascular surgery following, restart aspirin and Eliquis when okay with vascular surgery, awaiting PM&R consult.  Add Flexeril and Percocet for pain control    Chest pain with history of CAD with CABG-likely related to

## 2024-11-04 NOTE — OP NOTE
Operative Note      Patient: Jane Saavedra  YOB: 1990  MRN: [id number]    Date of Procedure: 11/4/2024    Pre-Op Diagnosis: Atherosclerosis of native arteries of right leg with ulceration of right foot    Post-Op Diagnosis: Same       Procedures:  1) Ultrasound guided access of left common femoral artery  2) Angiogram of aorta and bilateral iliac arteries with catheter in distal aorta  3) Right leg angiogram with catheter at level of right external iliac artery  4) Balloon angioplasty of the right SFA (4 mm x 150 mm drug coated balloon)  5) Balloon angioplasty of the right anterior tibial and tibioperoneal trunk arteries (2 mm x 60 mm standard PTA balloon)  6) Deployment of left common femoral artery closure device (5 [employer])    Surgeon(s):  Emperatriz Watson MD    Assistant: Devante Valencia DO (Resident)    Anesthesia: Monitor Anesthesia Care    Estimated Blood Loss (mL): 10 mL    Complications: None    Specimens: None    Implants: None      Drains:   [REMOVED] Urinary Catheter 10/15/24 Hall-Temperature (Removed)   Catheter Indications Perioperative use for selected surgical procedures 10/16/24 2000   Site Assessment No urethral drainage 10/16/24 2000   Urine Color Yellow 10/16/24 2000   Urine Appearance Clear 10/16/24 2000   Collection Container Standard 10/16/24 2000   Securement Method Leg strap 10/16/24 2000   Catheter Care  Soap and water 10/16/24 1200   Catheter Best Practices  Drainage tube clipped to bed;Catheter secured to thigh;Tamper seal intact;Bag below bladder;Bag not on floor;Lack of dependent loop in tubing;Drainage bag less than half full 10/16/24 2000   Status Draining 10/16/24 2000   Output (mL) 50 mL 10/16/24 1800       [REMOVED] Urinary Catheter 10/17/24 Hall (Removed)   Catheter Indications Perioperative use for selected surgical procedures 10/18/24 0800   Site Assessment No urethral drainage 10/18/24 0800   Urine Color Marilynn 10/18/24 0800   Urine Appearance Clear

## 2024-11-04 NOTE — PROGRESS NOTES
Occupational Therapy    [facility]  Occupational Therapy Not Seen Note    DATE: 2024    NAME: Jane Saavedra  MRN: [id number]   : 1990      Patient not seen this date for Occupational Therapy due to:      Pt off floor to surgery for   ANGIOGRAM OF RIGHT LEG WITH BALOON ANGIOPLASTY. Will continue as able.       Electronically signed by RIK Sterling on 2024 at 4:12 PM

## 2024-11-04 NOTE — ANESTHESIA POSTPROCEDURE EVALUATION
Department of Anesthesiology  Postprocedure Note    Patient: Jane Saavedra  MRN: [id number]  YOB: 1990  Date of evaluation: 11/4/2024    Procedure Summary       Date: 11/04/24 Room / Location: [place] [address] / [facility]    Anesthesia Start: 1447 Anesthesia Stop: 1619    Procedure: arias / ANGIOGRAM OF RIGHT LEG WITH BALOON ANGIOPLASTY (Right: Leg Upper) Diagnosis:       PAD (peripheral artery disease) ([facility])      (PAD (peripheral artery disease) ([facility]) [I73.9])    Surgeons: Emperatriz Arias MD Responsible Provider: Iain Ramirez MD    Anesthesia Type: MAC ASA Status: 4            Anesthesia Type: No value filed.    Shanon Phase I: Shanon Score: 10    Shanon Phase II:    POST-OP ANESTHESIA NOTE       /72   Pulse 75   Temp 98.2 °F (36.8 °C) (Oral)   Resp 19   Ht 1.778 m (5' 10\")   Wt 81.6 kg (180 lb)   SpO2 100%   BMI 25.83 kg/m²    Pain Assessment: None - Denies Pain  Pain Level: 7       Anesthesia Post Evaluation    Patient location during evaluation: PACU  Patient participation: complete - patient participated  Level of consciousness: awake  Pain score: 7  Airway patency: patent  Nausea & Vomiting: no vomiting and no nausea  Cardiovascular status: hemodynamically stable  Respiratory status: acceptable  Hydration status: stable  Pain management: adequate        There were no known notable events for this encounter.

## 2024-11-04 NOTE — CARE COORDINATION
Case Management Assessment  Initial Evaluation    Date/Time of Evaluation: 11/4/2024 5:51 PM  Assessment Completed by: Savannah Do RN    If patient is discharged prior to next notation, then this note serves as note for discharge by case management.    Patient Name: Jane Saavedra                   YOB: 1990  Diagnosis: Hyperglycemia [R73.9]  Sepsis ([facility]) [A41.9]  Leukocytosis, unspecified type [D72.829]  Chest pain, unspecified type [R07.9]  Below-knee amputation of left lower extremity, initial encounter ([facility]) [S88.112A]                   Date / Time: 10/31/2024 10:53 PM    Patient Admission Status: Inpatient   Readmission Risk (Low < 19, Mod (19-27), High > 27): Readmission Risk Score: 33.9    Current PCP: Marissa Wang MD  PCP verified by ? Yes    Chart Reviewed: Yes      History Provided by: Patient  Patient Orientation: Alert and Oriented    Patient Cognition: Alert    Hospitalization in the last 30 days (Readmission):  Yes    If yes, Readmission Assessment in  Navigator will be completed.    Advance Directives:      Code Status: Full Code   Patient's Primary Decision Maker is: Legal Next of Kin    Primary Decision Maker: Don't,Contact    Discharge Planning:    Patient lives with: Family Members Type of Home: House  Primary Care Giver: Self  Patient Support Systems include: Family Members   Current Financial resources: Medicaid  Current community resources:    Current services prior to admission: (P) Home Care, Durable Medical Equipment            Current DME: (P) Walker, Wheelchair            Type of Home Care services:  (P) Nursing Services    ADLS  Prior functional level: Assistance with the following:  Current functional level: Assistance with the following:    PT AM-PAC: 17 /24  OT AM-PAC: 21 /24    Family can provide assistance at DC: Yes  Would you like Case Management to discuss the discharge plan with any other family members/significant others, and if so, who? No  Plans

## 2024-11-04 NOTE — CARE COORDINATION
11/04/24 1753   Readmission Assessment   Number of Days since last admission? 1-7 days   Previous Disposition Home with Home Health   Who is being Interviewed Patient   What was the patient's/caregiver's perception as to why they think they needed to return back to the hospital? Other (Comment)  (chest pain)   Did you visit your Primary Care Physician after you left the hospital, before you returned this time? No   Why weren't you able to visit your PCP? Could not get an appointment   Did you see a specialist, such as Cardiac, Pulmonary, Orthopedic Physician, etc. after you left the hospital? No   Who advised the patient to return to the hospital? Self-referral   Does the patient report anything that got in the way of taking their medications? No   In our efforts to provide the best possible care to you and others like you, can you think of anything that we could have done to help you after you left the hospital the first time, so that you might not have needed to return so soon? Arrange for more help when leaving the hospital

## 2024-11-04 NOTE — PLAN OF CARE
Problem: Chronic Conditions and Co-morbidities  Goal: Patient's chronic conditions and co-morbidity symptoms are monitored and maintained or improved  Outcome: Progressing     Problem: Discharge Planning  Goal: Discharge to home or other facility with appropriate resources  Outcome: Progressing     Problem: Pain  Goal: Verbalizes/displays adequate comfort level or baseline comfort level  11/3/2024 2253 by Gita Holcomb RN  Outcome: Progressing  11/3/2024 0942 by Liza Goldstein RN  Outcome: Progressing     Problem: Safety - Adult  Goal: Free from fall injury  11/3/2024 2253 by Gita Holcomb RN  Outcome: Progressing  11/3/2024 0942 by Liza Goldstein RN  Outcome: Progressing     Problem: ABCDS Injury Assessment  Goal: Absence of physical injury  Outcome: Progressing

## 2024-11-04 NOTE — CONSULTS
Physical Medicine & Rehabilitation  Consult Note      Admitting Physician:  Mindy Ronquillo MD    Primary Care Provider:  Marissa Wang MD     Reason for Consult:  Acute Inpatient Rehabilitation    Chief Complaint:  Chest pain, anxiety    History of Present Illness:  Referring Provider is requesting an evaluation for appropriate placement upon discharge from acute care. History from chart review and patient.    Jane Saavedra is a 34 y.o. left-handed female with history of left BKA (10/2024) due to acute left limb ischemia, CAD s/p CABG, ischemic cardiomyopathy, PAD, HTN, HLD, type 2 diabetes, DVT, H pylori infection, bipolar disorder admitted to [facility] on 10/31/2024.      She initially presented with chest pain and anxiety associated with upcoming above-knee amputation scheduled on 11/1/24.  EKG was nonspecific and relatively unchanged from previous EKG; troponin was normal x2.  She did have left above-knee amputation done on 11/1/24, as planned, by Dr. Watson.  She underwent right lower limb arteriogram with balloon angioplasty today.    She reports ongoing left residual limb pain.  She also notes some phantom sensations but denies any phantom pain.  She states that she has numbness/tingling in the right lower limb.    Review of Systems:  Review of Systems   Constitutional:  Negative for fever.   Respiratory:  Negative for shortness of breath.    Cardiovascular:  Negative for chest pain.   Gastrointestinal:  Negative for abdominal pain.   Musculoskeletal:         +left residual limb pain   Neurological:  Positive for sensory change.      Premorbid function:  Independent    Current function:    Physical Therapy    Restrictions/Precautions: Up as Tolerated  Other position/activity restrictions: s/p L AKA 11/1/2024    Bed mobility  Supine to Sit: Stand by assistance  Sit to Supine:  (pt retired to sitting at the EOB.)  Scooting: Stand by assistance  Bed Mobility Comments: HOB elevated ~30 degrees, increased  Nightly  gabapentin (NEURONTIN) capsule 300 mg, 300 mg, Oral, TID  insulin glargine (LANTUS) injection vial 40 Units, 40 Units, SubCUTAneous, Nightly  isosorbide mononitrate (IMDUR) extended release tablet 30 mg, 30 mg, Oral, Daily  losartan (COZAAR) tablet 100 mg, 100 mg, Oral, Daily  metoprolol succinate (TOPROL XL) extended release tablet 50 mg, 50 mg, Oral, Daily  insulin lispro (HUMALOG,ADMELOG) injection vial 0-16 Units, 0-16 Units, SubCUTAneous, 4 times per day  morphine (PF) injection 2 mg, 2 mg, IntraVENous, Q3H PRN  ketorolac (TORADOL) injection 15 mg, 15 mg, IntraVENous, Q6H PRN  oxyCODONE (ROXICODONE) immediate release tablet 5 mg, 5 mg, Oral, Q6H PRN  acetaminophen (TYLENOL) tablet 1,000 mg, 1,000 mg, Oral, 3 times per day  sennosides-docusate sodium (SENOKOT-S) 8.6-50 MG tablet 2 tablet, 2 tablet, Oral, Daily PRN    Family History:       Problem Relation Age of Onset    Diabetes Mother        Social History:  Was staying at uncle's house prior to admission but wants to return home to [place] with fiance and mother-in-law (2-level apartment with 5-6 steps to enter)    Social History     Socioeconomic History    Marital status: Single   Tobacco Use    Smoking status: Former     Current packs/day: 0.25     Types: Cigarettes    Smokeless tobacco: Never   Vaping Use    Vaping status: Never Used   Substance and Sexual Activity    Alcohol use: No    Drug use: Not Currently     Types: Marijuana (Weed)    Sexual activity: Yes     Partners: Female     Social Determinants of Health     Financial Resource Strain: Medium Risk (9/3/2024)    Overall Financial Resource Strain (CARDIA)     Difficulty of Paying Living Expenses: Somewhat hard   Food Insecurity: Food Insecurity Present (11/1/2024)    Hunger Vital Sign     Worried About Running Out of Food in the Last Year: Sometimes true     Ran Out of Food in the Last Year: Sometimes true   Transportation Needs: No Transportation Needs (11/1/2024)    PRAPARE -

## 2024-11-04 NOTE — PROGRESS NOTES
Arrived to [facility] post procedure. Awakened easily and responding appropriately to questions.

## 2024-11-04 NOTE — PROGRESS NOTES
Division of Vascular Surgery            Progress Note      Name: Jane Saavedra  MRN: [id number]       Overnight Events:     None    Subjective:     Patient seen and examined at the bedside. No acute overnight events. Afebrile, vital signs stable and within normal limits. POD #3 from L AKA. Pt complaining of some mild pain at her L stump stating that it feels like \"a pulled muscle\". No other complaints at this time. Working with PT. Planning for RLE arteriogram this afternoon.       Physical Exam:     Vitals:  BP (!) 141/84   Pulse 84   Temp 97.9 °F (36.6 °C) (Oral)   Resp 18   Ht 1.778 m (5' 10\")   Wt 81.6 kg (180 lb)   SpO2 96%   BMI 25.83 kg/m²     General appearance - alert, well appearing and in no acute distress  Mental status - oriented to person, place and time with normal affect  Head - normocephalic and atraumatic  Neck - supple, no carotid bruits  Chest - normal respiratory effort, no wheezing or stridor  Heart - normal rate, regular rhythm  Abdomen - soft, non-tender, non-distended. No rebound tenderness or guarding.   Neurological - normal speech, no focal findings or movement disorder noted  Extremities - no pedal edema or calf pain with palpation. Chronic appearing wounds to R foot. Clean dressings in place. L AKA with dressings in place. Incision well approximated, no evidence of bleeding or drainage.         Skin - no gross lesions, rashes, or induration noted  Vascular Exam - palp RLE throughout, palp L femoral pulse    Imaging:     No results found.     Assessment:     34-year-old female postop day 2 status post left above-knee amputation.    Plan:     Postop day 3 status post left above-knee amputation.  Planning for RLE artriogram this afternoon. The procedure, risks, benefits, alternatives and expected outcomes were discussed. All questions were answered. Written consent was obtained.   Daily dressing changes to L AKA site. Wound care instructions placed.   PT/OT evaluation, JALEN

## 2024-11-04 NOTE — ANESTHESIA PRE PROCEDURE
Department of Anesthesiology  Preprocedure Note       Name:  Jane Saavedra   Age:  34 y.o.  :  1990                                          MRN:  [id number]         Date:  2024      Surgeon: Surgeon(s):  Emperatriz Watson MD    Procedure: Procedure(s):  hugo / ANGIOGRAM / rm     Medications prior to admission:   Prior to Admission medications    Medication Sig Start Date End Date Taking? Authorizing Provider   oxyCODONE (ROXICODONE) 5 MG immediate release tablet Take 1 tablet by mouth every 6 hours as needed for Pain for up to 7 days. Max Daily Amount: 20 mg 10/28/24 11/4/24  Chase, Edsonratulain, DO   gabapentin (NEURONTIN) 300 MG capsule Take 1 capsule by mouth 3 times daily for 30 days. 10/25/24 11/24/24  Chase, Quratulain, DO   DULoxetine (CYMBALTA) 20 MG extended release capsule Take 1 capsule by mouth daily 10/26/24   Chase, Jeffersontulain, DO   insulin glargine (LANTUS) 100 UNIT/ML injection vial Inject 40 Units into the skin nightly 10/25/24   Chase, Quratulain, DO   insulin lispro (HUMALOG,ADMELOG) 100 UNIT/ML SOLN injection vial Inject 5 Units into the skin 3 times daily (with meals) 10/25/24 11/24/24  Chase, Quratulain, DO   ondansetron (ZOFRAN-ODT) 4 MG disintegrating tablet Take 1 tablet by mouth every 8 hours as needed for Nausea or Vomiting 10/25/24   Chase, Quratulain, DO   losartan (COZAAR) 100 MG tablet Take 1 tablet by mouth daily 10/26/24   Chase, Quratulain, DO   bisacodyl (DULCOLAX) 10 MG suppository Place 1 suppository rectally daily as needed for Constipation 10/25/24 11/24/24  Chase, Quratulain, DO   polyethylene glycol (GLYCOLAX) 17 g packet Take 1 packet by mouth daily as needed for Constipation 10/25/24 12/26/24  Jefferson Stonetulain, DO   sennosides-docusate sodium (SENOKOT-S) 8.6-50 MG tablet Take 2 tablets by mouth 2 times daily as needed for Constipation 10/25/24   Joseph Stone,    nitrofurantoin, macrocrystal-monohydrate, (MACROBID) 100 MG capsule Take

## 2024-11-05 PROBLEM — Z89.612 S/P AKA (ABOVE KNEE AMPUTATION) UNILATERAL, LEFT (HCC): Status: ACTIVE | Noted: 2024-11-05

## 2024-11-05 LAB
ANION GAP SERPL CALCULATED.3IONS-SCNC: 10 MMOL/L (ref 9–16)
BASOPHILS # BLD: 0.04 K/UL (ref 0–0.2)
BASOPHILS NFR BLD: 0 % (ref 0–2)
BUN SERPL-MCNC: 10 MG/DL (ref 6–20)
CALCIUM SERPL-MCNC: 8.7 MG/DL (ref 8.6–10.4)
CHLORIDE SERPL-SCNC: 105 MMOL/L (ref 98–107)
CO2 SERPL-SCNC: 27 MMOL/L (ref 20–31)
CREAT SERPL-MCNC: 0.3 MG/DL (ref 0.6–0.9)
CRP SERPL HS-MCNC: 13.4 MG/L (ref 0–5)
EOSINOPHIL # BLD: 0.35 K/UL (ref 0–0.44)
EOSINOPHILS RELATIVE PERCENT: 4 % (ref 1–4)
ERYTHROCYTE [DISTWIDTH] IN BLOOD BY AUTOMATED COUNT: 14.1 % (ref 11.8–14.4)
ERYTHROCYTE [SEDIMENTATION RATE] IN BLOOD BY PHOTOMETRIC METHOD: 68 MM/HR (ref 0–20)
GFR, ESTIMATED: >90 ML/MIN/1.73M2
GLUCOSE BLD-MCNC: 108 MG/DL (ref 65–105)
GLUCOSE BLD-MCNC: 122 MG/DL (ref 65–105)
GLUCOSE BLD-MCNC: 147 MG/DL (ref 65–105)
GLUCOSE BLD-MCNC: 160 MG/DL (ref 65–105)
GLUCOSE BLD-MCNC: 199 MG/DL (ref 65–105)
GLUCOSE BLD-MCNC: 204 MG/DL (ref 65–105)
GLUCOSE SERPL-MCNC: 102 MG/DL (ref 74–99)
HCT VFR BLD AUTO: 30.9 % (ref 36.3–47.1)
HGB BLD-MCNC: 9.5 G/DL (ref 11.9–15.1)
IMM GRANULOCYTES # BLD AUTO: 0.05 K/UL (ref 0–0.3)
IMM GRANULOCYTES NFR BLD: 1 %
LYMPHOCYTES NFR BLD: 3.55 K/UL (ref 1.1–3.7)
LYMPHOCYTES RELATIVE PERCENT: 39 % (ref 24–43)
MCH RBC QN AUTO: 29.1 PG (ref 25.2–33.5)
MCHC RBC AUTO-ENTMCNC: 30.7 G/DL (ref 28.4–34.8)
MCV RBC AUTO: 94.5 FL (ref 82.6–102.9)
MONOCYTES NFR BLD: 0.6 K/UL (ref 0.1–1.2)
MONOCYTES NFR BLD: 7 % (ref 3–12)
NEUTROPHILS NFR BLD: 49 % (ref 36–65)
NEUTS SEG NFR BLD: 4.56 K/UL (ref 1.5–8.1)
NRBC BLD-RTO: 0 PER 100 WBC
PLATELET # BLD AUTO: 437 K/UL (ref 138–453)
PMV BLD AUTO: 9.3 FL (ref 8.1–13.5)
POTASSIUM SERPL-SCNC: 3.7 MMOL/L (ref 3.7–5.3)
RBC # BLD AUTO: 3.27 M/UL (ref 3.95–5.11)
SODIUM SERPL-SCNC: 142 MMOL/L (ref 136–145)
WBC OTHER # BLD: 9.2 K/UL (ref 3.5–11.3)

## 2024-11-05 PROCEDURE — 6360000002 HC RX W HCPCS: Performed by: INTERNAL MEDICINE

## 2024-11-05 PROCEDURE — 6370000000 HC RX 637 (ALT 250 FOR IP): Performed by: STUDENT IN AN ORGANIZED HEALTH CARE EDUCATION/TRAINING PROGRAM

## 2024-11-05 PROCEDURE — 85025 COMPLETE CBC W/AUTO DIFF WBC: CPT

## 2024-11-05 PROCEDURE — 2580000003 HC RX 258

## 2024-11-05 PROCEDURE — 99232 SBSQ HOSP IP/OBS MODERATE 35: CPT | Performed by: STUDENT IN AN ORGANIZED HEALTH CARE EDUCATION/TRAINING PROGRAM

## 2024-11-05 PROCEDURE — 6370000000 HC RX 637 (ALT 250 FOR IP)

## 2024-11-05 PROCEDURE — 85652 RBC SED RATE AUTOMATED: CPT

## 2024-11-05 PROCEDURE — 80048 BASIC METABOLIC PNL TOTAL CA: CPT

## 2024-11-05 PROCEDURE — 36415 COLL VENOUS BLD VENIPUNCTURE: CPT

## 2024-11-05 PROCEDURE — 99254 IP/OBS CNSLTJ NEW/EST MOD 60: CPT | Performed by: INTERNAL MEDICINE

## 2024-11-05 PROCEDURE — 6360000002 HC RX W HCPCS

## 2024-11-05 PROCEDURE — 2060000000 HC ICU INTERMEDIATE R&B

## 2024-11-05 PROCEDURE — 86140 C-REACTIVE PROTEIN: CPT

## 2024-11-05 PROCEDURE — 2580000003 HC RX 258: Performed by: INTERNAL MEDICINE

## 2024-11-05 RX ORDER — CYCLOBENZAPRINE HCL 10 MG
10 TABLET ORAL 2 TIMES DAILY
Status: DISCONTINUED | OUTPATIENT
Start: 2024-11-05 | End: 2024-11-06

## 2024-11-05 RX ADMIN — LOSARTAN POTASSIUM 100 MG: 50 TABLET, FILM COATED ORAL at 08:20

## 2024-11-05 RX ADMIN — GABAPENTIN 300 MG: 300 CAPSULE ORAL at 08:19

## 2024-11-05 RX ADMIN — OXYCODONE HYDROCHLORIDE AND ACETAMINOPHEN 1 TABLET: 5; 325 TABLET ORAL at 18:54

## 2024-11-05 RX ADMIN — MORPHINE SULFATE 2 MG: 2 INJECTION, SOLUTION INTRAMUSCULAR; INTRAVENOUS at 06:26

## 2024-11-05 RX ADMIN — METOPROLOL SUCCINATE 50 MG: 25 TABLET, EXTENDED RELEASE ORAL at 08:19

## 2024-11-05 RX ADMIN — SODIUM CHLORIDE, PRESERVATIVE FREE 10 ML: 5 INJECTION INTRAVENOUS at 08:20

## 2024-11-05 RX ADMIN — POLYETHYLENE GLYCOL 3350 17 G: 17 POWDER, FOR SOLUTION ORAL at 08:23

## 2024-11-05 RX ADMIN — CYCLOBENZAPRINE 5 MG: 10 TABLET, FILM COATED ORAL at 08:19

## 2024-11-05 RX ADMIN — ISOSORBIDE MONONITRATE 30 MG: 30 TABLET, EXTENDED RELEASE ORAL at 08:20

## 2024-11-05 RX ADMIN — SODIUM CHLORIDE, PRESERVATIVE FREE 10 ML: 5 INJECTION INTRAVENOUS at 20:05

## 2024-11-05 RX ADMIN — INSULIN GLARGINE 40 UNITS: 100 INJECTION, SOLUTION SUBCUTANEOUS at 20:05

## 2024-11-05 RX ADMIN — INSULIN GLARGINE 10 UNITS: 100 INJECTION, SOLUTION SUBCUTANEOUS at 08:20

## 2024-11-05 RX ADMIN — SERTRALINE HYDROCHLORIDE 25 MG: 25 TABLET ORAL at 08:20

## 2024-11-05 RX ADMIN — SENNOSIDES AND DOCUSATE SODIUM 2 TABLET: 50; 8.6 TABLET ORAL at 08:19

## 2024-11-05 RX ADMIN — INSULIN LISPRO 4 UNITS: 100 INJECTION, SOLUTION INTRAVENOUS; SUBCUTANEOUS at 18:55

## 2024-11-05 RX ADMIN — MORPHINE SULFATE 2 MG: 2 INJECTION, SOLUTION INTRAMUSCULAR; INTRAVENOUS at 20:09

## 2024-11-05 RX ADMIN — OXYCODONE HYDROCHLORIDE AND ACETAMINOPHEN 1 TABLET: 5; 325 TABLET ORAL at 23:22

## 2024-11-05 RX ADMIN — ATORVASTATIN CALCIUM 80 MG: 80 TABLET, FILM COATED ORAL at 20:04

## 2024-11-05 RX ADMIN — MORPHINE SULFATE 2 MG: 2 INJECTION, SOLUTION INTRAMUSCULAR; INTRAVENOUS at 15:15

## 2024-11-05 RX ADMIN — OXYCODONE HYDROCHLORIDE AND ACETAMINOPHEN 1 TABLET: 5; 325 TABLET ORAL at 13:23

## 2024-11-05 RX ADMIN — GABAPENTIN 300 MG: 300 CAPSULE ORAL at 13:23

## 2024-11-05 RX ADMIN — GABAPENTIN 300 MG: 300 CAPSULE ORAL at 20:05

## 2024-11-05 RX ADMIN — CYCLOBENZAPRINE 10 MG: 10 TABLET, FILM COATED ORAL at 20:04

## 2024-11-05 RX ADMIN — PIPERACILLIN AND TAZOBACTAM 3375 MG: 3; .375 INJECTION, POWDER, LYOPHILIZED, FOR SOLUTION INTRAVENOUS at 20:03

## 2024-11-05 RX ADMIN — MORPHINE SULFATE 2 MG: 2 INJECTION, SOLUTION INTRAMUSCULAR; INTRAVENOUS at 10:16

## 2024-11-05 ASSESSMENT — PAIN SCALES - GENERAL
PAINLEVEL_OUTOF10: 8
PAINLEVEL_OUTOF10: 4
PAINLEVEL_OUTOF10: 7
PAINLEVEL_OUTOF10: 8
PAINLEVEL_OUTOF10: 6
PAINLEVEL_OUTOF10: 5
PAINLEVEL_OUTOF10: 10
PAINLEVEL_OUTOF10: 9
PAINLEVEL_OUTOF10: 9
PAINLEVEL_OUTOF10: 7
PAINLEVEL_OUTOF10: 10
PAINLEVEL_OUTOF10: 6
PAINLEVEL_OUTOF10: 9
PAINLEVEL_OUTOF10: 8
PAINLEVEL_OUTOF10: 4
PAINLEVEL_OUTOF10: 7

## 2024-11-05 ASSESSMENT — PAIN DESCRIPTION - DESCRIPTORS
DESCRIPTORS: DISCOMFORT
DESCRIPTORS: DISCOMFORT;ACHING;SORE
DESCRIPTORS: ACHING
DESCRIPTORS: DISCOMFORT
DESCRIPTORS: DISCOMFORT

## 2024-11-05 ASSESSMENT — PAIN DESCRIPTION - LOCATION
LOCATION: LEG

## 2024-11-05 ASSESSMENT — PAIN DESCRIPTION - ORIENTATION
ORIENTATION: LEFT;RIGHT
ORIENTATION: LEFT
ORIENTATION: RIGHT
ORIENTATION: LEFT
ORIENTATION: LEFT

## 2024-11-05 NOTE — CARE COORDINATION
Spoke to Virgie from [facility]. They are able to accept. She will submit for precert. Patient updated

## 2024-11-05 NOTE — PLAN OF CARE
Problem: Chronic Conditions and Co-morbidities  Goal: Patient's chronic conditions and co-morbidity symptoms are monitored and maintained or improved  11/4/2024 2016 by Gita Holcomb RN  Outcome: Progressing  11/4/2024 1757 by Joy Paul RN  Outcome: Progressing     Problem: Discharge Planning  Goal: Discharge to home or other facility with appropriate resources  11/4/2024 2016 by Gita Holcomb RN  Outcome: Progressing  11/4/2024 1757 by Joy Paul RN  Outcome: Progressing     Problem: Pain  Goal: Verbalizes/displays adequate comfort level or baseline comfort level  11/4/2024 2016 by Gita Holcomb RN  Outcome: Progressing  11/4/2024 1757 by Joy Paul RN  Outcome: Progressing     Problem: Safety - Adult  Goal: Free from fall injury  11/4/2024 2016 by Gita Holcomb RN  Outcome: Progressing  11/4/2024 1757 by Joy Paul RN  Outcome: Progressing     Problem: ABCDS Injury Assessment  Goal: Absence of physical injury  11/4/2024 2016 by Gita Holcomb RN  Outcome: Progressing  11/4/2024 1757 by Joy Paul RN  Outcome: Progressing

## 2024-11-05 NOTE — CONSULTS
of relevant labs:  Labs:  W 15 - 12 - 10   Micro:  BC neg  11/1 -  Nsasal swab neg 11/1  U cx neg 11/1    Procedures      Cardiology      Imaging:      I have personally reviewed the past medical history, past surgical history, medications, social history, and family history, and I haveupdated the database accordingly.      Allergies:   Patient has no known allergies.     Review of Systems:     Review of Systems   Constitutional:  Positive for activity change.   HENT:  Negative for congestion.    Eyes:  Negative for discharge.   Respiratory:  Negative for apnea, cough and shortness of breath.    Cardiovascular:  Negative for chest pain.   Gastrointestinal:  Negative for abdominal distention.   Endocrine: Negative for cold intolerance.   Genitourinary:  Negative for dysuria.   Musculoskeletal:  Positive for arthralgias.   Skin:  Positive for color change (R foot).   Allergic/Immunologic: Negative for immunocompromised state.   Neurological:  Negative for dizziness.   Hematological:  Negative for adenopathy.   Psychiatric/Behavioral:  Negative for agitation.        Physical Examination :       Physical Exam  Constitutional:       General: She is not in acute distress.     Appearance: Normal appearance. She is not ill-appearing.   HENT:      Head: Normocephalic and atraumatic.      Nose: Nose normal.      Mouth/Throat:      Mouth: Mucous membranes are moist.   Eyes:      General: No scleral icterus.     Conjunctiva/sclera: Conjunctivae normal.   Cardiovascular:      Rate and Rhythm: Normal rate and regular rhythm.      Heart sounds: No murmur heard.     No gallop.   Pulmonary:      Effort: No respiratory distress.      Breath sounds: No wheezing.   Abdominal:      General: There is no distension.      Tenderness: There is no abdominal tenderness.   Musculoskeletal:         General: No swelling or deformity.      Cervical back: Neck supple. No rigidity.   Skin:     Coloration: Skin is not jaundiced.      Findings:  BID    sertraline  25 mg Oral Daily    sennosides-docusate sodium  2 tablet Oral Daily    polyethylene glycol  17 g Oral Daily    insulin glargine  10 Units SubCUTAneous Daily    insulin lispro  10 Units SubCUTAneous Once    sodium chloride flush  5-40 mL IntraVENous 2 times per day    [Held by provider] apixaban  5 mg Oral BID    [Held by provider] aspirin  81 mg Oral Daily    atorvastatin  80 mg Oral Nightly    gabapentin  300 mg Oral TID    insulin glargine  40 Units SubCUTAneous Nightly    isosorbide mononitrate  30 mg Oral Daily    losartan  100 mg Oral Daily    metoprolol succinate  50 mg Oral Daily    insulin lispro  0-16 Units SubCUTAneous 4 times per day       Social History:     Social History     Socioeconomic History    Marital status: Single     Spouse name: Not on file    Number of children: Not on file    Years of education: Not on file    Highest education level: Not on file   Occupational History    Not on file   Tobacco Use    Smoking status: Former     Current packs/day: 0.25     Types: Cigarettes    Smokeless tobacco: Never   Vaping Use    Vaping status: Never Used   Substance and Sexual Activity    Alcohol use: No    Drug use: Not Currently     Types: Marijuana (Weed)    Sexual activity: Yes     Partners: Female   Other Topics Concern    Not on file   Social History Narrative    Not on file     Social Determinants of Health     Financial Resource Strain: Medium Risk (9/3/2024)    Overall Financial Resource Strain (CARDIA)     Difficulty of Paying Living Expenses: Somewhat hard   Food Insecurity: Food Insecurity Present (11/1/2024)    Hunger Vital Sign     Worried About Running Out of Food in the Last Year: Sometimes true     Ran Out of Food in the Last Year: Sometimes true   Transportation Needs: No Transportation Needs (11/1/2024)    PRAPARE - Transportation     Lack of Transportation (Medical): No     Lack of Transportation (Non-Medical): No   Recent Concern: Transportation Needs - Unmet

## 2024-11-05 NOTE — PROGRESS NOTES
Division of Vascular Surgery        Progress Note      Name: Jane Saavedra  MRN: [id number]       Overnight Events:     None    Subjective:     Patient seen and examined at the bedside. No acute overnight events. Afebrile, vital signs stable and within normal limits. POD #4 from L AKA. Patient reports her pain is better controlled today. No other complaints at this time. Working with PT. She is doing well since her RLE angiogram yesterday. Minimal pain in her left groin access site.    Physical Exam:     Vitals:  /71   Pulse 71   Temp 97.9 °F (36.6 °C) (Axillary)   Resp 18   Ht 1.778 m (5' 10\")   Wt 81.6 kg (180 lb)   SpO2 98%   BMI 25.83 kg/m²     General appearance - alert, well appearing and in no acute distress  Mental status - oriented to person, place and time with normal affect  Head - normocephalic and atraumatic  Neck - supple, no carotid bruits  Chest - normal respiratory effort, no wheezing or stridor  Heart - normal rate, regular rhythm  Abdomen - soft, non-tender, non-distended. No rebound tenderness or guarding.   Neurological - normal speech, no focal findings or movement disorder noted  Extremities - no pedal edema or calf pain with palpation. Chronic appearing wounds to R foot. Clean dressings in place. L AKA with dressings in place. Incision well approximated, no evidence of bleeding or drainage.         Skin - no gross lesions, rashes, or induration noted  Vascular Exam - palp RLE throughout, palp L femoral pulse    Imaging:     No results found.     Assessment:     34-year-old female postop day 4 status post left above-knee amputation.    Plan:     Postop day 4 status post left above-knee amputation.   Postop day 1 RLE angiogram.  Daily dressing changes to L AKA site. Wound care instructions placed.  Patient seen by Dr. Watson.  Plan to remove staples on 11/26 outpatient with Dr. Watson.  PT/OT evaluation, NWB LLE  PM&R consulted, appreciate recommendations.  Patient is too

## 2024-11-05 NOTE — PROGRESS NOTES
Vascular Surgery Postoperative Progress Note    35 yo F who is s/p right lower extremity arteriogram with balloon angioplasty today. Left groin access site dressing is clean, dry, intact, no hematomas.    - Bedrest tonight, up as tolerated tomorrow 11/5 AM  - Ok to resume regular diet  - Rest of medical management per primary team    Peggy Johns DO  General Surgery PGY-2  11/4/24 10:16 PM

## 2024-11-05 NOTE — PROGRESS NOTES
[facility]  Office: [phone]  Jad Gracia DO, Yanick Nick DO, Andrew Wall DO, Dagoberto Anderson DO, Vinicius Church MD, Ruchi Vuong MD, Kasia Lewis MD, Jeanette Yates MD,  Owen Castro MD, Aleshia Watson MD, Nahomy Muller MD,  Gm Ocampo DO, Mindy Ronquillo MD, Sam Morris MD, Eliseo Gracia DO, Teresa Antunez MD,  Ambrocio Moore DO, Enriqueta Farrell MD, Andreea Dee MD, Mami Austin MD, Brianna Hurtado MD,  Lenny Ventura MD, Tad Palacios MD, Janelle Medina MD, Igor Hyde MD, Juan Carlos Oropeza MD, Andrew Tripathi MD, Justin Daugherty DO, Rico Chauhan MD, Shirley Waterhouse, CNP,  Tennille Bañuelos, CNP, Justin Hines, CNP,  Niki Haines, DEEPIKA, Radha Farooq, CNP, Karlie Chang, CNP, Jayne Bruno, CNP, Hawa Khalil, CNP, ERICKA De LeonC, ERICKA CanasC, Caity Monson, CNP, Destiny Castillo, CNP,  Era Wu, CNP, Marilynn Smith, CNP,  Carol Singleton, CNP, Emilie Osborn, CNP         [facility]   IN-PATIENT SERVICE   [facility]    Progress Note    11/5/2024    8:31 AM    Name:   Jane Saavedra  MRN:     [id number]     Acct:      [id number]   Room:   2003/2003-01  IP Day:  4  Admit Date:  10/31/2024 10:53 PM    PCP:   Marissa Wang MD  Code Status:  Full Code    Subjective:     C/C:   Chief Complaint   Patient presents with   • Chest Pain   • Hyperglycemia     Interval History Status: not changed.     Patient seen and examined  Reports a lot of pain at the stump site.  Underwent angiogram on right side 11/4  Vitals have been stable.  Electrolytes within normal limits.  Hemoglobin 9.5.  Glucose of 160.  Brief History:     34-year-old female with PMHx of acute left limb ischemia resulting in BKA October 2024, DM2, HTN, CAD s/p CABG in 2023 who presented to the ED emergency department endorsing chest pain and anxiety associated with her upcoming vascular procedure.  Patient was planned to have above-knee amputation      Recent Labs     11/04/24  0748 11/04/24  1131 11/04/24  1751 11/04/24  1950 11/04/24  2355 11/05/24  0624   POCGLU 159* 175* 142* 153* 147* 108*     ABG:  Lab Results   Component Value Date/Time    PHART 7.387 07/10/2023 03:23 PM    CNW3ZXM 42.9 07/10/2023 03:23 PM    PO2ART 143.6 07/10/2023 03:23 PM    TMK2OEJ 25.8 07/10/2023 03:23 PM    BEART 1 07/10/2023 03:23 PM    PUN2KXS 27 07/10/2023 03:23 PM    H3CDXUXP 99 07/10/2023 03:23 PM     Lab Results   Component Value Date/Time    SPECIAL Site: Urine 11/01/2024 07:47 PM     Lab Results   Component Value Date/Time    CULTURE NO GROWTH 11/01/2024 07:47 PM       Radiology:  CT CHEST PULMONARY EMBOLISM W CONTRAST    Result Date: 11/1/2024  No evidence of pulmonary embolism or acute cardiopulmonary process.       Physical Examination:        General appearance:  alert, cooperative and no distress  Mental Status:  oriented to person, place and time and normal affect  Lungs:  clear to auscultation bilaterally, normal effort  Heart:  regular rate and rhythm, no murmur  Abdomen:  soft, nontender, nondistended, normal bowel sounds, no masses, hepatomegaly, splenomegaly  Extremities: Left AKA  Skin: rt foot in dressing  Dry gangrene 5th toe    Assessment:        Hospital Problems             Last Modified POA    * (Principal) Gangrene ([facility]) 10/29/2024 Unknown    PAD (peripheral artery disease) ([facility]) 6/19/2024 Unknown    Sepsis ([facility]) 11/1/2024 Yes    S/P AKA (above knee amputation) unilateral, left ([facility]) 11/5/2024 Yes       Plan:        Peripheral arterial disease- left lower extremity acute ischemia s/p above-knee amputation on 11/1, vascular surgery following, restart aspirin and Eliquis when okay with vascular surgery, Plan to remove staples on 11/26 outpatient with Dr. Watson.  Add neurontin, add Flexeril and Percocet for pain control    Chronic right foot wounds with concern for dry gangrene- s/p angiogram right side with balloon angioplasty, MRI in August showed concern

## 2024-11-05 NOTE — PROGRESS NOTES
Progress Note  Foot and Ankle Surgery    Subjective     CC: Chronic/ non-healing right foot wounds     Interval 11/5/24:   Patient seen and examined at bedside.  No acute events overnight.  Afebrile, vital signs stable, pain controlled  Surgical Intervention scheduled for Thursday at 12pm with Dr. Corea for partial 5th ray amputation.    Updated Labs:     WBC: 10.9-> 9.2  Lab Results   Component Value Date    WBC 9.2 11/05/2024     ESR: 102-> 68  Lab Results   Component Value Date    SEDRATE 102 (H) 10/25/2024     CRP: 30.3-> 13.4  Lab Results   Component Value Date    CRP 30.3 (H) 10/31/2024       HPI :  Jane Saavedra is a 34 y.o. female admitted to [facility] for recent left AKA. Patient is known to the podiatry service.  Patient had a BKA performed by Dr. Watson on 10/15/24 with scheduled AKA procedure on 11/1/24 as an outpatient procedure. She had a recent ED admission on 11/1 complaining of chest pain, pressure, and anxiety. She is currently on POD #3 from AKA. Patient has chronic, non-healing wounds located to her right foot. Vascular team is performing arteriogram of right LE today 11/4/24. Podiatry was consulted for further evaluation and management of right foot wound once vascular procedure is completed.      PCP is Marissa Wang MD    ROS: Denies N/V/F/C/SOB/CP.  Otherwise negative except at stated in the HPI.     Medications:  Scheduled Meds:   cyclobenzaprine  5 mg Oral BID    sertraline  25 mg Oral Daily    sennosides-docusate sodium  2 tablet Oral Daily    polyethylene glycol  17 g Oral Daily    insulin glargine  10 Units SubCUTAneous Daily    insulin lispro  10 Units SubCUTAneous Once    sodium chloride flush  5-40 mL IntraVENous 2 times per day    [Held by provider] apixaban  5 mg Oral BID    [Held by provider] aspirin  81 mg Oral Daily    atorvastatin  80 mg Oral Nightly    gabapentin  300 mg Oral TID    insulin glargine  40 Units SubCUTAneous Nightly    isosorbide mononitrate  30 mg  Oral Daily    losartan  100 mg Oral Daily    metoprolol succinate  50 mg Oral Daily    insulin lispro  0-16 Units SubCUTAneous 4 times per day    acetaminophen  1,000 mg Oral 3 times per day       Continuous Infusions:   dextrose      sodium chloride 25 mL (24 05)       PRN Meds:oxyCODONE-acetaminophen, glucose, dextrose bolus **OR** dextrose bolus, glucagon (rDNA), dextrose, sodium chloride flush, sodium chloride, potassium chloride **OR** potassium alternative oral replacement **OR** potassium chloride, magnesium sulfate, ondansetron **OR** ondansetron, polyethylene glycol, bisacodyl, acetaminophen **OR** acetaminophen, morphine, ketorolac, sennosides-docusate sodium    Objective     Vitals:  Patient Vitals for the past 8 hrs:   BP Temp Temp src Pulse Resp SpO2 Weight   24 1016 -- -- -- -- 16 -- --   24 0819 120/72 -- -- 84 -- -- --   24 0715 120/72 98.8 °F (37.1 °C) Oral 77 18 97 % --   24 0600 -- -- -- -- -- -- 80 kg (176 lb 5.9 oz)   24 0420 110/71 97.9 °F (36.6 °C) Axillary 71 18 -- --     Average, Min, and Max for last 24 hours Vitals:  TEMPERATURE:  Temp  Av.1 °F (36.7 °C)  Min: 97.7 °F (36.5 °C)  Max: 98.8 °F (37.1 °C)    RESPIRATIONS RANGE: Resp  Av.3  Min: 16  Max: 19    PULSE RANGE: Pulse  Av.2  Min: 68  Max: 84    BLOOD PRESSURE RANGE:  Systolic (24hrs), Av , Min:110 , Max:141   ; Diastolic (24hrs), Av, Min:61, Max:80      PULSE OXIMETRY RANGE: SpO2  Av.9 %  Min: 96 %  Max: 100 %    I/O last 3 completed shifts:  In: 300 [I.V.:300]  Out: -     CBC:  Recent Labs     24  0659 24  0611   WBC 10.9 9.2   HGB 9.2* 9.5*   HCT 29.9* 30.9*   * 437        BMP:  Recent Labs     24  0659 24  0611    142   K 3.8 3.7    105   CO2 26 27   BUN 6 10   CREATININE 0.3* 0.3*   GLUCOSE 165* 102*   CALCIUM 8.6 8.7        Coags:  No results for input(s): \"APTT\", \"INR\" in the last 72 hours.    Invalid input(s):

## 2024-11-06 LAB
ANION GAP SERPL CALCULATED.3IONS-SCNC: 10 MMOL/L (ref 9–16)
BASOPHILS # BLD: 0.04 K/UL (ref 0–0.2)
BASOPHILS NFR BLD: 0 % (ref 0–2)
BUN SERPL-MCNC: 6 MG/DL (ref 6–20)
CALCIUM SERPL-MCNC: 8.5 MG/DL (ref 8.6–10.4)
CHLORIDE SERPL-SCNC: 102 MMOL/L (ref 98–107)
CO2 SERPL-SCNC: 26 MMOL/L (ref 20–31)
CREAT SERPL-MCNC: 0.4 MG/DL (ref 0.6–0.9)
EOSINOPHIL # BLD: 0.37 K/UL (ref 0–0.44)
EOSINOPHILS RELATIVE PERCENT: 3 % (ref 1–4)
ERYTHROCYTE [DISTWIDTH] IN BLOOD BY AUTOMATED COUNT: 14.4 % (ref 11.8–14.4)
GFR, ESTIMATED: >90 ML/MIN/1.73M2
GLUCOSE BLD-MCNC: 121 MG/DL (ref 65–105)
GLUCOSE BLD-MCNC: 133 MG/DL (ref 65–105)
GLUCOSE BLD-MCNC: 168 MG/DL (ref 65–105)
GLUCOSE BLD-MCNC: 205 MG/DL (ref 65–105)
GLUCOSE BLD-MCNC: 222 MG/DL (ref 65–105)
GLUCOSE SERPL-MCNC: 157 MG/DL (ref 74–99)
HCT VFR BLD AUTO: 33.2 % (ref 36.3–47.1)
HGB BLD-MCNC: 10.2 G/DL (ref 11.9–15.1)
IMM GRANULOCYTES # BLD AUTO: 0.04 K/UL (ref 0–0.3)
IMM GRANULOCYTES NFR BLD: 0 %
LYMPHOCYTES NFR BLD: 3.4 K/UL (ref 1.1–3.7)
LYMPHOCYTES RELATIVE PERCENT: 28 % (ref 24–43)
MCH RBC QN AUTO: 29.1 PG (ref 25.2–33.5)
MCHC RBC AUTO-ENTMCNC: 30.7 G/DL (ref 28.4–34.8)
MCV RBC AUTO: 94.6 FL (ref 82.6–102.9)
MICROORGANISM SPEC CULT: NORMAL
MICROORGANISM SPEC CULT: NORMAL
MONOCYTES NFR BLD: 0.8 K/UL (ref 0.1–1.2)
MONOCYTES NFR BLD: 7 % (ref 3–12)
NEUTROPHILS NFR BLD: 62 % (ref 36–65)
NEUTS SEG NFR BLD: 7.39 K/UL (ref 1.5–8.1)
NRBC BLD-RTO: 0 PER 100 WBC
PLATELET # BLD AUTO: 470 K/UL (ref 138–453)
PMV BLD AUTO: 9 FL (ref 8.1–13.5)
POTASSIUM SERPL-SCNC: 4.1 MMOL/L (ref 3.7–5.3)
RBC # BLD AUTO: 3.51 M/UL (ref 3.95–5.11)
SERVICE CMNT-IMP: NORMAL
SERVICE CMNT-IMP: NORMAL
SODIUM SERPL-SCNC: 138 MMOL/L (ref 136–145)
SPECIMEN DESCRIPTION: NORMAL
SPECIMEN DESCRIPTION: NORMAL
SURGICAL PATHOLOGY REPORT: NORMAL
WBC OTHER # BLD: 12 K/UL (ref 3.5–11.3)

## 2024-11-06 PROCEDURE — 6370000000 HC RX 637 (ALT 250 FOR IP)

## 2024-11-06 PROCEDURE — 6360000002 HC RX W HCPCS

## 2024-11-06 PROCEDURE — 6360000002 HC RX W HCPCS: Performed by: INTERNAL MEDICINE

## 2024-11-06 PROCEDURE — 99223 1ST HOSP IP/OBS HIGH 75: CPT | Performed by: PODIATRIST

## 2024-11-06 PROCEDURE — 99232 SBSQ HOSP IP/OBS MODERATE 35: CPT | Performed by: STUDENT IN AN ORGANIZED HEALTH CARE EDUCATION/TRAINING PROGRAM

## 2024-11-06 PROCEDURE — 2060000000 HC ICU INTERMEDIATE R&B

## 2024-11-06 PROCEDURE — 85025 COMPLETE CBC W/AUTO DIFF WBC: CPT

## 2024-11-06 PROCEDURE — 99232 SBSQ HOSP IP/OBS MODERATE 35: CPT | Performed by: INTERNAL MEDICINE

## 2024-11-06 PROCEDURE — 82947 ASSAY GLUCOSE BLOOD QUANT: CPT

## 2024-11-06 PROCEDURE — 80048 BASIC METABOLIC PNL TOTAL CA: CPT

## 2024-11-06 PROCEDURE — 2580000003 HC RX 258

## 2024-11-06 PROCEDURE — 6370000000 HC RX 637 (ALT 250 FOR IP): Performed by: STUDENT IN AN ORGANIZED HEALTH CARE EDUCATION/TRAINING PROGRAM

## 2024-11-06 PROCEDURE — 36415 COLL VENOUS BLD VENIPUNCTURE: CPT

## 2024-11-06 RX ORDER — CYCLOBENZAPRINE HCL 10 MG
10 TABLET ORAL 3 TIMES DAILY
Status: DISCONTINUED | OUTPATIENT
Start: 2024-11-06 | End: 2024-11-11 | Stop reason: HOSPADM

## 2024-11-06 RX ORDER — CYCLOBENZAPRINE HCL 10 MG
10 TABLET ORAL ONCE
Status: COMPLETED | OUTPATIENT
Start: 2024-11-06 | End: 2024-11-06

## 2024-11-06 RX ORDER — LOSARTAN POTASSIUM 50 MG/1
50 TABLET ORAL DAILY
Status: DISCONTINUED | OUTPATIENT
Start: 2024-11-07 | End: 2024-11-08

## 2024-11-06 RX ADMIN — SODIUM CHLORIDE: 9 INJECTION, SOLUTION INTRAVENOUS at 11:44

## 2024-11-06 RX ADMIN — ATORVASTATIN CALCIUM 80 MG: 80 TABLET, FILM COATED ORAL at 20:04

## 2024-11-06 RX ADMIN — PIPERACILLIN SODIUM AND TAZOBACTAM SODIUM 3375 MG: 3; .375 INJECTION, SOLUTION INTRAVENOUS at 03:31

## 2024-11-06 RX ADMIN — CYCLOBENZAPRINE 10 MG: 10 TABLET, FILM COATED ORAL at 20:04

## 2024-11-06 RX ADMIN — GABAPENTIN 300 MG: 300 CAPSULE ORAL at 09:49

## 2024-11-06 RX ADMIN — OXYCODONE HYDROCHLORIDE AND ACETAMINOPHEN 1 TABLET: 5; 325 TABLET ORAL at 23:06

## 2024-11-06 RX ADMIN — SENNOSIDES AND DOCUSATE SODIUM 2 TABLET: 50; 8.6 TABLET ORAL at 09:47

## 2024-11-06 RX ADMIN — MORPHINE SULFATE 2 MG: 2 INJECTION, SOLUTION INTRAMUSCULAR; INTRAVENOUS at 17:04

## 2024-11-06 RX ADMIN — GABAPENTIN 300 MG: 300 CAPSULE ORAL at 14:09

## 2024-11-06 RX ADMIN — MORPHINE SULFATE 2 MG: 2 INJECTION, SOLUTION INTRAMUSCULAR; INTRAVENOUS at 20:05

## 2024-11-06 RX ADMIN — CYCLOBENZAPRINE 10 MG: 10 TABLET, FILM COATED ORAL at 11:07

## 2024-11-06 RX ADMIN — ISOSORBIDE MONONITRATE 30 MG: 30 TABLET, EXTENDED RELEASE ORAL at 09:47

## 2024-11-06 RX ADMIN — OXYCODONE HYDROCHLORIDE AND ACETAMINOPHEN 1 TABLET: 5; 325 TABLET ORAL at 07:19

## 2024-11-06 RX ADMIN — INSULIN GLARGINE 40 UNITS: 100 INJECTION, SOLUTION SUBCUTANEOUS at 20:04

## 2024-11-06 RX ADMIN — SODIUM CHLORIDE, PRESERVATIVE FREE 10 ML: 5 INJECTION INTRAVENOUS at 20:05

## 2024-11-06 RX ADMIN — SERTRALINE HYDROCHLORIDE 25 MG: 25 TABLET ORAL at 09:49

## 2024-11-06 RX ADMIN — CYCLOBENZAPRINE 10 MG: 10 TABLET, FILM COATED ORAL at 14:09

## 2024-11-06 RX ADMIN — PIPERACILLIN SODIUM AND TAZOBACTAM SODIUM 3375 MG: 3; .375 INJECTION, SOLUTION INTRAVENOUS at 20:10

## 2024-11-06 RX ADMIN — MORPHINE SULFATE 2 MG: 2 INJECTION, SOLUTION INTRAMUSCULAR; INTRAVENOUS at 03:28

## 2024-11-06 RX ADMIN — INSULIN GLARGINE 10 UNITS: 100 INJECTION, SOLUTION SUBCUTANEOUS at 09:48

## 2024-11-06 RX ADMIN — PIPERACILLIN SODIUM AND TAZOBACTAM SODIUM 3375 MG: 3; .375 INJECTION, SOLUTION INTRAVENOUS at 11:46

## 2024-11-06 RX ADMIN — GABAPENTIN 300 MG: 300 CAPSULE ORAL at 20:04

## 2024-11-06 RX ADMIN — MORPHINE SULFATE 2 MG: 2 INJECTION, SOLUTION INTRAMUSCULAR; INTRAVENOUS at 11:35

## 2024-11-06 RX ADMIN — POLYETHYLENE GLYCOL 3350 17 G: 17 POWDER, FOR SOLUTION ORAL at 09:49

## 2024-11-06 RX ADMIN — OXYCODONE HYDROCHLORIDE AND ACETAMINOPHEN 1 TABLET: 5; 325 TABLET ORAL at 18:02

## 2024-11-06 RX ADMIN — INSULIN LISPRO 4 UNITS: 100 INJECTION, SOLUTION INTRAVENOUS; SUBCUTANEOUS at 17:05

## 2024-11-06 ASSESSMENT — PAIN - FUNCTIONAL ASSESSMENT
PAIN_FUNCTIONAL_ASSESSMENT: ACTIVITIES ARE NOT PREVENTED

## 2024-11-06 ASSESSMENT — PAIN SCALES - GENERAL
PAINLEVEL_OUTOF10: 4
PAINLEVEL_OUTOF10: 7
PAINLEVEL_OUTOF10: 8
PAINLEVEL_OUTOF10: 8
PAINLEVEL_OUTOF10: 4
PAINLEVEL_OUTOF10: 7
PAINLEVEL_OUTOF10: 4

## 2024-11-06 ASSESSMENT — PAIN DESCRIPTION - DESCRIPTORS
DESCRIPTORS: ACHING;DISCOMFORT;SORE
DESCRIPTORS: ACHING;SORE;DISCOMFORT
DESCRIPTORS: SORE;ACHING;DISCOMFORT

## 2024-11-06 ASSESSMENT — PAIN DESCRIPTION - LOCATION
LOCATION: LEG

## 2024-11-06 ASSESSMENT — PAIN DESCRIPTION - ORIENTATION
ORIENTATION: LEFT;RIGHT
ORIENTATION: LEFT
ORIENTATION: LEFT

## 2024-11-06 NOTE — PLAN OF CARE
Problem: Chronic Conditions and Co-morbidities  Goal: Patient's chronic conditions and co-morbidity symptoms are monitored and maintained or improved  11/5/2024 2055 by Janey Price RN  Outcome: Progressing  Flowsheets (Taken 11/5/2024 1945)  Care Plan - Patient's Chronic Conditions and Co-Morbidity Symptoms are Monitored and Maintained or Improved: Monitor and assess patient's chronic conditions and comorbid symptoms for stability, deterioration, or improvement  11/5/2024 1614 by Christofer Beach RN  Outcome: Progressing     Problem: Discharge Planning  Goal: Discharge to home or other facility with appropriate resources  11/5/2024 2055 by Janey Price RN  Outcome: Progressing  Flowsheets (Taken 11/5/2024 1945)  Discharge to home or other facility with appropriate resources: Identify barriers to discharge with patient and caregiver  11/5/2024 1614 by Christofer Beach RN  Outcome: Progressing     Problem: Pain  Goal: Verbalizes/displays adequate comfort level or baseline comfort level  11/5/2024 2055 by Janey Price RN  Outcome: Progressing  11/5/2024 1614 by Christofer Beach RN  Outcome: Progressing     Problem: Safety - Adult  Goal: Free from fall injury  11/5/2024 2055 by Janey Price RN  Outcome: Progressing  11/5/2024 1614 by Christofer Beach RN  Outcome: Progressing     Problem: ABCDS Injury Assessment  Goal: Absence of physical injury  11/5/2024 2055 by Janey Price RN  Outcome: Progressing  Flowsheets (Taken 11/5/2024 2053)  Absence of Physical Injury: Implement safety measures based on patient assessment  11/5/2024 1614 by Christofer Beach RN  Outcome: Progressing     Problem: Skin/Tissue Integrity  Goal: Absence of new skin breakdown  Description: 1.  Monitor for areas of redness and/or skin breakdown  2.  Assess vascular access sites hourly  3.  Every 4-6 hours minimum:  Change oxygen saturation probe site  4.  Every 4-6 hours:  If on nasal continuous positive airway  pressure, respiratory therapy assess nares and determine need for appliance change or resting period.  11/5/2024 2055 by Janey Price, RN  Outcome: Progressing  11/5/2024 1614 by Christofer Beach, RN  Outcome: Progressing

## 2024-11-06 NOTE — PROGRESS NOTES
Division of Vascular Surgery        Progress Note      Name: Jane Saavedra  MRN: [id number]       Overnight Events:     None    Subjective:     Patient seen and examined at the bedside. No acute overnight events. Afebrile, vital signs stable and within normal limits. POD #5 from L AKA.  Patient reports some mild pain related to her right foot wounds.  States her left AKA pain is improving.  Dressings being changed daily.  Podiatry consulted for right foot wounds.  Plan for operative intervention tomorrow.    Physical Exam:     Vitals:  /79   Pulse 84   Temp 98 °F (36.7 °C) (Oral)   Resp 16   Ht 1.778 m (5' 10\")   Wt 80 kg (176 lb 5.9 oz)   SpO2 95%   BMI 25.31 kg/m²     General appearance - alert, well appearing and in no acute distress  Mental status - oriented to person, place and time with normal affect  Head - normocephalic and atraumatic  Neck - supple  Chest - normal respiratory effort, no wheezing or stridor  Heart - normal rate, regular rhythm  Abdomen - soft, non-tender, non-distended. No rebound tenderness or guarding.   Neurological - normal speech, no focal findings or movement disorder noted  Extremities - no pedal edema or calf pain with palpation. Chronic appearing wounds to R foot. Clean dressings in place. L AKA with dressings in place. Incision well approximated, no evidence of bleeding or drainage.   Skin -right foot wounds.  no rashes or induration  Vascular Exam - palp RLE throughout, palp L femoral pulse    Imaging:     No results found.     Assessment:     34-year-old female postop day 5 status post left above-knee amputation.    Plan:     Postop day 4 status post left above-knee amputation.   Postop day 2 RLE angiogram.  Daily dressing changes to L AKA site. Wound care instructions placed.  Plan to remove sutures on 11/26 outpatient with Dr. Watson.  PT/OT evaluation, NWB LLE  PM&R consulted, appreciate recommendations.  Patient is too high-functioning and does not require  Pt picked up rx

## 2024-11-06 NOTE — PROGRESS NOTES
[facility]  Office: [phone]  Jad Gracia DO, Yanick Nick DO, Andrew Wall DO, Dagoberto Anderson DO, Vinicius Church MD, Ruchi Vuong MD, Kasia Lewis MD, Jeanette Yates MD,  Owen Castro MD, Aleshia Watson MD, Nahomy Muller MD,  Gm Ocampo DO, Mindy Ronquillo MD, Sam Morris MD, Eliseo Gracia DO, Teresa Antunez MD,  Ambrocio Moore DO, Enriqueta Farrell MD, Andreea Dee MD, Mami Austin MD, Brianna Hurtado MD,  Lenny Ventura MD, Tad Palacios MD, Janelle Medina MD, Igor Hyde MD, Juan Carlos Oropeza MD, Andrew Tripathi MD, Justin Daugherty DO, Rico Chauhan MD, Shirley Waterhouse, CNP,  Tennille Bañuelos, CNP, Justin Hines, CNP,  Niki Haines, DEEPIKA, Radha Farooq, CNP, Karlie Chang, CNP, Jayne Bruno, CNP, Hawa Khalil, CNP, ERICKA De LeonC, ERICKA CanasC, Caity Monson, CNP, Destiny Castillo, CNP,  Era Wu, CNP, Marilynn Smith, CNP,  Carol Singleton, CNP, Emilie Osborn, CNP         [facility]   IN-PATIENT SERVICE   [facility]    Progress Note    11/6/2024    2:43 PM    Name:   Jane Saavedra  MRN:     [id number]     Acct:      [id number]   Room:   2003/2003-01  IP Day:  5  Admit Date:  10/31/2024 10:53 PM    PCP:   Marissa Wang MD  Code Status:  Full Code    Subjective:     C/C:   Chief Complaint   Patient presents with   • Chest Pain   • Hyperglycemia     Interval History Status: not changed.     Patient seen and examined  Patient reports significant pain in stump site.  Patient states that vascular changed dressing and after that pain worsened with manipulation.  Labs and vitals reviewed  Blood pressure stable  White count 12, hemoglobin 10.2, creatinine 0.4.    Brief History:     34-year-old female with PMHx of acute left limb ischemia resulting in BKA October 2024, DM2, HTN, CAD s/p CABG in 2023 who presented to the ED emergency department endorsing chest pain and anxiety associated with her upcoming  Walter. continue neurontin, increase Flexeril and Percocet for pain control.    Chronic right foot wounds with concern for dry gangrene- s/p angiogram right side with balloon angioplasty, MRI in August showed concern for osteomyelitis, started on zosyn.  X-ray does not show any acute abnormality currently, will consult ID, podiatry planning for surgical intervention on Thursday.    Chest pain with history of CAD with CABG- resolved, continue to watch for chest pain    Hyperglycemia with type 2 diabetes mellitus-adjust Lantus, continue sliding scale, continue to watch blood sugars    Hypertension on losartan, isosorbide mononitrate, metoprolol    Depression- started on Zoloft    PM&R evaluated, patient does not meet criteria for inpatient rehab.      Mindy Ronquillo MD  11/6/2024  2:43 PM

## 2024-11-06 NOTE — PROGRESS NOTES
Physical Therapy        Physical Therapy Cancel Note      DATE: 2024    NAME: Jane Saavedra  MRN: [id number]   : 1990      Patient not seen this date for Physical Therapy due to:    Patient Declined: Pt refusing therapy d/t fatigue, will check back tomorrow after surgery 24.       Electronically signed by Mayco Monet PTA on 2024 at 11:30 AM

## 2024-11-06 NOTE — PROGRESS NOTES
[facility] -   Infectious diseases evaluation  admission date 10/31/2024    reason for consultation:   gangrene    Impression :   Current:  Diabetic  Left LE gangrene and popst AKA 11/1  Stump w a lower bruise - clean  R foot diabetic ulcer w 5th toe gangrene and forefoot cellulitis  For 5th toe amputation 11/7  PAD post R leg angioplasty 11/4    Other:    Discussion / summary of stay / plan of care/ Recommendations:   Post zosyn 11/1 -11/3  Vanco 11/1 -11/3  HENCE:   Cellulitis persistent though improved, R forefoot   Resume zosyn 11/5 till after the 5th toe amp on 11/7 - decide length per OR findings    Infection Control Recommendations   [place] Precautions  Contact Isolation     Antimicrobial Stewardship Recommendations   Simplification of therapy  Targeted therapy      History of Present Illness:   Initial history:  Jane Saavedra is a 34 y.o.-year-old female admitted with chest pain and anxiety associated with an upcoming vascular procedure.  The procedure was a left above-knee amputation which occurred on 11/1/2024 after admission  Stump afterwards, looked clean but has a bruise underneath it.    She also has a wound over the right foot-   Vascular ultimately took 4 open 11/4 with manage angioplasty for the right lower extremity arterial  She continues to have pain over the left stump w gangrene R 5th toe - scheduled for 5th toe amputation 11/7/24 by podiatry.    ID called for antibiotics         Diabetes type 2, left lower extremity gangrene with BKA 10/2024  CAD post CABG 2023 11/6  Alert appropriate continues to have a lot of pain from the left stump, planned for right fifth toe surgery on 11/7.  Labs reviewed,          Interval changes  11/6/2024   Patient Vitals for the past 8 hrs:   BP Temp Temp src Pulse Resp SpO2   11/06/24 0730 107/70 98.1 °F (36.7 °C) Axillary 82 16 94 %   11/06/24 0358 -- -- -- -- 16 --   11/06/24 0328 134/79 98 °F  (36.7 °C) Oral 84 16 95 %       Summary of relevant labs:  Labs:  W 15 - 12 - 10   Micro:  BC neg  11/1 -  Nsasal swab neg 11/1  U cx neg 11/1    Procedures      Cardiology      Imaging:      I have personally reviewed the past medical history, past surgical history, medications, social history, and family history, and I haveupdated the database accordingly.      Allergies:   Patient has no known allergies.     Review of Systems:     Review of Systems   Constitutional:  Positive for activity change.   HENT:  Negative for congestion.    Eyes:  Negative for photophobia, discharge and redness.   Respiratory:  Negative for apnea, cough and shortness of breath.    Cardiovascular:  Negative for chest pain.   Gastrointestinal:  Negative for abdominal distention.   Endocrine: Negative for cold intolerance.   Genitourinary:  Negative for dysuria.   Musculoskeletal:  Positive for arthralgias.   Skin:  Positive for color change (R foot).   Allergic/Immunologic: Negative for immunocompromised state.   Neurological:  Negative for dizziness.   Hematological:  Negative for adenopathy.   Psychiatric/Behavioral:  Negative for agitation.        Physical Examination :       Physical Exam  Constitutional:       General: She is not in acute distress.     Appearance: Normal appearance. She is not ill-appearing.   HENT:      Head: Normocephalic and atraumatic.      Nose: Nose normal.      Mouth/Throat:      Mouth: Mucous membranes are moist.   Eyes:      General: No scleral icterus.     Conjunctiva/sclera: Conjunctivae normal.   Cardiovascular:      Rate and Rhythm: Normal rate and regular rhythm.      Heart sounds: No murmur heard.     No friction rub. No gallop.   Pulmonary:      Effort: No respiratory distress.      Breath sounds: No stridor. No wheezing.   Abdominal:      General: There is no distension.      Tenderness: There is no abdominal tenderness.   Musculoskeletal:         General: No swelling or deformity.      Cervical back:

## 2024-11-06 NOTE — PROGRESS NOTES
Progress Note  Foot and Ankle Surgery    Subjective     CC: Chronic/ non-healing right foot wounds     Interval 11/5/24:   - Patient seen and examined at bedside during rounding this morning.  -No acute events overnight.  -Patient is scheduled for surgical procedure tomorrow with the podiatry service.  All further questions regarding planned surgical procedure were answered to the patient's satisfaction.    Updated Labs:     WBC: 10.9-> 9.2  Lab Results   Component Value Date    WBC 9.2 11/05/2024     ESR: 102-> 68  Lab Results   Component Value Date    SEDRATE 68 (H) 11/05/2024     CRP: 30.3-> 13.4  Lab Results   Component Value Date    CRP 13.4 (H) 11/05/2024       HPI :  Jane Saavedra is a 34 y.o. female admitted to [place] for recent left AKA. Patient is known to the podiatry service.  Patient had a BKA performed by Dr. Watson on 10/15/24 with scheduled AKA procedure on 11/1/24 as an outpatient procedure. She had a recent ED admission on 11/1 complaining of chest pain, pressure, and anxiety. She is currently on POD #3 from AKA. Patient has chronic, non-healing wounds located to her right foot. Vascular team is performing arteriogram of right LE today 11/4/24. Podiatry was consulted for further evaluation and management of right foot wound once vascular procedure is completed.      PCP is Marissa Wang MD    ROS: Denies N/V/F/C/SOB/CP.  Otherwise negative except at stated in the HPI.     Medications:  Scheduled Meds:   cyclobenzaprine  10 mg Oral BID    piperacillin-tazobactam  3,375 mg IntraVENous Q8H    sertraline  25 mg Oral Daily    sennosides-docusate sodium  2 tablet Oral Daily    polyethylene glycol  17 g Oral Daily    insulin glargine  10 Units SubCUTAneous Daily    insulin lispro  10 Units SubCUTAneous Once    sodium chloride flush  5-40 mL IntraVENous 2 times per day    [Held by provider] apixaban  5 mg Oral BID    [Held by provider] aspirin  81 mg Oral Daily    atorvastatin  80 mg Oral

## 2024-11-07 ENCOUNTER — ANESTHESIA EVENT (OUTPATIENT)
Dept: OPERATING ROOM | Age: 34
End: 2024-11-07
Payer: COMMERCIAL

## 2024-11-07 ENCOUNTER — APPOINTMENT (OUTPATIENT)
Dept: GENERAL RADIOLOGY | Age: 34
DRG: 181 | End: 2024-11-07
Payer: COMMERCIAL

## 2024-11-07 ENCOUNTER — ANESTHESIA (OUTPATIENT)
Dept: OPERATING ROOM | Age: 34
End: 2024-11-07
Payer: COMMERCIAL

## 2024-11-07 LAB
CRP SERPL HS-MCNC: 9.8 MG/L (ref 0–5)
ERYTHROCYTE [SEDIMENTATION RATE] IN BLOOD BY PHOTOMETRIC METHOD: 95 MM/HR (ref 0–20)
GLUCOSE BLD-MCNC: 151 MG/DL (ref 65–105)
GLUCOSE BLD-MCNC: 156 MG/DL (ref 65–105)
GLUCOSE BLD-MCNC: 173 MG/DL (ref 65–105)
GLUCOSE BLD-MCNC: 189 MG/DL (ref 65–105)
GLUCOSE BLD-MCNC: 205 MG/DL (ref 65–105)
GLUCOSE BLD-MCNC: 211 MG/DL (ref 65–105)
GLUCOSE BLD-MCNC: 269 MG/DL (ref 65–105)
GLUCOSE BLD-MCNC: 357 MG/DL (ref 65–105)

## 2024-11-07 PROCEDURE — 2580000003 HC RX 258: Performed by: PODIATRIST

## 2024-11-07 PROCEDURE — 2580000003 HC RX 258

## 2024-11-07 PROCEDURE — 7100000000 HC PACU RECOVERY - FIRST 15 MIN: Performed by: PODIATRIST

## 2024-11-07 PROCEDURE — 82947 ASSAY GLUCOSE BLOOD QUANT: CPT

## 2024-11-07 PROCEDURE — 2580000003 HC RX 258: Performed by: STUDENT IN AN ORGANIZED HEALTH CARE EDUCATION/TRAINING PROGRAM

## 2024-11-07 PROCEDURE — 6360000002 HC RX W HCPCS: Performed by: INTERNAL MEDICINE

## 2024-11-07 PROCEDURE — 6370000000 HC RX 637 (ALT 250 FOR IP): Performed by: STUDENT IN AN ORGANIZED HEALTH CARE EDUCATION/TRAINING PROGRAM

## 2024-11-07 PROCEDURE — 6360000002 HC RX W HCPCS

## 2024-11-07 PROCEDURE — 88305 TISSUE EXAM BY PATHOLOGIST: CPT

## 2024-11-07 PROCEDURE — 28820 AMPUTATION OF TOE: CPT | Performed by: PODIATRIST

## 2024-11-07 PROCEDURE — 6370000000 HC RX 637 (ALT 250 FOR IP)

## 2024-11-07 PROCEDURE — 3700000000 HC ANESTHESIA ATTENDED CARE: Performed by: PODIATRIST

## 2024-11-07 PROCEDURE — 6360000002 HC RX W HCPCS: Performed by: STUDENT IN AN ORGANIZED HEALTH CARE EDUCATION/TRAINING PROGRAM

## 2024-11-07 PROCEDURE — 88311 DECALCIFY TISSUE: CPT

## 2024-11-07 PROCEDURE — 3700000001 HC ADD 15 MINUTES (ANESTHESIA): Performed by: PODIATRIST

## 2024-11-07 PROCEDURE — 2709999900 HC NON-CHARGEABLE SUPPLY: Performed by: PODIATRIST

## 2024-11-07 PROCEDURE — 0Q9N0ZZ DRAINAGE OF RIGHT METATARSAL, OPEN APPROACH: ICD-10-PCS | Performed by: PODIATRIST

## 2024-11-07 PROCEDURE — 2500000003 HC RX 250 WO HCPCS: Performed by: PODIATRIST

## 2024-11-07 PROCEDURE — 3600000013 HC SURGERY LEVEL 3 ADDTL 15MIN: Performed by: PODIATRIST

## 2024-11-07 PROCEDURE — 6360000002 HC RX W HCPCS: Performed by: PODIATRIST

## 2024-11-07 PROCEDURE — 36415 COLL VENOUS BLD VENIPUNCTURE: CPT

## 2024-11-07 PROCEDURE — 28005 TREAT FOOT BONE LESION: CPT | Performed by: PODIATRIST

## 2024-11-07 PROCEDURE — 99232 SBSQ HOSP IP/OBS MODERATE 35: CPT | Performed by: STUDENT IN AN ORGANIZED HEALTH CARE EDUCATION/TRAINING PROGRAM

## 2024-11-07 PROCEDURE — 3600000003 HC SURGERY LEVEL 3 BASE: Performed by: PODIATRIST

## 2024-11-07 PROCEDURE — 73630 X-RAY EXAM OF FOOT: CPT

## 2024-11-07 PROCEDURE — 6370000000 HC RX 637 (ALT 250 FOR IP): Performed by: ANESTHESIOLOGY

## 2024-11-07 PROCEDURE — 85652 RBC SED RATE AUTOMATED: CPT

## 2024-11-07 PROCEDURE — 86140 C-REACTIVE PROTEIN: CPT

## 2024-11-07 PROCEDURE — 0Y6M0ZF DETACHMENT AT RIGHT FOOT, PARTIAL 5TH RAY, OPEN APPROACH: ICD-10-PCS | Performed by: PODIATRIST

## 2024-11-07 PROCEDURE — 94760 N-INVAS EAR/PLS OXIMETRY 1: CPT

## 2024-11-07 PROCEDURE — 2500000003 HC RX 250 WO HCPCS

## 2024-11-07 PROCEDURE — 7100000001 HC PACU RECOVERY - ADDTL 15 MIN: Performed by: PODIATRIST

## 2024-11-07 PROCEDURE — 1200000000 HC SEMI PRIVATE

## 2024-11-07 PROCEDURE — 0Y6X0Z0 DETACHMENT AT RIGHT 5TH TOE, COMPLETE, OPEN APPROACH: ICD-10-PCS | Performed by: PODIATRIST

## 2024-11-07 RX ORDER — IPRATROPIUM BROMIDE AND ALBUTEROL SULFATE 2.5; .5 MG/3ML; MG/3ML
1 SOLUTION RESPIRATORY (INHALATION)
Status: DISCONTINUED | OUTPATIENT
Start: 2024-11-07 | End: 2024-11-07 | Stop reason: HOSPADM

## 2024-11-07 RX ORDER — SODIUM CHLORIDE, SODIUM LACTATE, POTASSIUM CHLORIDE, CALCIUM CHLORIDE 600; 310; 30; 20 MG/100ML; MG/100ML; MG/100ML; MG/100ML
INJECTION, SOLUTION INTRAVENOUS
Status: DISCONTINUED | OUTPATIENT
Start: 2024-11-07 | End: 2024-11-07 | Stop reason: SDUPTHER

## 2024-11-07 RX ORDER — FENTANYL CITRATE 50 UG/ML
25 INJECTION, SOLUTION INTRAMUSCULAR; INTRAVENOUS EVERY 5 MIN PRN
Status: DISCONTINUED | OUTPATIENT
Start: 2024-11-07 | End: 2024-11-07 | Stop reason: HOSPADM

## 2024-11-07 RX ORDER — PROPOFOL 10 MG/ML
INJECTION, EMULSION INTRAVENOUS
Status: DISCONTINUED | OUTPATIENT
Start: 2024-11-07 | End: 2024-11-07 | Stop reason: SDUPTHER

## 2024-11-07 RX ORDER — NALOXONE HYDROCHLORIDE 0.4 MG/ML
INJECTION, SOLUTION INTRAMUSCULAR; INTRAVENOUS; SUBCUTANEOUS PRN
Status: DISCONTINUED | OUTPATIENT
Start: 2024-11-07 | End: 2024-11-07 | Stop reason: HOSPADM

## 2024-11-07 RX ORDER — FENTANYL CITRATE 50 UG/ML
INJECTION, SOLUTION INTRAMUSCULAR; INTRAVENOUS
Status: DISCONTINUED | OUTPATIENT
Start: 2024-11-07 | End: 2024-11-07 | Stop reason: SDUPTHER

## 2024-11-07 RX ORDER — SODIUM CHLORIDE 0.9 % (FLUSH) 0.9 %
5-40 SYRINGE (ML) INJECTION EVERY 12 HOURS SCHEDULED
Status: DISCONTINUED | OUTPATIENT
Start: 2024-11-07 | End: 2024-11-07 | Stop reason: HOSPADM

## 2024-11-07 RX ORDER — LABETALOL HYDROCHLORIDE 5 MG/ML
10 INJECTION, SOLUTION INTRAVENOUS
Status: DISCONTINUED | OUTPATIENT
Start: 2024-11-07 | End: 2024-11-07 | Stop reason: HOSPADM

## 2024-11-07 RX ORDER — LIDOCAINE HYDROCHLORIDE 10 MG/ML
INJECTION, SOLUTION EPIDURAL; INFILTRATION; INTRACAUDAL; PERINEURAL
Status: DISCONTINUED | OUTPATIENT
Start: 2024-11-07 | End: 2024-11-07 | Stop reason: SDUPTHER

## 2024-11-07 RX ORDER — INSULIN LISPRO 100 [IU]/ML
4 INJECTION, SOLUTION INTRAVENOUS; SUBCUTANEOUS ONCE
Status: COMPLETED | OUTPATIENT
Start: 2024-11-07 | End: 2024-11-07

## 2024-11-07 RX ORDER — MIDAZOLAM HYDROCHLORIDE 1 MG/ML
INJECTION, SOLUTION INTRAMUSCULAR; INTRAVENOUS
Status: COMPLETED
Start: 2024-11-07 | End: 2024-11-07

## 2024-11-07 RX ORDER — MAGNESIUM HYDROXIDE 1200 MG/15ML
LIQUID ORAL CONTINUOUS PRN
Status: DISCONTINUED | OUTPATIENT
Start: 2024-11-07 | End: 2024-11-07 | Stop reason: HOSPADM

## 2024-11-07 RX ORDER — SODIUM CHLORIDE 0.9 % (FLUSH) 0.9 %
5-40 SYRINGE (ML) INJECTION PRN
Status: DISCONTINUED | OUTPATIENT
Start: 2024-11-07 | End: 2024-11-07 | Stop reason: HOSPADM

## 2024-11-07 RX ORDER — ONDANSETRON 2 MG/ML
4 INJECTION INTRAMUSCULAR; INTRAVENOUS
Status: DISCONTINUED | OUTPATIENT
Start: 2024-11-07 | End: 2024-11-07 | Stop reason: HOSPADM

## 2024-11-07 RX ORDER — MIDAZOLAM HYDROCHLORIDE 2 MG/2ML
2 INJECTION, SOLUTION INTRAMUSCULAR; INTRAVENOUS ONCE
Status: COMPLETED | OUTPATIENT
Start: 2024-11-07 | End: 2024-11-07

## 2024-11-07 RX ORDER — FENTANYL CITRATE 50 UG/ML
50 INJECTION, SOLUTION INTRAMUSCULAR; INTRAVENOUS EVERY 5 MIN PRN
Status: DISCONTINUED | OUTPATIENT
Start: 2024-11-07 | End: 2024-11-07 | Stop reason: HOSPADM

## 2024-11-07 RX ORDER — SODIUM CHLORIDE 9 MG/ML
INJECTION, SOLUTION INTRAVENOUS PRN
Status: DISCONTINUED | OUTPATIENT
Start: 2024-11-07 | End: 2024-11-07 | Stop reason: HOSPADM

## 2024-11-07 RX ADMIN — INSULIN GLARGINE 40 UNITS: 100 INJECTION, SOLUTION SUBCUTANEOUS at 19:49

## 2024-11-07 RX ADMIN — MORPHINE SULFATE 2 MG: 2 INJECTION, SOLUTION INTRAMUSCULAR; INTRAVENOUS at 09:07

## 2024-11-07 RX ADMIN — INSULIN LISPRO 16 UNITS: 100 INJECTION, SOLUTION INTRAVENOUS; SUBCUTANEOUS at 19:48

## 2024-11-07 RX ADMIN — MIDAZOLAM HYDROCHLORIDE 2 MG: 2 INJECTION, SOLUTION INTRAMUSCULAR; INTRAVENOUS at 12:06

## 2024-11-07 RX ADMIN — INSULIN LISPRO 8 UNITS: 100 INJECTION, SOLUTION INTRAVENOUS; SUBCUTANEOUS at 00:20

## 2024-11-07 RX ADMIN — LOSARTAN POTASSIUM 50 MG: 50 TABLET, FILM COATED ORAL at 09:05

## 2024-11-07 RX ADMIN — INSULIN LISPRO 4 UNITS: 100 INJECTION, SOLUTION INTRAVENOUS; SUBCUTANEOUS at 12:06

## 2024-11-07 RX ADMIN — GABAPENTIN 300 MG: 300 CAPSULE ORAL at 19:49

## 2024-11-07 RX ADMIN — INSULIN GLARGINE 10 UNITS: 100 INJECTION, SOLUTION SUBCUTANEOUS at 09:05

## 2024-11-07 RX ADMIN — MORPHINE SULFATE 2 MG: 2 INJECTION, SOLUTION INTRAMUSCULAR; INTRAVENOUS at 23:49

## 2024-11-07 RX ADMIN — CYCLOBENZAPRINE 10 MG: 10 TABLET, FILM COATED ORAL at 19:49

## 2024-11-07 RX ADMIN — PIPERACILLIN SODIUM AND TAZOBACTAM SODIUM 3375 MG: 3; .375 INJECTION, SOLUTION INTRAVENOUS at 20:01

## 2024-11-07 RX ADMIN — MIDAZOLAM HYDROCHLORIDE 2 MG: 1 INJECTION, SOLUTION INTRAMUSCULAR; INTRAVENOUS at 12:06

## 2024-11-07 RX ADMIN — PIPERACILLIN SODIUM AND TAZOBACTAM SODIUM 3375 MG: 3; .375 INJECTION, SOLUTION INTRAVENOUS at 12:19

## 2024-11-07 RX ADMIN — SERTRALINE HYDROCHLORIDE 25 MG: 25 TABLET ORAL at 09:05

## 2024-11-07 RX ADMIN — MORPHINE SULFATE 2 MG: 2 INJECTION, SOLUTION INTRAMUSCULAR; INTRAVENOUS at 14:22

## 2024-11-07 RX ADMIN — CYCLOBENZAPRINE 10 MG: 10 TABLET, FILM COATED ORAL at 09:05

## 2024-11-07 RX ADMIN — OXYCODONE HYDROCHLORIDE AND ACETAMINOPHEN 1 TABLET: 5; 325 TABLET ORAL at 19:49

## 2024-11-07 RX ADMIN — MORPHINE SULFATE 2 MG: 2 INJECTION, SOLUTION INTRAMUSCULAR; INTRAVENOUS at 20:25

## 2024-11-07 RX ADMIN — FENTANYL CITRATE 25 MCG: 50 INJECTION, SOLUTION INTRAMUSCULAR; INTRAVENOUS at 12:25

## 2024-11-07 RX ADMIN — PROPOFOL 50 MG: 10 INJECTION, EMULSION INTRAVENOUS at 12:21

## 2024-11-07 RX ADMIN — OXYCODONE HYDROCHLORIDE AND ACETAMINOPHEN 1 TABLET: 5; 325 TABLET ORAL at 16:10

## 2024-11-07 RX ADMIN — GABAPENTIN 300 MG: 300 CAPSULE ORAL at 09:05

## 2024-11-07 RX ADMIN — FENTANYL CITRATE 25 MCG: 50 INJECTION, SOLUTION INTRAMUSCULAR; INTRAVENOUS at 12:30

## 2024-11-07 RX ADMIN — ATORVASTATIN CALCIUM 80 MG: 80 TABLET, FILM COATED ORAL at 19:49

## 2024-11-07 RX ADMIN — SODIUM CHLORIDE, POTASSIUM CHLORIDE, SODIUM LACTATE AND CALCIUM CHLORIDE: 600; 310; 30; 20 INJECTION, SOLUTION INTRAVENOUS at 12:16

## 2024-11-07 RX ADMIN — FENTANYL CITRATE 25 MCG: 50 INJECTION, SOLUTION INTRAMUSCULAR; INTRAVENOUS at 12:50

## 2024-11-07 RX ADMIN — PROPOFOL 100 MCG/KG/MIN: 10 INJECTION, EMULSION INTRAVENOUS at 12:20

## 2024-11-07 RX ADMIN — SODIUM CHLORIDE, PRESERVATIVE FREE 10 ML: 5 INJECTION INTRAVENOUS at 09:07

## 2024-11-07 RX ADMIN — SODIUM CHLORIDE, PRESERVATIVE FREE 10 ML: 5 INJECTION INTRAVENOUS at 20:25

## 2024-11-07 RX ADMIN — MORPHINE SULFATE 2 MG: 2 INJECTION, SOLUTION INTRAMUSCULAR; INTRAVENOUS at 03:47

## 2024-11-07 RX ADMIN — PIPERACILLIN SODIUM AND TAZOBACTAM SODIUM 3375 MG: 3; .375 INJECTION, SOLUTION INTRAVENOUS at 03:49

## 2024-11-07 RX ADMIN — SODIUM CHLORIDE, PRESERVATIVE FREE 10 ML: 5 INJECTION INTRAVENOUS at 19:50

## 2024-11-07 RX ADMIN — FENTANYL CITRATE 25 MCG: 50 INJECTION, SOLUTION INTRAMUSCULAR; INTRAVENOUS at 12:46

## 2024-11-07 RX ADMIN — SENNOSIDES AND DOCUSATE SODIUM 2 TABLET: 50; 8.6 TABLET ORAL at 09:05

## 2024-11-07 RX ADMIN — LIDOCAINE HYDROCHLORIDE 50 MG: 10 INJECTION, SOLUTION EPIDURAL; INFILTRATION; INTRACAUDAL; PERINEURAL at 12:20

## 2024-11-07 RX ADMIN — METOPROLOL SUCCINATE 50 MG: 25 TABLET, EXTENDED RELEASE ORAL at 09:05

## 2024-11-07 ASSESSMENT — PAIN SCALES - GENERAL
PAINLEVEL_OUTOF10: 9
PAINLEVEL_OUTOF10: 4
PAINLEVEL_OUTOF10: 9
PAINLEVEL_OUTOF10: 9
PAINLEVEL_OUTOF10: 6
PAINLEVEL_OUTOF10: 7
PAINLEVEL_OUTOF10: 4
PAINLEVEL_OUTOF10: 7
PAINLEVEL_OUTOF10: 8
PAINLEVEL_OUTOF10: 9

## 2024-11-07 ASSESSMENT — PAIN - FUNCTIONAL ASSESSMENT: PAIN_FUNCTIONAL_ASSESSMENT: 0-10

## 2024-11-07 ASSESSMENT — PAIN DESCRIPTION - ORIENTATION
ORIENTATION: LEFT
ORIENTATION: LEFT
ORIENTATION: RIGHT;LEFT
ORIENTATION: LEFT
ORIENTATION: RIGHT;LEFT

## 2024-11-07 ASSESSMENT — PAIN DESCRIPTION - LOCATION
LOCATION: LEG
LOCATION: LEG;FOOT
LOCATION: LEG

## 2024-11-07 ASSESSMENT — PAIN DESCRIPTION - DESCRIPTORS
DESCRIPTORS: ACHING;DULL
DESCRIPTORS: ACHING;DISCOMFORT;SORE
DESCRIPTORS: STABBING;BURNING

## 2024-11-07 NOTE — ANESTHESIA PRE PROCEDURE
THROMBECTOMY / EVERFLEX 6MM X 150CM STENT / FASCIOTOMY WITH OPEN THROMBECTOMY performed by Emperatriz Watson MD at [facility]    VASCULAR SURGERY Right 11/4/2024    hugo / ANGIOGRAM OF RIGHT LEG WITH BALOON ANGIOPLASTY performed by Emperatriz Watson MD at [facility]       Social History:    Social History     Tobacco Use    Smoking status: Former     Current packs/day: 0.25     Types: Cigarettes    Smokeless tobacco: Never   Substance Use Topics    Alcohol use: No                                Counseling given: Not Answered      Vital Signs (Current):   Vitals:    11/06/24 2306 11/06/24 2336 11/07/24 0347 11/07/24 0417   BP:   131/76    Pulse:   88    Resp: 16 18 18 16   Temp:   98 °F (36.7 °C)    TempSrc:   Oral    SpO2:   96%    Weight:       Height:                                                  BP Readings from Last 3 Encounters:   11/07/24 131/76   10/28/24 128/64   10/25/24 (!) 141/82       NPO Status:                                                                                 BMI:   Wt Readings from Last 3 Encounters:   11/05/24 80 kg (176 lb 5.9 oz)   10/28/24 81.7 kg (180 lb 1.9 oz)   10/19/24 81.6 kg (180 lb)     Body mass index is 25.31 kg/m².    CBC:   Lab Results   Component Value Date/Time    WBC 12.0 11/06/2024 07:54 AM    RBC 3.51 11/06/2024 07:54 AM    HGB 10.2 11/06/2024 07:54 AM    HCT 33.2 11/06/2024 07:54 AM    MCV 94.6 11/06/2024 07:54 AM    RDW 14.4 11/06/2024 07:54 AM     11/06/2024 07:54 AM       CMP:   Lab Results   Component Value Date/Time     11/06/2024 07:54 AM    K 4.1 11/06/2024 07:54 AM    K 4.1 08/16/2023 04:40 AM     11/06/2024 07:54 AM    CO2 26 11/06/2024 07:54 AM    BUN 6 11/06/2024 07:54 AM    CREATININE 0.4 11/06/2024 07:54 AM    GFRAA >60 05/14/2021 10:34 AM    AGRATIO 0.7 08/16/2023 04:40 AM    LABGLOM >90 11/06/2024 07:54 AM    LABGLOM >60 08/16/2023 04:40 AM    GLUCOSE 157 11/06/2024 07:54 AM    CALCIUM 8.5 11/06/2024 07:54 AM    BILITOT

## 2024-11-07 NOTE — PROGRESS NOTES
[facility]  Office: [phone]  Jad Gracia DO, Yanick Nick DO, Andrew Wall DO, Dagoberto Anderson DO, Vinicius Church MD, Ruchi Vuong MD, Kasia Lewis MD, Jeanette Yates MD,  Owen Castro MD, Aleshia Watson MD, Nahomy Muller MD,  Gm Ocampo DO, Mindy Ronquillo MD, Sam Morris MD, Eliseo Gracia DO, Teresa Antunez MD,  Ambrocio Moore DO, Enriqueta Farrell MD, Andreea Dee MD, Mami Austin MD, Brianna Hurtado MD,  Lenny Ventura MD, Tad Palacios MD, Janelle Medina MD, Igor Hyde MD, Juan Carlos Oropeza MD, Andrew Tripathi MD, Justin Daugherty DO, Rico Chauhan MD, Shirley Waterhouse, CNP,  Tennille Bañuelos, CNP, Justin Hines, CNP,  Niki Haines, DEEPIKA, Radha Farooq, CNP, Karlie Chang, CNP, Jayne Bruno, CNP, Hawa Khalil, CNP, ERICKA De LeonC, ERICKA CanasC, Caity Monson, CNP, Destiny Castillo, CNP,  Era Wu, CNP, Marilynn Smith, CNP,  Carol Singleton, CNP, Emilie Osborn, CNP         [facility]   IN-PATIENT SERVICE   [facility]    Progress Note    11/7/2024    11:58 AM    Name:   Jane Saavedra  MRN:     [id number]     Acct:      [id number]   Room:   [facility] RM/NONE  IP Day:  6  Admit Date:  10/31/2024 10:53 PM    PCP:   Marissa Wang MD  Code Status:  Full Code    Subjective:     C/C:   Chief Complaint   Patient presents with    Chest Pain    Hyperglycemia     Interval History Status: not changed.     Patient seen and examined in the morning  Patient is slightly better today.  She is going for surgery around noon.  Labs and vitals reviewed  Blood pressure remains around 130 systolic.  Yesterday blood pressure was down to 110s.  Blood sugar remains around 200.  Brief History:     34-year-old female with PMHx of acute left limb ischemia resulting in BKA October 2024, DM2, HTN, CAD s/p CABG in 2023 who presented to the ED emergency department endorsing chest pain and anxiety associated with her upcoming vascular

## 2024-11-07 NOTE — ANESTHESIA POSTPROCEDURE EVALUATION
Department of Anesthesiology  Postprocedure Note    Patient: Jane Saavedra  MRN: [id number]  YOB: 1990  Date of evaluation: 11/7/2024    Procedure Summary       Date: 11/07/24 Room / Location: [address]    Anesthesia Start: 1216 Anesthesia Stop: 1313    Procedure: PARTIAL 5TH RAY TOE AMPUTATION (Right) Diagnosis:       Acute osteomyelitis of toe, unspecified laterality      (Acute osteomyelitis of toe, unspecified laterality [M86.179])    Surgeons: Ambrocio Corea DPM Responsible Provider: Iain Ramirez MD    Anesthesia Type: MAC ASA Status: 4            Anesthesia Type: MAC    Shanon Phase I: Shanon Score: 9    Shanon Phase II:    POST-OP ANESTHESIA NOTE       /68   Pulse 91   Temp (!) 96.5 °F (35.8 °C)   Resp 19   Ht 1.778 m (5' 10\")   Wt 80 kg (176 lb 5.9 oz)   SpO2 98%   BMI 25.31 kg/m²    Pain Assessment: None - Denies Pain  Pain Level: 9       Anesthesia Post Evaluation    Patient location during evaluation: PACU  Patient participation: complete - patient participated  Level of consciousness: awake  Pain score: 9  Airway patency: patent  Nausea & Vomiting: no nausea and no vomiting  Cardiovascular status: hemodynamically stable  Respiratory status: acceptable  Hydration status: stable  Pain management: adequate        No notable events documented.

## 2024-11-07 NOTE — PROGRESS NOTES
Physical Therapy        Physical Therapy Cancel Note      DATE: 2024    NAME: Jane Saavedra  MRN: [id number]   : 1990      Patient not seen this date for Physical Therapy due to:    Planned R toe amputation this am per RN.   Will continue to pursue as able.       Electronically signed by Caron Lester PTA on 2024 at 10:39 AM

## 2024-11-07 NOTE — PROGRESS NOTES
Occupational Therapy    [facility]  Occupational Therapy Not Seen Note    DATE: 2024    NAME: Jane Saavedra  MRN: [id number]   : 1990      Patient not seen this date for Occupational Therapy due to:    Planned R toe amputation this am per RN.     Electronically signed by RIK Darnell on 2024 at 8:42 AM

## 2024-11-07 NOTE — PLAN OF CARE
Problem: Chronic Conditions and Co-morbidities  Goal: Patient's chronic conditions and co-morbidity symptoms are monitored and maintained or improved  11/6/2024 2100 by Janey Price RN  Outcome: Progressing  Flowsheets (Taken 11/6/2024 2015)  Care Plan - Patient's Chronic Conditions and Co-Morbidity Symptoms are Monitored and Maintained or Improved: Monitor and assess patient's chronic conditions and comorbid symptoms for stability, deterioration, or improvement  11/6/2024 1827 by Joy Paul RN  Outcome: Progressing     Problem: Discharge Planning  Goal: Discharge to home or other facility with appropriate resources  11/6/2024 2100 by Janey Price RN  Outcome: Progressing  Flowsheets (Taken 11/6/2024 2015)  Discharge to home or other facility with appropriate resources: Identify barriers to discharge with patient and caregiver  11/6/2024 1827 by Joy Paul RN  Outcome: Progressing     Problem: Pain  Goal: Verbalizes/displays adequate comfort level or baseline comfort level  11/6/2024 2100 by Janey Price RN  Outcome: Progressing  11/6/2024 1827 by Joy Paul RN  Outcome: Progressing     Problem: Safety - Adult  Goal: Free from fall injury  11/6/2024 2100 by Janey Price RN  Outcome: Progressing  11/6/2024 1827 by Joy Paul RN  Outcome: Progressing     Problem: ABCDS Injury Assessment  Goal: Absence of physical injury  11/6/2024 2100 by Janey Price RN  Outcome: Progressing  Flowsheets (Taken 11/6/2024 2058)  Absence of Physical Injury: Implement safety measures based on patient assessment  11/6/2024 1827 by Joy Paul RN  Outcome: Progressing     Problem: Skin/Tissue Integrity  Goal: Absence of new skin breakdown  Description: 1.  Monitor for areas of redness and/or skin breakdown  2.  Assess vascular access sites hourly  3.  Every 4-6 hours minimum:  Change oxygen saturation probe site  4.  Every 4-6 hours:  If on nasal continuous positive airway pressure, respiratory therapy assess

## 2024-11-07 NOTE — PROGRESS NOTES
Progress Note  Foot and Ankle Surgery    Subjective     CC: Chronic/ non-healing right foot wounds     Interval 11/7/2024  - Patient seen and examined at bedside during rounding this morning.  -Will be going to surgery today for partial fifth ray amputation.  -Consent was signed and is currently in the chart.    Updated Labs:     WBC: 12.0  Lab Results   Component Value Date    WBC 12.0 (H) 11/06/2024     ESR: 68  Lab Results   Component Value Date    SEDRATE 68 (H) 11/05/2024     CRP: 13.4  Lab Results   Component Value Date    CRP 13.4 (H) 11/05/2024       HPI :  Jane Saavedra is a 34 y.o. female admitted to [place] for recent left AKA. Patient is known to the podiatry service.  Patient had a BKA performed by Dr. Watson on 10/15/24 with scheduled AKA procedure on 11/1/24 as an outpatient procedure. She had a recent ED admission on 11/1 complaining of chest pain, pressure, and anxiety. She is currently on POD #3 from AKA. Patient has chronic, non-healing wounds located to her right foot. Vascular team is performing arteriogram of right LE today 11/4/24. Podiatry was consulted for further evaluation and management of right foot wound once vascular procedure is completed.      PCP is Marissa Wang MD    ROS: Brianies N/V/F/C/SOB/CP.  Otherwise negative except at stated in the HPI.     Medications:  Scheduled Meds:   midazolam  2 mg IntraVENous Once    cyclobenzaprine  10 mg Oral TID    losartan  50 mg Oral Daily    piperacillin-tazobactam  3,375 mg IntraVENous Q8H    sertraline  25 mg Oral Daily    sennosides-docusate sodium  2 tablet Oral Daily    polyethylene glycol  17 g Oral Daily    insulin glargine  10 Units SubCUTAneous Daily    insulin lispro  10 Units SubCUTAneous Once    sodium chloride flush  5-40 mL IntraVENous 2 times per day    [Held by provider] apixaban  5 mg Oral BID    [Held by provider] aspirin  81 mg Oral Daily    atorvastatin  80 mg Oral Nightly    gabapentin  300 mg Oral TID

## 2024-11-07 NOTE — PROGRESS NOTES
Division of Vascular Surgery        Progress Note      Name: Jane Saavedra  MRN: [id number]       Overnight Events:     None    Subjective:     Patient seen and examined at the bedside. No acute overnight events. Afebrile, vital signs stable and within normal limits. POD #6 from L AKA.  Patient reports some mild pain related to her right foot wounds.  Podiatry planning for operative intervention for right foot today.    Physical Exam:     Vitals:  /76   Pulse 88   Temp 98 °F (36.7 °C) (Oral)   Resp 16   Ht 1.778 m (5' 10\")   Wt 80 kg (176 lb 5.9 oz)   SpO2 96%   BMI 25.31 kg/m²     General appearance - alert, well appearing and in no acute distress  Mental status - oriented to person, place and time with normal affect  Head - normocephalic and atraumatic  Neck - supple  Chest - normal respiratory effort, no wheezing or stridor  Heart - normal rate, regular rhythm  Abdomen - soft, non-tender, non-distended. No rebound tenderness or guarding.   Neurological - normal speech, no focal findings or movement disorder noted  Extremities - no pedal edema or calf pain with palpation. Chronic appearing wounds to R foot. Clean dressings in place. L AKA with dressings in place. Incision well approximated, no evidence of bleeding or drainage.   Skin -right foot wounds.  no rashes or induration  Vascular Exam - palp RLE throughout, palp L femoral pulse    Imaging:     No results found.     Assessment:     34-year-old female postop day 5 status post left above-knee amputation.    Plan:     Postop day 6 status post left above-knee amputation.   Postop day 3 RLE angiogram.  Daily dressing changes to L AKA site. Wound care instructions placed.  Plan to remove sutures on 11/26 outpatient with Dr. Watson.  PT/OT evaluation, NWB LLE  PM&R consulted, appreciate recommendations.  Patient is too high-functioning and does not require acute inpatient rehab.  Podiatry consulted.  Appreciate recommendations  Diet: regular  DVT

## 2024-11-07 NOTE — BRIEF OP NOTE
PODIATRY BRIEF OP NOTE    PATIENT NAME: Jane Saavedra  YOB: 1990  -  34 y.o. female  MRN: [id number]  DATE: 11/7/2024  BILLING #: [id number]    Surgeon(s):  Ambrocio Corea DPM     ASSISTANTS: Marlene Marin DPM PGY1    PRE-OP DIAGNOSIS:   Osteomyelitis of 5th digit, right foot  Osteomyelitis of 5th metatarsal head, right foot    POST-OP DIAGNOSIS: Same as above.    PROCEDURE:   Partial 5th ray amputation    ANESTHESIA: MAC    HEMOSTASIS: Direct pressure    ESTIMATED BLOOD LOSS: Less than 5cc.    MATERIALS:   * No implants in log *    INJECTABLES: 10cc 1% lidocaine plain preoperatively    SPECIMEN:   ID Type Source Tests Collected by Time Destination   A : RIGHT 5th TOE OSTEOMYELITIS Bone Toe SURGICAL PATHOLOGY Ambrocio Corea DPM 11/7/2024 1234        COMPLICATIONS: None    Findings:  Infection Present At Time Of Surgery (PATOS) (choose all levels that have infection present):  No infection present  Other Findings: Partial first ray amputation was performed. The area was irrigated with Irricept and closed in layered fashion. Bleeding was noted. Patient to be non weight bearing in a surgical shoe.    Marlene Marin DPM   Podiatric Medicine & Surgery   11/7/2024 at 1:13 PM

## 2024-11-08 LAB
BASOPHILS # BLD: 0.04 K/UL (ref 0–0.2)
BASOPHILS NFR BLD: 0 % (ref 0–2)
CRP SERPL HS-MCNC: 7.8 MG/L (ref 0–5)
EOSINOPHIL # BLD: 0.3 K/UL (ref 0–0.44)
EOSINOPHILS RELATIVE PERCENT: 3 % (ref 1–4)
ERYTHROCYTE [DISTWIDTH] IN BLOOD BY AUTOMATED COUNT: 14.2 % (ref 11.8–14.4)
ERYTHROCYTE [SEDIMENTATION RATE] IN BLOOD BY PHOTOMETRIC METHOD: 92 MM/HR (ref 0–20)
GLUCOSE BLD-MCNC: 170 MG/DL (ref 65–105)
GLUCOSE BLD-MCNC: 180 MG/DL (ref 65–105)
GLUCOSE BLD-MCNC: 183 MG/DL (ref 65–105)
GLUCOSE BLD-MCNC: 208 MG/DL (ref 65–105)
GLUCOSE BLD-MCNC: 209 MG/DL (ref 65–105)
HCT VFR BLD AUTO: 35.4 % (ref 36.3–47.1)
HGB BLD-MCNC: 11.2 G/DL (ref 11.9–15.1)
IMM GRANULOCYTES # BLD AUTO: 0.03 K/UL (ref 0–0.3)
IMM GRANULOCYTES NFR BLD: 0 %
LYMPHOCYTES NFR BLD: 3.21 K/UL (ref 1.1–3.7)
LYMPHOCYTES RELATIVE PERCENT: 35 % (ref 24–43)
MCH RBC QN AUTO: 29.6 PG (ref 25.2–33.5)
MCHC RBC AUTO-ENTMCNC: 31.6 G/DL (ref 28.4–34.8)
MCV RBC AUTO: 93.7 FL (ref 82.6–102.9)
MONOCYTES NFR BLD: 0.72 K/UL (ref 0.1–1.2)
MONOCYTES NFR BLD: 8 % (ref 3–12)
NEUTROPHILS NFR BLD: 54 % (ref 36–65)
NEUTS SEG NFR BLD: 4.91 K/UL (ref 1.5–8.1)
NRBC BLD-RTO: 0 PER 100 WBC
PLATELET # BLD AUTO: 472 K/UL (ref 138–453)
PMV BLD AUTO: 9 FL (ref 8.1–13.5)
RBC # BLD AUTO: 3.78 M/UL (ref 3.95–5.11)
WBC OTHER # BLD: 9.2 K/UL (ref 3.5–11.3)

## 2024-11-08 PROCEDURE — 6370000000 HC RX 637 (ALT 250 FOR IP): Performed by: STUDENT IN AN ORGANIZED HEALTH CARE EDUCATION/TRAINING PROGRAM

## 2024-11-08 PROCEDURE — 6360000002 HC RX W HCPCS: Performed by: STUDENT IN AN ORGANIZED HEALTH CARE EDUCATION/TRAINING PROGRAM

## 2024-11-08 PROCEDURE — 99232 SBSQ HOSP IP/OBS MODERATE 35: CPT | Performed by: INTERNAL MEDICINE

## 2024-11-08 PROCEDURE — 97530 THERAPEUTIC ACTIVITIES: CPT

## 2024-11-08 PROCEDURE — 6360000002 HC RX W HCPCS: Performed by: INTERNAL MEDICINE

## 2024-11-08 PROCEDURE — 97168 OT RE-EVAL EST PLAN CARE: CPT

## 2024-11-08 PROCEDURE — 86140 C-REACTIVE PROTEIN: CPT

## 2024-11-08 PROCEDURE — 36415 COLL VENOUS BLD VENIPUNCTURE: CPT

## 2024-11-08 PROCEDURE — 85025 COMPLETE CBC W/AUTO DIFF WBC: CPT

## 2024-11-08 PROCEDURE — 97110 THERAPEUTIC EXERCISES: CPT

## 2024-11-08 PROCEDURE — 97535 SELF CARE MNGMENT TRAINING: CPT

## 2024-11-08 PROCEDURE — 85652 RBC SED RATE AUTOMATED: CPT

## 2024-11-08 PROCEDURE — 2580000003 HC RX 258: Performed by: STUDENT IN AN ORGANIZED HEALTH CARE EDUCATION/TRAINING PROGRAM

## 2024-11-08 PROCEDURE — 82947 ASSAY GLUCOSE BLOOD QUANT: CPT

## 2024-11-08 PROCEDURE — 1200000000 HC SEMI PRIVATE

## 2024-11-08 PROCEDURE — 99232 SBSQ HOSP IP/OBS MODERATE 35: CPT | Performed by: STUDENT IN AN ORGANIZED HEALTH CARE EDUCATION/TRAINING PROGRAM

## 2024-11-08 RX ORDER — DULOXETIN HYDROCHLORIDE 20 MG/1
20 CAPSULE, DELAYED RELEASE ORAL DAILY
Qty: 30 CAPSULE | Refills: 3 | Status: SHIPPED | OUTPATIENT
Start: 2024-11-08 | End: 2024-11-11 | Stop reason: HOSPADM

## 2024-11-08 RX ORDER — GABAPENTIN 300 MG/1
300 CAPSULE ORAL 3 TIMES DAILY
Qty: 90 CAPSULE | Refills: 0 | Status: SHIPPED | OUTPATIENT
Start: 2024-11-08 | End: 2024-12-08

## 2024-11-08 RX ORDER — OXYCODONE AND ACETAMINOPHEN 5; 325 MG/1; MG/1
2 TABLET ORAL EVERY 4 HOURS PRN
Qty: 15 TABLET | Refills: 0 | Status: SHIPPED | OUTPATIENT
Start: 2024-11-08 | End: 2024-11-13

## 2024-11-08 RX ORDER — METOPROLOL SUCCINATE 25 MG/1
25 TABLET, EXTENDED RELEASE ORAL DAILY
Status: DISCONTINUED | OUTPATIENT
Start: 2024-11-08 | End: 2024-11-11 | Stop reason: HOSPADM

## 2024-11-08 RX ORDER — LOSARTAN POTASSIUM 25 MG/1
12.5 TABLET ORAL DAILY
Status: DISCONTINUED | OUTPATIENT
Start: 2024-11-08 | End: 2024-11-11

## 2024-11-08 RX ORDER — OXYCODONE AND ACETAMINOPHEN 5; 325 MG/1; MG/1
2 TABLET ORAL EVERY 4 HOURS PRN
Status: DISCONTINUED | OUTPATIENT
Start: 2024-11-08 | End: 2024-11-11 | Stop reason: HOSPADM

## 2024-11-08 RX ORDER — LOSARTAN POTASSIUM 50 MG/1
25 TABLET ORAL DAILY
Status: DISCONTINUED | OUTPATIENT
Start: 2024-11-08 | End: 2024-11-08

## 2024-11-08 RX ORDER — CYCLOBENZAPRINE HCL 10 MG
10 TABLET ORAL 3 TIMES DAILY
Qty: 30 TABLET | Refills: 0 | Status: SHIPPED | OUTPATIENT
Start: 2024-11-08 | End: 2024-11-18

## 2024-11-08 RX ORDER — MORPHINE SULFATE 2 MG/ML
1 INJECTION, SOLUTION INTRAMUSCULAR; INTRAVENOUS
Status: DISCONTINUED | OUTPATIENT
Start: 2024-11-08 | End: 2024-11-09

## 2024-11-08 RX ADMIN — MORPHINE SULFATE 1 MG: 2 INJECTION, SOLUTION INTRAMUSCULAR; INTRAVENOUS at 14:19

## 2024-11-08 RX ADMIN — SODIUM CHLORIDE, PRESERVATIVE FREE 10 ML: 5 INJECTION INTRAVENOUS at 20:19

## 2024-11-08 RX ADMIN — CYCLOBENZAPRINE 10 MG: 10 TABLET, FILM COATED ORAL at 14:19

## 2024-11-08 RX ADMIN — LOSARTAN POTASSIUM 12.5 MG: 25 TABLET, FILM COATED ORAL at 09:48

## 2024-11-08 RX ADMIN — SENNOSIDES AND DOCUSATE SODIUM 2 TABLET: 50; 8.6 TABLET ORAL at 09:48

## 2024-11-08 RX ADMIN — SERTRALINE HYDROCHLORIDE 25 MG: 25 TABLET ORAL at 09:48

## 2024-11-08 RX ADMIN — INSULIN LISPRO 4 UNITS: 100 INJECTION, SOLUTION INTRAVENOUS; SUBCUTANEOUS at 12:16

## 2024-11-08 RX ADMIN — GABAPENTIN 300 MG: 300 CAPSULE ORAL at 09:47

## 2024-11-08 RX ADMIN — MORPHINE SULFATE 2 MG: 2 INJECTION, SOLUTION INTRAMUSCULAR; INTRAVENOUS at 03:15

## 2024-11-08 RX ADMIN — SODIUM CHLORIDE, PRESERVATIVE FREE 10 ML: 5 INJECTION INTRAVENOUS at 03:16

## 2024-11-08 RX ADMIN — OXYCODONE HYDROCHLORIDE AND ACETAMINOPHEN 1 TABLET: 5; 325 TABLET ORAL at 06:52

## 2024-11-08 RX ADMIN — INSULIN GLARGINE 10 UNITS: 100 INJECTION, SOLUTION SUBCUTANEOUS at 09:47

## 2024-11-08 RX ADMIN — PIPERACILLIN SODIUM AND TAZOBACTAM SODIUM 3375 MG: 3; .375 INJECTION, SOLUTION INTRAVENOUS at 05:06

## 2024-11-08 RX ADMIN — PIPERACILLIN SODIUM AND TAZOBACTAM SODIUM 3375 MG: 3; .375 INJECTION, SOLUTION INTRAVENOUS at 12:18

## 2024-11-08 RX ADMIN — INSULIN LISPRO 4 UNITS: 100 INJECTION, SOLUTION INTRAVENOUS; SUBCUTANEOUS at 18:01

## 2024-11-08 RX ADMIN — PIPERACILLIN SODIUM AND TAZOBACTAM SODIUM 3375 MG: 3; .375 INJECTION, SOLUTION INTRAVENOUS at 20:10

## 2024-11-08 RX ADMIN — OXYCODONE HYDROCHLORIDE AND ACETAMINOPHEN 1 TABLET: 5; 325 TABLET ORAL at 01:50

## 2024-11-08 RX ADMIN — OXYCODONE HYDROCHLORIDE AND ACETAMINOPHEN 2 TABLET: 5; 325 TABLET ORAL at 12:16

## 2024-11-08 RX ADMIN — SODIUM CHLORIDE, PRESERVATIVE FREE 10 ML: 5 INJECTION INTRAVENOUS at 10:05

## 2024-11-08 RX ADMIN — ATORVASTATIN CALCIUM 80 MG: 80 TABLET, FILM COATED ORAL at 20:12

## 2024-11-08 RX ADMIN — GABAPENTIN 300 MG: 300 CAPSULE ORAL at 20:12

## 2024-11-08 RX ADMIN — GABAPENTIN 300 MG: 300 CAPSULE ORAL at 14:19

## 2024-11-08 RX ADMIN — CYCLOBENZAPRINE 10 MG: 10 TABLET, FILM COATED ORAL at 20:12

## 2024-11-08 RX ADMIN — METOPROLOL SUCCINATE 25 MG: 25 TABLET, EXTENDED RELEASE ORAL at 09:48

## 2024-11-08 RX ADMIN — CYCLOBENZAPRINE 10 MG: 10 TABLET, FILM COATED ORAL at 09:47

## 2024-11-08 RX ADMIN — MORPHINE SULFATE 1 MG: 2 INJECTION, SOLUTION INTRAMUSCULAR; INTRAVENOUS at 10:28

## 2024-11-08 RX ADMIN — INSULIN GLARGINE 40 UNITS: 100 INJECTION, SOLUTION SUBCUTANEOUS at 20:17

## 2024-11-08 RX ADMIN — POLYETHYLENE GLYCOL 3350 17 G: 17 POWDER, FOR SOLUTION ORAL at 09:55

## 2024-11-08 ASSESSMENT — PAIN DESCRIPTION - ORIENTATION
ORIENTATION: LEFT;RIGHT
ORIENTATION: RIGHT;LEFT
ORIENTATION: LEFT;RIGHT
ORIENTATION: RIGHT;LEFT

## 2024-11-08 ASSESSMENT — PAIN - FUNCTIONAL ASSESSMENT: PAIN_FUNCTIONAL_ASSESSMENT: ACTIVITIES ARE NOT PREVENTED

## 2024-11-08 ASSESSMENT — PAIN SCALES - GENERAL
PAINLEVEL_OUTOF10: 0
PAINLEVEL_OUTOF10: 0
PAINLEVEL_OUTOF10: 7
PAINLEVEL_OUTOF10: 8
PAINLEVEL_OUTOF10: 8
PAINLEVEL_OUTOF10: 0
PAINLEVEL_OUTOF10: 10
PAINLEVEL_OUTOF10: 8
PAINLEVEL_OUTOF10: 7
PAINLEVEL_OUTOF10: 8
PAINLEVEL_OUTOF10: 9

## 2024-11-08 ASSESSMENT — PAIN DESCRIPTION - DESCRIPTORS
DESCRIPTORS: BURNING;DISCOMFORT
DESCRIPTORS: DISCOMFORT;THROBBING
DESCRIPTORS: DISCOMFORT;SORE;BURNING

## 2024-11-08 ASSESSMENT — PAIN DESCRIPTION - LOCATION
LOCATION: LEG
LOCATION: TOE (COMMENT WHICH ONE);LEG
LOCATION: LEG
LOCATION: LEG;TOE (COMMENT WHICH ONE)

## 2024-11-08 ASSESSMENT — PAIN SCALES - WONG BAKER: WONGBAKER_NUMERICALRESPONSE: HURTS WHOLE LOT

## 2024-11-08 NOTE — PROGRESS NOTES
Progress Note  Foot and Ankle Surgery    Subjective     CC: Chronic/ non-healing right foot wounds   S/p partial fifth ray amputation on 11/7/2024    Interval 11/8/2024  - Patient seen and examined at bedside during rounding this morning.  -Postoperative pain is present, but is managed with medications.  -Patient was able to eat and drink properly  -Current plan after talking to case management was for possible discharge to [facility], still waiting on precertification.    Updated Labs:     WBC: 12.0  Lab Results   Component Value Date    WBC 9.2 11/08/2024     ESR: 68  Lab Results   Component Value Date    SEDRATE 92 (H) 11/08/2024     CRP: 13.4  Lab Results   Component Value Date    CRP 7.8 (H) 11/08/2024       HPI :  Jane Saavedra is a 34 y.o. female admitted to [place] for recent left AKA. Patient is known to the podiatry service.  Patient had a BKA performed by Dr. Watson on 10/15/24 with scheduled AKA procedure on 11/1/24 as an outpatient procedure. She had a recent ED admission on 11/1 complaining of chest pain, pressure, and anxiety. She is currently on POD #3 from AKA. Patient has chronic, non-healing wounds located to her right foot. Vascular team is performing arteriogram of right LE today 11/4/24. Podiatry was consulted for further evaluation and management of right foot wound once vascular procedure is completed.      PCP is Marissa Wang MD    ROS: Denies N/V/F/C/SOB/CP.  Otherwise negative except at stated in the HPI.     Medications:  Scheduled Meds:   metoprolol succinate  25 mg Oral Daily    losartan  12.5 mg Oral Daily    cyclobenzaprine  10 mg Oral TID    piperacillin-tazobactam  3,375 mg IntraVENous Q8H    sertraline  25 mg Oral Daily    sennosides-docusate sodium  2 tablet Oral Daily    polyethylene glycol  17 g Oral Daily    insulin glargine  10 Units SubCUTAneous Daily    insulin lispro  10 Units SubCUTAneous Once    sodium chloride flush  5-40 mL IntraVENous 2 times per day     11/06/24  0754      K 4.1      CO2 26   BUN 6   CREATININE 0.4*   GLUCOSE 157*   CALCIUM 8.5*        Coags:  No results for input(s): \"APTT\", \"INR\" in the last 72 hours.    Invalid input(s): \"PROT\"    Lab Results   Component Value Date    SEDRATE 92 (H) 11/08/2024     Recent Labs     11/07/24  1512 11/08/24  0549   CRP 9.8* 7.8*       Lower Extremity Physical Exam:  Vascular: DP and PT pulses are palpable. CFT <3 seconds to all digits.  Hair growth is absent to the level of the digits.  No edema.       Neuro: Saph/sural/SP/DP/plantar sensation diminished to light touch.     Musculoskeletal: Deferred due to post-operative state.     Dermatologic: S/p partial fifth ray amputation, incision site coapted with multiple sutures.  Perioperative skin is pink, warm to touch.  No bleeding present, no signs of dehiscence at this time.    Clinical Images:        Imaging:   XR FOOT RIGHT (MIN 3 VIEWS)   Final Result   Status post amputation of the 5th toe at the level of the mid metatarsal.         XR FOOT RIGHT (MIN 3 VIEWS)   Final Result   No soft tissue gas or acute osseous abnormality appreciated.         CT CHEST PULMONARY EMBOLISM W CONTRAST   Final Result   No evidence of pulmonary embolism or acute cardiopulmonary process.             Cultures:   Results       Procedure Component Value Units Date/Time    Culture, Urine [[id number]] Collected: 11/01/24 1947    Order Status: Completed Specimen: Urine, clean catch Updated: 11/02/24 1615     Specimen Description .CLEAN CATCH URINE     Special Requests Site: Urine     Culture NO GROWTH    MRSA by PCR [[id number]] Collected: 11/01/24 0748    Order Status: Canceled Specimen: Nares     MRSA DNA Probe, Nasal [[id number]] Collected: 11/01/24 0747    Order Status: Completed Specimen: Nasal Updated: 11/01/24 1248     Specimen Description .NASAL SWAB     MRSA, DNA, Nasal NEGATIVE     Comment: NEGATIVE:  MRSA DNA not detected by nucleic acid amplification.

## 2024-11-08 NOTE — PLAN OF CARE
Problem: Chronic Conditions and Co-morbidities  Goal: Patient's chronic conditions and co-morbidity symptoms are monitored and maintained or improved  11/8/2024 0659 by Edel Do RN  Outcome: Progressing  11/7/2024 1948 by Joy Paul RN  Outcome: Progressing  Flowsheets (Taken 11/7/2024 1923 by Edel Do RN)  Care Plan - Patient's Chronic Conditions and Co-Morbidity Symptoms are Monitored and Maintained or Improved:   Monitor and assess patient's chronic conditions and comorbid symptoms for stability, deterioration, or improvement   Collaborate with multidisciplinary team to address chronic and comorbid conditions and prevent exacerbation or deterioration   Update acute care plan with appropriate goals if chronic or comorbid symptoms are exacerbated and prevent overall improvement and discharge     Problem: Discharge Planning  Goal: Discharge to home or other facility with appropriate resources  11/8/2024 0659 by Edel Do RN  Outcome: Progressing  11/7/2024 1948 by Joy Paul RN  Outcome: Progressing  Flowsheets (Taken 11/7/2024 1923 by Edel Do RN)  Discharge to home or other facility with appropriate resources:   Identify barriers to discharge with patient and caregiver   Arrange for needed discharge resources and transportation as appropriate   Identify discharge learning needs (meds, wound care, etc)   Refer to discharge planning if patient needs post-hospital services based on physician order or complex needs related to functional status, cognitive ability or social support system     Problem: Pain  Goal: Verbalizes/displays adequate comfort level or baseline comfort level  11/8/2024 0659 by Edel Do RN  Outcome: Progressing  11/7/2024 1948 by Joy Paul RN  Outcome: Progressing  Flowsheets (Taken 11/7/2024 1923 by Edel Do RN)  Verbalizes/displays adequate comfort level or baseline comfort level:   Assess pain using appropriate pain scale   Administer analgesics  based on type and severity of pain and evaluate response   Implement non-pharmacological measures as appropriate and evaluate response   Notify Licensed Independent Practitioner if interventions unsuccessful or patient reports new pain     Problem: Safety - Adult  Goal: Free from fall injury  11/8/2024 0659 by Edel Do RN  Outcome: Progressing  11/7/2024 1948 by Joy Paul RN  Outcome: Progressing  Flowsheets (Taken 11/7/2024 1923 by Edel Do RN)  Free From Fall Injury: Instruct family/caregiver on patient safety     Problem: ABCDS Injury Assessment  Goal: Absence of physical injury  11/8/2024 0659 by Edel Do RN  Outcome: Progressing  11/7/2024 1948 by Joy Paul RN  Outcome: Progressing  Flowsheets (Taken 11/7/2024 1923 by Edel Do RN)  Absence of Physical Injury: Implement safety measures based on patient assessment     Problem: Skin/Tissue Integrity  Goal: Absence of new skin breakdown  Description: 1.  Monitor for areas of redness and/or skin breakdown  2.  Assess vascular access sites hourly  3.  Every 4-6 hours minimum:  Change oxygen saturation probe site  4.  Every 4-6 hours:  If on nasal continuous positive airway pressure, respiratory therapy assess nares and determine need for appliance change or resting period.  11/8/2024 0659 by Edel Do RN  Outcome: Progressing  11/7/2024 1948 by Joy Paul RN  Outcome: Progressing     Problem: Neurosensory - Adult  Goal: Achieves stable or improved neurological status  11/8/2024 0659 by Edel Do RN  Outcome: Progressing  11/7/2024 1948 by Joy Paul RN  Outcome: Progressing  Flowsheets (Taken 11/7/2024 1923 by Edel Do RN)  Achieves stable or improved neurological status: Assess for and report changes in neurological status  Goal: Achieves maximal functionality and self care  11/8/2024 0659 by Edel Do RN  Outcome: Progressing  11/7/2024 1948 by Joy Paul RN  Outcome: Progressing  Flowsheets (Taken

## 2024-11-08 NOTE — PROGRESS NOTES
Comprehensive Nutrition Assessment    Type and Reason for Visit:  Initial, LOS    Nutrition Recommendations/Plan:   Send diabetic ONS TID with meals per patient request, strawberry only     Malnutrition Assessment:  Malnutrition Status:  No malnutrition (11/08/24 1042)    Context:  Chronic Illness     Findings of the 6 clinical characteristics of malnutrition:  Energy Intake:  No decrease in energy intake  Weight Loss:  No weight loss     Body Fat Loss:  No body fat loss     Muscle Mass Loss:  No muscle mass loss    Fluid Accumulation:  No fluid accumulation     Strength:  Not Performed    Nutrition Assessment:    Pt seen for LOS, admitted for hyperglycemia and Sepsis undergoing Left BKA on 11/1 with Hx AKA, CABG, HTN, HLD, CAD, PAD and type 2 DM with chronic osteomyelitis.  Nursing ntoes show % of meals consumed, meeting estimated needs, confirmed by both nurse and patient report.  Pt reports good appetite/intake both currently at [facility], and weight records do show fluctuating weights with an overall weight gain x 5 months.  Pt states she drinks strawberry Glucerna at home, and would like to continue while here.  Will send TID with meals, strawberry only, per patient request.  Encouraged Pt to decrease Glucerna intake if weight gain continues.    Nutrition Related Findings:    Meds/Labs reviewed.  Last BM 11/6.  No edema noted. Wound Type: Multiple, Surgical Incision, Diabetic Ulcer       Current Nutrition Intake & Therapies:    Average Meal Intake: %  Average Supplements Intake: None Ordered  ADULT DIET; Regular; 4 carb choices (60 gm/meal)    Anthropometric Measures:  Height: 177.8 cm (5' 10\")  Ideal Body Weight (IBW): 150 lbs (68 kg)    Admission Body Weight: 80 kg (176 lb 5.9 oz)  Current Body Weight: 80 kg (176 lb 5.9 oz), 117.6 % IBW. Weight Source: Bed scale  Current BMI (kg/m2): 25.3  Usual Body Weight: 73 kg (160 lb 15 oz) (June 2024)     % Weight Change (Calculated): 9.6  Weight Adjustment

## 2024-11-08 NOTE — PROGRESS NOTES
Occupational Therapy  Occupational Therapy RE-Evaluation  Facility/Department: [facility] CAR 2- STEPDOWN     Patient Name: Jane Saavedra        MRN: [id number]    : 1990    Date of Service: 2024    Discharge Recommendations  Discharge Recommendations: Patient would benefit from continued therapy after discharge  OT Equipment Recommendations  Equipment Needed: Yes  Mobility Devices: ADL Assistive Devices  Wheelchair: Standard, Wheelchair Cushion Pressure Relieving  ADL Assistive Devices: Transfer Tub Bench, Toileting - Drop Arm Commode, Heavy Duty Drop Arm Commode  Other: Continue to assess if pt requires slide board pending progress.    Chief Complaint   Patient presents with    Chest Pain    Hyperglycemia     Past Medical History:  has a past medical history of Acute osteomyelitis, Bipolar 1 disorder ([facility]), Cellulitis, Chronic diabetic ulcer of left foot determined by examination ([facility]), Depression, Diabetic foot infection ([facility]), H. pylori infection, History of recent hospitalization, Hyperlipidemia, Hypertension, Ischemic cardiomyopathy, Multiple vessel coronary artery disease, Neuropathy, Noncompliance, NSTEMI (non-ST elevated myocardial infarction) ([facility]), Osteomyelitis of foot, left, acute, PAD (peripheral artery disease) ([facility]), Renal abscess, left, STEMI (ST elevation myocardial infarction) ([facility]), Thrombus, Type II or unspecified type diabetes mellitus without mention of complication, uncontrolled, Under care of team, Under care of team, Under care of team, Under care of team, Under care of team, Under care of team, and Wellness examination.  Past Surgical History:  has a past surgical history that includes Carpal tunnel release (Left, 2020); Coronary artery bypass graft (N/A, 07/10/2023); back surgery (Left, 08/10/2023); vascular surgery (Left, 2024); left atrial appendage ligation (07/10/2023); IR ABSCESS DRAINAGE (2023); Laminectomy and microdiscectomy lumbar spine (2024);  yes

## 2024-11-08 NOTE — PROGRESS NOTES
Physical Therapy  Facility/Department: [facility] CAR 2- STEPDOWN  Physical Therapy Treatment Note    Name: Jane Saavedra  : 1990  MRN: [id number]  Date of Service: 2024    Discharge Recommendations:  Patient would benefit from continued therapy after discharge, Patient able to tolerate 3 hours of therapy per day   PT Equipment Recommendations  Equipment Needed: Yes  Wheelchair: Standard    Further therapy recommended at discharge.The patient should be able to tolerate at least 3 hours of therapy per day over 5 days or 15 hours over 7 days.   This patient may benefit from a Physical Medicine and Rehab consult.     Patient Diagnosis(es): The primary encounter diagnosis was Chest pain, unspecified type. Diagnoses of Below-knee amputation of left lower extremity, initial encounter ([facility]), Hyperglycemia, Leukocytosis, unspecified type, Gangrene ([facility]), Arteriosclerosis of arterial coronary artery bypass graft, PAD (peripheral artery disease) ([facility]), Acute osteomyelitis of toe, unspecified laterality, S/P BKA (below knee amputation) unilateral, left ([facility]), and S/P AKA (above knee amputation) unilateral, left ([facility]) were also pertinent to this visit.  Past Medical History:  has a past medical history of Acute osteomyelitis, Bipolar 1 disorder ([facility]), Cellulitis, Chronic diabetic ulcer of left foot determined by examination ([facility]), Depression, Diabetic foot infection ([facility]), H. pylori infection, History of recent hospitalization, Hyperlipidemia, Hypertension, Ischemic cardiomyopathy, Multiple vessel coronary artery disease, Neuropathy, Noncompliance, NSTEMI (non-ST elevated myocardial infarction) ([facility]), Osteomyelitis of foot, left, acute, PAD (peripheral artery disease) ([facility]), Renal abscess, left, STEMI (ST elevation myocardial infarction) ([facility]), Thrombus, Type II or unspecified type diabetes mellitus without mention of complication, uncontrolled, Under care of team, Under care of team, Under care of team, Under care of  Devices: Call light within reach, Nurse notified, Left in bed  Restraints  Restraints Initially in Place: No    Restrictions  Restrictions/Precautions  Restrictions/Precautions: Up as Tolerated, Weight Bearing  Required Braces or Orthoses?: No  Lower Extremity Weight Bearing Restrictions  Right Lower Extremity Weight Bearing: Non Weight Bearing  Left Lower Extremity Weight Bearing: Non Weight Bearing  Position Activity Restriction  Other position/activity restrictions: s/p L AKA 11/1/2024; Partial 5th ray amputation, right foot 11/7/2024     Subjective  General  Chart Reviewed: No  Patient assessed for rehabilitation services?: Yes  Response To Previous Treatment: Patient with no complaints from previous session.  Family / Caregiver Present: No  Follows Commands: Within Functional Limits  General Comment  Comments: Pt retired to bed supine with call light and RN notifed  Subjective  Subjective: Pt and RN agreeable to tx session, Pt supine in bed with reports of 10/10 pain in B LEs. Pt positioned for comfort following session. max encouragement required.    Cognition   Orientation  Overall Orientation Status: Within Normal Limits  Orientation Level: Oriented X4  Cognition  Overall Cognitive Status: WNL    Objective  Bed mobility  Rolling to Right: Stand by assistance  Supine to Sit: Stand by assistance  Sit to Supine: Stand by assistance  Scooting: Stand by assistance  Bed Mobility Comments: HOB flat, Pt able to complete with use of bed rails.  Transfers  Sit to Stand: Maximum Assistance  Stand to Sit: Maximum Assistance  Comment: Assessed with RW, Pt completed x 1 sit<>stand. Pt demonstrated heavy posterior lean d/t WB status. Pt required verbal cues for UE hand placement with good return. Pt utilized momentum rock.        Balance  Posture: Fair  Sitting - Static: Good  Sitting - Dynamic: Good;-  Standing - Static: Fair  Standing - Dynamic: Fair  Comments: Assessed sitting EOB, standing assessed with RW.  Exercise

## 2024-11-08 NOTE — PROGRESS NOTES
[facility] -   Infectious diseases evaluation  admission date 10/31/2024    reason for consultation:   gangrene    Impression :   Current:  Diabetic  Left LE gangrene and popst AKA 11/1  Stump w a lower bruise - clean  R foot diabetic ulcer w 5th toe gangrene and forefoot cellulitis  For 5th toe amputation 11/7  PAD post R leg angioplasty 11/4    Other:    Discussion / summary of stay / plan of care/ Recommendations:   Post zosyn 11/1 -11/3  Vanco 11/1 -11/3  HENCE:   Cellulitis persistent though improved, R forefoot   Resume zosyn 11/5   5th toe amp on 11/7 - wound looks great 11/8  Stop all AB  11/9 - anticipate DC off AB    Infection Control Recommendations   [place] Precautions  Contact Isolation     Antimicrobial Stewardship Recommendations   Simplification of therapy  Targeted therapy      History of Present Illness:   Initial history:  Jane Saavedra is a 34 y.o.-year-old female admitted with chest pain and anxiety associated with an upcoming vascular procedure.  The procedure was a left above-knee amputation which occurred on 11/1/2024 after admission  Stump afterwards, looked clean but has a bruise underneath it.    She also has a wound over the right foot-   Vascular ultimately took 4 open 11/4 with manage angioplasty for the right lower extremity arterial  She continues to have pain over the left stump w gangrene R 5th toe - scheduled for 5th toe amputation 11/7/24 by podiatry.    ID called for antibiotics         Diabetes type 2, left lower extremity gangrene with BKA 10/2024  CAD post CABG 2023                Interval changes  11/8/2024   Patient Vitals for the past 8 hrs:   BP Temp Temp src Pulse Resp SpO2   11/08/24 1602 114/72 97.7 °F (36.5 °C) Oral 80 20 98 %   11/08/24 1419 -- -- -- -- 22 --   11/08/24 1216 -- -- -- -- 18 --   11/08/24 1132 120/71 97.8 °F (36.6 °C) Oral 85 18 98 %   11/08/24 1058 -- -- -- -- 18 --     11/6  Alert appropriate  labs:    CBC with Differential:   Recent Labs     11/06/24  0754 11/08/24  0549   WBC 12.0* 9.2   HGB 10.2* 11.2*   HCT 33.2* 35.4*   * 472*   LYMPHOPCT 28 35   MONOPCT 7 8   EOSPCT 3 3     BMP:  Recent Labs     11/06/24  0754      K 4.1      CO2 26   BUN 6   CREATININE 0.4*     Hepatic Function Panel: No results for input(s): \"LABALBU\", \"BILIDIR\", \"IBILI\", \"BILITOT\", \"ALKPHOS\", \"ALT\", \"AST\" in the last 72 hours.    Invalid input(s): \"PROT\"  No results for input(s): \"RPR\" in the last 72 hours.  No results for input(s): \"HIV\" in the last 72 hours.  No results for input(s): \"BC\" in the last 72 hours.  Lab Results   Component Value Date/Time    CREATININE 0.4 11/06/2024 07:54 AM    GLUCOSE 157 11/06/2024 07:54 AM       Detailed results:        Thank you for allowing us to participate in the care of this patient.Please call with questions.    This note is created with the assistance of a speech recognition program.  While intending to generate adocument that actually reflects the content of the visit, the document can still have some errors including those of syntax and sound a like substitutions which may escape proof reading.  It such instances, actual meaningcan be extrapolated by contextual diversion.    Lu Feldman MD  Office: [phone]  Perfect serve / office [phone]

## 2024-11-08 NOTE — OP NOTE
PODIATRY OP NOTE    PATIENT NAME: Jane Saavedra  YOB: 1990  -  34 y.o. female  MRN: [id number]  DATE: 11/7/2024  BILLING #: [id number]    Surgeon(s):  Ambrocio Corea DPM     ASSISTANTS: Marlene Marin DPM PGY1    PRE-OP DIAGNOSIS:   Osteomyelitis 5th digit, right foot  Osteomyelitis of 5th metatarsal head, right foot    POST-OP DIAGNOSIS: Same as above.    PROCEDURE:   Right 5th toe amputation  Right foot I and D of bone     ANESTHESIA: MAC    HEMOSTASIS:Direct pressure    ESTIMATED BLOOD LOSS: Less than 5cc.    MATERIALS:   * No implants in log *    INJECTABLES: 10cc 1% lidocaine plain preoperatively    SPECIMEN:   ID Type Source Tests Collected by Time Destination   A : RIGHT 5th TOE OSTEOMYELITIS Bone Toe SURGICAL PATHOLOGY Ambrocio Corea DPM 11/7/2024 1234        COMPLICATIONS: None    Findings:  Infection Present At Time Of Surgery (PATOS) (choose all levels that have infection present):  No infection present  Other Findings: Partial first ray amputation was performed. The area was irrigated with Irricept and closed in layered fashion. Bleeding was noted. Patient to be non weight bearing in a surgical shoe.    INDICATIONS FOR PROCEDURE: Patient is a 34-year-old diabetic female who has had a chronic nonhealing wound which became gangrenous.  Patient has a history of previous diabetic foot infections that required surgical intervention.  Patient also has known history of severe peripheral arterial disease.   To this point the patient has failed antibiotic therapy and wound care.  Radiographs and MRI of the right foot were obtained preoperatively which demonstrated osteomyelitis of the 5th proximal phalanx and metatarsal head of right foot correlating with the gangrenous site.  The patient has failed numerous conservative therapies thus far and the patient elects to undergo surgical amputation.  Discussed with the patient that he is at high risk for further amputation and limb loss  status intact to the right foot. Due to decreased bleeding during surgery, will continue to assess the surgical site.     Marlene Marin DPM PGY1  Podiatric Medicine & Surgery   11/7/2024 at 9:50 PM

## 2024-11-08 NOTE — PLAN OF CARE
Problem: Chronic Conditions and Co-morbidities  Goal: Patient's chronic conditions and co-morbidity symptoms are monitored and maintained or improved  Outcome: Progressing     Problem: Discharge Planning  Goal: Discharge to home or other facility with appropriate resources  Outcome: Progressing     Problem: Pain  Goal: Verbalizes/displays adequate comfort level or baseline comfort level  Outcome: Progressing     Problem: Safety - Adult  Goal: Free from fall injury  Outcome: Progressing     Problem: ABCDS Injury Assessment  Goal: Absence of physical injury  Outcome: Progressing     Problem: Skin/Tissue Integrity  Goal: Absence of new skin breakdown  Description: 1.  Monitor for areas of redness and/or skin breakdown  2.  Assess vascular access sites hourly  3.  Every 4-6 hours minimum:  Change oxygen saturation probe site  4.  Every 4-6 hours:  If on nasal continuous positive airway pressure, respiratory therapy assess nares and determine need for appliance change or resting period.  Outcome: Progressing     Problem: Neurosensory - Adult  Goal: Achieves stable or improved neurological status  Outcome: Progressing  Goal: Achieves maximal functionality and self care  Outcome: Progressing     Problem: Skin/Tissue Integrity - Adult  Goal: Incisions, wounds, or drain sites healing without S/S of infection  Outcome: Progressing     Problem: Musculoskeletal - Adult  Goal: Return mobility to safest level of function  Outcome: Progressing  Goal: Return ADL status to a safe level of function  Outcome: Progressing

## 2024-11-08 NOTE — PROGRESS NOTES
[facility]  Office: [phone]  Jad Gracia DO, Yanick Nick DO, Andrew Wall DO, Dagoberto Anderson DO, Vinicius Church MD, Ruchi Vuong MD, Kasia Lewis MD, Jeanette Yates MD,  Owen Castro MD, Aleshia Watson MD, Nahomy Muller MD,  Gm Ocampo DO, Mindy Ronquillo MD, Sam Morris MD, Eliseo Gracia DO, Teresa Antunez MD,  Ambrocio Moore DO, Enriqueta Farrell MD, Andreea Dee MD, Mami Austin MD, Brianna Hurtado MD,  Lenny Ventura MD, Tad Palacios MD, Janelle Medina MD, Igor Hyde MD, Juan Carlos Oropeza MD, Andrew Tripathi MD, Justin Daugherty DO, Rico Chauhan MD, Shirley Waterhouse, CNP,  Tennille Bañuelos, CNP, Justin Hines, CNP,  Niki Haines, DEEPIKA, Radha Farooq, CNP, Karlie Chang, CNP, Jayne Bruno, CNP, Hawa Khalil, CNP, Eve Calloway PA-C, ERICKA CanasC, Caity Monson, CNP, Destiny Castillo, CNP,  Era Wu, CNP, Marilynn Smith, CNP,  Carol Singleton, CNP, Emilie Osborn, CNP         [facility]   IN-PATIENT SERVICE   [facility]    Progress Note    11/8/2024    9:53 AM    Name:   Jane Saavedra  MRN:     [id number]     Acct:      [id number]   Room:   2003/2003-01  IP Day:  7  Admit Date:  10/31/2024 10:53 PM    PCP:   Marissa Wang MD  Code Status:  Full Code    Subjective:     C/C:   Chief Complaint   Patient presents with    Chest Pain    Hyperglycemia     Interval History Status: not changed.     Patient seen and examined in the morning  No acute events overnight.  No chest pain or shortness of breath.  Patient reports that pain is fairly controlled.  She is waiting for placement now.  Labs and vitals reviewed.  Patient has been hypotensive around 100-1 10 systolic.  Hemoglobin 11.2, white count 9.2.  Blood sugar remains around 180.  Brief History:     34-year-old female with PMHx of acute left limb ischemia resulting in BKA October 2024, DM2, HTN, CAD s/p CABG in 2023 who presented to the ED emergency department

## 2024-11-09 PROBLEM — M86.271 SUBACUTE OSTEOMYELITIS OF RIGHT FOOT: Status: ACTIVE | Noted: 2024-11-09

## 2024-11-09 PROBLEM — S78.112A ABOVE KNEE AMPUTATION OF LEFT LOWER EXTREMITY (HCC): Status: ACTIVE | Noted: 2024-11-09

## 2024-11-09 LAB
BASOPHILS # BLD: 0.03 K/UL (ref 0–0.2)
BASOPHILS NFR BLD: 0 % (ref 0–2)
CRP SERPL HS-MCNC: 11.6 MG/L (ref 0–5)
EOSINOPHIL # BLD: 0.32 K/UL (ref 0–0.44)
EOSINOPHILS RELATIVE PERCENT: 4 % (ref 1–4)
ERYTHROCYTE [DISTWIDTH] IN BLOOD BY AUTOMATED COUNT: 14.1 % (ref 11.8–14.4)
ERYTHROCYTE [SEDIMENTATION RATE] IN BLOOD BY PHOTOMETRIC METHOD: 98 MM/HR (ref 0–20)
GLUCOSE BLD-MCNC: 157 MG/DL (ref 65–105)
GLUCOSE BLD-MCNC: 170 MG/DL (ref 65–105)
GLUCOSE BLD-MCNC: 223 MG/DL (ref 65–105)
GLUCOSE BLD-MCNC: 224 MG/DL (ref 65–105)
GLUCOSE BLD-MCNC: 224 MG/DL (ref 65–105)
GLUCOSE BLD-MCNC: 262 MG/DL (ref 65–105)
GLUCOSE BLD-MCNC: 88 MG/DL (ref 65–105)
HCT VFR BLD AUTO: 36.1 % (ref 36.3–47.1)
HGB BLD-MCNC: 11.2 G/DL (ref 11.9–15.1)
IMM GRANULOCYTES # BLD AUTO: 0.03 K/UL (ref 0–0.3)
IMM GRANULOCYTES NFR BLD: 0 %
LYMPHOCYTES NFR BLD: 2.78 K/UL (ref 1.1–3.7)
LYMPHOCYTES RELATIVE PERCENT: 34 % (ref 24–43)
MCH RBC QN AUTO: 28.7 PG (ref 25.2–33.5)
MCHC RBC AUTO-ENTMCNC: 31 G/DL (ref 28.4–34.8)
MCV RBC AUTO: 92.6 FL (ref 82.6–102.9)
MONOCYTES NFR BLD: 0.6 K/UL (ref 0.1–1.2)
MONOCYTES NFR BLD: 7 % (ref 3–12)
NEUTROPHILS NFR BLD: 55 % (ref 36–65)
NEUTS SEG NFR BLD: 4.35 K/UL (ref 1.5–8.1)
NRBC BLD-RTO: 0 PER 100 WBC
PLATELET # BLD AUTO: 488 K/UL (ref 138–453)
PMV BLD AUTO: 9.1 FL (ref 8.1–13.5)
RBC # BLD AUTO: 3.9 M/UL (ref 3.95–5.11)
SURGICAL PATHOLOGY REPORT: NORMAL
WBC OTHER # BLD: 8.1 K/UL (ref 3.5–11.3)

## 2024-11-09 PROCEDURE — 6370000000 HC RX 637 (ALT 250 FOR IP): Performed by: STUDENT IN AN ORGANIZED HEALTH CARE EDUCATION/TRAINING PROGRAM

## 2024-11-09 PROCEDURE — 2580000003 HC RX 258: Performed by: STUDENT IN AN ORGANIZED HEALTH CARE EDUCATION/TRAINING PROGRAM

## 2024-11-09 PROCEDURE — 6360000002 HC RX W HCPCS: Performed by: STUDENT IN AN ORGANIZED HEALTH CARE EDUCATION/TRAINING PROGRAM

## 2024-11-09 PROCEDURE — 36415 COLL VENOUS BLD VENIPUNCTURE: CPT

## 2024-11-09 PROCEDURE — 99232 SBSQ HOSP IP/OBS MODERATE 35: CPT | Performed by: INTERNAL MEDICINE

## 2024-11-09 PROCEDURE — 86140 C-REACTIVE PROTEIN: CPT

## 2024-11-09 PROCEDURE — 94760 N-INVAS EAR/PLS OXIMETRY 1: CPT

## 2024-11-09 PROCEDURE — 99232 SBSQ HOSP IP/OBS MODERATE 35: CPT | Performed by: NURSE PRACTITIONER

## 2024-11-09 PROCEDURE — 85652 RBC SED RATE AUTOMATED: CPT

## 2024-11-09 PROCEDURE — 1200000000 HC SEMI PRIVATE

## 2024-11-09 PROCEDURE — 6360000002 HC RX W HCPCS: Performed by: INTERNAL MEDICINE

## 2024-11-09 PROCEDURE — 6370000000 HC RX 637 (ALT 250 FOR IP): Performed by: NURSE PRACTITIONER

## 2024-11-09 PROCEDURE — 97530 THERAPEUTIC ACTIVITIES: CPT

## 2024-11-09 PROCEDURE — 97110 THERAPEUTIC EXERCISES: CPT

## 2024-11-09 PROCEDURE — 85025 COMPLETE CBC W/AUTO DIFF WBC: CPT

## 2024-11-09 RX ORDER — INSULIN LISPRO 100 [IU]/ML
0-16 INJECTION, SOLUTION INTRAVENOUS; SUBCUTANEOUS
Status: DISCONTINUED | OUTPATIENT
Start: 2024-11-09 | End: 2024-11-11 | Stop reason: HOSPADM

## 2024-11-09 RX ADMIN — OXYCODONE HYDROCHLORIDE AND ACETAMINOPHEN 2 TABLET: 5; 325 TABLET ORAL at 04:45

## 2024-11-09 RX ADMIN — ATORVASTATIN CALCIUM 80 MG: 80 TABLET, FILM COATED ORAL at 20:58

## 2024-11-09 RX ADMIN — CYCLOBENZAPRINE 10 MG: 10 TABLET, FILM COATED ORAL at 08:41

## 2024-11-09 RX ADMIN — SERTRALINE HYDROCHLORIDE 25 MG: 25 TABLET ORAL at 08:41

## 2024-11-09 RX ADMIN — INSULIN GLARGINE 40 UNITS: 100 INJECTION, SOLUTION SUBCUTANEOUS at 20:57

## 2024-11-09 RX ADMIN — OXYCODONE HYDROCHLORIDE AND ACETAMINOPHEN 2 TABLET: 5; 325 TABLET ORAL at 16:19

## 2024-11-09 RX ADMIN — INSULIN GLARGINE 10 UNITS: 100 INJECTION, SOLUTION SUBCUTANEOUS at 08:40

## 2024-11-09 RX ADMIN — MORPHINE SULFATE 1 MG: 2 INJECTION, SOLUTION INTRAMUSCULAR; INTRAVENOUS at 08:46

## 2024-11-09 RX ADMIN — METOPROLOL SUCCINATE 25 MG: 25 TABLET, EXTENDED RELEASE ORAL at 08:41

## 2024-11-09 RX ADMIN — GABAPENTIN 300 MG: 300 CAPSULE ORAL at 08:41

## 2024-11-09 RX ADMIN — INSULIN LISPRO 4 UNITS: 100 INJECTION, SOLUTION INTRAVENOUS; SUBCUTANEOUS at 20:57

## 2024-11-09 RX ADMIN — INSULIN LISPRO 4 UNITS: 100 INJECTION, SOLUTION INTRAVENOUS; SUBCUTANEOUS at 16:20

## 2024-11-09 RX ADMIN — MORPHINE SULFATE 1 MG: 2 INJECTION, SOLUTION INTRAMUSCULAR; INTRAVENOUS at 00:34

## 2024-11-09 RX ADMIN — OXYCODONE HYDROCHLORIDE AND ACETAMINOPHEN 2 TABLET: 5; 325 TABLET ORAL at 20:58

## 2024-11-09 RX ADMIN — SODIUM CHLORIDE, PRESERVATIVE FREE 10 ML: 5 INJECTION INTRAVENOUS at 20:59

## 2024-11-09 RX ADMIN — SENNOSIDES AND DOCUSATE SODIUM 2 TABLET: 50; 8.6 TABLET ORAL at 08:41

## 2024-11-09 RX ADMIN — ONDANSETRON 4 MG: 4 TABLET, ORALLY DISINTEGRATING ORAL at 13:49

## 2024-11-09 RX ADMIN — INSULIN LISPRO 8 UNITS: 100 INJECTION, SOLUTION INTRAVENOUS; SUBCUTANEOUS at 12:58

## 2024-11-09 RX ADMIN — INSULIN LISPRO 4 UNITS: 100 INJECTION, SOLUTION INTRAVENOUS; SUBCUTANEOUS at 06:20

## 2024-11-09 RX ADMIN — CYCLOBENZAPRINE 10 MG: 10 TABLET, FILM COATED ORAL at 12:57

## 2024-11-09 RX ADMIN — GABAPENTIN 300 MG: 300 CAPSULE ORAL at 13:42

## 2024-11-09 RX ADMIN — GABAPENTIN 300 MG: 300 CAPSULE ORAL at 20:58

## 2024-11-09 RX ADMIN — LOSARTAN POTASSIUM 12.5 MG: 25 TABLET, FILM COATED ORAL at 08:41

## 2024-11-09 RX ADMIN — CYCLOBENZAPRINE 10 MG: 10 TABLET, FILM COATED ORAL at 20:58

## 2024-11-09 RX ADMIN — POLYETHYLENE GLYCOL 3350 17 G: 17 POWDER, FOR SOLUTION ORAL at 08:40

## 2024-11-09 RX ADMIN — PIPERACILLIN SODIUM AND TAZOBACTAM SODIUM 3375 MG: 3; .375 INJECTION, SOLUTION INTRAVENOUS at 04:40

## 2024-11-09 RX ADMIN — SODIUM CHLORIDE, PRESERVATIVE FREE 10 ML: 5 INJECTION INTRAVENOUS at 08:47

## 2024-11-09 ASSESSMENT — PAIN DESCRIPTION - ORIENTATION
ORIENTATION: RIGHT
ORIENTATION: RIGHT
ORIENTATION: RIGHT;LEFT
ORIENTATION: LEFT;RIGHT

## 2024-11-09 ASSESSMENT — PAIN DESCRIPTION - DESCRIPTORS
DESCRIPTORS: ACHING;THROBBING
DESCRIPTORS: DISCOMFORT;THROBBING
DESCRIPTORS: ACHING;DULL

## 2024-11-09 ASSESSMENT — PAIN DESCRIPTION - LOCATION
LOCATION: LEG;TOE (COMMENT WHICH ONE)
LOCATION: TOE (COMMENT WHICH ONE);LEG
LOCATION: FOOT
LOCATION: FOOT

## 2024-11-09 ASSESSMENT — PAIN SCALES - GENERAL
PAINLEVEL_OUTOF10: 0
PAINLEVEL_OUTOF10: 7
PAINLEVEL_OUTOF10: 9
PAINLEVEL_OUTOF10: 7
PAINLEVEL_OUTOF10: 0
PAINLEVEL_OUTOF10: 7
PAINLEVEL_OUTOF10: 7
PAINLEVEL_OUTOF10: 0
PAINLEVEL_OUTOF10: 8
PAINLEVEL_OUTOF10: 6

## 2024-11-09 NOTE — PLAN OF CARE
Problem: Chronic Conditions and Co-morbidities  Goal: Patient's chronic conditions and co-morbidity symptoms are monitored and maintained or improved  Outcome: Progressing  Flowsheets (Taken 11/8/2024 2000)  Care Plan - Patient's Chronic Conditions and Co-Morbidity Symptoms are Monitored and Maintained or Improved:   Monitor and assess patient's chronic conditions and comorbid symptoms for stability, deterioration, or improvement   Collaborate with multidisciplinary team to address chronic and comorbid conditions and prevent exacerbation or deterioration   Update acute care plan with appropriate goals if chronic or comorbid symptoms are exacerbated and prevent overall improvement and discharge     Problem: Discharge Planning  Goal: Discharge to home or other facility with appropriate resources  Outcome: Progressing  Flowsheets (Taken 11/8/2024 2000)  Discharge to home or other facility with appropriate resources:   Identify barriers to discharge with patient and caregiver   Arrange for needed discharge resources and transportation as appropriate   Identify discharge learning needs (meds, wound care, etc)     Problem: Pain  Goal: Verbalizes/displays adequate comfort level or baseline comfort level  Outcome: Progressing     Problem: Safety - Adult  Goal: Free from fall injury  Outcome: Progressing  Flowsheets (Taken 11/8/2024 2000)  Free From Fall Injury: Instruct family/caregiver on patient safety     Problem: ABCDS Injury Assessment  Goal: Absence of physical injury  Outcome: Progressing     Problem: Skin/Tissue Integrity  Goal: Absence of new skin breakdown  Description: 1.  Monitor for areas of redness and/or skin breakdown  2.  Assess vascular access sites hourly  3.  Every 4-6 hours minimum:  Change oxygen saturation probe site  4.  Every 4-6 hours:  If on nasal continuous positive airway pressure, respiratory therapy assess nares and determine need for appliance change or resting period.  Outcome:

## 2024-11-09 NOTE — PLAN OF CARE
Problem: Chronic Conditions and Co-morbidities  Goal: Patient's chronic conditions and co-morbidity symptoms are monitored and maintained or improved  11/9/2024 1353 by Valentino Acosta RN  Outcome: Progressing  11/9/2024 0241 by Cheyenne Abreu RN  Outcome: Progressing  Flowsheets (Taken 11/8/2024 2000)  Care Plan - Patient's Chronic Conditions and Co-Morbidity Symptoms are Monitored and Maintained or Improved:   Monitor and assess patient's chronic conditions and comorbid symptoms for stability, deterioration, or improvement   Collaborate with multidisciplinary team to address chronic and comorbid conditions and prevent exacerbation or deterioration   Update acute care plan with appropriate goals if chronic or comorbid symptoms are exacerbated and prevent overall improvement and discharge     Problem: Discharge Planning  Goal: Discharge to home or other facility with appropriate resources  11/9/2024 1353 by Valentino Acosta RN  Outcome: Progressing  Flowsheets (Taken 11/9/2024 0800)  Discharge to home or other facility with appropriate resources:   Identify barriers to discharge with patient and caregiver   Arrange for needed discharge resources and transportation as appropriate   Identify discharge learning needs (meds, wound care, etc)   Refer to discharge planning if patient needs post-hospital services based on physician order or complex needs related to functional status, cognitive ability or social support system  11/9/2024 0241 by Cheyenne Abreu, RN  Outcome: Progressing  Flowsheets (Taken 11/8/2024 2000)  Discharge to home or other facility with appropriate resources:   Identify barriers to discharge with patient and caregiver   Arrange for needed discharge resources and transportation as appropriate   Identify discharge learning needs (meds, wound care, etc)     Problem: Pain  Goal: Verbalizes/displays adequate comfort level or baseline comfort level  11/9/2024 1353 by Valentino Acosta,

## 2024-11-09 NOTE — CARE COORDINATION
Spoke to Janey from [facility]. Updated therapy notes were submitted yesterday. Precert is pending

## 2024-11-09 NOTE — PROGRESS NOTES
[facility]  Office: [phone]  Jad Gracia DO, Yanick Nick DO, Adnrew Wall DO, Dagoberto Anderson DO, Vinicius Church MD, Ruchi Vuong MD, Kasia Lewis MD, Jeanette Yates MD,  Owen Castro MD, Aleshia Watson MD, Nahomy Muller MD,  Gm Ocampo DO, Mindy Ronquillo MD, Sam Morris MD, Eliseo Gracia DO, Teresa Antunez MD,  Ambrocio Moore DO, Enriqueta Farrell MD, Andreea Dee MD, Mami Austin MD, Brianna Hurtado MD,  Lenny Ventura MD, Tad Palacios MD, Janelle Medina MD, Igor Hyde MD, Juan Carlos Oropeza MD, Andrew Tripathi MD, Justin Daugherty DO, Rico Chauhan MD, Shirley Waterhouse, CNP,  Tennille Bañuelos, CNP, Justin Hines, SAURAV,  Niki Haines, DEEPIKA, Radha Farooq, CNP, Karlie Chang, CNP, Jayne Bruno, CNP, Hawa Khalil, CNP, ERICKA De LeonC, ERICKA CanasC, Caity Monson, CNP, Destiny Castillo, CNP,  Era Wu, CNP, Marilynn Smith, CNP,  Carol Sinlgeton, CNP, Emilie Osborn, CNP         [facility]   IN-PATIENT SERVICE   [facility]    Progress Note    11/9/2024    9:06 AM    Name:   Jane Saavedra  MRN:     [id number]     Acct:      [id number]   Room:   2003/2003-01  IP Day:  8  Admit Date:  10/31/2024 10:53 PM    PCP:   Marissa Wang MD  Code Status:  Full Code    Subjective:     C/C:   Chief Complaint   Patient presents with    Chest Pain    Hyperglycemia     Interval History Status: not changed, awaiting placement    Patient seen and evaluated in room.  No acute events overnight.  Had discussion regarding pain management, discussed discontinuing IV pain meds since patient has been stable for discharge and relying on oral pain meds, patient in agreement.  No other patient concerns at this time    Brief History:     34-year-old female with PMHx of acute left limb ischemia resulting in BKA October 2024, DM2, HTN, CAD s/p CABG in 2023 who presented to the ED emergency department endorsing chest pain and anxiety

## 2024-11-09 NOTE — DISCHARGE INSTRUCTIONS
Podiatry:  - Please make a follow-up visit with Dr. Corea outpatient 1 week after discharge  - Please keep your lower extremity wounds dry clean and covered at all times until follow-up visit  - Weightbearing status: weightbear lightly only to the heel in a surgical shoe  - Please take all prescribed medications as instructed  - Please restart all home medications as prescribed  - Please return to the hospital if any of the following local signs of infection arise: Increased/streaking redness, pain, swelling, drainage or malodor  - Please return to the hospital for any the following systemic signs of infection arise: Nausea, vomiting, fever, chills, diarrhea, shortness of breath or chest pain

## 2024-11-09 NOTE — PROGRESS NOTES
[facility] -   Infectious diseases evaluation    Progress Note    admission date 10/31/2024    reason for consultation:   gangrene    Impression :     Diabetic foot infection  Left LE gangrene and post AKA 11/1  Stump w a lower bruise - clean  R foot diabetic ulcer w 5th toe gangrene and forefoot cellulitis  For 5th toe amputation 11/7  PAD post R leg angioplasty 11/4      Discussion / summary of stay / plan of care/ Recommendations:   Post zosyn 11/1 -11/3  Vanco 11/1 -11/3  Cellulitis persistent though improved, R forefoot   Resume zosyn 11/5   5th toe amp on 11/7 - wound looks great 11/8  Stop all AB  11/9 - anticipate DC off AB  Recommendations:     Monitor off antibiotics   Wound Care  Awaiting placement for Rehab    Infection Control Recommendations   [place] Precautions  Contact Isolation     Antimicrobial Stewardship Recommendations   Simplification of therapy  Targeted therapy      History of Present Illness:   Initial history:  Jane Saavedra is a 34 y.o.-year-old female admitted with chest pain and anxiety associated with an upcoming vascular procedure.  The procedure was a left above-knee amputation which occurred on 11/1/2024 after admission  Stump afterwards, looked clean but has a bruise underneath it.    She also has a wound over the right foot-   Vascular ultimately took 4 open 11/4 with manage angioplasty for the right lower extremity arterial  She continues to have pain over the left stump w gangrene R 5th toe - scheduled for 5th toe amputation 11/7/24 by podiatry.    ID called for antibiotics         Diabetes type 2, left lower extremity gangrene with BKA 10/2024  CAD post CABG 2023                Interval changes  11/9/2024   Patient Vitals for the past 8 hrs:   BP Temp Temp src Pulse Resp SpO2 Weight   11/09/24 0600 -- -- -- -- -- -- 78.6 kg (173 lb 4.5 oz)   11/09/24 0400 -- -- -- 99 -- -- --   11/09/24 0342 129/72 98.6 °F (37 °C) Oral 89  18 95 % --   11/09/24 0105 -- -- -- (!) 104 21 -- --   11/09/24 0030 118/71 99.5 °F (37.5 °C) Oral 89 11 -- --     11/6  Alert appropriate continues to have a lot of pain from the left stump, planned for right fifth toe surgery on 11/7.  Labs reviewed,    11/8  Ox 3 and seems rested - R foot dressed dry  CRP 7  Vacs dressed the foot today, pic as below - very clean          CURRENT EVALUATION :11/9/2024    Afebrile  VS stable    Patient stable  S/P Lt AKA  Rt foot s/p partial 5th ray amputation  Incision closed with sutures. Incision intact    No complaints  No new issues reported    Medications reviewed:  Monitor off antibiotics  Completed Zosyn 11-9-24    Summary of relevant labs:11/9/2024   Labs:  PARI 15 - 12 - 10   Micro:  BC neg  11/1 -  Nsasal swab neg 11/1  U cx neg 11/1    Procedures      Cardiology      Imaging:      I have personally reviewed the past medical history, past surgical history, medications, social history, and family history, and I haveupdated the database accordingly.      Allergies:   Patient has no known allergies.     Review of Systems:     Review of Systems   Constitutional:  Positive for activity change.   HENT:  Negative for congestion.    Eyes:  Negative for photophobia, discharge and redness.   Respiratory:  Negative for apnea, cough and shortness of breath.    Cardiovascular:  Negative for chest pain.   Gastrointestinal:  Negative for abdominal distention.   Endocrine: Negative for cold intolerance, polyphagia and polyuria.   Genitourinary:  Negative for dysuria.   Musculoskeletal:  Positive for arthralgias.   Skin:  Positive for color change (R foot).   Allergic/Immunologic: Negative for immunocompromised state.   Neurological:  Negative for dizziness.   Hematological:  Negative for adenopathy.   Psychiatric/Behavioral:  Negative for agitation.        Physical Examination :       Physical Exam  Constitutional:       General: She is not in acute distress.     Appearance: Normal

## 2024-11-09 NOTE — PROGRESS NOTES
Progress Note  Foot and Ankle Surgery    Subjective     CC: Chronic/ non-healing right foot wounds   S/p partial fifth ray amputation on 11/7/2024    Interval 11/9/2024  - Patient seen and examined at bedside during rounding this morning.  - Afebrile, vital signs normal  -Patient's pain is controlled.  -Current plan after talking to case management was for possible discharge to [facility], still waiting on precertification.    Updated Labs:     WBC:   Lab Results   Component Value Date    WBC 8.1 11/09/2024     ESR:   Lab Results   Component Value Date    SEDRATE 98 (H) 11/09/2024     CRP:   Lab Results   Component Value Date    CRP 11.6 (H) 11/09/2024       HPI :  Jane Saavedra is a 34 y.o. female admitted to [facility] for recent left AKA. Patient is known to the podiatry service.  Patient had a BKA performed by Dr. Watson on 10/15/24 with scheduled AKA procedure on 11/1/24 as an outpatient procedure. She had a recent ED admission on 11/1 complaining of chest pain, pressure, and anxiety. She is currently on POD #3 from AKA. Patient has chronic, non-healing wounds located to her right foot. Vascular team is performing arteriogram of right LE today 11/4/24. Podiatry was consulted for further evaluation and management of right foot wound once vascular procedure is completed.      PCP is Marissa Wang MD    ROS: Denies N/V/F/C/SOB/CP.  Otherwise negative except at stated in the HPI.     Medications:  Scheduled Meds:   metoprolol succinate  25 mg Oral Daily    losartan  12.5 mg Oral Daily    cyclobenzaprine  10 mg Oral TID    sertraline  25 mg Oral Daily    sennosides-docusate sodium  2 tablet Oral Daily    polyethylene glycol  17 g Oral Daily    insulin glargine  10 Units SubCUTAneous Daily    insulin lispro  10 Units SubCUTAneous Once    sodium chloride flush  5-40 mL IntraVENous 2 times per day    [Held by provider] apixaban  5 mg Oral BID    [Held by provider] aspirin  81 mg Oral Daily    atorvastatin   80 mg Oral Nightly    gabapentin  300 mg Oral TID    insulin glargine  40 Units SubCUTAneous Nightly    insulin lispro  0-16 Units SubCUTAneous 4 times per day       Continuous Infusions:   dextrose      sodium chloride 10 mL/hr at 24 1144       PRN Meds:oxyCODONE-acetaminophen, glucose, dextrose bolus **OR** dextrose bolus, glucagon (rDNA), dextrose, sodium chloride flush, sodium chloride, potassium chloride **OR** potassium alternative oral replacement **OR** potassium chloride, magnesium sulfate, ondansetron **OR** ondansetron, polyethylene glycol, bisacodyl, acetaminophen **OR** acetaminophen, sennosides-docusate sodium    Objective     Vitals:  Patient Vitals for the past 8 hrs:   BP Temp Temp src Pulse Resp SpO2 Weight   24 0916 -- -- -- -- 17 -- --   24 0600 -- -- -- -- -- -- 78.6 kg (173 lb 4.5 oz)   24 0400 -- -- -- 99 -- -- --   24 0342 129/72 98.6 °F (37 °C) Oral 89 18 95 % --     Average, Min, and Max for last 24 hours Vitals:  TEMPERATURE:  Temp  Av.4 °F (36.9 °C)  Min: 97.7 °F (36.5 °C)  Max: 99.5 °F (37.5 °C)    RESPIRATIONS RANGE: Resp  Av.1  Min: 11  Max: 22    PULSE RANGE: Pulse  Av.3  Min: 78  Max: 104    BLOOD PRESSURE RANGE:  Systolic (24hrs), Av , Min:108 , Max:129   ; Diastolic (24hrs), Av, Min:69, Max:72      PULSE OXIMETRY RANGE: SpO2  Av.5 %  Min: 95 %  Max: 98 %    I/O last 3 completed shifts:  In: 100 [P.O.:100]  Out: 800 [Urine:800]    CBC:  Recent Labs     24  1512 24  0549 24  0654   WBC  --  9.2 8.1   HGB  --  11.2* 11.2*   HCT  --  35.4* 36.1*   PLT  --  472* 488*   CRP 9.8* 7.8* 11.6*        BMP:  No results for input(s): \"NA\", \"K\", \"CL\", \"CO2\", \"BUN\", \"CREATININE\", \"GLUCOSE\", \"CALCIUM\" in the last 72 hours.       Coags:  No results for input(s): \"APTT\", \"INR\" in the last 72 hours.    Invalid input(s): \"PROT\"    Lab Results   Component Value Date    SEDRATE 98 (H) 2024     Recent Labs

## 2024-11-09 NOTE — PROGRESS NOTES
Physical Therapy  Facility/Department: [facility] CAR 2- STEPDOWN  Physical Therapy Daily Note    Name: Jane Saavedra  : 1990  MRN: [id number]  Date of Service: 2024    Discharge Recommendations:  Patient would benefit from continued therapy after discharge, Patient able to tolerate 3 hours of therapy per day   PT Equipment Recommendations  Equipment Needed: Yes  Mobility Devices: Wheelchair  Wheelchair: Standard      Patient Diagnosis(es): The primary encounter diagnosis was Chest pain, unspecified type. Diagnoses of Below-knee amputation of left lower extremity, initial encounter ([facility]), Hyperglycemia, Leukocytosis, unspecified type, Gangrene ([facility]), Arteriosclerosis of arterial coronary artery bypass graft, PAD (peripheral artery disease) ([facility]), Acute osteomyelitis of toe, unspecified laterality, S/P BKA (below knee amputation) unilateral, left ([facility]), and S/P AKA (above knee amputation) unilateral, left ([facility]) were also pertinent to this visit.  Past Medical History:  has a past medical history of Acute osteomyelitis, Bipolar 1 disorder ([facility]), Cellulitis, Chronic diabetic ulcer of left foot determined by examination ([facility]), Depression, Diabetic foot infection ([facility]), H. pylori infection, History of recent hospitalization, Hyperlipidemia, Hypertension, Ischemic cardiomyopathy, Multiple vessel coronary artery disease, Neuropathy, Noncompliance, NSTEMI (non-ST elevated myocardial infarction) ([facility]), Osteomyelitis of foot, left, acute, PAD (peripheral artery disease) ([facility]), Renal abscess, left, STEMI (ST elevation myocardial infarction) ([facility]), Thrombus, Type II or unspecified type diabetes mellitus without mention of complication, uncontrolled, Under care of team, Under care of team, Under care of team, Under care of team, Under care of team, Under care of team, and Wellness examination.  Past Surgical History:  has a past surgical history that includes Carpal tunnel release (Left, 2020); Coronary artery

## 2024-11-10 LAB
BASOPHILS # BLD: <0.03 K/UL (ref 0–0.2)
BASOPHILS NFR BLD: 0 % (ref 0–2)
CRP SERPL HS-MCNC: 7.3 MG/L (ref 0–5)
EOSINOPHIL # BLD: 0.19 K/UL (ref 0–0.44)
EOSINOPHILS RELATIVE PERCENT: 3 % (ref 1–4)
ERYTHROCYTE [DISTWIDTH] IN BLOOD BY AUTOMATED COUNT: 14.1 % (ref 11.8–14.4)
ERYTHROCYTE [SEDIMENTATION RATE] IN BLOOD BY PHOTOMETRIC METHOD: 91 MM/HR (ref 0–20)
GLUCOSE BLD-MCNC: 128 MG/DL (ref 65–105)
GLUCOSE BLD-MCNC: 164 MG/DL (ref 65–105)
GLUCOSE BLD-MCNC: 188 MG/DL (ref 65–105)
GLUCOSE BLD-MCNC: 219 MG/DL (ref 65–105)
HCT VFR BLD AUTO: 34.7 % (ref 36.3–47.1)
HGB BLD-MCNC: 10.8 G/DL (ref 11.9–15.1)
IMM GRANULOCYTES # BLD AUTO: <0.03 K/UL (ref 0–0.3)
IMM GRANULOCYTES NFR BLD: 0 %
LYMPHOCYTES NFR BLD: 3.02 K/UL (ref 1.1–3.7)
LYMPHOCYTES RELATIVE PERCENT: 47 % (ref 24–43)
MCH RBC QN AUTO: 28.3 PG (ref 25.2–33.5)
MCHC RBC AUTO-ENTMCNC: 31.1 G/DL (ref 28.4–34.8)
MCV RBC AUTO: 91.1 FL (ref 82.6–102.9)
MONOCYTES NFR BLD: 0.6 K/UL (ref 0.1–1.2)
MONOCYTES NFR BLD: 9 % (ref 3–12)
NEUTROPHILS NFR BLD: 41 % (ref 36–65)
NEUTS SEG NFR BLD: 2.61 K/UL (ref 1.5–8.1)
NRBC BLD-RTO: 0 PER 100 WBC
PLATELET # BLD AUTO: 469 K/UL (ref 138–453)
PMV BLD AUTO: 9.2 FL (ref 8.1–13.5)
RBC # BLD AUTO: 3.81 M/UL (ref 3.95–5.11)
WBC OTHER # BLD: 6.5 K/UL (ref 3.5–11.3)

## 2024-11-10 PROCEDURE — 82947 ASSAY GLUCOSE BLOOD QUANT: CPT

## 2024-11-10 PROCEDURE — 99232 SBSQ HOSP IP/OBS MODERATE 35: CPT | Performed by: INTERNAL MEDICINE

## 2024-11-10 PROCEDURE — 36415 COLL VENOUS BLD VENIPUNCTURE: CPT

## 2024-11-10 PROCEDURE — 86140 C-REACTIVE PROTEIN: CPT

## 2024-11-10 PROCEDURE — 6370000000 HC RX 637 (ALT 250 FOR IP): Performed by: STUDENT IN AN ORGANIZED HEALTH CARE EDUCATION/TRAINING PROGRAM

## 2024-11-10 PROCEDURE — 85652 RBC SED RATE AUTOMATED: CPT

## 2024-11-10 PROCEDURE — 1200000000 HC SEMI PRIVATE

## 2024-11-10 PROCEDURE — 2580000003 HC RX 258: Performed by: STUDENT IN AN ORGANIZED HEALTH CARE EDUCATION/TRAINING PROGRAM

## 2024-11-10 PROCEDURE — 99232 SBSQ HOSP IP/OBS MODERATE 35: CPT | Performed by: STUDENT IN AN ORGANIZED HEALTH CARE EDUCATION/TRAINING PROGRAM

## 2024-11-10 PROCEDURE — 6370000000 HC RX 637 (ALT 250 FOR IP): Performed by: NURSE PRACTITIONER

## 2024-11-10 PROCEDURE — 85025 COMPLETE CBC W/AUTO DIFF WBC: CPT

## 2024-11-10 RX ADMIN — GABAPENTIN 300 MG: 300 CAPSULE ORAL at 08:25

## 2024-11-10 RX ADMIN — OXYCODONE HYDROCHLORIDE AND ACETAMINOPHEN 2 TABLET: 5; 325 TABLET ORAL at 13:27

## 2024-11-10 RX ADMIN — SODIUM CHLORIDE, PRESERVATIVE FREE 10 ML: 5 INJECTION INTRAVENOUS at 20:42

## 2024-11-10 RX ADMIN — CYCLOBENZAPRINE 10 MG: 10 TABLET, FILM COATED ORAL at 08:26

## 2024-11-10 RX ADMIN — LOSARTAN POTASSIUM 12.5 MG: 25 TABLET, FILM COATED ORAL at 08:25

## 2024-11-10 RX ADMIN — CYCLOBENZAPRINE 10 MG: 10 TABLET, FILM COATED ORAL at 13:28

## 2024-11-10 RX ADMIN — SERTRALINE HYDROCHLORIDE 25 MG: 25 TABLET ORAL at 08:25

## 2024-11-10 RX ADMIN — POLYETHYLENE GLYCOL 3350 17 G: 17 POWDER, FOR SOLUTION ORAL at 08:25

## 2024-11-10 RX ADMIN — CYCLOBENZAPRINE 10 MG: 10 TABLET, FILM COATED ORAL at 20:42

## 2024-11-10 RX ADMIN — INSULIN GLARGINE 40 UNITS: 100 INJECTION, SOLUTION SUBCUTANEOUS at 20:42

## 2024-11-10 RX ADMIN — OXYCODONE HYDROCHLORIDE AND ACETAMINOPHEN 2 TABLET: 5; 325 TABLET ORAL at 08:26

## 2024-11-10 RX ADMIN — METOPROLOL SUCCINATE 25 MG: 25 TABLET, EXTENDED RELEASE ORAL at 08:25

## 2024-11-10 RX ADMIN — INSULIN LISPRO 4 UNITS: 100 INJECTION, SOLUTION INTRAVENOUS; SUBCUTANEOUS at 11:46

## 2024-11-10 RX ADMIN — OXYCODONE HYDROCHLORIDE AND ACETAMINOPHEN 2 TABLET: 5; 325 TABLET ORAL at 20:42

## 2024-11-10 RX ADMIN — SODIUM CHLORIDE, PRESERVATIVE FREE 10 ML: 5 INJECTION INTRAVENOUS at 08:32

## 2024-11-10 RX ADMIN — SENNOSIDES AND DOCUSATE SODIUM 2 TABLET: 50; 8.6 TABLET ORAL at 08:25

## 2024-11-10 RX ADMIN — GABAPENTIN 300 MG: 300 CAPSULE ORAL at 13:28

## 2024-11-10 RX ADMIN — GABAPENTIN 300 MG: 300 CAPSULE ORAL at 20:42

## 2024-11-10 RX ADMIN — INSULIN GLARGINE 10 UNITS: 100 INJECTION, SOLUTION SUBCUTANEOUS at 08:29

## 2024-11-10 RX ADMIN — INSULIN LISPRO 4 UNITS: 100 INJECTION, SOLUTION INTRAVENOUS; SUBCUTANEOUS at 17:07

## 2024-11-10 RX ADMIN — ATORVASTATIN CALCIUM 80 MG: 80 TABLET, FILM COATED ORAL at 20:42

## 2024-11-10 ASSESSMENT — PAIN SCALES - GENERAL
PAINLEVEL_OUTOF10: 0
PAINLEVEL_OUTOF10: 7
PAINLEVEL_OUTOF10: 0
PAINLEVEL_OUTOF10: 7
PAINLEVEL_OUTOF10: 7
PAINLEVEL_OUTOF10: 0
PAINLEVEL_OUTOF10: 0

## 2024-11-10 ASSESSMENT — PAIN DESCRIPTION - LOCATION
LOCATION: LEG
LOCATION: FOOT

## 2024-11-10 ASSESSMENT — PAIN DESCRIPTION - ORIENTATION
ORIENTATION: RIGHT
ORIENTATION: RIGHT

## 2024-11-10 ASSESSMENT — PAIN DESCRIPTION - DESCRIPTORS: DESCRIPTORS: ACHING;DULL

## 2024-11-10 NOTE — PLAN OF CARE
Problem: Chronic Conditions and Co-morbidities  Goal: Patient's chronic conditions and co-morbidity symptoms are monitored and maintained or improved  Outcome: Progressing     Problem: Discharge Planning  Goal: Discharge to home or other facility with appropriate resources  Outcome: Progressing  Flowsheets (Taken 11/10/2024 0800)  Discharge to home or other facility with appropriate resources:   Identify barriers to discharge with patient and caregiver   Arrange for needed discharge resources and transportation as appropriate   Identify discharge learning needs (meds, wound care, etc)   Refer to discharge planning if patient needs post-hospital services based on physician order or complex needs related to functional status, cognitive ability or social support system     Problem: Pain  Goal: Verbalizes/displays adequate comfort level or baseline comfort level  Outcome: Progressing     Problem: Safety - Adult  Goal: Free from fall injury  Outcome: Progressing     Problem: ABCDS Injury Assessment  Goal: Absence of physical injury  Outcome: Progressing     Problem: Skin/Tissue Integrity  Goal: Absence of new skin breakdown  Description: 1.  Monitor for areas of redness and/or skin breakdown  2.  Assess vascular access sites hourly  3.  Every 4-6 hours minimum:  Change oxygen saturation probe site  4.  Every 4-6 hours:  If on nasal continuous positive airway pressure, respiratory therapy assess nares and determine need for appliance change or resting period.  Outcome: Progressing     Problem: Neurosensory - Adult  Goal: Achieves stable or improved neurological status  Outcome: Progressing  Goal: Achieves maximal functionality and self care  Outcome: Progressing     Problem: Skin/Tissue Integrity - Adult  Goal: Incisions, wounds, or drain sites healing without S/S of infection  Outcome: Progressing     Problem: Musculoskeletal - Adult  Goal: Return mobility to safest level of function  Outcome: Progressing  Goal: Return

## 2024-11-10 NOTE — PROGRESS NOTES
[facility] -   Infectious diseases evaluation    Progress Note    admission date 10/31/2024    reason for consultation:   gangrene    Impression :     Diabetic foot infection  Left LE gangrene and post AKA 11/1  Stump w a lower bruise - clean  R foot diabetic ulcer w 5th toe gangrene and forefoot cellulitis  For 5th toe amputation 11/7  PAD post R leg angioplasty 11/4      Discussion / summary of stay / plan of care/ Recommendations:   Post zosyn 11/1 -11/3  Vanco 11/1 -11/3  Cellulitis persistent though improved, R forefoot   Resume zosyn 11/5   5th toe amp on 11/7 - wound looks great 11/8  Stop all AB  11/9 - anticipate DC off AB  Recommendations:     Monitor off antibiotics   Wound Care  Awaiting placement for Rehab    Infection Control Recommendations   [place] Precautions  Contact Isolation     Antimicrobial Stewardship Recommendations   Simplification of therapy  Targeted therapy      History of Present Illness:   Initial history:  Jane Saavedra is a 34 y.o.-year-old female admitted with chest pain and anxiety associated with an upcoming vascular procedure.  The procedure was a left above-knee amputation which occurred on 11/1/2024 after admission  Stump afterwards, looked clean but has a bruise underneath it.    She also has a wound over the right foot-   Vascular ultimately took 4 open 11/4 with manage angioplasty for the right lower extremity arterial  She continues to have pain over the left stump w gangrene R 5th toe - scheduled for 5th toe amputation 11/7/24 by podiatry.    ID called for antibiotics         Diabetes type 2, left lower extremity gangrene with BKA 10/2024  CAD post CABG 2023                Interval changes  11/10/2024   Patient Vitals for the past 8 hrs:   BP Temp Temp src Pulse Resp SpO2   11/10/24 1745 124/81 98.9 °F (37.2 °C) Oral 96 18 97 %   11/10/24 1620 124/81 98.9 °F (37.2 °C) Oral 96 19 --   11/10/24 1357 -- -- -- -- 16 --  psoas and paraspinal    STEMI (ST elevation myocardial infarction) (HCC) 07/04/2023    Thrombus     LT LOWER EXTREMITY    Type II or unspecified type diabetes mellitus without mention of complication, uncontrolled 02/21/2015    tx started May 2015    Under care of team     Cardiology, Brian Mi , last seen 12/7/2023    Under care of team     Neurosurgery, Brian Nuñez , last seen 4/11/2024    Under care of team     Podiatry, last seen 7/1/2024    Under care of team     Endocrinology , ? ( dc instructions state to call for appointment )    Under care of team     Vascular , Dr. Watson , ( dc instructions state to call for appointment )    Under care of team     Cardiology , ?  ( dc instructions state to call for appointment )    Wellness examination     PCP , ? ( dc instructions state to call for appointment )       Past Surgical  History:     Past Surgical History:   Procedure Laterality Date    ABOVE KNEE AMPUTATION Left 11/01/2024    DR WATSON    BACK SURGERY Left 08/10/2023    FLANK ABSCESS I&D ,  performed by Sánchez Leonard MD at [facility] OR    CARDIAC CATHETERIZATION  07/05/2023    restenosis d/t medication non compliance : Fabiola Garcia in [facility]    CARPAL TUNNEL RELEASE Left 07/22/2020    CORONARY ANGIOPLASTY WITH STENT PLACEMENT  01/06/2023    REED x2 to L CX , REED to LAD : Brian    CORONARY ARTERY BYPASS GRAFT N/A 07/10/2023    x 3 vessels using LIMA and right saph,   performed by Malina Gilliam MD at [facility] CVOR    IR DRAIN SKIN ABSCESS  11/26/2023    drain tube placement    LAMINECTOMY AND MICRODISCECTOMY LUMBAR SPINE  03/28/2024    L4-5 , Brian Mi    LEFT ATRIAL APPENDAGE LIGATION  07/10/2023    during bypass    LEG AMPUTATION BELOW KNEE Left 10/17/2024    BELOW KNEE AMPUTATION, POSSIBLE ABOVE KNEE AMPUTATION performed by Emperatriz Watson MD at [facility]    LEG SURGERY Left 11/1/2024    ABOVE KNEE AMPUTATION performed by Emperatriz Watson MD at [facility]    TOE AMPUTATION Left

## 2024-11-10 NOTE — PROGRESS NOTES
[facility]  Office: [phone]  Jad Gracia DO, Yanick Nick DO, Andrew Wall DO, Dagoberto Anderson DO, Vinicius Church MD, Ruchi Vuong MD, Kasia Lewis MD, Jeanette Yates MD,  Owen Castro MD, Aleshia Watson MD, Nahomy Muller MD,  Gm Ocampo DO, Mindy Ronquillo MD, Sam Morris MD, Eliseo Gracia DO, Teresa Antunez MD,  Ambrocio Moore DO, Enriqueta Farrell MD, Andreea Dee MD, Mami Austin MD, Brianna Hurtado MD,  Lenny Ventura MD, Tad Palacios MD, Janelle Medina MD, Igor Hyde MD, Juan Carlos Oropeza MD, Andrew Tripathi MD, Justin Daugherty DO, Rico Chauhan MD, Shirley Waterhouse, CNP,  Tennille Bañuelos, CNP, Justin Hines, CNP,  Niki Haines, DEEPIKA, Radha Farooq, CNP, Karlie Chang, CNP, Jayne Bruno, CNP, Hawa Khalil, CNP, ERICKA De LeonC, ERICKA CanasC, Caity Monson, CNP, Destiny Castillo, CNP,  Era Wu, CNP, Marilynn Smith, CNP,  Carol Singleton, CNP, Emilie Osborn, CNP         [facility]   IN-PATIENT SERVICE   [facility]    Progress Note    11/10/2024    8:11 AM    Name:   Jane Saavedra  MRN:     [id number]     Acct:      [id number]   Room:   2003/2003-01  IP Day:  9  Admit Date:  10/31/2024 10:53 PM    PCP:   Marissa Wang MD  Code Status:  Full Code    Subjective:     C/C:   Chief Complaint   Patient presents with    Chest Pain    Hyperglycemia     Interval History Status: improved.     Vitals reviewed, afebrile and hemodynamically stable.  Saturating well on room air.  Labs reviewed, blood sugars are adequately controlled, CRP continues to downtrend to 7.3, no leukocytosis, hemoglobin is stable, platelets are stable.  Stop further labs.  Overnight patient had no significant events.    On examination patient resting comfortably in bed.  Awaiting placement.  Continues to complain of right foot pain but otherwise denies any complaints.    Brief History:     This is a 34-year-old female with a significant  Anesthesia Type: 1% lidocaine with epinephrine Detail Level: Detailed Medical Necessity Information: It is in your best interest to select a reason for this procedure from the list below. All of these items fulfill various CMS LCD requirements except the new and changing color options. Include Z78.9 (Other Specified Conditions Influencing Health Status) As An Associated Diagnosis?: No Add Associated Diagnoses If Applicable When Selecting Medical Necessity: Yes Anesthesia Volume In Cc: 3 Medical Necessity Clause: This procedure was medically necessary because the lesions that were treated were: Consent: Written consent obtained and the risks of skin tag removal was reviewed with the patient including but not limited to bleeding, pigmentary change, infection, pain, and remote possibility of scarring.

## 2024-11-10 NOTE — CARE COORDINATION
Met with patient to discuss transitional planning. Updated her that precert is still pending. Requested SNF choices. She no longer has list. New list provided    1037 received SNF choices from patient. Referrals sent to [facility], [facility] [place]    1200 received call from Cat from [facility]. She is reviewing    1657 spoke to Joy from [facility]. Precert is pending

## 2024-11-10 NOTE — PLAN OF CARE
Problem: Chronic Conditions and Co-morbidities  Goal: Patient's chronic conditions and co-morbidity symptoms are monitored and maintained or improved  11/9/2024 2110 by Edel Do RN  Outcome: Progressing  Flowsheets (Taken 11/9/2024 2058)  Care Plan - Patient's Chronic Conditions and Co-Morbidity Symptoms are Monitored and Maintained or Improved:   Monitor and assess patient's chronic conditions and comorbid symptoms for stability, deterioration, or improvement   Collaborate with multidisciplinary team to address chronic and comorbid conditions and prevent exacerbation or deterioration   Update acute care plan with appropriate goals if chronic or comorbid symptoms are exacerbated and prevent overall improvement and discharge  11/9/2024 1353 by Valentino Acosta RN  Outcome: Progressing     Problem: Discharge Planning  Goal: Discharge to home or other facility with appropriate resources  11/9/2024 2110 by Edle Do RN  Outcome: Progressing  Flowsheets (Taken 11/9/2024 2058)  Discharge to home or other facility with appropriate resources:   Identify barriers to discharge with patient and caregiver   Arrange for needed discharge resources and transportation as appropriate   Identify discharge learning needs (meds, wound care, etc)   Refer to discharge planning if patient needs post-hospital services based on physician order or complex needs related to functional status, cognitive ability or social support system  11/9/2024 1353 by Valentino Acosta RN  Outcome: Progressing  Flowsheets (Taken 11/9/2024 0800)  Discharge to home or other facility with appropriate resources:   Identify barriers to discharge with patient and caregiver   Arrange for needed discharge resources and transportation as appropriate   Identify discharge learning needs (meds, wound care, etc)   Refer to discharge planning if patient needs post-hospital services based on physician order or complex needs related to functional status,  cognitive ability or social support system     Problem: Pain  Goal: Verbalizes/displays adequate comfort level or baseline comfort level  11/9/2024 2110 by Edel Do RN  Outcome: Progressing  Flowsheets (Taken 11/9/2024 1917)  Verbalizes/displays adequate comfort level or baseline comfort level:   Encourage patient to monitor pain and request assistance   Assess pain using appropriate pain scale   Administer analgesics based on type and severity of pain and evaluate response   Implement non-pharmacological measures as appropriate and evaluate response   Notify Licensed Independent Practitioner if interventions unsuccessful or patient reports new pain  11/9/2024 1353 by Valentino Acosta RN  Outcome: Progressing     Problem: Safety - Adult  Goal: Free from fall injury  11/9/2024 2110 by Edel Do RN  Outcome: Progressing  11/9/2024 1353 by Valentino Acosta RN  Outcome: Progressing     Problem: ABCDS Injury Assessment  Goal: Absence of physical injury  11/9/2024 2110 by Edel Do RN  Outcome: Progressing  11/9/2024 1353 by Valentino Acosta RN  Outcome: Progressing     Problem: Skin/Tissue Integrity  Goal: Absence of new skin breakdown  Description: 1.  Monitor for areas of redness and/or skin breakdown  2.  Assess vascular access sites hourly  3.  Every 4-6 hours minimum:  Change oxygen saturation probe site  4.  Every 4-6 hours:  If on nasal continuous positive airway pressure, respiratory therapy assess nares and determine need for appliance change or resting period.  11/9/2024 2110 by Edel Do RN  Outcome: Progressing  11/9/2024 1353 by Valentino Acosta RN  Outcome: Progressing     Problem: Neurosensory - Adult  Goal: Achieves stable or improved neurological status  11/9/2024 2110 by Edel Do RN  Outcome: Progressing  Flowsheets (Taken 11/9/2024 2058)  Achieves stable or improved neurological status:   Assess for and report changes in neurological status   Monitor temperature,

## 2024-11-11 VITALS
DIASTOLIC BLOOD PRESSURE: 79 MMHG | HEART RATE: 90 BPM | TEMPERATURE: 98.2 F | SYSTOLIC BLOOD PRESSURE: 123 MMHG | BODY MASS INDEX: 24.37 KG/M2 | WEIGHT: 170.19 LBS | HEIGHT: 70 IN | OXYGEN SATURATION: 96 % | RESPIRATION RATE: 18 BRPM

## 2024-11-11 LAB
GLUCOSE BLD-MCNC: 149 MG/DL (ref 65–105)
GLUCOSE BLD-MCNC: 181 MG/DL (ref 65–105)
GLUCOSE BLD-MCNC: 209 MG/DL (ref 65–105)
GLUCOSE BLD-MCNC: 216 MG/DL (ref 65–105)

## 2024-11-11 PROCEDURE — 6370000000 HC RX 637 (ALT 250 FOR IP): Performed by: STUDENT IN AN ORGANIZED HEALTH CARE EDUCATION/TRAINING PROGRAM

## 2024-11-11 PROCEDURE — 99232 SBSQ HOSP IP/OBS MODERATE 35: CPT | Performed by: INTERNAL MEDICINE

## 2024-11-11 PROCEDURE — 6370000000 HC RX 637 (ALT 250 FOR IP): Performed by: NURSE PRACTITIONER

## 2024-11-11 PROCEDURE — 82947 ASSAY GLUCOSE BLOOD QUANT: CPT

## 2024-11-11 PROCEDURE — 2580000003 HC RX 258: Performed by: STUDENT IN AN ORGANIZED HEALTH CARE EDUCATION/TRAINING PROGRAM

## 2024-11-11 PROCEDURE — 99239 HOSP IP/OBS DSCHRG MGMT >30: CPT | Performed by: STUDENT IN AN ORGANIZED HEALTH CARE EDUCATION/TRAINING PROGRAM

## 2024-11-11 PROCEDURE — 6360000002 HC RX W HCPCS: Performed by: STUDENT IN AN ORGANIZED HEALTH CARE EDUCATION/TRAINING PROGRAM

## 2024-11-11 RX ORDER — LOSARTAN POTASSIUM 50 MG/1
25 TABLET ORAL DAILY
Status: DISCONTINUED | OUTPATIENT
Start: 2024-11-12 | End: 2024-11-11 | Stop reason: HOSPADM

## 2024-11-11 RX ORDER — HEPARIN SODIUM 5000 [USP'U]/ML
5000 INJECTION, SOLUTION INTRAVENOUS; SUBCUTANEOUS EVERY 8 HOURS SCHEDULED
Status: DISCONTINUED | OUTPATIENT
Start: 2024-11-11 | End: 2024-11-11 | Stop reason: HOSPADM

## 2024-11-11 RX ORDER — METOPROLOL SUCCINATE 25 MG/1
25 TABLET, EXTENDED RELEASE ORAL DAILY
Qty: 30 TABLET | Refills: 3 | Status: SHIPPED | OUTPATIENT
Start: 2024-11-12

## 2024-11-11 RX ORDER — MORPHINE SULFATE 2 MG/ML
2 INJECTION, SOLUTION INTRAMUSCULAR; INTRAVENOUS ONCE
Status: COMPLETED | OUTPATIENT
Start: 2024-11-11 | End: 2024-11-11

## 2024-11-11 RX ORDER — SERTRALINE HYDROCHLORIDE 25 MG/1
25 TABLET, FILM COATED ORAL DAILY
Qty: 30 TABLET | Refills: 3 | Status: SHIPPED | OUTPATIENT
Start: 2024-11-12

## 2024-11-11 RX ORDER — LOSARTAN POTASSIUM 25 MG/1
25 TABLET ORAL DAILY
Qty: 30 TABLET | Refills: 3 | Status: SHIPPED | OUTPATIENT
Start: 2024-11-12

## 2024-11-11 RX ADMIN — GABAPENTIN 300 MG: 300 CAPSULE ORAL at 16:06

## 2024-11-11 RX ADMIN — GABAPENTIN 300 MG: 300 CAPSULE ORAL at 08:25

## 2024-11-11 RX ADMIN — INSULIN LISPRO 4 UNITS: 100 INJECTION, SOLUTION INTRAVENOUS; SUBCUTANEOUS at 17:06

## 2024-11-11 RX ADMIN — CYCLOBENZAPRINE 10 MG: 10 TABLET, FILM COATED ORAL at 19:36

## 2024-11-11 RX ADMIN — SODIUM CHLORIDE, PRESERVATIVE FREE 10 ML: 5 INJECTION INTRAVENOUS at 08:22

## 2024-11-11 RX ADMIN — POLYETHYLENE GLYCOL 3350 17 G: 17 POWDER, FOR SOLUTION ORAL at 08:24

## 2024-11-11 RX ADMIN — ASPIRIN 81 MG: 81 TABLET, COATED ORAL at 08:25

## 2024-11-11 RX ADMIN — GABAPENTIN 300 MG: 300 CAPSULE ORAL at 19:36

## 2024-11-11 RX ADMIN — SENNOSIDES AND DOCUSATE SODIUM 2 TABLET: 50; 8.6 TABLET ORAL at 08:25

## 2024-11-11 RX ADMIN — INSULIN LISPRO 4 UNITS: 100 INJECTION, SOLUTION INTRAVENOUS; SUBCUTANEOUS at 08:21

## 2024-11-11 RX ADMIN — INSULIN GLARGINE 40 UNITS: 100 INJECTION, SOLUTION SUBCUTANEOUS at 19:36

## 2024-11-11 RX ADMIN — ATORVASTATIN CALCIUM 80 MG: 80 TABLET, FILM COATED ORAL at 19:36

## 2024-11-11 RX ADMIN — INSULIN LISPRO 4 UNITS: 100 INJECTION, SOLUTION INTRAVENOUS; SUBCUTANEOUS at 12:14

## 2024-11-11 RX ADMIN — LOSARTAN POTASSIUM 12.5 MG: 25 TABLET, FILM COATED ORAL at 08:25

## 2024-11-11 RX ADMIN — INSULIN GLARGINE 10 UNITS: 100 INJECTION, SOLUTION SUBCUTANEOUS at 08:21

## 2024-11-11 RX ADMIN — OXYCODONE HYDROCHLORIDE AND ACETAMINOPHEN 2 TABLET: 5; 325 TABLET ORAL at 08:24

## 2024-11-11 RX ADMIN — SERTRALINE HYDROCHLORIDE 25 MG: 25 TABLET ORAL at 08:26

## 2024-11-11 RX ADMIN — MORPHINE SULFATE 2 MG: 2 INJECTION, SOLUTION INTRAMUSCULAR; INTRAVENOUS at 18:27

## 2024-11-11 RX ADMIN — CYCLOBENZAPRINE 10 MG: 10 TABLET, FILM COATED ORAL at 08:25

## 2024-11-11 RX ADMIN — OXYCODONE HYDROCHLORIDE AND ACETAMINOPHEN 2 TABLET: 5; 325 TABLET ORAL at 16:06

## 2024-11-11 RX ADMIN — CYCLOBENZAPRINE 10 MG: 10 TABLET, FILM COATED ORAL at 16:06

## 2024-11-11 RX ADMIN — METOPROLOL SUCCINATE 25 MG: 25 TABLET, EXTENDED RELEASE ORAL at 08:24

## 2024-11-11 ASSESSMENT — PAIN DESCRIPTION - ORIENTATION: ORIENTATION: LEFT;RIGHT

## 2024-11-11 ASSESSMENT — PAIN DESCRIPTION - ONSET: ONSET: GRADUAL

## 2024-11-11 ASSESSMENT — ENCOUNTER SYMPTOMS
SHORTNESS OF BREATH: 0
COLOR CHANGE: 1
ABDOMINAL DISTENTION: 0
EYE REDNESS: 0
COUGH: 0
PHOTOPHOBIA: 0
EYE DISCHARGE: 0
APNEA: 0

## 2024-11-11 ASSESSMENT — PAIN DESCRIPTION - DESCRIPTORS: DESCRIPTORS: ACHING;BURNING

## 2024-11-11 ASSESSMENT — PAIN SCALES - GENERAL
PAINLEVEL_OUTOF10: 9
PAINLEVEL_OUTOF10: 8
PAINLEVEL_OUTOF10: 7
PAINLEVEL_OUTOF10: 9
PAINLEVEL_OUTOF10: 8

## 2024-11-11 ASSESSMENT — PAIN - FUNCTIONAL ASSESSMENT: PAIN_FUNCTIONAL_ASSESSMENT: PREVENTS OR INTERFERES SOME ACTIVE ACTIVITIES AND ADLS

## 2024-11-11 ASSESSMENT — PAIN DESCRIPTION - FREQUENCY: FREQUENCY: CONTINUOUS

## 2024-11-11 ASSESSMENT — PAIN DESCRIPTION - LOCATION: LOCATION: LEG;FOOT

## 2024-11-11 ASSESSMENT — PAIN DESCRIPTION - PAIN TYPE: TYPE: SURGICAL PAIN

## 2024-11-11 NOTE — PLAN OF CARE
Problem: Chronic Conditions and Co-morbidities  Goal: Patient's chronic conditions and co-morbidity symptoms are monitored and maintained or improved  11/10/2024 1945 by Edel Do RN  Outcome: Progressing  Flowsheets (Taken 11/10/2024 1932)  Care Plan - Patient's Chronic Conditions and Co-Morbidity Symptoms are Monitored and Maintained or Improved:   Monitor and assess patient's chronic conditions and comorbid symptoms for stability, deterioration, or improvement   Collaborate with multidisciplinary team to address chronic and comorbid conditions and prevent exacerbation or deterioration   Update acute care plan with appropriate goals if chronic or comorbid symptoms are exacerbated and prevent overall improvement and discharge  11/10/2024 1250 by Valentino Acosta RN  Outcome: Progressing     Problem: Discharge Planning  Goal: Discharge to home or other facility with appropriate resources  11/10/2024 1945 by Edel Do RN  Outcome: Progressing  Flowsheets (Taken 11/10/2024 1932)  Discharge to home or other facility with appropriate resources:   Identify barriers to discharge with patient and caregiver   Arrange for needed discharge resources and transportation as appropriate   Identify discharge learning needs (meds, wound care, etc)   Refer to discharge planning if patient needs post-hospital services based on physician order or complex needs related to functional status, cognitive ability or social support system  11/10/2024 1250 by Valentino Acosta RN  Outcome: Progressing  Flowsheets (Taken 11/10/2024 0800)  Discharge to home or other facility with appropriate resources:   Identify barriers to discharge with patient and caregiver   Arrange for needed discharge resources and transportation as appropriate   Identify discharge learning needs (meds, wound care, etc)   Refer to discharge planning if patient needs post-hospital services based on physician order or complex needs related to functional

## 2024-11-11 NOTE — DISCHARGE INSTR - COC
Continuity of Care Form    Patient Name: Jane Saavedra   :  1990  MRN:  [id number]    Admit date:  10/31/2024  Discharge date:  24  Code Status Order: Full Code   Advance Directives:   Advance Care Flowsheet Documentation        Date/Time Healthcare Directive Type of Healthcare Directive Copy in Chart Healthcare Agent Appointed Healthcare Agent's Name Healthcare Agent's Phone Number    24 1212 No, patient does not have an advance directive for healthcare treatment  --  --  --  --  --                     Admitting Physician:  No admitting provider for patient encounter.  PCP: Marissa Wang MD    Discharging Nurse: CHRISTI Lombardi   Discharging Hospital Unit/Room#:   Discharging Unit Phone Number: [phone]    Emergency Contact:   Extended Emergency Contact Information  Primary Emergency Contact: Ken Mcintosh  Home Phone: [phone]  Work Phone: [phone]  Mobile Phone: [phone]  Relation: Girlfriend   needed? No  Secondary Emergency Contact: Don't,Contact  Relation: None    Past Surgical History:  Past Surgical History:   Procedure Laterality Date    ABOVE KNEE AMPUTATION Left 2024    DR SWEET    BACK SURGERY Left 08/10/2023    FLANK ABSCESS I&D ,  performed by Sánchez Leonard MD at [facility] OR    CARDIAC CATHETERIZATION  2023    restenosis d/t medication non compliance : Fabiola Garcia in [facility]    CARPAL TUNNEL RELEASE Left 2020    CORONARY ANGIOPLASTY WITH STENT PLACEMENT  2023    REED x2 to L CX , REED to LAD : Brian    CORONARY ARTERY BYPASS GRAFT N/A 07/10/2023    x 3 vessels using LIMA and right saph,   performed by Malina Gilliam MD at [facility] CVOR    IR DRAIN SKIN ABSCESS  2023    drain tube placement    LAMINECTOMY AND MICRODISCECTOMY LUMBAR SPINE  2024    L4-5 , [place]    LEFT ATRIAL APPENDAGE LIGATION  07/10/2023    during bypass    LEG AMPUTATION BELOW KNEE Left 10/17/2024    BELOW KNEE AMPUTATION,  (Comment) 11/11/24 0825   Number of days: 24       Incision 11/01/24 Knee Left;Upper (Active)   Wound Image   11/07/24 1923   Dressing Status Clean;Dry;Intact 11/11/24 0825   Dressing Change Due 11/11/24 11/11/24 0825   Incision Cleansed Not Cleansed 11/11/24 0825   Dressing/Treatment Gauze dressing/dressing sponge;Other (comment) 11/11/24 0825   Closure Other (Comment) 11/11/24 0825   Margins Other (Comment) 11/11/24 0825   Incision Assessment Other (Comment) 11/11/24 0825   Drainage Amount None (dry) 11/11/24 0825   Drainage Description Sanguinous 11/07/24 1615   Odor Other (comment) 11/11/24 0825   Franci-incision Assessment Other (Comment) 11/11/24 0825   Number of days: 10        Elimination:  Continence:   Bowel: Yes  Bladder: Yes  Urinary Catheter: None   Colostomy/Ileostomy/Ileal Conduit: No       Date of Last BM: 11/11/24    Intake/Output Summary (Last 24 hours) at 11/11/2024 1642  Last data filed at 11/10/2024 1932  Gross per 24 hour   Intake 450 ml   Output --   Net 450 ml     I/O last 3 completed shifts:  In: 450 [P.O.:450]  Out: -     Safety Concerns:     At Risk for Falls    Impairments/Disabilities:      Amputation - AKA left leg; partial amputation right 5th toe    Nutrition Therapy:  Current Nutrition Therapy:   - Oral Diet:  Carb Control 4 carbs/meal (1800kcals/day)  - Oral Nutrition Supplement:  Diabetic  three times a day    Routes of Feeding: Oral  Liquids: No Restrictions  Daily Fluid Restriction: no  Last Modified Barium Swallow with Video (Video Swallowing Test): not done    Treatments at the Time of Hospital Discharge:   Respiratory Treatments: none  Oxygen Therapy:  is not on home oxygen therapy.  Ventilator:    - No ventilator support    Rehab Therapies: Physical Therapy and Occupational Therapy  Weight Bearing Status/Restrictions: Non-weight bearing on right leg  Other Medical Equipment (for information only, NOT a DME order):  wheelchair  Other Treatments: post surgical boot  Daily

## 2024-11-11 NOTE — PLAN OF CARE
Problem: Chronic Conditions and Co-morbidities  Goal: Patient's chronic conditions and co-morbidity symptoms are monitored and maintained or improved  Outcome: Progressing     Problem: Discharge Planning  Goal: Discharge to home or other facility with appropriate resources  Outcome: Progressing     Problem: Pain  Goal: Verbalizes/displays adequate comfort level or baseline comfort level  Outcome: Progressing     Problem: Safety - Adult  Goal: Free from fall injury  Outcome: Progressing     Problem: ABCDS Injury Assessment  Goal: Absence of physical injury  Outcome: Progressing     Problem: Neurosensory - Adult  Goal: Achieves maximal functionality and self care  Outcome: Progressing     Problem: Skin/Tissue Integrity - Adult  Goal: Incisions, wounds, or drain sites healing without S/S of infection  Outcome: Progressing

## 2024-11-11 NOTE — DISCHARGE SUMMARY
[facility]  Office: [phone]  Jad Gracia DO, Yanick Nick DO, Andrew Wall DO, Dagoberto Anderson DO, Vinicius Church MD, Ruchi Vuong MD, Kasia Lewis MD, Jeanette Yates MD,  Owen Castro MD, Aleshia Waston MD, Nahomy Muller MD,  Gm Ocampo DO, Mindy Ronquillo MD, Sam Morris MD, Eliseo Gracia DO, Teresa Antunez MD,  Ambrocio Moore DO, Enriqueta Farrell MD, Andreea Dee MD, Mami Austin MD, Brianna Hurtado MD,  Lenny Ventura MD, Tad Palacios MD, Janelle Medina MD, Igor Hyde MD, Juan Carlos Oropeza MD, Andrew Tripathi MD, Justin Daugherty DO, Rico Chauhan MD, Shirley Waterhouse, CNP,  Tennille Bañuelos, CNP, Justin Hines, CNP,  Niki Haines, DEEPIKA, Radha Farooq, CNP, Karlie Chang, CNP, Jayne Bruno, CNP, Hawa Khalil, CNP, Eve Calloway PA-C, Meghan Patrick PA-C, Caity Monson, CNP, Destiny Castillo, CNP,  Era Wu, CNP, Marilynn Smith, CNP,  Carol Singleton, CNP, Emilie Osborn, CNP         [facility]   IN-PATIENT SERVICE   [facility]    Discharge Summary     Patient ID: Jane Saavedra  :  1990   MRN: [id number]     ACCOUNT:  [id number]   Patient's PCP: Marissa Wang MD  Admit Date: 10/31/2024   Discharge Date: 2024     Length of Stay: 10  Code Status:  Full Code  Admitting Physician: No admitting provider for patient encounter.  Discharge Physician: Mindy Roqnuillo MD     Active Discharge Diagnoses:     Hospital Problem Lists:  Principal Problem:    Gangrene of right foot (HCC)  Active Problems:    PAD (peripheral artery disease) (HCC)    Gangrene (HCC)    Sepsis (HCC)    S/P AKA (above knee amputation) unilateral, left (HCC)    Subacute osteomyelitis of right foot    Above knee amputation of left lower extremity (HCC)  Resolved Problems:    * No resolved hospital problems. *      Admission Condition:  poor     Discharged Condition: fair    Hospital Stay:     Hospital Course:  Jane Saavedra is a 34 y.o.  additional to accommodate PRN testing needs.     Blood Pressure Kit  1 kit by Does not apply route daily     Dexcom G7 Sensor Misc  Apply new sensor to back of arm every 10 days.     ezetimibe 10 MG tablet  Commonly known as: ZETIA  Take 1 tablet by mouth daily     gabapentin 300 MG capsule  Commonly known as: NEURONTIN  Take 1 capsule by mouth 3 times daily for 30 days.     * glucose monitoring kit  1 kit by Does not apply route daily     * BLOOD GLUCOSE MONITOR KIT Kit  Check bs bid     * glucose monitoring kit  1 kit by Does not apply route daily     insulin glargine 100 UNIT/ML injection vial  Commonly known as: LANTUS  Inject 40 Units into the skin nightly     insulin lispro 100 UNIT/ML Soln injection vial  Commonly known as: HUMALOG,ADMELOG  Inject 5 Units into the skin 3 times daily (with meals)     Insulin Pen Needle 32G X 4 MM Misc  1 each by Does not apply route daily     * Lancets Misc  1 each by Does not apply route daily     * Lancets Misc  1 each by Does not apply route daily     Misc. Devices Misc  1 PAIR OF DIABETIC SHOES (1 LEFT/ 1 RIGHT)  1-3 PAIRS OF INSERTS (LEFT/ RIGHT)     NovoLOG FlexPen 100 UNIT/ML injection pen  Generic drug: insulin aspart  Inject 3 Units into the skin 3 times daily (before meals)     ondansetron 4 MG disintegrating tablet  Commonly known as: ZOFRAN-ODT  Take 1 tablet by mouth every 8 hours as needed for Nausea or Vomiting     polyethylene glycol 17 g packet  Commonly known as: GLYCOLAX  Take 1 packet by mouth daily as needed for Constipation     sennosides-docusate sodium 8.6-50 MG tablet  Commonly known as: SENOKOT-S  Take 2 tablets by mouth 2 times daily as needed for Constipation           * This list has 7 medication(s) that are the same as other medications prescribed for you. Read the directions carefully, and ask your doctor or other care provider to review them with you.                STOP taking these medications      apixaban 5 MG Tabs tablet  Commonly known as:

## 2024-11-11 NOTE — CARE COORDINATION
Voicemail left for Licha from [facility] requesting return call to follow up on precert    1300 spoke to Granville from [facility]. Precert is still pending. Patient updated. She requested referral sent to [facility]    1443 spoke to Granville from [facility]. Precert approved. They are able to accept today after 7 pm. Transportation request faxed to [facility]    1526 patient scheduled for 8 pm transport. Notified Janey from [facility]. Patient updated    1642 AVS faxed    Discharge Report    [facility]  Clinical Case Management Department  Written by: Savannah Do RN    Patient Name: Jane Saavedra  Attending Provider: Mindy Ronquillo MD  Admit Date: 10/31/2024 10:53 PM  MRN: [id number]  Account: [id number]                     : 1990  Discharge Date: 2024      Disposition: BOBBY Do RN

## 2024-11-11 NOTE — PROGRESS NOTES
infarction) (HCC), Osteomyelitis of foot, left, acute, PAD (peripheral artery disease) (HCC), Renal abscess, left, STEMI (ST elevation myocardial infarction) (HCC), Thrombus, Type II or unspecified type diabetes mellitus without mention of complication, uncontrolled, Under care of team, Under care of team, Under care of team, Under care of team, Under care of team, Under care of team, and Wellness examination.    Social History:   reports that she has quit smoking. Her smoking use included cigarettes. She has never used smokeless tobacco. She reports that she does not currently use drugs after having used the following drugs: Marijuana (Weed). She reports that she does not drink alcohol.     Family History:   Family History   Problem Relation Age of Onset    Diabetes Mother        Vitals:  /75   Pulse (!) 106   Temp 98.4 °F (36.9 °C) (Oral)   Resp 15   Ht 1.778 m (5' 10\")   Wt 77.2 kg (170 lb 3.1 oz)   SpO2 97%   BMI 24.42 kg/m²   Temp (24hrs), Av.5 °F (36.9 °C), Min:98.1 °F (36.7 °C), Max:98.9 °F (37.2 °C)    Recent Labs     11/10/24  1123 11/10/24  1623 11/10/24  2006 11/11/24  0803   POCGLU 219* 188* 128* 209*       I/O (24Hr):    Intake/Output Summary (Last 24 hours) at 2024 1338  Last data filed at 11/10/2024 1932  Gross per 24 hour   Intake 450 ml   Output --   Net 450 ml           Labs:  Hematology:  Recent Labs     24  0654 11/10/24  0555   WBC 8.1 6.5   RBC 3.90* 3.81*   HGB 11.2* 10.8*   HCT 36.1* 34.7*   MCV 92.6 91.1   MCH 28.7 28.3   MCHC 31.0 31.1   RDW 14.1 14.1   * 469*   MPV 9.1 9.2   SEDRATE 98* 91*   CRP 11.6* 7.3*     Chemistry:  No results for input(s): \"NA\", \"K\", \"CL\", \"CO2\", \"GLUCOSE\", \"BUN\", \"CREATININE\", \"MG\", \"ANIONGAP\", \"LABGLOM\", \"GFRAA\", \"CALCIUM\", \"CAION\", \"PHOS\", \"PSA\", \"PROBNP\", \"TROPHS\", \"CKTOTAL\", \"CKMB\", \"CKMBINDEX\", \"MYOGLOBIN\", \"DIGOXIN\", \"LACTACIDWB\" in the last 72 hours.    Recent Labs     11/09/24  2012 11/10/24  0651 11/10/24  1123  for dry gangrene- s/p angiogram right side with balloon angioplasty, MRI in August showed concern for osteomyelitis, completed zosyn.  ID following, underwent partial fifth ray amputation of right foot on 11/7 under podiatry.    Chest pain with history of CAD with CABG- resolved, continue to watch for chest pain    Hyperglycemia with type 2 diabetes mellitus-adjust Lantus, continue sliding scale, continue to watch blood sugars    Hypertension- blood pressure stable, increase cozaar to 25mg and continue metoprolol, DC Imdur.    Depression- continue on Zoloft    PM&R evaluated, patient does not meet criteria for inpatient rehab.  [facility]  Ready for discharge      Mindy Ronquillo MD  11/11/2024  1:38 PM

## 2024-11-11 NOTE — PROGRESS NOTES
[facility] -   Infectious diseases evaluation  admission date 10/31/2024    reason for consultation:   gangrene    Impression :   Current:  Diabetic  Left LE gangrene and popst AKA 11/1  Stump w a lower bruise - clean  R foot diabetic ulcer w 5th toe gangrene and forefoot cellulitis  For 5th toe amputation 11/7  PAD post R leg angioplasty 11/4    Other:    Discussion / summary of stay / plan of care/ Recommendations:   Post zosyn 11/1 -11/3  Vanco 11/1 -11/3  HENCE:   Cellulitis persistent though improved, R forefoot   Resume zosyn 11/5   5th toe amp on 11/7 - wound looks great 11/8  Stop all AB  11/9 - anticipate DC off AB    Infection Control Recommendations   [place] Precautions  Contact Isolation     Antimicrobial Stewardship Recommendations   Simplification of therapy  Targeted therapy      History of Present Illness:   Initial history:  Jane Saavedra is a 34 y.o.-year-old female admitted with chest pain and anxiety associated with an upcoming vascular procedure.  The procedure was a left above-knee amputation which occurred on 11/1/2024 after admission  Stump afterwards, looked clean but has a bruise underneath it.    She also has a wound over the right foot-   Vascular ultimately took 4 open 11/4 with manage angioplasty for the right lower extremity arterial  She continues to have pain over the left stump w gangrene R 5th toe - scheduled for 5th toe amputation 11/7/24 by podiatry.    ID called for antibiotics         Diabetes type 2, left lower extremity gangrene with BKA 10/2024  CAD post CABG 2023                Interval changes  11/11/2024   Patient Vitals for the past 8 hrs:   BP Temp Temp src Pulse Resp SpO2   11/11/24 1641 118/70 98.3 °F (36.8 °C) Oral 90 18 96 %   11/11/24 1157 132/75 98.4 °F (36.9 °C) Oral -- 15 97 %     11/6  Alert appropriate continues to have a lot of pain from the left stump, planned for right fifth toe surgery on 11/7.  Labs

## 2024-11-12 ENCOUNTER — HOSPITAL ENCOUNTER (OUTPATIENT)
Age: 34
Setting detail: SPECIMEN
Discharge: HOME | End: 2024-11-12

## 2024-11-12 PROBLEM — M86.179 ACUTE OSTEOMYELITIS OF TOE: Status: ACTIVE | Noted: 2024-11-12

## 2024-11-12 LAB
ALBUMIN SERPL-MCNC: 3.2 G/DL (ref 3.5–5.2)
ALBUMIN/GLOB SERPL: 0.9 {RATIO} (ref 1–2.5)
ALP SERPL-CCNC: 86 U/L (ref 35–104)
ALT SERPL-CCNC: 15 U/L (ref 10–35)
ANION GAP SERPL CALCULATED.3IONS-SCNC: 11 MMOL/L (ref 9–16)
AST SERPL-CCNC: 15 U/L (ref 10–35)
BASOPHILS # BLD: 0.05 K/UL (ref 0–0.2)
BASOPHILS NFR BLD: 1 % (ref 0–2)
BILIRUB SERPL-MCNC: <0.2 MG/DL (ref 0–1.2)
BUN SERPL-MCNC: 15 MG/DL (ref 6–20)
CALCIUM SERPL-MCNC: 9.2 MG/DL (ref 8.6–10.4)
CHLORIDE SERPL-SCNC: 101 MMOL/L (ref 98–107)
CO2 SERPL-SCNC: 28 MMOL/L (ref 20–31)
CREAT SERPL-MCNC: 0.4 MG/DL (ref 0.5–0.9)
EOSINOPHIL # BLD: 0.25 K/UL (ref 0–0.44)
EOSINOPHILS RELATIVE PERCENT: 3 % (ref 1–4)
ERYTHROCYTE [DISTWIDTH] IN BLOOD BY AUTOMATED COUNT: 13.9 % (ref 11.8–14.4)
GFR, ESTIMATED: >90 ML/MIN/1.73M2
GLUCOSE SERPL-MCNC: 160 MG/DL (ref 74–99)
HCT VFR BLD AUTO: 34 % (ref 36.3–47.1)
HGB BLD-MCNC: 10.8 G/DL (ref 11.9–15.1)
IMM GRANULOCYTES # BLD AUTO: 0.02 K/UL (ref 0–0.3)
IMM GRANULOCYTES NFR BLD: 0 %
LYMPHOCYTES NFR BLD: 3.9 K/UL (ref 1.1–3.7)
LYMPHOCYTES RELATIVE PERCENT: 54 % (ref 24–43)
MCH RBC QN AUTO: 29.3 PG (ref 25.2–33.5)
MCHC RBC AUTO-ENTMCNC: 31.8 G/DL (ref 28.4–34.8)
MCV RBC AUTO: 92.1 FL (ref 82.6–102.9)
MONOCYTES NFR BLD: 0.62 K/UL (ref 0.1–1.2)
MONOCYTES NFR BLD: 8 % (ref 3–12)
NEUTROPHILS NFR BLD: 34 % (ref 36–65)
NEUTS SEG NFR BLD: 2.51 K/UL (ref 1.5–8.1)
NRBC BLD-RTO: 0 PER 100 WBC
PLATELET # BLD AUTO: 453 K/UL (ref 138–453)
PMV BLD AUTO: 9.4 FL (ref 8.1–13.5)
POTASSIUM SERPL-SCNC: 4 MMOL/L (ref 3.7–5.3)
PROT SERPL-MCNC: 6.6 G/DL (ref 6.6–8.7)
RBC # BLD AUTO: 3.69 M/UL (ref 3.95–5.11)
SODIUM SERPL-SCNC: 140 MMOL/L (ref 136–145)
WBC OTHER # BLD: 7.4 K/UL (ref 3.5–11.3)

## 2024-11-12 PROCEDURE — P9603 ONE-WAY ALLOW PRORATED MILES: HCPCS

## 2024-11-12 PROCEDURE — 36415 COLL VENOUS BLD VENIPUNCTURE: CPT

## 2024-11-12 PROCEDURE — 85025 COMPLETE CBC W/AUTO DIFF WBC: CPT

## 2024-11-12 PROCEDURE — 80053 COMPREHEN METABOLIC PANEL: CPT

## 2024-11-12 NOTE — PROGRESS NOTES
Patient instructed to remove shoes and socks and instructed to sit in exam chair.  Current PCP is Marissa Wang MD and date of last visit was 09/12/2024.   Do you have a follow up visit scheduled?  No  If yes, the date is unknown

## 2024-11-12 NOTE — PROGRESS NOTES
Spiritual Health History and Assessment/Progress Note  [facility]    (P) Spiritual/Emotional Needs,  ,  ,      Name: Jane Saavedra MRN: [id number]    Age: 34 y.o.     Sex: female   Language: English   Pentecostalism: [employer]   Gangrene of right foot (HCC)     Date: 11/11/2024            Total Time Calculated: (P) 20 min              Spiritual Assessment began in [facility]        Referral/Consult From: (P) Rounding   Encounter Overview/Reason: (P) Spiritual/Emotional Needs  Service Provided For: (P) Patient    Guera, Belief, Meaning:   Patient is connected with a guera tradition or spiritual practice and has beliefs or practices that help with coping during difficult times  Family/Friends No family/friends present      Importance and Influence:  Patient has no beliefs influential to healthcare decision-making identified during this visit  Family/Friends No family/friends present    Community:  Patient feels well-supported. Support system includes: Other: Family  Family/Friends No family/friends present    Assessment and Plan of Care:     Patient Interventions include: Other:  provided a supportive presence through active listening and words of affirmation.  Family/Friends Interventions include: No family/friends present    Patient Plan of Care: Spiritual Care available upon further referral  Family/Friends Plan of Care: No family/friends present    Electronically signed by Chaplain Rashard on 11/11/2024 at 7:25 PM

## 2024-11-12 NOTE — PROGRESS NOTES
Transport picked up patient to take to [facility]. Patients IV removed and left with all belongings.

## 2024-11-14 ENCOUNTER — TELEPHONE (OUTPATIENT)
Dept: FAMILY MEDICINE CLINIC | Age: 34
End: 2024-11-14

## 2024-11-14 NOTE — TELEPHONE ENCOUNTER
Care Transitions Initial Follow Up Call    Outreach made within 2 business days of discharge: Yes    Patient: Jane Saavedra Patient : 1990   MRN: [id number]  Reason for Admission: Gangrene of right foot   Discharge Date: 24       Spoke with: No answer and voicemail not set up    [facility]

## 2024-11-15 ENCOUNTER — HOSPITAL ENCOUNTER (OUTPATIENT)
Age: 34
Setting detail: SPECIMEN
Discharge: HOME | End: 2024-11-15

## 2024-11-15 LAB
ALBUMIN SERPL-MCNC: 3.4 G/DL (ref 3.5–5.2)
ALBUMIN/GLOB SERPL: 1.1 {RATIO} (ref 1–2.5)
ALP SERPL-CCNC: 85 U/L (ref 35–104)
ALT SERPL-CCNC: 16 U/L (ref 10–35)
ANION GAP SERPL CALCULATED.3IONS-SCNC: 12 MMOL/L (ref 9–16)
AST SERPL-CCNC: 14 U/L (ref 10–35)
BASOPHILS # BLD: 0.03 K/UL (ref 0–0.2)
BASOPHILS NFR BLD: 0 % (ref 0–2)
BILIRUB SERPL-MCNC: <0.2 MG/DL (ref 0–1.2)
BUN SERPL-MCNC: 9 MG/DL (ref 6–20)
CALCIUM SERPL-MCNC: 9.1 MG/DL (ref 8.6–10.4)
CHLORIDE SERPL-SCNC: 104 MMOL/L (ref 98–107)
CO2 SERPL-SCNC: 25 MMOL/L (ref 20–31)
CREAT SERPL-MCNC: 0.4 MG/DL (ref 0.5–0.9)
EOSINOPHIL # BLD: 0.31 K/UL (ref 0–0.44)
EOSINOPHILS RELATIVE PERCENT: 4 % (ref 1–4)
ERYTHROCYTE [DISTWIDTH] IN BLOOD BY AUTOMATED COUNT: 13.9 % (ref 11.8–14.4)
GFR, ESTIMATED: >90 ML/MIN/1.73M2
GLUCOSE SERPL-MCNC: 142 MG/DL (ref 74–99)
HCT VFR BLD AUTO: 33.2 % (ref 36.3–47.1)
HGB BLD-MCNC: 10.5 G/DL (ref 11.9–15.1)
IMM GRANULOCYTES # BLD AUTO: 0.01 K/UL (ref 0–0.3)
IMM GRANULOCYTES NFR BLD: 0 %
LYMPHOCYTES NFR BLD: 3.47 K/UL (ref 1.1–3.7)
LYMPHOCYTES RELATIVE PERCENT: 46 % (ref 24–43)
MCH RBC QN AUTO: 28.7 PG (ref 25.2–33.5)
MCHC RBC AUTO-ENTMCNC: 31.6 G/DL (ref 28.4–34.8)
MCV RBC AUTO: 90.7 FL (ref 82.6–102.9)
MONOCYTES NFR BLD: 0.43 K/UL (ref 0.1–1.2)
MONOCYTES NFR BLD: 6 % (ref 3–12)
NEUTROPHILS NFR BLD: 44 % (ref 36–65)
NEUTS SEG NFR BLD: 3.28 K/UL (ref 1.5–8.1)
NRBC BLD-RTO: 0 PER 100 WBC
PLATELET # BLD AUTO: 482 K/UL (ref 138–453)
PMV BLD AUTO: 9.6 FL (ref 8.1–13.5)
POTASSIUM SERPL-SCNC: 3.9 MMOL/L (ref 3.7–5.3)
PROT SERPL-MCNC: 6.6 G/DL (ref 6.6–8.7)
RBC # BLD AUTO: 3.66 M/UL (ref 3.95–5.11)
SODIUM SERPL-SCNC: 141 MMOL/L (ref 136–145)
WBC OTHER # BLD: 7.5 K/UL (ref 3.5–11.3)

## 2024-11-15 PROCEDURE — 36415 COLL VENOUS BLD VENIPUNCTURE: CPT

## 2024-11-15 PROCEDURE — P9603 ONE-WAY ALLOW PRORATED MILES: HCPCS

## 2024-11-15 PROCEDURE — 85025 COMPLETE CBC W/AUTO DIFF WBC: CPT

## 2024-11-15 PROCEDURE — 80053 COMPREHEN METABOLIC PANEL: CPT

## 2024-11-18 ENCOUNTER — OFFICE VISIT (OUTPATIENT)
Dept: PODIATRY | Age: 34
End: 2024-11-18
Payer: COMMERCIAL

## 2024-11-18 VITALS
BODY MASS INDEX: 24.34 KG/M2 | SYSTOLIC BLOOD PRESSURE: 126 MMHG | DIASTOLIC BLOOD PRESSURE: 64 MMHG | HEIGHT: 70 IN | WEIGHT: 170 LBS | HEART RATE: 90 BPM

## 2024-11-18 DIAGNOSIS — S78.119A ABOVE-KNEE AMPUTATION (HCC): ICD-10-CM

## 2024-11-18 DIAGNOSIS — Z89.421 HISTORY OF PARTIAL RAY AMPUTATION OF FIFTH TOE OF RIGHT FOOT (HCC): ICD-10-CM

## 2024-11-18 DIAGNOSIS — M79.671 FOOT PAIN, RIGHT: Primary | ICD-10-CM

## 2024-11-18 PROCEDURE — 3078F DIAST BP <80 MM HG: CPT | Performed by: STUDENT IN AN ORGANIZED HEALTH CARE EDUCATION/TRAINING PROGRAM

## 2024-11-18 PROCEDURE — 3074F SYST BP LT 130 MM HG: CPT | Performed by: STUDENT IN AN ORGANIZED HEALTH CARE EDUCATION/TRAINING PROGRAM

## 2024-11-18 PROCEDURE — 99214 OFFICE O/P EST MOD 30 MIN: CPT | Performed by: STUDENT IN AN ORGANIZED HEALTH CARE EDUCATION/TRAINING PROGRAM

## 2024-11-18 NOTE — PROGRESS NOTES
[facility]  [address]   [address] [place] [address]  Tel: [phone]   Fax: [phone]    Subjective     Procedure:   Left hallux amputation with fillet (DOS: 7/8/2024), right foot wound  Right partial fifth ray amputation (DOS: 11/7/2024)    Interval history 11/18/2024:  Patient follows up to clinic for wound to right foot after partial fifth ray amputation.  Patient has noted the podiatry service and was recently seen on the floor while inpatient.  Patient denies any constitutional symptoms at this time. She admits to increased pain to the right foot which she correlates with physical therapy/rehab.  She states she also occasionally has phantom limb pain.  Patient states they have been having her apply weight to the area.  Dressing changes have been performed at her rehab facility.  Patient states she has not noted any strikethrough or bleeding through the dressing.. No other pedal complaints at this time.    HPI:  This 34 y.o. female presents for a post op visit. Patient states they are doing well. Pain is well controlled by pain medication.  Has been icing and elevating the extremity as instructed preoperatively and has been weightbearing as tolerated to the heel only while utilizing her surgical screw to the left lower extremity. Denies any current nausea, vomiting, fever, chills, shortness of breath, chest pain or calf pain. Denies any other pedal complaints    Primary care physician is Marissa Wang MD.    ROS:    Constitutional: Denies nausea, vomiting, fever, chills.  Neurologic: Denies numbness, tingling, and burning in the feet.    Vascular: Denies symptoms of lower extremity claudication.    Skin: Close wound with suture present.  Otherwise negative except as noted in the HPI.     PMH:  Past Medical History:   Diagnosis Date    Acute osteomyelitis     LEFT FOOT    Bipolar 1 disorder (HCC)     Cellulitis     LT FOOT    Chronic diabetic ulcer of left foot determined by  Statement Selected

## 2024-11-19 ENCOUNTER — HOSPITAL ENCOUNTER (OUTPATIENT)
Age: 34
Setting detail: SPECIMEN
Discharge: HOME | End: 2024-11-19

## 2024-11-19 ENCOUNTER — TELEPHONE (OUTPATIENT)
Dept: PODIATRY | Age: 34
End: 2024-11-19

## 2024-11-19 PROBLEM — M79.671 FOOT PAIN, RIGHT: Status: ACTIVE | Noted: 2024-11-19

## 2024-11-19 PROBLEM — S78.119A ABOVE-KNEE AMPUTATION (HCC): Status: ACTIVE | Noted: 2024-11-09

## 2024-11-19 PROBLEM — Z89.421 HISTORY OF PARTIAL RAY AMPUTATION OF FIFTH TOE OF RIGHT FOOT (HCC): Status: ACTIVE | Noted: 2024-11-19

## 2024-11-19 LAB
ALBUMIN SERPL-MCNC: 3.2 G/DL (ref 3.5–5.2)
ALBUMIN/GLOB SERPL: 1 {RATIO} (ref 1–2.5)
ALP SERPL-CCNC: 83 U/L (ref 35–104)
ALT SERPL-CCNC: 19 U/L (ref 10–35)
ANION GAP SERPL CALCULATED.3IONS-SCNC: 11 MMOL/L (ref 9–16)
AST SERPL-CCNC: 17 U/L (ref 10–35)
BASOPHILS # BLD: 0.03 K/UL (ref 0–0.2)
BASOPHILS NFR BLD: 0 % (ref 0–2)
BILIRUB SERPL-MCNC: <0.2 MG/DL (ref 0–1.2)
BUN SERPL-MCNC: 14 MG/DL (ref 6–20)
CALCIUM SERPL-MCNC: 9.2 MG/DL (ref 8.6–10.4)
CHLORIDE SERPL-SCNC: 103 MMOL/L (ref 98–107)
CO2 SERPL-SCNC: 27 MMOL/L (ref 20–31)
CREAT SERPL-MCNC: 0.4 MG/DL (ref 0.5–0.9)
EOSINOPHIL # BLD: 0.42 K/UL (ref 0–0.44)
EOSINOPHILS RELATIVE PERCENT: 6 % (ref 1–4)
ERYTHROCYTE [DISTWIDTH] IN BLOOD BY AUTOMATED COUNT: 14.6 % (ref 11.8–14.4)
GFR, ESTIMATED: >90 ML/MIN/1.73M2
GLUCOSE SERPL-MCNC: 248 MG/DL (ref 74–99)
HCT VFR BLD AUTO: 34.5 % (ref 36.3–47.1)
HGB BLD-MCNC: 10.8 G/DL (ref 11.9–15.1)
IMM GRANULOCYTES # BLD AUTO: 0.02 K/UL (ref 0–0.3)
IMM GRANULOCYTES NFR BLD: 0 %
LYMPHOCYTES NFR BLD: 3.79 K/UL (ref 1.1–3.7)
LYMPHOCYTES RELATIVE PERCENT: 52 % (ref 24–43)
MCH RBC QN AUTO: 29 PG (ref 25.2–33.5)
MCHC RBC AUTO-ENTMCNC: 31.3 G/DL (ref 28.4–34.8)
MCV RBC AUTO: 92.5 FL (ref 82.6–102.9)
MONOCYTES NFR BLD: 0.57 K/UL (ref 0.1–1.2)
MONOCYTES NFR BLD: 8 % (ref 3–12)
NEUTROPHILS NFR BLD: 34 % (ref 36–65)
NEUTS SEG NFR BLD: 2.48 K/UL (ref 1.5–8.1)
NRBC BLD-RTO: 0 PER 100 WBC
PLATELET # BLD AUTO: 432 K/UL (ref 138–453)
PMV BLD AUTO: 9.8 FL (ref 8.1–13.5)
POTASSIUM SERPL-SCNC: 4.1 MMOL/L (ref 3.7–5.3)
PROT SERPL-MCNC: 6.3 G/DL (ref 6.6–8.7)
RBC # BLD AUTO: 3.73 M/UL (ref 3.95–5.11)
RBC # BLD: ABNORMAL 10*6/UL
SODIUM SERPL-SCNC: 140 MMOL/L (ref 136–145)
WBC OTHER # BLD: 7.3 K/UL (ref 3.5–11.3)

## 2024-11-19 PROCEDURE — 80053 COMPREHEN METABOLIC PANEL: CPT

## 2024-11-19 PROCEDURE — P9603 ONE-WAY ALLOW PRORATED MILES: HCPCS

## 2024-11-19 PROCEDURE — 85025 COMPLETE CBC W/AUTO DIFF WBC: CPT

## 2024-11-19 PROCEDURE — 36415 COLL VENOUS BLD VENIPUNCTURE: CPT

## 2024-11-19 NOTE — TELEPHONE ENCOUNTER
Pt called in stating the facility she is at has not received pts restricts pt is currently at [facility] and rehab

## 2024-11-21 ENCOUNTER — TELEPHONE (OUTPATIENT)
Dept: VASCULAR SURGERY | Age: 34
End: 2024-11-21

## 2024-11-25 ENCOUNTER — OFFICE VISIT (OUTPATIENT)
Dept: PODIATRY | Age: 34
End: 2024-11-25
Payer: COMMERCIAL

## 2024-11-25 VITALS
DIASTOLIC BLOOD PRESSURE: 64 MMHG | BODY MASS INDEX: 24.34 KG/M2 | SYSTOLIC BLOOD PRESSURE: 126 MMHG | WEIGHT: 170 LBS | HEIGHT: 70 IN

## 2024-11-25 DIAGNOSIS — M79.671 FOOT PAIN, RIGHT: ICD-10-CM

## 2024-11-25 DIAGNOSIS — S78.119A ABOVE-KNEE AMPUTATION (HCC): ICD-10-CM

## 2024-11-25 DIAGNOSIS — Z89.421 HISTORY OF PARTIAL RAY AMPUTATION OF FIFTH TOE OF RIGHT FOOT (HCC): Primary | ICD-10-CM

## 2024-11-25 PROCEDURE — 99213 OFFICE O/P EST LOW 20 MIN: CPT | Performed by: STUDENT IN AN ORGANIZED HEALTH CARE EDUCATION/TRAINING PROGRAM

## 2024-11-25 PROCEDURE — 3074F SYST BP LT 130 MM HG: CPT | Performed by: STUDENT IN AN ORGANIZED HEALTH CARE EDUCATION/TRAINING PROGRAM

## 2024-11-25 PROCEDURE — 3078F DIAST BP <80 MM HG: CPT | Performed by: STUDENT IN AN ORGANIZED HEALTH CARE EDUCATION/TRAINING PROGRAM

## 2024-11-25 NOTE — H&P
examination findings, postoperative prognosis and protocol. All questions were answered to patient's apparent satisfaction.   All sutures were removed at today's visit.  Patient is able to be weight bearing as tolerated in normal shoe gear. She is clear for showering and normal daily activities.  No dressings were applied today.  Patient to go to ED if any of the following local signs of infection arise: Increased/streaking redness, pain, swelling, drainage or malodor. Patient to go to ED if any the following systemic signs of infection arise: Nausea, vomiting, fever, chills, diarrhea, shortness of breath or chest pain   RTC in 2 weeks for a final wound visit.   Please call the office with any questions or concerns   Discussed with Dr. Hemal Marin DPM PGY1  Podiatric Medicine & Surgery   11/25/2024 at 7:06 PM

## 2024-11-26 ENCOUNTER — OFFICE VISIT (OUTPATIENT)
Dept: VASCULAR SURGERY | Age: 34
End: 2024-11-26

## 2024-11-26 VITALS
HEART RATE: 102 BPM | DIASTOLIC BLOOD PRESSURE: 102 MMHG | BODY MASS INDEX: 24.34 KG/M2 | WEIGHT: 170 LBS | SYSTOLIC BLOOD PRESSURE: 169 MMHG | HEIGHT: 70 IN | OXYGEN SATURATION: 96 % | RESPIRATION RATE: 18 BRPM

## 2024-11-26 DIAGNOSIS — I70.235 ATHEROSCLEROSIS OF NATIVE ARTERY OF BOTH LOWER EXTREMITIES WITH BILATERAL ULCERATION OF OTHER PART OF FEET (HCC): Primary | ICD-10-CM

## 2024-11-26 DIAGNOSIS — I70.245 ATHEROSCLEROSIS OF NATIVE ARTERY OF BOTH LOWER EXTREMITIES WITH BILATERAL ULCERATION OF OTHER PART OF FEET (HCC): Primary | ICD-10-CM

## 2024-11-26 PROCEDURE — 99024 POSTOP FOLLOW-UP VISIT: CPT | Performed by: STUDENT IN AN ORGANIZED HEALTH CARE EDUCATION/TRAINING PROGRAM

## 2024-11-26 ASSESSMENT — ENCOUNTER SYMPTOMS
ABDOMINAL PAIN: 0
VOICE CHANGE: 0
EYE PAIN: 0
ABDOMINAL DISTENTION: 0
CHEST TIGHTNESS: 0
COUGH: 0
TROUBLE SWALLOWING: 0
VOMITING: 0
COLOR CHANGE: 0

## 2024-11-26 NOTE — PROGRESS NOTES
[facility]  [address]  [place] [address]  Dept: [phone]     Patient: Jane Saavedra  : 1990  MRN: [id number]  DOS: 2024    HPI:  24: Jane Saavedra is a 34 y.o. female who comes to the office regarding follow up s/p left leg popliteal artery percutaneous thrombectomy with PTA on 24. She now has bilateral foot wounds. Also has leg swelling. Past tobacco use. History of CABG in . No rest pain or motor/sensory loss. H1C last year was 13.2.    10/15/24: Was evaluated in [place], [place] recently for foot infection. She developed pain in her left leg that she thinks has been there for last 3 days. She has not been able to move her foot or ankle due to the pain and this motor loss might have been there for 3 days. She underwent arterial duplex today which shows left popliteal artery occlusion with absent tibial flow distally. Right side shows patent vessels but monophasic flow. ABIs were performed which show R Sara 0.61 and L 0.17. A1C from 24 is 13.4.    24: Underwent limb salvage attempt on 10/15/24 with angiogram and cutdown at below knee popliteal artery. The flow was restored but patient had a non-functional foot. Underwent left BKA on 10/17/24 but due to ischemic BKA stump required AKA on 14. Then on  required right leg angiogram with PTA of SFA and AT/TPT prior to right foot operation by podiatry. Since then the left AKA stump has healed well. The right foot wound has also healed well.    Past Medical History:   Diagnosis Date    Acute osteomyelitis     LEFT FOOT    Bipolar 1 disorder (HCC)     Cellulitis     LT FOOT    Chronic diabetic ulcer of left foot determined by examination (HCC)     Depression     Diabetic foot infection (HCC)     H. pylori infection     recurrent    History of recent hospitalization 2024    til 2024 : [facility] ,

## 2024-11-26 NOTE — PROGRESS NOTES
[facility]  [address]   [address] [place] [address]  Tel: [phone]   Fax: [phone]    Subjective     Procedure:   Left hallux amputation with fillet (DOS: 7/8/2024), right foot wound  Right partial fifth ray amputation (DOS: 11/7/2024)    Interval history 11/25/2024:  Patient follows up to clinic for wound to right foot after partial fifth ray amputation.  She was discharged from [facility]. Patient has been feeling well. No pain. No other pedal complaints at this time.    HPI:  This 34 y.o. female presents for a post op visit. Patient states they are doing well. Pain is well controlled by pain medication.  Has been icing and elevating the extremity as instructed preoperatively and has been weightbearing as tolerated to the heel only while utilizing her surgical screw to the left lower extremity. Denies any current nausea, vomiting, fever, chills, shortness of breath, chest pain or calf pain. Denies any other pedal complaints    Primary care physician is Marissa Wang MD.    ROS:    Constitutional: Denies nausea, vomiting, fever, chills.  Neurologic: Denies numbness, tingling, and burning in the feet.    Vascular: Denies symptoms of lower extremity claudication.    Skin: Closed wound.  Otherwise negative except as noted in the HPI.     PMH:  Past Medical History:   Diagnosis Date    Acute osteomyelitis     LEFT FOOT    Bipolar 1 disorder ([facility])     Cellulitis     LT FOOT    Chronic diabetic ulcer of left foot determined by examination ([facility])     Depression     Diabetic foot infection ([facility])     H. pylori infection     recurrent    History of recent hospitalization 06/18/2024    til 6/20/2024 : [facility] , ischemic leg    Hyperlipidemia     tx started May 2015    Hypertension     tx started May 2015    Ischemic cardiomyopathy     Multiple vessel coronary artery disease 01/2023    Neuropathy     BLE    Noncompliance     NSTEMI (non-ST elevated myocardial infarction) ([facility]) 01/06/2023

## 2024-12-03 ENCOUNTER — CARE COORDINATION (OUTPATIENT)
Dept: CARE COORDINATION | Age: 34
End: 2024-12-03

## 2024-12-04 NOTE — CARE COORDINATION
[facility]    SW and RN (Dar) phoned pt to check with her since her discharge from [facility] to see where pt would be residing for follow up. Pt states she is back in [place], [place] and plans on staying there.   Pt will be discharged from [facility] since she is no longer in [place] and states she has no plans to return.

## 2024-12-10 ENCOUNTER — TELEPHONE (OUTPATIENT)
Dept: VASCULAR SURGERY | Age: 34
End: 2024-12-10

## 2024-12-10 NOTE — TELEPHONE ENCOUNTER
Pt calling to f/u on prosthetic script.     Pt states she called last week requesting a prosthetic script to be sent to [facility] in [place], [place] (fax [phone])    Pt is no longer in need of the script for [facility]    PSC- informed pt request will be forwarded to clinical team.

## 2024-12-11 DIAGNOSIS — I70.235 ATHEROSCLEROSIS OF NATIVE ARTERY OF BOTH LOWER EXTREMITIES WITH BILATERAL ULCERATION OF OTHER PART OF FEET (HCC): Primary | ICD-10-CM

## 2024-12-11 DIAGNOSIS — I70.245 ATHEROSCLEROSIS OF NATIVE ARTERY OF BOTH LOWER EXTREMITIES WITH BILATERAL ULCERATION OF OTHER PART OF FEET (HCC): Primary | ICD-10-CM

## 2024-12-11 DIAGNOSIS — M79.89 LEG SWELLING: ICD-10-CM

## 2024-12-11 DIAGNOSIS — I99.8 ACUTE LOWER LIMB ISCHEMIA: ICD-10-CM

## 2024-12-11 DIAGNOSIS — I96 GANGRENE (HCC): ICD-10-CM

## (undated) DEVICE — DRAIN SURG 24FR L5/16IN DIA8MM SIL RND HUBLESS FULL FLUT

## (undated) DEVICE — SMARTGOWN BREATHABLE SURGICAL GOWN: Brand: CONVERTORS

## (undated) DEVICE — SUTURE NONABSORBABLE MONOFILAMENT 4-0 RB-1 36 IN BLU PROLENE 8557H

## (undated) DEVICE — SVMMC POD PK

## (undated) DEVICE — STRAP ARMBRD W1.5XL32IN FOAM STR YET SFT W/ HK AND LOOP

## (undated) DEVICE — GOWN,SIRUS,NONRNF,SETINSLV,2XL,18/CS: Brand: MEDLINE

## (undated) DEVICE — APPLICATOR MEDICATED 10.5 CC SOLUTION HI LT ORNG CHLORAPREP

## (undated) DEVICE — PROTECTOR ULN NRV PUR FOAM HK LOOP STRP ANATOMICALLY

## (undated) DEVICE — 60-7070-103 TRNQT,DPSB,PLC RED: Brand: MEDLINE RENEWAL

## (undated) DEVICE — GLOVE SURG SZ 7 CRM LTX FREE POLYISOPRENE POLYMER BEAD ANTI

## (undated) DEVICE — KIT MICRO INTRO 4FR STIFF 40CM NIGHTENALL TUNGSTEN 7SMT

## (undated) DEVICE — LOOP VES W13MM THK09MM MINI RED SIL FLD REPELLENT

## (undated) DEVICE — DEVICE CTRL FLOWSWITCH 24 PER BX

## (undated) DEVICE — Device

## (undated) DEVICE — TUBING AUTOTRNS SUCT 1/4 IN LEN W/ ASPIR ANTICOAG SORIN

## (undated) DEVICE — SUTURE PERMAHAND SZ 2-0 L18IN NONABSORBABLE BLK L26MM SH C012D

## (undated) DEVICE — BANDAGE,GAUZE,BULKEE II,4.5"X4.1YD,STRL: Brand: MEDLINE

## (undated) DEVICE — DEVICE SURGICAL SWITCH FLOW HIGH PRESSURE M/F LL

## (undated) DEVICE — GAUZE,SPONGE,FLUFF,6"X6.75",STRL,5/TRAY: Brand: MEDLINE

## (undated) DEVICE — PINNACLE INTRODUCER SHEATH: Brand: PINNACLE

## (undated) DEVICE — SHEET, ORTHO, SPLIT, STERILE: Brand: MEDLINE

## (undated) DEVICE — SYRINGE ONLY,20ML LUER LOCK: Brand: MEDLINE INDUSTRIES, INC.

## (undated) DEVICE — LIQUIBAND RAPID ADHESIVE 36/CS 0.8ML: Brand: MEDLINE

## (undated) DEVICE — SUTURE NONABSORBABLE MONOFILAMENT 7-0 BV-1 1X24 IN PROLENE 8702H

## (undated) DEVICE — SOLUTION IV IRRIG 500ML 0.9% SODIUM CHL 2F7123

## (undated) DEVICE — VESSEL HARVESTING KIT 7 MM ENDOSCP FOR PWR SUPL

## (undated) DEVICE — GAUZE,SPONGE,4"X4",8PLY,STRL,LF,10/TRAY: Brand: MEDLINE

## (undated) DEVICE — PAD,ABDOMINAL,8"X10",ST,LF: Brand: MEDLINE

## (undated) DEVICE — BANDAGE,GAUZE,4.5"X4.1YD,STERILE,LF: Brand: MEDLINE

## (undated) DEVICE — DRAPE,UTILITY,XL,4/PK,STERILE: Brand: MEDLINE

## (undated) DEVICE — SUTURE PERMA HND 2-0 L18IN NONABSORBABLE BLK CT-1 L36MM 1/2 C022D

## (undated) DEVICE — BANDAGE COMPR W6INXL12FT SMOOTH FOR LIMB EXSANG ESMARCH

## (undated) DEVICE — STRAP,POSITIONING,KNEE/BODY,FOAM,4X60": Brand: MEDLINE

## (undated) DEVICE — DRESSING NEG PRSS L W15XH3.3XL26CM BLK POLYUR DRP PD

## (undated) DEVICE — SUTURE MONOCRYL SZ 2-0 L27IN ABSRB UD SH L26MM TAPERPOINT NDL Y417H

## (undated) DEVICE — PROVE COVER: Brand: UNBRANDED

## (undated) DEVICE — SYRINGE MED 10ML LUERLOCK TIP W/O SFTY DISP

## (undated) DEVICE — BLADE SURG MIC STR DBL BVL SHRP ALL ARND SHRPTOME

## (undated) DEVICE — PACK PROCEDURE SURG OPN HRT A BASIC

## (undated) DEVICE — CATHETER INDIGO 7D + L LUMEN ASPIRATION TUBING

## (undated) DEVICE — SUTURE VICRYL + SZ 2-0 L27IN ABSRB CLR CT-1 1/2 CIR TAPERCUT VCP259H

## (undated) DEVICE — GAUZE,SPONGE,4"X4",16PLY,XRAY,STRL,LF: Brand: MEDLINE

## (undated) DEVICE — GLOVE SURG SZ 65 L12IN FNGR THK79MIL GRN LTX FREE

## (undated) DEVICE — SUTURE PROL SZ 3-0 L30IN NONABSORBABLE BLU L26MM SH 1/2 CIR 8832H

## (undated) DEVICE — SUTURE MONOCRYL SZ 5-0 L18IN ABSRB UD L13MM P-3 3/8 CIR PRIM Y493G

## (undated) DEVICE — GLOVE SURG SZ 7 L12IN FNGR THK79MIL GRN LTX FREE

## (undated) DEVICE — THE STERILE LIGHT HANDLE COVER IS USED WITH STERIS SURGICAL LIGHTING AND VISUALIZATION SYSTEMS.

## (undated) DEVICE — RADIFOCUS GLIDEWIRE ADVANTAGE GUIDEWIRE: Brand: GLIDEWIRE ADVANTAGE

## (undated) DEVICE — GOWN,AURORA,NONREINFORCED,LARGE: Brand: MEDLINE

## (undated) DEVICE — OPTIFOAM GENTLE EX, SACRUM, 7X7: Brand: MEDLINE

## (undated) DEVICE — TRAY SURG CUST CRD CATH TOLEDO

## (undated) DEVICE — SUTURE NONABSORBABLE MONOFILAMENT 6-0 C-1 1X30 IN PROLENE 8706H

## (undated) DEVICE — GLOVE SURG SZ 6 CRM LTX FREE POLYISOPRENE POLYMER BEAD ANTI

## (undated) DEVICE — GUIDEWIRE WITH ICE™ HYDROPHILIC COATING: Brand: V-18™ CONTROL WIRE™

## (undated) DEVICE — INTENDED FOR TISSUE SEPARATION, AND OTHER PROCEDURES THAT REQUIRE A SHARP SURGICAL BLADE TO PUNCTURE OR CUT.: Brand: BARD-PARKER ® CARBON RIB-BACK BLADES

## (undated) DEVICE — DRESSING,GAUZE,XEROFORM,CURAD,5"X9",ST: Brand: CURAD

## (undated) DEVICE — AGENT HEMOSTATIC SURGIFLOW MATRIX KIT W/THROMBIN

## (undated) DEVICE — MASTISOL ADHESIVE LIQ 2/3ML

## (undated) DEVICE — CANISTER SUCTION VAC PORTABLE 1000 CC FOR INDIGO SYS ENGINE

## (undated) DEVICE — CATHETER IN.PACT 018 4.0 MM X 150 MM X 130 CM

## (undated) DEVICE — 3F 40 CM LEMAITRE EMBOLECTOMY CATHETER, EIFU: Brand: LEMAITRE EMBOLECTOMY CATHETER

## (undated) DEVICE — BNDG,ELSTC,MATRIX,STRL,6"X5YD,LF,HOOK&LP: Brand: MEDLINE

## (undated) DEVICE — PTA BALLOON DILATATION CATHETER: Brand: COYOTE™

## (undated) DEVICE — MARKER,SKIN,WI/RULER AND LABELS: Brand: MEDLINE

## (undated) DEVICE — TOWEL,OR,DSP,ST,BLUE,DLX,XR,4/PK,20PK/CS: Brand: MEDLINE

## (undated) DEVICE — CATHETER 7 INDIGO 130CM XTORQ + LIGHT BOLT ASPIRATION TUBING

## (undated) DEVICE — TAPE UMB 72X7/32 IN

## (undated) DEVICE — SET EXTN IV L30IN TBNG DIA0.1IN PRIMING 4ML MACBOR FEM ADPT

## (undated) DEVICE — SUTURE MONOCRYL + ABSORBABLE MONOFILAMENT 3-0 SH 27 IN UD  MCP316H

## (undated) DEVICE — LEAD PACEMKR MYOCARDIAL UNIPOLAR TEMP

## (undated) DEVICE — SUTURE ETHBND EXCEL SZ 0 L18IN NONABSORBABLE GRN L36MM CT-1 CX21D

## (undated) DEVICE — GLOVE SURG SZ 8 CRM LTX FREE POLYISOPRENE POLYMER BEAD ANTI

## (undated) DEVICE — DECANTER BAG 9": Brand: MEDLINE INDUSTRIES, INC.

## (undated) DEVICE — SYRINGE MED 20ML STD CLR PLAS LUERLOCK TIP N CTRL DISP

## (undated) DEVICE — APPLICATOR MEDICATED 26 CC SOLUTION HI LT ORNG CHLORAPREP

## (undated) DEVICE — SUTURE MCRYL + SZ 4-0 L18IN ABSRB UD L19MM PS-2 3/8 CIR MCP496G

## (undated) DEVICE — SUTURE VCRL + SZ 3-0 L18IN ABSRB UD SH 1/2 CIR TAPERCUT NDL VCP864D

## (undated) DEVICE — PATCH SURG FIBRIN SEAL 4.8X4.8 CM ABSORBABLE TACHOSIL

## (undated) DEVICE — SUTURE PDS II SZ 2-0 L27IN ABSRB VLT SH L26MM 1/2 CIR Z317H

## (undated) DEVICE — Z DISCONTINUED USE 2220128 SUTURE VCRL SZ 4-0 L18IN ABSRB UD VCRL POLYGLACTIN 910 COAT J109T

## (undated) DEVICE — INFLATION KIT CUST REV

## (undated) DEVICE — SYRINGE 20ML LL S/C 50

## (undated) DEVICE — CATHETER PTCA L150CM BLLN L60MM OD2MM SHTH OD4FR 0.014IN

## (undated) DEVICE — SUTURE NONABSORBABLE MONOFILAMENT 3-0 PS-1 18 IN BLK ETHILON 1663H

## (undated) DEVICE — GLOVE ORANGE PI 8 1/2   MSG9085

## (undated) DEVICE — DEVICE INFL 20ML 30ATM DGT FLD DISPNS SYR W ACCESSPLUS BLU

## (undated) DEVICE — SNAP KAP: Brand: UNBRANDED

## (undated) DEVICE — COVER US PRB W12XL244CM SURGICAL INTRAOPERATIVE PLAS TAPR L

## (undated) DEVICE — SUTURE SZ 7 L18IN NONABSORBABLE SIL CCS L48MM 1/2 CIR STRNM M655G

## (undated) DEVICE — COVER LT HNDL PLAS RIG 2 PER PK

## (undated) DEVICE — STERILE LATEX POWDER-FREE SURGICAL GLOVESWITH NITRILE COATING: Brand: PROTEXIS

## (undated) DEVICE — CANNULA PERF AD 20FR L8.5IN ART 3/8IN CONN NVENT MTL TIP

## (undated) DEVICE — EVERGRIP INSERT SET: Brand: FOGARTY EVERGRIP

## (undated) DEVICE — GLOVE SURG SZ 85 L12IN FNGR ORTHO 126MIL CRM LTX FREE

## (undated) DEVICE — BANDAGE GZ W2XL75IN ST RAYON POLY CNFRM STRTCH LTWT

## (undated) DEVICE — TOWEL,OR,DSP,ST,NATURAL,DLX,4/PK,20PK/CS: Brand: MEDLINE

## (undated) DEVICE — STRYKER PERFORMANCE SERIES SAGITTAL BLADE: Brand: STRYKER PERFORMANCE SERIES

## (undated) DEVICE — COVER,LIGHT HANDLE,FLX,2/PK: Brand: MEDLINE INDUSTRIES, INC.

## (undated) DEVICE — SURGICAL SUCTION CONNECTING TUBE WITH MALE CONNECTOR AND SUCTION CLAMP, 2 FT. LONG (.6 M), 5 MM I.D.: Brand: CONMED

## (undated) DEVICE — LINE TBL CSC-14 FOR CARDPLG DEL SYS

## (undated) DEVICE — DRAPE,TOWEL,LARGE,INVISISHIELD: Brand: MEDLINE

## (undated) DEVICE — GLOVE SURG SZ 8 L12IN FNGR THK79MIL GRN LTX FREE

## (undated) DEVICE — BLADE,STAINLESS-STEEL,15,STRL,DISPOSABLE: Brand: MEDLINE

## (undated) DEVICE — FLEXOR, CHECK-FLO, INTRODUCER ANSEL MODIFICATION: Brand: FLEXOR

## (undated) DEVICE — PREMIUM DRY TRAY LF: Brand: MEDLINE INDUSTRIES, INC.

## (undated) DEVICE — CONTAINER,SPECIMEN,4OZ,OR STRL: Brand: MEDLINE

## (undated) DEVICE — CATHETER ANGIO 4FR L65CM 0.035IN VIS OMNI FLUSH-0 SFT TIP

## (undated) DEVICE — SPONGE LAP W18XL18IN WHT COT 4 PLY FLD STRUNG RADPQ DISP ST 2 PER PACK

## (undated) DEVICE — 1LYRTR 16FR10ML100%SILTMPS SNP: Brand: MEDLINE INDUSTRIES, INC.

## (undated) DEVICE — GLOVE SURG SZ 75 CRM LTX FREE POLYISOPRENE POLYMER BEAD ANTI

## (undated) DEVICE — [HIGH FLOW INSUFFLATOR,  DO NOT USE IF PACKAGE IS DAMAGED,  KEEP DRY,  KEEP AWAY FROM SUNLIGHT,  PROTECT FROM HEAT AND RADIOACTIVE SOURCES.]: Brand: PNEUMOSURE

## (undated) DEVICE — GAUZE,PACKING STRIP,IODOFORM,1"X5YD,STRL: Brand: CURAD

## (undated) DEVICE — SUTURE ETHBND EXCEL SZ 2-0 L36IN NONABSORBABLE GRN SH-2 X559H

## (undated) DEVICE — CONNECTOR PERF W0.25XH3/8IN BASE Y SHP REDUC W/O LUERLOCK

## (undated) DEVICE — SUTURE MCRYL SZ 4-0 L18IN ABSRB UD L19MM PS-2 3/8 CIR PRIM Y496G

## (undated) DEVICE — RETRACTOR SURG INSRT SUT HLD OCTOBASE

## (undated) DEVICE — NAVICROSS SUPPORT CATHETER: Brand: NAVICROSS

## (undated) DEVICE — TRAILBLAZER L90CM 50MM MRK BND SPC GWIRE 0.035IN

## (undated) DEVICE — RADIFOCUS TORQUE DEVICE MULTI-TORQUE VISE: Brand: RADIFOCUS TORQUE DEVICE

## (undated) DEVICE — THIN OFFSET (9.0 X 0.38 X 25.0MM)

## (undated) DEVICE — SUTURE PERMAHAND SZ 2-0 L12X18IN NONABSORBABLE BLK SILK A185H

## (undated) DEVICE — SUTURE PROL SZ 6-0 L24IN NONABSORBABLE BLU L9.3MM BV-1 3/8 8805H

## (undated) DEVICE — SURGICAL PROCEDURE TRAY CRD CATH SVMMC

## (undated) DEVICE — SUTURE ETHILON SZ 3-0 L18IN NONABSORBABLE BLK L24MM PS-1 3/8 1663G

## (undated) DEVICE — SUTURE ABSORBABLE L 18 IN SZ 4-0 NDL L 19 MM POLYDIOXANONE

## (undated) DEVICE — DEVICE VASC CLSR 5FR GRY W/ INTEGR SEAL 10ML LOK SYR

## (undated) DEVICE — PAD,ABDOMINAL,5"X9",ST,LF,25/BX: Brand: MEDLINE INDUSTRIES, INC.

## (undated) DEVICE — KIT BLWR MISTER 5P 15L W/ TBNG SET IRRIG MIST TO IMPROVE

## (undated) DEVICE — SSC BONE WAX: Brand: SSC BONE WAX

## (undated) DEVICE — SUTURE N ABSRB L 36 IN SZ 5-0 NDL L 13 MM POLYPRO OR PPL BLU

## (undated) DEVICE — SEALANT HEMOSTATIC FLOSEAL W/RECOTHROM 5 ML 75X13.25 IN 10CC

## (undated) DEVICE — DRESSING TRNSPAR W4XL4.8IN FLM SURESITE 123

## (undated) DEVICE — SUTURE MONOCRYL SZ 4-0 L18IN ABSRB UD L16MM PC-3 3/8 CIR PRIM Y845G

## (undated) DEVICE — DEVICE EMBOLIC PROTCT FLTR L5MM GWIRE L320/190CM DIA0.014IN

## (undated) DEVICE — Device: Brand: VISIONS PV .018 DIGITAL IVUS CATHETER

## (undated) DEVICE — Z CONVERTED USE 2271043 CONTAINER SPEC COLL 4OZ SCR ON LID PEEL PCH

## (undated) DEVICE — SUTURE PDS + SZ 0 L36IN ABSRB VLT CT 1 L36MM 1 2 CIR PDP346H

## (undated) DEVICE — MINOR SET UP: Brand: MEDLINE INDUSTRIES, INC.

## (undated) DEVICE — HI-TORQUE SPARTACORE 14 PERIPHERAL GUIDE WIRE .014 5.0 CM X 300 CM: Brand: SPARTACORE

## (undated) DEVICE — 3M™ IOBAN™ 2 ANTIMICROBIAL INCISE DRAPE 6651EZ: Brand: IOBAN™ 2

## (undated) DEVICE — SUTURE PROL SZ 6-0 L24IN NONABSORBABLE BLU L13MM C-1 3/8 8726H

## (undated) DEVICE — CANNULA PERF 7FR L5.5IN AORT ROOT RADPQ STD TIP W/ VENT LN

## (undated) DEVICE — 20 ML SYRINGE LUER-LOCK TIP: Brand: MONOJECT

## (undated) DEVICE — GLOVE,SURG,SENSICARE SLT,LF,PF,7.5: Brand: MEDLINE

## (undated) DEVICE — KIT,ANTI FOG,W/SPONGE & FLUID,SOFT PACK: Brand: MEDLINE

## (undated) DEVICE — SUTURE PERMAHAND SZ 4-0 L18IN NONABSORBABLE BLK SILK BRAID A183H

## (undated) DEVICE — SYRINGE MEDICAL 3ML CLEAR PLASTIC STANDARD NON CONTROL LUERLOCK TIP DISPOSABLE

## (undated) DEVICE — SET PEN AND LBL AND L BWL LBL PAL PEN AND LBLS PAL700]

## (undated) DEVICE — SUTURE VIC + BR UD PS2 4-0 18IN VCP496G

## (undated) DEVICE — DESTINATION RENAL GUIDING SHEATH: Brand: DESTINATION

## (undated) DEVICE — GOWN,SIRUS,NONRNF,SETINSLV,XL,20/CS: Brand: MEDLINE

## (undated) DEVICE — SUTURE VCRL SZ 3-0 L27IN ABSRB UD L26MM SH 1/2 CIR J416H

## (undated) DEVICE — PUNCH AORT DIA4MM LNG HNDL

## (undated) DEVICE — DRAPE,REIN 53X77,STERILE: Brand: MEDLINE

## (undated) DEVICE — SUTURE VICRYL SZ 4-0 L18IN ABSRB UD L19MM PS-2 3/8 CIR PRIM J496H

## (undated) DEVICE — GLOVE SURG SZ 75 L12IN FNGR THK79MIL GRN LTX FREE

## (undated) DEVICE — BANDAGE,ELASTIC,ESMARK,STERILE,4"X9',LF: Brand: MEDLINE